# Patient Record
Sex: FEMALE | Race: WHITE | NOT HISPANIC OR LATINO | Employment: OTHER | URBAN - METROPOLITAN AREA
[De-identification: names, ages, dates, MRNs, and addresses within clinical notes are randomized per-mention and may not be internally consistent; named-entity substitution may affect disease eponyms.]

---

## 2017-02-13 ENCOUNTER — HOSPITAL ENCOUNTER (EMERGENCY)
Facility: MEDICAL CENTER | Age: 52
End: 2017-02-13
Attending: EMERGENCY MEDICINE
Payer: COMMERCIAL

## 2017-02-13 VITALS
WEIGHT: 166.67 LBS | BODY MASS INDEX: 26.79 KG/M2 | SYSTOLIC BLOOD PRESSURE: 131 MMHG | TEMPERATURE: 98.2 F | HEIGHT: 66 IN | HEART RATE: 90 BPM | RESPIRATION RATE: 16 BRPM | OXYGEN SATURATION: 98 % | DIASTOLIC BLOOD PRESSURE: 74 MMHG

## 2017-02-13 DIAGNOSIS — J20.9 ACUTE BRONCHITIS, UNSPECIFIED ORGANISM: ICD-10-CM

## 2017-02-13 PROCEDURE — 99283 EMERGENCY DEPT VISIT LOW MDM: CPT

## 2017-02-13 RX ORDER — ALBUTEROL SULFATE 90 UG/1
2 AEROSOL, METERED RESPIRATORY (INHALATION) EVERY 6 HOURS PRN
Qty: 8.5 G | Refills: 0 | Status: SHIPPED | OUTPATIENT
Start: 2017-02-13 | End: 2018-12-31 | Stop reason: SDUPTHER

## 2017-02-13 RX ORDER — AZITHROMYCIN 250 MG/1
TABLET, FILM COATED ORAL
Qty: 6 TAB | Refills: 0 | Status: SHIPPED | OUTPATIENT
Start: 2017-02-13 | End: 2017-06-19

## 2017-02-13 RX ORDER — PREDNISONE 20 MG/1
60 TABLET ORAL DAILY
Qty: 15 TAB | Refills: 0 | Status: SHIPPED | OUTPATIENT
Start: 2017-02-13 | End: 2017-02-18

## 2017-02-13 ASSESSMENT — PAIN SCALES - GENERAL: PAINLEVEL_OUTOF10: 9

## 2017-02-13 NOTE — ED AVS SNAPSHOT
2/13/2017          Marilyn Salinas  [address]  [place] [address]    Dear Marilyn:    [facility] wants to ensure your discharge home is safe and you or your loved ones have had all your questions answered regarding your care after you leave the hospital.    You may receive a telephone call within two days of your discharge.  This call is to make certain you understand your discharge instructions as well as ensure we provided you with the best care possible during your stay with us.     The call will only last approximately 3-5 minutes and will be done by a nurse.    Once again, we want to ensure your discharge home is safe and that you have a clear understanding of any next steps in your care.  If you have any questions or concerns, please do not hesitate to contact us, we are here for you.  Thank you for choosing [facility] for your healthcare needs.    Sincerely,    Jeronimo Schwartz    [facility]

## 2017-02-13 NOTE — ED AVS SNAPSHOT
Ecoviate Access Code: [email or web address]  Expires: 3/15/2017  5:47 PM    Your email address is not on file at [facility].  Email Addresses are required for you to sign up for Ecoviate, please contact [phone] to verify your personal information and to provide your email address prior to attempting to register for Ecoviate.    Marilyn Salinas  [address]  [place] [address]    Ecoviate  A secure, online tool to manage your health information     [facility]’s Ecoviate® is a secure, online tool that connects you to your personalized health information from the privacy of your home -- day or night - making it very easy for you to manage your healthcare. Once the activation process is completed, you can even access your medical information using the Ecoviate bubba, which is available for free in the Apple Bubba store or Google Play store.     To learn more about Ecoviate, visit [email or web address]    There are two levels of access available (as shown below):   My Chart Features  [facility] Primary Care Doctor [facility]  Specialists [facility]  Urgent  Care Non-[facility] Primary Care Doctor   Email your healthcare team securely and privately 24/7 X X X    Manage appointments: schedule your next appointment; view details of past/upcoming appointments X      Request prescription refills. X      View recent personal medical records, including lab and immunizations X X X X   View health record, including health history, allergies, medications X X X X   Read reports about your outpatient visits, procedures, consult and ER notes X X X X   See your discharge summary, which is a recap of your hospital and/or ER visit that includes your diagnosis, lab results, and care plan X X  X     How to register for Ecoviate:  Once your e-mail address has been verified, follow the following steps to sign up for Ecoviate.     1. Go to  [email or web address]  2. Click on the Sign Up Now box, which takes you to the New Member Sign Up page.  You will need to provide the following information:  a. Enter your mth sense Access Code exactly as it appears at the top of this page. (You will not need to use this code after you’ve completed the sign-up process. If you do not sign up before the expiration date, you must request a new code.)   b. Enter your date of birth.   c. Enter your home email address.   d. Click Submit, and follow the next screen’s instructions.  3. Create a [employer] ID. This will be your mth sense login ID and cannot be changed, so think of one that is secure and easy to remember.  4. Create a mth sense password. You can change your password at any time.  5. Enter your Password Reset Question and Answer. This can be used at a later time if you forget your password.   6. Enter your e-mail address. This allows you to receive e-mail notifications when new information is available in mth sense.  7. Click Sign Up. You can now view your health information.    For assistance activating your mth sense account, call [phone]

## 2017-02-13 NOTE — ED AVS SNAPSHOT
Home Care Instructions                                                                                                                Marilyn Salinas   MRN: [id number]    Department:  [facility], Emergency Dept   Date of Visit:  2/13/2017            [facility], Emergency Dept    [address] [place] [id number]    Phone:  [phone]      You were seen by     Mando Avalos M.D.      Your Diagnosis Was     Acute bronchitis, unspecified organism     J20.9       Follow-up Information     1. Follow up with [facility], Emergency Dept.    Specialty:  Emergency Medicine    Why:  If symptoms worsen    Contact information    [address] [place] [id number] [phone]      Medication Information     Review all of your home medications and newly ordered medications with your primary doctor and/or pharmacist as soon as possible. Follow medication instructions as directed by your doctor and/or pharmacist.     Please keep your complete medication list with you and share with your physician. Update the information when medications are discontinued, doses are changed, or new medications (including over-the-counter products) are added; and carry medication information at all times in the event of emergency situations.               Medication List      START taking these medications        Instructions    albuterol 108 (90 BASE) MCG/ACT Aers inhalation aerosol    Inhale 2 Puffs by mouth every 6 hours as needed for Shortness of Breath.   Dose:  2 Puff       azithromycin 250 MG Tabs   Commonly known as:  ZITHROMAX    Take two tabs by mouth on day one, then one tab by mouth daily on days 2-5.       predniSONE 20 MG Tabs   Commonly known as:  DELTASONE    Take 3 Tabs by mouth every day for 5 days. Take with food   Dose:  60 mg                 Discharge Instructions       Bronchitis  Bronchitis is the body's way of reacting to injury and/or infection  (inflammation) of the bronchi. Bronchi are the air tubes that extend from the windpipe into the lungs. If the inflammation becomes severe, it may cause shortness of breath.  CAUSES   Inflammation may be caused by:  · A virus.  · Germs (bacteria).  · Dust.  · Allergens.  · Pollutants and many other irritants.  The cells lining the bronchial tree are covered with tiny hairs (cilia). These constantly beat upward, away from the lungs, toward the mouth. This keeps the lungs free of pollutants. When these cells become too irritated and are unable to do their job, mucus begins to develop. This causes the characteristic cough of bronchitis. The cough clears the lungs when the cilia are unable to do their job. Without either of these protective mechanisms, the mucus would settle in the lungs. Then you would develop pneumonia.  Smoking is a common cause of bronchitis and can contribute to pneumonia. Stopping this habit is the single most important thing you can do to help yourself.  TREATMENT   · Your caregiver may prescribe an antibiotic if the cough is caused by bacteria. Also, medicines that open up your airways make it easier to breathe. Your caregiver may also recommend or prescribe an expectorant. It will loosen the mucus to be coughed up. Only take over-the-counter or prescription medicines for pain, discomfort, or fever as directed by your caregiver.  · Removing whatever causes the problem (smoking, for example) is critical to preventing the problem from getting worse.  · Cough suppressants may be prescribed for relief of cough symptoms.  · Inhaled medicines may be prescribed to help with symptoms now and to help prevent problems from returning.  · For those with recurrent (chronic) bronchitis, there may be a need for steroid medicines.  SEEK IMMEDIATE MEDICAL CARE IF:   · During treatment, you develop more pus-like mucus (purulent sputum).  · You have a fever.  · Your baby is older than 3 months with a rectal  temperature of 102° F (38.9° C) or higher.  · Your baby is 3 months old or younger with a rectal temperature of 100.4° F (38° C) or higher.  · You become progressively more ill.  · You have increased difficulty breathing, wheezing, or shortness of breath.  It is necessary to seek immediate medical care if you are elderly or sick from any other disease.  MAKE SURE YOU:   · Understand these instructions.  · Will watch your condition.  · Will get help right away if you are not doing well or get worse.  Document Released: 12/18/2006 Document Revised: 03/11/2013 Document Reviewed: 10/27/2009  ExitCare® Patient Information ©2014 [employer].            Patient Information     Patient Information    Following emergency treatment: all patient requiring follow-up care must return either to a private physician or a clinic if your condition worsens before you are able to obtain further medical attention, please return to the emergency room.     Billing Information    At [facility], we work to make the billing process streamlined for our patients.  Our Representatives are here to answer any questions you may have regarding your hospital bill.  If you have insurance coverage and have supplied your insurance information to us, we will submit a claim to your insurer on your behalf.  Should you have any questions regarding your bill, we can be reached online or by phone as follows:  Online: You are able pay your bills online or live chat with our representatives about any billing questions you may have. We are here to help Monday - Friday from 8:00am to 7:30pm and 9:00am - 12:00pm on Saturdays.  Please visit [email or web address]  for more information.   Phone:  [phone] or [phone]    Please note that your emergency physician, surgeon, pathologist, radiologist, anesthesiologist, and other specialists are not employed by [facility] and will therefore bill separately for their services.   Please contact them directly for any questions concerning their bills at the numbers below:     Emergency Physician Services:  [phone]  [facility]:  [phone]  [facility]:  [phone]  [facility]:  [phone]    1. Your final bill may vary from the amount quoted upon discharge if all procedures are not complete at that time, or if your doctor has additional procedures of which we are not aware. You will receive an additional bill if you return to the Emergency Department at [facility] for suture removal regardless of the facility of which the sutures were placed.     2. Please arrange for settlement of this account at the emergency registration.    3. All self-pay accounts are due in full at the time of treatment.  If you are unable to meet this obligation then payment is expected within 4-5 days.     4. If you have had radiology studies (CT, X-ray, Ultrasound, MRI), you have received a preliminary result during your emergency department visit. Please contact the radiology department [phone] to receive a copy of your final result. Please discuss the Final result with your primary physician or with the follow up physician provided.     Crisis Hotline:  [place] Crisis Hotline:  8-347-YDLFLQH or 1-642.346.3497  [place] Crisis Hotline:    1-483.532.9371 or 079-532-0177         ED Discharge Follow Up Questions    1. In order to provide you with very good care, we would like to follow up with a phone call in the next few days.  May we have your permission to contact you?     YES /  NO    2. What is the best phone number to call you? (       )_____-__________    3. What is the best time to call you?      Morning  /  Afternoon  /  Evening                   Patient Signature:  ____________________________________________________________    Date:  ____________________________________________________________

## 2017-02-14 NOTE — ED NOTES
".  Chief Complaint   Patient presents with   • Congestion     In head and chest x 1.5 months     ./76 mmHg  Pulse 100  Temp(Src) 36.8 °C (98.2 °F) (Temporal)  Resp 18  Ht 1.676 m (5' 6\")  Wt 75.6 kg (166 lb 10.7 oz)  BMI 26.91 kg/m2  SpO2 93%    Ambulatory to triage with above complaints, educated on triage process, placed in lobby, told to inform staff of any changes  "

## 2017-02-14 NOTE — DISCHARGE INSTRUCTIONS

## 2017-02-14 NOTE — ED PROVIDER NOTES
"ED Provider Note    CHIEF COMPLAINT  Chief Complaint   Patient presents with   • Congestion     In head and chest x 1.5 months       HPI  Marilyn Salinas is a 51 y.o. female who presents to the emergency department with chest congestion of approximately a month and a half in duration. She states her symptoms have gradually gotten worse. She also has a lot of congestion in her face. She does have a productive cough. She has not had any fevers nor vomiting. She does abuse tobacco products but does not utilize any inhalers. She has not been on recent steroids or antibiotics.    REVIEW OF SYSTEMS  No nausea or vomiting    PHYSICAL EXAM  VITAL SIGNS: /76 mmHg  Pulse 100  Temp(Src) 36.8 °C (98.2 °F) (Temporal)  Resp 18  Ht 1.676 m (5' 6\")  Wt 75.6 kg (166 lb 10.7 oz)  BMI 26.91 kg/m2  SpO2 93%  In general the patient appears uncomfortable but nontoxic  Nares the patient has swollen turbinates, tympanic membranes are retracted bilaterally, oropharynx nonerythematous  Neck is supple without adenopathy  Pulmonary diffuse rhonchi throughout, slight tachypnea, diffuse wheezing  Skin no erythema nor induration    COURSE & MEDICAL DECISION MAKING  Pertinent Labs & Imaging studies reviewed. (See chart for details)  This a 51-year-old female who presents with acute bronchitis. The patient be treated with azithromycin, prednisone, and albuterol. She does not appear toxic. She'll return if she does not have improvement in 4-5 days and sooner if worse.    FINAL IMPRESSION  1. Acute bronchitis with reactive airway disease     Disposition  The patient will be discharged in stable condition.      Electronically signed by: Mando Avalos, 2/13/2017 5:47 PM        "

## 2017-06-19 ENCOUNTER — OFFICE VISIT (OUTPATIENT)
Dept: MEDICAL GROUP | Facility: MEDICAL CENTER | Age: 52
End: 2017-06-19
Attending: NURSE PRACTITIONER
Payer: MEDICAID

## 2017-06-19 VITALS
BODY MASS INDEX: 29.88 KG/M2 | HEIGHT: 64 IN | DIASTOLIC BLOOD PRESSURE: 80 MMHG | HEART RATE: 88 BPM | SYSTOLIC BLOOD PRESSURE: 120 MMHG | RESPIRATION RATE: 12 BRPM | TEMPERATURE: 98.4 F | OXYGEN SATURATION: 90 % | WEIGHT: 175 LBS

## 2017-06-19 DIAGNOSIS — H54.7 POOR VISION: ICD-10-CM

## 2017-06-19 DIAGNOSIS — M79.672 PAIN IN BOTH FEET: ICD-10-CM

## 2017-06-19 DIAGNOSIS — M51.36 DDD (DEGENERATIVE DISC DISEASE), LUMBAR: ICD-10-CM

## 2017-06-19 DIAGNOSIS — Z13.220 SCREENING CHOLESTEROL LEVEL: ICD-10-CM

## 2017-06-19 DIAGNOSIS — M79.671 PAIN IN BOTH FEET: ICD-10-CM

## 2017-06-19 DIAGNOSIS — N95.1 PERI-MENOPAUSE: ICD-10-CM

## 2017-06-19 DIAGNOSIS — Z12.11 SCREEN FOR COLON CANCER: ICD-10-CM

## 2017-06-19 DIAGNOSIS — Z13.21 ENCOUNTER FOR VITAMIN DEFICIENCY SCREENING: ICD-10-CM

## 2017-06-19 DIAGNOSIS — G89.29 OTHER CHRONIC PAIN: ICD-10-CM

## 2017-06-19 DIAGNOSIS — F41.9 ANXIETY AND DEPRESSION: ICD-10-CM

## 2017-06-19 DIAGNOSIS — M20.42 HAMMER TOES OF BOTH FEET: ICD-10-CM

## 2017-06-19 DIAGNOSIS — E66.9 OBESITY (BMI 30-39.9): ICD-10-CM

## 2017-06-19 DIAGNOSIS — Z72.0 TOBACCO USE: ICD-10-CM

## 2017-06-19 DIAGNOSIS — J45.20 MILD INTERMITTENT ASTHMA WITHOUT COMPLICATION: ICD-10-CM

## 2017-06-19 DIAGNOSIS — K30 ACID INDIGESTION: ICD-10-CM

## 2017-06-19 DIAGNOSIS — R09.89 CHEST CONGESTION: ICD-10-CM

## 2017-06-19 DIAGNOSIS — R09.81 NASAL CONGESTION: ICD-10-CM

## 2017-06-19 DIAGNOSIS — Z00.00 ROUTINE HEALTH MAINTENANCE: ICD-10-CM

## 2017-06-19 DIAGNOSIS — F32.A ANXIETY AND DEPRESSION: ICD-10-CM

## 2017-06-19 DIAGNOSIS — Z12.39 SCREENING FOR BREAST CANCER: ICD-10-CM

## 2017-06-19 DIAGNOSIS — M20.41 HAMMER TOES OF BOTH FEET: ICD-10-CM

## 2017-06-19 DIAGNOSIS — Z13.1 SCREENING FOR DIABETES MELLITUS: ICD-10-CM

## 2017-06-19 DIAGNOSIS — Z91.09 ENVIRONMENTAL ALLERGIES: ICD-10-CM

## 2017-06-19 DIAGNOSIS — N95.1 MENOPAUSAL SYMPTOM: ICD-10-CM

## 2017-06-19 DIAGNOSIS — Z13.29 SCREENING FOR THYROID DISORDER: ICD-10-CM

## 2017-06-19 PROBLEM — M51.369 DDD (DEGENERATIVE DISC DISEASE), LUMBAR: Status: ACTIVE | Noted: 2017-06-19

## 2017-06-19 PROCEDURE — 99203 OFFICE O/P NEW LOW 30 MIN: CPT | Performed by: NURSE PRACTITIONER

## 2017-06-19 PROCEDURE — 99204 OFFICE O/P NEW MOD 45 MIN: CPT | Performed by: NURSE PRACTITIONER

## 2017-06-19 RX ORDER — AZITHROMYCIN 250 MG/1
TABLET, FILM COATED ORAL
Qty: 6 TAB | Refills: 0 | Status: SHIPPED | OUTPATIENT
Start: 2017-06-19 | End: 2017-07-31

## 2017-06-19 RX ORDER — FLUTICASONE PROPIONATE 50 MCG
1 SPRAY, SUSPENSION (ML) NASAL 2 TIMES DAILY
Qty: 16 G | Refills: 2 | Status: SHIPPED | OUTPATIENT
Start: 2017-06-19 | End: 2018-04-10 | Stop reason: SDUPTHER

## 2017-06-19 RX ORDER — METHOCARBAMOL 500 MG/1
500 TABLET, FILM COATED ORAL 2 TIMES DAILY PRN
Qty: 60 TAB | Refills: 2 | Status: SHIPPED | OUTPATIENT
Start: 2017-06-19 | End: 2018-09-06 | Stop reason: SDUPTHER

## 2017-06-19 RX ORDER — RANITIDINE 150 MG/1
300 TABLET ORAL DAILY
COMMUNITY
End: 2017-06-19 | Stop reason: SDUPTHER

## 2017-06-19 RX ORDER — CODEINE PHOSPHATE/GUAIFENESIN 10-100MG/5
5 LIQUID (ML) ORAL 2 TIMES DAILY PRN
Qty: 120 ML | Refills: 0 | Status: SHIPPED | OUTPATIENT
Start: 2017-06-19 | End: 2017-07-31

## 2017-06-19 RX ORDER — IBUPROFEN 800 MG/1
800 TABLET ORAL EVERY 8 HOURS PRN
COMMUNITY
End: 2017-06-19 | Stop reason: SDUPTHER

## 2017-06-19 RX ORDER — RANITIDINE 150 MG/1
150 TABLET ORAL 2 TIMES DAILY
Qty: 60 TAB | Refills: 2 | Status: SHIPPED | OUTPATIENT
Start: 2017-06-19 | End: 2017-12-12 | Stop reason: SDUPTHER

## 2017-06-19 RX ORDER — IBUPROFEN 800 MG/1
800 TABLET ORAL EVERY 12 HOURS PRN
Qty: 60 TAB | Refills: 2 | Status: SHIPPED | OUTPATIENT
Start: 2017-06-19 | End: 2017-10-02

## 2017-06-19 RX ORDER — LORATADINE 10 MG/1
10 TABLET ORAL DAILY
Qty: 30 TAB | Refills: 2 | Status: SHIPPED | OUTPATIENT
Start: 2017-06-19 | End: 2017-11-24 | Stop reason: SDUPTHER

## 2017-06-19 RX ORDER — METHOCARBAMOL 500 MG/1
500 TABLET, FILM COATED ORAL DAILY
COMMUNITY
End: 2017-06-19 | Stop reason: SDUPTHER

## 2017-06-19 RX ORDER — BENZONATATE 100 MG/1
100 CAPSULE ORAL 3 TIMES DAILY PRN
Qty: 30 CAP | Refills: 0 | Status: SHIPPED | OUTPATIENT
Start: 2017-06-19 | End: 2017-07-31

## 2017-06-19 RX ORDER — GABAPENTIN 100 MG/1
100 CAPSULE ORAL 3 TIMES DAILY
Qty: 90 CAP | Refills: 1 | Status: SHIPPED | OUTPATIENT
Start: 2017-06-19 | End: 2017-07-31 | Stop reason: SDUPTHER

## 2017-06-19 ASSESSMENT — PAIN SCALES - GENERAL: PAINLEVEL: 10=SEVERE PAIN

## 2017-06-19 ASSESSMENT — PATIENT HEALTH QUESTIONNAIRE - PHQ9
5. POOR APPETITE OR OVEREATING: 3 - NEARLY EVERY DAY
CLINICAL INTERPRETATION OF PHQ2 SCORE: 5
SUM OF ALL RESPONSES TO PHQ QUESTIONS 1-9: 23

## 2017-06-19 NOTE — PROGRESS NOTES
"Marilyn presents to the clinic to establish as New Patient.  Pt reports moved back to [facility] in Jan 2017    Her PMH includes:    Asthma  Acid Indigestion  \"Arthritis\"  Anxiety and Depression  Heart Murmur ( normal echo)  Tobacco use-Smoking  Marijuana use-smoking.  Asthmatic Bronchitis/chest congestion  Lumpectomy  DDD Lumbar Spine    [place]  Report shows:  No entries.    Chief Complaint   Patient presents with   • New Patient   • Menopause   • Nasal Congestion   • Foot Problem         HISTORY OF THE PRESENT ILLNESS: Patient is a 51 y.o. female.  Moved back to this area from [place] in Jan 2017.    Routine health maintenance  LMP- 1/11/17  Last PAP- ~ 1987  Last Mammogram 2012    Obesity (BMI 30-39.9)  WE discussed her over-weight status and need to cut back  On portion size, increase exercise, decrease sugar/carb intake.  Will check TSH level, Hgb A1C    Chest congestion/cough,   Pt reports has chest congestion since January since moving from [place].  Itchy eyes, runny nose,   Slight wheezing, Smokes 1/4 ppd and daily marijuana.  Discussed her need to quit smoking everything.  Mucus she coughs up is \"yellow and green\".  Treated in ER for same in Feb for same;    Pain in both feet  Pt reports pain in feet \"my entire life\"  Pt reports is taking Motrin and helps.  States feet sometimes swell each day.  When elevate feet swelling gets better.      Anxiety and depression  Pt reports anxiety and depression for about 15 yrs.  Pt reports saw Psychiatrist in [facility] in March and recommended Zoloft but nevere started, In past Paxil and PRozac caused her to be irritable and \"scattered\".  Scared to take medication.  Denies suicidal ideation.  Pt lives in [place] and would prefer provider there.    DDD (degenerative disc disease), lumbar  Pt reports has \"arthritis\" and \"bulging discs\".  In past used Tramadol.    Pt crying and states pain in low back and Feet is \"bad\".Denies saddle paresthesia or loss of bowel or bladder " control.      Acid indigestion  Pt reports Acid indigestion/GERD.  Zantac helps.  Denies dark or bloody stools    Poor vision  Pt reports previous eye surgery.  States needs glasses and wants to see optometrist for vision check  And glasses.  I have given her [facility] info and directions to call for  Appt.  Denies eye pain or drainage.      Allergies: Review of patient's allergies indicates no known allergies.    Current Outpatient Prescriptions Ordered in [facility]   Medication Sig Dispense Refill   • sertraline (ZOLOFT) 50 MG Tab Take 50 mg by mouth every day.     • ibuprofen (MOTRIN) 800 MG Tab Take 1 Tab by mouth every 12 hours as needed for Mild Pain, Moderate Pain or Inflammation. 60 Tab 2   • methocarbamol (ROBAXIN) 500 MG Tab Take 1 Tab by mouth 2 times a day as needed. 60 Tab 2   • guaifenesin-codeine (TUSSI-ORGANIDIN NR) 100-10 MG/5ML syrup Take 5 mL by mouth 2 times a day as needed. 120 mL 0   • benzonatate (TESSALON) 100 MG Cap Take 1 Cap by mouth 3 times a day as needed. 30 Cap 0   • azithromycin (ZITHROMAX) 250 MG Tab Z pack-Take 2 tabs day one and then 1 tab daily for a total of 5 days 6 Tab 0   • loratadine (CLARITIN) 10 MG Tab Take 1 Tab by mouth every day. 30 Tab 2   • fluticasone (FLONASE ALLERGY RELIEF) 50 MCG/ACT nasal spray Spray 1 Spray in nose 2 times a day. 16 g 2   • gabapentin (NEURONTIN) 100 MG Cap Take 1 Cap by mouth 3 times a day. 90 Cap 1   • ranitidine (ZANTAC) 150 MG Tab Take 1 Tab by mouth 2 times a day. 60 Tab 2   • albuterol 108 (90 BASE) MCG/ACT Aero Soln inhalation aerosol Inhale 2 Puffs by mouth every 6 hours as needed for Shortness of Breath. 8.5 g 0     No current Epic-ordered facility-administered medications on file.       Past Medical History   Diagnosis Date   • Anxiety    • Depression    • Arthritis    • Asthma        Past Surgical History   Procedure Laterality Date   • Eye surgery     • Lumpectomy         Social History   Substance Use Topics   • Smoking status:  "Current Every Day Smoker -- 0.25 packs/day for 26 years     Types: Cigarettes   • Smokeless tobacco: Never Used   • Alcohol Use: No       No family status information on file.     Family History   Problem Relation Age of Onset   • Cancer Mother    • Alcohol/Drug Mother    • Cancer Brother    • Diabetes Maternal Aunt        Review of Systems   Constitutional: Negative for fever, chills, weight loss and malaise/fatigue.   HENT: Negative for ear pain, nosebleeds, sore throat and neck pain. Positive for runny nose and congestion.   Positive for itchy eyes, sneezing since moving back to [place] in January.  Eyes: Negative for blurred vision.   Respiratory: Positive for cough, sputum production, mild shortness of breath and wheezing.    Cardiovascular: Negative for chest pain, palpitations, orthopnea. Positive for intermittent lower leg swelling relieved with elevation of legs.   Gastrointestinal: Positive for heartburn. Negative for nausea, vomiting and abdominal pain.   Genitourinary: Negative for dysuria, urgency and frequency.   Musculoskeletal: Negative for myalgias. Positive for chronic Low back pain and joint pain(Bilat feet pain for years).   Skin: Negative for rash and itching.   Neurological: Negative for dizziness, tingling, tremors, sensory change, focal weakness and headaches.   Endo/Heme/Allergies: Does not bruise/bleed easily.   Psychiatric/Behavioral: Negative for memory loss. Positive for depression, anxiety. Negative for suicidal ideation     Current medications, allergies, and problem list reviewed with patient and updated in  [facility] today.      Exam: Blood pressure 120/80, pulse 88, temperature 36.9 °C (98.4 °F), resp. rate 12, height 1.626 m (5' 4\"), weight 79.379 kg (175 lb), last menstrual period 01/11/2017, SpO2 90 %.  General: Normal appearing. moderate distress. Intermittent tearfulness.  HEENT: Normocephalic. Eyes conjunctiva clear lids without ptosis, Right eye slight deviation to right, pupils " equal and reactive to light accommodation, ears normal shape and contour, canals are clear bilaterally, TM's are slightly red in color bilat., nasal mucosa benign, oropharynx is without erythema, edema or exudates.   Neck: Supple without JVD. Thyroid is not enlarged. No lymph node swelling or tenderness to anterior neck.  Pulmonary: Scattered rhonchi to upper lung fields to ausculation. Slight bilat inspiratory wheezes.  Normal effort. No rales.  Cardiovascular: Regular rate and rhythm. Radial pulses are intact and equal bilaterally.  Abdomen: Soft, nontender, nondistended.   Neurologic: Grossly nonfocal  Lymph: No cervical or supraclavicular lymph nodes are palpable  Skin: Warm and dry.  No obvious lesions.  Musculoskeletal: Normal gait. No extremity cyanosis, clubbing, or edema.  Psych: Anxious mood and affect. At times tearful during encounter.  Alert and oriented x3. Judgment and insight is normal.        Assessment/Plan  1. Routine health maintenance  CBC WITH DIFFERENTIAL    COMP METABOLIC PANEL    HEPATITIS PANEL ACUTE(4 COMPONENTS)    HIV ANTIBODIES   2. Screening for thyroid disorder  TSH   3. Encounter for vitamin deficiency screening  VITAMIN D,25 HYDROXY    VITAMIN B12   4. Screening cholesterol level  LIPID PROFILE   5. Screening for diabetes mellitus  HEMOGLOBIN A1C   6. Screen for colon cancer  OCCULT BLOOD FECES IMMUNOASSAY    OCCULT BLOOD FECES IMMUNOASSAY   7. Obesity (BMI 30-39.9)  Patient identified as having weight management issue.  Appropriate orders and counseling given.   8. Screening for breast cancer  MA-SCREEN MAMMO W/CAD-BILAT   9. Mild intermittent asthma without complication  Albuterol as discussed.   10. Nasal congestion  Pt to use Flonase allergy relief spray.   11. Tobacco use  Pt counseled to quit all smoking (cigarettes and Marijuana)   12. Anxiety and depression  REFERRAL TO PSYCHIATRY([facility])    REFERRAL TO PSYCHOLOGY([facility])   13. Acid indigestion  ranitidine  (ZANTAC) 150 MG Tab   14. DDD (degenerative disc disease), lumbar  REFERRAL TO PAIN CLINIC    ibuprofen (MOTRIN) 800 MG Tab    methocarbamol (ROBAXIN) 500 MG Tab  Gabapentin RX   15. Dolly-menopause  Referral to GYN   16. Chest congestion /Asthmatic Bronchitis RX Zpak and Robitussin Codeine   17. Pain in both feet  REFERRAL TO PAIN CLINIC    DX-JOINT SURVEY-FEET SINGLE VIEW    ibuprofen (MOTRIN) 800 MG Tab    methocarbamol (ROBAXIN) 500 MG Tab   18. Other chronic pain  ibuprofen (MOTRIN) 800 MG Tab    methocarbamol (ROBAXIN) 500 MG Tab   19. Hammer toes of both feet  REFERRAL TO ORTHOPEDICS   20. Environmental allergies  loratadine (CLARITIN) 10 MG Tab    fluticasone (FLONASE ALLERGY RELIEF) 50 MCG/ACT nasal spray   21. Poor vision  [facility]   Discussed no narcotics today. Pt to start Gabapentin slowly.  Follow up in 4 weeks. Call or return if questions, concerns, or worsening condition.

## 2017-06-19 NOTE — ASSESSMENT & PLAN NOTE
"Pt reports has chest congestion since January since moving from [place].  Itchy eyes, runny nose,   Slight wheezing, Smokes 1/4 ppd and daily marijuana.  Discussed her need to quit smoking everything.  Mucus she coughs up is \"yellow and green\".  Treated in ER for same in Feb for same;  "

## 2017-06-19 NOTE — ASSESSMENT & PLAN NOTE
"Pt reports anxiety and depression for about 15 yrs.  Pt reports saw Psychiatrist in [facility] in March and recommended Zoloft but nevere started, In past Paxil and PRozac caused her to be irritable and \"scattered\".  Scared to take medication.  Denies suicidal ideation.  Pt lives in [place] and would prefer provider there.  "

## 2017-06-19 NOTE — MR AVS SNAPSHOT
"        Marilyn Cruzva   2017 8:30 AM   Office Visit   MRN: [id number]    Department[facility]   Dept Phone:  [phone]    Description:  Female : 1965   Provider:  YINKA Donato           Reason for Visit     New Patient     Menopause     Nasal Congestion     Foot Problem           Allergies as of 2017     No Known Allergies      You were diagnosed with     Routine health maintenance   [644999]       Screening for thyroid disorder   [V77.0.ICD-9-CM]       Encounter for vitamin deficiency screening   [638254]       Screening cholesterol level   [138541]       Screening for diabetes mellitus   [V77.1.ICD-9-CM]       Screen for colon cancer   [410937]       Obesity (BMI 30-39.9)   [258576]       Screening for breast cancer   [713525]       Mild intermittent asthma without complication   [361068]       Nasal congestion   [347369]       Tobacco use   [117216]       Anxiety and depression   [702593]       Acid indigestion   [705136]       DDD (degenerative disc disease), lumbar   [355347]       Dolly-menopause   [727610]       Chest congestion   [680415]       Pain in both feet   [287745]       Other chronic pain   [338.29.ICD-9-CM]       Hammer toes of both feet   [0104096]       Environmental allergies   [033869]         Vital Signs     Blood Pressure Pulse Temperature Respirations Height Weight    120/80 mmHg 88 36.9 °C (98.4 °F) 12 1.626 m (5' 4\") 79.379 kg (175 lb)    Body Mass Index Oxygen Saturation Last Menstrual Period Smoking Status          30.02 kg/m2 90% 2017 Current Every Day Smoker        Basic Information     Date Of Birth Sex Race Ethnicity Preferred Language    1965 Female White Non- English      Your appointments     2017 10:10 AM   Established Patient with YINKA Dontao   [facility]    [address] [place] [id number] [phone]           You will be receiving a confirmation call a few days " before your appointment from our automated call confirmation system.              Problem List              ICD-10-CM Priority Class Noted - Resolved    Routine health maintenance Z00.00   6/19/2017 - Present    Obesity (BMI 30-39.9) E66.9   6/19/2017 - Present    Mild intermittent asthma J45.20   6/19/2017 - Present    Tobacco use Z72.0   6/19/2017 - Present    Anxiety and depression F41.9, F32.9   6/19/2017 - Present    Acid indigestion K30   6/19/2017 - Present    DDD (degenerative disc disease), lumbar M51.36   6/19/2017 - Present    Dolly-menopause N95.1   6/19/2017 - Present    Chest congestion R09.89   6/19/2017 - Present    Pain in both feet M79.671, M79.672   6/19/2017 - Present      Health Maintenance        Date Due Completion Dates    IMM DTaP/Tdap/Td Vaccine (1 - Tdap) 7/1/1985 ---    PAP SMEAR 7/1/1987 ---    MAMMOGRAM 2/24/2012 2/24/2011, 5/19/2009, 5/19/2009, 11/4/2008, 10/16/2008, 10/16/2008, 3/12/2004    COLONOSCOPY 7/1/2016 ---            Current Immunizations     No immunizations on file.      Below and/or attached are the medications your provider expects you to take. Review all of your home medications and newly ordered medications with your provider and/or pharmacist. Follow medication instructions as directed by your provider and/or pharmacist. Please keep your medication list with you and share with your provider. Update the information when medications are discontinued, doses are changed, or new medications (including over-the-counter products) are added; and carry medication information at all times in the event of emergency situations     Allergies:  No Known Allergies          Medications  Valid as of: June 19, 2017 -  8:43 AM    Generic Name Brand Name Tablet Size Instructions for use    Albuterol Sulfate (Aero Soln) albuterol 108 (90 BASE) MCG/ACT Inhale 2 Puffs by mouth every 6 hours as needed for Shortness of Breath.        Azithromycin (Tab) ZITHROMAX 250 MG Z pack-Take 2 tabs day  one and then 1 tab daily for a total of 5 days        Benzonatate (Cap) TESSALON 100 MG Take 1 Cap by mouth 3 times a day as needed.        Fluticasone Propionate (Suspension) FLONASE 50 MCG/ACT Spray 1 Spray in nose 2 times a day.        Gabapentin (Cap) NEURONTIN 100 MG Take 1 Cap by mouth 3 times a day.        Guaifenesin-Codeine (Syrup) TUSSI-ORGANIDIN -10 MG/5ML Take 5 mL by mouth 2 times a day as needed.        Ibuprofen (Tab) MOTRIN 800 MG Take 1 Tab by mouth every 12 hours as needed for Mild Pain, Moderate Pain or Inflammation.        Loratadine (Tab) CLARITIN 10 MG Take 1 Tab by mouth every day.        Methocarbamol (Tab) ROBAXIN 500 MG Take 1 Tab by mouth 2 times a day as needed.        RaNITidine HCl (Tab) ZANTAC 150 MG Take 1 Tab by mouth 2 times a day.        Sertraline HCl (Tab) ZOLOFT 50 MG Take 50 mg by mouth every day.        .                 Medicines prescribed today were sent to:     [facility] [address] [place], [place] [address] [address] [address]    Phone: [phone] Fax: [phone]    Open 24 Hours?: No      Medication refill instructions:       If your prescription bottle indicates you have medication refills left, it is not necessary to call your provider’s office. Please contact your pharmacy and they will refill your medication.    If your prescription bottle indicates you do not have any refills left, you may request refills at any time through one of the following ways: The online Kite system (except Urgent Care), by calling your provider’s office, or by asking your pharmacy to contact your provider’s office with a refill request. Medication refills are processed only during regular business hours and may not be available until the next business day. Your provider may request additional information or to have a follow-up visit with you prior to refilling your medication.   *Please Note: Medication refills are assigned a new Rx  number when refilled electronically. Your pharmacy may indicate that no refills were authorized even though a new prescription for the same medication is available at the pharmacy. Please request the medicine by name with the pharmacy before contacting your provider for a refill.        Your To Do List     Future Labs/Procedures Complete By Expires    CBC WITH DIFFERENTIAL  As directed 6/19/2018    COMP METABOLIC PANEL  As directed 6/19/2018    DX-JOINT SURVEY-FEET SINGLE VIEW  As directed 6/19/2018    HEMOGLOBIN A1C  As directed 6/19/2018    HEPATITIS PANEL ACUTE(4 COMPONENTS)  As directed 6/19/2018    HIV ANTIBODIES  As directed 6/19/2018    LIPID PROFILE  As directed 6/19/2018    OCCULT BLOOD FECES IMMUNOASSAY  As directed 6/19/2018    OCCULT BLOOD FECES IMMUNOASSAY  As directed 6/19/2018    TSH  As directed 6/19/2018    VITAMIN B12  As directed 6/19/2018    VITAMIN D,25 HYDROXY  As directed 6/19/2018      Referral     A referral request has been sent to our patient care coordination department. Please allow 3-5 business days for us to process this request and contact you either by phone or mail. If you do not hear from us by the 5th business day, please call us at [phone].           Healthonomy Access Code: [email or web address]  Expires: 6/29/2017  2:49 PM    Healthonomy  A secure, online tool to manage your health information     [employer]’s Healthonomy® is a secure, online tool that connects you to your personalized health information from the privacy of your home -- day or night - making it very easy for you to manage your healthcare. Once the activation process is completed, you can even access your medical information using the Healthonomy bubba, which is available for free in the Apple Bubba store or Google Play store.     Healthonomy provides the following levels of access (as shown below):   My Chart Features   [facility] Doctor [facility]  Specialists [facility]  Urgent  Care Non-[facility]  Primary Care  Doctor   Email  your healthcare team securely and privately 24/7 X X X    Manage appointments: schedule your next appointment; view details of past/upcoming appointments X      Request prescription refills. X      View recent personal medical records, including lab and immunizations X X X X   View health record, including health history, allergies, medications X X X X   Read reports about your outpatient visits, procedures, consult and ER notes X X X X   See your discharge summary, which is a recap of your hospital and/or ER visit that includes your diagnosis, lab results, and care plan. X X       How to register for Av[employer]oria:  1. Go to  [email or web address].  2. Click on the Sign Up Now box, which takes you to the New Member Sign Up page. You will need to provide the following information:  a. Enter your Avtodoria Access Code exactly as it appears at the top of this page. (You will not need to use this code after you’ve completed the sign-up process. If you do not sign up before the expiration date, you must request a new code.)   b. Enter your date of birth.   c. Enter your home email address.   d. Click Submit, and follow the next screen’s instructions.  3. Create a Avtodoria ID. This will be your Avtodoria login ID and cannot be changed, so think of one that is secure and easy to remember.  4. Create a Avtodoria password. You can change your password at any time.  5. Enter your Password Reset Question and Answer. This can be used at a later time if you forget your password.   6. Enter your e-mail address. This allows you to receive e-mail notifications when new information is available in Avtodoria.  7. Click Sign Up. You can now view your health information.    For assistance activating your Avtodoria account, call [phone]        Quit Tobacco Information     Do you want to quit using tobacco?    Quitting tobacco decreases risks of cancer, heart and lung disease, increases life expectancy, improves sense of taste and smell, and  increases spending money, among other benefits.    If you are thinking about quitting, we can help.  • [facility] Quit Tobacco Program: 891.464.6309  o Program occurs weekly for four weeks and includes pharmacist consultation on products to support quitting smoking or chewing tobacco. A provider referral is needed for pharmacist consultation.  • Tobacco Users Help Hotline: 9-171-QUIT-NOW (058-3046) or https://[place].quitlogix.org/  o Free, confidential telephone and online coaching for [place] residents. Sessions are designed on a schedule that is convenient for you. Eligible clients receive free nicotine replacement therapy.  • Nationally: www.smokefree.gov  o Information and professional assistance to support both immediate and long-term needs as you become, and remain, a non-smoker. Smokefree.gov allows you to choose the help that best fits your needs.

## 2017-06-19 NOTE — ASSESSMENT & PLAN NOTE
"Pt reports has \"arthritis\" and \"bulging discs\".  In past used Tramadol.    Pt crying and states pain in low back and Feet is \"bad\".Denies saddle paresthesia or loss of bowel or bladder control.    "

## 2017-06-19 NOTE — ASSESSMENT & PLAN NOTE
"Pt reports pain in feet \"my entire life\"  Pt reports is taking Motrin and helps.  States feet sometimes swell each day.  When elevate feet swelling gets better.    "

## 2017-06-19 NOTE — ASSESSMENT & PLAN NOTE
WE discussed her over-weight status and need to cut back  On portion size, increase exercise, decrease sugar/carb intake.  Will check TSH level, Hgb A1C

## 2017-06-19 NOTE — ASSESSMENT & PLAN NOTE
Pt reports previous eye surgery.  States needs glasses and wants to see optometrist for vision check  And glasses.  I have given her [facility] info and directions to call for  Appt.  Denies eye pain or drainage.

## 2017-06-20 ENCOUNTER — HOSPITAL ENCOUNTER (OUTPATIENT)
Facility: MEDICAL CENTER | Age: 52
End: 2017-06-20
Attending: NURSE PRACTITIONER
Payer: MEDICAID

## 2017-06-20 PROCEDURE — 82274 ASSAY TEST FOR BLOOD FECAL: CPT

## 2017-06-22 ENCOUNTER — OFFICE VISIT (OUTPATIENT)
Dept: PHYSICAL MEDICINE AND REHAB | Facility: MEDICAL CENTER | Age: 52
End: 2017-06-22
Payer: MEDICAID

## 2017-06-22 VITALS
WEIGHT: 167 LBS | HEART RATE: 85 BPM | DIASTOLIC BLOOD PRESSURE: 78 MMHG | SYSTOLIC BLOOD PRESSURE: 112 MMHG | HEIGHT: 66 IN | BODY MASS INDEX: 26.84 KG/M2 | OXYGEN SATURATION: 97 % | TEMPERATURE: 96.6 F

## 2017-06-22 DIAGNOSIS — M51.36 DDD (DEGENERATIVE DISC DISEASE), LUMBAR: ICD-10-CM

## 2017-06-22 DIAGNOSIS — M79.672 PAIN IN BOTH FEET: ICD-10-CM

## 2017-06-22 DIAGNOSIS — M79.606 PAIN OF LOWER EXTREMITY, UNSPECIFIED LATERALITY: ICD-10-CM

## 2017-06-22 DIAGNOSIS — G89.29 CHRONIC BILATERAL LOW BACK PAIN WITHOUT SCIATICA: ICD-10-CM

## 2017-06-22 DIAGNOSIS — M54.2 NECK PAIN: ICD-10-CM

## 2017-06-22 DIAGNOSIS — F17.200 SMOKER: ICD-10-CM

## 2017-06-22 DIAGNOSIS — M54.50 CHRONIC BILATERAL LOW BACK PAIN WITHOUT SCIATICA: ICD-10-CM

## 2017-06-22 DIAGNOSIS — G89.29 OTHER CHRONIC PAIN: ICD-10-CM

## 2017-06-22 DIAGNOSIS — M79.671 PAIN IN BOTH FEET: ICD-10-CM

## 2017-06-22 DIAGNOSIS — Z98.890 H/O FOOT SURGERY: ICD-10-CM

## 2017-06-22 DIAGNOSIS — R26.9 IMPAIRED GAIT: ICD-10-CM

## 2017-06-22 DIAGNOSIS — Z71.89 PAIN MEDICATION AGREEMENT DISCUSSED: ICD-10-CM

## 2017-06-22 PROCEDURE — 99204 OFFICE O/P NEW MOD 45 MIN: CPT | Performed by: PHYSICAL MEDICINE & REHABILITATION

## 2017-06-22 RX ORDER — TRAMADOL HYDROCHLORIDE 50 MG/1
50 TABLET ORAL
Qty: 30 TAB | Refills: 1 | Status: SHIPPED | OUTPATIENT
Start: 2017-06-22 | End: 2018-04-10

## 2017-06-22 ASSESSMENT — ENCOUNTER SYMPTOMS
SPUTUM PRODUCTION: 0
PHOTOPHOBIA: 0
VOMITING: 0
SENSORY CHANGE: 1
NECK PAIN: 1
FEVER: 0
ORTHOPNEA: 0
DEPRESSION: 1
INSOMNIA: 1
BACK PAIN: 1
NERVOUS/ANXIOUS: 1
DOUBLE VISION: 0
PALPITATIONS: 0
CHILLS: 0
TINGLING: 1
HEMOPTYSIS: 0
MYALGIAS: 1
NAUSEA: 0

## 2017-06-22 NOTE — PROGRESS NOTES
Subjective:      Marilyn Salinas is a 51 y.o. female who presents with New Patient      Chief complaint: Foot pain        HPI   The patient notes long history of spinal/joints/musculoskeletal pain, denies specific trauma at onset. The patient had prior care in [place] prior to moving to the [facility], some records available for review.    Regarding today's visit:    The patient notes ongoing pain about the bilateral feet, with neuropathic component, worse with activities, limiting standing and walking tolerance. She has had prior podiatry care, also left foot surgery in 2005, and trial with orthotics.    She notes intermittent low back pain, relatively controlled, without overt radicular component.    She notes intermittent neck pain, relatively controlled, without radicular component.    She notes anxiety/depression, psychiatry and psychology consulted.    She has had prior treatment with medications. No bowel/bladder dysfunction noted. No focal weakness noted. Home exercise program has been limited. The ongoing pain limits her ability to function, including standing and walking tolerance.      MEDICAL RECORDS REVIEW/DATA REVIEW: Reviewed in epic.    Records Reviewed: Reviewed referring provider notes.     I reviewed medications. Ibuprofen and Robaxin helpful, tolerated. Has used tramadol in the past, tolerated, with benefit.    I reviewed [place]  profile 6/22/2017. Reviewed [place] CURES 6/22/2017, last controlled substance filled was tramadol in 11/2016.     I reviewed diagnostic studies:     I reviewed radiographs. Reviewed MRI lumbar spine 1/2016 from [place], notes multilevel degenerative changes, disc bulging, foraminal stenosis, facet arthropathy.     I reviewed lab studies.     I reviewed medical issues.     I reviewed family history: No neuromuscular disorders noted.    I reviewed social issues. Unemployed, moved to [facility] from [place]      PAST MEDICAL HISTORY:   Past Medical History    Diagnosis Date   • Anxiety    • Depression    • Arthritis    • Asthma        PAST SURGICAL HISTORY:    Past Surgical History   Procedure Laterality Date   • Eye surgery     • Lumpectomy         ALLERGIES:  Review of patient's allergies indicates no known allergies.    MEDICATIONS:    Outpatient Encounter Prescriptions as of 6/22/2017   Medication Sig Dispense Refill   • tramadol (ULTRAM) 50 MG Tab Take 1 Tab by mouth 1 time daily as needed. for pain. 30 Tab 1   • ibuprofen (MOTRIN) 800 MG Tab Take 1 Tab by mouth every 12 hours as needed for Mild Pain, Moderate Pain or Inflammation. 60 Tab 2   • methocarbamol (ROBAXIN) 500 MG Tab Take 1 Tab by mouth 2 times a day as needed. 60 Tab 2   • guaifenesin-codeine (TUSSI-ORGANIDIN NR) 100-10 MG/5ML syrup Take 5 mL by mouth 2 times a day as needed. 120 mL 0   • benzonatate (TESSALON) 100 MG Cap Take 1 Cap by mouth 3 times a day as needed. 30 Cap 0   • azithromycin (ZITHROMAX) 250 MG Tab Z pack-Take 2 tabs day one and then 1 tab daily for a total of 5 days 6 Tab 0   • loratadine (CLARITIN) 10 MG Tab Take 1 Tab by mouth every day. 30 Tab 2   • fluticasone (FLONASE ALLERGY RELIEF) 50 MCG/ACT nasal spray Spray 1 Spray in nose 2 times a day. 16 g 2   • gabapentin (NEURONTIN) 100 MG Cap Take 1 Cap by mouth 3 times a day. 90 Cap 1   • ranitidine (ZANTAC) 150 MG Tab Take 1 Tab by mouth 2 times a day. 60 Tab 2   • albuterol 108 (90 BASE) MCG/ACT Aero Soln inhalation aerosol Inhale 2 Puffs by mouth every 6 hours as needed for Shortness of Breath. 8.5 g 0   • sertraline (ZOLOFT) 50 MG Tab Take 50 mg by mouth every day.       No facility-administered encounter medications on file as of 6/22/2017.       SOCIAL HISTORY:    Social History     Social History   • Marital Status: Single     Spouse Name: N/A   • Number of Children: N/A   • Years of Education: N/A     Social History Main Topics   • Smoking status: Current Every Day Smoker -- 0.25 packs/day for 26 years     Types: Cigarettes  "  • Smokeless tobacco: Never Used   • Alcohol Use: No   • Drug Use: Yes     Special: Marijuana   • Sexual Activity: Not Currently     Other Topics Concern   •  Service No   • Blood Transfusions No   • Caffeine Concern No   • Occupational Exposure No   • Hobby Hazards No   • Sleep Concern Yes   • Stress Concern Yes   • Weight Concern Yes   • Special Diet No   • Back Care Yes   • Exercise No   • Bike Helmet Yes   • Seat Belt Yes   • Self-Exams Yes     Social History Narrative       Review of Systems   Constitutional: Negative for fever and chills.   HENT: Negative for hearing loss and tinnitus.    Eyes: Negative for double vision and photophobia.   Respiratory: Negative for hemoptysis and sputum production.    Cardiovascular: Negative for palpitations and orthopnea.   Gastrointestinal: Negative for nausea and vomiting.   Genitourinary: Negative for urgency and frequency.   Musculoskeletal: Positive for myalgias, back pain, joint pain and neck pain.   Skin: Negative.    Neurological: Positive for tingling and sensory change.   Endo/Heme/Allergies: Negative.    Psychiatric/Behavioral: Positive for depression. The patient is nervous/anxious and has insomnia.    All other systems reviewed and are negative.        Objective:     /78 mmHg  Pulse 85  Temp(Src) 35.9 °C (96.6 °F)  Ht 1.676 m (5' 6\")  Wt 75.751 kg (167 lb)  BMI 26.97 kg/m2  SpO2 97%  LMP 01/11/2017     Physical Exam  Constitutional: oriented to person, place, and time, appears well-developed and well-nourished.   HEENT: Normocephalic atraumatic, neck supple, no JVD noted, no masses noted, no meningeal signs noted  Lymphadenopathy: no cervical, supraclavicular, or inguinal lymphadenopathy noted  Cardiovascular: Intact distal pulses, including at wrists and ankles, although diminished at ankles, no limb swelling noted  Pulmonary: No tachypnea noted, no accessory muscle use noted, no dyspnea noted  Abdominal: Soft, nontender, exhibits no " distension, no peritoneal signs, no HSM  Musculoskeletal:   Right shoulder: exhibits mild tenderness. Mild pain with range of motion testing  Left shoulder: exhibits mild tenderness. Mild pain with range of motion testing  Right hip: exhibits mild tenderness. Mild pain with range of motion testing  Left hip: exhibits mild tenderness. Mild pain with range of motion testing  Cervical back: exhibits mild decreased range of motion, mild tenderness and mild pain. Spurling's testing produces mild axial pain, trigger points noted  Lumbar back: exhibits mild decreased range of motion, mild tenderness and mild pain. negative straight leg testing, trigger points noted  Foot/ankle: Tender with palpation bilateral foot/ankle regions, pain with range of motion testing, healed scar left foot, vascular skin changes noted  Wrist/hand: mild pain with range of motion testing, negative tinel's at wrist, negative tinel's at elbows  Neurological: oriented to person, place, and time. Cranial nerves grossly intact, normal strength, except about ankles/feet, decreased due to pain. Sensation subjectively diminished and feet, otherwise intact. Reflexes 1+ in upper and lower limbs, stance limited due to foot pain, gait antalgic, mildly wide-based, reciprocal, No upper motor neuron signs evident  Skin: Skin is intact. no rashes or lesions noted  Psychiatric: normal mood and affect. speech is normal and behavior is normal. Judgment and thought content normal. Cognition and memory are normal.        Assessment/Plan:       ASSESSMENT:    1. Bilateral lower limb, foot pain, sprain strain, prior left foot surgery, smoker, consider arterial disease, orthopedics consulted    - Obtained bilateral foot x-rays  - Ordered lower limb arterial studies  - Reviewed orthotics/footwear    2. Low back pain, myofascial pain, disc protrusion, degenerative disc disease, foraminal stenosis, facet arthropathy, lumbar spondylosis, relatively controlled    3. Neck  pain, myofascial pain    - DX-CERVICAL SPINE-4+ VIEWS; Future    4. Impaired gait/mobility    - Ordered single tip cane    5. Anxiety/depression, psychiatry and psychology consulted    - Reviewed psychosocial interventions    6. Controlled substance agreement discussed    - Check results on urine drug screen, provided today  - Patient advised that facility complies with the federal illegal substance list, submitted pain medicine consultation for transfer of care  - Patient advised to this facility is available for further services that do not involve controlled substances    7. Comorbid medical issues, including pulmonary, with care per primary care provider    - Obtain laboratory studies, ordered by primary care provider      DISCUSSION/PLAN:    - I discussed management options. I reviewed symptomatic care    - I reviewed physical therapy referral. I reviewed home exercise program, with medical precautions    - The patient can consider complementary trials with acupuncture, superficial massage therapy, or TENS unit    - I reviewed medication monitoring. I reviewed pain medication dosing, reviewed risks and alternatives. I reviewed medication adjustments.     - To facilitate transfer of care and under the compassionate use provision, I wrote prescription for:    - tramadol (ULTRAM) 50 MG Tab; Take 1 Tab by mouth 1 time daily as needed. for pain.  Dispense: 30 Tab; Refill: 1    - I reviewed risks, side effects, and interactions of medications, including NSAIDs and over-the-counter medications. I reviewed tylenol/acetaminophen dosing. I advise the patient to avoid sudafed/pseudoephedrine-type medication as well as herbs/supplements. I reviewed bowel management program. I recommend avoid use of benzodiazepines due to risk of interaction with pain medication. I advise the patient to avoid alcohol use. I reviewed the controlled substance prescribing program. I reviewed further symptomatic medications.    - I reviewed  additional diagnostic options, including further/advanced imaging, electrodiagnostic testing, and further lab screen    - I reviewed additional therapeutic options, including injection/interventional therapy and additional consultative input    - I encourage the patient to stop or decrease cigarette use    - Return in 2 months or an as-needed basis      Please note that this dictation was created using voice recognition software. I have made every reasonable attempt to correct obvious errors but there may be errors of grammar and content that I may have overlooked prior to finalization of this note.

## 2017-06-22 NOTE — MR AVS SNAPSHOT
"Marilyn Salinas   2017 2:45 PM   Office Visit   MRN: [id number]    Department:  [facility]   Dept Phone:  [phone]    Description:  Female : 1965   Provider:  Connor Monaco M.D.           Reason for Visit     New Patient           Allergies as of 2017     No Known Allergies      You were diagnosed with     DDD (degenerative disc disease), lumbar   [908702]       Pain in both feet   [979752]       Other chronic pain   [338.29.ICD-9-CM]       H/O foot surgery   [838753]       Chronic bilateral low back pain without sciatica   [0066804]       Neck pain   [451859]       Pain medication agreement discussed   [714429]       Impaired gait   [873164]         Vital Signs     Blood Pressure Pulse Temperature Height Weight Body Mass Index    112/78 mmHg 85 35.9 °C (96.6 °F) 1.676 m (5' 6\") 75.751 kg (167 lb) 26.97 kg/m2    Oxygen Saturation Last Menstrual Period Smoking Status             97% 2017 Current Every Day Smoker         Basic Information     Date Of Birth Sex Race Ethnicity Preferred Language    1965 Female White Non- English      Your appointments     2017 10:10 AM   Established Patient with YINKA Donato   [facility]    [address] [id number] [phone]           You will be receiving a confirmation call a few days before your appointment from our automated call confirmation system.            2017 12:30 PM   MA SCRN10 with [facility] MG 3   [facility] ([address])    [address] [id number] [phone]           No deodorant, powder, perfume or lotion under the arm or breast area.              Problem List              ICD-10-CM Priority Class Noted - Resolved    Routine health maintenance Z00.00   2017 - Present    Obesity (BMI 30-39.9) E66.9   2017 - Present    Mild intermittent asthma J45.20   2017 - Present    Tobacco use " Z72.0   6/19/2017 - Present    Anxiety and depression F41.9, F32.9   6/19/2017 - Present    Acid indigestion K30   6/19/2017 - Present    DDD (degenerative disc disease), lumbar M51.36   6/19/2017 - Present    Dolly-menopause N95.1   6/19/2017 - Present    Chest congestion R09.89   6/19/2017 - Present    Pain in both feet M79.671, M79.672   6/19/2017 - Present    Poor vision H54.7   6/19/2017 - Present      Health Maintenance        Date Due Completion Dates    IMM DTaP/Tdap/Td Vaccine (1 - Tdap) 7/1/1985 ---    IMM PNEUMOCOCCAL 19-64 (ADULT) MEDIUM RISK SERIES (1 of 1 - PPSV23) 7/1/1985 ---    PAP SMEAR 7/1/1987 ---    MAMMOGRAM 2/24/2012 2/24/2011, 5/19/2009, 5/19/2009, 11/4/2008, 10/16/2008, 10/16/2008, 3/12/2004    COLONOSCOPY 7/1/2016 ---            Current Immunizations     No immunizations on file.      Below and/or attached are the medications your provider expects you to take. Review all of your home medications and newly ordered medications with your provider and/or pharmacist. Follow medication instructions as directed by your provider and/or pharmacist. Please keep your medication list with you and share with your provider. Update the information when medications are discontinued, doses are changed, or new medications (including over-the-counter products) are added; and carry medication information at all times in the event of emergency situations     Allergies:  No Known Allergies          Medications  Valid as of: June 22, 2017 -  3:39 PM    Generic Name Brand Name Tablet Size Instructions for use    Albuterol Sulfate (Aero Soln) albuterol 108 (90 BASE) MCG/ACT Inhale 2 Puffs by mouth every 6 hours as needed for Shortness of Breath.        Azithromycin (Tab) ZITHROMAX 250 MG Z pack-Take 2 tabs day one and then 1 tab daily for a total of 5 days        Benzonatate (Cap) TESSALON 100 MG Take 1 Cap by mouth 3 times a day as needed.        Fluticasone Propionate (Suspension) FLONASE 50 MCG/ACT Spray 1 Spray  in nose 2 times a day.        Gabapentin (Cap) NEURONTIN 100 MG Take 1 Cap by mouth 3 times a day.        Guaifenesin-Codeine (Syrup) TUSSI-ORGANIDIN -10 MG/5ML Take 5 mL by mouth 2 times a day as needed.        Ibuprofen (Tab) MOTRIN 800 MG Take 1 Tab by mouth every 12 hours as needed for Mild Pain, Moderate Pain or Inflammation.        Loratadine (Tab) CLARITIN 10 MG Take 1 Tab by mouth every day.        Methocarbamol (Tab) ROBAXIN 500 MG Take 1 Tab by mouth 2 times a day as needed.        RaNITidine HCl (Tab) ZANTAC 150 MG Take 1 Tab by mouth 2 times a day.        Sertraline HCl (Tab) ZOLOFT 50 MG Take 50 mg by mouth every day.        TraMADol HCl (Tab) ULTRAM 50 MG Take 1 Tab by mouth 1 time daily as needed. for pain.        .                 Medicines prescribed today were sent to:     [facility] [address] [place], [place] [address] [address] [address]    Phone: [phone] Fax: [phone]    Open 24 Hours?: No      Medication refill instructions:       If your prescription bottle indicates you have medication refills left, it is not necessary to call your provider’s office. Please contact your pharmacy and they will refill your medication.    If your prescription bottle indicates you do not have any refills left, you may request refills at any time through one of the following ways: The online [employer] system (except Urgent Care), by calling your provider’s office, or by asking your pharmacy to contact your provider’s office with a refill request. Medication refills are processed only during regular business hours and may not be available until the next business day. Your provider may request additional information or to have a follow-up visit with you prior to refilling your medication.   *Please Note: Medication refills are assigned a new Rx number when refilled electronically. Your pharmacy may indicate that no refills were authorized even though a new  prescription for the same medication is available at the pharmacy. Please request the medicine by name with the pharmacy before contacting your provider for a refill.        Your To Do List     Future Labs/Procedures Complete By Expires    DX-CERVICAL SPINE-4+ VIEWS  As directed 6/22/2018    PAIN MANAGEMENT SCRN, W/ RFLX TO QNT  As directed 6/22/2018    Comments:    Current Meds (name, sig, last dose):   Current outpatient prescriptions:   •  ibuprofen, 800 mg, Oral, Q12HRS PRN, 6/22/2017  •  methocarbamol, 500 mg, Oral, BID PRN, 6/22/2017  •  guaifenesin-codeine, 5 mL, Oral, BID PRN, 6/22/2017  •  benzonatate, 100 mg, Oral, TID PRN, 6/22/2017  •  azithromycin, Z pack-Take 2 tabs day one and then 1 tab daily for a total of 5 days, 6/22/2017  •  loratadine, 10 mg, Oral, DAILY, 6/22/2017  •  fluticasone, 1 Spray, Nasal, BID, 6/22/2017  •  gabapentin, 100 mg, Oral, TID, 6/22/2017  •  ranitidine, 150 mg, Oral, BID, 6/22/2017  •  albuterol, 2 Puff, Inhalation, Q6HRS PRN, 6/22/2017  •  sertraline, 50 mg, Oral, DAILY            Referral     A referral request has been sent to our patient care coordination department. Please allow 3-5 business days for us to process this request and contact you either by phone or mail. If you do not hear from us by the 5th business day, please call us at [phone].           Ecociclus Access Code: [email or web address]  Expires: 6/29/2017  2:49 PM    Ecociclus  A secure, online tool to manage your health information     [employer]’s Ecociclus® is a secure, online tool that connects you to your personalized health information from the privacy of your home -- day or night - making it very easy for you to manage your healthcare. Once the activation process is completed, you can even access your medical information using the Ecociclus bubba, which is available for free in the Apple Bubba store or Google Play store.     Ecociclus provides the following levels of access (as shown below):   My Chart  Features   [facility] Doctor [facility]  Specialists [facility]  Urgent  Care Non-[facility]  Primary Care  Doctor   Email your healthcare team securely and privately 24/7 X X X    Manage appointments: schedule your next appointment; view details of past/upcoming appointments X      Request prescription refills. X      View recent personal medical records, including lab and immunizations X X X X   View health record, including health history, allergies, medications X X X X   Read reports about your outpatient visits, procedures, consult and ER notes X X X X   See your discharge summary, which is a recap of your hospital and/or ER visit that includes your diagnosis, lab results, and care plan. X X       How to register for Life Metrics:  1. Go to  [email or web address].  2. Click on the Sign Up Now box, which takes you to the New Member Sign Up page. You will need to provide the following information:  a. Enter your Life Metrics Access Code exactly as it appears at the top of this page. (You will not need to use this code after you’ve completed the sign-up process. If you do not sign up before the expiration date, you must request a new code.)   b. Enter your date of birth.   c. Enter your home email address.   d. Click Submit, and follow the next screen’s instructions.  3. Create a Life Metrics ID. This will be your Life Metrics login ID and cannot be changed, so think of one that is secure and easy to remember.  4. Create a Life Metrics password. You can change your password at any time.  5. Enter your Password Reset Question and Answer. This can be used at a later time if you forget your password.   6. Enter your e-mail address. This allows you to receive e-mail notifications when new information is available in Life Metrics.  7. Click Sign Up. You can now view your health information.    For assistance activating your Life Metrics account, call [phone]        Quit Tobacco Information     Do you want to quit using tobacco?    Quitting  tobacco decreases risks of cancer, heart and lung disease, increases life expectancy, improves sense of taste and smell, and increases spending money, among other benefits.    If you are thinking about quitting, we can help.  • [facility] Quit Tobacco Program: [phone]  o Program occurs weekly for four weeks and includes pharmacist consultation on products to support quitting smoking or chewing tobacco. A provider referral is needed for pharmacist consultation.  • Tobacco Users Help Hotline: 9-419-QUIT-NOW (410-4499) or https://[place].quitlogix.org/  o Free, confidential telephone and online coaching for [place] residents. Sessions are designed on a schedule that is convenient for you. Eligible clients receive free nicotine replacement therapy.  • Nationally: www.smokefree.gov  o Information and professional assistance to support both immediate and long-term needs as you become, and remain, a non-smoker. Smokefree.gov allows you to choose the help that best fits your needs.

## 2017-06-24 DIAGNOSIS — Z12.11 SCREEN FOR COLON CANCER: ICD-10-CM

## 2017-06-24 LAB — HEMOCCULT STL QL IA: NEGATIVE

## 2017-06-28 ENCOUNTER — HOSPITAL ENCOUNTER (OUTPATIENT)
Dept: RADIOLOGY | Facility: MEDICAL CENTER | Age: 52
End: 2017-06-28
Attending: PHYSICAL MEDICINE & REHABILITATION
Payer: MEDICAID

## 2017-06-28 DIAGNOSIS — M79.672 PAIN IN BOTH FEET: ICD-10-CM

## 2017-06-28 DIAGNOSIS — M79.606 PAIN OF LOWER EXTREMITY, UNSPECIFIED LATERALITY: ICD-10-CM

## 2017-06-28 DIAGNOSIS — M79.671 PAIN IN BOTH FEET: ICD-10-CM

## 2017-06-28 DIAGNOSIS — F17.200 SMOKER: ICD-10-CM

## 2017-06-28 PROCEDURE — 93922 UPR/L XTREMITY ART 2 LEVELS: CPT | Mod: 26 | Performed by: SURGERY

## 2017-06-28 PROCEDURE — 93922 UPR/L XTREMITY ART 2 LEVELS: CPT

## 2017-07-24 ENCOUNTER — HOSPITAL ENCOUNTER (OUTPATIENT)
Dept: RADIOLOGY | Facility: MEDICAL CENTER | Age: 52
End: 2017-07-24
Attending: NURSE PRACTITIONER
Payer: MEDICAID

## 2017-07-24 ENCOUNTER — HOSPITAL ENCOUNTER (OUTPATIENT)
Dept: RADIOLOGY | Facility: MEDICAL CENTER | Age: 52
End: 2017-07-24
Attending: PHYSICAL MEDICINE & REHABILITATION
Payer: MEDICAID

## 2017-07-24 DIAGNOSIS — M79.672 PAIN IN BOTH FEET: ICD-10-CM

## 2017-07-24 DIAGNOSIS — M54.2 NECK PAIN: ICD-10-CM

## 2017-07-24 DIAGNOSIS — M79.671 PAIN IN BOTH FEET: ICD-10-CM

## 2017-07-24 PROCEDURE — 77063 BREAST TOMOSYNTHESIS BI: CPT

## 2017-07-24 PROCEDURE — 72050 X-RAY EXAM NECK SPINE 4/5VWS: CPT

## 2017-07-24 PROCEDURE — 77077 JOINT SURVEY SINGLE VIEW: CPT

## 2017-07-27 ENCOUNTER — HOSPITAL ENCOUNTER (OUTPATIENT)
Dept: LAB | Facility: MEDICAL CENTER | Age: 52
End: 2017-07-27
Attending: NURSE PRACTITIONER
Payer: MEDICAID

## 2017-07-27 DIAGNOSIS — Z13.21 ENCOUNTER FOR VITAMIN DEFICIENCY SCREENING: ICD-10-CM

## 2017-07-27 DIAGNOSIS — Z00.00 ROUTINE HEALTH MAINTENANCE: ICD-10-CM

## 2017-07-27 DIAGNOSIS — Z13.1 SCREENING FOR DIABETES MELLITUS: ICD-10-CM

## 2017-07-27 DIAGNOSIS — Z13.220 SCREENING CHOLESTEROL LEVEL: ICD-10-CM

## 2017-07-27 DIAGNOSIS — Z13.29 SCREENING FOR THYROID DISORDER: ICD-10-CM

## 2017-07-27 LAB
25(OH)D3 SERPL-MCNC: 13 NG/ML (ref 30–100)
ALBUMIN SERPL BCP-MCNC: 4.1 G/DL (ref 3.2–4.9)
ALBUMIN/GLOB SERPL: 1.4 G/DL
ALP SERPL-CCNC: 66 U/L (ref 30–99)
ALT SERPL-CCNC: 19 U/L (ref 2–50)
ANION GAP SERPL CALC-SCNC: 8 MMOL/L (ref 0–11.9)
AST SERPL-CCNC: 18 U/L (ref 12–45)
BILIRUB SERPL-MCNC: 0.6 MG/DL (ref 0.1–1.5)
BUN SERPL-MCNC: 17 MG/DL (ref 8–22)
CALCIUM SERPL-MCNC: 9.8 MG/DL (ref 8.5–10.5)
CHLORIDE SERPL-SCNC: 109 MMOL/L (ref 96–112)
CHOLEST SERPL-MCNC: 264 MG/DL (ref 100–199)
CO2 SERPL-SCNC: 23 MMOL/L (ref 20–33)
CREAT SERPL-MCNC: 0.75 MG/DL (ref 0.5–1.4)
GFR SERPL CREATININE-BSD FRML MDRD: >60 ML/MIN/1.73 M 2
GLOBULIN SER CALC-MCNC: 3 G/DL (ref 1.9–3.5)
GLUCOSE SERPL-MCNC: 88 MG/DL (ref 65–99)
HAV IGM SERPL QL IA: NEGATIVE
HBV CORE IGM SER QL: NEGATIVE
HBV SURFACE AG SER QL: NEGATIVE
HCV AB SER QL: NEGATIVE
HDLC SERPL-MCNC: 50 MG/DL
HIV 1+2 AB+HIV1 P24 AG SERPL QL IA: NON REACTIVE
LDLC SERPL CALC-MCNC: 181 MG/DL
POTASSIUM SERPL-SCNC: 4.1 MMOL/L (ref 3.6–5.5)
PROT SERPL-MCNC: 7.1 G/DL (ref 6–8.2)
SODIUM SERPL-SCNC: 140 MMOL/L (ref 135–145)
TRIGL SERPL-MCNC: 165 MG/DL (ref 0–149)
TSH SERPL DL<=0.005 MIU/L-ACNC: 2.98 UIU/ML (ref 0.3–3.7)
VIT B12 SERPL-MCNC: 299 PG/ML (ref 211–911)

## 2017-07-27 PROCEDURE — 36415 COLL VENOUS BLD VENIPUNCTURE: CPT

## 2017-07-27 PROCEDURE — 82306 VITAMIN D 25 HYDROXY: CPT

## 2017-07-27 PROCEDURE — 80074 ACUTE HEPATITIS PANEL: CPT

## 2017-07-27 PROCEDURE — 87389 HIV-1 AG W/HIV-1&-2 AB AG IA: CPT

## 2017-07-27 PROCEDURE — 84443 ASSAY THYROID STIM HORMONE: CPT

## 2017-07-27 PROCEDURE — 82607 VITAMIN B-12: CPT

## 2017-07-27 PROCEDURE — 80061 LIPID PANEL: CPT

## 2017-07-27 PROCEDURE — 85025 COMPLETE CBC W/AUTO DIFF WBC: CPT

## 2017-07-27 PROCEDURE — 80053 COMPREHEN METABOLIC PANEL: CPT

## 2017-07-27 PROCEDURE — 83036 HEMOGLOBIN GLYCOSYLATED A1C: CPT

## 2017-07-28 LAB
BASOPHILS # BLD AUTO: 1.2 % (ref 0–1.8)
BASOPHILS # BLD: 0.11 K/UL (ref 0–0.12)
EOSINOPHIL # BLD AUTO: 0.39 K/UL (ref 0–0.51)
EOSINOPHIL NFR BLD: 4.4 % (ref 0–6.9)
ERYTHROCYTE [DISTWIDTH] IN BLOOD BY AUTOMATED COUNT: 46.6 FL (ref 35.9–50)
EST. AVERAGE GLUCOSE BLD GHB EST-MCNC: 114 MG/DL
HBA1C MFR BLD: 5.6 % (ref 0–5.6)
HCT VFR BLD AUTO: 47.2 % (ref 37–47)
HGB BLD-MCNC: 16 G/DL (ref 12–16)
IMM GRANULOCYTES # BLD AUTO: 0.04 K/UL (ref 0–0.11)
IMM GRANULOCYTES NFR BLD AUTO: 0.4 % (ref 0–0.9)
LYMPHOCYTES # BLD AUTO: 3.49 K/UL (ref 1–4.8)
LYMPHOCYTES NFR BLD: 39.2 % (ref 22–41)
MCH RBC QN AUTO: 30.8 PG (ref 27–33)
MCHC RBC AUTO-ENTMCNC: 33.9 G/DL (ref 33.6–35)
MCV RBC AUTO: 90.8 FL (ref 81.4–97.8)
MONOCYTES # BLD AUTO: 0.61 K/UL (ref 0–0.85)
MONOCYTES NFR BLD AUTO: 6.9 % (ref 0–13.4)
NEUTROPHILS # BLD AUTO: 4.26 K/UL (ref 2–7.15)
NEUTROPHILS NFR BLD: 47.9 % (ref 44–72)
NRBC # BLD AUTO: 0 K/UL
NRBC BLD AUTO-RTO: 0 /100 WBC
PLATELET # BLD AUTO: 278 K/UL (ref 164–446)
PMV BLD AUTO: 10.7 FL (ref 9–12.9)
RBC # BLD AUTO: 5.2 M/UL (ref 4.2–5.4)
WBC # BLD AUTO: 8.9 K/UL (ref 4.8–10.8)

## 2017-07-31 ENCOUNTER — OFFICE VISIT (OUTPATIENT)
Dept: MEDICAL GROUP | Facility: MEDICAL CENTER | Age: 52
End: 2017-07-31
Attending: NURSE PRACTITIONER
Payer: MEDICAID

## 2017-07-31 ENCOUNTER — HOSPITAL ENCOUNTER (OUTPATIENT)
Dept: LAB | Facility: MEDICAL CENTER | Age: 52
End: 2017-07-31
Attending: SPECIALIST
Payer: MEDICAID

## 2017-07-31 VITALS
WEIGHT: 180 LBS | OXYGEN SATURATION: 94 % | TEMPERATURE: 97.5 F | DIASTOLIC BLOOD PRESSURE: 70 MMHG | HEART RATE: 76 BPM | SYSTOLIC BLOOD PRESSURE: 100 MMHG | HEIGHT: 66 IN | BODY MASS INDEX: 28.93 KG/M2 | RESPIRATION RATE: 16 BRPM

## 2017-07-31 DIAGNOSIS — M79.671 PAIN IN BOTH FEET: ICD-10-CM

## 2017-07-31 DIAGNOSIS — M51.36 DDD (DEGENERATIVE DISC DISEASE), LUMBAR: ICD-10-CM

## 2017-07-31 DIAGNOSIS — N95.1 PERI-MENOPAUSE: ICD-10-CM

## 2017-07-31 DIAGNOSIS — Z23 NEED FOR PNEUMOCOCCAL VACCINE: ICD-10-CM

## 2017-07-31 DIAGNOSIS — E55.9 VITAMIN D DEFICIENCY: ICD-10-CM

## 2017-07-31 DIAGNOSIS — M79.672 PAIN IN BOTH FEET: ICD-10-CM

## 2017-07-31 DIAGNOSIS — F41.9 ANXIETY AND DEPRESSION: ICD-10-CM

## 2017-07-31 DIAGNOSIS — F32.A ANXIETY AND DEPRESSION: ICD-10-CM

## 2017-07-31 DIAGNOSIS — E78.5 HYPERLIPIDEMIA, UNSPECIFIED: ICD-10-CM

## 2017-07-31 DIAGNOSIS — Z23 NEED FOR TDAP VACCINATION: ICD-10-CM

## 2017-07-31 DIAGNOSIS — Z74.8 ASSISTANCE WITH TRANSPORTATION: ICD-10-CM

## 2017-07-31 PROCEDURE — 90471 IMMUNIZATION ADMIN: CPT | Performed by: NURSE PRACTITIONER

## 2017-07-31 PROCEDURE — 88175 CYTOPATH C/V AUTO FLUID REDO: CPT

## 2017-07-31 PROCEDURE — 90715 TDAP VACCINE 7 YRS/> IM: CPT | Performed by: NURSE PRACTITIONER

## 2017-07-31 PROCEDURE — 99212 OFFICE O/P EST SF 10 MIN: CPT | Performed by: NURSE PRACTITIONER

## 2017-07-31 PROCEDURE — 99214 OFFICE O/P EST MOD 30 MIN: CPT | Mod: 25 | Performed by: NURSE PRACTITIONER

## 2017-07-31 PROCEDURE — 88305 TISSUE EXAM BY PATHOLOGIST: CPT

## 2017-07-31 PROCEDURE — 90732 PPSV23 VACC 2 YRS+ SUBQ/IM: CPT | Performed by: NURSE PRACTITIONER

## 2017-07-31 RX ORDER — ATORVASTATIN CALCIUM 20 MG/1
20 TABLET, FILM COATED ORAL DAILY
Qty: 30 TAB | Refills: 2 | Status: SHIPPED | OUTPATIENT
Start: 2017-07-31 | End: 2018-01-01 | Stop reason: SDUPTHER

## 2017-07-31 RX ORDER — GABAPENTIN 100 MG/1
100 CAPSULE ORAL 3 TIMES DAILY
Qty: 90 CAP | Refills: 2 | Status: SHIPPED | OUTPATIENT
Start: 2017-07-31 | End: 2018-04-10

## 2017-07-31 ASSESSMENT — PATIENT HEALTH QUESTIONNAIRE - PHQ9
SUM OF ALL RESPONSES TO PHQ QUESTIONS 1-9: 16
5. POOR APPETITE OR OVEREATING: 3 - NEARLY EVERY DAY
CLINICAL INTERPRETATION OF PHQ2 SCORE: 5

## 2017-07-31 NOTE — ASSESSMENT & PLAN NOTE
Pt has appt with DR Monaco and started on Tramadol.  Pt is planning on seeing Dr Francisco Domínguez in October 2nd.  Tramadol helping and Gabapentin helping.   Pt asking for xray of lumbar spine as had known DDD and can not remember when last xray.  I instructed her I would order the xrays but recommend f/u with Dr Monaco.

## 2017-07-31 NOTE — PROGRESS NOTES
"    Chief Complaint: Results, Gabapentin refill.    HPI:  Marilyn presents to the clinic for 1st f/u appt since her new patient visit on 6/19/17.    Her PMH includes    Asthma  Acid Indigestion  \"Arthritis\"  Anxiety and Depression  Heart Murmur ( normal echo)  Tobacco use-Smoking  Marijuana use-smoking.  Asthmatic Bronchitis/chest congestion  Lumpectomy  DDD Lumbar Spine    Established w   Pain Management-Dr Monaco -1st appt 6/22/17      Referrals Approved:  Psychiatry and Psychology-[facility]  Orthopedics- Ortho  GYN- DR Driscoll  Pain -Dr Carlos Enrique Tapia  Report shows:  6/26/17 Tramadol 50 mg # 30 by Dr Monaco    Review of Records shows:    6/19/17 clinic for New Patient appt. Labs ordered, Referral to Pain Management, Psychiatry, Psychology, Orthopedics                Joint Survey Xrays of feet ordered. Rx for Zpak, Robitussin w codeine for Bronchitis, Rx Motrin, Zantac, Robaxin, Gabapentin, Claritin, Flonase.    6/21/17 Pain Management Appt w Dr Monaco, eval and Rx for Tramadol.     Results REview:    7/27/17 Labs  CBC normal, CMP normal, Tsh= 2.980, Hepatitis Panel all negative, HIV negative  Cholesterol Tot= 264, Xwzbbkpoa=442, HDL= 50 , LDL= 181  Vitamin D= 13     7/24/17  Xray survey feet:  Moderate arthritic changes PIP joint of the left second toe.  2.  Findings consistent with left bunionectomy.  3.  Mild arthritic changes right and left first MTP joints and left calcaneocuboid joint.  4.  Possible osteonecrosis of head of the left third metatarsal.         7/24/17 C-spine Xray REport:  Moderate degenerative changes. No instability.    Vitamin D deficiency  Labs low vit d.  Discussed benefit of normalizing.      Hyperlipidemia, unspecified  WE discussed her lipid panel  And we discussed importance of reducing sugar/carbs and saturated fats  I recommended addition of STATIN, increase exercise and recheck   Lipids in 3-6 months.    Pain in both feet  Pt reports nerve pain in feet is doing " "better.  With Gabapentin \"cuts pain in feet 1/2\".  Has appt [facility] tomorrow.        DDD (degenerative disc disease), lumbar  Pt has appt with DR Monaco and started on Tramadol.  Pt is planning on seeing Dr Francisco Domínguez in October 2nd.  Tramadol helping and Gabapentin helping.   Pt asking for xray of lumbar spine as had known DDD and can not remember when last xray.  I instructed her I would order the xrays but recommend f/u with Dr Monaco.    Magi-menopause  Saw Dr Driscoll for magi-menopause symptoms.  She reports \"very painful Pap smear\"  WAs reported she was unable to see Dr Marquita Cuenca and that's why she saw Dr Driscoll.   Has f/u appt in Mid August.      Anxiety and depression  Pt reports has appt with [facility] family counseling.   Has anxiety and depression is moderate.  Denies suicidal ideation.    DMV placard/Transportation Assist    Pt asking for DMV paperwork for disability placard  As has difficulty walking far due to chronic back pain, chronic feet pain.  Paperwork completed and scanned into media  Patient Active Problem List    Diagnosis Date Noted   • Vitamin D deficiency 07/31/2017   • Hyperlipidemia, unspecified 07/31/2017   • Routine health maintenance 06/19/2017   • Obesity (BMI 30-39.9) 06/19/2017   • Mild intermittent asthma 06/19/2017   • Tobacco use 06/19/2017   • Anxiety and depression 06/19/2017   • Acid indigestion 06/19/2017   • DDD (degenerative disc disease), lumbar 06/19/2017   • Magi-menopause 06/19/2017   • Chest congestion 06/19/2017   • Pain in both feet 06/19/2017   • Poor vision 06/19/2017       Allergies:Review of patient's allergies indicates no known allergies.    Current Outpatient Prescriptions   Medication Sig Dispense Refill   • Cholecalciferol 4000 UNITS Cap Take 1 Capsule by mouth every day. 30 Cap 5   • atorvastatin (LIPITOR) 20 MG Tab Take 1 Tab by mouth every day. 30 Tab 2   • gabapentin (NEURONTIN) 100 MG Cap Take 1 Cap by mouth 3 times a day. 90 Cap 2 " "  • tramadol (ULTRAM) 50 MG Tab Take 1 Tab by mouth 1 time daily as needed. for pain. 30 Tab 1   • ibuprofen (MOTRIN) 800 MG Tab Take 1 Tab by mouth every 12 hours as needed for Mild Pain, Moderate Pain or Inflammation. 60 Tab 2   • methocarbamol (ROBAXIN) 500 MG Tab Take 1 Tab by mouth 2 times a day as needed. 60 Tab 2   • loratadine (CLARITIN) 10 MG Tab Take 1 Tab by mouth every day. 30 Tab 2   • fluticasone (FLONASE ALLERGY RELIEF) 50 MCG/ACT nasal spray Spray 1 Spray in nose 2 times a day. 16 g 2   • ranitidine (ZANTAC) 150 MG Tab Take 1 Tab by mouth 2 times a day. 60 Tab 2   • albuterol 108 (90 BASE) MCG/ACT Aero Soln inhalation aerosol Inhale 2 Puffs by mouth every 6 hours as needed for Shortness of Breath. 8.5 g 0     Current Facility-Administered Medications   Medication Dose Route Frequency Provider Last Rate Last Dose   • pneumococcal vaccine (PNEUMOVAX-23) injection 25 mcg  0.5 mL Intramuscular Once PRN Connor Saunders, A.P.N.           Social History   Substance Use Topics   • Smoking status: Current Every Day Smoker -- 0.25 packs/day for 26 years     Types: Cigarettes   • Smokeless tobacco: Never Used   • Alcohol Use: No       Family History   Problem Relation Age of Onset   • Cancer Mother    • Alcohol/Drug Mother    • Cancer Brother    • Diabetes Maternal Aunt        ROS:  Review of Systems   See HPI Above        Exam:  Blood pressure 100/70, pulse 76, temperature 36.4 °C (97.5 °F), resp. rate 16, height 1.676 m (5' 5.98\"), weight 81.647 kg (180 lb), SpO2 94 %.  General:  Well nourished, well developed female in NAD  HENT:Head is grossly normal. PERRL.  Neck: Supple. Trachea is midline.  Pulmonary: Clear to ausculation .  Normal effort.  Cardiovascular: Regular rate and rhythm.  Abdomen-Abdomen is soft, No tenderness.  Upper extremities- Strong = . Good ROM  Lower extremities- neg for edema, redness, tenderness.  Neuro- A & O x 4. Speech clear and appropriate. Tired affect.     Current " medications, allergies, and problem list reviewed with patient and updated in  EPIC today.    Assessment/Plan:  1. Need for Tdap vaccination  Tdap =>6yo IM   2. Need for pneumococcal vaccine  pneumococcal vaccine (PNEUMOVAX-23) injection 25 mcg   3. Vitamin D deficiency  Cholecalciferol 4000 UNITS Cap Start   4. Hyperlipidemia, unspecified  atorvastatin (LIPITOR) 20 MG Tab Start   5. Pain in both feet  gabapentin (NEURONTIN) 100 MG Cap-Refill   6. DDD (degenerative disc disease), lumbar  DX-LUMBAR SPINE-2 OR 3 VIEWS  DMv paperwork completed for Pt for DMV placards See scanned in media    gabapentin (NEURONTIN) 100 MG Cap-Refill   7. Dolly-menopause  Pt to f/u Dr Driscoll as planned. Pt had PAP smear today in his office.   8. Anxiety and depression  Patient has been identified as being depressed and appropriate orders and counseling have been given  Pt to keep upcoming appt with [facility]. Instructed if suicidal thoughts ever occur to go to ER directly.   >30 min spent face to face with patient, >50% of time spent counseling, coordinating care, reviewing records, discussing POC, educating patient  Follow up in 2 months, Call for Lumbar Xray report. Call or return if questions, concerns, or worsening condition.

## 2017-07-31 NOTE — ASSESSMENT & PLAN NOTE
Pt reports has appt with [facility].   Has anxiety and depression is moderate.  Denies suicidal ideation.

## 2017-07-31 NOTE — ASSESSMENT & PLAN NOTE
"Saw Dr Driscoll for magi-menopause symptoms.  She reports \"very painful Pap smear\"  WAs reported she was unable to see Dr Marquiat Cuenca and that's why she saw Dr Driscoll.   Has f/u appt in Mid August.    "

## 2017-07-31 NOTE — ASSESSMENT & PLAN NOTE
WE discussed her lipid panel  And we discussed importance of reducing sugar/carbs and saturated fats  I recommended addition of STATIN, increase exercise and recheck   Lipids in 3-6 months.

## 2017-07-31 NOTE — ASSESSMENT & PLAN NOTE
"Pt reports nerve pain in feet is doing better.  With Gabapentin \"cuts pain in feet 1/2\".  Has appt [facility] tomorrow.      "

## 2017-07-31 NOTE — MR AVS SNAPSHOT
"        Marilyn Salinas   2017 2:10 PM   Office Visit   MRN: [id number]    [facility]   Dept Phone:  [phone]    Description:  Female : 1965   Provider:  YINKA Donato           Reason for Visit     Results           Allergies as of 2017     No Known Allergies      You were diagnosed with     Need for Tdap vaccination   [364488]       Need for pneumococcal vaccine   [485222]       Vitamin D deficiency   [3548399]       Hyperlipidemia, unspecified   [0405656]       Pain in both feet   [992904]       DDD (degenerative disc disease), lumbar   [070508]       Dolly-menopause   [199726]       Anxiety and depression   [062433]         Vital Signs     Blood Pressure Pulse Temperature Respirations Height Weight    100/70 mmHg 76 36.4 °C (97.5 °F) 16 1.676 m (5' 5.98\") 81.647 kg (180 lb)    Body Mass Index Oxygen Saturation Smoking Status             29.07 kg/m2 94% Current Every Day Smoker         Basic Information     Date Of Birth Sex Race Ethnicity Preferred Language    1965 Female White Non- English      Problem List              ICD-10-CM Priority Class Noted - Resolved    Routine health maintenance Z00.00   2017 - Present    Obesity (BMI 30-39.9) E66.9   2017 - Present    Mild intermittent asthma J45.20   2017 - Present    Tobacco use Z72.0   2017 - Present    Anxiety and depression F41.9, F32.9   2017 - Present    Acid indigestion K30   2017 - Present    DDD (degenerative disc disease), lumbar M51.36   2017 - Present    Dolly-menopause N95.1   2017 - Present    Chest congestion R09.89   2017 - Present    Pain in both feet M79.671, M79.672   2017 - Present    Poor vision H54.7   2017 - Present    Vitamin D deficiency E55.9   2017 - Present    Hyperlipidemia, unspecified E78.5   2017 - Present      Health Maintenance        Date Due Completion Dates    IMM DTaP/Tdap/Td Vaccine (1 - Tdap) 1985 " ---    IMM PNEUMOCOCCAL 19-64 (ADULT) MEDIUM RISK SERIES (1 of 1 - PPSV23) 7/1/1985 ---    PAP SMEAR 7/1/1987 ---    COLONOSCOPY 7/1/2016 ---    IMM INFLUENZA (1) 9/1/2017 ---    MAMMOGRAM 7/24/2018 7/24/2017, 2/24/2011, 5/19/2009, 5/19/2009, 11/4/2008, 10/16/2008, 10/16/2008, 3/12/2004            Current Immunizations     Pneumococcal polysaccharide vaccine (PPSV-23)  Incomplete    Tdap Vaccine 7/31/2017      Below and/or attached are the medications your provider expects you to take. Review all of your home medications and newly ordered medications with your provider and/or pharmacist. Follow medication instructions as directed by your provider and/or pharmacist. Please keep your medication list with you and share with your provider. Update the information when medications are discontinued, doses are changed, or new medications (including over-the-counter products) are added; and carry medication information at all times in the event of emergency situations     Allergies:  No Known Allergies          Medications  Valid as of: July 31, 2017 -  2:49 PM    Generic Name Brand Name Tablet Size Instructions for use    Albuterol Sulfate (Aero Soln) albuterol 108 (90 BASE) MCG/ACT Inhale 2 Puffs by mouth every 6 hours as needed for Shortness of Breath.        Atorvastatin Calcium (Tab) LIPITOR 20 MG Take 1 Tab by mouth every day.        Cholecalciferol (Cap) Cholecalciferol 4000 UNITS Take 1 Capsule by mouth every day.        Fluticasone Propionate (Suspension) FLONASE 50 MCG/ACT Spray 1 Spray in nose 2 times a day.        Gabapentin (Cap) NEURONTIN 100 MG Take 1 Cap by mouth 3 times a day.        Ibuprofen (Tab) MOTRIN 800 MG Take 1 Tab by mouth every 12 hours as needed for Mild Pain, Moderate Pain or Inflammation.        Loratadine (Tab) CLARITIN 10 MG Take 1 Tab by mouth every day.        Methocarbamol (Tab) ROBAXIN 500 MG Take 1 Tab by mouth 2 times a day as needed.        RaNITidine HCl (Tab) ZANTAC 150 MG Take 1 Tab  by mouth 2 times a day.        TraMADol HCl (Tab) ULTRAM 50 MG Take 1 Tab by mouth 1 time daily as needed. for pain.        .                 Medicines prescribed today were sent to:     [facility] [place], [place] - [address]    [address] [place] [address]    Phone: [phone] Fax: [phone]    Open 24 Hours?: No      Medication refill instructions:       If your prescription bottle indicates you have medication refills left, it is not necessary to call your provider’s office. Please contact your pharmacy and they will refill your medication.    If your prescription bottle indicates you do not have any refills left, you may request refills at any time through one of the following ways: The online JetPay system (except Urgent Care), by calling your provider’s office, or by asking your pharmacy to contact your provider’s office with a refill request. Medication refills are processed only during regular business hours and may not be available until the next business day. Your provider may request additional information or to have a follow-up visit with you prior to refilling your medication.   *Please Note: Medication refills are assigned a new Rx number when refilled electronically. Your pharmacy may indicate that no refills were authorized even though a new prescription for the same medication is available at the pharmacy. Please request the medicine by name with the pharmacy before contacting your provider for a refill.        Your To Do List     Future Labs/Procedures Complete By Expires    DX-LUMBAR SPINE-2 OR 3 VIEWS  As directed 7/31/2018         JetPay Status: Patient Declined        Quit Tobacco Information     Do you want to quit using tobacco?    Quitting tobacco decreases risks of cancer, heart and lung disease, increases life expectancy, improves sense of taste and smell, and increases spending money, among other benefits.    If you are thinking about quitting,  we can help.  • [facility] Quit Tobacco Program: 625.950.8076  o Program occurs weekly for four weeks and includes pharmacist consultation on products to support quitting smoking or chewing tobacco. A provider referral is needed for pharmacist consultation.  • Tobacco Users Help Hotline: 1-646-QUIT-NOW (651-4950) or https://[place].quitlogix.org/  o Free, confidential telephone and online coaching for [place] residents. Sessions are designed on a schedule that is convenient for you. Eligible clients receive free nicotine replacement therapy.  • Nationally: www.smokefree.gov  o Information and professional assistance to support both immediate and long-term needs as you become, and remain, a non-smoker. Smokefree.gov allows you to choose the help that best fits your needs.

## 2017-07-31 NOTE — ASSESSMENT & PLAN NOTE
Pt asking for DMV paperwork for disability placard  As has difficulty walking far due to chronic back pain, chronic feet pain.  Paperwork completed and scanned into media

## 2017-08-02 LAB — PATHOLOGY CONSULT NOTE: NORMAL

## 2017-08-03 ENCOUNTER — TELEPHONE (OUTPATIENT)
Dept: MEDICAL GROUP | Facility: MEDICAL CENTER | Age: 52
End: 2017-08-03

## 2017-08-03 LAB
C TRACH DNA GENITAL QL NAA+PROBE: NEGATIVE
CYTOLOGY REG CYTOL: NORMAL
HPV HR 12 DNA CVX QL NAA+PROBE: NEGATIVE
HPV16 DNA SPEC QL NAA+PROBE: NEGATIVE
HPV18 DNA SPEC QL NAA+PROBE: NEGATIVE
N GONORRHOEA DNA GENITAL QL NAA+PROBE: NEGATIVE
SPECIMEN SOURCE: NORMAL
SPECIMEN SOURCE: NORMAL

## 2017-08-03 NOTE — TELEPHONE ENCOUNTER
Pt. Called in, states she has imaging done at [address] on 07/24/17, states she signed a form for records/imaging from xray to be sent to [facility]. Pt states [facility] has not received records and has cancelled her appt since imaging has yet to be received. Pt. States she would like an update. Advised records were updated and mailed out per [facility] imaging dept. Spoke with Octavia to confirm.

## 2017-09-15 NOTE — H&P
DATE OF SCHEDULED SURGERY:  10/03/2017    REASON FOR SURGERY:  Heavy vaginal bleeding, passage of large clots,   significant dysmenorrhea, dyspareunia, pelvic pain, near complete uterovaginal   prolapse, and mixed urinary incontinence.    HISTORY OF PRESENT ILLNESS:  This is a 52-year-old woman with longstanding   history of heavy vaginal bleeding, passage of large clots, who was referred to   our office for perimenopausal symptoms including abnormal uterine bleeding.    The patient did undergo a Pap smear, which was within normal limits, negative   for high risk human papilloma virus.  Endometrial biopsy showed fragments of   benign endometrial and endocervical tissue with no evidence of any hyperplasia   or malignancy.  Cervical cultures were within normal limits.  The patient did   undergo a pelvic ultrasound, which did show multiple uterine fibroids seen   posteriorly and superiorly measuring 1.8, 2.3 and 2.1 cm respectively.  No   significant free fluid or adnexal masses were appreciated.  The patient has   had persistent heavy vaginal bleeding, states that she has exhausted all   conservative measures, not interested in proceeding forward with hormonal   therapy and given her near complete uterovaginal prolapse in addition to   abnormal uterine bleeding, in addition urinary incontinence requiring daily   pad therapy.  She is now interested in proceeding forward with attempted   definitive surgical correction with laparoscopic-assisted vaginal   hysterectomy, bilateral salpingo-oophorectomy, anterior and posterior vaginal   repair, enterocele repair, perineoplasty, and probable sacrospinous vault   suspension, in addition to transobturator tape midurethral sling procedure.    Risks, benefits, alternatives of all individual portions of the procedure were   addressed with the patient in detail over several visits.  She has asked   appropriate questions, signed the appropriate consents, and wished to proceed    forward with surgery as planned.    PAST MEDICAL HISTORY:  Hyperlipidemia, obesity, asthma, hypercholesterolemia,   depression, anxiety, chronic pain, degenerative disk disease, lumbago,   seasonal allergies, and gastroesophageal reflux disease.    PAST SURGICAL HISTORY:  The patient had foot surgery, breast surgery, and eye   surgery.    OBSTETRICAL HISTORY:  The patient has had 2 previous deliveries, 2 therapeutic   abortions, largest infant 9 pounds 2 ounces between 1983 and 1989.    GYNECOLOGIC HISTORY:  The patient does report now somewhat regular cycles;   however, reports some heavy with passage of large clots, significant   dysmenorrhea, lasting up to 2 weeks in duration.  She reports significant   dyspareunia and pelvic pain.  She does understand the limitations of surgery   in addressing, pelvic pain, dyspareunia, which may be unimproved or worsen the   symptoms with the surgery as described above.  She also has mixed urinary   incontinence symptoms.  She does have type 2 stress urinary incontinence,   which was demonstrated and confirmed on urodynamic studies, but does   understand her overactive bladder may actually worsen with the surgery as   described above requiring additional medical or surgical management to address   this in the future.  A near complete uterovaginal prolapse has a higher risk   of recurrence potentially postoperatively.  She also understands the   limitations of surgery in the phase of her Guillain-[place] syndrome, which was   diagnosed in 2016.    SOCIAL HISTORY:  She is single.  She denies the use of any alcohol.  She   reports a 28-year history of tobacco use, currently smoking half to quarter   pack of cigarettes per day.  She denies any other drug use.    MEDICATIONS:  Atorvastatin 20 mg p.o. daily, bupropion 150 mg p.o. daily,   gabapentin 100 mg 3 times daily, ibuprofen p.r.n., loratadine 10 mg p.o.   daily, meloxicam 15 mg p.o. daily, methocarbamol 500 mg p.o. daily,  ranitidine   150 mg twice daily, tramadol p.r.n., vitamin D 400 units daily.    ALLERGIES:  No known drug allergies.    PHYSICAL EXAMINATION:  VITAL SIGNS:  She is afebrile, hemodynamically stable.  HEART:  Regular rate and rhythm.  CHEST:  Clear to auscultation bilaterally.  ABDOMEN:  Obese, nontender.  PELVIC:  Shows grade II anterior and posterior uterine relaxation, tenderness   to palpation was noted at the uterine fundus, bilateral adnexal regions.  No   adnexal masses were appreciated.  EXTREMITIES:  Nontender.  RECTOVAGINAL:  Confirmed the above.  She is guaiac negative.    ASSESSMENT AND PLAN:  A 52-year-old woman, who is in the period of   postmenopausal period of time with symptomatic uterine fibroids with   associated menometrorrhagia, dysmenorrhea, pelvic pain, dyspareunia,   symptomatic near complete uterovaginal prolapse, mixed urinary incontinence,   now interested in proceeding forward with surgery as described above.  The   risks, benefits and alternatives have been addressed with the patient in   detail over several visits.  She has asked appropriate questions, signed the   appropriate consents, and wished to proceed forward with surgery as planned.       ____________________________________     MD CARLENE MOREL / JANE    DD:  09/14/2017 14:53:46  DT:  09/14/2017 16:46:49    D#:  [id number]  Job#:  [id number]

## 2017-10-02 ENCOUNTER — APPOINTMENT (OUTPATIENT)
Dept: ADMISSIONS | Facility: MEDICAL CENTER | Age: 52
DRG: 743 | End: 2017-10-02
Payer: MEDICAID

## 2017-10-02 ENCOUNTER — APPOINTMENT (OUTPATIENT)
Dept: ADMISSIONS | Facility: MEDICAL CENTER | Age: 52
End: 2017-10-02
Attending: SPECIALIST
Payer: MEDICAID

## 2017-10-02 RX ORDER — MELOXICAM 15 MG/1
15 TABLET ORAL DAILY
COMMUNITY
End: 2019-09-09 | Stop reason: SDUPTHER

## 2017-10-03 ENCOUNTER — HOSPITAL ENCOUNTER (INPATIENT)
Facility: MEDICAL CENTER | Age: 52
LOS: 1 days | DRG: 743 | End: 2017-10-03
Attending: SPECIALIST | Admitting: SPECIALIST
Payer: MEDICAID

## 2017-10-03 VITALS
HEIGHT: 66 IN | WEIGHT: 177.25 LBS | DIASTOLIC BLOOD PRESSURE: 72 MMHG | RESPIRATION RATE: 16 BRPM | HEART RATE: 84 BPM | BODY MASS INDEX: 28.49 KG/M2 | SYSTOLIC BLOOD PRESSURE: 125 MMHG | TEMPERATURE: 97.7 F | OXYGEN SATURATION: 94 %

## 2017-10-03 PROBLEM — R10.2 PELVIC PAIN: Status: ACTIVE | Noted: 2017-10-03

## 2017-10-03 LAB
ANION GAP SERPL CALC-SCNC: 10 MMOL/L (ref 0–11.9)
BASOPHILS # BLD AUTO: 0.7 % (ref 0–1.8)
BASOPHILS # BLD: 0.06 K/UL (ref 0–0.12)
BUN SERPL-MCNC: 13 MG/DL (ref 8–22)
CALCIUM SERPL-MCNC: 8.9 MG/DL (ref 8.5–10.5)
CHLORIDE SERPL-SCNC: 106 MMOL/L (ref 96–112)
CO2 SERPL-SCNC: 19 MMOL/L (ref 20–33)
CREAT SERPL-MCNC: 0.6 MG/DL (ref 0.5–1.4)
EKG IMPRESSION: NORMAL
EOSINOPHIL # BLD AUTO: 0.18 K/UL (ref 0–0.51)
EOSINOPHIL NFR BLD: 2 % (ref 0–6.9)
ERYTHROCYTE [DISTWIDTH] IN BLOOD BY AUTOMATED COUNT: 45.1 FL (ref 35.9–50)
GFR SERPL CREATININE-BSD FRML MDRD: >60 ML/MIN/1.73 M 2
GLUCOSE SERPL-MCNC: 86 MG/DL (ref 65–99)
HCT VFR BLD AUTO: 45.2 % (ref 37–47)
HGB BLD-MCNC: 15.9 G/DL (ref 12–16)
IMM GRANULOCYTES # BLD AUTO: 0.02 K/UL (ref 0–0.11)
IMM GRANULOCYTES NFR BLD AUTO: 0.2 % (ref 0–0.9)
LYMPHOCYTES # BLD AUTO: 2.58 K/UL (ref 1–4.8)
LYMPHOCYTES NFR BLD: 28.7 % (ref 22–41)
MCH RBC QN AUTO: 32.3 PG (ref 27–33)
MCHC RBC AUTO-ENTMCNC: 35.2 G/DL (ref 33.6–35)
MCV RBC AUTO: 91.7 FL (ref 81.4–97.8)
MONOCYTES # BLD AUTO: 0.75 K/UL (ref 0–0.85)
MONOCYTES NFR BLD AUTO: 8.4 % (ref 0–13.4)
NEUTROPHILS # BLD AUTO: 5.39 K/UL (ref 2–7.15)
NEUTROPHILS NFR BLD: 60 % (ref 44–72)
NRBC # BLD AUTO: 0 K/UL
NRBC BLD AUTO-RTO: 0 /100 WBC
PLATELET # BLD AUTO: 239 K/UL (ref 164–446)
PMV BLD AUTO: 10.4 FL (ref 9–12.9)
POTASSIUM SERPL-SCNC: 3.8 MMOL/L (ref 3.6–5.5)
RBC # BLD AUTO: 4.93 M/UL (ref 4.2–5.4)
SODIUM SERPL-SCNC: 135 MMOL/L (ref 135–145)
WBC # BLD AUTO: 9 K/UL (ref 4.8–10.8)

## 2017-10-03 PROCEDURE — 501516 HCHG SUTURE, CAPIO: Performed by: SPECIALIST

## 2017-10-03 PROCEDURE — 0TSD4ZZ REPOSITION URETHRA, PERCUTANEOUS ENDOSCOPIC APPROACH: ICD-10-PCS | Performed by: SPECIALIST

## 2017-10-03 PROCEDURE — 700102 HCHG RX REV CODE 250 W/ 637 OVERRIDE(OP)

## 2017-10-03 PROCEDURE — 501838 HCHG SUTURE GENERAL: Performed by: SPECIALIST

## 2017-10-03 PROCEDURE — 0UT9FZZ RESECTION OF UTERUS, VIA NATURAL OR ARTIFICIAL OPENING WITH PERCUTANEOUS ENDOSCOPIC ASSISTANCE: ICD-10-PCS | Performed by: SPECIALIST

## 2017-10-03 PROCEDURE — 160036 HCHG PACU - EA ADDL 30 MINS PHASE I: Performed by: SPECIALIST

## 2017-10-03 PROCEDURE — 80048 BASIC METABOLIC PNL TOTAL CA: CPT

## 2017-10-03 PROCEDURE — 503052 HCHG HEMOSTAT POWDER-5GRAM: Performed by: SPECIALIST

## 2017-10-03 PROCEDURE — C1771 REP DEV, URINARY, W/SLING: HCPCS | Performed by: SPECIALIST

## 2017-10-03 PROCEDURE — 85025 COMPLETE CBC W/AUTO DIFF WBC: CPT

## 2017-10-03 PROCEDURE — 501577 HCHG TROCAR, STEP 11MM: Performed by: SPECIALIST

## 2017-10-03 PROCEDURE — 700111 HCHG RX REV CODE 636 W/ 250 OVERRIDE (IP)

## 2017-10-03 PROCEDURE — A9270 NON-COVERED ITEM OR SERVICE: HCPCS

## 2017-10-03 PROCEDURE — 0USG8ZZ REPOSITION VAGINA, VIA NATURAL OR ARTIFICIAL OPENING ENDOSCOPIC: ICD-10-PCS | Performed by: SPECIALIST

## 2017-10-03 PROCEDURE — 93005 ELECTROCARDIOGRAM TRACING: CPT | Performed by: SPECIALIST

## 2017-10-03 PROCEDURE — 501579 HCHG TROCAR, STEP 5MM: Performed by: SPECIALIST

## 2017-10-03 PROCEDURE — 160002 HCHG RECOVERY MINUTES (STAT): Performed by: SPECIALIST

## 2017-10-03 PROCEDURE — 160009 HCHG ANES TIME/MIN: Performed by: SPECIALIST

## 2017-10-03 PROCEDURE — 88305 TISSUE EXAM BY PATHOLOGIST: CPT

## 2017-10-03 PROCEDURE — 503054 HCHG APPLICATOR-HEMOSTAT POWDER: Performed by: SPECIALIST

## 2017-10-03 PROCEDURE — 306637 HCHG MISC ORTHO ITEM RC 0274

## 2017-10-03 PROCEDURE — 0UT7FZZ RESECTION OF BILATERAL FALLOPIAN TUBES, VIA NATURAL OR ARTIFICIAL OPENING WITH PERCUTANEOUS ENDOSCOPIC ASSISTANCE: ICD-10-PCS | Performed by: SPECIALIST

## 2017-10-03 PROCEDURE — 0UUF8JZ SUPPLEMENT CUL-DE-SAC WITH SYNTHETIC SUBSTITUTE, VIA NATURAL OR ARTIFICIAL OPENING ENDOSCOPIC: ICD-10-PCS | Performed by: SPECIALIST

## 2017-10-03 PROCEDURE — 502240 HCHG MISC OR SUPPLY RC 0272: Performed by: SPECIALIST

## 2017-10-03 PROCEDURE — 93010 ELECTROCARDIOGRAM REPORT: CPT | Performed by: INTERNAL MEDICINE

## 2017-10-03 PROCEDURE — 160035 HCHG PACU - 1ST 60 MINS PHASE I: Performed by: SPECIALIST

## 2017-10-03 PROCEDURE — A4338 INDWELLING CATHETER LATEX: HCPCS | Performed by: SPECIALIST

## 2017-10-03 PROCEDURE — 0UT2FZZ RESECTION OF BILATERAL OVARIES, VIA NATURAL OR ARTIFICIAL OPENING WITH PERCUTANEOUS ENDOSCOPIC ASSISTANCE: ICD-10-PCS | Performed by: SPECIALIST

## 2017-10-03 PROCEDURE — 160041 HCHG SURGERY MINUTES - EA ADDL 1 MIN LEVEL 4: Performed by: SPECIALIST

## 2017-10-03 PROCEDURE — 502703 HCHG DEVICE, LIGASURE V SEALER: Performed by: SPECIALIST

## 2017-10-03 PROCEDURE — 160029 HCHG SURGERY MINUTES - 1ST 30 MINS LEVEL 4: Performed by: SPECIALIST

## 2017-10-03 PROCEDURE — 500854 HCHG NEEDLE, INSUFFLATION FOR STEP: Performed by: SPECIALIST

## 2017-10-03 PROCEDURE — 160048 HCHG OR STATISTICAL LEVEL 1-5: Performed by: SPECIALIST

## 2017-10-03 PROCEDURE — 501330 HCHG SET, CYSTO IRRIG TUBING: Performed by: SPECIALIST

## 2017-10-03 PROCEDURE — 700101 HCHG RX REV CODE 250

## 2017-10-03 PROCEDURE — 500886 HCHG PACK, LAPAROSCOPY: Performed by: SPECIALIST

## 2017-10-03 DEVICE — SLING TOT OBTRYX I HALO: Type: IMPLANTABLE DEVICE | Site: BLADDER | Status: FUNCTIONAL

## 2017-10-03 RX ORDER — METOCLOPRAMIDE HYDROCHLORIDE 5 MG/ML
10 INJECTION INTRAMUSCULAR; INTRAVENOUS EVERY 4 HOURS PRN
Status: DISCONTINUED | OUTPATIENT
Start: 2017-10-03 | End: 2017-10-03 | Stop reason: HOSPADM

## 2017-10-03 RX ORDER — SIMETHICONE 80 MG
80 TABLET,CHEWABLE ORAL EVERY 8 HOURS PRN
Status: DISCONTINUED | OUTPATIENT
Start: 2017-10-03 | End: 2017-10-03 | Stop reason: HOSPADM

## 2017-10-03 RX ORDER — OXYCODONE HCL 20 MG/1
TABLET, FILM COATED, EXTENDED RELEASE ORAL
Status: COMPLETED
Start: 2017-10-03 | End: 2017-10-03

## 2017-10-03 RX ORDER — MIDAZOLAM HYDROCHLORIDE 1 MG/ML
INJECTION INTRAMUSCULAR; INTRAVENOUS
Status: COMPLETED
Start: 2017-10-03 | End: 2017-10-03

## 2017-10-03 RX ORDER — ONDANSETRON 2 MG/ML
4 INJECTION INTRAMUSCULAR; INTRAVENOUS EVERY 6 HOURS PRN
Status: DISCONTINUED | OUTPATIENT
Start: 2017-10-03 | End: 2017-10-03 | Stop reason: HOSPADM

## 2017-10-03 RX ORDER — GABAPENTIN 300 MG/1
CAPSULE ORAL
Status: COMPLETED
Start: 2017-10-03 | End: 2017-10-03

## 2017-10-03 RX ORDER — OXYCODONE HCL 5 MG/5 ML
SOLUTION, ORAL ORAL
Status: COMPLETED
Start: 2017-10-03 | End: 2017-10-03

## 2017-10-03 RX ORDER — BUPIVACAINE HYDROCHLORIDE AND EPINEPHRINE 2.5; 5 MG/ML; UG/ML
INJECTION, SOLUTION INFILTRATION; PERINEURAL
Status: DISCONTINUED | OUTPATIENT
Start: 2017-10-03 | End: 2017-10-03 | Stop reason: HOSPADM

## 2017-10-03 RX ORDER — ACETAMINOPHEN 500 MG
1000 TABLET ORAL EVERY 6 HOURS
Status: DISCONTINUED | OUTPATIENT
Start: 2017-10-03 | End: 2017-10-03 | Stop reason: HOSPADM

## 2017-10-03 RX ORDER — CELECOXIB 200 MG/1
CAPSULE ORAL
Status: COMPLETED
Start: 2017-10-03 | End: 2017-10-03

## 2017-10-03 RX ORDER — OXYCODONE HYDROCHLORIDE 5 MG/1
10 TABLET ORAL
Status: DISCONTINUED | OUTPATIENT
Start: 2017-10-03 | End: 2017-10-03 | Stop reason: HOSPADM

## 2017-10-03 RX ORDER — BUPIVACAINE HYDROCHLORIDE AND EPINEPHRINE 2.5; 5 MG/ML; UG/ML
INJECTION, SOLUTION EPIDURAL; INFILTRATION; INTRACAUDAL; PERINEURAL
Status: DISCONTINUED | OUTPATIENT
Start: 2017-10-03 | End: 2017-10-03 | Stop reason: HOSPADM

## 2017-10-03 RX ORDER — ACETAMINOPHEN 500 MG
TABLET ORAL
Status: COMPLETED
Start: 2017-10-03 | End: 2017-10-03

## 2017-10-03 RX ORDER — SODIUM CHLORIDE, SODIUM LACTATE, POTASSIUM CHLORIDE, CALCIUM CHLORIDE 600; 310; 30; 20 MG/100ML; MG/100ML; MG/100ML; MG/100ML
INJECTION, SOLUTION INTRAVENOUS CONTINUOUS
Status: DISCONTINUED | OUTPATIENT
Start: 2017-10-03 | End: 2017-10-03 | Stop reason: HOSPADM

## 2017-10-03 RX ADMIN — FENTANYL CITRATE 50 MCG: 50 INJECTION, SOLUTION INTRAMUSCULAR; INTRAVENOUS at 16:37

## 2017-10-03 RX ADMIN — OXYCODONE HYDROCHLORIDE 10 MG: 5 SOLUTION ORAL at 16:38

## 2017-10-03 RX ADMIN — ACETAMINOPHEN 1000 MG: 500 TABLET, FILM COATED ORAL at 12:59

## 2017-10-03 RX ADMIN — MIDAZOLAM 1 MG: 1 INJECTION INTRAMUSCULAR; INTRAVENOUS at 17:00

## 2017-10-03 RX ADMIN — SODIUM CHLORIDE, SODIUM LACTATE, POTASSIUM CHLORIDE, CALCIUM CHLORIDE 1000 ML: 600; 310; 30; 20 INJECTION, SOLUTION INTRAVENOUS at 12:30

## 2017-10-03 RX ADMIN — OXYCODONE HYDROCHLORIDE 20 MG: 20 TABLET, FILM COATED, EXTENDED RELEASE ORAL at 12:59

## 2017-10-03 RX ADMIN — GABAPENTIN 300 MG: 300 CAPSULE ORAL at 13:34

## 2017-10-03 RX ADMIN — CELECOXIB 400 MG: 200 CAPSULE ORAL at 12:59

## 2017-10-03 RX ADMIN — FENTANYL CITRATE 50 MCG: 50 INJECTION, SOLUTION INTRAMUSCULAR; INTRAVENOUS at 16:47

## 2017-10-03 ASSESSMENT — PAIN SCALES - GENERAL
PAINLEVEL_OUTOF10: 8
PAINLEVEL_OUTOF10: 0
PAINLEVEL_OUTOF10: ASSUMED PAIN PRESENT
PAINLEVEL_OUTOF10: 8
PAINLEVEL_OUTOF10: 0

## 2017-10-03 NOTE — OR NURSING
"RECEIVED FROM OR WITH DR LIU.  ORAL AIRWAY IN PLACE.  VSS  ORDONEZ CATHETER IN PLACE.  ABDOMEN SOFT - THREE BAND-AIDS DRY AND IN TACT.  NERISSA PAD DRY.  1625 AIRWAY DC'D.  PATIENT GIVEN SIPS OF WATER.    1635 GIVEN ORAL AND IV PAIN MEDICATION.  PATIENT STATES \"IT HURTS\" UNABLE TO RATE SAME.  FAMILY BROUGHT TO BEDSIDE.  1655 PATIENT STATES SHE IS VERY ANXIOUS AND HAS H/O ANXIETY AND TAKES MEDICATION FOR SAME.  SHE STATES SHE DID NOT TAKE IT LAST NIGHT OR THIS MORNING.  MEDICATED PER ORDER.  1720 SLEEPING  1745 PATIENT AWAKE.  GIVEN INCENTIVE SPIROMETER AND SHOWN HOW TO USE IT.  VERY RELUCTANT TO DO SO  1800 PATIENT STATES SHE IS VERY UNCOMFORTABLE.  TRANSFERRED TO RECLINER AND GIVEN ICE CREAM.  PATIENT STATES SHE IS VERY UNCOMFORTABLE AND HAS HORRIBLE FOOT PAIN BILATERALLY.  FAMILY STATES SHE WILL BE SEEING A PHYSICIAN FOR THIS SOON.    1830 DISCHARGE INSTRUCTIONS TO PATIENT AND FAMILY.  CATHETER CARE DEMONSTRATED.  FAMILY STATES THEY FEEL CONFIDENT WITH THIS PLAN.    1840 ON ROOM AIR; EATING CRACKERS AND DRINKING JUICE.  1905 UP AND DRESSED. ALL QUESTIONS ANSWERED.  DISCHARGED TO HOME  "

## 2017-10-03 NOTE — OP REPORT
DATE OF SERVICE:  10/3/2017     PREOPERATIVE DIAGNOSES:  Heavy vaginal bleeding, passage of large clots,   significant dysmenorrhea, dyspareunia, pelvic pain, near complete uterovaginal   prolapse, and mixed urinary incontinence.     POSTOPERATIVE DIAGNOSES:  Same; final pathology pending     PROCEDURE:  Laparoscopic-assisted vaginal hysterectomy, bilateral    salpingo-oophorectomy, anterior and posterior vaginal repair, enterocele    repair, perineoplasty, sacrospinous vault suspension, transobturator tape mid    urethral sling procedure, cystoscopy.     SURGEON:  Hudson Driscoll MD     ASSISTANT:  Ramsey Presley MD     ANESTHESIA:  General     ANESTHESIOLOGIST: Anesthesiologist: Karely Wick M.D.     ESTIMATED BLOOD LOSS FOR THE PROCEDURE:  100 mL.     FINDINGS:  Bulbous fibroid uterus weighing 211gms with normal appearing adnexa,    good support of the anterior and posterior vaginal walls in addition to apical   vaginal tissue without any undue tension in the suture sites.  Cystoscopy    revealed bilateral ureteral efflux, normal bladder, no undue tension noted at    the level of the mid urethra after sling placement on cystoscopic evaluation.     DESCRIPTION OF PROCEDURE:  The patient was taken to the operating room where    general anesthesia was performed without difficulty.  The patient was then    prepped and draped in usual sterile fashion with lower extremities placed in    Fabian stirrups.  Attention was first turned to the perineum where a weighted    speculum was placed with the use of Reyes retractor.  Single tooth tenaculum    was placed on the anterior lip of the cervix.  Hulka uterine manipulator was    then placed.  Single tooth tenaculum and retractor was then removed.     Attention was then turned to the umbilicus where a 1 cm incision was made.  A    Veress needle was placed, 3.5 L of carboperitoneum were then obtained.  A 10    mm trocar port was then placed.  Two separate ports were placed  approximately    1/3 of the way from the anterior supra iliac spine lateral to the rectus    muscle under direct laparoscopic visualization with 5 mm ports placed.     Attention was then turned to the pelvis where the distal portion of the    fallopian tubes was then grasped just below the ovary.  This area was grasped,   cauterized, and transected at the level of the infundibulopelvic ligament.     Once the ureters were noted to be coursing far inferior to the operative    field, the broad round main portion of broad ligaments were then isolated,    cauterized, and transected on each side.  The vesicouterine peritoneum was    grasped, incised, the bladder flap was created.  Uterine vessels were then    clamped, cauterized and transected on each side.  Attention was then turned to   the perineum where a weighted speculum was placed with the use of Reyes    retractor, a thyroid tenaculum was placed on the anterior lip, one on the    posterior lips of the cervix.  Cervix was then injected with 10 mL of 0.25%    Marcaine with epinephrine in a circumferential fashion starting anteriorly,    proceeding posterior, proceeding back anterior once again.  The bladder was    pushed anterior and cephalad.  The anterior cul-de-sac was entered sharply.     Right angle Tiffanie retractor was placed upon sharp entry in the anterior    cul-de-sac.  A large duckbill speculum was placed upon sharp entry in the    posterior cul-de-sac.  Uterosacral cardinal complex was then grasped with ZED    clamps, transected and suture ligated with 0 Vicryl suture bilaterally.  The    uterus, both fallopian tubes then handed off the field as specimen.  Excellent   hemostasis was noted.  The anterior and posterior leafs of the peritoneum in    addition to the uterosacral cardinal complexes were reapproximated in the    midline in a pursestring suture using 2-0 Vicryl.  The vaginal cuff was then    reapproximated with figure-of-eight sutures using 0  Vicryl reapproximating    both uterosacral cardinal complexes of the respective vaginal apices.  Once    the vaginal cuff was closed, the anterior vaginal mucosa was then injected    with 10 mL of 0.25% Marcaine with epinephrine.  An incision was made in the    anterior vaginal mucosa.  The vaginal mucosa was then dissected off the    underlying pubocervical fascia jailyn until the levator muscles were then    reached.  Horizontal mattress sutures were then used to reapproximate the    pubocervical fascia in the midline in a double 0 closure, thereby reducing the   midline cystocele.  A 10 mL of 0.25% Marcaine with epinephrine were injected    with 5 mL on the right, 5 mL on the left lateral to the clitoris labia crural    fold followed by stab incision made with the use 11 blade scalpel followed by    the placement of the Obtryx halo needles through the labial crural incision    sites through the obturator membrane out through the vaginal mucosal incision    at the level of the mid urethra.  The sling was then applied to the Obtryx    halo needle.  Needle was then taken back up through their original track.     Tensioning of position was then performed.  The Louis catheter was removed,    which had been placed at the beginning of the case for placement of 30-degree    cystoscope, which revealed normal urinary bladder, bilateral ureteral efflux    and no undue tension noted at the level of the mid urethra.  The sling was    then released under direct cystoscopic evaluation.  Tensioning of position was   then finalized.  All excess vaginal mucosa and sling material was then    excised, cystoscope removed.  Louis catheter replaced.  The vaginal mucosa was   then reapproximated with 2-0 Vicryl in running locking fashion in one    segment.  Posterior vaginal mucosa was then injected with 10 mL of 0.25%    Marcaine with epinephrine.  The vaginal mucosa was then dissected off the    underlying rectovaginal fascia jailyn until  the levator muscles were then    reached.  Dissection on the sacrospinous processes ischial spine was then    performed on the right, 3 cm medial along the sacrospinous ligament with    straight catheterization needle device, 0 Ethibond suture was taken through    the sacrospinous ligament taken out through the apical portion of the vaginal    cuff on the overlying vaginal mucosa.  Once tied down, there was good    reapproximation of the cuff to the sacrospinous ligament.  The rectovaginal    septum was then reapproximated to the posterior aspect of the vaginal cuff to    reduce a large enterocele located at the superior aspect of dissection in a    double pursestring suture.  Horizontal mattress sutures were then used to    reapproximate the rectovaginal fascia in the midline in a double 0 closure,    thereby reducing the midline rectocele.  Excess vaginal mucosa was then    trimmed.  The vaginal mucosa was then reapproximated with 2-0 Vicryl in    running locking fashion in one segment for a perineoplasty on the perineum.     Attention was then turned back up to the abdomen and pelvis.  Pelvis was    inspected and noted to be hemostatic, 3.5 L of carboperitoneum removed    followed by removal of the ports under direct laparoscopic visualization.  The   incisions were then reapproximated with single interrupted sutures using 4-0    Vicryl, injected with 20 mL of 0.25% Marcaine with epinephrine.  The patient    tolerated the procedure well and was awoken from general anesthesia, was taken   to recovery in stable condition.        ____________________________________     MERLENE LANTIGUA MD

## 2017-10-03 NOTE — OR SURGEON
Operative Report    PreOp Diagnosis: Heavy vaginal bleeding, passage of large clots,   significant dysmenorrhea, dyspareunia, pelvic pain, near complete uterovaginal   prolapse, and mixed urinary incontinence.    PostOp Diagnosis: Same    Procedure(s):  VAGINAL HYSTERECTOMY SCOPE TOTAL - Wound Class: Clean Contaminated  SALPINGECTOMY - Wound Class: Clean Contaminated  OOPHORECTOMY - Wound Class: Clean Contaminated  ANTERIOR AND POSTERIOR REPAIR - Wound Class: Clean Contaminated  ENTEROCELE REPAIR, PERINEOPLASTY - Wound Class: Clean Contaminated  BLADDER SLING FEMALE TOT, CYSTOSCOPY - Wound Class: Clean Contaminated  VAGINAL SUSPENSION SACROSPINOUS VAULT POSSIBLE - Wound Class: Clean Contaminated    Surgeon(s):  NAUN Weldon M.D.    Anesthesiologist/Type of Anesthesia:  Anesthesiologist: Karely Wick M.D./General    Surgical Staff:  Circulator: Eden Foster R.N.; Erin Saldivar R.N.  Relief Circulator: Indira Nicole R.N.  Scrub Person: Ana Luisa Almanza    Specimens:  Uterus bilateral fallopian tubes and ovaries    Estimated Blood Loss: 100ccs    Findings: Fibroid uterus weighing 211 gms, normal appearing adnexa, good support of the anterior and posterior and apical vaginal tissue without any undue tension at any suture sites, cystoscopy revealed bilateral ureteral efflux, normal efflux and no undue tension at mid urethra after sling placement     Complications: None        10/3/2017 4:12 PM Hudson Driscoll

## 2017-10-04 NOTE — DISCHARGE INSTRUCTIONS
ACTIVITY: Rest and take it easy for the first 24 hours.  A responsible adult is recommended to remain with you during that time.  It is normal to feel sleepy.  We encourage you to not do anything that requires balance, judgment or coordination.    MILD FLU-LIKE SYMPTOMS ARE NORMAL. YOU MAY EXPERIENCE GENERALIZED MUSCLE ACHES, THROAT IRRITATION, HEADACHE AND/OR SOME NAUSEA.    FOR 24 HOURS DO NOT:  Drive, operate machinery or run household appliances.  Drink beer or alcoholic beverages.   Make important decisions or sign legal documents.    SPECIAL INSTRUCTIONS: *PLEASE SEE INSTRUCTION SHEET.  NO HEAVY LIFTING.  PELVIC REST - NO TAMPONS, DOUCHING OR INTERCOURSE.**    DIET: To avoid nausea, slowly advance diet as tolerated, avoiding spicy or greasy foods for the first day.  Add more substantial food to your diet according to your physician's instructions.  Babies can be fed formula or breast milk as soon as they are hungry.  INCREASE FLUIDS AND FIBER TO AVOID CONSTIPATION.    SURGICAL DRESSING/BATHING: *OK TO SHOWER AFTER CATHETER REMOVAL TOMORROW.  NO TUB BATHS, HOT TUBS OR SOAKING **    FOLLOW-UP APPOINTMENT:  A follow-up appointment should be arranged with your doctor; call to schedule.    You should CALL YOUR PHYSICIAN if you develop:  Fever greater than 101 degrees F.  Pain not relieved by medication, or persistent nausea or vomiting.  Excessive bleeding (blood soaking through dressing) or unexpected drainage from the wound.  Extreme redness or swelling around the incision site, drainage of pus or foul smelling drainage.  Inability to urinate or empty your bladder within 8 hours.  Problems with breathing or chest pain.    You should call 911 if you develop problems with breathing or chest pain.  If you are unable to contact your doctor or surgical center, you should go to the nearest emergency room or urgent care center.    Physician's telephone #: *[phone]**    If any questions arise, call your doctor.  If  your doctor is not available, please feel free to call the Surgical Center at [phone].  The Center is open Monday through Friday from 7AM to 7PM.  You can also call the HEALTH HOTLINE open 24 hours/day, 7 days/week and speak to a nurse at [phone], or toll free at [phone].    A registered nurse may call you a few days after your surgery to see how you are doing after your procedure.    MEDICATIONS: Resume taking daily medication.  Take prescribed pain medication with food.  If no medication is prescribed, you may take non-aspirin pain medication if needed.  PAIN MEDICATION CAN BE VERY CONSTIPATING.  Take a stool softener or laxative such as senokot, pericolace, or milk of magnesia if needed.     Last pain medication given at *4:30 P.M.  MAY TAKE MORE PAIN MEDICATION AT 8:30 P.M. TONIGHT**.    If your physician has prescribed pain medication that includes Acetaminophen (Tylenol), do not take additional Acetaminophen (Tylenol) while taking the prescribed medication.    Depression / Suicide Risk    As you are discharged from this [facility] facility, it is important to learn how to keep safe from harming yourself.    Recognize the warning signs:  · Abrupt changes in personality, positive or negative- including increase in energy   · Giving away possessions  · Change in eating patterns- significant weight changes-  positive or negative  · Change in sleeping patterns- unable to sleep or sleeping all the time   · Unwillingness or inability to communicate  · Depression  · Unusual sadness, discouragement and loneliness  · Talk of wanting to die  · Neglect of personal appearance   · Rebelliousness- reckless behavior  · Withdrawal from people/activities they love  · Confusion- inability to concentrate     If you or a loved one observes any of these behaviors or has concerns about self-harm, here's what you can do:  · Talk about it- your feelings and reasons for harming yourself  · Remove any means that you  might use to hurt yourself (examples: pills, rope, extension cords, firearm)  · Get professional help from the community (Mental Health, Substance Abuse, psychological counseling)  · Do not be alone:Call your Safe Contact- someone whom you trust who will be there for you.  · Call your local CRISIS HOTLINE 498-2544 or 265-035-0579  · Call your local Children's Mobile Crisis Response Team [place] (486) 942-1761 or www.[employer] [place]  · Call the toll free National Suicide Prevention Hotlines   · National Suicide Prevention Lifeline 484-119-PVQW (9145)  · National Hope Line Network 800-SUICIDE (398-6236)

## 2017-12-12 DIAGNOSIS — K30 ACID INDIGESTION: ICD-10-CM

## 2017-12-13 RX ORDER — RANITIDINE 150 MG/1
TABLET ORAL
Qty: 60 TAB | Refills: 2 | Status: SHIPPED | OUTPATIENT
Start: 2017-12-13 | End: 2018-04-04 | Stop reason: SDUPTHER

## 2018-03-23 ENCOUNTER — APPOINTMENT (OUTPATIENT)
Dept: RADIOLOGY | Facility: IMAGING CENTER | Age: 53
End: 2018-03-23
Attending: NURSE PRACTITIONER
Payer: MEDICAID

## 2018-03-23 ENCOUNTER — OFFICE VISIT (OUTPATIENT)
Dept: URGENT CARE | Facility: PHYSICIAN GROUP | Age: 53
End: 2018-03-23
Payer: MEDICAID

## 2018-03-23 VITALS
SYSTOLIC BLOOD PRESSURE: 120 MMHG | TEMPERATURE: 97.7 F | BODY MASS INDEX: 27.16 KG/M2 | HEART RATE: 82 BPM | WEIGHT: 169 LBS | DIASTOLIC BLOOD PRESSURE: 80 MMHG | HEIGHT: 66 IN | OXYGEN SATURATION: 99 % | RESPIRATION RATE: 20 BRPM

## 2018-03-23 DIAGNOSIS — M54.50 ACUTE MIDLINE LOW BACK PAIN WITHOUT SCIATICA: ICD-10-CM

## 2018-03-23 DIAGNOSIS — R29.898 WEAKNESS OF BOTH LOWER EXTREMITIES: ICD-10-CM

## 2018-03-23 DIAGNOSIS — G61.0 GUILLAIN BARRÉ SYNDROME (HCC): ICD-10-CM

## 2018-03-23 PROCEDURE — 72100 X-RAY EXAM L-S SPINE 2/3 VWS: CPT | Performed by: FAMILY MEDICINE

## 2018-03-23 PROCEDURE — 99213 OFFICE O/P EST LOW 20 MIN: CPT | Performed by: NURSE PRACTITIONER

## 2018-03-23 RX ORDER — HYDROCODONE BITARTRATE AND ACETAMINOPHEN 7.5; 325 MG/1; MG/1
1-2 TABLET ORAL EVERY 6 HOURS PRN
COMMUNITY
End: 2018-12-14

## 2018-03-23 RX ORDER — BUPROPION HYDROCHLORIDE 100 MG/1
450 TABLET ORAL DAILY
COMMUNITY
End: 2018-10-30

## 2018-03-23 RX ORDER — OXYBUTYNIN CHLORIDE 5 MG/1
5 TABLET ORAL 3 TIMES DAILY
COMMUNITY
End: 2018-08-21

## 2018-03-23 ASSESSMENT — ENCOUNTER SYMPTOMS
FEVER: 0
CHILLS: 0
SHORTNESS OF BREATH: 0

## 2018-03-23 NOTE — PROGRESS NOTES
"Subjective:      Marilyn Salinas is a 52 y.o. female who presents with Anxiety (flare up of pre-existing medical condition)    Past Medical History:   Diagnosis Date   • Anxiety    • Arthritis     spine, feet    • Asthma    • Cataract    • Depression    • High cholesterol      Social History     Social History   • Marital status: Single     Spouse name: N/A   • Number of children: N/A   • Years of education: N/A     Occupational History   • Not on file.     Social History Main Topics   • Smoking status: Current Every Day Smoker     Packs/day: 0.25     Years: 26.00     Types: Cigarettes   • Smokeless tobacco: Never Used   • Alcohol use No   • Drug use: Yes     Types: Marijuana   • Sexual activity: Not Currently     Other Topics Concern   •  Service No   • Blood Transfusions No   • Caffeine Concern No   • Occupational Exposure No   • Hobby Hazards No   • Sleep Concern Yes   • Stress Concern Yes   • Weight Concern Yes   • Special Diet No   • Back Care Yes   • Exercise No   • Bike Helmet Yes   • Seat Belt Yes   • Self-Exams Yes     Social History Narrative   • No narrative on file     Family History   Problem Relation Age of Onset   • Cancer Mother    • Alcohol/Drug Mother    • Cancer Brother    • Diabetes Maternal Aunt        Allergies: Patient has no known allergies.    This patient is a 52-year-old female who presents today with complaint of episode of lower extremity weakness bilaterally yesterday. Patient has a history of Jason Barré syndrome that was diagnosed about 2 years ago. Patient states this was diagnosed in an emergency room and no apparent precursor has been identified. Patient states that she was at a counseling appointment at mental health facility yesterday and felt that her legs \"locked up\". Patient states overtime she feels her lower extremities are becoming increasingly weak. She is improved today over yesterday.          Anxiety   Presents for initial visit. Onset was in the past 7 " "days. The problem has been resolved. Patient reports no chest pain or shortness of breath.           Review of Systems   Constitutional: Negative for chills and fever.   Respiratory: Negative for shortness of breath.    Cardiovascular: Negative for chest pain.   All other systems reviewed and are negative.         Objective:     /80   Pulse 82   Temp 36.5 °C (97.7 °F)   Resp 20   Ht 1.676 m (5' 6\")   Wt 76.7 kg (169 lb)   SpO2 99%   BMI 27.28 kg/m²      Physical Exam   Constitutional: She is oriented to person, place, and time. She appears well-developed and well-nourished.   HENT:   Head: Normocephalic.   Right Ear: External ear normal.   Left Ear: External ear normal.   Nose: Nose normal.   Mouth/Throat: Oropharynx is clear and moist. No oropharyngeal exudate.   Eyes: Conjunctivae and EOM are normal. Pupils are equal, round, and reactive to light.   Neck: Normal range of motion. Neck supple.   Cardiovascular: Normal rate and regular rhythm.    Pulmonary/Chest: Effort normal and breath sounds normal.   Musculoskeletal: Normal range of motion.        Lumbar back: She exhibits tenderness. She exhibits normal range of motion.        Back:    SLR positive bilaterally at 45 degrees.  Patient is ambulatory with assistance from a cane   Neurological: She is alert and oriented to person, place, and time.   Skin: Skin is warm and dry. Capillary refill takes less than 2 seconds.   Psychiatric: She has a normal mood and affect. Her speech is normal and behavior is normal. Cognition and memory are normal.   Vitals reviewed.    3/23/2018 12:49 PM    HISTORY/REASON FOR EXAM:  Low back pain      TECHNIQUE/ EXAM DESCRIPTION AND NUMBER OF VIEWS:  3 views of the lumbar spine.    COMPARISON: None.    FINDINGS:  There are 5 nonrib-bearing lumbar vertebra. No compression fracture is identified. There is multilevel intervertebral disc space narrowing which is most prominent at L5/S1 with disc vacuum phenomenon seen. " Endplate spurring is seen in multiple levels.   There are degenerative changes of the facet joints at multiple levels. Endplate sclerosis is seen at L5/S1.   Impression       Multilevel degenerative changes, most prominent at L5/S1.    Facet arthropathy.               Assessment/Plan:     1. Acute midline low back pain without sciatica  2. Lower extremity weakness  -XR lumbar spine  -Continue present medications  -Referral given to neurology  -strict ER precautions for recurrence of sypmtoms or worsening of weakness/falls.

## 2018-04-04 DIAGNOSIS — K30 ACID INDIGESTION: ICD-10-CM

## 2018-04-04 RX ORDER — RANITIDINE 150 MG/1
TABLET ORAL
Qty: 60 TAB | Refills: 2 | Status: SHIPPED | OUTPATIENT
Start: 2018-04-04 | End: 2018-04-10 | Stop reason: SDUPTHER

## 2018-04-10 ENCOUNTER — OFFICE VISIT (OUTPATIENT)
Dept: MEDICAL GROUP | Facility: MEDICAL CENTER | Age: 53
End: 2018-04-10
Attending: NURSE PRACTITIONER
Payer: MEDICAID

## 2018-04-10 VITALS
HEART RATE: 98 BPM | OXYGEN SATURATION: 95 % | SYSTOLIC BLOOD PRESSURE: 92 MMHG | TEMPERATURE: 98.8 F | HEIGHT: 66 IN | DIASTOLIC BLOOD PRESSURE: 60 MMHG | WEIGHT: 166 LBS | RESPIRATION RATE: 16 BRPM | BODY MASS INDEX: 26.68 KG/M2

## 2018-04-10 DIAGNOSIS — M79.672 PAIN IN BOTH FEET: ICD-10-CM

## 2018-04-10 DIAGNOSIS — Z91.09 ENVIRONMENTAL ALLERGIES: ICD-10-CM

## 2018-04-10 DIAGNOSIS — M79.671 PAIN IN BOTH FEET: ICD-10-CM

## 2018-04-10 DIAGNOSIS — G61.0 GBS (GUILLAIN BARRE SYNDROME) (HCC): ICD-10-CM

## 2018-04-10 DIAGNOSIS — E55.9 VITAMIN D DEFICIENCY: ICD-10-CM

## 2018-04-10 DIAGNOSIS — K30 ACID INDIGESTION: ICD-10-CM

## 2018-04-10 DIAGNOSIS — M51.36 DDD (DEGENERATIVE DISC DISEASE), LUMBAR: ICD-10-CM

## 2018-04-10 DIAGNOSIS — E78.2 MIXED HYPERLIPIDEMIA: ICD-10-CM

## 2018-04-10 PROCEDURE — 99214 OFFICE O/P EST MOD 30 MIN: CPT | Performed by: NURSE PRACTITIONER

## 2018-04-10 PROCEDURE — 99213 OFFICE O/P EST LOW 20 MIN: CPT | Performed by: NURSE PRACTITIONER

## 2018-04-10 RX ORDER — GABAPENTIN 400 MG/1
400 CAPSULE ORAL 2 TIMES DAILY
COMMUNITY
Start: 2018-04-04 | End: 2019-09-09 | Stop reason: SDUPTHER

## 2018-04-10 RX ORDER — FLUTICASONE PROPIONATE 50 MCG
1 SPRAY, SUSPENSION (ML) NASAL 2 TIMES DAILY
Qty: 16 G | Refills: 2 | Status: SHIPPED | OUTPATIENT
Start: 2018-04-10 | End: 2018-10-30 | Stop reason: SDUPTHER

## 2018-04-10 RX ORDER — ATORVASTATIN CALCIUM 20 MG/1
20 TABLET, FILM COATED ORAL DAILY
Qty: 90 TAB | Refills: 2 | Status: SHIPPED | OUTPATIENT
Start: 2018-04-10 | End: 2019-09-10 | Stop reason: SDUPTHER

## 2018-04-10 RX ORDER — LORATADINE 10 MG/1
10 TABLET ORAL DAILY
Qty: 30 TAB | Refills: 2 | Status: SHIPPED | OUTPATIENT
Start: 2018-04-10 | End: 2018-10-30 | Stop reason: SDUPTHER

## 2018-04-10 RX ORDER — RANITIDINE 150 MG/1
150 TABLET ORAL 2 TIMES DAILY
Qty: 180 TAB | Refills: 2 | Status: SHIPPED | OUTPATIENT
Start: 2018-04-10 | End: 2018-10-13 | Stop reason: SDUPTHER

## 2018-04-10 RX ORDER — VENLAFAXINE HYDROCHLORIDE 37.5 MG/1
37.5 CAPSULE, EXTENDED RELEASE ORAL DAILY
COMMUNITY
Start: 2018-02-28 | End: 2019-12-19

## 2018-04-10 RX ORDER — ZOLPIDEM TARTRATE 10 MG/1
10 TABLET ORAL NIGHTLY PRN
COMMUNITY
Start: 2018-03-15 | End: 2021-11-22

## 2018-04-10 NOTE — ASSESSMENT & PLAN NOTE
Reports dx feb 2016 was dx w Manasa Babcock in [facility].  States was having difficulty walking and limbs felt heavy and has flare ups.    I recommended she talk to Dr Francisco Domínguez her pain management about it.  Discussed new referral to Neurology as notes show [facility] not accepting her dx/insurance.  Pt wants to see Neurologist for further work up and treatment.

## 2018-04-10 NOTE — PROGRESS NOTES
"Chief Complaint:   Chief Complaint   Patient presents with   • Follow-Up   • Results     urgent care xray of spine    • Foot Problem       HPI:  Marilyn is here today for a follow-up on pain and request for ranitidine, med refills, and discuss low back and feet pains      Her PMH includes     Asthma  Acid Indigestion  \"Arthritis\"  Anxiety and Depression  Heart Murmur ( normal echo)  Tobacco use-Smoking  Marijuana use-smoking.  Asthmatic Bronchitis/chest congestion  Lumpectomy  DDD Lumbar Spine  \"Guillian Bare\" Syndrome per patient  Chronic feet pain w bunions, calluses, toenail fungus     Established w   Pain Management-Dr Francisco Domínguez        Referrals Previously Approved:  Psychiatry and Psychology-[facility]  Orthopedics- Ortho  GYN- DR Driscoll  Pain -Dr Monaco---> DR Francisco Domínguez     [place]  Report shows:  3/16/18 Norco 7.5/325 # 120 by Dr Francisco Domínguez  3/15/18 Ambien 10 mg # 30 by DR Francisco Domínguez  Similar entries in report  15 RX and 4 Prescribers in report  30 MME     Review of Records shows:    3/23/18 [facility] Visit for Anxiety , low back pain. Xray of Lumbar Spine completed---Report Findings:    FINDINGS:  There are 5 nonrib-bearing lumbar vertebra. No compression fracture is identified. There is multilevel intervertebral disc space narrowing which is most prominent at L5/S1 with disc vacuum phenomenon seen. Endplate spurring is seen in multiple levels.   There are degenerative changes of the facet joints at multiple levels. Endplate sclerosis is seen at L5/S1.   Impression       Multilevel degenerative changes, most prominent at L5/S1.   Urgent Care Practitioner referred her to Neurology for hx of Guillian Urania and lumbar pain w radiculopathy  But denied as [facility] not accepting this dx.    10/3/17 Hysterectomy Surgery by Dr Driscoll    7/31/17 Clinic Visit for Results and Gabapentin Refill. Lumbar Spine Xray ordered.     6/19/17 clinic for New Patient appt. Labs " ordered, Referral to Pain Management, Psychiatry, Psychology, Orthopedics                Joint Survey Xrays of feet ordered. Rx for Zpak, Robitussin w codeine for Bronchitis, Rx Motrin, Zantac, Robaxin, Gabapentin, Claritin, Flonase.     6/21/17 Pain Management Appt w alyssa Tate and Rx for Tramadol.        DDD (degenerative disc disease), lumbar  Pt has DDD lumbar area and has pain managed by Pain Clinic- DR Francisco Domínguez  See [facility]  above.  She recently was seen in [facility] at the URgent care for low back pain and anxiety.  L-S Spine film ordered and I discussed the results and recommended f/u with her Pain Management MD.    Pain in both feet  Pt has hx of feet pain.  7/24/17  Xray survey feet:  Moderate arthritic changes PIP joint of the left second toe.  2.  Findings consistent with left bunionectomy.  3.  Mild arthritic changes right and left first MTP joints and left calcaneocuboid joint.  4.  Possible osteonecrosis of head of the left third metatarsal.         Previously referred to Orthopedics for this problem and was to see [facility] in early August 2017.  Pt reports went to Ortho there and got special Orthotics but Pt not happy with him as no other plnands.  Both feet have gunnnions and and large calluses and all toenails very thick and appears to be toenail fungus.   No podiatry accepting insurance.  Reports just standing for an hour or two causes her feet pain to be too much and she must sit.  Discussed her discuss her feet pain w Pain Management.  Recommend Epsom Salts and warm Water soaks daily.    GBS (Guillain [place] syndrome) (CMS-[facility])  Reports dx feb 2016 was dx w Guillain Dwale in [facility].  States was having difficulty walking and limbs felt heavy and has flare ups.    I recommended she talk to Dr Francisco Domínguez her pain management about it.  Discussed new referral to Neurology as notes show [facility] not accepting her dx/insurance.  Pt wants to see Neurologist  for further work up and treatment.      Patient Active Problem List    Diagnosis Date Noted   • GBS (Guillain [place] syndrome) (CMS-[facility]) 04/10/2018   • Pelvic pain 10/03/2017   • Vitamin D deficiency 07/31/2017   • Hyperlipidemia, unspecified 07/31/2017   • Assistance with transportation 07/31/2017   • Routine health maintenance 06/19/2017   • Obesity (BMI 30-39.9) 06/19/2017   • Mild intermittent asthma 06/19/2017   • Tobacco use 06/19/2017   • Anxiety and depression 06/19/2017   • Acid indigestion 06/19/2017   • DDD (degenerative disc disease), lumbar 06/19/2017   • Dolly-menopause 06/19/2017   • Chest congestion 06/19/2017   • Pain in both feet 06/19/2017   • Poor vision 06/19/2017       Allergies:Patient has no known allergies.    Medicines as of today:  Current Outpatient Prescriptions   Medication Sig Dispense Refill   • gabapentin (NEURONTIN) 400 MG Cap Take 400 mg by mouth 2 times a day.     • venlafaxine XR (EFFEXOR XR) 37.5 MG CAPSULE SR 24 HR Take 37.5 mg by mouth every day.     • zolpidem (AMBIEN) 10 MG Tab Take 10 mg by mouth at bedtime as needed.     • raNITidine (ZANTAC) 150 MG Tab Take 1 Tab by mouth 2 times a day. TWICE DAILY 180 Tab 2   • atorvastatin (LIPITOR) 20 MG Tab Take 1 Tab by mouth every day. 90 Tab 2   • Cholecalciferol 4000 units Cap Take 1 Capsule by mouth every day. 90 Cap 2   • fluticasone (FLONASE ALLERGY RELIEF) 50 MCG/ACT nasal spray Spray 1 Spray in nose 2 times a day. 16 g 2   • loratadine (CLARITIN) 10 MG Tab Take 1 Tab by mouth every day. 30 Tab 2   • HYDROcodone-acetaminophen (NORCO) 7.5-325 MG per tablet Take 1-2 Tabs by mouth every 6 hours as needed.     • buPROPion (WELLBUTRIN) 100 MG Tab Take 100 mg by mouth 2 times a day.     • oxybutynin (DITROPAN) 5 MG Tab Take 5 mg by mouth 3 times a day.     • meloxicam (MOBIC) 15 MG tablet Take 15 mg by mouth every day.     • methocarbamol (ROBAXIN) 500 MG Tab Take 1 Tab by mouth 2 times a day as needed. 60 Tab 2   • albuterol 108 (90  "BASE) MCG/ACT Aero Soln inhalation aerosol Inhale 2 Puffs by mouth every 6 hours as needed for Shortness of Breath. 8.5 g 0     No current facility-administered medications for this visit.        Social History   Substance Use Topics   • Smoking status: Current Every Day Smoker     Packs/day: 0.25     Years: 26.00     Types: Cigarettes   • Smokeless tobacco: Never Used   • Alcohol use No       Past Medical History:   Diagnosis Date   • Anxiety    • Arthritis     spine, feet    • Asthma    • Cataract    • Depression    • High cholesterol        Family History   Problem Relation Age of Onset   • Cancer Mother    • Alcohol/Drug Mother    • Cancer Brother    • Diabetes Maternal Aunt        ROS:  Review of Systems   See HPI Above    Exam:  Blood pressure (!) 92/60, pulse 98, temperature 37.1 °C (98.8 °F), resp. rate 16, height 1.676 m (5' 5.98\"), weight 75.3 kg (166 lb), SpO2 95 %. Body mass index is 26.81 kg/m².    General:  Well nourished, well developed female in mild discomfort.  HENT:Head is grossly normal. PERRL.  Neck: Supple. Trachea is midline.  Pulmonary: Clear to ausculation .  Normal effort. No rales, ronchi, or wheezing.   Cardiovascular: Regular rate and rhythm.  Abdomen-Abdomen is soft, No tenderness.  Upper extremities- Strong = . Good ROM  Lower extremities- neg for edema. Bilat Feet w bunions, calluses, and very thickened curved toenails consistent  w toenail fungus.   Neuro- A & O x 4. Speech clear and appropriate. Walks slowly w a cane w good balance.    Current medications, allergies, and problem list reviewed with patient and updated in [facility] today.    Assessment/Plan:  1. DDD (degenerative disc disease), lumbar  Keep f/u appt w Pain Management next week  ( Dr Francisco Domínguez)   2. Pain in both feet  Epsom Salt and warm water soak daily.  Wear Orthotics. Discuss pain w Pain Management. Consider seeing Podiatrist as cash pay, since insurance not covering Podiatry.   3. GBS (Guillain [place] syndrome) " (CMS-[facility])  REFERRAL TO NEUROLOGY   4. Acid indigestion  raNITidine (ZANTAC) 150 MG Tab   5. Mixed hyperlipidemia  atorvastatin (LIPITOR) 20 MG Tab   6. Vitamin D deficiency  Cholecalciferol 4000 units Cap   7. Environmental allergies  fluticasone (FLONASE ALLERGY RELIEF) 50 MCG/ACT nasal spray    loratadine (CLARITIN) 10 MG Tab       Return in about 3 months (around 7/10/2018).

## 2018-04-10 NOTE — ASSESSMENT & PLAN NOTE
Pt has DDD lumbar area and has pain managed by Pain Clinic- DR Francisco Mayes  above.  She recently was seen in [facility] for low back pain and anxiety.  L-S Spine film ordered and I discussed the results and recommended f/u with her Pain Management MD.

## 2018-04-10 NOTE — ASSESSMENT & PLAN NOTE
Pt has hx of feet pain.  7/24/17  Xray survey feet:  Moderate arthritic changes PIP joint of the left second toe.  2.  Findings consistent with left bunionectomy.  3.  Mild arthritic changes right and left first MTP joints and left calcaneocuboid joint.  4.  Possible osteonecrosis of head of the left third metatarsal.         Previously referred to Orthopedics for this problem and was to see [facility] in early August 2017.  Pt reports went to Ortho there and got special Orthotics but Pt not happy with him as no other plnands.  Both feet have gunnnions and and large calluses and all toenails very thick and appears to be toenail fungus.   No podiatry accepting insurance.  Reports just standing for an hour or two causes her feet pain to be too much and she must sit.  Discussed her discuss her feet pain w Pain Management.  Recommend Epsom Salts and warm Water soaks daily.

## 2018-05-08 ENCOUNTER — APPOINTMENT (OUTPATIENT)
Dept: RADIOLOGY | Facility: MEDICAL CENTER | Age: 53
End: 2018-05-08
Attending: ANESTHESIOLOGY
Payer: MEDICAID

## 2018-05-08 DIAGNOSIS — M54.2 CERVICALGIA: ICD-10-CM

## 2018-05-08 DIAGNOSIS — M47.816 LUMBAR SPONDYLOSIS: ICD-10-CM

## 2018-05-08 PROCEDURE — 72148 MRI LUMBAR SPINE W/O DYE: CPT

## 2018-05-08 PROCEDURE — 72141 MRI NECK SPINE W/O DYE: CPT

## 2018-05-23 ENCOUNTER — APPOINTMENT (OUTPATIENT)
Dept: RADIOLOGY | Facility: IMAGING CENTER | Age: 53
End: 2018-05-23
Attending: ANESTHESIOLOGY
Payer: MEDICAID

## 2018-05-23 ENCOUNTER — APPOINTMENT (OUTPATIENT)
Dept: URGENT CARE | Facility: PHYSICIAN GROUP | Age: 53
End: 2018-05-23
Payer: MEDICAID

## 2018-05-23 DIAGNOSIS — M79.671 BILATERAL FOOT PAIN: ICD-10-CM

## 2018-05-23 DIAGNOSIS — M79.672 BILATERAL FOOT PAIN: ICD-10-CM

## 2018-05-23 PROCEDURE — 73620 X-RAY EXAM OF FOOT: CPT | Mod: LT,RT | Performed by: FAMILY MEDICINE

## 2018-07-17 ENCOUNTER — APPOINTMENT (OUTPATIENT)
Dept: RADIOLOGY | Facility: IMAGING CENTER | Age: 53
End: 2018-07-17
Attending: NURSE PRACTITIONER
Payer: MEDICAID

## 2018-07-17 ENCOUNTER — APPOINTMENT (OUTPATIENT)
Dept: URGENT CARE | Facility: PHYSICIAN GROUP | Age: 53
End: 2018-07-17
Payer: MEDICAID

## 2018-07-17 DIAGNOSIS — M54.5 LOW BACK PAIN, UNSPECIFIED BACK PAIN LATERALITY, UNSPECIFIED CHRONICITY, WITH SCIATICA PRESENCE UNSPECIFIED: ICD-10-CM

## 2018-07-17 PROCEDURE — 72100 X-RAY EXAM L-S SPINE 2/3 VWS: CPT | Performed by: FAMILY MEDICINE

## 2018-08-21 ENCOUNTER — OFFICE VISIT (OUTPATIENT)
Dept: MEDICAL GROUP | Facility: MEDICAL CENTER | Age: 53
End: 2018-08-21
Attending: NURSE PRACTITIONER
Payer: MEDICAID

## 2018-08-21 VITALS
WEIGHT: 160 LBS | SYSTOLIC BLOOD PRESSURE: 116 MMHG | OXYGEN SATURATION: 93 % | HEIGHT: 66 IN | TEMPERATURE: 98.2 F | HEART RATE: 96 BPM | BODY MASS INDEX: 25.71 KG/M2 | DIASTOLIC BLOOD PRESSURE: 68 MMHG | RESPIRATION RATE: 24 BRPM

## 2018-08-21 DIAGNOSIS — G61.0 GBS (GUILLAIN BARRE SYNDROME) (HCC): ICD-10-CM

## 2018-08-21 DIAGNOSIS — L84 CORNS AND CALLUS: ICD-10-CM

## 2018-08-21 DIAGNOSIS — M79.671 PAIN IN BOTH FEET: ICD-10-CM

## 2018-08-21 DIAGNOSIS — F32.A ANXIETY AND DEPRESSION: ICD-10-CM

## 2018-08-21 DIAGNOSIS — M79.672 PAIN IN BOTH FEET: ICD-10-CM

## 2018-08-21 DIAGNOSIS — Z87.820 HX OF CONCUSSION: ICD-10-CM

## 2018-08-21 DIAGNOSIS — N32.81 OVERACTIVE BLADDER: ICD-10-CM

## 2018-08-21 DIAGNOSIS — M51.36 DDD (DEGENERATIVE DISC DISEASE), LUMBAR: ICD-10-CM

## 2018-08-21 DIAGNOSIS — G62.89 OTHER POLYNEUROPATHY: ICD-10-CM

## 2018-08-21 DIAGNOSIS — F41.9 ANXIETY AND DEPRESSION: ICD-10-CM

## 2018-08-21 PROCEDURE — 99213 OFFICE O/P EST LOW 20 MIN: CPT | Performed by: NURSE PRACTITIONER

## 2018-08-21 PROCEDURE — 99214 OFFICE O/P EST MOD 30 MIN: CPT | Performed by: NURSE PRACTITIONER

## 2018-08-21 RX ORDER — OXYBUTYNIN CHLORIDE 15 MG/1
15 TABLET, EXTENDED RELEASE ORAL DAILY
Qty: 30 TAB | Refills: 2 | Status: SHIPPED | OUTPATIENT
Start: 2018-08-21 | End: 2018-09-26 | Stop reason: SDUPTHER

## 2018-08-21 ASSESSMENT — PATIENT HEALTH QUESTIONNAIRE - PHQ9
CLINICAL INTERPRETATION OF PHQ2 SCORE: 3
5. POOR APPETITE OR OVEREATING: 3 - NEARLY EVERY DAY
SUM OF ALL RESPONSES TO PHQ QUESTIONS 1-9: 18

## 2018-08-21 NOTE — ASSESSMENT & PLAN NOTE
"Pt has large corn to outer aspect of left foot plantar area.  States unable to see Podiatrist.  Wants to have tx here.  Right foot has 3 corns that are moderate in growth and painful.    Reports is upset as corns are very painful and want them \"shaved down\"  "

## 2018-08-21 NOTE — ASSESSMENT & PLAN NOTE
"Pt is seeing Dr ismael Jacobson at [facility] r/t her chronic back pain.  Reports pain is \"9\".  Is seeing Dr Domínguez for Pain Management and provides Narcotic.  "

## 2018-08-21 NOTE — ASSESSMENT & PLAN NOTE
Is seeing [facility] for Psychiatry and Counseling  Is tearful at times but sometimes frustrated at her medical issues.  Denies suicidal ideation.

## 2018-08-21 NOTE — ASSESSMENT & PLAN NOTE
Right foot large pedicled corn w tenderness  Left foot w 3 moderate size corns w assoc callus formation  States caused feet pain every day.  Unable to see Podiatry as insurance does not cover.  Recommend she return her for a long appt for   Liquid Nitrogen, Scalpel Debridement and application of Trichlor-acetic acid.  We discussed it may take a number of tx's to minimize the negative effects of  The corns.

## 2018-08-21 NOTE — ASSESSMENT & PLAN NOTE
"Pt reports has Neurology appt w Dr Calvin r/t her Gullian [place] syndrome.  Has numbness to hands that bother her.  Walking is an issue due to balance and feet pain w corns.  Has special shoe inserts are helping slightly  Hx of \"pigeon toed\".      Pt reports \"tripped over my dog\" in July and No LOC and went to [facility] and tx for sinus infection and instructed had CT face and head, and reports CT head normal but CT face showed sinus infection.  "

## 2018-08-21 NOTE — PROGRESS NOTES
"Chief Complaint:   Chief Complaint   Patient presents with   • Follow-Up   • Concussion     in july        HPI:  Marilyn is here today for a follow-up on July Concussion/ER Visit at [facility].    Her PMH includes     Asthma  Acid Indigestion  \"Arthritis\"  Anxiety and Depression  Heart Murmur ( normal echo)  Tobacco use-Smoking  Marijuana use-smoking.  Asthmatic Bronchitis/chest congestion  Lumpectomy  DDD Lumbar Spine  \"Guillian Bare\" Syndrome per patient  Chronic feet pain w bunions, calluses, toenail fungus     Established w   Pain Management-Dr Francisco Domínguez  GYN- Dr Driscoll  Ophthalmologist- Dr Giordano  Neurosurgery- Gideon Jacobson ([facility])  Neurology- [facility]- Dr Calvin, 1st appt 9/11/18  Psychiatry and Counseling- [facility]  Report shows:  8/9/18 [place] 7.5/325 # 120 by Dr Francisco Domínguez  7/24/18 Ambien 10 mg # 30 by DR Francisco Domínguez  Similar entries in report  22 RX and 3 Prescribers in report       Review of Records shows:  4/10/18 Clinic Appt for f/u on LBP, Feet pain, Request for Zantac. Hx of Guillian Montezuma wanting to see Neurology.  Allergy medications.     3/23/18 [facility] Visit for Anxiety , low back pain. Xray of Lumbar Spine completed---Report Findings:     FINDINGS:  There are 5 nonrib-bearing lumbar vertebra. No compression fracture is identified. There is multilevel intervertebral disc space narrowing which is most prominent at L5/S1 with disc vacuum phenomenon seen. Endplate spurring is seen in multiple levels.   There are degenerative changes of the facet joints at multiple levels. Endplate sclerosis is seen at L5/S1.   Impression        Multilevel degenerative changes, most prominent at L5/S1.   Urgent Care Practitioner referred her to Neurology for hx of Guillian Montezuma and lumbar pain w radiculopathy  But denied as [facility] not accepting this " "dx.     ----------------------------------------------------------------------------------------------------------------------------------------------    GBS (Guillain [place] syndrome) ([facility]) Recent Concussion from [facility]  Pt reports has Neurology appt w Dr Calvin r/t her Gullian Kent syndrome.  Has numbness to hands that bother her.  Walking is an issue due to balance and feet pain w corns.  Has special shoe inserts are helping slightly  Hx of \"pigeon toed\".      Pt reports \"tripped over my dog\" in July and No LOC and went to [facility] and tx for sinus infection and instructed had CT face and head, and reports CT head normal but CT face showed sinus infection. Reports sinuses are 'better'.  Reports mild tenderness to right forehead where she hit her head.    DDD (degenerative disc disease), lumbar  Pt is seeing Dr Gideon Jacobson at [facility] r/t her chronic back pain.  Reports pain is \"9\".  Is seeing Dr Domínguez for Pain Management and provides Narcotic.    Pain in both feet  Pt has large corn to outer aspect of left foot plantar area.  States unable to see Podiatrist.  Wants to have tx here.  Right foot has 3 corns that are moderate in growth and painful.    Reports is upset as corns are very painful and want them \"shaved down\"    Anxiety and depression  Is seeing [facility] for Psychiatry and Counseling  Is tearful at times but sometimes frustrated at her medical issues.  Denies suicidal ideation.    Corns and callus  Right foot large pedicled corn w tenderness  Left foot w 3 moderate size corns w assoc callus formation  States caused feet pain every day.  Unable to see Podiatry as insurance does not cover.  Recommend she return her for a long appt for   Liquid Nitrogen, Scalpel Debridement and application of Trichlor-acetic acid.  We discussed it may take a number of tx's to minimize the negative effects of  The corns.      Patient Active Problem List    Diagnosis Date Noted   • Corns and callus 08/21/2018 "   • GBS (Guillain [place] syndrome) ([facility]) 04/10/2018   • Pelvic pain 10/03/2017   • Vitamin D deficiency 07/31/2017   • Hyperlipidemia, unspecified 07/31/2017   • Assistance with transportation 07/31/2017   • Routine health maintenance 06/19/2017   • Obesity (BMI 30-39.9) 06/19/2017   • Mild intermittent asthma 06/19/2017   • Tobacco use 06/19/2017   • Anxiety and depression 06/19/2017   • Acid indigestion 06/19/2017   • DDD (degenerative disc disease), lumbar 06/19/2017   • Dolly-menopause 06/19/2017   • Chest congestion 06/19/2017   • Pain in both feet 06/19/2017   • Poor vision 06/19/2017       Allergies:Patient has no known allergies.    Medicines as of today:  Current Outpatient Prescriptions   Medication Sig Dispense Refill   • oxybutynin (DITROPAN XL) 15 MG CR tablet Take 1 Tab by mouth every day. 30 Tab 2   • gabapentin (NEURONTIN) 400 MG Cap Take 400 mg by mouth 2 times a day.     • venlafaxine XR (EFFEXOR XR) 37.5 MG CAPSULE SR 24 HR Take 37.5 mg by mouth every day.     • zolpidem (AMBIEN) 10 MG Tab Take 10 mg by mouth at bedtime as needed.     • raNITidine (ZANTAC) 150 MG Tab Take 1 Tab by mouth 2 times a day. TWICE DAILY 180 Tab 2   • atorvastatin (LIPITOR) 20 MG Tab Take 1 Tab by mouth every day. 90 Tab 2   • Cholecalciferol 4000 units Cap Take 1 Capsule by mouth every day. 90 Cap 2   • fluticasone (FLONASE ALLERGY RELIEF) 50 MCG/ACT nasal spray Spray 1 Spray in nose 2 times a day. 16 g 2   • loratadine (CLARITIN) 10 MG Tab Take 1 Tab by mouth every day. 30 Tab 2   • HYDROcodone-acetaminophen (NORCO) 7.5-325 MG per tablet Take 1-2 Tabs by mouth every 6 hours as needed.     • buPROPion (WELLBUTRIN) 100 MG Tab Take 450 mg by mouth every day.     • meloxicam (MOBIC) 15 MG tablet Take 15 mg by mouth every day.     • methocarbamol (ROBAXIN) 500 MG Tab Take 1 Tab by mouth 2 times a day as needed. 60 Tab 2   • albuterol 108 (90 BASE) MCG/ACT Aero Soln inhalation aerosol Inhale 2 Puffs by mouth every 6 hours as  "needed for Shortness of Breath. 8.5 g 0     No current facility-administered medications for this visit.        Social History   Substance Use Topics   • Smoking status: Current Every Day Smoker     Packs/day: 0.25     Years: 26.00     Types: Cigarettes   • Smokeless tobacco: Never Used   • Alcohol use No       Past Medical History:   Diagnosis Date   • Anxiety    • Arthritis     spine, feet    • Asthma    • Cataract    • Depression    • High cholesterol        Family History   Problem Relation Age of Onset   • Cancer Mother    • Alcohol/Drug Mother    • Cancer Brother    • Diabetes Maternal Aunt        ROS:  Review of Systems   See HPI Above    Exam:  Blood pressure 116/68, pulse 96, temperature 36.8 °C (98.2 °F), resp. rate (!) 24, height 1.676 m (5' 5.98\"), weight 72.6 kg (160 lb), SpO2 93 %. Body mass index is 25.84 kg/m².    General:  Well nourished, well developed female in NAD  HENT:Head is grossly normal. PERRL.  Neck: Supple. Trachea is midline.  Pulmonary: Clear to ausculation .  Normal effort. No rales, ronchi, or wheezing.   Cardiovascular: Regular rate and rhythm.  Abdomen-Abdomen is soft, No tenderness.  Upper extremities- Strong = . Good ROM  Lower extremities- neg for edema, redness, tenderness except right foot large protuberant corn to proximal lateral plantar surface  Tender to touch. Left foot w 3 moderate sized corns and assoc calluses w tenderness. No redness or drainage.  Neuro- A & O x 4. Speech clear and appropriate. Tearful at times    Current medications, allergies, and problem list reviewed with patient and updated in [facility] today.    Assessment/Plan:  1. Other polyneuropathy (HCC)  GBS (Guillain [place] syndrome) (HCC)     Keep appt w [facility]- Dr Calvin set for 9/11/18. Continue Gabapentin   2. Hx of concussion  Keep appt w Neurology.         3. DDD (degenerative disc disease), lumbar  Continue w Pain Management- DR Francisco Domínguez.  Keep f/u appt w [facility]- Dr Meneses " Kavon this week   4. Pain in both feet / Corns and callus  Pt to return for tx of corns and calluses   5. Anxiety and depression  Continue current Psych Meds, Continue [facility].   6. Overactive bladder  oxybutynin (DITROPAN XL) 15 MG CR tablet            No Follow-up on file.

## 2018-09-06 ENCOUNTER — OFFICE VISIT (OUTPATIENT)
Dept: MEDICAL GROUP | Facility: MEDICAL CENTER | Age: 53
End: 2018-09-06
Attending: NURSE PRACTITIONER
Payer: MEDICAID

## 2018-09-06 VITALS
TEMPERATURE: 97.9 F | SYSTOLIC BLOOD PRESSURE: 122 MMHG | RESPIRATION RATE: 16 BRPM | OXYGEN SATURATION: 98 % | DIASTOLIC BLOOD PRESSURE: 82 MMHG | HEART RATE: 64 BPM | WEIGHT: 157 LBS | BODY MASS INDEX: 25.23 KG/M2 | HEIGHT: 66 IN

## 2018-09-06 DIAGNOSIS — M79.671 PAIN IN BOTH FEET: ICD-10-CM

## 2018-09-06 DIAGNOSIS — L84 CORNS AND CALLUS: ICD-10-CM

## 2018-09-06 DIAGNOSIS — G89.29 OTHER CHRONIC PAIN: ICD-10-CM

## 2018-09-06 DIAGNOSIS — M51.36 DDD (DEGENERATIVE DISC DISEASE), LUMBAR: ICD-10-CM

## 2018-09-06 DIAGNOSIS — M79.672 PAIN IN BOTH FEET: ICD-10-CM

## 2018-09-06 PROCEDURE — 99214 OFFICE O/P EST MOD 30 MIN: CPT | Performed by: NURSE PRACTITIONER

## 2018-09-06 PROCEDURE — 99212 OFFICE O/P EST SF 10 MIN: CPT | Performed by: NURSE PRACTITIONER

## 2018-09-06 RX ORDER — METHOCARBAMOL 500 MG/1
500 TABLET, FILM COATED ORAL 2 TIMES DAILY PRN
Qty: 60 TAB | Refills: 0 | Status: SHIPPED | OUTPATIENT
Start: 2018-09-06 | End: 2019-09-10 | Stop reason: SDUPTHER

## 2018-09-06 ASSESSMENT — PAIN SCALES - GENERAL: PAINLEVEL: 5=MODERATE PAIN

## 2018-09-06 NOTE — ASSESSMENT & PLAN NOTE
Marilyn here today for treatment of multiple corns and callus on feet which are causing her  Pain when walking or bearing wt. Reports unable to walk barefoot due to pain, and must   Have padded shoes on. Unable to see Podiatry 2' cost and lack of insurance coverage  For Podiatry.

## 2018-09-06 NOTE — PROGRESS NOTES
"Chief Complaint:   Chief Complaint   Patient presents with   • Follow-Up       HPI:  Marilyn is here today for a follow-up for feet callous/corns  causing pain  Her PMH includes     Asthma  Acid Indigestion  \"Arthritis\"  Anxiety and Depression  Heart Murmur ( normal echo)  Tobacco use-Smoking  Marijuana use-smoking.  Asthmatic Bronchitis/chest congestion  Lumpectomy  DDD Lumbar Spine  \"Guillian Bare\" Syndrome per patient  Chronic feet pain w bunions, calluses, corns and toenail fungus     Established w   Pain Management-Dr Francisco Domínguez  GYN- Dr Driscoll  Ophthalmologist- Dr Giordano  Neurosurgery- Gideon Jacobson ([facility])  Neurology- [facility]- Dr Calvin, 1st appt 9/11/18  Psychiatry and Counseling- [facility]  Report shows:  8/29/18 Ambien 10 mg # 30 by Michelle Hoskins  8/9/18 [place] 7.5/325 # 120 by Dr Francisco Domínguez  7/24/18 Ambien 10 mg # 30 by DR Francisco Domínguez  Similar entries in report  23 RX and 3 Prescribers in report        Review of Records shows:    8/21/18 Clinic visit for F/u on ER fisit for \"Concussion'  Feet issues and unable to see Podiatry.  4/10/18 Clinic Appt for f/u on LBP, Feet pain, Request for Zantac. Hx of Guillian Lynch wanting to see Neurology.  Allergy medications.     3/23/18 [facility] Visit for Anxiety , low back pain. Xray of Lumbar Spine completed---Report Findings:      Corns and callus  Marilyn here today for treatment of multiple corns and callus on feet which are causing her  Pain when walking or bearing wt. Reports unable to walk barefoot due to pain, and must   Have padded shoes on. Unable to see Podiatry 2' cost and lack of insurance coverage  For Podiatry.        Patient Active Problem List    Diagnosis Date Noted   • Corns and callus 08/21/2018   • GBS (Guillain Lynch syndrome) (HCC) 04/10/2018   • Pelvic pain 10/03/2017   • Vitamin D deficiency 07/31/2017   • Hyperlipidemia, unspecified 07/31/2017   • Assistance with " transportation 07/31/2017   • Routine health maintenance 06/19/2017   • Obesity (BMI 30-39.9) 06/19/2017   • Mild intermittent asthma 06/19/2017   • Tobacco use 06/19/2017   • Anxiety and depression 06/19/2017   • Acid indigestion 06/19/2017   • DDD (degenerative disc disease), lumbar 06/19/2017   • Dolly-menopause 06/19/2017   • Chest congestion 06/19/2017   • Pain in both feet 06/19/2017   • Poor vision 06/19/2017       Allergies:Patient has no known allergies.    Medicines as of today:  Current Outpatient Prescriptions   Medication Sig Dispense Refill   • salicylic acid-lactic acid 17 % external solution Apply 1-2 drops to each callus on feet twice daily as tolerated 1 Bottle 2   • methocarbamol (ROBAXIN) 500 MG Tab Take 1 Tab by mouth 2 times a day as needed. 60 Tab 0   • oxybutynin (DITROPAN XL) 15 MG CR tablet Take 1 Tab by mouth every day. 30 Tab 2   • gabapentin (NEURONTIN) 400 MG Cap Take 400 mg by mouth 2 times a day.     • venlafaxine XR (EFFEXOR XR) 37.5 MG CAPSULE SR 24 HR Take 37.5 mg by mouth every day.     • zolpidem (AMBIEN) 10 MG Tab Take 10 mg by mouth at bedtime as needed.     • raNITidine (ZANTAC) 150 MG Tab Take 1 Tab by mouth 2 times a day. TWICE DAILY 180 Tab 2   • atorvastatin (LIPITOR) 20 MG Tab Take 1 Tab by mouth every day. 90 Tab 2   • Cholecalciferol 4000 units Cap Take 1 Capsule by mouth every day. 90 Cap 2   • fluticasone (FLONASE ALLERGY RELIEF) 50 MCG/ACT nasal spray Spray 1 Spray in nose 2 times a day. 16 g 2   • loratadine (CLARITIN) 10 MG Tab Take 1 Tab by mouth every day. 30 Tab 2   • HYDROcodone-acetaminophen (NORCO) 7.5-325 MG per tablet Take 1-2 Tabs by mouth every 6 hours as needed.     • buPROPion (WELLBUTRIN) 100 MG Tab Take 450 mg by mouth every day.     • meloxicam (MOBIC) 15 MG tablet Take 15 mg by mouth every day.     • albuterol 108 (90 BASE) MCG/ACT Aero Soln inhalation aerosol Inhale 2 Puffs by mouth every 6 hours as needed for Shortness of Breath. 8.5 g 0     No  "current facility-administered medications for this visit.        Social History   Substance Use Topics   • Smoking status: Current Every Day Smoker     Packs/day: 0.25     Years: 26.00     Types: Cigarettes   • Smokeless tobacco: Never Used   • Alcohol use No       Past Medical History:   Diagnosis Date   • Anxiety    • Arthritis     spine, feet    • Asthma    • Cataract    • Depression    • High cholesterol        Family History   Problem Relation Age of Onset   • Cancer Mother    • Alcohol/Drug Mother    • Cancer Brother    • Diabetes Maternal Aunt        ROS:  Review of Systems   See HPI Above    Exam:  Blood pressure 122/82, pulse 64, temperature 36.6 °C (97.9 °F), resp. rate 16, height 1.676 m (5' 5.98\"), weight 71.2 kg (157 lb), SpO2 98 %, not currently breastfeeding. Body mass index is 25.35 kg/m².    General:  Well nourished, well developed female in moderate discomfort  HENT:Head is grossly normal.   Neck: Supple. Trachea is midline.  Pulmonary: Clear to ausculation .  Normal effort. No rales, ronchi, or wheezing.   Cardiovascular: Regular rate and rhythm.  Abdomen-Abdomen is soft, No tenderness.  Upper extremities- Strong = . Good ROM  Lower extremities- neg for edema, redness, tenderness except tenderness to multiple corns/calluses on feet.  Neuro- A & O x 4. Speech clear and appropriate.    Current medications, allergies, and problem list reviewed with patient and updated in [facility] today.    Assessment/Plan:  1. Corns and callus  salicylic acid-lactic acid 17 % external solution RX  For use at home on callus and corns as discussed.  Procedure in Clinic: Liquid Nitrogen application to 2 calluses on left foot and 3 calluse/corns on right foot, followed by Scalpel debridement of dead skin, and application of Trichloracetic acid.  Tolerated well. Small band-aide to left foot large toe corn.  Skin care discussed. Signs of infection discussed and if any to call/return or go to UC/ER   2. DDD (degenerative " disc disease), lumbar  methocarbamol (ROBAXIN) 500 MG Tab refill until able to see her Pain Management MD-Dr Domínguez.   3. Pain in both feet  methocarbamol (ROBAXIN) 500 MG Tab   4. Other chronic pain  methocarbamol (ROBAXIN) 500 MG Tab       Return in about 3 weeks (around 9/27/2018) for for 2nd Procedure/tx of corns/calluses of feet.

## 2018-09-11 ENCOUNTER — OFFICE VISIT (OUTPATIENT)
Dept: NEUROLOGY | Facility: MEDICAL CENTER | Age: 53
End: 2018-09-11
Payer: MEDICAID

## 2018-09-11 VITALS
BODY MASS INDEX: 25.62 KG/M2 | OXYGEN SATURATION: 98 % | TEMPERATURE: 98.2 F | SYSTOLIC BLOOD PRESSURE: 106 MMHG | WEIGHT: 159.4 LBS | DIASTOLIC BLOOD PRESSURE: 78 MMHG | HEIGHT: 66 IN | HEART RATE: 75 BPM | RESPIRATION RATE: 18 BRPM

## 2018-09-11 DIAGNOSIS — R20.2 PARESTHESIAS WITH SUBJECTIVE WEAKNESS: ICD-10-CM

## 2018-09-11 DIAGNOSIS — G82.50 QUADRIPARESIS (HCC): Primary | ICD-10-CM

## 2018-09-11 DIAGNOSIS — R53.1 PARESTHESIAS WITH SUBJECTIVE WEAKNESS: ICD-10-CM

## 2018-09-11 PROCEDURE — 99205 OFFICE O/P NEW HI 60 MIN: CPT | Performed by: PSYCHIATRY & NEUROLOGY

## 2018-09-11 ASSESSMENT — ENCOUNTER SYMPTOMS
SENSORY CHANGE: 1
TINGLING: 1
NECK PAIN: 1
MEMORY LOSS: 0
CONSTIPATION: 0
BACK PAIN: 1
FALLS: 0
TREMORS: 0

## 2018-09-11 ASSESSMENT — PAIN SCALES - GENERAL: PAINLEVEL: 5=MODERATE PAIN

## 2018-09-12 NOTE — PROGRESS NOTES
Subjective:      Marilyn Salinas is a 53 y.o. female who presents from the office of ONOFRE Donato, for consultation, with a history of progressive paraparesis and paresthesias in the upper extremities, in a background of possible GBS diagnosed 2 years ago.    HPI    Patient is a pleasant 53-year-old right-handed  female whose pain and weakness symptoms started several years ago, mostly in the legs, though more recently the upper extremities have become involved. She describes the first episode back in her 20s were there was sudden weakness in all 4 extremities, aware of this when she had awakened unable to move. There was no neck pain, cranial nerve functions were intact, there was no loss of consciousness, she was never incontinent. The extremities felt heavy and weak, though she could move them, it was quite difficult; the legs were always more involved than the arms. The episode was self-limited, the symptoms resolving within a matter of hours, and she was never left with sequelae. These were never associated with pain, muscle tenderness or cramps, sensory distortions, etc. They were random in onset, she estimates she is probably had 5-6 of these over her life.    The most recent event occurred in January, 2016, this time the weakness was so severe that she actually required assistance to get out of bed. She was still able to weight-bear, used her boyfriend's walker, getting herself to the car. She was evaluated at a local emergency room. Evidently MRI scan of the brain was done, though she cannot be more specific. Blood work was drawn, again she can't be specific. She clearly remembers that a lumbar puncture was never performed. She was admitted for several days, she was never treated with what sounds like was IVIG, certainly not TPE. Symptoms resolved within 2 days, without sequelae, and she was told that she had Guillain-Barré syndrome.     Mostly what she is now dealing with is the low back  pain which predated all of the above, which has clearly worsened between the episodes of generalized weakness. She describes bilateral radicular symptoms from the back in an L5 distribution down both legs and into the tops of the feet. When she stands and walks, this can bring the symptoms on. She can sit down and put her feet up, this gives her the most consistent benefit. Over time the interval before symptoms increase has shortened, the required time to rest and relax for symptom improvement has lengthened. There has been no real change in bowel or bladder function, she does suffer from chronic bladder insufficiency. Over time she has noted that Valsalva can actually cause radiation of symptoms into the legs.    More recently, she has begun to notice upper extremity paresthesias localized over the ulnar nerve distribution in both forearms, at times radiating proximally in a C7 distribution pattern. These are more severely morning hours, increase with use of the extremities. She uses her cane and keyboards as part of her job description, this makes the symptoms worse.    She eventually saw Dr. Jacobson from a neurosurgical standpoint, MRI scan in May, 2018 was done, revealing moderate to severe central canal stenosis, bilateral foraminal narrowing, at L3/4 due to degenerative change and disc protrusion, and at the L5/S1 level, mild central stenosis but right-sided lateral foraminal narrowing. MRI the cervical spine revealed multilevel degenerative changes with varying degrees of foraminal narrowing bilaterally, and without central stenosis. EMG is pending next month.    Symptomatically, she has been on Neurontin 400 mg, twice a day, with some benefit, Modic 15 mg daily and Vicodin with Robaxin taken regularly, this is provided the best symptomatically relief so far.    Medical and surgical histories are reviewed. She has a history of anxiety and depression, his lipidemia and osteoarthritis, no diagnosed history of  "MS, neurodegenerative disease, seizure, CVA, CAD, hypertension, PVD, autoimmune disease, diabetes, hypertension or systemic malignancy. There is no surgical history of note other than NELA for fibroids.    She knows nothing of her father's history medically, her mother has arthritis, her half brother has cancer. HER-2 children are alive and well. She continues to smoke regularly, does not drink alcohol. She uses medical cannabis for her pain.    She is on Robaxin 500 mg and Vicodin when necessary, meloxicam 15 mg daily, Lipitor 20 mg daily, Ditropan XL 15 mg daily, Neurontin 400 mg, twice a day, Wellbutrin, and Effexor XR.    Review of Systems   Constitutional: Negative for malaise/fatigue.   Gastrointestinal: Negative for constipation.   Genitourinary: Positive for frequency and urgency.   Musculoskeletal: Positive for back pain, joint pain and neck pain. Negative for falls.   Neurological: Positive for tingling and sensory change. Negative for tremors.   Psychiatric/Behavioral: Negative for memory loss.   All other systems reviewed and are negative.       Objective:     /78   Pulse 75   Temp 36.8 °C (98.2 °F)   Resp 18   Ht 1.676 m (5' 6\")   Wt 72.3 kg (159 lb 6.4 oz)   SpO2 98%   BMI 25.73 kg/m²      Physical Exam    She appears in mild distress complaining of low back pain. She is quite cooperative. Vital signs are stable. There is no malar rash or temporal tenderness. The neck is supple, range of motion is constrained because of discomfort, compression maneuvers are negative other than eliciting localized pain. Carotid pulses are present bilaterally without asymmetry. Cardiac evaluation reveals a regular rhythm. Straight leg raising is negative bilaterally other than increased ipsilateral back pain. There is a severely restrained range of motion in both ankles resulting in some extensor posturing with the feet. Obtaining a 90° angle when she stands on her feet is difficult.    Cognition is grossly " intact, there is no aphasia or inattention, she is fully oriented.    PERRLA/EOMI, visual fields are grossly full, there is no facial asymmetry or sensory loss, jaw jerk is present, the tongue and uvula are midline, there is no dysarthria, shoulder shrug and head rotation are intact.    Musculoskeletal exam again reveals no tremor, asterixis, or drift. Strength in the upper extremities is intact bilaterally, tone is normal. In the lower extremities there is no obvious weakness even distally in the feet though range of motion is constrained. Reflexes are brisk and present at all points, diminished at the ankles because of the reduced range of motion. Both toes are downgoing.    Coordination reveals a spastic quality and limp when she walks with a left leg moving stiffly. She has to use a cane. She can stand with her feet together but it is difficult. Heel, toe, and tandem walking are all possibilities. Repetitive movements are slowed in her feet, these are intact in the hands. There is no appendicular dystaxia with any extremities.    Sensory exam is intact to vibration and joint position sense. There is a minimal loss of pinprick in the distal lower extremities below the ankles only.     Assessment/Plan:     1. Quadriparesis (HCC)  The event she is described in the past I doubt are related to Guillain-Barré syndrome, in fact I doubt she ever had the condition itself. The history is certainly inconsistent with this. Regardless, neurodiagnostics need to be done, symptoms she describes in the upper extremities suggest bilateral ulnar neuropathies or C7 radiculopathies, she is already having a study done to evaluate the lower extremities, her symptoms I suspect are related to a structural pathology from the lumbosacral stenosis, with possible claudication given the degree of central stenosis seen on imaging. She is already under the care of Dr. Jacobson, from a neurosurgical standpoint, and this is the ankle I think she  will need to pursue if there is to be any meaningful improvement in her condition.    - REFERRAL TO NEURODIAGNOSTICS (EEG,EP,EMG/NCS/DBS) Modality Requested: EMG/NCS-Comment Extremities    2. Paresthesias with subjective weakness  Again, I doubt this is part of GBS syndrome or its sequelae, the upper extremities I think are due to focal compression either at the elbows/wrists or proximally in the cervical spine, in the lower extremities most likely in the lumbosacral spine. Symptomatic relief should be continued.    Face-to-face time was spent reviewing all of the above. At this time, unless there is something additional that is found on neurophysiologic studies, I don't think long-term neurologic input really will be required.    - REFERRAL TO NEURODIAGNOSTICS (EEG,EP,EMG/NCS/DBS) Modality Requested: EMG/NCS-Comment Extremities    Time: 60 minutes was spent face-to-face with the patient for exam, review, discussion, and education, of this over 50% of the time was spent counseling and coordinating care surrounding all of the above issues.

## 2018-09-26 DIAGNOSIS — N32.81 OVERACTIVE BLADDER: ICD-10-CM

## 2018-09-26 RX ORDER — OXYBUTYNIN CHLORIDE 15 MG/1
15 TABLET, EXTENDED RELEASE ORAL 2 TIMES DAILY
Qty: 60 TAB | Refills: 2 | Status: SHIPPED | OUTPATIENT
Start: 2018-09-26 | End: 2018-12-31 | Stop reason: SDUPTHER

## 2018-09-26 NOTE — TELEPHONE ENCOUNTER
PATIENT IS REQUESTING A NEW RX TO BE SENT FOR BID. PATIENT USED TO TAKE 1 DAILY.   PATIENT HAS NEW PHARMACY                 Was the patient seen in the last year in this department? Yes    Does patient have an active prescription for medications requested? No     Received Request Via: Pharmacy

## 2018-10-13 DIAGNOSIS — K30 ACID INDIGESTION: ICD-10-CM

## 2018-10-15 RX ORDER — RANITIDINE 150 MG/1
TABLET ORAL
Qty: 180 TAB | Refills: 0 | Status: SHIPPED | OUTPATIENT
Start: 2018-10-15 | End: 2019-05-16 | Stop reason: SDUPTHER

## 2018-10-23 ENCOUNTER — APPOINTMENT (OUTPATIENT)
Dept: RADIOLOGY | Facility: MEDICAL CENTER | Age: 53
End: 2018-10-23
Attending: NURSE PRACTITIONER
Payer: MEDICAID

## 2018-10-30 ENCOUNTER — HOSPITAL ENCOUNTER (OUTPATIENT)
Dept: RADIOLOGY | Facility: MEDICAL CENTER | Age: 53
End: 2018-10-30
Attending: NURSE PRACTITIONER
Payer: MEDICAID

## 2018-10-30 ENCOUNTER — OFFICE VISIT (OUTPATIENT)
Dept: MEDICAL GROUP | Facility: MEDICAL CENTER | Age: 53
End: 2018-10-30
Attending: NURSE PRACTITIONER
Payer: MEDICAID

## 2018-10-30 VITALS
TEMPERATURE: 98 F | OXYGEN SATURATION: 100 % | HEART RATE: 66 BPM | RESPIRATION RATE: 16 BRPM | HEIGHT: 66 IN | WEIGHT: 155 LBS | SYSTOLIC BLOOD PRESSURE: 120 MMHG | BODY MASS INDEX: 24.91 KG/M2 | DIASTOLIC BLOOD PRESSURE: 64 MMHG

## 2018-10-30 DIAGNOSIS — L84 CORNS AND CALLUS: ICD-10-CM

## 2018-10-30 DIAGNOSIS — Z72.0 TOBACCO USE: ICD-10-CM

## 2018-10-30 DIAGNOSIS — Z12.39 SCREENING BREAST EXAMINATION: ICD-10-CM

## 2018-10-30 DIAGNOSIS — R05.9 COUGH: ICD-10-CM

## 2018-10-30 DIAGNOSIS — J45.31 MILD PERSISTENT ASTHMATIC BRONCHITIS WITH ACUTE EXACERBATION: ICD-10-CM

## 2018-10-30 DIAGNOSIS — Z91.09 ENVIRONMENTAL ALLERGIES: ICD-10-CM

## 2018-10-30 PROCEDURE — 99214 OFFICE O/P EST MOD 30 MIN: CPT | Performed by: NURSE PRACTITIONER

## 2018-10-30 PROCEDURE — 99212 OFFICE O/P EST SF 10 MIN: CPT | Performed by: NURSE PRACTITIONER

## 2018-10-30 PROCEDURE — 77067 SCR MAMMO BI INCL CAD: CPT

## 2018-10-30 RX ORDER — CODEINE PHOSPHATE/GUAIFENESIN 10-100MG/5
5 LIQUID (ML) ORAL 2 TIMES DAILY PRN
Qty: 120 ML | Refills: 0 | Status: SHIPPED | OUTPATIENT
Start: 2018-10-30 | End: 2018-11-06

## 2018-10-30 RX ORDER — BUPROPION HYDROCHLORIDE 150 MG/1
450 TABLET ORAL EVERY MORNING
Refills: 2 | COMMUNITY
Start: 2018-09-26 | End: 2019-12-19

## 2018-10-30 RX ORDER — DOXYCYCLINE 100 MG/1
100 CAPSULE ORAL 2 TIMES DAILY
Qty: 14 CAP | Refills: 0 | Status: SHIPPED | OUTPATIENT
Start: 2018-10-30 | End: 2019-02-22

## 2018-10-30 RX ORDER — FLUTICASONE PROPIONATE 50 MCG
1 SPRAY, SUSPENSION (ML) NASAL 2 TIMES DAILY
Qty: 16 G | Refills: 2 | Status: SHIPPED | OUTPATIENT
Start: 2018-10-30 | End: 2019-02-22 | Stop reason: SDUPTHER

## 2018-10-30 RX ORDER — LORATADINE 10 MG/1
10 TABLET ORAL DAILY
Qty: 30 TAB | Refills: 2 | Status: SHIPPED | OUTPATIENT
Start: 2018-10-30 | End: 2018-12-31 | Stop reason: SDUPTHER

## 2018-10-30 RX ORDER — PREDNISONE 5 MG/1
TABLET ORAL
Qty: 15 TAB | Refills: 0 | Status: SHIPPED | OUTPATIENT
Start: 2018-10-30 | End: 2019-02-22

## 2018-10-30 RX ORDER — METHOCARBAMOL 500 MG/1
TABLET, FILM COATED ORAL
Refills: 0 | COMMUNITY
Start: 2018-09-06 | End: 2018-10-30

## 2018-10-30 NOTE — PROGRESS NOTES
"Chief Complaint:   Chief Complaint   Patient presents with   • Callouses     bilateral    • Cough       HPI:  Marilyn is here today for a follow-up on feet corns/callouses, recent cough    Her PMH includes     Asthma  Acid Indigestion  \"Arthritis\"  Anxiety and Depression  Heart Murmur ( normal echo)  Tobacco use-Smoking  Marijuana use-smoking.  Asthmatic Bronchitis/chest congestion  Lumpectomy  DDD Lumbar Spine  \"Guillian Bare\" Syndrome per patient  Chronic feet pain w bunions, calluses, corns and toenail fungus     Established w   Pain Management-Dr rFancisco Domínguez  GYN- Dr Driscoll  Ophthalmologist- Dr Giordano  Neurosurgery- Gideon Jacobson ([facility])  Neurology- [facility]- Dr Calvin, 1st appt 9/11/18  Psychiatry and Counseling- [facility]  Report shows:  10/16/18 Norco 7.5/325 # 120 by Dr Francisco Domínguez  9/26/18 Ambien 10 mg # 30 by Michelle Felix [place]  Similar Entries in report  22 RX and 2 Prescribers in report  ---------------------------------------------       Review of Records shows:  9/11/18 Neurology appt- Consult- Dr Calvin. For weakness/paresthesia to upper extremities.---> EEG ordered. Suspect is radiculopathy from spine.   9/6/18 Clinic Visit for tx of feet corns/callouses--> Liquid Nitrogen, Scalpel Debridement,  Application of Trichloroacetic acid. RX Topical Salicylic acid for home use.  RX Refill Robaxin. To f/u w her Pain Management MD    8/21/18 Clinic visit for F/u on ER fisit for \"Concussion'  Feet issues and unable to see Podiatry.  4/10/18 Clinic Appt for f/u on LBP, Feet pain, Request for Zantac. Hx of Guillian Compton wanting to see Neurology.  Allergy medications.     3/23/18 [facility] Visit for Anxiety , low back pain. Xray of Lumbar Spine completed---Report Findings:       Cough ( Hx of Asthma, 30 plus pack years smoking cigarettes.  Marijuana smoking)  Reports \"cough all the time\"  Smokes 1 pack per week of Cigarettes and Marijuana daily " "smoking  Recommend to not smoke either.  Cough is \"white/yellow\" phlegm and if \"smoke weed it is brown\".  Denies fever or chills.  Hx of Asthma.  Smoked since age 21 yrs old and was 1ppd for over 30 years.    Suspect Chronic Bronchitis.  Is taking Albuterol inhaler and using it \"more these days\"    Corns and callus  Reports corns/callouses to both feet still bothering her, but less.  Is wanting more treatment to remove them.  She was unable to get Salicylic acid RX to apply on own at home as Insurance did not cover.        Patient Active Problem List    Diagnosis Date Noted   • Cough 10/30/2018   • Corns and callus 08/21/2018   • GBS (Guillain [place] syndrome) ([facility]) 04/10/2018   • Pelvic pain 10/03/2017   • Vitamin D deficiency 07/31/2017   • Hyperlipidemia, unspecified 07/31/2017   • Assistance with transportation 07/31/2017   • Routine health maintenance 06/19/2017   • Obesity (BMI 30-39.9) 06/19/2017   • Mild intermittent asthma 06/19/2017   • Tobacco use 06/19/2017   • Anxiety and depression 06/19/2017   • Acid indigestion 06/19/2017   • DDD (degenerative disc disease), lumbar 06/19/2017   • Dolly-menopause 06/19/2017   • Chest congestion 06/19/2017   • Pain in both feet 06/19/2017   • Poor vision 06/19/2017       Allergies:Patient has no known allergies.    Medicines as of today:  Current Outpatient Prescriptions   Medication Sig Dispense Refill   • buPROPion (WELLBUTRIN XL) 150 MG XL tablet Take 450 mg by mouth every morning.  2   • fluticasone (FLONASE ALLERGY RELIEF) 50 MCG/ACT nasal spray Spray 1 Spray in nose 2 times a day. 16 g 2   • doxycycline (MONODOX) 100 MG capsule Take 1 Cap by mouth 2 times a day. 14 Cap 0   • predniSONE (DELTASONE) 5 MG Tab Take 5 mg twice a day for 5 days, then once daily for 5 days. 15 Tab 0   • loratadine (CLARITIN) 10 MG Tab Take 1 Tab by mouth every day. 30 Tab 2   • guaifenesin-codeine (TUSSI-ORGANIDIN NR) 100-10 MG/5ML syrup Take 5 mL by mouth 2 times a day as needed for up " "to 7 days. 120 mL 0   • raNITidine (ZANTAC) 150 MG Tab TAKE 1 TABLET BY MOUTH TWO TIMES DAILY 180 Tab 0   • oxybutynin (DITROPAN XL) 15 MG CR tablet Take 1 Tab by mouth 2 Times a Day. 60 Tab 2   • gabapentin (NEURONTIN) 400 MG Cap Take 400 mg by mouth 2 times a day.     • venlafaxine XR (EFFEXOR XR) 37.5 MG CAPSULE SR 24 HR Take 37.5 mg by mouth every day.     • zolpidem (AMBIEN) 10 MG Tab Take 10 mg by mouth at bedtime as needed.     • atorvastatin (LIPITOR) 20 MG Tab Take 1 Tab by mouth every day. 90 Tab 2   • Cholecalciferol 4000 units Cap Take 1 Capsule by mouth every day. 90 Cap 2   • HYDROcodone-acetaminophen (NORCO) 7.5-325 MG per tablet Take 1-2 Tabs by mouth every 6 hours as needed.     • meloxicam (MOBIC) 15 MG tablet Take 15 mg by mouth every day.     • albuterol 108 (90 BASE) MCG/ACT Aero Soln inhalation aerosol Inhale 2 Puffs by mouth every 6 hours as needed for Shortness of Breath. 8.5 g 0   • methocarbamol (ROBAXIN) 500 MG Tab Take 1 Tab by mouth 2 times a day as needed. 60 Tab 0     No current facility-administered medications for this visit.        Social History   Substance Use Topics   • Smoking status: Current Every Day Smoker     Packs/day: 0.25     Years: 26.00     Types: Cigarettes   • Smokeless tobacco: Never Used   • Alcohol use No       Past Medical History:   Diagnosis Date   • Anxiety    • Arthritis     spine, feet    • Asthma    • Cataract    • Depression    • High cholesterol        Family History   Problem Relation Age of Onset   • Cancer Mother    • Alcohol/Drug Mother    • Cancer Brother    • Diabetes Maternal Aunt        ROS:  Review of Systems   See HPI Above    Exam:  Blood pressure 120/64, pulse 66, temperature 36.7 °C (98 °F), temperature source Temporal, resp. rate 16, height 1.676 m (5' 5.98\"), weight 70.3 kg (155 lb), SpO2 100 %, not currently breastfeeding. Body mass index is 25.03 kg/m².    General:  Well nourished, well developed female in NAD  HENT:Head is grossly normal. " PERRL.  Neck: Supple. Trachea is midline.  Pulmonary: Rhonchi bilat to upper airways to ausculation .  Normal effort. Sligh expiratory wheezing.  No Rales  Cardiovascular: Regular rate and rhythm.  Abdomen-Abdomen is soft, No tenderness.  Upper extremities- Strong = . Good ROM  Lower extremities- neg for edema, redness Multiple corns/callouses to feet.  Neuro- A & O x 4. Speech clear and appropriate.    Current medications, allergies, and problem list reviewed with patient and updated in [facility] today.    Assessment/Plan:  1. Environmental allergies  fluticasone (FLONASE ALLERGY RELIEF) 50 MCG/ACT nasal spray    loratadine (CLARITIN) 10 MG Tab   2. Cough (suspect chronic bronchitis/COPD) doxycycline (MONODOX) 100 MG capsule    PULMONARY FUNCTION TESTS -Test requested: Complete Pulmonary Function Test    guaifenesin-codeine (TUSSI-ORGANIDIN NR) 100-10 MG/5ML syrup   3. Mild persistent asthmatic bronchitis with acute exacerbation  doxycycline (MONODOX) 100 MG capsule    predniSONE (DELTASONE) 5 MG Tab    PULMONARY FUNCTION TESTS -Test requested: Complete Pulmonary Function Test    guaifenesin-codeine (TUSSI-ORGANIDIN NR) 100-10 MG/5ML syrup   4. Tobacco use  PULMONARY FUNCTION TESTS -Test requested: Complete Pulmonary Function Test  Recommend smoking cessation, both cigarettes currently 1 pack per week, and daily marijuana smoking.   5. Corns and callus  Procedure: Liquid Nitrogen to Corn/callouses of each foot. Scalpel Debridement of dead skin.  Tolerated well.       Return in about 8 weeks (around 12/25/2018) for PFT, re tx of Corns/callouses, Long.

## 2018-10-30 NOTE — ASSESSMENT & PLAN NOTE
"Reports \"cough all the time\"  Smokes 1 pack per week of Cigarettes and Marijuana daily smoking  Recommend to not smoke either.  Cough is \"white/yellow\" phlegm and if \"smoke weed it is brown\".  Denies fever or chills.  Hx of Asthma.  Smoked since age 21 yrs old and was 1ppd for over 30 years.    Suspect Chronic Bronchitis.  Is taking Albuterol inhaler and using it \"more these days\"  "

## 2018-10-30 NOTE — ASSESSMENT & PLAN NOTE
Reports corns/callouses to both feet still bothering her, but less.  Is wanting more treatment to remove them.  She was unable to get Salicylic acid RX to apply on own at home as Insurance did not cover.

## 2018-11-18 ENCOUNTER — OFFICE VISIT (OUTPATIENT)
Dept: URGENT CARE | Facility: PHYSICIAN GROUP | Age: 53
End: 2018-11-18
Payer: MEDICAID

## 2018-11-18 VITALS
DIASTOLIC BLOOD PRESSURE: 70 MMHG | HEIGHT: 66 IN | WEIGHT: 157 LBS | BODY MASS INDEX: 25.23 KG/M2 | HEART RATE: 104 BPM | TEMPERATURE: 98.6 F | SYSTOLIC BLOOD PRESSURE: 110 MMHG | OXYGEN SATURATION: 97 % | RESPIRATION RATE: 14 BRPM
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
## 2018-11-18 DIAGNOSIS — M25.572 ACUTE LEFT ANKLE PAIN: ICD-10-CM

## 2018-11-18 DIAGNOSIS — S50.02XA CONTUSION OF LEFT ELBOW, INITIAL ENCOUNTER: ICD-10-CM

## 2018-11-18 DIAGNOSIS — S39.012A STRAIN OF LUMBAR REGION, INITIAL ENCOUNTER: ICD-10-CM

## 2018-11-18 DIAGNOSIS — S70.02XA CONTUSION OF LEFT HIP, INITIAL ENCOUNTER: ICD-10-CM

## 2018-11-18 PROCEDURE — 99214 OFFICE O/P EST MOD 30 MIN: CPT | Performed by: PHYSICIAN ASSISTANT

## 2018-11-18 NOTE — PROGRESS NOTES
Chief Complaint   Patient presents with   • Injury     fell off on the ground/ L side of the body/ R arm bruise       HISTORY OF PRESENT ILLNESS: Patient is a 53 y.o. female who presents today for the following:    Patient comes in for evaluation of multiple injuries sustained from a ground-level fall 2 days ago.  Patient states she stepped off the curb wrong, landing on her left side.  She does not recall hitting her head or losing consciousness.  She has had a dull headache since the incident but has not had any worsening headaches or vision changes, dizziness, or balance issues.  She says that she hurts from her temple to her ankle.  She complains of the most pain of the left hip.  Patient is on chronic pain medication.       Patient Active Problem List    Diagnosis Date Noted   • Cough 10/30/2018   • Corns and callus 08/21/2018   • GBS (Guillain [place] syndrome) ([facility]) 04/10/2018   • Pelvic pain 10/03/2017   • Vitamin D deficiency 07/31/2017   • Hyperlipidemia, unspecified 07/31/2017   • Assistance with transportation 07/31/2017   • Routine health maintenance 06/19/2017   • Obesity (BMI 30-39.9) 06/19/2017   • Mild intermittent asthma 06/19/2017   • Tobacco use 06/19/2017   • Anxiety and depression 06/19/2017   • Acid indigestion 06/19/2017   • DDD (degenerative disc disease), lumbar 06/19/2017   • Dolly-menopause 06/19/2017   • Chest congestion 06/19/2017   • Pain in both feet 06/19/2017   • Poor vision 06/19/2017       Allergies:Patient has no known allergies.    Current Outpatient Prescriptions [facility]   Medication Sig Dispense Refill   • buPROPion (WELLBUTRIN XL) 150 MG XL tablet Take 450 mg by mouth every morning.  2   • fluticasone (FLONASE ALLERGY RELIEF) 50 MCG/ACT nasal spray Spray 1 Spray in nose 2 times a day. 16 g 2   • doxycycline (MONODOX) 100 MG capsule Take 1 Cap by mouth 2 times a day. 14 Cap 0   • predniSONE (DELTASONE) 5 MG Tab Take 5 mg twice a day for 5 days, then once daily for 5 days.  15 Tab 0   • loratadine (CLARITIN) 10 MG Tab Take 1 Tab by mouth every day. 30 Tab 2   • raNITidine (ZANTAC) 150 MG Tab TAKE 1 TABLET BY MOUTH TWO TIMES DAILY 180 Tab 0   • oxybutynin (DITROPAN XL) 15 MG CR tablet Take 1 Tab by mouth 2 Times a Day. 60 Tab 2   • methocarbamol (ROBAXIN) 500 MG Tab Take 1 Tab by mouth 2 times a day as needed. 60 Tab 0   • gabapentin (NEURONTIN) 400 MG Cap Take 400 mg by mouth 2 times a day.     • venlafaxine XR (EFFEXOR XR) 37.5 MG CAPSULE SR 24 HR Take 37.5 mg by mouth every day.     • zolpidem (AMBIEN) 10 MG Tab Take 10 mg by mouth at bedtime as needed.     • atorvastatin (LIPITOR) 20 MG Tab Take 1 Tab by mouth every day. 90 Tab 2   • Cholecalciferol 4000 units Cap Take 1 Capsule by mouth every day. 90 Cap 2   • HYDROcodone-acetaminophen (NORCO) 7.5-325 MG per tablet Take 1-2 Tabs by mouth every 6 hours as needed.     • meloxicam (MOBIC) 15 MG tablet Take 15 mg by mouth every day.     • albuterol 108 (90 BASE) MCG/ACT Aero Soln inhalation aerosol Inhale 2 Puffs by mouth every 6 hours as needed for Shortness of Breath. 8.5 g 0     No current Epic-ordered facility-administered medications on file.        Past Medical History:   Diagnosis Date   • Anxiety    • Arthritis     spine, feet    • Asthma    • Cataract    • Depression    • High cholesterol        Social History   Substance Use Topics   • Smoking status: Current Every Day Smoker     Packs/day: 0.25     Years: 26.00     Types: Cigarettes   • Smokeless tobacco: Never Used   • Alcohol use No       Family Status   Relation Status   • Mo (Not Specified)   • Bro (Not Specified)   • MAunt (Not Specified)     Family History   Problem Relation Age of Onset   • Cancer Mother    • Alcohol/Drug Mother    • Cancer Brother    • Diabetes Maternal Aunt        Review of Systems:   Constitutional ROS: No unexpected change in weight, No weakness, No fatigue  Eye ROS: No recent significant change in vision, No eye pain, redness, discharge  Ear ROS:  "No drainage, No tinnitus or vertigo, No recent change in hearing  Mouth/Throat ROS: No teeth or gum problems, No bleeding gums, No tongue complaints  Neck ROS: No swollen glands, No significant pain in neck  Pulmonary ROS: No chronic cough, sputum, or hemoptysis, No dyspnea on exertion, No wheezing  Cardiovascular ROS: No diaphoresis, No edema, No palpitations  Gastrointestinal ROS: No change in bowel habits, No significant change in appetite, No nausea, vomiting, diarrhea, or constipation    Exam:  Blood pressure 110/70, pulse (!) 104, temperature 37 °C (98.6 °F), temperature source Temporal, resp. rate 14, height 1.676 m (5' 6\"), weight 71.2 kg (157 lb), last menstrual period 01/11/2017, SpO2 97 %, not currently breastfeeding.  General: Well developed, well nourished. No distress.  HEENT: Head is grossly normal.  No soft tissue swelling, ecchymosis, or abrasions noted on the scalp or the face.  Neck: No localized tenderness noted.  Full range of motion.  No ecchymosis noted.  Pulmonary: Unlabored respiratory effort.   Back: Full range of motion.  No localized tenderness noted.  Extremities: Abrasion noted in the proximal left forearm, extensor surface.  No localized bony tenderness noted.  Full range of motion left elbow.  Left ankle shows no bony tenderness, soft tissue swelling, or ecchymosis.  Left hip shows a small ecchymotic lesion slightly posterior to the greater trochanter but has full range of motion of the left hip.  Neurologic: Grossly nonfocal. No facial asymmetry noted.  Skin: Warm, dry, good turgor. No rashes in visible areas.   Psych: Normal mood. Alert and oriented x3. Judgment and insight is normal.    Assessment/Plan:  Patient is easily able to take off and put on her shoe of the left foot as she flexes and extends her left elbow and flexes and externally rotates her left hip she crosses her left ankle over the right knee.  Patient does not show any obvious discomfort and does not in a quick " manner.  Discussed with patient that I have a low suspicion for fracture at this point.  I also have a low suspicion for any acute intracranial processes although she certainly may have sustained a concussion.  Recommend continuing chronic pain medication as directed and following up with primary care for worsening or persistent symptoms.  1. Fall from ground level     2. Contusion of left elbow, initial encounter     3. Contusion of left hip, initial encounter     4. Acute left ankle pain     5. Strain of lumbar region, initial encounter

## 2018-11-28 ENCOUNTER — HOSPITAL ENCOUNTER (OUTPATIENT)
Dept: PULMONOLOGY | Facility: MEDICAL CENTER | Age: 53
End: 2018-11-28
Attending: NURSE PRACTITIONER
Payer: MEDICAID

## 2018-11-28 PROCEDURE — 94060 EVALUATION OF WHEEZING: CPT

## 2018-11-28 PROCEDURE — 94726 PLETHYSMOGRAPHY LUNG VOLUMES: CPT

## 2018-11-28 PROCEDURE — 94726 PLETHYSMOGRAPHY LUNG VOLUMES: CPT | Mod: 26 | Performed by: INTERNAL MEDICINE

## 2018-11-28 PROCEDURE — 94729 DIFFUSING CAPACITY: CPT | Mod: 26 | Performed by: INTERNAL MEDICINE

## 2018-11-28 PROCEDURE — 94060 EVALUATION OF WHEEZING: CPT | Mod: 26 | Performed by: INTERNAL MEDICINE

## 2018-11-28 PROCEDURE — 94729 DIFFUSING CAPACITY: CPT

## 2018-11-28 ASSESSMENT — PULMONARY FUNCTION TESTS
FEV1/FVC: 76.29
FEV1_PERCENT_PREDICTED: 83
FEV1/FVC: 80
FEV1/FVC_PERCENT_CHANGE: 38
FVC_PREDICTED: 3.63
FEV1_PERCENT_CHANGE: 3
FVC_LLN: 3.03
FEV1/FVC_PERCENT_PREDICTED: 95
FEV1/FVC_PERCENT_LLN: 67
FEV1: 2.42
FVC: 3.04
FVC_PERCENT_PREDICTED: 90
FEV1/FVC_PERCENT_PREDICTED: 96
FEV1/FVC: 80
FEV1/FVC_PERCENT_CHANGE: -4
FEV1/FVC_PREDICTED: 80
FVC_PERCENT_PREDICTED: 83
FEV1/FVC_PERCENT_LLN: 67
FVC: 3.29
FEV1_LLN: 2.41
FEV1: 2.51
FEV1_PERCENT_CHANGE: 8
FEV1_LLN: 2.41
FEV1_PERCENT_PREDICTED: 86
FEV1_PREDICTED: 2.89
FEV1/FVC_PERCENT_PREDICTED: 80
FEV1/FVC_PERCENT_PREDICTED: 99
FEV1/FVC_PERCENT_PREDICTED: 100
FVC_LLN: 3.03
FEV1/FVC: 76

## 2018-12-14 ENCOUNTER — APPOINTMENT (OUTPATIENT)
Dept: RADIOLOGY | Facility: MEDICAL CENTER | Age: 53
End: 2018-12-14
Attending: EMERGENCY MEDICINE
Payer: MEDICAID

## 2018-12-14 ENCOUNTER — HOSPITAL ENCOUNTER (EMERGENCY)
Facility: MEDICAL CENTER | Age: 53
End: 2018-12-14
Attending: EMERGENCY MEDICINE
Payer: MEDICAID

## 2018-12-14 VITALS
OXYGEN SATURATION: 96 % | TEMPERATURE: 97.3 F | DIASTOLIC BLOOD PRESSURE: 83 MMHG | HEIGHT: 66 IN | WEIGHT: 145 LBS | HEART RATE: 80 BPM | RESPIRATION RATE: 18 BRPM | SYSTOLIC BLOOD PRESSURE: 122 MMHG | BODY MASS INDEX: 23.3 KG/M2

## 2018-12-14 DIAGNOSIS — S22.32XA CLOSED FRACTURE OF ONE RIB OF LEFT SIDE, INITIAL ENCOUNTER: ICD-10-CM

## 2018-12-14 LAB
ALBUMIN SERPL BCP-MCNC: 4 G/DL (ref 3.2–4.9)
ALBUMIN/GLOB SERPL: 1.6 G/DL
ALP SERPL-CCNC: 78 U/L (ref 30–99)
ALT SERPL-CCNC: 19 U/L (ref 2–50)
ANION GAP SERPL CALC-SCNC: 9 MMOL/L (ref 0–11.9)
AST SERPL-CCNC: 16 U/L (ref 12–45)
BASOPHILS # BLD AUTO: 0.8 % (ref 0–1.8)
BASOPHILS # BLD: 0.07 K/UL (ref 0–0.12)
BILIRUB SERPL-MCNC: 0.4 MG/DL (ref 0.1–1.5)
BUN SERPL-MCNC: 16 MG/DL (ref 8–22)
CALCIUM SERPL-MCNC: 9.6 MG/DL (ref 8.5–10.5)
CHLORIDE SERPL-SCNC: 106 MMOL/L (ref 96–112)
CO2 SERPL-SCNC: 22 MMOL/L (ref 20–33)
CREAT SERPL-MCNC: 0.77 MG/DL (ref 0.5–1.4)
EOSINOPHIL # BLD AUTO: 0.2 K/UL (ref 0–0.51)
EOSINOPHIL NFR BLD: 2.3 % (ref 0–6.9)
ERYTHROCYTE [DISTWIDTH] IN BLOOD BY AUTOMATED COUNT: 43.8 FL (ref 35.9–50)
ETHANOL BLD-MCNC: 0 G/DL
GLOBULIN SER CALC-MCNC: 2.5 G/DL (ref 1.9–3.5)
GLUCOSE SERPL-MCNC: 90 MG/DL (ref 65–99)
HCT VFR BLD AUTO: 39.4 % (ref 37–47)
HGB BLD-MCNC: 13.7 G/DL (ref 12–16)
IMM GRANULOCYTES # BLD AUTO: 0.02 K/UL (ref 0–0.11)
IMM GRANULOCYTES NFR BLD AUTO: 0.2 % (ref 0–0.9)
LYMPHOCYTES # BLD AUTO: 3.58 K/UL (ref 1–4.8)
LYMPHOCYTES NFR BLD: 40.5 % (ref 22–41)
MCH RBC QN AUTO: 31.6 PG (ref 27–33)
MCHC RBC AUTO-ENTMCNC: 34.8 G/DL (ref 33.6–35)
MCV RBC AUTO: 90.8 FL (ref 81.4–97.8)
MONOCYTES # BLD AUTO: 0.52 K/UL (ref 0–0.85)
MONOCYTES NFR BLD AUTO: 5.9 % (ref 0–13.4)
NEUTROPHILS # BLD AUTO: 4.45 K/UL (ref 2–7.15)
NEUTROPHILS NFR BLD: 50.3 % (ref 44–72)
NRBC # BLD AUTO: 0 K/UL
NRBC BLD-RTO: 0 /100 WBC
PLATELET # BLD AUTO: 257 K/UL (ref 164–446)
PMV BLD AUTO: 10.9 FL (ref 9–12.9)
POTASSIUM SERPL-SCNC: 3.8 MMOL/L (ref 3.6–5.5)
PROT SERPL-MCNC: 6.5 G/DL (ref 6–8.2)
RBC # BLD AUTO: 4.34 M/UL (ref 4.2–5.4)
SODIUM SERPL-SCNC: 137 MMOL/L (ref 135–145)
WBC # BLD AUTO: 8.8 K/UL (ref 4.8–10.8)

## 2018-12-14 PROCEDURE — 96375 TX/PRO/DX INJ NEW DRUG ADDON: CPT

## 2018-12-14 PROCEDURE — 96374 THER/PROPH/DIAG INJ IV PUSH: CPT

## 2018-12-14 PROCEDURE — 80053 COMPREHEN METABOLIC PANEL: CPT

## 2018-12-14 PROCEDURE — 85025 COMPLETE CBC W/AUTO DIFF WBC: CPT

## 2018-12-14 PROCEDURE — 700111 HCHG RX REV CODE 636 W/ 250 OVERRIDE (IP): Performed by: EMERGENCY MEDICINE

## 2018-12-14 PROCEDURE — 99285 EMERGENCY DEPT VISIT HI MDM: CPT

## 2018-12-14 PROCEDURE — 71101 X-RAY EXAM UNILAT RIBS/CHEST: CPT | Mod: LT

## 2018-12-14 PROCEDURE — 80307 DRUG TEST PRSMV CHEM ANLYZR: CPT

## 2018-12-14 RX ORDER — ONDANSETRON 2 MG/ML
4 INJECTION INTRAMUSCULAR; INTRAVENOUS ONCE
Status: COMPLETED | OUTPATIENT
Start: 2018-12-14 | End: 2018-12-14

## 2018-12-14 RX ORDER — HYDROCODONE BITARTRATE AND ACETAMINOPHEN 7.5; 325 MG/1; MG/1
1-2 TABLET ORAL EVERY 6 HOURS PRN
Qty: 6 TAB | Refills: 0 | Status: SHIPPED | OUTPATIENT
Start: 2018-12-14 | End: 2018-12-16

## 2018-12-14 RX ORDER — HYDROMORPHONE HYDROCHLORIDE 1 MG/ML
1 INJECTION, SOLUTION INTRAMUSCULAR; INTRAVENOUS; SUBCUTANEOUS ONCE
Status: COMPLETED | OUTPATIENT
Start: 2018-12-14 | End: 2018-12-14

## 2018-12-14 RX ADMIN — HYDROMORPHONE HYDROCHLORIDE 1 MG: 1 INJECTION, SOLUTION INTRAMUSCULAR; INTRAVENOUS; SUBCUTANEOUS at 14:25

## 2018-12-14 RX ADMIN — ONDANSETRON 4 MG: 2 INJECTION INTRAMUSCULAR; INTRAVENOUS at 14:25

## 2018-12-14 ASSESSMENT — PAIN DESCRIPTION - DESCRIPTORS: DESCRIPTORS: ACHING

## 2018-12-14 ASSESSMENT — LIFESTYLE VARIABLES: DO YOU DRINK ALCOHOL: NO

## 2018-12-14 ASSESSMENT — PAIN SCALES - GENERAL: PAINLEVEL_OUTOF10: 4

## 2018-12-14 NOTE — ED TRIAGE NOTES
Pt. Arrives via EMS from home with C/O left sided rib pain after coughing. Pt. Reports she was smoking a marijuana cigarette which caused her to cough. Pt. Hx. Of asthma.

## 2018-12-14 NOTE — ED PROVIDER NOTES
ED Provider Note    CHIEF COMPLAINT  Chief Complaint   Patient presents with   • Rib Pain       HPI  Marilyn Salinas is a 53 y.o. female who presents for evaluation of left lateral chest wall pain.  The patient reports that several days ago she fell onto a ceramic jar.  She has some pain but not did not seek medical care.  Then she had a significant coughing episode and felt a tearing sensation on her left lateral chest wall.  She has a known history of underlying asthma and COPD.  She reports 10 out of 10 pain.  Paramedics arrived and brought her here.  She received pain medicine prior to arrival.  No hemoptysis no leg swelling injury to the head or neck    REVIEW OF SYSTEMS  See HPI for further details.  No numbness tingling weakness fevers chills all other systems are negative.     PAST MEDICAL HISTORY  Past Medical History:   Diagnosis Date   • Anxiety    • Arthritis     spine, feet    • Asthma    • Cataract    • Depression    • High cholesterol        FAMILY HISTORY  Noncontributory    SOCIAL HISTORY  Social History     Social History   • Marital status: Single     Spouse name: N/A   • Number of children: N/A   • Years of education: N/A     Social History Main Topics   • Smoking status: Current Every Day Smoker     Packs/day: 0.25     Years: 26.00     Types: Cigarettes   • Smokeless tobacco: Never Used   • Alcohol use No   • Drug use: Yes     Types: Marijuana   • Sexual activity: Not Currently     Other Topics Concern   •  Service No   • Blood Transfusions No   • Caffeine Concern No   • Occupational Exposure No   • Hobby Hazards No   • Sleep Concern Yes   • Stress Concern Yes   • Weight Concern Yes   • Special Diet No   • Back Care Yes   • Exercise No   • Bike Helmet Yes   • Seat Belt Yes   • Self-Exams Yes     Social History Narrative   • No narrative on file     Noncontributory  SURGICAL HISTORY  Past Surgical History:   Procedure Laterality Date   • VAGINAL HYSTERECTOMY SCOPE TOTAL N/A 10/3/2017     Procedure: VAGINAL HYSTERECTOMY SCOPE TOTAL;  Surgeon: Hudson Driscoll M.D.;  Location: SURGERY SAME DAY [facility] ORS;  Service: Gynecology   • SALPINGECTOMY Bilateral 10/3/2017    Procedure: SALPINGECTOMY;  Surgeon: Hudson Driscoll M.D.;  Location: SURGERY SAME DAY [facility] ORS;  Service: Gynecology   • OOPHORECTOMY Bilateral 10/3/2017    Procedure: OOPHORECTOMY;  Surgeon: Hudson Driscoll M.D.;  Location: SURGERY SAME DAY [facility] ORS;  Service: Gynecology   • ANTERIOR AND POSTERIOR REPAIR Bilateral 10/3/2017    Procedure: ANTERIOR AND POSTERIOR REPAIR;  Surgeon: Hudson Driscoll M.D.;  Location: SURGERY SAME DAY [facility] ORS;  Service: Gynecology   • ENTEROCELE REPAIR N/A 10/3/2017    Procedure: ENTEROCELE REPAIR, PERINEOPLASTY;  Surgeon: Hudson Driscoll M.D.;  Location: SURGERY SAME DAY [facility] ORS;  Service: Gynecology   • BLADDER SLING FEMALE N/A 10/3/2017    Procedure: BLADDER SLING FEMALE TOT, CYSTOSCOPY;  Surgeon: Hudson Driscoll M.D.;  Location: SURGERY SAME DAY [facility] ORS;  Service: Gynecology   • VAGINAL SUSPENSION N/A 10/3/2017    Procedure: VAGINAL SUSPENSION SACROSPINOUS VAULT POSSIBLE;  Surgeon: Hudson Driscoll M.D.;  Location: SURGERY SAME DAY [facility] ORS;  Service: Gynecology   • OTHER Left 2005    hammertoe x2   • EYE SURGERY  1972    for lazy eye   • LUMPECTOMY         CURRENT MEDICATIONS  Home Medications     Reviewed by Zita Rob R.N. (Registered Nurse) on 12/14/18 at 1340  Med List Status: Not Addressed   Medication Last Dose Status   albuterol 108 (90 BASE) MCG/ACT Aero Soln inhalation aerosol  Active   atorvastatin (LIPITOR) 20 MG Tab  Active   buPROPion (WELLBUTRIN XL) 150 MG XL tablet  Active   Cholecalciferol 4000 units Cap  Active   doxycycline (MONODOX) 100 MG capsule  Active   fluticasone (FLONASE ALLERGY RELIEF) 50 MCG/ACT nasal spray  Active   gabapentin (NEURONTIN) 400 MG Cap  Active   HYDROcodone-acetaminophen (NORCO) 7.5-325 MG per tablet  Active   loratadine  "(CLARITIN) 10 MG Tab  Active   meloxicam (MOBIC) 15 MG tablet  Active   methocarbamol (ROBAXIN) 500 MG Tab  Active   oxybutynin (DITROPAN XL) 15 MG CR tablet  Active   predniSONE (DELTASONE) 5 MG Tab  Active   raNITidine (ZANTAC) 150 MG Tab  Active   venlafaxine XR (EFFEXOR XR) 37.5 MG CAPSULE SR 24 HR  Active   zolpidem (AMBIEN) 10 MG Tab  Active                ALLERGIES  No Known Allergies    PHYSICAL EXAM  VITAL SIGNS: /83   Pulse 80   Temp 36.3 °C (97.3 °F) (Temporal)   Resp 18   Ht 1.676 m (5' 6\")   Wt 65.8 kg (145 lb)   LMP 01/11/2017   SpO2 96%   BMI 23.40 kg/m²       Constitutional: Patient appears to be uncomfortable  HENT: Normocephalic, Atraumatic, Bilateral external ears normal, Oropharynx moist, No oral exudates, Nose normal.   Eyes: PERRLA, EOMI, Conjunctiva normal, No discharge.   Neck: Normal range of motion, No tenderness, Supple, No stridor.   Cardiovascular: Normal heart rate, Normal rhythm, No murmurs, No rubs, No gallops.   Thorax & Lungs: Normal breath sounds, No respiratory distress, No wheezing, point tenderness on the left lateral chest wall overlying ribs 5 and 6 no crepitus no flail chest   abdomen: Bowel sounds normal, Soft, No tenderness, No masses, No pulsatile masses.   Skin: Warm, Dry, No erythema, No rash.   Back: No tenderness, No CVA tenderness.   Extremities: Intact distal pulses, No edema, No tenderness, No cyanosis, No clubbing.   Neurologic: Alert & oriented x 3, Normal motor function, Normal sensory function, No focal deficits noted.   Psychiatric: Anxious      RADIOLOGY/PROCEDURES  [id number] (WITH 1-VIEW CXR) LEFT   Final Result      1.  LEFT eighth rib fracture   2.  Probable calcific tendinosis of the LEFT rotator cuff          Results for orders placed or performed during the hospital encounter of 12/14/18   DIAGNOSTIC ALCOHOL   Result Value Ref Range    Diagnostic Alcohol 0.00 0.00 g/dL   COMP METABOLIC PANEL   Result Value Ref Range    Sodium 137 " 135 - 145 mmol/L    Potassium 3.8 3.6 - 5.5 mmol/L    Chloride 106 96 - 112 mmol/L    Co2 22 20 - 33 mmol/L    Anion Gap 9.0 0.0 - 11.9    Glucose 90 65 - 99 mg/dL    Bun 16 8 - 22 mg/dL    Creatinine 0.77 0.50 - 1.40 mg/dL    Calcium 9.6 8.5 - 10.5 mg/dL    AST(SGOT) 16 12 - 45 U/L    ALT(SGPT) 19 2 - 50 U/L    Alkaline Phosphatase 78 30 - 99 U/L    Total Bilirubin 0.4 0.1 - 1.5 mg/dL    Albumin 4.0 3.2 - 4.9 g/dL    Total Protein 6.5 6.0 - 8.2 g/dL    Globulin 2.5 1.9 - 3.5 g/dL    A-G Ratio 1.6 g/dL   CBC WITH DIFFERENTIAL   Result Value Ref Range    WBC 8.8 4.8 - 10.8 K/uL    RBC 4.34 4.20 - 5.40 M/uL    Hemoglobin 13.7 12.0 - 16.0 g/dL    Hematocrit 39.4 37.0 - 47.0 %    MCV 90.8 81.4 - 97.8 fL    MCH 31.6 27.0 - 33.0 pg    MCHC 34.8 33.6 - 35.0 g/dL    RDW 43.8 35.9 - 50.0 fL    Platelet Count 257 164 - 446 K/uL    MPV 10.9 9.0 - 12.9 fL    Neutrophils-Polys 50.30 44.00 - 72.00 %    Lymphocytes 40.50 22.00 - 41.00 %    Monocytes 5.90 0.00 - 13.40 %    Eosinophils 2.30 0.00 - 6.90 %    Basophils 0.80 0.00 - 1.80 %    Immature Granulocytes 0.20 0.00 - 0.90 %    Nucleated RBC 0.00 /100 WBC    Neutrophils (Absolute) 4.45 2.00 - 7.15 K/uL    Lymphs (Absolute) 3.58 1.00 - 4.80 K/uL    Monos (Absolute) 0.52 0.00 - 0.85 K/uL    Eos (Absolute) 0.20 0.00 - 0.51 K/uL    Baso (Absolute) 0.07 0.00 - 0.12 K/uL    Immature Granulocytes (abs) 0.02 0.00 - 0.11 K/uL    NRBC (Absolute) 0.00 K/uL   ESTIMATED GFR   Result Value Ref Range    GFR If African American >60 >60 mL/min/1.73 m 2    GFR If Non African American >60 >60 mL/min/1.73 m 2      COURSE & MEDICAL DECISION MAKING  Pertinent Labs & Imaging studies reviewed. (See chart for details)  [id number] (WITH 1-VIEW CXR) LEFT   Final Result      1.  LEFT eighth rib fracture   2.  Probable calcific tendinosis of the LEFT rotator cuff          An IV was already been established by paramedics.  The patient was given additional nausea and pain medication.  Laboratory  studies are all normal and reassuring.  Radiograph demonstrates an isolated single left rib fracture.  There is no suggestion of pneumothorax flail chest pulmonary contusion on radiograph.  The patient was able to use incentive spirometry.  I reviewed her records and she apparently gets regular double strength Granby through her PCP every month.  Apparently her prescription is waiting for her at the pharmacy.  I will give her 6 tablets prescription only      FINAL IMPRESSION  1.  1. Closed fracture of one rib of left side, initial encounter               Electronically signed by: Zander Mitchell, 12/14/2018 1:47 PM

## 2018-12-14 NOTE — ED NOTES
Pt discharged at this time. Pt verbalized and demonstrated understanding of use of incentive spirometer. Pt wheeled out in wheelchair at this time. Pt stable on discharge.

## 2018-12-20 ENCOUNTER — APPOINTMENT (OUTPATIENT)
Dept: NEUROLOGY | Facility: MEDICAL CENTER | Age: 53
End: 2018-12-20
Payer: MEDICAID

## 2018-12-31 ENCOUNTER — OFFICE VISIT (OUTPATIENT)
Dept: MEDICAL GROUP | Facility: MEDICAL CENTER | Age: 53
End: 2018-12-31
Attending: NURSE PRACTITIONER
Payer: MEDICAID

## 2018-12-31 VITALS
BODY MASS INDEX: 23.95 KG/M2 | HEIGHT: 66 IN | RESPIRATION RATE: 16 BRPM | DIASTOLIC BLOOD PRESSURE: 80 MMHG | HEART RATE: 86 BPM | SYSTOLIC BLOOD PRESSURE: 100 MMHG | WEIGHT: 149 LBS | OXYGEN SATURATION: 95 % | TEMPERATURE: 98.4 F

## 2018-12-31 DIAGNOSIS — M79.671 PAIN IN BOTH FEET: ICD-10-CM

## 2018-12-31 DIAGNOSIS — M51.36 DDD (DEGENERATIVE DISC DISEASE), LUMBAR: ICD-10-CM

## 2018-12-31 DIAGNOSIS — J45.20 MILD INTERMITTENT ASTHMA WITHOUT COMPLICATION: ICD-10-CM

## 2018-12-31 DIAGNOSIS — M79.672 PAIN IN BOTH FEET: ICD-10-CM

## 2018-12-31 DIAGNOSIS — N32.81 OVERACTIVE BLADDER: ICD-10-CM

## 2018-12-31 DIAGNOSIS — Z91.81 RISK FOR FALLS: ICD-10-CM

## 2018-12-31 DIAGNOSIS — L84 CORNS AND CALLUS: ICD-10-CM

## 2018-12-31 DIAGNOSIS — S93.402A SPRAIN OF LEFT ANKLE, UNSPECIFIED LIGAMENT, INITIAL ENCOUNTER: ICD-10-CM

## 2018-12-31 DIAGNOSIS — Z91.09 ENVIRONMENTAL ALLERGIES: ICD-10-CM

## 2018-12-31 PROCEDURE — 99214 OFFICE O/P EST MOD 30 MIN: CPT | Performed by: NURSE PRACTITIONER

## 2018-12-31 PROCEDURE — 99213 OFFICE O/P EST LOW 20 MIN: CPT | Performed by: NURSE PRACTITIONER

## 2018-12-31 RX ORDER — LORATADINE 10 MG/1
TABLET ORAL
Qty: 30 TAB | Refills: 2 | Status: SHIPPED | OUTPATIENT
Start: 2018-12-31 | End: 2019-05-30 | Stop reason: SDUPTHER

## 2018-12-31 RX ORDER — OXYBUTYNIN CHLORIDE 15 MG/1
TABLET, EXTENDED RELEASE ORAL
Qty: 60 TAB | Refills: 2 | Status: SHIPPED | OUTPATIENT
Start: 2018-12-31 | End: 2019-06-14 | Stop reason: SDUPTHER

## 2018-12-31 RX ORDER — ALBUTEROL SULFATE 90 UG/1
2 AEROSOL, METERED RESPIRATORY (INHALATION) 4 TIMES DAILY
Qty: 1 INHALER | Refills: 2 | Status: SHIPPED | OUTPATIENT
Start: 2018-12-31 | End: 2019-11-14 | Stop reason: SDUPTHER

## 2018-12-31 ASSESSMENT — PAIN SCALES - GENERAL: PAINLEVEL: 8=MODERATE-SEVERE PAIN

## 2018-12-31 NOTE — ASSESSMENT & PLAN NOTE
Continues to see [facility] Neurosurgery.  Had Nerve Conduction Study denied by insurance.  Continues to take Meloxicam and Gabapentin and obtains narcotic pain meds from  Dr Francisco Domínguez- Pain management.

## 2018-12-31 NOTE — ASSESSMENT & PLAN NOTE
"Pt reports she has had a number of falls the past few months.  She believes it is due to her hips and low back pain , and left ankle.  States had surgery to left ankle/foot for \"hammer toe\" in 2005 and has progressively  Been more painful and disruptive to her walking.    Is seeing DR Jacobson - Neurosurgeon and reports her low back pain and NCS was denied by insurance.    Records show an Urgent Care visit on 11/18/18 for a reported fall against a curb  And  ER visit on 12/14/18 for a reported fall against a ceramic jar. Xrays  Showed   Isolated Left 8th rib fx.    Discussed trial of Left Ankle air-gel splint, continue using her cane, and to be careful with   Her narcotic pain medication to help prevent further falls. Denies dizziness.  "

## 2018-12-31 NOTE — ASSESSMENT & PLAN NOTE
REports pain to both feet but left ankle and foot worse.  Reports gets some swelling as days go on.  Recommend she elevate more.  Will trial air-gel splint

## 2018-12-31 NOTE — ASSESSMENT & PLAN NOTE
"We reviewed her PFT which is very good, probable mild asthma.  Pt asking for refill of Albuterol as \"using it more\".  Continues to smoke and not ready to quit.  Denies resting SOB.    "

## 2018-12-31 NOTE — PROGRESS NOTES
"Chief Complaint:   Chief Complaint   Patient presents with   • Foot Problem   • Fall     too many falls in the last 3 months        HPI:  Marilyn is here today for a follow-up on  Feet issues, discuss recent falls.  Her PMH includes     Asthma  Acid Indigestion  \"Arthritis\"  Anxiety and Depression  Heart Murmur ( normal echo)  Tobacco use-Smoking  Marijuana use-smoking.  Asthmatic Bronchitis/chest congestion  Lumpectomy  DDD Lumbar Spine  \"Guillian Bare\" Syndrome per patient  Chronic feet pain w bunions, calluses, corns and toenail fungus     Established w   Pain Management-Dr Francisco Domínguez  GYN- Dr Driscoll  Ophthalmologist- Dr Giordano  Neurosurgery- Gideon Jacobson ([facility])  Neurology- [facility]- Dr Calvin, 1st appt 9/11/18  Psychiatry and Counseling- [facility]  Report shows:  12/17/18 Ambien 10 mg # 30 by DR Michelle Jha ( [place])  12/17/18 Norco 7.5.325 # 120 by Dr Francisco Domínguez MD  Similar entries in report  33 RX and 5 PRescribers in report.  ---------------------------------------------        Review of Records shows:  12/14/18 ER visit for Left lateral rib pain s/p fall on ceramic jar., Cough, Xray showed Left 8th rib fx, left shoulder rotator cuff tendonitis possible.  11/18/18 Urgent Care visit for GLF off curb, left side.  10/30/18 Clini cfor Cough/Bronchitis ( smokes tobacco and [place]).  oredre for PFT, RX Doxycycline, Prednisone, Claritin, Flonase, Robitussin w Codeine.   TX w Liquid Nitrogen and Scalpel debridement of feet callous.  9/11/18 Neurology appt- Consult- Dr Calvin. For weakness/paresthesia to upper extremities.---> EEG ordered. Suspect is radiculopathy from spine.   9/6/18 Clinic Visit for tx of feet corns/callouses--> Liquid Nitrogen, Scalpel Debridement,  Application of Trichloroacetic acid. RX Topical Salicylic acid for home use.  RX Refill Robaxin. To f/u w her Pain Management MD     8/21/18 Clinic visit for F/u on ER fisit for \"Concussion'  Feet issues " "and unable to see Podiatry.  4/10/18 Clinic Appt for f/u on LBP, Feet pain, Request for Zantac. Hx of Kendrick Babcock wanting to see Neurology.  Allergy medications.     3/23/18 [facility] Visit for Anxiety , low back pain. Xray of Lumbar Spine completed---Report Findings:         Risk for falls  Pt reports she has had a number of falls the past few months.  She believes it is due to her hips and low back pain , and left ankle.  States had surgery to left ankle/foot for \"hammer toe\" in 2005 and has progressively  Been more painful and disruptive to her walking.    Is seeing DR Jacobson - Neurosurgeon and reports her low back pain and NCS was denied by insurance.    Records show an Urgent Care visit on 11/18/18 for a reported fall against a curb  And  ER visit on 12/14/18 for a reported fall against a ceramic jar. Xrays  Showed   Isolated Left 8th rib fx.    Discussed trial of Left Ankle air-gel splint, continue using her cane, and to be careful with   Her narcotic pain medication to help prevent further falls. Denies dizziness.    Corns and callus  Reports corns on both feet are somewhat better, but wants another tx today.  Reports is using otc Jergens lotion which is helping soften her feet skin and lesson the   Discomfort of the corns.    Pain in both feet/left ankle pain  REports pain to both feet but left ankle and foot worse.  Reports gets some swelling as days go on.  Recommend she elevate more.  Will trial air-gel splint     Mild intermittent asthma  We reviewed her PFT which is very good, probable mild asthma.  Pt asking for refill of Albuterol as \"using it more\".  Continues to smoke and not ready to quit.  Denies resting SOB.      DDD (degenerative disc disease), lumbar/chronic Pain  Continues to see [facility] Neurosurgery.  Had Nerve Conduction Study denied by insurance.  Continues to take Meloxicam and Gabapentin and obtains narcotic pain meds from  Dr Francisco Domínguez- Pain management.        Patient " Active Problem List    Diagnosis Date Noted   • Risk for falls 12/31/2018   • Cough 10/30/2018   • Corns and callus 08/21/2018   • GBS (Guillain Brodhead syndrome) ([facility]) 04/10/2018   • Pelvic pain 10/03/2017   • Vitamin D deficiency 07/31/2017   • Hyperlipidemia, unspecified 07/31/2017   • Assistance with transportation 07/31/2017   • Routine health maintenance 06/19/2017   • Obesity (BMI 30-39.9) 06/19/2017   • Mild intermittent asthma 06/19/2017   • Tobacco use 06/19/2017   • Anxiety and depression 06/19/2017   • Acid indigestion 06/19/2017   • DDD (degenerative disc disease), lumbar 06/19/2017   • Dolly-menopause 06/19/2017   • Chest congestion 06/19/2017   • Pain in both feet 06/19/2017   • Poor vision 06/19/2017       Allergies:Patient has no known allergies.    Medicines as of today:  Current Outpatient Prescriptions   Medication Sig Dispense Refill   • albuterol 108 (90 Base) MCG/ACT Aero Soln inhalation aerosol Inhale 2 Puffs by mouth 4 times a day. 1 Inhaler 2   • buPROPion (WELLBUTRIN XL) 150 MG XL tablet Take 450 mg by mouth every morning.  2   • fluticasone (FLONASE ALLERGY RELIEF) 50 MCG/ACT nasal spray Spray 1 Spray in nose 2 times a day. 16 g 2   • doxycycline (MONODOX) 100 MG capsule Take 1 Cap by mouth 2 times a day. 14 Cap 0   • predniSONE (DELTASONE) 5 MG Tab Take 5 mg twice a day for 5 days, then once daily for 5 days. 15 Tab 0   • loratadine (CLARITIN) 10 MG Tab Take 1 Tab by mouth every day. 30 Tab 2   • raNITidine (ZANTAC) 150 MG Tab TAKE 1 TABLET BY MOUTH TWO TIMES DAILY 180 Tab 0   • oxybutynin (DITROPAN XL) 15 MG CR tablet Take 1 Tab by mouth 2 Times a Day. 60 Tab 2   • methocarbamol (ROBAXIN) 500 MG Tab Take 1 Tab by mouth 2 times a day as needed. 60 Tab 0   • gabapentin (NEURONTIN) 400 MG Cap Take 400 mg by mouth 2 times a day.     • venlafaxine XR (EFFEXOR XR) 37.5 MG CAPSULE SR 24 HR Take 37.5 mg by mouth every day.     • zolpidem (AMBIEN) 10 MG Tab Take 10 mg by mouth at bedtime as needed.  "    • atorvastatin (LIPITOR) 20 MG Tab Take 1 Tab by mouth every day. 90 Tab 2   • Cholecalciferol 4000 units Cap Take 1 Capsule by mouth every day. 90 Cap 2   • meloxicam (MOBIC) 15 MG tablet Take 15 mg by mouth every day.       No current facility-administered medications for this visit.        Social History   Substance Use Topics   • Smoking status: Current Every Day Smoker     Packs/day: 0.25     Years: 26.00     Types: Cigarettes   • Smokeless tobacco: Never Used   • Alcohol use No       Past Medical History:   Diagnosis Date   • Anxiety    • Arthritis     spine, feet    • Asthma    • Cataract    • Depression    • High cholesterol        Family History   Problem Relation Age of Onset   • Cancer Mother    • Alcohol/Drug Mother    • Cancer Brother    • Diabetes Maternal Aunt        ROS:  Review of Systems   See HPI Above    Exam:  Blood pressure 100/80, pulse 86, temperature 36.9 °C (98.4 °F), temperature source Temporal, resp. rate 16, height 1.676 m (5' 5.98\"), weight 67.6 kg (149 lb), last menstrual period 01/11/2017, SpO2 95 %, not currently breastfeeding. Body mass index is 24.06 kg/m².    General:  Well nourished, well developed female in moderate discomfort.  HENT:Head is grossly normal. PERRL.  Neck: Supple. Trachea is midline.  Pulmonary: Slight end inspiratory wheeze bilat to ausculation .  Normal effort. No rales, ronchi.   Cardiovascular: Regular rate and rhythm.  Abdomen-Abdomen is soft, No tenderness.  Upper extremities- . Good ROM  Lower extremities- neg for edema, redness. Small callus/corns to both feet. Mild tenderness to left lateral ankle.  No swelling noted.  Neuro- A & O x 4. Speech clear and appropriate.    Current medications, allergies, and problem list reviewed with patient and updated in [facility] today.    Assessment/Plan:  1. Risk for falls  To be cognizant of her use of gabapentin and Narcotics from Pain management for pain.  Wear left ankle gel-splint applied today ([facility]) and " instructions of use given  Billing via paper work.   2. Corns and callus  Procedure: Liquid Nitrogen and scalpel debridement to 3 corns completed today and tolerated well.  To continue use of emollient lotions to feet.   3. Pain in both feet  Elevate often as discussed. Continue w Dr Domínguez for pain managment   4. Mild intermittent asthma without complication  albuterol 108 (90 Base) MCG/ACT Aero Soln inhalation aerosol   5. Sprain of left ankle, unspecified ligament, initial encounter  GEl-Air ankle splint applied.   6. DDD (degenerative disc disease), lumbar  F/u [facility]. Continue w Pain management. Continue Gabpentin and Meloxicam prn.       Return in about 2 months (around 2/28/2019) for f/u on feet and ankle issues.

## 2019-01-28 ENCOUNTER — TELEPHONE (OUTPATIENT)
Dept: MEDICAL GROUP | Facility: MEDICAL CENTER | Age: 54
End: 2019-01-28

## 2019-01-28 NOTE — TELEPHONE ENCOUNTER
Please let Marilyn know that I am unable to report that she is disable and unable to work and she may want to go to a Disability Doctor for evaluation  Oscar

## 2019-01-28 NOTE — TELEPHONE ENCOUNTER
Pt called and is requesting notes that states she is disabled and unable to work. Pt needs this for her daughter to be able to claim pt on her taxes this year as pt is living with her son and his girlfriend and is not able to work at this time. Please advise.

## 2019-02-22 ENCOUNTER — OFFICE VISIT (OUTPATIENT)
Dept: MEDICAL GROUP | Facility: MEDICAL CENTER | Age: 54
End: 2019-02-22
Attending: INTERNAL MEDICINE
Payer: MEDICAID

## 2019-02-22 VITALS
DIASTOLIC BLOOD PRESSURE: 80 MMHG | SYSTOLIC BLOOD PRESSURE: 124 MMHG | WEIGHT: 146 LBS | RESPIRATION RATE: 16 BRPM | HEART RATE: 70 BPM | TEMPERATURE: 98.3 F | OXYGEN SATURATION: 100 % | BODY MASS INDEX: 23.46 KG/M2 | HEIGHT: 66 IN

## 2019-02-22 DIAGNOSIS — F07.81 POST CONCUSSIVE SYNDROME: ICD-10-CM

## 2019-02-22 DIAGNOSIS — R42 VERTIGO: ICD-10-CM

## 2019-02-22 DIAGNOSIS — R04.0 EPISTAXIS: ICD-10-CM

## 2019-02-22 PROBLEM — Z00.00 ROUTINE HEALTH MAINTENANCE: Status: RESOLVED | Noted: 2017-06-19 | Resolved: 2019-02-22

## 2019-02-22 PROCEDURE — 99213 OFFICE O/P EST LOW 20 MIN: CPT | Performed by: INTERNAL MEDICINE

## 2019-02-22 PROCEDURE — 99214 OFFICE O/P EST MOD 30 MIN: CPT | Performed by: INTERNAL MEDICINE

## 2019-02-22 RX ORDER — FLUTICASONE PROPIONATE 50 MCG
1 SPRAY, SUSPENSION (ML) NASAL 2 TIMES DAILY
Qty: 16 G | Refills: 2 | Status: SHIPPED | OUTPATIENT
Start: 2019-02-22 | End: 2019-04-12 | Stop reason: SDUPTHER

## 2019-02-22 RX ORDER — BUTALBITAL, ACETAMINOPHEN AND CAFFEINE 50; 325; 40 MG/1; MG/1; MG/1
1 TABLET ORAL EVERY 6 HOURS PRN
Qty: 30 TAB | Refills: 0 | Status: SHIPPED | OUTPATIENT
Start: 2019-02-22 | End: 2019-03-24

## 2019-02-22 ASSESSMENT — PAIN SCALES - GENERAL: PAINLEVEL: 3=SLIGHT PAIN

## 2019-02-22 NOTE — ASSESSMENT & PLAN NOTE
She reports intermittent episodes of vertigo.  States that she was advised to follow-up with ENT for this but has not received a referral yet.  She has not noticed increase in frequency of the vertigo symptoms since her most recent fall.

## 2019-02-22 NOTE — ASSESSMENT & PLAN NOTE
She reports that since her most recent fall, she is been having consistent nosebleeds.  States that every time she blows her nose, there is some blood present.  She also reports tasting blood, and when she coughs up a lot of mucus there is a little bit of blood in it.  She has never had hemoptysis and absence of a nosebleed and she believes that the blood is coming from the nosebleed.

## 2019-02-22 NOTE — ASSESSMENT & PLAN NOTE
She states that approximately 2 weeks ago, she tripped over a toy and her granddaughters room and fell backwards hitting her head very hard on the dresser.  She has a laceration over the back of her scalp consistent with this.  She reports not losing consciousness but coming close.  She went to the emergency room the next day and was evaluated with a CT head.  We do not have these records for review as they were done at [facility] but she reports that it was negative and she was discharged home.  Since that time, she is continued to have daily headaches as well as some increased nausea.  She has only had one episode of vomiting.  She also has noticed more emotional lability and that she is crying more easily.  For her headaches, she has been trying the meloxicam without improvement.  She is also on hydrocodone for pain management and reports this is not helpful.

## 2019-02-22 NOTE — PROGRESS NOTES
Subjective:   Marilyn Salinas is a 53 y.o. female here today for continued headaches and nausea after recent fall, nosebleed    Post concussive syndrome  She states that approximately 2 weeks ago, she tripped over a toy and her granddaughters room and fell backwards hitting her head very hard on the dresser.  She has a laceration over the back of her scalp consistent with this.  She reports not losing consciousness but coming close.  She went to the emergency room the next day and was evaluated with a CT head.  We do not have these records for review as they were done at [facility] but she reports that it was negative and she was discharged home.  Since that time, she is continued to have daily headaches as well as some increased nausea.  She has only had one episode of vomiting.  She also has noticed more emotional lability and that she is crying more easily.  For her headaches, she has been trying the meloxicam without improvement.  She is also on hydrocodone for pain management and reports this is not helpful.     Epistaxis  She reports that since her most recent fall, she is been having consistent nosebleeds.  States that every time she blows her nose, there is some blood present.  She also reports tasting blood, and when she coughs up a lot of mucus there is a little bit of blood in it.  She has never had hemoptysis and absence of a nosebleed and she believes that the blood is coming from the nosebleed.    Vertigo  She reports intermittent episodes of vertigo.  States that she was advised to follow-up with ENT for this but has not received a referral yet.  She has not noticed increase in frequency of the vertigo symptoms since her most recent fall.       Current medicines (including changes today)  Current Outpatient Prescriptions   Medication Sig Dispense Refill   • acetaminophen/caffeine/butalbital 325-40-50 mg (FIORICET) -40 MG Tab Take 1 Tab by mouth every 6 hours as needed for  "Headache for up to 30 days. 30 Tab 0   • fluticasone (FLONASE ALLERGY RELIEF) 50 MCG/ACT nasal spray Spray 1 Spray in nose 2 times a day. 16 g 2   • oxybutynin (DITROPAN XL) 15 MG CR tablet TAKE 1 TABLET BY MOUTH TWO TIMES DAILY 60 Tab 2   • ALLERGY RELIEF 10 MG Tab TAKE 1 TABLET BY MOUTH DAILY 30 Tab 2   • albuterol 108 (90 Base) MCG/ACT Aero Soln inhalation aerosol Inhale 2 Puffs by mouth 4 times a day. 1 Inhaler 2   • buPROPion (WELLBUTRIN XL) 150 MG XL tablet Take 450 mg by mouth every morning.  2   • raNITidine (ZANTAC) 150 MG Tab TAKE 1 TABLET BY MOUTH TWO TIMES DAILY 180 Tab 0   • methocarbamol (ROBAXIN) 500 MG Tab Take 1 Tab by mouth 2 times a day as needed. 60 Tab 0   • gabapentin (NEURONTIN) 400 MG Cap Take 400 mg by mouth 2 times a day.     • venlafaxine XR (EFFEXOR XR) 37.5 MG CAPSULE SR 24 HR Take 37.5 mg by mouth every day.     • zolpidem (AMBIEN) 10 MG Tab Take 10 mg by mouth at bedtime as needed.     • meloxicam (MOBIC) 15 MG tablet Take 15 mg by mouth every day.     • atorvastatin (LIPITOR) 20 MG Tab Take 1 Tab by mouth every day. (Patient not taking: Reported on 2/22/2019) 90 Tab 2   • Cholecalciferol 4000 units Cap Take 1 Capsule by mouth every day. 90 Cap 2     No current facility-administered medications for this visit.      She  has a past medical history of Anxiety; Arthritis; Asthma; Cataract; Depression; and High cholesterol.    ROS   Denies chest pain, shortness of breath  As above in HPI     Objective:     Blood pressure 124/80, pulse 70, temperature 36.8 °C (98.3 °F), temperature source Temporal, resp. rate 16, height 1.676 m (5' 5.98\"), weight 66.2 kg (146 lb), last menstrual period 01/11/2017, SpO2 100 %, not currently breastfeeding. Body mass index is 23.58 kg/m².   Physical Exam:  Constitutional: Alert, no distress.  Skin: Warm, dry, good turgor, no rashes in visible areas.  Eye: Equal, round and reactive, R eye straubismus unchanged per patient.  ENT: no obvious bleeding vessels " visualized intranasally  Psych: Alert and oriented x3, tearful during visit       Assessment and Plan:   The following treatment plan was discussed    1. Post concussive syndrome  Records have been requested from [facility], however she is already had brain imaging one day after the event without any acute worsening lately so I do not think we need to repeat this.  Fioricet has been provided for the headache.  She declines prescription for antiemetic.  We discussed that the emotional lability may be part of this is well and things should improve over the next few weeks.  - acetaminophen/caffeine/butalbital 325-40-50 mg (FIORICET) -40 MG Tab; Take 1 Tab by mouth every 6 hours as needed for Headache for up to 30 days.  Dispense: 30 Tab; Refill: 0    2. Epistaxis  No obvious source of bleeding visualized.  Concerned that it could be more posterior and needs to be cauterized given persistent symptoms as well as sensation of the blood going down the back of her throat.  I have referred her to ENT for this.  - REFERRAL TO ENT    3. Vertigo  Intermittent, we did not discuss this in much detail today however patient is requesting that ENT also check her out for the vertigo.  Since we are sending her for the epistaxis, will also have them evaluate for this.  - REFERRAL TO ENT    She has an establish care appointment with her new PCP, Dr. Latif, in several weeks.  She will follow-up sooner than that if she develops any worsening headache or changes neurologically.    Followup: Return in about 4 weeks (around 3/22/2019), or if symptoms worsen or fail to improve, for establish care with new PCP.

## 2019-02-22 NOTE — LETTER
[facility]  AMALIA Tapia.  [address]  [place] [address]  Fax: [phone]   Authorization for Release/Disclosure of   Protected Health Information   Name: MARILYN SALINAS : 1965 SSN: [id number]   Address: [address] [place] [address]  [place] [address] Phone:    [phone] (home) [phone] (work)   I authorize the entity listed below to release/disclose the PHI below to:   [facility]/YINKA Tapia and Mary Drew M.D.   Provider or Entity Name:     Address   City, State, Zip   Phone:      Fax:     Reason for request: continuity of care   Information to be released:    [  ] LAST COLONOSCOPY,  including any PATH REPORT and follow-up  [  ] LAST FIT/COLOGUARD RESULT [  ] LAST DEXA  [  ] LAST MAMMOGRAM  [  ] LAST PAP  [  ] LAST LABS [  ] RETINA EXAM REPORT  [  ] IMMUNIZATION RECORDS  [ x ] Release all info      [  ] Check here and initial the line next to each item to release ALL health information INCLUDING  _____ Care and treatment for drug and / or alcohol abuse  _____ HIV testing, infection status, or AIDS  _____ Genetic Testing    DATES OF SERVICE OR TIME PERIOD TO BE DISCLOSED: _____________  I understand and acknowledge that:  * This Authorization may be revoked at any time by you in writing, except if your health information has already been used or disclosed.  * Your health information that will be used or disclosed as a result of you signing this authorization could be re-disclosed by the recipient. If this occurs, your re-disclosed health information may no longer be protected by State or Federal laws.  * You may refuse to sign this Authorization. Your refusal will not affect your ability to obtain treatment.  * This Authorization becomes effective upon signing and will  on (date) __________.      If no date is indicated, this Authorization will  one (1) year from the signature date.    Name: Marilyn Salinas    Signature:   Date:     2019            PLEASE FAX REQUESTED RECORDS BACK TO: [phone]

## 2019-03-12 ENCOUNTER — TELEPHONE (OUTPATIENT)
Dept: MEDICAL GROUP | Facility: MEDICAL CENTER | Age: 54
End: 2019-03-12

## 2019-03-12 NOTE — TELEPHONE ENCOUNTER
Left message with patient about no show to appointment today 3/12/19.  Explained that this was her first no show and the no show policy.

## 2019-04-12 ENCOUNTER — OFFICE VISIT (OUTPATIENT)
Dept: MEDICAL GROUP | Facility: MEDICAL CENTER | Age: 54
End: 2019-04-12
Attending: FAMILY MEDICINE
Payer: MEDICAID

## 2019-04-12 VITALS
HEART RATE: 68 BPM | TEMPERATURE: 97.4 F | OXYGEN SATURATION: 97 % | WEIGHT: 142 LBS | HEIGHT: 66 IN | RESPIRATION RATE: 16 BRPM | SYSTOLIC BLOOD PRESSURE: 100 MMHG | BODY MASS INDEX: 22.82 KG/M2 | DIASTOLIC BLOOD PRESSURE: 68 MMHG

## 2019-04-12 DIAGNOSIS — M79.672 PAIN IN BOTH FEET: ICD-10-CM

## 2019-04-12 DIAGNOSIS — M48.02 FORAMINAL STENOSIS OF CERVICAL REGION: ICD-10-CM

## 2019-04-12 DIAGNOSIS — F41.9 ANXIETY AND DEPRESSION: ICD-10-CM

## 2019-04-12 DIAGNOSIS — M79.671 PAIN IN BOTH FEET: ICD-10-CM

## 2019-04-12 DIAGNOSIS — M79.671 BILATERAL FOOT PAIN: ICD-10-CM

## 2019-04-12 DIAGNOSIS — F32.A ANXIETY AND DEPRESSION: ICD-10-CM

## 2019-04-12 DIAGNOSIS — M51.36 DDD (DEGENERATIVE DISC DISEASE), LUMBAR: ICD-10-CM

## 2019-04-12 DIAGNOSIS — Z91.81 RISK FOR FALLS: ICD-10-CM

## 2019-04-12 DIAGNOSIS — R42 VERTIGO: ICD-10-CM

## 2019-04-12 DIAGNOSIS — M79.672 BILATERAL FOOT PAIN: ICD-10-CM

## 2019-04-12 PROBLEM — E66.9 OBESITY (BMI 30-39.9): Status: RESOLVED | Noted: 2017-06-19 | Resolved: 2019-04-12

## 2019-04-12 PROCEDURE — 99213 OFFICE O/P EST LOW 20 MIN: CPT | Performed by: FAMILY MEDICINE

## 2019-04-12 PROCEDURE — 99214 OFFICE O/P EST MOD 30 MIN: CPT | Performed by: FAMILY MEDICINE

## 2019-04-12 RX ORDER — MECLIZINE HCL 12.5 MG/1
12.5 TABLET ORAL 3 TIMES DAILY PRN
Qty: 90 TAB | Refills: 3 | Status: SHIPPED | OUTPATIENT
Start: 2019-04-12 | End: 2019-09-10 | Stop reason: SDUPTHER

## 2019-04-12 RX ORDER — FLUTICASONE PROPIONATE 50 MCG
1 SPRAY, SUSPENSION (ML) NASAL 2 TIMES DAILY
Qty: 16 G | Refills: 11 | Status: SHIPPED | OUTPATIENT
Start: 2019-04-12 | End: 2019-11-14 | Stop reason: SDUPTHER

## 2019-04-12 NOTE — ASSESSMENT & PLAN NOTE
Patient continues to experience frequent falls several per week.  Sustained a recent laceration to her scalp in February.  Falls are typically ground-level.  Patient is using a 2 wheeled walker which times gets caught and cracked causing her to pitch forward.  No syncopal episodes.

## 2019-04-12 NOTE — ASSESSMENT & PLAN NOTE
Patient reports ongoing frustration and certain measure of sadness associated with her chronic pain (feet, neck, lumbar spine).  She is on 300 mg currently of Wellbutrin along with 75 mg venlafaxine.  Patient is seeing a therapist in [place] weekly.  Denies suicidal ideation or confusion.

## 2019-04-12 NOTE — ASSESSMENT & PLAN NOTE
Patient reports approximately 3-year history of vertigo where she will feel whorling along with at times some unsteadiness as well.  This is a major contributor to her recent frequent falls in the past several months.  She has had one visit recently with ENT in [place] and they have scheduled a brain MRI for next week.  She reports ringing in both ears.  Patient does have persistent nausea for unclear reasons.  No usual emesis.

## 2019-04-12 NOTE — ASSESSMENT & PLAN NOTE
Patient reports she is very symptomatic from continued pain in both feet.  She had hammertoes corrected in her left foot in 2005.  Still has some partial numbness in the distal left foot.  Reports pain in both feet and occasionally tripping over her left foot.  She has history of onychomycosis and painful corns as well.

## 2019-04-12 NOTE — PROGRESS NOTES
No chief complaint on file.      HISTORY OF PRESENT ILLNESS: Patient is a 53 y.o. female established patient who presents today to transfer primary care providers and discuss the following problems        Pain in both feet  Patient reports she is very symptomatic from continued pain in both feet.  She had hammertoes corrected in her left foot in 2005.  Still has some partial numbness in the distal left foot.  Reports pain in both feet and occasionally tripping over her left foot.  She has history of onychomycosis and painful corns as well.    Anxiety and depression  Patient reports ongoing frustration and certain measure of sadness associated with her chronic pain (feet, neck, lumbar spine).  She is on 300 mg currently of Wellbutrin along with 75 mg venlafaxine.  Patient is seeing a therapist in [place] weekly.  Denies suicidal ideation or confusion.    Risk for falls  Patient continues to experience frequent falls several per week.  Sustained a recent laceration to her scalp in February.  Falls are typically ground-level.  Patient is using a 2 wheeled walker which times gets caught and cracked causing her to pitch forward.  No syncopal episodes.    Vertigo  Patient reports approximately 3-year history of vertigo where she will feel whorling along with at times some unsteadiness as well.  This is a major contributor to her recent frequent falls in the past several months.  She has had one visit recently with ENT in [place] and they have scheduled a brain MRI for next week.  She reports ringing in both ears.  Patient does have persistent nausea for unclear reasons.  No usual emesis.  Social history-single, currently temporary living with her daughter in [place].  Not working      Patient Active Problem List    Diagnosis Date Noted   • Foraminal stenosis of cervical region 04/12/2019   • Post concussive syndrome 02/22/2019   • Epistaxis 02/22/2019   • Vertigo 02/22/2019   • Risk for falls 12/31/2018   • Cough  10/30/2018   • Corns and callus 08/21/2018   • GBS (Guillain Freeland syndrome) ([facility]) 04/10/2018   • Pelvic pain 10/03/2017   • Vitamin D deficiency 07/31/2017   • Hyperlipidemia, unspecified 07/31/2017   • Assistance with transportation 07/31/2017   • Mild intermittent asthma 06/19/2017   • Tobacco use 06/19/2017   • Anxiety and depression 06/19/2017   • Acid indigestion 06/19/2017   • DDD (degenerative disc disease), lumbar 06/19/2017   • Dolly-menopause 06/19/2017   • Chest congestion 06/19/2017   • Pain in both feet 06/19/2017   • Poor vision 06/19/2017       Allergies:Patient has no known allergies.    Current Outpatient Prescriptions   Medication Sig Dispense Refill   • fluticasone (FLONASE ALLERGY RELIEF) 50 MCG/ACT nasal spray Spray 1 Spray in nose 2 times a day. 16 g 2   • oxybutynin (DITROPAN XL) 15 MG CR tablet TAKE 1 TABLET BY MOUTH TWO TIMES DAILY 60 Tab 2   • ALLERGY RELIEF 10 MG Tab TAKE 1 TABLET BY MOUTH DAILY 30 Tab 2   • albuterol 108 (90 Base) MCG/ACT Aero Soln inhalation aerosol Inhale 2 Puffs by mouth 4 times a day. 1 Inhaler 2   • buPROPion (WELLBUTRIN XL) 150 MG XL tablet Take 450 mg by mouth every morning.  2   • raNITidine (ZANTAC) 150 MG Tab TAKE 1 TABLET BY MOUTH TWO TIMES DAILY 180 Tab 0   • methocarbamol (ROBAXIN) 500 MG Tab Take 1 Tab by mouth 2 times a day as needed. 60 Tab 0   • gabapentin (NEURONTIN) 400 MG Cap Take 400 mg by mouth 2 times a day.     • venlafaxine XR (EFFEXOR XR) 37.5 MG CAPSULE SR 24 HR Take 37.5 mg by mouth every day.     • zolpidem (AMBIEN) 10 MG Tab Take 10 mg by mouth at bedtime as needed.     • atorvastatin (LIPITOR) 20 MG Tab Take 1 Tab by mouth every day. (Patient not taking: Reported on 2/22/2019) 90 Tab 2   • Cholecalciferol 4000 units Cap Take 1 Capsule by mouth every day. 90 Cap 2   • meloxicam (MOBIC) 15 MG tablet Take 15 mg by mouth every day.       No current facility-administered medications for this visit.        Social History   Substance Use Topics   •  "Smoking status: Current Every Day Smoker     Packs/day: 0.25     Years: 26.00     Types: Cigarettes   • Smokeless tobacco: Never Used   • Alcohol use No       Family History   Problem Relation Age of Onset   • Cancer Mother    • Alcohol/Drug Mother    • Cancer Brother    • Diabetes Maternal Aunt        ROS:  Review of Systems   Constitutional: Negative for fever, chills, weight loss and malaise/fatigue.   Eyes: Negative for blurred vision.   Respiratory: Negative for cough, sputum production, shortness of breath and wheezing.    Cardiovascular: Negative for chest pain, palpitations, orthopnea and leg swelling.   Gastrointestinal: Negative for heartburn, nausea, vomiting and abdominal pain.   Endo/Heme/Allergies: Does not bruise/bleed easily.               Exam:  /68 (BP Location: Left arm, Patient Position: Sitting, BP Cuff Size: Adult)   Pulse 68   Temp 36.3 °C (97.4 °F) (Temporal)   Resp 16   Ht 1.676 m (5' 5.98\")   Wt 64.4 kg (142 lb)   SpO2 97%   General:  Well nourished, well developed female in mild distress  Head is grossly normal.  Ears-normal TMs and canals bilaterally.  Neck: Supple without JVD or bruit. Thyroid is not enlarged. Trachea is midline.  Tender at the posterior base of the neck C7-T1 area  Pulmonary: Clear to ausculation .  Normal effort. No rales, ronchi, or wheezing.  Cardiovascular: Regular rate and rhythm without murmur.  Abdomen-Abdomen is soft, normal bowel sounds, no masses, guarding, ororganomegaly, or tenderness.  Lower extremities-medial mild contracture at both ankles.  Severe onychomycosis of the nails on the left foot moderate of the nails on the right foot.  Decreased light touch over the distal toes on the left foot (following previous surgery).  Also several marked calluses on both feet.  Back-1+ tender from L4 through S4 in the midline    Please note that this dictation was created using voice recognition software. I have made every reasonable attempt to correct " obvious errors, but I expect that there are errors of grammar and possibly content that I did not discover before finalizing the note.    Assessment/Plan:  1. Bilateral foot pain     2. Risk for falls     3. DDD (degenerative disc disease), lumbar     4. Foraminal stenosis of cervical region     5. Pain in both feet     6. Anxiety and depression     7. Vertigo       Plan: 1.  To decrease risk of falls we will try to obtain a 4 wheeled walker with seat  2.  Continue ENT evaluation of vertigo  3.  Cautious trial of meclizine 12.5 mg up to 3 times daily-drowsiness reviewed  4.  Continue other current medications  5.  Continue follow-up with neurosurgery regarding cervical and lumbar spinal stenosis and foraminal stenosis at multiple levels  6.  Follow-up with me in 4-6 weeks

## 2019-05-16 DIAGNOSIS — K30 ACID INDIGESTION: ICD-10-CM

## 2019-05-16 RX ORDER — RANITIDINE 150 MG/1
150 TABLET ORAL 2 TIMES DAILY
Qty: 180 TAB | Refills: 0 | Status: SHIPPED | OUTPATIENT
Start: 2019-05-16 | End: 2019-08-28 | Stop reason: SDUPTHER

## 2019-05-30 ENCOUNTER — OFFICE VISIT (OUTPATIENT)
Dept: MEDICAL GROUP | Facility: MEDICAL CENTER | Age: 54
End: 2019-05-30
Attending: FAMILY MEDICINE
Payer: MEDICAID

## 2019-05-30 VITALS
HEART RATE: 80 BPM | TEMPERATURE: 97.8 F | BODY MASS INDEX: 21.53 KG/M2 | HEIGHT: 66 IN | RESPIRATION RATE: 16 BRPM | WEIGHT: 134 LBS | OXYGEN SATURATION: 93 % | SYSTOLIC BLOOD PRESSURE: 100 MMHG | DIASTOLIC BLOOD PRESSURE: 58 MMHG

## 2019-05-30 DIAGNOSIS — M79.675 TOE PAIN, CHRONIC, LEFT: ICD-10-CM

## 2019-05-30 DIAGNOSIS — G89.29 TOE PAIN, CHRONIC, LEFT: ICD-10-CM

## 2019-05-30 PROCEDURE — 99213 OFFICE O/P EST LOW 20 MIN: CPT | Performed by: FAMILY MEDICINE

## 2019-05-30 RX ORDER — VENLAFAXINE HYDROCHLORIDE 75 MG/1
75 CAPSULE, EXTENDED RELEASE ORAL EVERY EVENING
Refills: 1 | Status: ON HOLD | COMMUNITY
Start: 2019-04-16 | End: 2020-01-07

## 2019-05-30 RX ORDER — BUPROPION HYDROCHLORIDE 300 MG/1
TABLET ORAL
Refills: 2 | COMMUNITY
Start: 2019-05-07 | End: 2019-09-09 | Stop reason: SDUPTHER

## 2019-05-30 RX ORDER — LIDOCAINE 50 MG/G
OINTMENT TOPICAL
Refills: 2 | COMMUNITY
Start: 2019-03-26 | End: 2019-12-19

## 2019-05-30 RX ORDER — HYDROCODONE BITARTRATE AND ACETAMINOPHEN 7.5; 325 MG/1; MG/1
TABLET ORAL
Refills: 0 | COMMUNITY
Start: 2019-05-21 | End: 2019-12-19

## 2019-05-30 ASSESSMENT — PAIN SCALES - GENERAL: PAINLEVEL: 10=SEVERE PAIN

## 2019-05-30 NOTE — PROGRESS NOTES
"Subjective:      Marilyn Salinas is a 53 y.o. female who presents with Nail Problem and Vertigo            HPI 1.  Toe pain-patient reports increasing levels of pain involving the greatly thickened great toenail and pain at the tip of her left great toenail.  She is been taking clindamycin over the past 3 weeks.  She reports she has had persistent pain in the area of her left great toe dating back to surgery that she had done with Dr. Javier tanner in 2005.  She does have an appointment to see him on June 18.  She was recently seen at [facility] emergency room also for toe pain.  She denies any recent trauma but does note occasional blood seepage at the base of her thickened nail.  She has difficulty with the jaleesa's horn deformity of that greatly thickened left great toenail getting caught on shoes and socks.  Patient reports she is already taking Norco 7.5 up to 4 times daily    ROS negative for fever, positive for bilateral lumbar pain, positive for limp       Objective:     /58 (BP Location: Left arm, Patient Position: Sitting, BP Cuff Size: Adult)   Pulse 80   Temp 36.6 °C (97.8 °F) (Temporal)   Resp 16   Ht 1.676 m (5' 5.98\")   Wt 60.8 kg (134 lb)   LMP 01/11/2017   SpO2 93%   BMI 21.64 kg/m²      Physical Exam  General-moderate distress, agitated due to persistent pain level, normal grooming   Left foot-severely thickened and deformed karina horn nail on the left great toe, 2+ tender to motion.  Also moderate callus and yellow thickening of the skin broadly over the tip of the left great toe which is also 2+ tender to pressure.  There may be a small dry 4 mm ulcer on the medial distal tip which is currently crusted.  Moderate karina horn deformities of the other 4 toenails on the left foot are also noted.  Light touch is preserved.  There is no local redness or increased warmth.       Assessment/Plan:     1. Toe pain, chronic, left  Plan: 1.  In the absence of infection I have informed patient I do not " think she needs to have any further refills of antibiotic  2.  Think she will benefit from mechanical removal of this markedly enlarged and deformed toenail-services for which she will need to see her podiatrist  3.  We will call to see if we can accelerate her appointment any earlier however she reports that as a result of the ER calling her podiatrist they have already moved her appointment up to June 18 from a later date.  4.  Recheck with me in 6 weeks-follow-up on back pain at that time

## 2019-08-12 ENCOUNTER — TELEPHONE (OUTPATIENT)
Dept: MEDICAL GROUP | Facility: MEDICAL CENTER | Age: 54
End: 2019-08-12

## 2019-08-12 DIAGNOSIS — F41.9 ANXIETY AND DEPRESSION: ICD-10-CM

## 2019-08-12 DIAGNOSIS — F32.A ANXIETY AND DEPRESSION: ICD-10-CM

## 2019-08-12 NOTE — TELEPHONE ENCOUNTER
Referral to psychiatry placed, doesn't need referral to primary care she simple needs to see who accepts her insurance. Thank you

## 2019-08-12 NOTE — TELEPHONE ENCOUNTER
"1. Caller Name: Marilyn                                        Call Back Number: [phone] (home) [phone] (work)      Patient approves a detailed voicemail message: yes    Pt called in requesting a referal to a new psychiatrist that she will be sseing in [place]. Pt also calling stating that she is moving to [place] and she is currently not seeing her pain mangement doctor as she is not happy with him. She asked to be taken of her pain medication as nothing was changing and she felt he was not helpful at all. She asked them if they could help her with her withdrawal symptoms and was told that she needed to come in for a 4 day clinic, which was not acceptable to her. I advised that we would place a referral to psychiatry for her appointment. Pt also stated she needed a referral to a new PCP. Advised pt that we usually don't do referral to new PCP's and tried to explain the process to her. She stated that doctors just don't give a damn about pt's any more and \"so there's nothing you can do to help me , okay bye. and hung up   "

## 2019-08-12 NOTE — TELEPHONE ENCOUNTER
1. Caller Name: Marilyn                                        Call Back Number: [phone] (home) [phone] (work)        Patient approves a detailed voicemail message: yes    2. SPECIFIC Action To Be Taken: Referral to Psychiatry    3. Diagnosis/Clinical Reason for Request: Anxiety and Depression    4. Specialty & Provider Name/Lab/Imaging Location: [facility]     5. Is appointment scheduled for requested order/referral: yes     Patient was informed they will receive a return phone call from the office ONLY if there are any questions before processing their request. Advised to call back if they haven't received a call from the referral department in 5 days.

## 2019-09-09 DIAGNOSIS — G89.29 OTHER CHRONIC PAIN: ICD-10-CM

## 2019-09-09 DIAGNOSIS — E78.2 MIXED HYPERLIPIDEMIA: ICD-10-CM

## 2019-09-09 DIAGNOSIS — M79.671 PAIN IN BOTH FEET: ICD-10-CM

## 2019-09-09 DIAGNOSIS — M79.672 PAIN IN BOTH FEET: ICD-10-CM

## 2019-09-09 DIAGNOSIS — M51.36 DDD (DEGENERATIVE DISC DISEASE), LUMBAR: ICD-10-CM

## 2019-09-09 NOTE — TELEPHONE ENCOUNTER
1. Caller Name: Marilyn                                         Call Back Number: [phone] (home) [phone] (work)        Patient approves a detailed voicemail message: yes    Pt called stating that she is in need of refills and to have something done regarding her foot. I asked the pt exactly what medications she needed and she was on her way to check in the back of her house. I heard a thump anf the pt cried out in pain and was crying. She said she fell and hit her head on a glass door. I asked the pt if she was alright and advised that she needed to be seen in the ER(confirmed by Jovana Vazquez). Pt stated that she did not want to go the the ER because they never do anything to help her. Asked if the pt could come into the office and she stated that she has been falling alot and that MTM makes her take the bus in from [place] and it is hard for her to get to the bus stop. Pt said she maybe able to get her friend to bring her into the office. I made her an appointment for the next day. Pt states she will have her friend bring her and if she can't she will call and cancel.

## 2019-09-10 RX ORDER — BUPROPION HYDROCHLORIDE 300 MG/1
300 TABLET ORAL EVERY MORNING
Qty: 30 TAB | Refills: 2 | Status: SHIPPED
Start: 2019-09-10 | End: 2019-12-19

## 2019-09-10 RX ORDER — GABAPENTIN 400 MG/1
400 CAPSULE ORAL 2 TIMES DAILY
Qty: 60 CAP | Refills: 6 | Status: SHIPPED
Start: 2019-09-10 | End: 2019-12-19

## 2019-09-10 RX ORDER — MECLIZINE HCL 12.5 MG/1
12.5 TABLET ORAL 3 TIMES DAILY PRN
Qty: 90 TAB | Refills: 3 | Status: SHIPPED
Start: 2019-09-10 | End: 2019-12-19

## 2019-09-10 RX ORDER — ATORVASTATIN CALCIUM 20 MG/1
20 TABLET, FILM COATED ORAL DAILY
Qty: 90 TAB | Refills: 2 | Status: SHIPPED
Start: 2019-09-10 | End: 2019-12-19

## 2019-09-10 RX ORDER — MELOXICAM 15 MG/1
15 TABLET ORAL DAILY
Qty: 30 TAB | Refills: 3 | Status: SHIPPED
Start: 2019-09-10 | End: 2019-12-19

## 2019-09-10 RX ORDER — METHOCARBAMOL 500 MG/1
500 TABLET, FILM COATED ORAL 2 TIMES DAILY PRN
Qty: 60 TAB | Refills: 3 | Status: SHIPPED
Start: 2019-09-10 | End: 2019-12-19

## 2019-09-27 ENCOUNTER — TELEPHONE (OUTPATIENT)
Dept: MEDICAL GROUP | Facility: MEDICAL CENTER | Age: 54
End: 2019-09-27

## 2019-09-27 NOTE — TELEPHONE ENCOUNTER
1. Caller Name: Marilyn                                         Call Back Number: [phone] (home) [phone] (work)        Patient approves a detailed voicemail message: yes    Pt called stating that she is having trouble getting to her appts due to transportation. MTM only provides her with bus tickets and she is having trouble getting to the bus stop. Pt would like to know if we could get them to find an alternate mode of transportation. She was also asking for refills on her medications, they where sent to the [facility], I [facility] them into the [facility] in [place].

## 2019-09-30 NOTE — TELEPHONE ENCOUNTER
I am not aware of any other transportation networks that reach out as far as [place].  Patient could call in to [facility] and see if they have any other options available for her geographic area.  Dr. ARREAGA

## 2019-11-05 ENCOUNTER — TELEPHONE (OUTPATIENT)
Dept: MEDICAL GROUP | Facility: MEDICAL CENTER | Age: 54
End: 2019-11-05

## 2019-11-06 NOTE — TELEPHONE ENCOUNTER
1. Caller Name: Marilyn                                         Call Back Number: [phone] (home) [phone] (work)        Patient approves a detailed voicemail message: yes    Pt called requesting that we send information to [facility] in order to get her approved for transportation other than the bus, we filled out new forms and faxed them to [facility]. Pt informed via voicemail to contact [facility] in the morning to set up transportation

## 2019-11-08 ENCOUNTER — APPOINTMENT (OUTPATIENT)
Dept: RADIOLOGY | Facility: MEDICAL CENTER | Age: 54
End: 2019-11-08
Attending: NURSE PRACTITIONER
Payer: MEDICAID

## 2019-11-08 NOTE — TELEPHONE ENCOUNTER
Patient called and requested a message to be sent to her PCP and MA. Patient was distrught and was upset on the phone as she was explaining that she had an appointment today 11/8 to see her provider but [employer]TM never picked her up. Patient states that she had called and set up a ride with [employer]TM but they did not show. Patient then stated that had called [employer] asking where her ride was and was but was told my MTM they did not pick her up because they had not received any paperwork regarding any transportation from the provider.    Patient would like a call back from the MA as she is asking why [employer]TM stated they hadn't received anything when she was told last that everything was sent.

## 2019-11-13 NOTE — TELEPHONE ENCOUNTER
Spoke with pt regarding up coming appointments, tried to contact [facility] and was hung up on 3 times. Pt has an appointment Thursday that she needs transportation set up for. Thursday at the [facility] an also an appt at 3:45 with imaging @ [address]. Please contact [facility] to schedule the transportation and inform the pt.

## 2019-11-14 ENCOUNTER — APPOINTMENT (OUTPATIENT)
Dept: RADIOLOGY | Facility: MEDICAL CENTER | Age: 54
End: 2019-11-14
Attending: NURSE PRACTITIONER
Payer: MEDICAID

## 2019-11-14 ENCOUNTER — OFFICE VISIT (OUTPATIENT)
Dept: MEDICAL GROUP | Facility: MEDICAL CENTER | Age: 54
End: 2019-11-14
Attending: FAMILY MEDICINE
Payer: MEDICAID

## 2019-11-14 VITALS
WEIGHT: 135 LBS | BODY MASS INDEX: 21.69 KG/M2 | HEART RATE: 80 BPM | TEMPERATURE: 97.5 F | HEIGHT: 66 IN | DIASTOLIC BLOOD PRESSURE: 62 MMHG | RESPIRATION RATE: 16 BRPM | OXYGEN SATURATION: 96 % | SYSTOLIC BLOOD PRESSURE: 94 MMHG

## 2019-11-14 DIAGNOSIS — M79.672 PAIN IN BOTH FEET: ICD-10-CM

## 2019-11-14 DIAGNOSIS — M47.812 SPONDYLOSIS WITHOUT MYELOPATHY OR RADICULOPATHY, CERVICAL REGION: ICD-10-CM

## 2019-11-14 DIAGNOSIS — M79.671 PAIN IN BOTH FEET: ICD-10-CM

## 2019-11-14 DIAGNOSIS — J45.20 MILD INTERMITTENT ASTHMA WITHOUT COMPLICATION: ICD-10-CM

## 2019-11-14 PROCEDURE — 99213 OFFICE O/P EST LOW 20 MIN: CPT | Performed by: FAMILY MEDICINE

## 2019-11-14 PROCEDURE — 72141 MRI NECK SPINE W/O DYE: CPT

## 2019-11-14 PROCEDURE — 72148 MRI LUMBAR SPINE W/O DYE: CPT

## 2019-11-14 RX ORDER — ALBUTEROL SULFATE 90 UG/1
2 AEROSOL, METERED RESPIRATORY (INHALATION) 4 TIMES DAILY
Qty: 1 INHALER | Refills: 6 | Status: ON HOLD
Start: 2019-11-14 | End: 2020-01-07

## 2019-11-14 RX ORDER — FLUTICASONE PROPIONATE 50 MCG
1 SPRAY, SUSPENSION (ML) NASAL 2 TIMES DAILY
Qty: 16 G | Refills: 11 | Status: SHIPPED | OUTPATIENT
Start: 2019-11-14 | End: 2019-12-19

## 2019-11-14 NOTE — PROGRESS NOTES
"Subjective:      Marilyn Salinas is a 54 y.o. female who presents with Toe Pain            HPI 1.  Severe bilateral foot pain-patient continues to be severely affected by bilateral foot pain right foot worse than left.  She had previous podiatric surgery around 2005 for hammertoe repair and bunionectomy of her left great toe.  She has developed onychomycosis with severe karina horn deformity of all 5 toenails on the left side along with areas of severe pain at the heads of several metatarsals and other areas of numbness more on the medial aspect of her foot.  Patient reports that if she while standing turns rapidly she will fall with a frequency of about 2 times per week.  She does not experience syncope.  She cannot walk more than 15 feet and that is with significant pain.  She is using a cane.  She is also seeing neurosurgery for low back pain and neck crepitus and has an MRI of her lumbar spine scheduled later today.  She also has an application in for disability    ROS       Objective:     BP (!) 94/62 (BP Location: Left arm, Patient Position: Sitting, BP Cuff Size: Adult)   Pulse 80   Temp 36.4 °C (97.5 °F) (Temporal)   Resp 16   Ht 1.676 m (5' 5.98\")   Wt 61.2 kg (135 lb)   LMP 01/11/2017   SpO2 96%   BMI 21.80 kg/m²      Physical Exam  General-alert cooperative female in moderate distress using a cane.  Lower extremities-2-3+ tender to compression pressure over the head of the third metatarsal and over the head of the fifth metatarsal.  Decreased light touch over the medial aspect of the left foot with intact touch over the lateral aspect.  Extremely severe karina horn deformities of all 5 nails on the left foot.  Negative for pretibial edema  Back-1+ tender to palpation over the bony midline L4-S4.     Foot x-rays-2018-reviewed notable for persistent abnormal area at the head of the third metatarsal, query osteonecrosis, along with old postsurgical changes     Assessment/Plan:       1. Pain in both " feet    - MR-FOOT-WITH & W/O RIGHT; Future  - REFERRAL TO PHYSICAL THERAPY Reason for Therapy: Eval/Treat/Report    2. Mild intermittent asthma without complication    - albuterol 108 (90 Base) MCG/ACT Aero Soln inhalation aerosol; Inhale 2 Puffs by mouth 4 times a day.  Dispense: 1 Inhaler; Refill: 6    Plan: 1.  FCE form for mental health and physical ability was presented by patient today-I have explained to her that I do not have the facilities or formal training to accurately complete either of these forms  2.  Physical therapy referral for physical FCE form  3.  Patient is referred back to her psychiatry provider for her other paperwork  4.  Foot MRI-left, to evaluate suspected osteonecrosis head of the third metatarsal  5.  Revisit 1 month

## 2019-12-09 ENCOUNTER — TELEPHONE (OUTPATIENT)
Dept: MEDICAL GROUP | Facility: MEDICAL CENTER | Age: 54
End: 2019-12-09

## 2019-12-10 NOTE — TELEPHONE ENCOUNTER
Patient called asking if a ride was scheduled for her appointment tomorrow. Also she wanted to let you know that she has not gone to her function appointment I'm assuming its her physical therapy

## 2019-12-19 ENCOUNTER — HOSPITAL ENCOUNTER (OUTPATIENT)
Dept: RADIOLOGY | Facility: MEDICAL CENTER | Age: 54
End: 2019-12-19

## 2019-12-19 ENCOUNTER — HOSPITAL ENCOUNTER (INPATIENT)
Facility: MEDICAL CENTER | Age: 54
LOS: 8 days | DRG: 029 | End: 2019-12-27
Attending: EMERGENCY MEDICINE | Admitting: HOSPITALIST
Payer: MEDICAID

## 2019-12-19 DIAGNOSIS — G89.29 CHRONIC MIDLINE LOW BACK PAIN WITHOUT SCIATICA: ICD-10-CM

## 2019-12-19 DIAGNOSIS — M54.2 CERVICAL SPINE PAIN: ICD-10-CM

## 2019-12-19 DIAGNOSIS — S14.129A CENTRAL CORD SYNDROME, INITIAL ENCOUNTER (HCC): ICD-10-CM

## 2019-12-19 DIAGNOSIS — M54.16 LUMBAR RADICULOPATHY: ICD-10-CM

## 2019-12-19 DIAGNOSIS — M54.50 CHRONIC MIDLINE LOW BACK PAIN WITHOUT SCIATICA: ICD-10-CM

## 2019-12-19 DIAGNOSIS — R26.2 AMBULATORY DYSFUNCTION: ICD-10-CM

## 2019-12-19 PROCEDURE — 36620 INSERTION CATHETER ARTERY: CPT

## 2019-12-19 PROCEDURE — 700111 HCHG RX REV CODE 636 W/ 250 OVERRIDE (IP)

## 2019-12-19 PROCEDURE — 03HY32Z INSERTION OF MONITORING DEVICE INTO UPPER ARTERY, PERCUTANEOUS APPROACH: ICD-10-PCS | Performed by: INTERNAL MEDICINE

## 2019-12-19 PROCEDURE — 700102 HCHG RX REV CODE 250 W/ 637 OVERRIDE(OP): Performed by: INTERNAL MEDICINE

## 2019-12-19 PROCEDURE — 99291 CRITICAL CARE FIRST HOUR: CPT

## 2019-12-19 PROCEDURE — 99291 CRITICAL CARE FIRST HOUR: CPT | Mod: 25 | Performed by: INTERNAL MEDICINE

## 2019-12-19 PROCEDURE — 700111 HCHG RX REV CODE 636 W/ 250 OVERRIDE (IP): Performed by: INTERNAL MEDICINE

## 2019-12-19 PROCEDURE — 36620 INSERTION CATHETER ARTERY: CPT | Performed by: INTERNAL MEDICINE

## 2019-12-19 PROCEDURE — 99222 1ST HOSP IP/OBS MODERATE 55: CPT | Performed by: HOSPITALIST

## 2019-12-19 PROCEDURE — 770022 HCHG ROOM/CARE - ICU (200)

## 2019-12-19 PROCEDURE — A9270 NON-COVERED ITEM OR SERVICE: HCPCS | Performed by: INTERNAL MEDICINE

## 2019-12-19 PROCEDURE — A9270 NON-COVERED ITEM OR SERVICE: HCPCS | Performed by: HOSPITALIST

## 2019-12-19 PROCEDURE — A9270 NON-COVERED ITEM OR SERVICE: HCPCS

## 2019-12-19 PROCEDURE — 700105 HCHG RX REV CODE 258: Performed by: INTERNAL MEDICINE

## 2019-12-19 PROCEDURE — 700102 HCHG RX REV CODE 250 W/ 637 OVERRIDE(OP)

## 2019-12-19 PROCEDURE — 700102 HCHG RX REV CODE 250 W/ 637 OVERRIDE(OP): Performed by: HOSPITALIST

## 2019-12-19 RX ORDER — ALBUTEROL SULFATE 90 UG/1
2 AEROSOL, METERED RESPIRATORY (INHALATION)
Status: DISCONTINUED | OUTPATIENT
Start: 2019-12-19 | End: 2019-12-27 | Stop reason: HOSPADM

## 2019-12-19 RX ORDER — PROCHLORPERAZINE EDISYLATE 5 MG/ML
5-10 INJECTION INTRAMUSCULAR; INTRAVENOUS EVERY 4 HOURS PRN
Status: DISCONTINUED | OUTPATIENT
Start: 2019-12-19 | End: 2019-12-20

## 2019-12-19 RX ORDER — RANITIDINE 150 MG/1
150 TABLET ORAL EVERY MORNING
Status: ON HOLD | COMMUNITY
End: 2020-01-07

## 2019-12-19 RX ORDER — OXYBUTYNIN CHLORIDE 15 MG/1
15 TABLET, EXTENDED RELEASE ORAL 2 TIMES DAILY
COMMUNITY
End: 2020-02-25

## 2019-12-19 RX ORDER — ONDANSETRON 4 MG/1
4 TABLET, ORALLY DISINTEGRATING ORAL EVERY 4 HOURS PRN
Status: DISCONTINUED | OUTPATIENT
Start: 2019-12-19 | End: 2019-12-20

## 2019-12-19 RX ORDER — OXYCODONE HYDROCHLORIDE 5 MG/1
5-10 TABLET ORAL EVERY 4 HOURS PRN
Status: DISCONTINUED | OUTPATIENT
Start: 2019-12-19 | End: 2019-12-27 | Stop reason: HOSPADM

## 2019-12-19 RX ORDER — OXYBUTYNIN CHLORIDE 5 MG/1
15 TABLET, EXTENDED RELEASE ORAL 2 TIMES DAILY
Status: DISCONTINUED | OUTPATIENT
Start: 2019-12-19 | End: 2019-12-27 | Stop reason: HOSPADM

## 2019-12-19 RX ORDER — METHOCARBAMOL 500 MG/1
500 TABLET, FILM COATED ORAL 2 TIMES DAILY PRN
Status: DISCONTINUED | OUTPATIENT
Start: 2019-12-19 | End: 2019-12-27 | Stop reason: HOSPADM

## 2019-12-19 RX ORDER — ACETAMINOPHEN 325 MG/1
650 TABLET ORAL EVERY 6 HOURS
Status: DISCONTINUED | OUTPATIENT
Start: 2019-12-19 | End: 2019-12-27 | Stop reason: HOSPADM

## 2019-12-19 RX ORDER — BUPROPION HYDROCHLORIDE 300 MG/1
300 TABLET ORAL EVERY EVENING
Status: ON HOLD | COMMUNITY
End: 2019-12-27

## 2019-12-19 RX ORDER — BUPROPION HYDROCHLORIDE 150 MG/1
150 TABLET, EXTENDED RELEASE ORAL 2 TIMES DAILY
Status: DISCONTINUED | OUTPATIENT
Start: 2019-12-19 | End: 2019-12-27 | Stop reason: HOSPADM

## 2019-12-19 RX ORDER — PROMETHAZINE HYDROCHLORIDE 25 MG/1
12.5-25 SUPPOSITORY RECTAL EVERY 4 HOURS PRN
Status: DISCONTINUED | OUTPATIENT
Start: 2019-12-19 | End: 2019-12-20

## 2019-12-19 RX ORDER — MELOXICAM 15 MG/1
15 TABLET ORAL DAILY
Status: ON HOLD | COMMUNITY
End: 2019-12-27

## 2019-12-19 RX ORDER — POLYETHYLENE GLYCOL 3350 17 G/17G
1 POWDER, FOR SOLUTION ORAL
Status: DISCONTINUED | OUTPATIENT
Start: 2019-12-19 | End: 2019-12-20

## 2019-12-19 RX ORDER — MIDODRINE HYDROCHLORIDE 5 MG/1
10 TABLET ORAL
Status: DISCONTINUED | OUTPATIENT
Start: 2019-12-19 | End: 2019-12-21

## 2019-12-19 RX ORDER — LORATADINE 10 MG/1
10 TABLET ORAL DAILY
Status: ON HOLD | COMMUNITY
End: 2020-01-07

## 2019-12-19 RX ORDER — GABAPENTIN 400 MG/1
400 CAPSULE ORAL 2 TIMES DAILY
Status: DISCONTINUED | OUTPATIENT
Start: 2019-12-19 | End: 2019-12-23

## 2019-12-19 RX ORDER — ZOLPIDEM TARTRATE 5 MG/1
10 TABLET ORAL NIGHTLY PRN
Status: DISCONTINUED | OUTPATIENT
Start: 2019-12-19 | End: 2019-12-27 | Stop reason: HOSPADM

## 2019-12-19 RX ORDER — AMOXICILLIN 250 MG
2 CAPSULE ORAL 2 TIMES DAILY
Status: DISCONTINUED | OUTPATIENT
Start: 2019-12-19 | End: 2019-12-20

## 2019-12-19 RX ORDER — METHOCARBAMOL 500 MG/1
500 TABLET, FILM COATED ORAL 2 TIMES DAILY PRN
Status: ON HOLD | COMMUNITY
End: 2020-01-07

## 2019-12-19 RX ORDER — VENLAFAXINE HYDROCHLORIDE 75 MG/1
75 CAPSULE, EXTENDED RELEASE ORAL EVERY EVENING
Status: DISCONTINUED | OUTPATIENT
Start: 2019-12-19 | End: 2019-12-27 | Stop reason: HOSPADM

## 2019-12-19 RX ORDER — FLUTICASONE PROPIONATE 50 MCG
1 SPRAY, SUSPENSION (ML) NASAL DAILY
Status: ON HOLD | COMMUNITY
End: 2020-01-07

## 2019-12-19 RX ORDER — MELOXICAM 7.5 MG/1
15 TABLET ORAL DAILY
Status: DISCONTINUED | OUTPATIENT
Start: 2019-12-20 | End: 2019-12-19

## 2019-12-19 RX ORDER — BISACODYL 10 MG
10 SUPPOSITORY, RECTAL RECTAL
Status: DISCONTINUED | OUTPATIENT
Start: 2019-12-19 | End: 2019-12-20

## 2019-12-19 RX ORDER — FAMOTIDINE 20 MG/1
20 TABLET, FILM COATED ORAL 2 TIMES DAILY
Status: DISCONTINUED | OUTPATIENT
Start: 2019-12-19 | End: 2019-12-27 | Stop reason: HOSPADM

## 2019-12-19 RX ORDER — ONDANSETRON 2 MG/ML
4 INJECTION INTRAMUSCULAR; INTRAVENOUS EVERY 4 HOURS PRN
Status: DISCONTINUED | OUTPATIENT
Start: 2019-12-19 | End: 2019-12-20

## 2019-12-19 RX ORDER — GABAPENTIN 400 MG/1
400 CAPSULE ORAL 2 TIMES DAILY
Status: ON HOLD | COMMUNITY
End: 2019-12-27

## 2019-12-19 RX ORDER — PROMETHAZINE HYDROCHLORIDE 25 MG/1
12.5-25 TABLET ORAL EVERY 4 HOURS PRN
Status: DISCONTINUED | OUTPATIENT
Start: 2019-12-19 | End: 2019-12-20

## 2019-12-19 RX ORDER — ALBUTEROL SULFATE 90 UG/1
AEROSOL, METERED RESPIRATORY (INHALATION)
Status: ACTIVE
Start: 2019-12-19 | End: 2019-12-20

## 2019-12-19 RX ADMIN — SENNOSIDES AND DOCUSATE SODIUM 2 TABLET: 8.6; 5 TABLET ORAL at 17:14

## 2019-12-19 RX ADMIN — ZOLPIDEM TARTRATE 10 MG: 5 TABLET ORAL at 23:09

## 2019-12-19 RX ADMIN — VENLAFAXINE HYDROCHLORIDE 75 MG: 75 CAPSULE, EXTENDED RELEASE ORAL at 18:55

## 2019-12-19 RX ADMIN — PHENYLEPHRINE HYDROCHLORIDE 10 MCG/MIN: 10 INJECTION INTRAVENOUS at 23:56

## 2019-12-19 RX ADMIN — BUPROPION HYDROCHLORIDE 150 MG: 150 TABLET, EXTENDED RELEASE ORAL at 18:55

## 2019-12-19 RX ADMIN — MIDODRINE HYDROCHLORIDE 10 MG: 5 TABLET ORAL at 23:09

## 2019-12-19 RX ADMIN — ALBUTEROL SULFATE 2 PUFF: 90 AEROSOL, METERED RESPIRATORY (INHALATION) at 22:57

## 2019-12-19 RX ADMIN — ACETAMINOPHEN 650 MG: 325 TABLET, FILM COATED ORAL at 23:09

## 2019-12-19 RX ADMIN — FENTANYL CITRATE 25 MCG: 50 INJECTION, SOLUTION INTRAMUSCULAR; INTRAVENOUS at 23:45

## 2019-12-19 RX ADMIN — FAMOTIDINE 20 MG: 20 TABLET ORAL at 17:14

## 2019-12-19 RX ADMIN — METHOCARBAMOL TABLETS 500 MG: 500 TABLET, COATED ORAL at 17:29

## 2019-12-19 RX ADMIN — OXYBUTYNIN CHLORIDE 15 MG: 5 TABLET, EXTENDED RELEASE ORAL at 18:55

## 2019-12-19 RX ADMIN — ACETAMINOPHEN 650 MG: 325 TABLET, FILM COATED ORAL at 17:29

## 2019-12-19 RX ADMIN — GABAPENTIN 400 MG: 400 CAPSULE ORAL at 17:14

## 2019-12-19 RX ADMIN — OXYCODONE HYDROCHLORIDE 5 MG: 5 TABLET ORAL at 22:25

## 2019-12-19 ASSESSMENT — ENCOUNTER SYMPTOMS
DIARRHEA: 0
ABDOMINAL PAIN: 0
NAUSEA: 0
HEADACHES: 0
SENSORY CHANGE: 1
DOUBLE VISION: 0
FEVER: 0
BLURRED VISION: 0
NECK PAIN: 1
WEAKNESS: 1
VOMITING: 0
CHILLS: 0
FOCAL WEAKNESS: 1
LOSS OF CONSCIOUSNESS: 0
COUGH: 0
BACK PAIN: 1
SHORTNESS OF BREATH: 0
DIZZINESS: 0
PALPITATIONS: 0
SORE THROAT: 0
FALLS: 1

## 2019-12-19 ASSESSMENT — COGNITIVE AND FUNCTIONAL STATUS - GENERAL
DRESSING REGULAR LOWER BODY CLOTHING: A LOT
SUGGESTED CMS G CODE MODIFIER MOBILITY: CL
SUGGESTED CMS G CODE MODIFIER DAILY ACTIVITY: CK
TOILETING: A LOT
MOVING FROM LYING ON BACK TO SITTING ON SIDE OF FLAT BED: A LOT
DAILY ACTIVITIY SCORE: 14
STANDING UP FROM CHAIR USING ARMS: A LOT
PERSONAL GROOMING: A LOT
TURNING FROM BACK TO SIDE WHILE IN FLAT BAD: A LITTLE
MOBILITY SCORE: 14
DRESSING REGULAR UPPER BODY CLOTHING: A LOT
MOVING TO AND FROM BED TO CHAIR: A LITTLE
WALKING IN HOSPITAL ROOM: A LOT
CLIMB 3 TO 5 STEPS WITH RAILING: A LOT
HELP NEEDED FOR BATHING: A LOT

## 2019-12-19 ASSESSMENT — COPD QUESTIONNAIRES
DO YOU EVER COUGH UP ANY MUCUS OR PHLEGM?: YES, EVERY DAY
DURING THE PAST 4 WEEKS HOW MUCH DID YOU FEEL SHORT OF BREATH: SOME OF THE TIME
HAVE YOU SMOKED AT LEAST 100 CIGARETTES IN YOUR ENTIRE LIFE: YES
COPD SCREENING SCORE: 7

## 2019-12-19 ASSESSMENT — PATIENT HEALTH QUESTIONNAIRE - PHQ9
1. LITTLE INTEREST OR PLEASURE IN DOING THINGS: NOT AT ALL
2. FEELING DOWN, DEPRESSED, IRRITABLE, OR HOPELESS: NOT AT ALL
SUM OF ALL RESPONSES TO PHQ9 QUESTIONS 1 AND 2: 0
2. FEELING DOWN, DEPRESSED, IRRITABLE, OR HOPELESS: NOT AT ALL
SUM OF ALL RESPONSES TO PHQ9 QUESTIONS 1 AND 2: 0
1. LITTLE INTEREST OR PLEASURE IN DOING THINGS: NOT AT ALL

## 2019-12-19 ASSESSMENT — LIFESTYLE VARIABLES
HOW MANY TIMES IN THE PAST YEAR HAVE YOU HAD 5 OR MORE DRINKS IN A DAY: 0
EVER FELT BAD OR GUILTY ABOUT YOUR DRINKING: NO
DO YOU DRINK ALCOHOL: NO
TOTAL SCORE: 0
EVER_SMOKED: YES
ON A TYPICAL DAY WHEN YOU DRINK ALCOHOL HOW MANY DRINKS DO YOU HAVE: 0
CONSUMPTION TOTAL: NEGATIVE
TOTAL SCORE: 0
EVER HAD A DRINK FIRST THING IN THE MORNING TO STEADY YOUR NERVES TO GET RID OF A HANGOVER: NO
AVERAGE NUMBER OF DAYS PER WEEK YOU HAVE A DRINK CONTAINING ALCOHOL: 0
HAVE YOU EVER FELT YOU SHOULD CUT DOWN ON YOUR DRINKING: NO
TOTAL SCORE: 0
ALCOHOL_USE: NO
HAVE PEOPLE ANNOYED YOU BY CRITICIZING YOUR DRINKING: NO
EVER_SMOKED: YES

## 2019-12-19 NOTE — ED NOTES
Medication reconciliation updated and complete per pt at bedside with medication list. Reviewed list with pt and returned to pt at bedside  Allergies have been verified and updated   No oral ABX within the last 14 days

## 2019-12-19 NOTE — ED TRIAGE NOTES
"Pt transferred from [facility] for back pain and admission. Frequent falls, c/o heavy legs. Uses cane and walker. Pt states she has fallen 15 times in 30 days. Pt wants to be rescaned, \"my head is not okay\". Pt with pain to neck, shoulders and tailbone.  "

## 2019-12-19 NOTE — ED PROVIDER NOTES
ED Provider Note    Scribed for Zander Pickett M.D. by Lata Camarillo. 12/19/2019, 2:05 PM.    Primary care provider: Edwar Platt M.D.  Means of arrival: Ambulance    History obtained from: Patient  History limited by: None     CHIEF COMPLAINT  Chief Complaint   Patient presents with   • Back Pain       HPI  Marilyn Salinas is a 54 y.o. female who presents to the Emergency Department as a transfer from [facility] for back pain onset prior to arrival. She endorses chronic back pain with associated decreased mobility.  She reports 15 falls in the last 30 days. She also endorses a constant headache and would like to have a head CT done. Her case is followed by Dr. Platt and Dr. Jacobson is her back specialist. She denies urinary or bowel incontinence.       REVIEW OF SYSTEMS  See HPI for further details. All other systems are negative.     PAST MEDICAL HISTORY   has a past medical history of Anxiety, Arthritis, Asthma, Cataract, Depression, and High cholesterol.    SURGICAL HISTORY   has a past surgical history that includes eye surgery (1972); lumpectomy; other (Left, 2005); vaginal hysterectomy scope total (N/A, 10/3/2017); salpingectomy (Bilateral, 10/3/2017); oophorectomy (Bilateral, 10/3/2017); anterior and posterior repair (Bilateral, 10/3/2017); enterocele repair (N/A, 10/3/2017); bladder sling female (N/A, 10/3/2017); and vaginal suspension (N/A, 10/3/2017).    SOCIAL HISTORY  Social History     Tobacco Use   • Smoking status: Current Every Day Smoker     Packs/day: 0.25     Years: 26.00     Pack years: 6.50     Types: Cigarettes   • Smokeless tobacco: Never Used   Substance Use Topics   • Alcohol use: No   • Drug use: Yes     Types: Marijuana     Comment: marijuana daily      Social History     Substance and Sexual Activity   Drug Use Yes   • Types: Marijuana    Comment: marijuana daily       FAMILY HISTORY  Family History   Problem Relation Age of Onset   • Cancer Mother    • Alcohol/Drug  "Mother    • Cancer Brother    • Diabetes Maternal Aunt        CURRENT MEDICATIONS  Reviewed.  See Encounter Summary.     ALLERGIES  Allergies   Allergen Reactions   • Codeine        PHYSICAL EXAM  VITAL SIGNS: /79   Pulse 90   Temp 37.1 °C (98.8 °F) (Temporal)   Resp 16   Ht 1.676 m (5' 6\")   Wt 61.2 kg (135 lb)   LMP 01/11/2017   BMI 21.79 kg/m²   Constitutional: Alert in no apparent distress.  HENT: No signs of trauma, Bilateral external ears normal, Nose normal.   Eyes: Pupils are equal and reactive, Conjunctiva normal, Non-icteric.   Neck: Normal range of motion, No tenderness, Supple, No stridor.   Cardiovascular: Regular rate and rhythm, no murmurs.   Thorax & Lungs: Normal breath sounds, No respiratory distress, No wheezing, No chest tenderness.   Abdomen: Bowel sounds normal, Soft, No tenderness, No masses, No pulsatile masses. No peritoneal signs.  Skin: Warm, Dry, No erythema, No rash.   Back: No bony tenderness, No CVA tenderness.   Extremities: Intact distal pulses, No edema, No tenderness, No cyanosis  Musculoskeletal: Good range of motion in all major joints. No tenderness to palpation or major deformities noted.   Neurologic: Alert , Normal motor function, Normal sensory function. 3/5 strength throughout.  Some muscular spasticity at ankles        DIAGNOSTIC STUDIES / PROCEDURES     RADIOLOGY  OUTSIDE IMAGES-CT CERVICAL SPINE   Final Result        The radiologist's interpretation of all radiological studies and images have been reviewed by me.    COURSE & MEDICAL DECISION MAKING  Pertinent Labs & Imaging studies reviewed. (See chart for details)    Obtained and reviewed past medical records from [facility] which indicated negative head CT and C4-C5 compression fracture. Dr. Jacobson was contacted and reccommended transfer to [facility] and will evaluate her as an inpatient. MRI on 11/15/19 [facility] which reveal central canal narrowing of L3-L5 and C4-C5. [facility] lab review " negative.     2:05 PM - Patient seen and examined at bedside.    2:29 PM I discussed the patient's case and the above findings with Dr. Aguilera (Hospitalist) who agrees to evaluate the patient for hospitalization.     Decision Making:  This is a 54 y.o. year old female who [facility]  [facility] facility did complete a CT of the head and neck today.  Patient with recent MRI in the middle of November.  At this point I have brought the patient into the hospital service for ongoing inpatient care as was initially discussed at the time of transfer.  Neurosurgical consultation and evaluation here in the ER.  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  Laboratory evaluation was completed prior to arrival and has not been repeated now and on chart review it was within normal limits.    DISPOSITION:  Patient will be hospitalized by Dr. Aguilera in guarded condition.      FINAL IMPRESSION  1. Ambulatory dysfunction    2. Chronic midline low back pain without sciatica          ILata (Scribe), am scribing for, and in the presence of, Zander Pickett M.D..    Electronically signed by: Lata Camarillo (Misty), 12/19/2019    IZander M.D. personally performed the services described in this documentation, as scribed by Lata Camarillo in my presence, and it is both accurate and complete.  C  The note accurately reflects work and decisions made by me.  Zander Pickett  12/19/2019  9:29 PM

## 2019-12-20 ENCOUNTER — APPOINTMENT (OUTPATIENT)
Dept: RADIOLOGY | Facility: MEDICAL CENTER | Age: 54
DRG: 029 | End: 2019-12-20
Attending: NEUROLOGICAL SURGERY
Payer: MEDICAID

## 2019-12-20 ENCOUNTER — ANESTHESIA (OUTPATIENT)
Dept: SURGERY | Facility: MEDICAL CENTER | Age: 54
DRG: 029 | End: 2019-12-20
Payer: MEDICAID

## 2019-12-20 ENCOUNTER — ANESTHESIA EVENT (OUTPATIENT)
Dept: SURGERY | Facility: MEDICAL CENTER | Age: 54
DRG: 029 | End: 2019-12-20
Payer: MEDICAID

## 2019-12-20 PROBLEM — S14.129A CENTRAL CORD SYNDROME (HCC): Status: ACTIVE | Noted: 2019-12-20

## 2019-12-20 LAB
ANION GAP SERPL CALC-SCNC: 7 MMOL/L (ref 0–11.9)
BASOPHILS # BLD AUTO: 0.8 % (ref 0–1.8)
BASOPHILS # BLD: 0.07 K/UL (ref 0–0.12)
BUN SERPL-MCNC: 14 MG/DL (ref 8–22)
CALCIUM SERPL-MCNC: 8.6 MG/DL (ref 8.5–10.5)
CHLORIDE SERPL-SCNC: 107 MMOL/L (ref 96–112)
CO2 SERPL-SCNC: 23 MMOL/L (ref 20–33)
CREAT SERPL-MCNC: 0.74 MG/DL (ref 0.5–1.4)
EOSINOPHIL # BLD AUTO: 0.28 K/UL (ref 0–0.51)
EOSINOPHIL NFR BLD: 3 % (ref 0–6.9)
ERYTHROCYTE [DISTWIDTH] IN BLOOD BY AUTOMATED COUNT: 43.6 FL (ref 35.9–50)
GLUCOSE SERPL-MCNC: 101 MG/DL (ref 65–99)
HCT VFR BLD AUTO: 39.2 % (ref 37–47)
HGB BLD-MCNC: 13.4 G/DL (ref 12–16)
IMM GRANULOCYTES # BLD AUTO: 0.03 K/UL (ref 0–0.11)
IMM GRANULOCYTES NFR BLD AUTO: 0.3 % (ref 0–0.9)
LYMPHOCYTES # BLD AUTO: 3.78 K/UL (ref 1–4.8)
LYMPHOCYTES NFR BLD: 41 % (ref 22–41)
MCH RBC QN AUTO: 31.2 PG (ref 27–33)
MCHC RBC AUTO-ENTMCNC: 34.2 G/DL (ref 33.6–35)
MCV RBC AUTO: 91.2 FL (ref 81.4–97.8)
MONOCYTES # BLD AUTO: 0.71 K/UL (ref 0–0.85)
MONOCYTES NFR BLD AUTO: 7.7 % (ref 0–13.4)
NEUTROPHILS # BLD AUTO: 4.36 K/UL (ref 2–7.15)
NEUTROPHILS NFR BLD: 47.2 % (ref 44–72)
NRBC # BLD AUTO: 0 K/UL
NRBC BLD-RTO: 0 /100 WBC
PLATELET # BLD AUTO: 326 K/UL (ref 164–446)
PMV BLD AUTO: 9.4 FL (ref 9–12.9)
POTASSIUM SERPL-SCNC: 3.5 MMOL/L (ref 3.6–5.5)
RBC # BLD AUTO: 4.3 M/UL (ref 4.2–5.4)
SODIUM SERPL-SCNC: 137 MMOL/L (ref 135–145)
WBC # BLD AUTO: 9.2 K/UL (ref 4.8–10.8)

## 2019-12-20 PROCEDURE — 0RG10A0 FUSION OF CERVICAL VERTEBRAL JOINT WITH INTERBODY FUSION DEVICE, ANTERIOR APPROACH, ANTERIOR COLUMN, OPEN APPROACH: ICD-10-PCS | Performed by: NEUROLOGICAL SURGERY

## 2019-12-20 PROCEDURE — 0RT30ZZ RESECTION OF CERVICAL VERTEBRAL DISC, OPEN APPROACH: ICD-10-PCS | Performed by: NEUROLOGICAL SURGERY

## 2019-12-20 PROCEDURE — C1781 MESH (IMPLANTABLE): HCPCS | Performed by: NEUROLOGICAL SURGERY

## 2019-12-20 PROCEDURE — 700101 HCHG RX REV CODE 250: Performed by: ANESTHESIOLOGY

## 2019-12-20 PROCEDURE — 500864 HCHG NEEDLE, SPINAL 18G: Performed by: NEUROLOGICAL SURGERY

## 2019-12-20 PROCEDURE — C1713 ANCHOR/SCREW BN/BN,TIS/BN: HCPCS | Performed by: NEUROLOGICAL SURGERY

## 2019-12-20 PROCEDURE — 01N10ZZ RELEASE CERVICAL NERVE, OPEN APPROACH: ICD-10-PCS | Performed by: NEUROLOGICAL SURGERY

## 2019-12-20 PROCEDURE — 72040 X-RAY EXAM NECK SPINE 2-3 VW: CPT

## 2019-12-20 PROCEDURE — 500367 HCHG DRAIN KIT, HEMOVAC: Performed by: NEUROLOGICAL SURGERY

## 2019-12-20 PROCEDURE — 95939 C MOTOR EVOKED UPR&LWR LIMBS: CPT | Performed by: NEUROLOGICAL SURGERY

## 2019-12-20 PROCEDURE — A9270 NON-COVERED ITEM OR SERVICE: HCPCS | Performed by: HOSPITALIST

## 2019-12-20 PROCEDURE — 700112 HCHG RX REV CODE 229: Performed by: NURSE PRACTITIONER

## 2019-12-20 PROCEDURE — 95937 NEUROMUSCULAR JUNCTION TEST: CPT | Performed by: NEUROLOGICAL SURGERY

## 2019-12-20 PROCEDURE — 700111 HCHG RX REV CODE 636 W/ 250 OVERRIDE (IP): Performed by: INTERNAL MEDICINE

## 2019-12-20 PROCEDURE — 95940 IONM IN OPERATNG ROOM 15 MIN: CPT | Performed by: NEUROLOGICAL SURGERY

## 2019-12-20 PROCEDURE — 770022 HCHG ROOM/CARE - ICU (200)

## 2019-12-20 PROCEDURE — 160035 HCHG PACU - 1ST 60 MINS PHASE I: Performed by: NEUROLOGICAL SURGERY

## 2019-12-20 PROCEDURE — 99291 CRITICAL CARE FIRST HOUR: CPT | Performed by: INTERNAL MEDICINE

## 2019-12-20 PROCEDURE — 700102 HCHG RX REV CODE 250 W/ 637 OVERRIDE(OP): Performed by: HOSPITALIST

## 2019-12-20 PROCEDURE — 700111 HCHG RX REV CODE 636 W/ 250 OVERRIDE (IP): Performed by: ANESTHESIOLOGY

## 2019-12-20 PROCEDURE — 160041 HCHG SURGERY MINUTES - EA ADDL 1 MIN LEVEL 4: Performed by: NEUROLOGICAL SURGERY

## 2019-12-20 PROCEDURE — 700105 HCHG RX REV CODE 258: Performed by: ANESTHESIOLOGY

## 2019-12-20 PROCEDURE — 85025 COMPLETE CBC W/AUTO DIFF WBC: CPT

## 2019-12-20 PROCEDURE — A9270 NON-COVERED ITEM OR SERVICE: HCPCS | Performed by: INTERNAL MEDICINE

## 2019-12-20 PROCEDURE — 160029 HCHG SURGERY MINUTES - 1ST 30 MINS LEVEL 4: Performed by: NEUROLOGICAL SURGERY

## 2019-12-20 PROCEDURE — 80048 BASIC METABOLIC PNL TOTAL CA: CPT

## 2019-12-20 PROCEDURE — 700101 HCHG RX REV CODE 250: Performed by: NURSE PRACTITIONER

## 2019-12-20 PROCEDURE — 160048 HCHG OR STATISTICAL LEVEL 1-5: Performed by: NEUROLOGICAL SURGERY

## 2019-12-20 PROCEDURE — 00NW0ZZ RELEASE CERVICAL SPINAL CORD, OPEN APPROACH: ICD-10-PCS | Performed by: NEUROLOGICAL SURGERY

## 2019-12-20 PROCEDURE — 700102 HCHG RX REV CODE 250 W/ 637 OVERRIDE(OP): Performed by: INTERNAL MEDICINE

## 2019-12-20 PROCEDURE — 501838 HCHG SUTURE GENERAL: Performed by: NEUROLOGICAL SURGERY

## 2019-12-20 PROCEDURE — 700101 HCHG RX REV CODE 250: Performed by: NEUROLOGICAL SURGERY

## 2019-12-20 PROCEDURE — 110454 HCHG SHELL REV 250: Performed by: NEUROLOGICAL SURGERY

## 2019-12-20 PROCEDURE — 160009 HCHG ANES TIME/MIN: Performed by: NEUROLOGICAL SURGERY

## 2019-12-20 PROCEDURE — 160002 HCHG RECOVERY MINUTES (STAT): Performed by: NEUROLOGICAL SURGERY

## 2019-12-20 PROCEDURE — 95938 SOMATOSENSORY TESTING: CPT | Performed by: NEUROLOGICAL SURGERY

## 2019-12-20 PROCEDURE — 95861 NEEDLE EMG 2 EXTREMITIES: CPT | Performed by: NEUROLOGICAL SURGERY

## 2019-12-20 PROCEDURE — 502000 HCHG MISC OR IMPLANTS RC 0278: Performed by: NEUROLOGICAL SURGERY

## 2019-12-20 PROCEDURE — A9270 NON-COVERED ITEM OR SERVICE: HCPCS | Performed by: NURSE PRACTITIONER

## 2019-12-20 PROCEDURE — 700105 HCHG RX REV CODE 258: Performed by: INTERNAL MEDICINE

## 2019-12-20 PROCEDURE — 700106 HCHG RX REV CODE 271: Performed by: NEUROLOGICAL SURGERY

## 2019-12-20 PROCEDURE — 700111 HCHG RX REV CODE 636 W/ 250 OVERRIDE (IP): Performed by: NURSE PRACTITIONER

## 2019-12-20 PROCEDURE — 500002 HCHG ADHESIVE, DERMABOND: Performed by: NEUROLOGICAL SURGERY

## 2019-12-20 PROCEDURE — 160036 HCHG PACU - EA ADDL 30 MINS PHASE I: Performed by: NEUROLOGICAL SURGERY

## 2019-12-20 DEVICE — IMPLANTABLE DEVICE: Type: IMPLANTABLE DEVICE | Status: FUNCTIONAL

## 2019-12-20 DEVICE — ORTHOBLEND 5CC: Type: IMPLANTABLE DEVICE | Status: FUNCTIONAL

## 2019-12-20 DEVICE — GRAFT BONE KIT INFUSE XX-SMALL: Type: IMPLANTABLE DEVICE | Status: FUNCTIONAL

## 2019-12-20 RX ORDER — CEFAZOLIN SODIUM 1 G/3ML
INJECTION, POWDER, FOR SOLUTION INTRAMUSCULAR; INTRAVENOUS PRN
Status: DISCONTINUED | OUTPATIENT
Start: 2019-12-20 | End: 2019-12-20 | Stop reason: SURG

## 2019-12-20 RX ORDER — PROCHLORPERAZINE EDISYLATE 5 MG/ML
5-10 INJECTION INTRAMUSCULAR; INTRAVENOUS EVERY 4 HOURS PRN
Status: DISCONTINUED | OUTPATIENT
Start: 2019-12-20 | End: 2019-12-27 | Stop reason: HOSPADM

## 2019-12-20 RX ORDER — PROMETHAZINE HYDROCHLORIDE 25 MG/1
12.5-25 TABLET ORAL EVERY 4 HOURS PRN
Status: DISCONTINUED | OUTPATIENT
Start: 2019-12-20 | End: 2019-12-27 | Stop reason: HOSPADM

## 2019-12-20 RX ORDER — ROCURONIUM BROMIDE 10 MG/ML
INJECTION, SOLUTION INTRAVENOUS PRN
Status: DISCONTINUED | OUTPATIENT
Start: 2019-12-20 | End: 2019-12-20 | Stop reason: SURG

## 2019-12-20 RX ORDER — PROMETHAZINE HYDROCHLORIDE 25 MG/1
12.5-25 SUPPOSITORY RECTAL EVERY 4 HOURS PRN
Status: DISCONTINUED | OUTPATIENT
Start: 2019-12-20 | End: 2019-12-27 | Stop reason: HOSPADM

## 2019-12-20 RX ORDER — REMIFENTANIL HYDROCHLORIDE 1 MG/ML
INJECTION, POWDER, LYOPHILIZED, FOR SOLUTION INTRAVENOUS
Status: DISCONTINUED | OUTPATIENT
Start: 2019-12-20 | End: 2019-12-20 | Stop reason: SURG

## 2019-12-20 RX ORDER — DOCUSATE SODIUM 100 MG/1
100 CAPSULE, LIQUID FILLED ORAL 2 TIMES DAILY
Status: DISCONTINUED | OUTPATIENT
Start: 2019-12-20 | End: 2019-12-27 | Stop reason: HOSPADM

## 2019-12-20 RX ORDER — BUPIVACAINE HYDROCHLORIDE AND EPINEPHRINE 5; 5 MG/ML; UG/ML
INJECTION, SOLUTION PERINEURAL
Status: DISCONTINUED | OUTPATIENT
Start: 2019-12-20 | End: 2019-12-20 | Stop reason: HOSPADM

## 2019-12-20 RX ORDER — DEXAMETHASONE SODIUM PHOSPHATE 4 MG/ML
INJECTION, SOLUTION INTRA-ARTICULAR; INTRALESIONAL; INTRAMUSCULAR; INTRAVENOUS; SOFT TISSUE PRN
Status: DISCONTINUED | OUTPATIENT
Start: 2019-12-20 | End: 2019-12-20 | Stop reason: SURG

## 2019-12-20 RX ORDER — SODIUM CHLORIDE, SODIUM LACTATE, POTASSIUM CHLORIDE, CALCIUM CHLORIDE 600; 310; 30; 20 MG/100ML; MG/100ML; MG/100ML; MG/100ML
INJECTION, SOLUTION INTRAVENOUS CONTINUOUS
Status: DISCONTINUED | OUTPATIENT
Start: 2019-12-20 | End: 2019-12-20 | Stop reason: HOSPADM

## 2019-12-20 RX ORDER — DIPHENHYDRAMINE HCL 25 MG
25 TABLET ORAL EVERY 6 HOURS PRN
Status: DISCONTINUED | OUTPATIENT
Start: 2019-12-20 | End: 2019-12-27 | Stop reason: HOSPADM

## 2019-12-20 RX ORDER — SODIUM CHLORIDE AND POTASSIUM CHLORIDE 150; 900 MG/100ML; MG/100ML
INJECTION, SOLUTION INTRAVENOUS CONTINUOUS
Status: DISCONTINUED | OUTPATIENT
Start: 2019-12-20 | End: 2019-12-23

## 2019-12-20 RX ORDER — ENEMA 19; 7 G/133ML; G/133ML
1 ENEMA RECTAL
Status: DISCONTINUED | OUTPATIENT
Start: 2019-12-20 | End: 2019-12-27 | Stop reason: HOSPADM

## 2019-12-20 RX ORDER — SODIUM CHLORIDE, SODIUM LACTATE, POTASSIUM CHLORIDE, AND CALCIUM CHLORIDE .6; .31; .03; .02 G/100ML; G/100ML; G/100ML; G/100ML
IRRIGANT IRRIGATION
Status: DISCONTINUED | OUTPATIENT
Start: 2019-12-20 | End: 2019-12-20 | Stop reason: HOSPADM

## 2019-12-20 RX ORDER — DIPHENHYDRAMINE HYDROCHLORIDE 50 MG/ML
25 INJECTION INTRAMUSCULAR; INTRAVENOUS EVERY 6 HOURS PRN
Status: DISCONTINUED | OUTPATIENT
Start: 2019-12-20 | End: 2019-12-27 | Stop reason: HOSPADM

## 2019-12-20 RX ORDER — HYDROMORPHONE HYDROCHLORIDE 1 MG/ML
0.1 INJECTION, SOLUTION INTRAMUSCULAR; INTRAVENOUS; SUBCUTANEOUS
Status: DISCONTINUED | OUTPATIENT
Start: 2019-12-20 | End: 2019-12-20 | Stop reason: HOSPADM

## 2019-12-20 RX ORDER — HALOPERIDOL 5 MG/ML
1 INJECTION INTRAMUSCULAR
Status: DISCONTINUED | OUTPATIENT
Start: 2019-12-20 | End: 2019-12-20 | Stop reason: HOSPADM

## 2019-12-20 RX ORDER — POLYETHYLENE GLYCOL 3350 17 G/17G
1 POWDER, FOR SOLUTION ORAL 2 TIMES DAILY PRN
Status: DISCONTINUED | OUTPATIENT
Start: 2019-12-20 | End: 2019-12-27 | Stop reason: HOSPADM

## 2019-12-20 RX ORDER — ONDANSETRON 4 MG/1
4 TABLET, ORALLY DISINTEGRATING ORAL EVERY 4 HOURS PRN
Status: DISCONTINUED | OUTPATIENT
Start: 2019-12-20 | End: 2019-12-27 | Stop reason: HOSPADM

## 2019-12-20 RX ORDER — OXYCODONE HCL 5 MG/5 ML
10 SOLUTION, ORAL ORAL
Status: DISCONTINUED | OUTPATIENT
Start: 2019-12-20 | End: 2019-12-20 | Stop reason: HOSPADM

## 2019-12-20 RX ORDER — ONDANSETRON 2 MG/ML
INJECTION INTRAMUSCULAR; INTRAVENOUS PRN
Status: DISCONTINUED | OUTPATIENT
Start: 2019-12-20 | End: 2019-12-20 | Stop reason: SURG

## 2019-12-20 RX ORDER — HYDROMORPHONE HYDROCHLORIDE 2 MG/ML
INJECTION, SOLUTION INTRAMUSCULAR; INTRAVENOUS; SUBCUTANEOUS PRN
Status: DISCONTINUED | OUTPATIENT
Start: 2019-12-20 | End: 2019-12-20 | Stop reason: SURG

## 2019-12-20 RX ORDER — HYDROMORPHONE HYDROCHLORIDE 1 MG/ML
0.4 INJECTION, SOLUTION INTRAMUSCULAR; INTRAVENOUS; SUBCUTANEOUS
Status: DISCONTINUED | OUTPATIENT
Start: 2019-12-20 | End: 2019-12-20 | Stop reason: HOSPADM

## 2019-12-20 RX ORDER — ONDANSETRON 2 MG/ML
4 INJECTION INTRAMUSCULAR; INTRAVENOUS
Status: DISCONTINUED | OUTPATIENT
Start: 2019-12-20 | End: 2019-12-20 | Stop reason: HOSPADM

## 2019-12-20 RX ORDER — HYDROMORPHONE HYDROCHLORIDE 1 MG/ML
0.2 INJECTION, SOLUTION INTRAMUSCULAR; INTRAVENOUS; SUBCUTANEOUS
Status: DISCONTINUED | OUTPATIENT
Start: 2019-12-20 | End: 2019-12-20 | Stop reason: HOSPADM

## 2019-12-20 RX ORDER — CEFAZOLIN SODIUM 2 G/100ML
2 INJECTION, SOLUTION INTRAVENOUS EVERY 8 HOURS
Status: COMPLETED | OUTPATIENT
Start: 2019-12-20 | End: 2019-12-21

## 2019-12-20 RX ORDER — BACITRACIN 50000 [IU]/1
INJECTION, POWDER, FOR SOLUTION INTRAMUSCULAR
Status: DISCONTINUED | OUTPATIENT
Start: 2019-12-20 | End: 2019-12-20 | Stop reason: HOSPADM

## 2019-12-20 RX ORDER — BISACODYL 10 MG
10 SUPPOSITORY, RECTAL RECTAL
Status: DISCONTINUED | OUTPATIENT
Start: 2019-12-20 | End: 2019-12-27 | Stop reason: HOSPADM

## 2019-12-20 RX ORDER — ONDANSETRON 2 MG/ML
4 INJECTION INTRAMUSCULAR; INTRAVENOUS EVERY 4 HOURS PRN
Status: DISCONTINUED | OUTPATIENT
Start: 2019-12-20 | End: 2019-12-27 | Stop reason: HOSPADM

## 2019-12-20 RX ORDER — AMOXICILLIN 250 MG
1 CAPSULE ORAL NIGHTLY
Status: DISCONTINUED | OUTPATIENT
Start: 2019-12-20 | End: 2019-12-23

## 2019-12-20 RX ORDER — OXYCODONE HCL 5 MG/5 ML
5 SOLUTION, ORAL ORAL
Status: DISCONTINUED | OUTPATIENT
Start: 2019-12-20 | End: 2019-12-20 | Stop reason: HOSPADM

## 2019-12-20 RX ORDER — DIPHENHYDRAMINE HYDROCHLORIDE 50 MG/ML
12.5 INJECTION INTRAMUSCULAR; INTRAVENOUS
Status: DISCONTINUED | OUTPATIENT
Start: 2019-12-20 | End: 2019-12-20 | Stop reason: HOSPADM

## 2019-12-20 RX ORDER — SODIUM CHLORIDE, SODIUM LACTATE, POTASSIUM CHLORIDE, CALCIUM CHLORIDE 600; 310; 30; 20 MG/100ML; MG/100ML; MG/100ML; MG/100ML
INJECTION, SOLUTION INTRAVENOUS
Status: DISCONTINUED | OUTPATIENT
Start: 2019-12-20 | End: 2019-12-20 | Stop reason: SURG

## 2019-12-20 RX ORDER — AMOXICILLIN 250 MG
1 CAPSULE ORAL
Status: DISCONTINUED | OUTPATIENT
Start: 2019-12-20 | End: 2019-12-27 | Stop reason: HOSPADM

## 2019-12-20 RX ADMIN — HYDROMORPHONE HYDROCHLORIDE 0.4 MG: 2 INJECTION, SOLUTION INTRAMUSCULAR; INTRAVENOUS; SUBCUTANEOUS at 16:46

## 2019-12-20 RX ADMIN — FENTANYL CITRATE 50 MCG: 50 INJECTION, SOLUTION INTRAMUSCULAR; INTRAVENOUS at 14:37

## 2019-12-20 RX ADMIN — POTASSIUM CHLORIDE AND SODIUM CHLORIDE: 900; 150 INJECTION, SOLUTION INTRAVENOUS at 19:26

## 2019-12-20 RX ADMIN — OXYBUTYNIN CHLORIDE 15 MG: 5 TABLET, EXTENDED RELEASE ORAL at 04:54

## 2019-12-20 RX ADMIN — BUPROPION HYDROCHLORIDE 150 MG: 150 TABLET, EXTENDED RELEASE ORAL at 04:54

## 2019-12-20 RX ADMIN — PROPOFOL 150 MG: 10 INJECTION, EMULSION INTRAVENOUS at 14:37

## 2019-12-20 RX ADMIN — ZOLPIDEM TARTRATE 10 MG: 5 TABLET ORAL at 21:20

## 2019-12-20 RX ADMIN — ROCURONIUM BROMIDE 30 MG: 10 INJECTION, SOLUTION INTRAVENOUS at 14:37

## 2019-12-20 RX ADMIN — MIDODRINE HYDROCHLORIDE 10 MG: 5 TABLET ORAL at 19:00

## 2019-12-20 RX ADMIN — REMIFENTANIL HYDROCHLORIDE 0.1 MCG/KG/MIN: 1 INJECTION, POWDER, LYOPHILIZED, FOR SOLUTION INTRAVENOUS at 14:41

## 2019-12-20 RX ADMIN — PHENYLEPHRINE HYDROCHLORIDE 30 MCG/MIN: 10 INJECTION INTRAVENOUS at 13:23

## 2019-12-20 RX ADMIN — FENTANYL CITRATE 50 MCG: 0.05 INJECTION, SOLUTION INTRAMUSCULAR; INTRAVENOUS at 18:31

## 2019-12-20 RX ADMIN — DEXAMETHASONE SODIUM PHOSPHATE 8 MG: 4 INJECTION, SOLUTION INTRA-ARTICULAR; INTRALESIONAL; INTRAMUSCULAR; INTRAVENOUS; SOFT TISSUE at 15:04

## 2019-12-20 RX ADMIN — PHENYLEPHRINE HYDROCHLORIDE 30 MCG/MIN: 10 INJECTION INTRAVENOUS at 14:26

## 2019-12-20 RX ADMIN — ACETAMINOPHEN 650 MG: 325 TABLET, FILM COATED ORAL at 19:14

## 2019-12-20 RX ADMIN — CEFAZOLIN 2 G: 330 INJECTION, POWDER, FOR SOLUTION INTRAMUSCULAR; INTRAVENOUS at 14:41

## 2019-12-20 RX ADMIN — GABAPENTIN 400 MG: 400 CAPSULE ORAL at 18:59

## 2019-12-20 RX ADMIN — GABAPENTIN 400 MG: 400 CAPSULE ORAL at 04:54

## 2019-12-20 RX ADMIN — SODIUM CHLORIDE, POTASSIUM CHLORIDE, SODIUM LACTATE AND CALCIUM CHLORIDE: 600; 310; 30; 20 INJECTION, SOLUTION INTRAVENOUS at 14:25

## 2019-12-20 RX ADMIN — CEFAZOLIN SODIUM 2 G: 2 INJECTION, SOLUTION INTRAVENOUS at 23:05

## 2019-12-20 RX ADMIN — FAMOTIDINE 20 MG: 20 TABLET ORAL at 19:13

## 2019-12-20 RX ADMIN — MIDODRINE HYDROCHLORIDE 10 MG: 5 TABLET ORAL at 07:30

## 2019-12-20 RX ADMIN — OXYBUTYNIN CHLORIDE 15 MG: 5 TABLET, EXTENDED RELEASE ORAL at 19:04

## 2019-12-20 RX ADMIN — ONDANSETRON 4 MG: 2 INJECTION INTRAMUSCULAR; INTRAVENOUS at 16:50

## 2019-12-20 RX ADMIN — DOCUSATE SODIUM 100 MG: 100 CAPSULE, LIQUID FILLED ORAL at 19:13

## 2019-12-20 RX ADMIN — PROPOFOL 200 MCG/KG/MIN: 10 INJECTION, EMULSION INTRAVENOUS at 14:39

## 2019-12-20 RX ADMIN — ACETAMINOPHEN 650 MG: 325 TABLET, FILM COATED ORAL at 04:54

## 2019-12-20 RX ADMIN — VENLAFAXINE HYDROCHLORIDE 75 MG: 75 CAPSULE, EXTENDED RELEASE ORAL at 19:03

## 2019-12-20 RX ADMIN — HYDROMORPHONE HYDROCHLORIDE 0.2 MG: 1 INJECTION, SOLUTION INTRAMUSCULAR; INTRAVENOUS; SUBCUTANEOUS at 18:23

## 2019-12-20 RX ADMIN — OXYCODONE HYDROCHLORIDE 10 MG: 5 TABLET ORAL at 18:57

## 2019-12-20 RX ADMIN — FENTANYL CITRATE 50 MCG: 0.05 INJECTION, SOLUTION INTRAMUSCULAR; INTRAVENOUS at 18:12

## 2019-12-20 RX ADMIN — ACETAMINOPHEN 650 MG: 325 TABLET, FILM COATED ORAL at 12:15

## 2019-12-20 RX ADMIN — MIDODRINE HYDROCHLORIDE 10 MG: 5 TABLET ORAL at 12:15

## 2019-12-20 RX ADMIN — BUPROPION HYDROCHLORIDE 150 MG: 150 TABLET, EXTENDED RELEASE ORAL at 19:02

## 2019-12-20 ASSESSMENT — PATIENT HEALTH QUESTIONNAIRE - PHQ9
1. LITTLE INTEREST OR PLEASURE IN DOING THINGS: NOT AT ALL
SUM OF ALL RESPONSES TO PHQ9 QUESTIONS 1 AND 2: 0
2. FEELING DOWN, DEPRESSED, IRRITABLE, OR HOPELESS: NOT AT ALL

## 2019-12-20 ASSESSMENT — ENCOUNTER SYMPTOMS
MYALGIAS: 0
STRIDOR: 0
BLURRED VISION: 0
SORE THROAT: 0
NERVOUS/ANXIOUS: 0
DIZZINESS: 0
VOMITING: 0
COUGH: 0
ABDOMINAL PAIN: 0
NECK PAIN: 1
FLANK PAIN: 0
FOCAL WEAKNESS: 1
BRUISES/BLEEDS EASILY: 0
TINGLING: 1
CHILLS: 0
HEADACHES: 0
BACK PAIN: 1
DIARRHEA: 0
PALPITATIONS: 0
NAUSEA: 0
SENSORY CHANGE: 1
FEVER: 0
DOUBLE VISION: 0
SHORTNESS OF BREATH: 0
SPUTUM PRODUCTION: 0
SPEECH CHANGE: 0

## 2019-12-20 NOTE — PROGRESS NOTES
Hospital Medicine Daily Progress Note    Date of Service  12/21/2019    Chief Complaint  Back and neck pain    Hospital Course    54 y.o. female admitted 12/19/2019 with HTN, chronic back pain and neck pain. An outpatient MRI done of her cervical spine and lumbar spine on November 14, and these demonstrated multiple degenerative changes and some foraminal issues however did not show any cord compression.  Since her last MRI she has had episodes of falling.  She has had increasing weakness in the legs and arms. At an outlying facility prior to transfer to us, she had a CT done this did not demonstrate any acute findings either.  She is sent to our facility for evaluation by neurosurgery Dr. Jacobson who has been consulted.  On 12/20 patient had cervical surgical intervention by Dr. Linton.  12/21 patient states improved strength in her arms but remains weak in her legs.       Interval Problem Update   S/P Cervical spine surgery 12/20  Tingling in upper arms but better than yesterday  On pressor support to keep MAP >80  UOP:1.1L   IS:1500cc  On room air  Midodrine 10 TID increase to 15mg TID  Alert with clear speech.  Remains weak in her bilateral legs with some tingling.  States improvement in her upper arm strength.    Consultants/Specialty  Dr Linton, Neurosurgery    Code Status  FULL    Disposition  Transfer to neurosurgical floor  Will need rehab placement    Review of Systems  Review of Systems   Constitutional: Negative for chills, fever and malaise/fatigue.   HENT: Negative for congestion and sore throat.    Eyes: Negative for blurred vision.   Respiratory: Negative for cough, shortness of breath and stridor.    Cardiovascular: Negative for chest pain and palpitations.   Gastrointestinal: Negative for abdominal pain and nausea.   Genitourinary: Negative for dysuria.   Musculoskeletal: Positive for neck pain. Negative for myalgias.   Neurological: Positive for tingling and sensory change. Negative for dizziness,  speech change and headaches.        Physical Exam  Temp:  [36.8 °C (98.2 °F)-37.6 °C (99.6 °F)] 37.2 °C (98.9 °F)  Pulse:  [49-88] 54  Resp:  [10-70] 24  BP: ()/(52-76) 119/66  SpO2:  [89 %-100 %] 96 %    Physical Exam  Vitals signs reviewed.   Constitutional:       General: She is not in acute distress.     Appearance: Normal appearance. She is not diaphoretic.   HENT:      Head: Normocephalic and atraumatic.      Nose: Nose normal.      Mouth/Throat:      Mouth: Mucous membranes are moist.   Eyes:      General: No scleral icterus.        Right eye: No discharge.         Left eye: No discharge.      Extraocular Movements: Extraocular movements intact.      Conjunctiva/sclera: Conjunctivae normal.   Neck:      Musculoskeletal: Muscular tenderness present.      Vascular: No carotid bruit.      Comments: Right lateral neck surgical site w/o drainage, mild bruising and good approximation of surgical edges  Cardiovascular:      Rate and Rhythm: Normal rate and regular rhythm.      Pulses:           Radial pulses are 2+ on the right side and 2+ on the left side.        Dorsalis pedis pulses are 2+ on the right side and 2+ on the left side.      Heart sounds: Normal heart sounds. No murmur.   Pulmonary:      Effort: Pulmonary effort is normal. No respiratory distress.      Breath sounds: Normal breath sounds. No wheezing.   Abdominal:      General: Bowel sounds are normal. There is no distension.      Palpations: Abdomen is soft.      Tenderness: There is no tenderness.   Musculoskeletal:         General: No swelling or tenderness.   Skin:     Coloration: Skin is not jaundiced.   Neurological:      General: No focal deficit present.      Mental Status: She is alert and oriented to person, place, and time.      Cranial Nerves: No cranial nerve deficit.      Motor: Weakness (bilateral legs) present.   Psychiatric:         Mood and Affect: Mood normal.         Behavior: Behavior normal.         Fluids    Intake/Output  Summary (Last 24 hours) at 12/21/2019 1633  Last data filed at 12/21/2019 1400  Gross per 24 hour   Intake 5314.31 ml   Output 4038 ml   Net 1276.31 ml       Laboratory  Recent Labs     12/20/19  0447 12/21/19  0322   WBC 9.2 12.2*   RBC 4.30 4.23   HEMOGLOBIN 13.4 13.2   HEMATOCRIT 39.2 38.9   MCV 91.2 92.0   MCH 31.2 31.2   MCHC 34.2 33.9   RDW 43.6 44.6   PLATELETCT 326 328   MPV 9.4 9.6     Recent Labs     12/20/19 0447 12/21/19  0322   SODIUM 137 139   POTASSIUM 3.5* 4.2   CHLORIDE 107 109   CO2 23 23   GLUCOSE 101* 128*   BUN 14 10   CREATININE 0.74 0.66   CALCIUM 8.6 8.2*                   Imaging  IR-MIDLINE CATHETER INSERTION WO GUIDANCE > AGE 3   Final Result                  Ultrasound-guided midline placement performed by qualified nursing staff    as above.          DX-CERVICAL SPINE-2 OR 3 VIEWS   Final Result      Digitized intraoperative radiograph is submitted for review.  This examination is not for diagnostic purpose but for guidance during a surgical procedure.      OUTSIDE IMAGES-CT CERVICAL SPINE   Final Result      DX-PORTABLE FLUORO > 1 HOUR    (Results Pending)        Assessment/Plan  * Cervical spine pain  Assessment & Plan  Recent history of falls  Neurosurgery consulted and on 12/20 took patient for cervical fixation last repair  Pain management  PT OT and rehab evaluation  Pain management    Central cord syndrome (HCC)  Assessment & Plan  Central cord injury.  Herniated C4-5 disk.  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    12/20 S/P surgical repair by Dr Linton.  1.  Anterior cervical diskectomy and iatrogenic fracture through C3-4 with   bilateral foraminotomies at C3-4.  2.  Anterior cervical diskectomy with bilateral foraminotomies at C4-5.  3.  C4 corpectomy.  4.  Placement of anterior interbody spacer using a Pyramesh cage with   autograft, allograft and BMP.  5.  Use of intraoperative fluoro for  analysis of anterior decompression of   spinal cord.  6.  Anterior plate fixation from C3 through C5 using [place] plate and 17 mm   bicortical screws placed under direct fluoro visualization.  7.  Use of fluoro for localization.  8.  Modifier 22.  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    Additionally, this patient had profound constriction at the C4 body requiring   a C4 corpectomy to decompress her anteriorly away from her ossification   posteriorly and is a baseline smoker with very poor bone quality.  9.  Use of intraoperative monitoring for motors and sensories given extreme   stenosis across the C4 body and C4-5 disk space as well as for placement of   the anterior cage at the C4 corpectomy site.    Will need PT/OT and likely Rehab placement.    Lumbar radiculopathy  Assessment & Plan  As per her cervical pain, this is a acutely worse on chronic basis.  MRI in November did not show any acute spinal cord injury.  On exam today she does have weakness however it is an inconsistent exam.  She has less than antigravity strength when specifically tested however demonstrates antigravity strength fairly easily when she is moving spontaneously.  Beyond that she does demonstrate spasticity in the plantar flexors which would imply some real underlying pathology.  Look forward to Dr. Jacobson's evaluation and recommendations      Mild intermittent asthma- (present on admission)  Assessment & Plan  Placed on O2 and respiratory protocols  Exam is benign    Anxiety and depression- (present on admission)  Assessment & Plan  Continue the patient's home venlafaxine and bupropion    Tobacco use- (present on admission)  Assessment & Plan  Smoking cessation       VTE prophylaxis: SCD

## 2019-12-20 NOTE — PROGRESS NOTES
Received bedside report from ER nurse Kayla. Patient transported to the unit with no complications. Dr. Aranda notified patient is on unit. Patient's belongings are in the room's closet. Call light within reach. Will continue to monitor.

## 2019-12-20 NOTE — PROGRESS NOTES
Neurosurgery Progress Note    Subjective:  Exam improving since last night more mobility and strength in her lower Ex BL    Exam:  A+OX3  CN Intact  LORI 4/5 with distal weakness>then prox 3/5 hand  C5 distribution pain hand numbness likely from cord numbness   BLE contracted with down pointing toes now 4-/5 all motor groups       BP  Min: 98/61  Max: 142/67  Pulse  Av.6  Min: 51  Max: 105  Resp  Av.7  Min: 16  Max: 21  Temp  Av.8 °C (98.3 °F)  Min: 36.7 °C (98 °F)  Max: 37.1 °C (98.8 °F)  SpO2  Av.6 %  Min: 89 %  Max: 99 %    No data recorded    Recent Labs     19  0447   WBC 9.2   RBC 4.30   HEMOGLOBIN 13.4   HEMATOCRIT 39.2   MCV 91.2   MCH 31.2   MCHC 34.2   RDW 43.6   PLATELETCT 326   MPV 9.4     Recent Labs     19  0447   SODIUM 137   POTASSIUM 3.5*   CHLORIDE 107   CO2 23   GLUCOSE 101*   BUN 14   CREATININE 0.74   CALCIUM 8.6               Intake/Output       19 0700 - 19 0659 19 0700 - 19 0659      0206-1629 [id number] Total [id number] [id number] Total       Intake    P.O.  --  160 160  --  -- --    P.O. -- 160 160 -- -- --    I.V.  --  114.3 114.3  --  -- --    Phenylephrine Volume -- 114.3 114.3 -- -- --    Other  --  60 60  --  -- --    Medications (PO/Enteral Liquids) -- 60 60 -- -- --    Total Intake -- 334.3 334.3 -- -- --       Output    Urine  --  250 250  --  -- --    Number of Times Voided -- 1 x 1 x -- -- --    Urine Void (mL) -- 250 250 -- -- --    Stool  --  -- --  --  -- --    Number of Times Stooled -- 0 x 0 x -- -- --    Total Output -- 250 250 -- -- --       Net I/O     -- 84.3 84.3 -- -- --            Intake/Output Summary (Last 24 hours) at 2019 0638  Last data filed at 2019 0600  Gross per 24 hour   Intake 334.34 ml   Output 250 ml   Net 84.34 ml            • zolpidem  10 mg HS PRN   • venlafaxine XR  75 mg Q EVENING   • famotidine  20 mg BID   • oxybutynin SR  15 mg BID   • methocarbamol  500 mg BID PRN   • gabapentin   400 mg BID   • buPROPion SR  150 mg BID   • senna-docusate  2 Tab BID    And   • polyethylene glycol/lytes  1 Packet QDAY PRN    And   • magnesium hydroxide  30 mL QDAY PRN    And   • bisacodyl  10 mg QDAY PRN   • Respiratory Care per Protocol   Continuous RT   • ondansetron  4 mg Q4HRS PRN   • ondansetron  4 mg Q4HRS PRN   • promethazine  12.5-25 mg Q4HRS PRN   • promethazine  12.5-25 mg Q4HRS PRN   • prochlorperazine  5-10 mg Q4HRS PRN   • acetaminophen  650 mg Q6HRS   • oxyCODONE immediate-release  5-10 mg Q4HRS PRN   • midodrine  10 mg TID WITH MEALS   • albuterol      • albuterol  2 Puff Q4H PRN (RT)   • Phenylephrine infusion  0-300 mcg/min Continuous       Assessment and Plan:  Hospital day # 2  POD# NA  Chemical prophylactic DVT therapy: No  Start date/time: TBD    OR today for a C4 corpectomy and BL C4-5 foraminotomy C3-4 disectomy, C4-5 discectomy with [place]ek interbody spacer possible BMP, and anterior screw and plate fixation     NPO  Hold anticoagulation   Maps >85 fo 5 days  ICU Q2 hr neuro checks   No role for regina Linton

## 2019-12-20 NOTE — ASSESSMENT & PLAN NOTE
Venlafaxine and bupropion  Denies suicidal ideations.  Patient reports her anxiety is well controlled with pot as an outpatient.

## 2019-12-20 NOTE — PROCEDURES
"Arterial Line Insertion  Date/Time: 12/19/2019 11:52 PM  Performed by: Austin Aranda Jr., D.O.  Authorized by: Austin Aranda Jr., D.O.   Consent: Written consent obtained.  Risks and benefits: risks, benefits and alternatives were discussed  Consent given by: patient  Required items: required blood products, implants, devices, and special equipment available  Patient identity confirmed: verbally with patient and arm band  Time out: Immediately prior to procedure a \"time out\" was called to verify the correct patient, procedure, equipment, support staff and site/side marked as required.  Preparation: Patient was prepped and draped in the usual sterile fashion.  Indications: hemodynamic monitoring  Location: left radial  Anesthesia: local infiltration    Anesthesia:  Local Anesthetic: lidocaine 1% without epinephrine    Sedation:  Patient sedated: no    Fabian's test normal: yes  Needle gauge: 20  Seldinger technique: Seldinger technique used  Number of attempts: 1  Post-procedure: line sutured and dressing applied  Post-procedure CMS: normal  Patient tolerance: Patient tolerated the procedure well with no immediate complications          "

## 2019-12-20 NOTE — ASSESSMENT & PLAN NOTE
-counseled for more than 10 minutes on tobacco cessation [id number]   -I have ordered nicotine replacement therapy

## 2019-12-20 NOTE — PROGRESS NOTES
Called pharmacy and spoke with Joana (pharmacist), about max dose of phenylephrine on a peripheral line, patient is currently on phenylephrine 10 mcg/min. Joana called Dr. Aranda and said that there is no max dose on this medication through a peripheral IV  per Dr. Aranda.

## 2019-12-20 NOTE — PROGRESS NOTES
Received report from Kayla, er nurse at [facility]. Patient transported to the unit with no complication. Dr. Aranda notified patient on unit. Patient's belongings are in room's closet. Call light within reach. Will continue to monitor.

## 2019-12-20 NOTE — ASSESSMENT & PLAN NOTE
Acute on chronic following several falls recently, monitoring for additional complications from these falls  Exam concerning for central cord type condition s/p surgical decompression Dr Linton 12/20  Neurosurgery on board, recommendations for blood pressure augmentation to MAP 85 mmHg, titrating Trevor-Synephrine and continue oral midodrine as well x5 days postop, stop date 12/25   Neurochecks to every 4 hours stable improving exam  Oral midodrine   lovenox cleared by neurosurgery start 12/23  Continue hard collar for support postop when out of bed  Per neurosurgery will start decadron for wound swelling

## 2019-12-20 NOTE — H&P
Hospital Medicine History & Physical Note    Date of Service  12/19/2019    Primary Care Physician  Edwar Platt M.D.    Consultants  Neurosurgery Dr. Jacobson    Code Status  Full    Chief Complaint  Back and neck pain    History of Presenting Illness  54 y.o. female who has a history of chronic back and neck pain for many years now.  She has been working with Dr. Jacobson as an outpatient.  She presents today complaining of worsening pain both in her neck and shoulders as well as in both legs.  She feels that this is secondary to a ground-level fall which she suffered several days ago.  She tripped and lost her balance hitting her head on a sharp metal edge.  There was no loss of consciousness.  She complains of pain in her neck rating up into the back of her head causing headaches, and radiating downwards into both upper shoulders.  She also reports numbness that extends all the way down to her fingertips.  The pain is worse with movement however is present constantly.  In her mid lower back she has pain centrally which radiates down into both legs all the way down to her toes.  The pain is worse on the left side but is present on the right as well.  She reports sensation loss extending in a stocking pattern from her toes all the way up to her lower back.  She states she has been able to walk in the last 24 hours due to the weakness.  She denies any bowel or bladder changes.  She had an outpatient MRI done of her cervical spine and lumbar spine on November 14.  These demonstrated multiple degenerative changes and some foraminal issues however did not show any cord compression.  At an outlying facility prior to transfer to us, she had a CT done this did not demonstrate any acute findings either.  She is sent to our facility for evaluation by neurosurgery Dr. Jacobson who has been consulted.    Review of Systems  Review of Systems   Constitutional: Negative for chills and fever.   HENT: Negative for nosebleeds and  sore throat.    Eyes: Negative for blurred vision and double vision.   Respiratory: Negative for cough and shortness of breath.    Cardiovascular: Negative for chest pain, palpitations and leg swelling.   Gastrointestinal: Negative for abdominal pain, diarrhea, nausea and vomiting.   Genitourinary: Negative for dysuria and urgency.   Musculoskeletal: Positive for back pain, falls, joint pain and neck pain.   Skin: Negative for rash.   Neurological: Positive for sensory change, focal weakness and weakness. Negative for dizziness, loss of consciousness and headaches.       Past Medical History   has a past medical history of Anxiety, Arthritis, Asthma, Cataract, Depression, and High cholesterol.    Surgical History   has a past surgical history that includes eye surgery (1972); lumpectomy; other (Left, 2005); vaginal hysterectomy scope total (N/A, 10/3/2017); salpingectomy (Bilateral, 10/3/2017); oophorectomy (Bilateral, 10/3/2017); anterior and posterior repair (Bilateral, 10/3/2017); enterocele repair (N/A, 10/3/2017); bladder sling female (N/A, 10/3/2017); and vaginal suspension (N/A, 10/3/2017).     Family History  family history includes Alcohol/Drug in her mother; Cancer in her brother and mother; Diabetes in her maternal aunt.     Social History   reports that she has been smoking cigarettes. She has a 6.50 pack-year smoking history. She has never used smokeless tobacco. She reports current drug use. Drug: Marijuana. She reports that she does not drink alcohol.    Allergies  Allergies   Allergen Reactions   • Codeine Unspecified     Unknown reaction         Medications  Prior to Admission Medications   Prescriptions Last Dose Informant Patient Reported? Taking?   Cholecalciferol 4000 units Tab 12/18/2019 at AM Patient Yes Yes   Sig: Take 4,000 Units by mouth every morning.   albuterol 108 (90 Base) MCG/ACT Aero Soln inhalation aerosol 12/18/2019 at AM Patient No No   Sig: Inhale 2 Puffs by mouth 4 times a day.    buPROPion (WELLBUTRIN XL) 300 MG XL tablet 12/18/2019 at PM Patient Yes Yes   Sig: Take 300 mg by mouth every evening.   fluticasone (FLONASE) 50 MCG/ACT nasal spray 12/18/2019 at AFTERNOON Patient Yes Yes   Sig: Spray 1 Spray in nose every day.   gabapentin (NEURONTIN) 400 MG Cap 12/18/2019 at AM Patient Yes Yes   Sig: Take 400 mg by mouth 2 Times a Day.   loratadine (CLARITIN) 10 MG Tab 12/18/2019 at AM Patient Yes Yes   Sig: Take 10 mg by mouth every day.   meloxicam (MOBIC) 15 MG tablet 12/18/2019 at AM Patient Yes Yes   Sig: Take 15 mg by mouth every day.   methocarbamol (ROBAXIN) 500 MG Tab 12/17/2019 at AM Patient Yes Yes   Sig: Take 500 mg by mouth 2 times a day as needed (MUSCLE SPASM).   oxybutynin (DITROPAN XL) 15 MG CR tablet 12/18/2019 at AM Patient Yes Yes   Sig: Take 15 mg by mouth 2 Times a Day.   raNITidine (ZANTAC) 150 MG Tab 12/18/2019 at AM Patient Yes Yes   Sig: Take 150 mg by mouth every morning.   venlafaxine XR (EFFEXOR XR) 75 MG CAPSULE SR 24 HR 12/18/2019 at PM Patient Yes No   Sig: Take 75 mg by mouth every evening.   zolpidem (AMBIEN) 10 MG Tab 12/17/2019 at PM Patient Yes No   Sig: Take 10 mg by mouth at bedtime as needed.      Facility-Administered Medications: None       Physical Exam  Temp:  [37.1 °C (98.8 °F)] 37.1 °C (98.8 °F)  Pulse:  [] 96  Resp:  [16] 16  BP: ()/(64-79) 99/73  SpO2:  [91 %-98 %] 98 %    Physical Exam  Constitutional:       General: She is not in acute distress.     Appearance: She is well-developed. She is not diaphoretic.   HENT:      Head: Normocephalic and atraumatic.   Neck:      Vascular: No JVD.   Cardiovascular:      Rate and Rhythm: Normal rate and regular rhythm.   Pulmonary:      Effort: Pulmonary effort is normal. No respiratory distress.      Breath sounds: No stridor. No wheezing or rales.   Abdominal:      Palpations: Abdomen is soft.      Tenderness: There is no tenderness. There is no guarding or rebound.   Skin:     General: Skin is  warm and dry.      Findings: No rash.   Neurological:      Mental Status: She is oriented to person, place, and time. Mental status is at baseline.      Comments: The patient reports subjectively decreased sensation in all 4 extremities however she is able to localize light touch with her eyes closed in all 4 extremities in all dermatomes.  She reports the sensation is not normal however.  Her strength is 2/5 at best in both lower extremities when I specifically test her however when moving in the bed and moving the sheets around, she does have demonstrate antigravity strength.  She does however demonstrate some spasticity and bilateral plantar flexion.   Psychiatric:         Thought Content: Thought content normal.         Laboratory:          No results for input(s): ALTSGPT, ASTSGOT, ALKPHOSPHAT, TBILIRUBIN, DBILIRUBIN, GAMMAGT, AMYLASE, LIPASE, ALB, PREALBUMIN, GLUCOSE in the last 72 hours.      No results for input(s): NTPROBNP in the last 72 hours.      No results for input(s): TROPONINT in the last 72 hours.    Urinalysis:    No results found     Imaging:  OUTSIDE IMAGES-CT CERVICAL SPINE   Final Result            Assessment/Plan:  I anticipate this patient will require at least two midnights for appropriate medical management, necessitating inpatient admission.    Cervical spine pain  Assessment & Plan  The patient has chronic pain however is acutely worsened likely secondary to a recent fall.  Neurosurgery has been consulted and we look forward to their evaluation.  Her physical exam as far as her motor function is somewhat inconsistent.  We will continue her outpatient pain medication and use some p.o. narcotics however will stay away from IV.    Lumbar radiculopathy  Assessment & Plan  As per her cervical pain, this is a acutely worse on chronic basis.  MRI in November did not show any acute spinal cord injury.  On exam today she does have weakness however it is an inconsistent exam.  She has less than  antigravity strength when specifically tested however demonstrates antigravity strength fairly easily when she is moving spontaneously.  Beyond that she does demonstrate spasticity in the plantar flexors which would imply some real underlying pathology.  Look forward to Dr. Jacobson's evaluation and recommendations      Anxiety and depression- (present on admission)  Assessment & Plan  Continue the patient's home venlafaxine and bupropion    Tobacco use- (present on admission)  Assessment & Plan  I spent approximately 5 minutes counseling the patient in smoking cessation.  She appears somewhat interested in trying.    Mild intermittent asthma- (present on admission)  Assessment & Plan  Placed on O2 and respiratory protocols  Exam is benign      VTE prophylaxis: Enoxaparin

## 2019-12-20 NOTE — CONSULTS
Critical Care Consultation    Date of consult: 12/19/2019    Referring Physician  Yong Zhong D.O.    Reason for Consultation  Spastic paraplegia, concern for cervical cord compression    History of Presenting Illness  54 y.o. female with a past medical history of hypercholesterolemia, anxiety, asthma, degenerative disc disease who presented 12/19/2019 as a transfer from [facility] for progressive paraplegia.  The patient reports over the last several days she has had several falls during which she hit her head and neck several times.  Prior to these events she was ambulating around her house with a cane however has had worsening lower extremity weakness and actually developed a contracture of her left lower extremity.  She presented to [facility] for these complaints and a CT scan of the neck was obtained which did not show any acute abnormalities.  An MRI from November which was done in our facility shows multilevel degenerative disc disease with moderate central canal stenosis at C4-C5.  The patient denies any incontinence of bowel or bladder or retention of bowel or bladder, she denies any saddle anesthesia.  The patient was seen by neurosurgery in the emergency department and determined to have significant neurologic findings consistent with central cord syndrome.  She is to be admitted to the ICU for neuro checks and BP support.  She is planned for operative intervention tomorrow.  At this time the patient's main complaint is lumbar pain with radicular symptoms in her lower extremities.    Code Status  Full Code    Review of Systems  Review of Systems   Constitutional: Negative for chills and fever.   Eyes: Negative for blurred vision.   Respiratory: Negative for cough, sputum production, shortness of breath and stridor.    Cardiovascular: Negative for chest pain.   Gastrointestinal: Negative for abdominal pain, nausea and vomiting.   Genitourinary: Negative for  dysuria.   Musculoskeletal: Positive for back pain, joint pain and neck pain. Negative for myalgias.   Skin: Negative for rash.   Neurological: Positive for tingling, sensory change and focal weakness. Negative for dizziness.       Past Medical History   has a past medical history of Anxiety, Arthritis, Asthma, Cataract, Depression, and High cholesterol.    Surgical History   has a past surgical history that includes eye surgery (1972); lumpectomy; other (Left, 2005); vaginal hysterectomy scope total (N/A, 10/3/2017); salpingectomy (Bilateral, 10/3/2017); oophorectomy (Bilateral, 10/3/2017); anterior and posterior repair (Bilateral, 10/3/2017); enterocele repair (N/A, 10/3/2017); bladder sling female (N/A, 10/3/2017); and vaginal suspension (N/A, 10/3/2017).    Family History  family history includes Alcohol/Drug in her mother; Cancer in her brother and mother; Diabetes in her maternal aunt.    Social History   reports that she has been smoking cigarettes. She has a 6.50 pack-year smoking history. She has never used smokeless tobacco. She reports current drug use. Drug: Marijuana. She reports that she does not drink alcohol.    Medications  Home Medications     Reviewed by Zeynep Murcia (Pharmacy Tech) on 12/19/19 at 1436  Med List Status: Complete   Medication Last Dose Status   albuterol 108 (90 Base) MCG/ACT Aero Soln inhalation aerosol 12/18/2019 Active   buPROPion (WELLBUTRIN XL) 300 MG XL tablet 12/18/2019 Active   Cholecalciferol 4000 units Tab 12/18/2019 Active   fluticasone (FLONASE) 50 MCG/ACT nasal spray 12/18/2019 Active   gabapentin (NEURONTIN) 400 MG Cap 12/18/2019 Active   loratadine (CLARITIN) 10 MG Tab 12/18/2019 Active   meloxicam (MOBIC) 15 MG tablet 12/18/2019 Active   methocarbamol (ROBAXIN) 500 MG Tab 12/17/2019 Active   oxybutynin (DITROPAN XL) 15 MG CR tablet 12/18/2019 Active   raNITidine (ZANTAC) 150 MG Tab 12/18/2019 Active   venlafaxine XR (EFFEXOR XR) 75 MG CAPSULE SR 24 HR  12/18/2019 Active   zolpidem (AMBIEN) 10 MG Tab 12/17/2019 Active              Current Facility-Administered Medications   Medication Dose Route Frequency Provider Last Rate Last Dose   • zolpidem (AMBIEN) tablet 10 mg  10 mg Oral HS PRN Yong Zhong, D.O.   10 mg at 12/19/19 2309   • venlafaxine XR (EFFEXOR XR) capsule 75 mg  75 mg Oral Q EVENING Yong Zhong, D.O.   75 mg at 12/19/19 1855   • famotidine (PEPCID) tablet 20 mg  20 mg Oral BID Yong Zhong, D.O.   20 mg at 12/19/19 1714   • oxybutynin SR (DITROPAN-XL) tablet 15 mg  15 mg Oral BID Yong Zhong, D.O.   15 mg at 12/19/19 1855   • methocarbamol (ROBAXIN) tablet 500 mg  500 mg Oral BID PRN Yong Zhong, D.O.   500 mg at 12/19/19 1729   • gabapentin (NEURONTIN) capsule 400 mg  400 mg Oral BID Yong Zhong, D.O.   400 mg at 12/19/19 1714   • buPROPion SR (WELLBUTRIN-SR) tablet 150 mg  150 mg Oral BID Yong Zhong, D.O.   150 mg at 12/19/19 1855   • senna-docusate (PERICOLACE or SENOKOT S) 8.6-50 MG per tablet 2 Tab  2 Tab Oral BID Yong Zhong, D.O.   2 Tab at 12/19/19 1714    And   • polyethylene glycol/lytes (MIRALAX) PACKET 1 Packet  1 Packet Oral QDAY PRN Yong Zhong D.O.        And   • magnesium hydroxide (MILK OF MAGNESIA) suspension 30 mL  30 mL Oral QDAY PRN Yong Zhong D.O.        And   • bisacodyl (DULCOLAX) suppository 10 mg  10 mg Rectal QDAY PRN Yong Zhong D.O.       • Respiratory Care per Protocol   Nebulization Continuous RT Yong Zhong D.O.       • ondansetron (ZOFRAN) syringe/vial injection 4 mg  4 mg Intravenous Q4HRS PRN WILL MartinO.       • ondansetron (ZOFRAN ODT) dispertab 4 mg  4 mg Oral Q4HRS PRN NANCY Martin.O.       • promethazine (PHENERGAN) tablet 12.5-25 mg  12.5-25 mg Oral Q4HRS PRN NANCY Martin.O.       • promethazine (PHENERGAN) suppository 12.5-25 mg   12.5-25 mg Rectal Q4HRS PRN Yong Zhong D.O.       • prochlorperazine (COMPAZINE) injection 5-10 mg  5-10 mg Intravenous Q4HRS PRN Yong Zhong D.O.       • acetaminophen (TYLENOL) tablet 650 mg  650 mg Oral Q6HRS Yong Zhong D.O.   650 mg at 12/19/19 2309   • oxyCODONE immediate-release (ROXICODONE) tablet 5-10 mg  5-10 mg Oral Q4HRS PRN Yong Zhong D.O.   5 mg at 12/19/19 2225   • midodrine (PROAMATINE) tablet 10 mg  10 mg Oral TID WITH MEALS Austin Aranda Jr., D.O.   10 mg at 12/19/19 2309   • ALBUTEROL SULFATE  (90 BASE) MCG/ACT INH AERS            • albuterol inhaler 2 Puff  2 Puff Inhalation Q4H PRN (RT) NANCY Martin.O.   2 Puff at 12/19/19 2257   • phenylephrine (ANTONIO-SYNEPHRINE) 40 mg in  mL Infusion  0-300 mcg/min Intravenous Continuous Austin Aranda Jr. D.O. 22.5 mL/hr at 12/20/19 0126 60 mcg/min at 12/20/19 0126       Allergies  Allergies   Allergen Reactions   • Codeine Unspecified     Unknown reaction         Vital Signs last 24 hours  Temp:  [36.7 °C (98 °F)-37.1 °C (98.8 °F)] 36.9 °C (98.4 °F)  Pulse:  [] 59  Resp:  [16-20] 20  BP: ()/(56-79) 127/75  SpO2:  [89 %-99 %] 92 %    Physical Exam  Physical Exam  HENT:      Head: Normocephalic and atraumatic.      Right Ear: External ear normal.      Left Ear: External ear normal.      Nose: Nose normal.      Mouth/Throat:      Mouth: Mucous membranes are dry.      Pharynx: Oropharynx is clear.      Comments: Edentulous  Eyes:      Extraocular Movements: Extraocular movements intact.      Conjunctiva/sclera: Conjunctivae normal.   Neck:      Musculoskeletal: Normal range of motion and neck supple.   Cardiovascular:      Rate and Rhythm: Normal rate and regular rhythm.   Pulmonary:      Effort: Pulmonary effort is normal.      Breath sounds: Normal breath sounds.   Abdominal:      General: Abdomen is flat. Bowel sounds are normal.      Palpations: Abdomen is soft.      Musculoskeletal:         General: No swelling or tenderness.        Back:    Skin:     General: Skin is warm and dry.   Neurological:      Mental Status: She is alert and oriented to person, place, and time.      GCS: GCS eye subscore is 4. GCS verbal subscore is 5. GCS motor subscore is 6.      Cranial Nerves: Cranial nerves are intact.      Sensory: Sensory deficit (Bilateral hand numbness) present.      Motor: Weakness, atrophy and abnormal muscle tone present.      Deep Tendon Reflexes: Babinski sign present on the right side. Babinski sign present on the left side.      Reflex Scores:       Patellar reflexes are 4+ on the right side and 4+ on the left side.     Comments: + Burrows's bilaterally. 4/5 bilateral upper extremity strength.    Significant bilateral lower extremity weakness with left lower extremity contracture.   Psychiatric:         Mood and Affect: Mood normal.         Behavior: Behavior normal.         Fluids    Intake/Output Summary (Last 24 hours) at 12/20/2019 0256  Last data filed at 12/20/2019 0200  Gross per 24 hour   Intake 184.34 ml   Output 250 ml   Net -65.66 ml       Laboratory  No results found for this or any previous visit (from the past 48 hour(s)).    Imaging  OUTSIDE IMAGES-CT CERVICAL SPINE   Final Result          Assessment/Plan  Cervical spine pain  Assessment & Plan  Acute on chronic following several falls recently  Exam concerning for central cord type condition  Neurosurgery on board, recommendations for blood pressure augmentation to 90 mmHg  Every hour neuro checks  Art line will be placed, vasopressors as needed to maintain MAP >90 mmHg  Hold lovenox and mobic for possible surgical intervention tomorrow  NPO after MN    Lumbar radiculopathy  Assessment & Plan  Acute on chronic  MRI from November reviewed  Neurosurgery on board    Anxiety and depression- (present on admission)  Assessment & Plan  Continue Wellbutrin and Effexor    Tobacco use- (present on  admission)  Assessment & Plan  Nicotine replacement protocol if needed  Cessation counseling    Mild intermittent asthma- (present on admission)  Assessment & Plan  PRN albuterol at home  RT/O2 Protocols  Titrate supplemental FiO2 to maintain SpO2 >88%      Discussed patient condition and risk of morbidity and/or mortality with Hospitalist, RN, Pharmacy, Code status disscussed, Charge nurse / hot rounds and Patient.      The patient remains critically ill.  Critical care time = 34 minutes in directly providing and coordinating critical care and extensive data review.  No time overlap and excludes procedures.

## 2019-12-20 NOTE — PROGRESS NOTES
2 RN skin check complete.  Devices in place: blood pressure cuff, oximeter, ECG leads, SCDs, peripheral IV.  Skin assessed under devices intact.  No confirmed pressure ulcers found.  No new potential pressure ulcers noted.  The following interventions in place: SCDs, repositioning q2 hours with pillows, heels floating on pillows.

## 2019-12-20 NOTE — ED NOTES
No change in pt condition, able to roll from side to side without difficulty, no skin breakdown noted when turned.

## 2019-12-20 NOTE — DIETARY
"Nutrition Services: Day 1 of admit.  Marilyn Salinas is a 54 y.o. female with admitting DX of Cervical spondylarthritis, Lumbar radicular pain  Consult received for Poor PO & Wt Loss 14-23# in 6 months on Nutrition Admit Screen (MST 3)    Pt states prior to admit she was eating okay. Pt reports wt loss in [id number] and denies wt loss recently. Pt reports her UBW is 135 lbs (61.4 kg).     Assessment:  Ht: 167.6 cm, Wt 12/19: 57.1 kg via bed scale, BMI: 20.33 - Normal  Diet: NPO since this morning. Per MD note plan for \"OR today for a C4 corpectomy and BL C4-5 foraminotomy C3-4 disectomy, C4-5 discectomy with Peek interbody spacer possible BMP, and anterior screw and plate fixation\"    Evaluation:   1. Pt with severe fat loss as evidenced by sunken orbital regions.  2. Per chart review pt's wt on 5/30: 60.8 kg. Wt loss percent is 6.1% in 7 months, which is not significant however it is worth noting.   3. Labs: K 3.5, Glucose 101  4. Meds: wellbutrin-sr, pepcid, neurontin, proamatine, ditropan-xl, pericolace, effexor xr, brooks-synephrine @ 40 mcg/min    Malnutrition Risk: Pt severe fat loss noted above. No other criteria noted at this time.     Recommendations/Plan:  1. Start PO diet when medically feasible.   2. Monitor weight.  3. Obtain supplement order per RD as needed.    RD following    "

## 2019-12-20 NOTE — ASSESSMENT & PLAN NOTE
PRN albuterol at home  RT/O2 Protocols  Titrate supplemental FiO2 to maintain SpO2 >88%  No exacerbation at this time, monitor for need for IV steroids  Nasal steroids for chronic sinus process as well as needed  Continue to monitor, okay so far

## 2019-12-20 NOTE — ASSESSMENT & PLAN NOTE
Acute on chronic  MRI from November reviewed with radiology  Neurosurgery on board, to the OR 12/20 for decompression of cervical abnormalities, appears successful for lower extremity central cord   Continue monitoring for neurological improvement and providing spinal cord supportive care

## 2019-12-20 NOTE — ANESTHESIA PROCEDURE NOTES
Airway  Date/Time: 12/20/2019 2:39 PM  Performed by: Kaleb Dye M.D.  Authorized by: Kaleb Dye M.D.     Location:  OR  Urgency:  Elective  Indications for Airway Management:  Anesthesia  Spontaneous Ventilation: absent    Sedation Level:  Deep  Preoxygenated: Yes    Patient Position:  Sniffing  Mask Difficulty Assessment:  1 - vent by mask  Final Airway Type:  Endotracheal airway  Final Endotracheal Airway:  ETT  Cuffed: Yes    Technique Used for Successful ETT Placement:  Video laryngoscopy  Insertion Site:  Oral  Blade Type:  Glide  Laryngoscope Blade/Videolaryngoscope Blade Size:  3  ETT Size (mm):  7.0  Measured from:  Lips  ETT to Lips (cm):  21  Placement Verified by: auscultation and capnometry    Cormack-Lehane Classification:  Grade I - full view of glottis  Number of Attempts at Approach:  1  Number of Other Approaches Attempted:  0   Head/neck kept in neutral position throughout induction/intubation.

## 2019-12-20 NOTE — PROGRESS NOTES
Critical Care Progress Note    Date of admission  12/19/2019    Chief Complaint  54 y.o. female admitted 12/19/2019 with spastic paraplegia    Hospital Course    54 y.o. female with a past medical history of hypercholesterolemia, anxiety, asthma, degenerative disc disease who presented 12/19/2019 as a transfer from [facility] for progressive paraplegia.  The patient reports over the last several days she has had several falls during which she hit her head and neck several times.  Prior to these events she was ambulating around her house with a cane however has had worsening lower extremity weakness and actually developed a contracture of her left lower extremity.  She presented to [facility] for these complaints and a CT scan of the neck was obtained which did not show any acute abnormalities.  An MRI from November which was done in our facility shows multilevel degenerative disc disease with moderate central canal stenosis at C4-C5.  The patient denies any incontinence of bowel or bladder or retention of bowel or bladder, she denies any saddle anesthesia.  The patient was seen by neurosurgery in the emergency department and determined to have significant neurologic findings consistent with central cord syndrome.  She is to be admitted to the ICU for neuro checks and BP support.  She is planned for operative intervention tomorrow.  At this time the patient's main complaint is lumbar pain with radicular symptoms in her lower extremities.      Interval Problem Update  Reviewed last 24 hour events:    A&O x4  No change numbness in UE and LE weakenss  Neuro checks no change overnight  SB/SR  SBp 120-140s/90s  ANTONIO 60 -> 40 - goal MAP > 90  UO good  NPO  Tm 98.8  PIVs  L Radial A line  No CXR  1 lpm NC  Midodrine 10 tid    IS  Continue to titrate Antonio-Synephrine  Central line placement as needed  N.p.o.  To OR 2 PM cervical spine surgery and decompression/C5 corpectomy  Review with  neurosurgery on return Re: Blood pressure goals postop another treatment to support spinal cord and hopefully its recovery from impingement    Review of Systems  Review of Systems   Constitutional: Negative for chills, fever and malaise/fatigue.   HENT: Negative for congestion and sore throat.    Eyes: Negative for blurred vision and double vision.   Respiratory: Negative for cough, sputum production and shortness of breath.    Cardiovascular: Negative for chest pain and palpitations.   Gastrointestinal: Negative for abdominal pain, diarrhea, nausea and vomiting.   Genitourinary: Negative for dysuria, flank pain, frequency and hematuria.   Musculoskeletal: Positive for back pain and neck pain.   Neurological: Positive for tingling, sensory change and focal weakness. Negative for speech change and headaches.   Endo/Heme/Allergies: Does not bruise/bleed easily.   Psychiatric/Behavioral: The patient is not nervous/anxious.         Vital Signs for last 24 hours   Temp:  [36.1 °C (97 °F)-36.9 °C (98.4 °F)] 36.8 °C (98.2 °F)  Pulse:  [50-93] 73  Resp:  [13-78] 20  BP: ()/(56-77) 137/73  SpO2:  [89 %-99 %] 96 %    Hemodynamic parameters for last 24 hours       Respiratory Information for the last 24 hours       Physical Exam   Physical Exam  Vitals signs reviewed.   Constitutional:       General: She is not in acute distress.     Appearance: She is underweight. She is ill-appearing.      Interventions: Nasal cannula in place.   HENT:      Head: Normocephalic and atraumatic.      Mouth/Throat:      Mouth: Mucous membranes are moist.   Eyes:      General: No scleral icterus.     Extraocular Movements: Extraocular movements intact.      Pupils: Pupils are equal, round, and reactive to light.   Neck:      Musculoskeletal: Neck supple.      Thyroid: No thyromegaly.      Trachea: Phonation normal.   Cardiovascular:      Rate and Rhythm: Normal rate and regular rhythm.  No extrasystoles are present.     Pulses: Normal  pulses.      Heart sounds: No murmur. No friction rub. No gallop.       Comments: Sinus rhythm  Pulmonary:      Effort: No accessory muscle usage.      Breath sounds: No wheezing, rhonchi or rales.   Abdominal:      General: Abdomen is flat. Bowel sounds are normal.      Palpations: Abdomen is soft.      Tenderness: There is tenderness. There is no right CVA tenderness, left CVA tenderness or guarding. Positive signs include Rovsing's sign.   Lymphadenopathy:      Cervical: No cervical adenopathy.   Skin:     General: Skin is warm and dry.      Capillary Refill: Capillary refill takes less than 2 seconds.      Coloration: Skin is not cyanotic.      Nails: There is no clubbing.     Neurological:      Mental Status: She is alert and oriented to person, place, and time.      GCS: GCS eye subscore is 4. GCS verbal subscore is 5. GCS motor subscore is 6.      Comments: Upper extremity mild weakness, moderate to severe lower extremity weakness bilaterally, sensation intact in lower extremities but there is some tingling and numbness diffusely in the upper extremities, mentation normal and no cranial nerve deficits     Psychiatric:         Attention and Perception: Attention normal.         Mood and Affect: Mood normal.         Speech: Speech normal.         Behavior: Behavior normal.         Cognition and Memory: Cognition normal.         Medications  Current Facility-Administered Medications   Medication Dose Route Frequency Provider Last Rate Last Dose   • Pharmacy Consult Request ...Pain Management Review 1 Each  1 Each Other PHARMACY TO DOSE BIB McgarryPRENEE.       • MD ALERT...DO NOT ADMINISTER NSAIDS or ASPIRIN unless ORDERED By Neurosurgery 1 Each  1 Each Other PRN BIB McgarryP.N.       • docusate sodium (COLACE) capsule 100 mg  100 mg Oral BID HUMBERTO Mcgarry.P.N.   100 mg at 12/20/19 1913   • senna-docusate (PERICOLACE or SENOKOT S) 8.6-50 MG per tablet 1 Tab  1 Tab Oral Nightly Elsy ARREAGA  Clayton, A.P.N.       • senna-docusate (PERICOLACE or SENOKOT S) 8.6-50 MG per tablet 1 Tab  1 Tab Oral Q24HRS PRN Elsy Arnold, A.P.N.       • polyethylene glycol/lytes (MIRALAX) PACKET 1 Packet  1 Packet Oral BID PRN Elsy Arnold, A.P.N.       • magnesium hydroxide (MILK OF MAGNESIA) suspension 30 mL  30 mL Oral QDAY PRN Elsy Arnold, A.P.N.       • bisacodyl (DULCOLAX) suppository 10 mg  10 mg Rectal Q24HRS PRN Elsy Arnold, A.P.N.       • fleet enema 133 mL  1 Each Rectal Once PRN Elsy Arnold, A.P.N.       • 0.9 % NaCl with KCl 20 mEq infusion   Intravenous Continuous Elsy Arnold, A.P.N. 100 mL/hr at 12/20/19 1926     • ondansetron (ZOFRAN) syringe/vial injection 4 mg  4 mg Intravenous Q4HRS PRN Elsy Arnold, A.P.N.       • ondansetron (ZOFRAN ODT) dispertab 4 mg  4 mg Oral Q4HRS PRN Elsy Arnold, A.P.N.       • promethazine (PHENERGAN) tablet 12.5-25 mg  12.5-25 mg Oral Q4HRS PRN Elsy Arnold, A.P.N.       • promethazine (PHENERGAN) suppository 12.5-25 mg  12.5-25 mg Rectal Q4HRS PRN Elsy Arnold, A.P.N.       • prochlorperazine (COMPAZINE) injection 5-10 mg  5-10 mg Intravenous Q4HRS PRN Elsy Arnold, A.P.N.       • diphenhydrAMINE (BENADRYL) tablet/capsule 25 mg  25 mg Oral Q6HRS PRN Elsy Arnold, A.P.N.        Or   • diphenhydrAMINE (BENADRYL) injection 25 mg  25 mg Intravenous Q6HRS PRN Elsy Arnold, A.P.N.       • ceFAZolin in dextrose (ANCEF) IVPB premix 2 g  2 g Intravenous Q8HR BIB McgarryPNonaN.       • benzocaine-menthol (CEPACOL) lozenge 1 Lozenge  1 Lozenge Mouth/Throat Q2HRS PRN BIB McgarryP.N.       • zolpidem (AMBIEN) tablet 10 mg  10 mg Oral HS PRN Yong Zhong D.O.   10 mg at 12/19/19 2309   • venlafaxine XR (EFFEXOR XR) capsule 75 mg  75 mg Oral Q EVENING Yong Zhong D.O.   75 mg at 12/20/19 1903   • famotidine (PEPCID) tablet 20 mg  20 mg Oral BID Yong Zhong D.O.   20 mg at  12/20/19 1913   • oxybutynin SR (DITROPAN-XL) tablet 15 mg  15 mg Oral BID Yong Zhong, D.O.   15 mg at 12/20/19 1904   • methocarbamol (ROBAXIN) tablet 500 mg  500 mg Oral BID PRN Yong Zhong D.O.   500 mg at 12/19/19 1729   • gabapentin (NEURONTIN) capsule 400 mg  400 mg Oral BID Yong Zhong D.O.   400 mg at 12/20/19 1859   • buPROPion SR (WELLBUTRIN-SR) tablet 150 mg  150 mg Oral BID Yong Zhong, D.O.   150 mg at 12/20/19 1902   • Respiratory Care per Protocol   Nebulization Continuous RT Yong Zhong D.O.       • acetaminophen (TYLENOL) tablet 650 mg  650 mg Oral Q6HRS Yong Zhong D.O.   650 mg at 12/20/19 1914   • oxyCODONE immediate-release (ROXICODONE) tablet 5-10 mg  5-10 mg Oral Q4HRS PRN Yong Zhong D.O.   10 mg at 12/20/19 1857   • midodrine (PROAMATINE) tablet 10 mg  10 mg Oral TID WITH MEALS Austin Aranda Jr., D.O.   10 mg at 12/20/19 1900   • albuterol inhaler 2 Puff  2 Puff Inhalation Q4H PRN (RT) Yong Zhong D.O.   2 Puff at 12/19/19 2257   • phenylephrine (ANTONIO-SYNEPHRINE) 40 mg in  mL Infusion  0-300 mcg/min Intravenous Continuous Austin Aranda Jr., D.O. 11.2 mL/hr at 12/20/19 1745 29.867 mcg/min at 12/20/19 1745       Fluids    Intake/Output Summary (Last 24 hours) at 12/20/2019 2115  Last data filed at 12/20/2019 2000  Gross per 24 hour   Intake 2164.01 ml   Output 1800 ml   Net 364.01 ml       Laboratory          Recent Labs     12/20/19  0447   SODIUM 137   POTASSIUM 3.5*   CHLORIDE 107   CO2 23   BUN 14   CREATININE 0.74   CALCIUM 8.6     Recent Labs     12/20/19  0447   GLUCOSE 101*     Recent Labs     12/20/19  0447   WBC 9.2   NEUTSPOLYS 47.20   LYMPHOCYTES 41.00   MONOCYTES 7.70   EOSINOPHILS 3.00   BASOPHILS 0.80     Recent Labs     12/20/19 0447   RBC 4.30   HEMOGLOBIN 13.4   HEMATOCRIT 39.2   PLATELETCT 326       Imaging  CT:    Reviewed  MRI:   Reviewed    Assessment/Plan  Central  cord syndrome (HCC)  Assessment & Plan  Neurologic signs symptoms consistent with this process  Neurosurgical consultation obtained, operative decompression for 12/20  Supporting perfusion to spinal cord with active titration of Trevor-Synephrine  Maintain euvolemia with fluids, serial exam to reassess  No role for steroids at this time physical therapy, Occupational Therapy and physiatry consultation in the near future  Correlation mid cervical region, monitor for progression and respiratory compromise requiring intubation mechanical ventilation    Cervical spine pain  Assessment & Plan  Acute on chronic following several falls recently, monitoring for additional complications from these falls  Exam concerning for central cord type condition, findings persisting  Neurosurgery on board, recommendations for blood pressure augmentation to 90 mmHg, titrating Trevor-Synephrine  Continuing every hour neuro checks, unchanged, reduce to every 12 hours when okay with neurosurgery  Arterial line placed 12/19, vasopressors as needed to maintain MAP >90 mmHg  Hold lovenox and mobic for surgical intervention today  N.p.o. for now    Lumbar radiculopathy  Assessment & Plan  Acute on chronic  MRI from November reviewed with radiology  Neurosurgery on board, to the OR today for decompression of cervical abnormalities    Mild intermittent asthma- (present on admission)  Assessment & Plan  PRN albuterol at home  RT/O2 Protocols  Titrate supplemental FiO2 to maintain SpO2 >88%  No exacerbation at this time, monitor for need for IV steroids  Nasal steroids for chronic sinus process as well as needed    Anxiety and depression- (present on admission)  Assessment & Plan  Resume Wellbutrin and Effexor, n.p.o. today  As needed benzodiazepines    Tobacco use- (present on admission)  Assessment & Plan  Nicotine replacement protocol as needed  Cessation counseling ongoing       VTE:  SCDs  Ulcer: H2 Antagonist  Lines: Arterial Line  Ongoing  indication addressed    I have performed a physical exam and reviewed and updated ROS and Plan today (12/20/2019). In review of yesterday's note (12/19/2019), there are no changes except as documented above.     Patient remains critically ill.  Patient with significant cord impingement.  Patient with upper and lower extremity nerve neuropathic findings.  Ongoing blood pressure support with fluids and active titration of Trevor-Synephrine continuous infusion.  Going to the OR today for this neurosurgical procedure.  Prognosis guarded.  Patient has significant increased risk for morbidity and mortality without above critical care interventions.    Discussed patient condition and risk of morbidity and/or mortality with Hospitalist, RN, RT, Pharmacy, , Charge nurse / hot rounds, Patient and neurosurgery     The patient remains critically ill.  Critical care time = 40 minutes in directly providing and coordinating critical care and extensive data review.  No time overlap and excludes procedures.

## 2019-12-20 NOTE — ANESTHESIA PREPROCEDURE EVALUATION
"Cervical central cord syndrome after fall, spastic paraplegia in all extremities. Pt on phenylephrine ggt to keep MAP > 90. Denies problems with anesthesia in the past.    Relevant Problems   PULMONARY   (+) Mild intermittent asthma       Physical Exam    Airway   Mallampati: II  TM distance: >3 FB  Neck ROM: full       Cardiovascular - normal exam  Rhythm: regular  Rate: normal  (-) murmur     Dental - normal exam      Very poor dentition   Pulmonary - normal exam  Breath sounds clear to auscultation     Abdominal    Neurological - abnormal exam    Comments: Spastic paraplegia in BLE. Upper extremities weak and \"painful to move.\"             Anesthesia Plan    ASA 3 (Acute spastic paraplegia)   ASA physical status 3 criteria: COPD and other (comment)    Plan - general       Airway plan will be ETT        Induction: intravenous    Postoperative Plan: Postoperative administration of opioids is intended.    Pertinent diagnostic labs and testing reviewed    Informed Consent:    Anesthetic plan and risks discussed with patient.    Use of blood products discussed with: patient whom consented to blood products.         "

## 2019-12-20 NOTE — CONSULTS
DATE OF SERVICE:  12/19/2019    NEUROSURGERY CONSULTATION    CHIEF COMPLAINT AND REASON FOR CONSULTATION:  Central cord/spinal cord injury.    BRIEF HISTORY OF PRESENT ILLNESS:  This is a woman born in 1965, who has a   history of degenerative disk disease and history of premature birth   approximately 3 months early and had some congenital-related developmental   delay.  She presented to [facility] with concerns for progressive paraplegia   and frequent falls.  She has a past medical history also significant for foot   surgery and chronic axial and radicular lumbar pain, which she was being   referred originally to Dr. Jacobson from that location.  Over the last several   days, the patient has had progressive falls where she hit her head several   times.  She estimates approximately 5 times giving herself severe concussion.    In addition to that, she has had several times where she has head her neck   and the back of her neck with her falls and originally was able to ambulate   successfully with a cane; however, over the last several days she has   progressed to using a rolling walker and now she has lower extremity   contractures and had significant difficulty moving her lower limbs as well as   strength in her hands consistent with likely a central cord injury.  She has   MRIs of both her cervical spine and lumbar spine, which shows severe   degenerative disk disease.  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    She has bilateral hyperreflexia and Burrows's as well as contractures and   spastic paraplegia on examination consistent with her MRI findings of cervical   compression and likely cervical  "spinal cord injury.  This has been going on   for several days; however, it has worsened over the last 72 hours.  The last   time the patient states that she was able to ambulate successfully without the   use of a significant aid was likely Monday or Tuesday.  She also has had   several head traumas, \"snowglobe.\"  At this time, she does not report any   bowel or bladder incontinence.  She had a recent head CT at the outside   institution, which did not demonstrate any strokes or other abnormalities.    REVIEW OF SYSTEMS:  A 12-point review of systems as per HPI.  The patient has   had progressive weakness with concern for spinal cord injury.  She is not   complaining of bowel or bladder incontinence at this time.  She has   significant deficits in the bilateral lower extremities where she has   difficulty with knee flexion, EHL, foot flexion and extension.  She says that   she has been unable to use her legs in a coordinated manner for some time.    She has no chest pain or shortness of breath.    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  She also seems   to have some cognitive delay from this.  It is unclear if this is secondary to   the prematurity or the persistent head trauma that she has suffered in the   last few days.  Her son-in-law or son who is at the bedside states that she is   very emotional and has been having some cognitive trouble recently.    SURGICAL HISTORY:  Significant for a foot surgery in 2005, which left her with   a contracted foot.    MEDICATIONS:  Please see EMR.    PHYSICAL EXAMINATION:  GENERAL:  The patient is alert.  She is oriented x3, able to answer questions   appropriately.  She does have some confusion and some tangential thinking.    She has to be refocused several times during the course of the examination to   figure out exactly what her symptoms are related to her neck trauma.  However,   she is able to be refocused and she can answer questions " appropriately.  HEENT:  Appears to be atraumatic and normocephalic.  She has no contusions or   lacerations.  She does have restricted range of motion of the neck with neck   pain at the axial.  She said this has worse recently after her trauma.  She   has missing dentition.  NEUROLOGIC:  Cranial nerves are grossly intact.  EXTREMITIES:  Bilateral upper extremities, the patient has 4/5 strength in the   deltoids, 4+/5 strength in the biceps and triceps.  She has 4/5 strength in   the intrinsic hand muscles, wrist extension, wrist flexion.  She has pain in   the C5 distribution and had numbness in her hands as well.  Bilateral lower   extremity, with prompting and significant encouragement, the patient is able   to lift her legs off the bed.  She is able to bend her knees and flex her   thigh.  She seems to have foot contractures in the extended position and toe   pointing and she has rigidity and increased stiffness in the lower leg.  She   has 4/5 at best in the lower extremities in all motor groups with the   exception of the hamstrings are 3/5, foot EHL is 3/5.  She has hyperreflexia   and spasticity in the lower extremities, 3+/5 in the knees.  She has bilateral   Babinski's and clonus as well.    IMAGING:  She had a CT with report from the outside that demonstrates no   abnormal findings in the cranium.  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  She has a significant disk herniation   and bulge that is both central and lateral at the C4-C5 level that is   compounded by an ossified ligamentum flavum at the C4-C5 level causing ____.    She also has bilateral severe neural foraminal compression due to disk   herniation laterally at this level.  Lumbar MRI with and without contrast   shows severe degenerative disk disease, most  profound at the L5-S1 level with   disk herniation.  She has neural foraminal stenosis throughout the lumbar   spine; however, she does not have substantial central stenosis.  At the top of   her lumbar spine, the lower thoracic disk can be viewed.  They do not show   significant degenerative disk disease, which would be concerning for potential   need for an MRI of the thoracic spine at this time.    ASSESSMENT AND PLAN:  This is a woman born in 1965, who presents with symptoms   of severe spinal cord injury after having frequent falls over the last   several days.  On evaluation, she has symptoms consistent with spastic   paraplegia, bilateral upper extremity hyperreflexia, and weakness with distal   greater than proximal weakness consistent with central cord injury.  Her   bilateral lower extremities do shows significant weakness and spasticity along   with rigidity, concerning for longstanding cord compression that has been   exacerbated by her recent fall.  At this time, given her imaging, we do not   feel that it is not necessary to reimage her with thoracic MRI without   contrast.  It is evident based on her Ham as well as her bilateral lower   extremity rigidity that she likely is symptomatic from the C4-C5 disk with   hypertrophy of the ligamentum flavum behind the C4 lamina.  Given that it   would be very difficult to address her cervical compression from the disk at   the C4-C5 level without an anterior approach, at this time we feel that it   would be reasonable to perform a C4 corpectomy to have reduction in her   overall posterior compression of the cord with additionally adding a C3-C4   diskectomy and using ultrasound to see if the patient has decompression of the   cord anteriorly ventrally.  This will allow us to also decompress the   bilateral C5 radiculopathies that the patient is complaining of due to her   severe shoulder pain and C5 weakness.  We have scheduled this case for   tomorrow  given that she has had progressive paraplegia over the last several   weeks with severe onset of spasticity in the last 48-24 hours.  We have   scheduled her for a C4 corpectomy with the above findings.  We have discussed   the case with the neuro critical care attending to have her placed in the ICU   with blood pressure parameters of greater than 90.  The ____ demonstrates   there is no role for steroids in this patient.  She should have an A-line with   q. 1 hour neuro checks.  We will keep her n.p.o. at midnight and appropriate   labs were obtained at [facility], which showed that she is not coagulopathic   at this time.  The case is posted right now for 2 p.m. with neuromonitoring   due to her severe compression.       ____________________________________     MD MITCH Dumas / JANE    DD:  12/19/2019 17:13:52  DT:  12/19/2019 22:40:01    D#:  [id number]  Job#:  [id number]

## 2019-12-20 NOTE — CARE PLAN
Problem: Bronchoconstriction:  Goal: Improve in air movement and diminished wheezing  Outcome: PROGRESSING AS EXPECTED     Alb PRN ordered per RT protocol per pts home routine   Given PRN x 1 at 2257

## 2019-12-20 NOTE — ED NOTES
Patient resting quietly in bed without distress, denies any complaints or needs at this time.  Call bell within reach.

## 2019-12-20 NOTE — CARE PLAN
Problem: Communication  Goal: The ability to communicate needs accurately and effectively will improve. Patient has been encouraged to express feelings of hospital stay.   Outcome: PROGRESSING AS EXPECTED     Problem: Safety  Goal: Will remain free from injury: Hospital clothing on, bed at lowest level possible. Bed alarm activated, yellow bracelet on.   Outcome: PROGRESSING AS EXPECTED

## 2019-12-21 ENCOUNTER — APPOINTMENT (OUTPATIENT)
Dept: RADIOLOGY | Facility: MEDICAL CENTER | Age: 54
DRG: 029 | End: 2019-12-21
Attending: INTERNAL MEDICINE
Payer: MEDICAID

## 2019-12-21 LAB
ANION GAP SERPL CALC-SCNC: 7 MMOL/L (ref 0–11.9)
BASOPHILS # BLD AUTO: 0.2 % (ref 0–1.8)
BASOPHILS # BLD: 0.03 K/UL (ref 0–0.12)
BUN SERPL-MCNC: 10 MG/DL (ref 8–22)
CALCIUM SERPL-MCNC: 8.2 MG/DL (ref 8.5–10.5)
CHLORIDE SERPL-SCNC: 109 MMOL/L (ref 96–112)
CO2 SERPL-SCNC: 23 MMOL/L (ref 20–33)
CREAT SERPL-MCNC: 0.66 MG/DL (ref 0.5–1.4)
EOSINOPHIL # BLD AUTO: 0 K/UL (ref 0–0.51)
EOSINOPHIL NFR BLD: 0 % (ref 0–6.9)
ERYTHROCYTE [DISTWIDTH] IN BLOOD BY AUTOMATED COUNT: 44.6 FL (ref 35.9–50)
GLUCOSE SERPL-MCNC: 128 MG/DL (ref 65–99)
HCT VFR BLD AUTO: 38.9 % (ref 37–47)
HGB BLD-MCNC: 13.2 G/DL (ref 12–16)
IMM GRANULOCYTES # BLD AUTO: 0.05 K/UL (ref 0–0.11)
IMM GRANULOCYTES NFR BLD AUTO: 0.4 % (ref 0–0.9)
LYMPHOCYTES # BLD AUTO: 1.16 K/UL (ref 1–4.8)
LYMPHOCYTES NFR BLD: 9.5 % (ref 22–41)
MCH RBC QN AUTO: 31.2 PG (ref 27–33)
MCHC RBC AUTO-ENTMCNC: 33.9 G/DL (ref 33.6–35)
MCV RBC AUTO: 92 FL (ref 81.4–97.8)
MONOCYTES # BLD AUTO: 0.51 K/UL (ref 0–0.85)
MONOCYTES NFR BLD AUTO: 4.2 % (ref 0–13.4)
NEUTROPHILS # BLD AUTO: 10.43 K/UL (ref 2–7.15)
NEUTROPHILS NFR BLD: 85.7 % (ref 44–72)
NRBC # BLD AUTO: 0 K/UL
NRBC BLD-RTO: 0 /100 WBC
PLATELET # BLD AUTO: 328 K/UL (ref 164–446)
PMV BLD AUTO: 9.6 FL (ref 9–12.9)
POTASSIUM SERPL-SCNC: 4.2 MMOL/L (ref 3.6–5.5)
RBC # BLD AUTO: 4.23 M/UL (ref 4.2–5.4)
SODIUM SERPL-SCNC: 139 MMOL/L (ref 135–145)
WBC # BLD AUTO: 12.2 K/UL (ref 4.8–10.8)

## 2019-12-21 PROCEDURE — 92610 EVALUATE SWALLOWING FUNCTION: CPT

## 2019-12-21 PROCEDURE — 99291 CRITICAL CARE FIRST HOUR: CPT | Performed by: INTERNAL MEDICINE

## 2019-12-21 PROCEDURE — 700105 HCHG RX REV CODE 258: Performed by: INTERNAL MEDICINE

## 2019-12-21 PROCEDURE — 700102 HCHG RX REV CODE 250 W/ 637 OVERRIDE(OP): Performed by: INTERNAL MEDICINE

## 2019-12-21 PROCEDURE — 700101 HCHG RX REV CODE 250: Performed by: NURSE PRACTITIONER

## 2019-12-21 PROCEDURE — A9270 NON-COVERED ITEM OR SERVICE: HCPCS | Performed by: NURSE PRACTITIONER

## 2019-12-21 PROCEDURE — 700111 HCHG RX REV CODE 636 W/ 250 OVERRIDE (IP): Performed by: NURSE PRACTITIONER

## 2019-12-21 PROCEDURE — 700112 HCHG RX REV CODE 229: Performed by: NURSE PRACTITIONER

## 2019-12-21 PROCEDURE — 700111 HCHG RX REV CODE 636 W/ 250 OVERRIDE (IP): Performed by: INTERNAL MEDICINE

## 2019-12-21 PROCEDURE — A9270 NON-COVERED ITEM OR SERVICE: HCPCS | Performed by: INTERNAL MEDICINE

## 2019-12-21 PROCEDURE — C1751 CATH, INF, PER/CENT/MIDLINE: HCPCS

## 2019-12-21 PROCEDURE — 770022 HCHG ROOM/CARE - ICU (200)

## 2019-12-21 PROCEDURE — 99233 SBSQ HOSP IP/OBS HIGH 50: CPT | Performed by: HOSPITALIST

## 2019-12-21 PROCEDURE — A9270 NON-COVERED ITEM OR SERVICE: HCPCS | Performed by: HOSPITALIST

## 2019-12-21 PROCEDURE — 700102 HCHG RX REV CODE 250 W/ 637 OVERRIDE(OP): Performed by: NURSE PRACTITIONER

## 2019-12-21 PROCEDURE — 80048 BASIC METABOLIC PNL TOTAL CA: CPT

## 2019-12-21 PROCEDURE — 85025 COMPLETE CBC W/AUTO DIFF WBC: CPT

## 2019-12-21 PROCEDURE — 700102 HCHG RX REV CODE 250 W/ 637 OVERRIDE(OP): Performed by: HOSPITALIST

## 2019-12-21 RX ORDER — MIDODRINE HYDROCHLORIDE 5 MG/1
15 TABLET ORAL
Status: DISCONTINUED | OUTPATIENT
Start: 2019-12-21 | End: 2019-12-27 | Stop reason: HOSPADM

## 2019-12-21 RX ADMIN — BUPROPION HYDROCHLORIDE 150 MG: 150 TABLET, EXTENDED RELEASE ORAL at 17:28

## 2019-12-21 RX ADMIN — BUPROPION HYDROCHLORIDE 150 MG: 150 TABLET, EXTENDED RELEASE ORAL at 05:05

## 2019-12-21 RX ADMIN — MIDODRINE HYDROCHLORIDE 10 MG: 5 TABLET ORAL at 07:59

## 2019-12-21 RX ADMIN — MIDODRINE HYDROCHLORIDE 15 MG: 5 TABLET ORAL at 12:03

## 2019-12-21 RX ADMIN — FAMOTIDINE 20 MG: 20 TABLET ORAL at 05:03

## 2019-12-21 RX ADMIN — PHENYLEPHRINE HYDROCHLORIDE 70 MCG/MIN: 10 INJECTION INTRAVENOUS at 19:45

## 2019-12-21 RX ADMIN — POTASSIUM CHLORIDE AND SODIUM CHLORIDE: 900; 150 INJECTION, SOLUTION INTRAVENOUS at 15:50

## 2019-12-21 RX ADMIN — OXYBUTYNIN CHLORIDE 15 MG: 5 TABLET, EXTENDED RELEASE ORAL at 17:29

## 2019-12-21 RX ADMIN — ACETAMINOPHEN 650 MG: 325 TABLET, FILM COATED ORAL at 05:04

## 2019-12-21 RX ADMIN — OXYBUTYNIN CHLORIDE 15 MG: 5 TABLET, EXTENDED RELEASE ORAL at 05:03

## 2019-12-21 RX ADMIN — CEFAZOLIN SODIUM 2 G: 2 INJECTION, SOLUTION INTRAVENOUS at 08:19

## 2019-12-21 RX ADMIN — VENLAFAXINE HYDROCHLORIDE 75 MG: 75 CAPSULE, EXTENDED RELEASE ORAL at 17:28

## 2019-12-21 RX ADMIN — ACETAMINOPHEN 650 MG: 325 TABLET, FILM COATED ORAL at 12:03

## 2019-12-21 RX ADMIN — DOCUSATE SODIUM 100 MG: 100 CAPSULE, LIQUID FILLED ORAL at 06:00

## 2019-12-21 RX ADMIN — POTASSIUM CHLORIDE AND SODIUM CHLORIDE: 900; 150 INJECTION, SOLUTION INTRAVENOUS at 05:56

## 2019-12-21 RX ADMIN — ACETAMINOPHEN 650 MG: 325 TABLET, FILM COATED ORAL at 23:45

## 2019-12-21 RX ADMIN — FAMOTIDINE 20 MG: 20 TABLET ORAL at 17:27

## 2019-12-21 RX ADMIN — PHENYLEPHRINE HYDROCHLORIDE 60 MCG/MIN: 10 INJECTION INTRAVENOUS at 02:12

## 2019-12-21 RX ADMIN — PHENYLEPHRINE HYDROCHLORIDE 100 MCG/MIN: 10 INJECTION INTRAVENOUS at 12:00

## 2019-12-21 RX ADMIN — GABAPENTIN 400 MG: 400 CAPSULE ORAL at 17:26

## 2019-12-21 RX ADMIN — MIDODRINE HYDROCHLORIDE 15 MG: 5 TABLET ORAL at 17:26

## 2019-12-21 RX ADMIN — GABAPENTIN 400 MG: 400 CAPSULE ORAL at 05:03

## 2019-12-21 RX ADMIN — ZOLPIDEM TARTRATE 10 MG: 5 TABLET ORAL at 20:15

## 2019-12-21 RX ADMIN — ACETAMINOPHEN 650 MG: 325 TABLET, FILM COATED ORAL at 17:27

## 2019-12-21 RX ADMIN — ACETAMINOPHEN 650 MG: 325 TABLET, FILM COATED ORAL at 01:24

## 2019-12-21 ASSESSMENT — COGNITIVE AND FUNCTIONAL STATUS - GENERAL
MOVING TO AND FROM BED TO CHAIR: A LITTLE
STANDING UP FROM CHAIR USING ARMS: A LOT
CLIMB 3 TO 5 STEPS WITH RAILING: A LOT
HELP NEEDED FOR BATHING: A LOT
DRESSING REGULAR UPPER BODY CLOTHING: A LOT
EATING MEALS: A LITTLE
SUGGESTED CMS G CODE MODIFIER DAILY ACTIVITY: CL
MOVING FROM LYING ON BACK TO SITTING ON SIDE OF FLAT BED: A LOT
DAILY ACTIVITIY SCORE: 13
TOILETING: A LOT
TURNING FROM BACK TO SIDE WHILE IN FLAT BAD: A LITTLE
MOBILITY SCORE: 14
PERSONAL GROOMING: A LOT
WALKING IN HOSPITAL ROOM: A LOT
SUGGESTED CMS G CODE MODIFIER MOBILITY: CL
DRESSING REGULAR LOWER BODY CLOTHING: A LOT

## 2019-12-21 ASSESSMENT — ENCOUNTER SYMPTOMS
HEADACHES: 0
SPUTUM PRODUCTION: 0
SHORTNESS OF BREATH: 0
FOCAL WEAKNESS: 1
BRUISES/BLEEDS EASILY: 0
FEVER: 0
TINGLING: 1
DIARRHEA: 0
DOUBLE VISION: 0
CHILLS: 0
COUGH: 0
NERVOUS/ANXIOUS: 0
PALPITATIONS: 0
MYALGIAS: 0
ORTHOPNEA: 0
BLURRED VISION: 0
PND: 0
DIZZINESS: 0
NAUSEA: 0
FLANK PAIN: 0
SENSORY CHANGE: 1
NECK PAIN: 1
BACK PAIN: 1
SPEECH CHANGE: 0
ABDOMINAL PAIN: 0
DEPRESSION: 0
SORE THROAT: 0
STRIDOR: 0
VOMITING: 0

## 2019-12-21 NOTE — ASSESSMENT & PLAN NOTE
S/p operative decompression for 12/20 Dr Linton tolerated well  Supporting perfusion to spinal cord perfusion with active titration of Trevor-Synephrine, MAP goal >= 85 x5 days (12/25)  Oral midodrine supported with Trevor-Synephrine infusion  Maintain euvolemia with fluids, serial exam to reassess  OT/PT and physiatry consultation in the near future  Continue hard collar for supportive neck postop  Rehab consultation 12/23 Dr Gregory

## 2019-12-21 NOTE — CARE PLAN
Problem: Venous Thromboembolism (VTW)/Deep Vein Thrombosis (DVT) Prevention: SCDS in place, order in for BRIT hose, change position every 2 hours, deep breathing exercising encouraged.  Goal: Patient will participate in Venous Thrombosis (VTE)/Deep Vein Thrombosis (DVT)Prevention Measures  Outcome: PROGRESSING AS EXPECTED     Problem: Bowel/Gastric: Educated on the importance of stool softners and how oxycodone causes constipation.   Goal: Normal bowel function is maintained or improved  Outcome: PROGRESSING AS EXPECTED

## 2019-12-21 NOTE — PROGRESS NOTES
Critical Care Progress Note    Date of admission  12/19/2019    Chief Complaint  54 y.o. female admitted 12/19/2019 with spastic paraplegia    Hospital Course    54 y.o. female with a past medical history of hypercholesterolemia, anxiety, asthma, degenerative disc disease who presented 12/19/2019 as a transfer from [facility] for progressive paraplegia.  The patient reports over the last several days she has had several falls during which she hit her head and neck several times.  Prior to these events she was ambulating around her house with a cane however has had worsening lower extremity weakness and actually developed a contracture of her left lower extremity.  She presented to [facility] for these complaints and a CT scan of the neck was obtained which did not show any acute abnormalities.  An MRI from November which was done in our facility shows multilevel degenerative disc disease with moderate central canal stenosis at C4-C5.  The patient denies any incontinence of bowel or bladder or retention of bowel or bladder, she denies any saddle anesthesia.  The patient was seen by neurosurgery in the emergency department and determined to have significant neurologic findings consistent with central cord syndrome.  She is to be admitted to the ICU for neuro checks and BP support.  She is planned for operative intervention tomorrow.  At this time the patient's main complaint is lumbar pain with radicular symptoms in her lower extremities.      Interval Problem Update  Reviewed last 24 hour events:    POD#1 - C4 corpectomy and complex repair  Leg strength better?  No change UE weakness/numbess  Aspen collar  Q1H neurochecks no change  Follows well  SB/SR  SBp 110-120s  ANTONIO 75  Midodrine  Tm 99.6  Louis - great UO  NS w/ 20 K - 100  IS 1500  Room air  SCDs - no VTE per NS  Ancef    Up Midodrine  Wean off ANTONIO  Midline    Serial follow-up  Neurological exam unchanged  Bradycardic at time,  suspect is reflexive to pressors reason to support MAP  Antonio-Synephrine being titrated     YESTERDAY  A&O x4  No change numbness in UE and LE weakenss  Neuro checks no change overnight  SB/SR  SBp 120-140s/90s  ANTONIO 60 -> 40 - goal MAP > 90  UO good  NPO  Tm 98.8  PIVs  L Radial A line  No CXR  1 lpm NC  Midodrine 10 tid    IS  Continue to titrate Antonio-Synephrine  Central line placement as needed  N.p.o.  To OR 2 PM cervical spine surgery and decompression/C5 corpectomy  Review with neurosurgery on return Re: Blood pressure goals postop another treatment to support spinal cord and hopefully its recovery from impingement    Review of Systems  Review of Systems   Constitutional: Negative for chills, fever and malaise/fatigue.   HENT: Negative for congestion and sore throat.    Eyes: Negative for blurred vision and double vision.   Respiratory: Negative for cough, sputum production and shortness of breath.    Cardiovascular: Negative for chest pain, palpitations, orthopnea and PND.   Gastrointestinal: Negative for abdominal pain, diarrhea, nausea and vomiting.   Genitourinary: Negative for dysuria, flank pain, frequency and hematuria.   Musculoskeletal: Positive for back pain (Chronic) and neck pain (Controlled).   Skin: Negative for rash.   Neurological: Positive for tingling (No change), sensory change (No change) and focal weakness (Improved in lower extremities). Negative for speech change and headaches.   Endo/Heme/Allergies: Does not bruise/bleed easily.   Psychiatric/Behavioral: Negative for depression. The patient is not nervous/anxious.         Vital Signs for last 24 hours   Temp:  [36.8 °C (98.2 °F)-37.6 °C (99.6 °F)] 37.3 °C (99.1 °F)  Pulse:  [47-87] 47  Resp:  [10-51] 14  BP: ()/(52-76) 127/63  SpO2:  [89 %-100 %] 94 %    Hemodynamic parameters for last 24 hours       Respiratory Information for the last 24 hours       Physical Exam   Physical Exam  Vitals signs reviewed.   Constitutional:        General: She is not in acute distress.     Appearance: She is underweight. She is ill-appearing.      Interventions: Nasal cannula in place.   HENT:      Head: Normocephalic and atraumatic.      Mouth/Throat:      Mouth: Mucous membranes are moist.   Eyes:      General: No scleral icterus.     Extraocular Movements: Extraocular movements intact.      Pupils: Pupils are equal, round, and reactive to light.   Neck:      Musculoskeletal: Neck supple.      Thyroid: No thyromegaly.      Vascular: No JVD.      Trachea: Phonation normal.      Comments: Anterior surgical changes noted, Wound looks good  Cardiovascular:      Rate and Rhythm: Normal rate and regular rhythm.  No extrasystoles are present.     Pulses: Normal pulses.      Heart sounds: No murmur. No friction rub. No gallop.       Comments: Remains in sinus rhythm  Pulmonary:      Effort: No accessory muscle usage.      Breath sounds: No wheezing, rhonchi or rales.   Abdominal:      General: Abdomen is flat. Bowel sounds are normal.      Palpations: Abdomen is soft.      Tenderness: There is no tenderness. There is no right CVA tenderness, left CVA tenderness or guarding.   Lymphadenopathy:      Cervical: No cervical adenopathy.   Skin:     General: Skin is warm and dry.      Capillary Refill: Capillary refill takes less than 2 seconds.      Coloration: Skin is not cyanotic.      Nails: There is no clubbing.     Neurological:      Mental Status: She is alert and oriented to person, place, and time.      GCS: GCS eye subscore is 4. GCS verbal subscore is 5. GCS motor subscore is 6.      Comments: Sensory loss in upper extremity primarily ends is unchanged as is strength in the upper extremities, lower extremity strength appears to have improved postop     Psychiatric:         Attention and Perception: Attention normal.         Mood and Affect: Mood normal.         Speech: Speech normal.         Behavior: Behavior normal.         Cognition and Memory: Cognition  normal.         Medications  Current Facility-Administered Medications   Medication Dose Route Frequency Provider Last Rate Last Dose   • midodrine (PROAMATINE) tablet 15 mg  15 mg Oral TID WITH MEALS Jack Ruffin M.D.   15 mg at 12/21/19 1726   • Pharmacy Consult Request ...Pain Management Review 1 Each  1 Each Other PHARMACY TO DOSE HUMBERTO Mcgarry.P.N.       • MD ALERT...DO NOT ADMINISTER NSAIDS or ASPIRIN unless ORDERED By Neurosurgery 1 Each  1 Each Other PRN HUMBERTO Mcgarry.P.N.       • docusate sodium (COLACE) capsule 100 mg  100 mg Oral BID HUMBERTO Mcgarry.P.N.   Stopped at 12/21/19 1800   • senna-docusate (PERICOLACE or SENOKOT S) 8.6-50 MG per tablet 1 Tab  1 Tab Oral Nightly Elsy Arnold A.P.N.       • senna-docusate (PERICOLACE or SENOKOT S) 8.6-50 MG per tablet 1 Tab  1 Tab Oral Q24HRS PRN Elsy Arnold, A.P.N.       • polyethylene glycol/lytes (MIRALAX) PACKET 1 Packet  1 Packet Oral BID PRN Elsy Arnold, A.P.N.       • magnesium hydroxide (MILK OF MAGNESIA) suspension 30 mL  30 mL Oral QDAY PRN Elsy Arnold, A.P.N.       • bisacodyl (DULCOLAX) suppository 10 mg  10 mg Rectal Q24HRS PRN Elsy Arnold A.P.N.       • fleet enema 133 mL  1 Each Rectal Once PRN Elsy Arnold, A.P.N.       • 0.9 % NaCl with KCl 20 mEq infusion   Intravenous Continuous Elsy Arnold A.P.N. 100 mL/hr at 12/21/19 1550     • ondansetron (ZOFRAN) syringe/vial injection 4 mg  4 mg Intravenous Q4HRS PRN Elsy Arnold, A.P.N.       • ondansetron (ZOFRAN ODT) dispertab 4 mg  4 mg Oral Q4HRS PRN Elsy Arnold A.P.N.       • promethazine (PHENERGAN) tablet 12.5-25 mg  12.5-25 mg Oral Q4HRS PRN Elsy Arnold, A.P.N.       • promethazine (PHENERGAN) suppository 12.5-25 mg  12.5-25 mg Rectal Q4HRS PRN Elsy Arnold, A.P.N.       • prochlorperazine (COMPAZINE) injection 5-10 mg  5-10 mg Intravenous Q4HRS PRN Elsy Arnold, A.P.N.       • diphenhydrAMINE (BENADRYL)  tablet/capsule 25 mg  25 mg Oral Q6HRS PRN Elsy Arnold, A.P.N.        Or   • diphenhydrAMINE (BENADRYL) injection 25 mg  25 mg Intravenous Q6HRS PRN Elsy Arnold, A.P.N.       • benzocaine-menthol (CEPACOL) lozenge 1 Lozenge  1 Lozenge Mouth/Throat Q2HRS PRN Elsy Arnold, A.P.N.       • zolpidem (AMBIEN) tablet 10 mg  10 mg Oral HS PRN Yong Zhong, D.O.   10 mg at 12/20/19 2120   • venlafaxine XR (EFFEXOR XR) capsule 75 mg  75 mg Oral Q EVENING Yong Zhong, D.O.   75 mg at 12/21/19 1728   • famotidine (PEPCID) tablet 20 mg  20 mg Oral BID Yong Zhong, D.O.   20 mg at 12/21/19 1727   • oxybutynin SR (DITROPAN-XL) tablet 15 mg  15 mg Oral BID Yong Zhong, D.O.   15 mg at 12/21/19 1729   • methocarbamol (ROBAXIN) tablet 500 mg  500 mg Oral BID PRN Yong Zhong, D.O.   500 mg at 12/19/19 1729   • gabapentin (NEURONTIN) capsule 400 mg  400 mg Oral BID Yong Zhong, D.O.   400 mg at 12/21/19 1726   • buPROPion SR (WELLBUTRIN-SR) tablet 150 mg  150 mg Oral BID Yong Zhong, D.O.   150 mg at 12/21/19 1728   • Respiratory Care per Protocol   Nebulization Continuous RT Yong Zhong, D.O.       • acetaminophen (TYLENOL) tablet 650 mg  650 mg Oral Q6HRS Yong Zhong, D.O.   650 mg at 12/21/19 1727   • oxyCODONE immediate-release (ROXICODONE) tablet 5-10 mg  5-10 mg Oral Q4HRS PRN Yong Zhong, D.O.   10 mg at 12/20/19 1857   • albuterol inhaler 2 Puff  2 Puff Inhalation Q4H PRN (RT) Yong Zhong D.O.   2 Puff at 12/19/19 6861   • phenylephrine (ANTONIO-SYNEPHRINE) 40 mg in  mL Infusion  0-300 mcg/min Intravenous Continuous Jack Ruffin M.D. 26.3 mL/hr at 12/21/19 1945 70 mcg/min at 12/21/19 1945       Fluids    Intake/Output Summary (Last 24 hours) at 12/21/2019 1956  Last data filed at 12/21/2019 1800  Gross per 24 hour   Intake 4598.76 ml   Output 3888 ml   Net 710.76 ml       Laboratory           Recent Labs     12/20/19 0447 12/21/19  0322   SODIUM 137 139   POTASSIUM 3.5* 4.2   CHLORIDE 107 109   CO2 23 23   BUN 14 10   CREATININE 0.74 0.66   CALCIUM 8.6 8.2*     Recent Labs     12/20/19 0447 12/21/19  0322   GLUCOSE 101* 128*     Recent Labs     12/20/19 0447 12/21/19  0322   WBC 9.2 12.2*   NEUTSPOLYS 47.20 85.70*   LYMPHOCYTES 41.00 9.50*   MONOCYTES 7.70 4.20   EOSINOPHILS 3.00 0.00   BASOPHILS 0.80 0.20     Recent Labs     12/20/19 0447 12/21/19  0322   RBC 4.30 4.23   HEMOGLOBIN 13.4 13.2   HEMATOCRIT 39.2 38.9   PLATELETCT 326 328       Imaging  CT:    Reviewed  MRI:   Reviewed    Assessment/Plan  * Cervical spine pain  Assessment & Plan  Acute on chronic following several falls recently, monitoring for additional complications from these falls  Exam concerning for central cord type condition, findings persisting  Neurosurgery on board, recommendations for blood pressure augmentation to MAP 85 mmHg, titrating Trevor-Synephrine  Continue oral midodrine as well  Noting reflexive bradycardia to medications at times  Continuing every hour neuro checks, unchanged, reduce to every 12 hours when okay with neurosurgery  Arterial line placed 12/19, vasopressors as needed to maintain MAP >85 mmHg  Hold lovenox and mobic for surgical intervention today      Central cord syndrome (HCC)  Assessment & Plan  Neurologic signs symptoms consistent with this process  Neurosurgical consultation obtained, operative decompression for 12/20  Supporting perfusion to spinal cord with active titration of Trevor-Synephrine, MAP goal >= 85  Maintain euvolemia with fluids, serial exam to reassess  No role for steroids at this time physical therapy  OT/PT and physiatry consultation in the near future  Monitor for progression and respiratory compromise requiring intubation mechanical ventilation    Lumbar radiculopathy  Assessment & Plan  Acute on chronic  MRI from November reviewed with radiology  Neurosurgery on board, to  the OR 12/20 for decompression of cervical abnormalities, appears successful  Monitoring for neurological improvement and providing spinal cord supportive care    Mild intermittent asthma- (present on admission)  Assessment & Plan  PRN albuterol at home  RT/O2 Protocols  Titrate supplemental FiO2 to maintain SpO2 >88%  No exacerbation at this time, monitor for need for IV steroids  Nasal steroids for chronic sinus process as well as needed    Anxiety and depression- (present on admission)  Assessment & Plan  Resume Wellbutrin and Effexor when clinically appropriate  As needed benzodiazepines    Tobacco use- (present on admission)  Assessment & Plan  Nicotine replacement protocol as needed  Cessation counseling ongoing       VTE:  SCDs  Ulcer: H2 Antagonist  Lines: Arterial Line  Ongoing indication addressed    I have performed a physical exam and reviewed and updated ROS and Plan today (12/21/2019). In review of yesterday's note (12/20/2019), there are no changes except as documented above.     Patient remains critically ill.  Patient with significant cord impingement status post operative decompression.  Patient with upper and lower extremity nerve neuropathic findings.  Ongoing blood pressure support with fluids and active titration of Trevor-Synephrine continuous infusion along with oral midodrine as well, goal MAP greater than 85.  Prognosis remains guarded, ongoing frequent neuro checks and close observation in the ICU postop.  Patient has significant increased risk for morbidity and mortality without above critical care interventions.    Discussed patient condition and risk of morbidity and/or mortality with Hospitalist, RN, RT, Pharmacy, Charge nurse / hot rounds, Patient and neurosurgery     The patient remains critically ill.  Critical care time = 38 minutes in directly providing and coordinating critical care and extensive data review.  No time overlap and excludes procedures.

## 2019-12-21 NOTE — OP REPORT
DATE OF SERVICE:  12/20/2019    SURGEON:  Connor Linton MD    FIRST ASSISTANT:  ONOFRE Vides    PREOPERATIVE DIAGNOSES:  1.  Central cord injury.  2.  Herniated C4-5 disk.  3.  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  4.  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    POSTOPERATIVE DIAGNOSES:  1.  Central cord injury.  2.  Herniated C4-5 disk.  3.  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  4.  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    SURGERIES:  1.  Anterior cervical diskectomy and iatrogenic fracture through C3-4 with   bilateral foraminotomies at C3-4.  2.  Anterior cervical diskectomy with bilateral foraminotomies at C4-5.  3.  C4 corpectomy.  4.  Placement of anterior interbody spacer using a Pyramesh cage with   autograft, allograft and BMP.  5.  Use of intraoperative fluoro for analysis of anterior decompression of   spinal cord.  6.  Anterior plate fixation from C3 through C5 using Cutler plate and 17 mm   bicortical screws placed under direct fluoro visualization.  7.  Use of fluoro for localization.  8.  Modifier 22.  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    Additionally, this patient had profound constriction at the C4 body requiring   a C4 corpectomy to decompress her anteriorly away from her ossification   posteriorly and is a baseline smoker with very poor bone quality.  9.  Use of intraoperative monitoring for motors and sensories given extreme   stenosis across the C4 body and C4-5 disk space as well as for placement of   the anterior cage at the C4 corpectomy site.    BRIEF HISTORY OF PRESENT ILLNESS:  This is a woman born in 1965 who presented   to [facility] as a direct transfer from [facility] after   having repeated falls where she struck her head as well as her neck.  She had   baseline deformity of the left foot; however, she was becoming weaker and   weaker over the last several days and eventually went from being able to walk   without any assistance or cane to using a rolling walker and then eventually   requiring significant assistance and unable to ambulate.  She presented to   [facility] with MRI showing severe stenosis across the C3   through C5 levels secondary to ossification of the posterior longitudinal   ligament as well as large disk herniation at C3-4, C4-5 and ossification of   the ligamentum flavum.  At the time of her presentation, she had spastic   paraplegia with minimal movement in her lower extremities and additionally C5   palsies and proximal and distal weakness in the upper extremities.  Given   these findings, the patient was placed in the ICU.  She had presented 3 days   after her initial injury.  We increased her blood pressure parameters, which   required her to be on pressors over the evening.  She had mild improvement   with increased blood pressures over the evening and was taken the following   day for an anterior cervical diskectomy at C3-4, C4-5 with a C4 corpectomy and   anterior plating.    CONSENT:  Consent was obtained from the family and the patient apprising the   risks, complications and indications of the surgery, which included, but not   limited to paralysis, hematoma and need for more surgery.  They agreed and   consented.    SPECIMENS:  None.    IMPLANTS:  [employer]onic Pyramesh cage with autograft, allograft and BMP packed   inside of it.  This is a 14x10 mm internal diameter cage.  Also, [place]   plating system that was placed with 17 mm screws and a 23 mm plate anteriorly   with screws in both C3 and C5 with bicortical purchase directed under fluoro.    DRAINS:  The patient had a  medium Hemovac placed anterior to her implants.    COMPLICATIONS:  None.    PROCEDURE IN DETAIL:  The patient was brought into the operating room by   anesthesia.  She was placed on the operating table in a neutral position.    Then, holding cervical traction, the patient was intubated and general   anesthetic was started using the GlideScope.  We then papoosed her arms and   put a small gel roll under her scapula and extended her neck slightly.  We   then clipped, prepped and draped the patient in sterile usual fashion for a   right cervical approach for an anterior cervical diskectomy and fusion.  We   then brought the fluoro in and got a shot to assess for appropriate placement   of our incision.  Once we were happy with placement of our incision, we   performed a surgical timeout confirming the correct side, site, procedure and   patient with all faculty and staff agreeing.  Our incision was centered over   the C4 body.  We then incised the dermis using a 10 blade surgical scalpel,   and then using Bovie cautery on cut, we cut through the residual of the dermis   down to the level of the platysma.  We then performed a supraplatysmal   dissection using hot cautery and then cut through the platysma using the Bovie   cautery again on the low setting and then did an infraplatysmal dissection.    At this time, there was no bleeding.  We then placed a self-retaining   Weitlaner retractor into the wound.  We bipolared some smaller veins that were   over the strap muscles, and then using hot cautery and traction, we dissected   over the strap muscles superior to the omohyoid and we were able to find a   nice plane between the strap muscles and the carotid sheath.  At that time, we   were able to dissect down to the prevertebral fascia, and at this time when   we had a good exposure to prevertebral fascia, we then placed a Cloward in the   wound and then bluntly dissected the alveolar tissue over the prevertebral    fascia superiorly and inferiorly until we were able to see several disk   spaces.  We clipped a snap to the ALL over what we assumed was the C3-4 disk   space and got a lateral fluoro confirming that this was the congenital union   of the C3-4 body with still residual disk in it.  Once we had a lateral fluoro   shot, we were able to assess this area and we began our hot dissection using   Cloward suction and Bovie to expose the anterior aspect of the body of C3, C4   and C5.  Once we had the disk spaces exposed for the C4-5, we took a Leksell   and bit the anterior osteophyte off of C3-4 exposing the disk space.  We then   dissected the lateral paraspinal muscles laterally and then placed the rainbow   retractor into the wound and retracted the viscera and soft tissues away from   the spine.  We then placed [place] pin holes in the C3 body and the C5 body   and then using the high-speed drill drilled away the osteophyte complex   anterior to the C3-4 and C4-5 disk spaces.  Once we had exposure of the disk   spaces, we used the curette to remove the disk as much as possible at the C4-5   level, exposing the uncovertebral joints to get our lateral margin, and then   in the C3-4 level, we were able to remove the central disk that was still   existed from her congenital union.  The lateral aspect, there was no   uncovertebral joint as this was fused between C3 and C4; however, it was   necessary to explore this with the 2 mm drill laterally so that we could gain   enough lateral exposure so that we could complete the corpectomy.  Once the   disk space laterally was exposed at C3-4, we then began to trough from the   lateral aspect of the uncovertebral joints approximately 1 cm wide and remove   the anterior and internal aspects of C4 using a Leksell rongeur.  Once we   reached the PLL by both repeatedly switching between the 2 mm drill troughing   C4 laterally and then biting away the internal aspects for our graft, we    continued down to the PLL and we were able to drill down to the PLL in a   rectangular shape, exposing the C3-4 disk, the C4-5 disk and then the PLL on   the lateral aspect of our dissection.  Once we had the PLL exposed and the   disk space exposed, we used a micro curette to gain access to the epidural   space at the C4-5 level and then used the #1 Kerr[place] to punch, bite out and   use as a scissor to remove the PLL slightly above the endplate of C5 and then   in series cut the PLL away from the posterior aspect of C4, moving towards C3,   removing en bloc the PLL behind C4 along with the redundancy of tissues.    Once we reached the C3 body, we then underbit the C3 body, removing the   residual tissue.  At this time, we had approximately 10 mm of lateral exposure   and we had complete removal of the endplate of C4 as well as the PLL between   the C3-4 and C4-5 disk spaces.  We did bring in the ultrasound at this time to   view the decompression and noted there was still significant osteophyte   complex behind the C3-4 disk superior to C5 as well as underneath the inferior   edge of C3.  We therefore continued with our dissection and bit away a   significant portion, backbiting underneath C3 as well as under C5 until on   ultrasound we saw complete decompression of both the cord as well as removal   of these osteophyte complexes.  Once that had been completed, we then checked   with a nerve hook to feel that there was no residual stenosis and then we felt   lateral underneath the body of C4 and continued with lateral expansion of our   corpectomy using a barrel bur until we had approximately 14-15 mm of lateral   exposure to allow for the 14 mm Pyramesh cage to fit into the space at C4.    Once that was completed, we then checked the bilateral foramina with a nerve   hook and then bit out until there was complete decompression of both the C4   and C5 nerve roots.  Once our bilateral C3-4 and C4-5 foraminotomies  were   completed and we felt underneath the bone using both the foramina with a nerve   hook and then double checking with the ultrasound, we felt that it was at   that point appropriate to dry the wound with bipolar as well as FloSeal,   irrigated the wound out and then fitted a Pyramesh 14 mm wide x 10 mm deep   titanium cage to the patient's opening.  After we had fitted this   appropriately and did a fair amount of decortication of the endplates, we   packed the Pyramesh cage with the patient's own C4 corpectomy bone as well as   [place] and a third of an extra small BMP in the center of the Pyramesh cage.    We then malleted this into place, checked the motors again to make sure that   there was no change in motors or sensories, which were stable, and once the   cage was in place, we then removed the [place] pins, waxed the holes, double   checked for appropriate hemostasis and then under direct fluoro visualization   placed an anterior [place] plate cage and 17 mm screws directed up and   medially in the C3 body as well as the C5 body down and medially.  Once we   felt that we had bicortical purchase with our 17 mm screws and we had adequate   placement, we locked the anterior plate in place and double checked with AP   and lateral fluoro that we had appropriate placement of our screws, which were   saved and then we dried the wound thoroughly, placed a small Hemovac over the   plate and cage and then ensured that the plate was locked and then closed the   wound in layers with 3-0 Vicryls on the piriformis muscle followed by 3-0   Vicryls in the dermis followed by Monocryl on the skin with Dermabond.  All   counts were correct x2.  My PA, Elsy Arnold, was present for the entirety   of the case and was vital to assist with retraction, placement of hardware and   retaining the dry field for appropriate visualization.  This case was   challenging much more so than a normal corpectomy given the extreme stenosis    of the patient's cervical spine as well as the retropulsed disk fragment at   the C4-5 level underneath the C5 body as well as the patient's congenital   fusion, which made it difficult to remove the C3-4 disk and to gain   appropriate visualization as well as iatrogenic fracture through the outline   so that we could place the Pyramesh cage.       ____________________________________     MD MITCH Dumas / JANE    DD:  12/20/2019 18:05:32  DT:  12/20/2019 20:57:57    D#:  [id number]  Job#:  [id number]

## 2019-12-21 NOTE — THERAPY
"Speech Language Therapy Clinical Swallow Evaluation completed.    Functional Status: Patient was seen on this date for a clinical swallow evaluation. RN reported pt tolerated lunch without difficulty but MD requesting formal SLP evaluation. Patient AAOx4, vocal quality clear, and speech intact. Oral motor examination WNL. Poor-fair dental quality. PO trials consisted of single ice chips, thin liquids (single and consecutive sips from cup), soft solids in mixed consistency form, and dry solids. Swallow trigger was timely and laryngeal elevation reduced to palpation. No overt s/sx of aspiration with all trials tested. Patient unable to chew more fibrous textures (pineapple) and removed un-masticated bolus from oral cavity. Pt reported baseline soft diet due to poor dental quality.     Recommendations - Diet: At this time, recommend slight downgrade to D3/thin liquid given assistance with tray set up and monitoring.                            Strategies: Assistance needed for meal tray set-up and Head of Bed at 90 Degrees                            Medication Administration: Whole with Liquid Wash      Plan of Care: Will benefit from Speech Therapy 3 times per week    Post-Acute Therapy: Recommend inpatient transitional care services for continued speech therapy services.      See \"Rehab Therapy-Acute\" Patient Summary Report for complete documentation. Thank you for the consult.         "

## 2019-12-21 NOTE — PROGRESS NOTES
Neurosurgery Progress Note    Subjective:  POD #1    Feels improved strength in arms and legs  Incision CDI  Hemovac 20 cc        Exam:  Awake, alert Wound looks good  Speech fluent appropriate  In no apparent distress  Affect, mood appropriate  Oriented x 3  Pupils 3 mm midline, reactive.  Conjugate gaze.  Visual fields full to confrontation  Face symmetric  Tongue midline without fasciculation  Facial sensation intact light touch  Hearing intact to conversation, light finger rub bilaterally  Motor:  4/5 BUE and LE  Sensation:  Intact touch face, neck, torso, four extremities      BP  Min: 92/60  Max: 137/73  Pulse  Av.6  Min: 49  Max: 88  Resp  Av.5  Min: 10  Max: 70  Temp  Av.9 °C (98.5 °F)  Min: 36.1 °C (97 °F)  Max: 37.6 °C (99.6 °F)  SpO2  Av.7 %  Min: 92 %  Max: 100 %    No data recorded    Recent Labs     19  0447 19  0322   WBC 9.2 12.2*   RBC 4.30 4.23   HEMOGLOBIN 13.4 13.2   HEMATOCRIT 39.2 38.9   MCV 91.2 92.0   MCH 31.2 31.2   MCHC 34.2 33.9   RDW 43.6 44.6   PLATELETCT 326 328   MPV 9.4 9.6     Recent Labs     19  0447 19  0322   SODIUM 137 139   POTASSIUM 3.5* 4.2   CHLORIDE 107 109   CO2 23 23   GLUCOSE 101* 128*   BUN 14 10   CREATININE 0.74 0.66   CALCIUM 8.6 8.2*               Intake/Output       19 0700 - 19 0659 19 07 - 19 0659       6104-3993 Total 7742-2431 3070-2808 Total       Intake    I.V.  1613  1349 2962  --  -- --    Phenylephrine Volume 113 292.4 405.4 -- -- --    Volume (mL) (Lactated Ringers) 1500 -- 1500 -- -- --    Volume (mL) (0.9 % NaCl with KCl 20 mEq infusion) -- 1056.7 1056.7 -- -- --    Other  --  400 400  --  -- --    Medications (PO/Enteral Liquids) -- 400 400 -- -- --    IV Piggyback  --  183.3 183.3  --  -- --    Volume (mL) (ceFAZolin in dextrose (ANCEF) IVPB premix 2 g) -- 183.3 183.3 -- -- --    Total Intake 1613 1932.4 3545.4 -- -- --       Output    Urine  1050  1150 2200  --  -- --     Urine 800 -- 800 -- -- --    Number of Times Voided 1 x -- 1 x -- -- --    Urine Void (mL) 250 -- 250 -- -- --    Output (mL) (Urethral Catheter Latex 16 Fr.) -- 1150 1150 -- -- --    Drains  --  20 20  --  -- --    Output (mL) (Closed/Suction Drain 1 Right Neck Hemovac) -- 20 20 -- -- --    Stool  --  -- --  --  -- --    Number of Times Stooled -- 0 x 0 x -- -- --    Blood  200  -- 200  --  -- --    Est. Blood Loss 200 -- 200 -- -- --    Total Output 1250 1170 2420 -- -- --       Net I/O     363 762.4 1125.4 -- -- --            Intake/Output Summary (Last 24 hours) at 12/21/2019 1043  Last data filed at 12/21/2019 0600  Gross per 24 hour   Intake 3462.35 ml   Output 2170 ml   Net 1292.35 ml            • midodrine  15 mg TID WITH MEALS   • Pharmacy Consult Request  1 Each PHARMACY TO DOSE   • MD ALERT...DO NOT ADMINISTER NSAIDS or ASPIRIN unless ORDERED By Neurosurgery  1 Each PRN   • docusate sodium  100 mg BID   • senna-docusate  1 Tab Nightly   • senna-docusate  1 Tab Q24HRS PRN   • polyethylene glycol/lytes  1 Packet BID PRN   • magnesium hydroxide  30 mL QDAY PRN   • bisacodyl  10 mg Q24HRS PRN   • fleet  1 Each Once PRN   • 0.9 % NaCl with KCl 20 mEq 1,000 mL   Continuous   • ondansetron  4 mg Q4HRS PRN   • ondansetron  4 mg Q4HRS PRN   • promethazine  12.5-25 mg Q4HRS PRN   • promethazine  12.5-25 mg Q4HRS PRN   • prochlorperazine  5-10 mg Q4HRS PRN   • diphenhydrAMINE  25 mg Q6HRS PRN    Or   • diphenhydrAMINE  25 mg Q6HRS PRN   • benzocaine-menthol  1 Lozenge Q2HRS PRN   • zolpidem  10 mg HS PRN   • venlafaxine XR  75 mg Q EVENING   • famotidine  20 mg BID   • oxybutynin SR  15 mg BID   • methocarbamol  500 mg BID PRN   • gabapentin  400 mg BID   • buPROPion SR  150 mg BID   • Respiratory Care per Protocol   Continuous RT   • acetaminophen  650 mg Q6HRS   • oxyCODONE immediate-release  5-10 mg Q4HRS PRN   • albuterol  2 Puff Q4H PRN (RT)   • Phenylephrine infusion  0-300 mcg/min Continuous       Assessment  and Plan:    POD# 1- improved     Hold anticoagulation   Maps >85 fo 4 more days  ICU 42 hr neuro checks   No role for steriods

## 2019-12-21 NOTE — ANESTHESIA TIME REPORT
Anesthesia Start and Stop Event Times     Date Time Event    12/20/2019 1415 Ready for Procedure     1425 Anesthesia Start     1724 Anesthesia Stop        Responsible Staff  12/20/19    Name Role Begin End    Kaleb Dye M.D. Anesth 1425 1724        Preop Diagnosis (Free Text):  Pre-op Diagnosis     Cervical spondylarthritis        Preop Diagnosis (Codes):    Post op Diagnosis  Cervical myelopathy (HCC)      Premium Reason  A. 3PM - 7AM    Comments:

## 2019-12-21 NOTE — PROGRESS NOTES
Dr. Aranda notified of patient's cardiac rhythm sinus radhika with a rate of 48. Patient is not symptomatic.  MD Orders: continue to monitor.

## 2019-12-21 NOTE — CARE PLAN
Problem: Safety  Goal: Will remain free from injury  Outcome: PROGRESSING AS EXPECTED   Bed in low position and locked. Patient educated on call light use, toileting, and safe mobilization. Bed alarm activated.     Problem: Venous Thromboembolism (VTW)/Deep Vein Thrombosis (DVT) Prevention:  Goal: Patient will participate in Venous Thrombosis (VTE)/Deep Vein Thrombosis (DVT)Prevention Measures  Outcome: PROGRESSING AS EXPECTED     Bilateral BRIT hose and SCDs in place.

## 2019-12-21 NOTE — PROCEDURES
Vascular Access Team    Date of Insertion: 12/21/19  Arm Circumference: n/a  Line Length: 10  Line Size: 18  Vein Occupancy %: 30  Reason for Midline: access  Labs: WBC 2.2, , BUN 10, Cr 0.66, GFR >60, INR na    Orders confirmed, vessel patency confirmed with ultrasound. Risks and benefits of procedure explained to patient and education regarding line associated bloodstream infections provided. Questions answered.     PowerGlide Midline placed in RUE per licensed provider order with ultrasound guidance. 18g, 10 cm line placed in basilic vein after 1 attempt(s).  Catheter inserted with brisk blood return. Secured with 0cm external from insertion site.  Line flushed without resistance with 10 mL 0.9% normal saline.  Midline secured with Biopatch and Tegaderm.     Midline placement is confirmed by nurse using ultrasound and ability to flush and draw blood. Midline is appropriate for use at this time.  No X-ray is needed for placement confirmation. Pt tolerated procedure well.  Patient condition relayed to unit RN or ordering physician via this post procedure note in the EMR.    Ultrasound images uploaded to PACS and viewable in the EMR - yes  Ultrasound imaged printed and placed in paper chart - no      BARD PowerGlide Midline ref # [id number], Lot # [id number]

## 2019-12-22 LAB
ANION GAP SERPL CALC-SCNC: 6 MMOL/L (ref 0–11.9)
BASOPHILS # BLD AUTO: 0.7 % (ref 0–1.8)
BASOPHILS # BLD: 0.08 K/UL (ref 0–0.12)
BUN SERPL-MCNC: 12 MG/DL (ref 8–22)
CALCIUM SERPL-MCNC: 8.4 MG/DL (ref 8.5–10.5)
CHLORIDE SERPL-SCNC: 108 MMOL/L (ref 96–112)
CO2 SERPL-SCNC: 27 MMOL/L (ref 20–33)
CREAT SERPL-MCNC: 0.69 MG/DL (ref 0.5–1.4)
EOSINOPHIL # BLD AUTO: 0.13 K/UL (ref 0–0.51)
EOSINOPHIL NFR BLD: 1.2 % (ref 0–6.9)
ERYTHROCYTE [DISTWIDTH] IN BLOOD BY AUTOMATED COUNT: 46.3 FL (ref 35.9–50)
GLUCOSE SERPL-MCNC: 87 MG/DL (ref 65–99)
HCT VFR BLD AUTO: 37.6 % (ref 37–47)
HGB BLD-MCNC: 12.4 G/DL (ref 12–16)
IMM GRANULOCYTES # BLD AUTO: 0.02 K/UL (ref 0–0.11)
IMM GRANULOCYTES NFR BLD AUTO: 0.2 % (ref 0–0.9)
LYMPHOCYTES # BLD AUTO: 4.91 K/UL (ref 1–4.8)
LYMPHOCYTES NFR BLD: 45.5 % (ref 22–41)
MCH RBC QN AUTO: 30.8 PG (ref 27–33)
MCHC RBC AUTO-ENTMCNC: 33 G/DL (ref 33.6–35)
MCV RBC AUTO: 93.3 FL (ref 81.4–97.8)
MONOCYTES # BLD AUTO: 0.71 K/UL (ref 0–0.85)
MONOCYTES NFR BLD AUTO: 6.6 % (ref 0–13.4)
NEUTROPHILS # BLD AUTO: 4.93 K/UL (ref 2–7.15)
NEUTROPHILS NFR BLD: 45.8 % (ref 44–72)
NRBC # BLD AUTO: 0 K/UL
NRBC BLD-RTO: 0 /100 WBC
PLATELET # BLD AUTO: 272 K/UL (ref 164–446)
PMV BLD AUTO: 9.8 FL (ref 9–12.9)
POTASSIUM SERPL-SCNC: 4.6 MMOL/L (ref 3.6–5.5)
RBC # BLD AUTO: 4.03 M/UL (ref 4.2–5.4)
SODIUM SERPL-SCNC: 141 MMOL/L (ref 135–145)
WBC # BLD AUTO: 10.8 K/UL (ref 4.8–10.8)

## 2019-12-22 PROCEDURE — 700102 HCHG RX REV CODE 250 W/ 637 OVERRIDE(OP): Performed by: INTERNAL MEDICINE

## 2019-12-22 PROCEDURE — 99233 SBSQ HOSP IP/OBS HIGH 50: CPT | Performed by: INTERNAL MEDICINE

## 2019-12-22 PROCEDURE — 700102 HCHG RX REV CODE 250 W/ 637 OVERRIDE(OP): Performed by: HOSPITALIST

## 2019-12-22 PROCEDURE — A9270 NON-COVERED ITEM OR SERVICE: HCPCS | Performed by: INTERNAL MEDICINE

## 2019-12-22 PROCEDURE — 700112 HCHG RX REV CODE 229: Performed by: NURSE PRACTITIONER

## 2019-12-22 PROCEDURE — A9270 NON-COVERED ITEM OR SERVICE: HCPCS | Performed by: NURSE PRACTITIONER

## 2019-12-22 PROCEDURE — 99233 SBSQ HOSP IP/OBS HIGH 50: CPT | Performed by: HOSPITALIST

## 2019-12-22 PROCEDURE — 85025 COMPLETE CBC W/AUTO DIFF WBC: CPT

## 2019-12-22 PROCEDURE — 700105 HCHG RX REV CODE 258: Performed by: INTERNAL MEDICINE

## 2019-12-22 PROCEDURE — 770022 HCHG ROOM/CARE - ICU (200)

## 2019-12-22 PROCEDURE — A9270 NON-COVERED ITEM OR SERVICE: HCPCS | Performed by: HOSPITALIST

## 2019-12-22 PROCEDURE — 700111 HCHG RX REV CODE 636 W/ 250 OVERRIDE (IP): Performed by: INTERNAL MEDICINE

## 2019-12-22 PROCEDURE — 700101 HCHG RX REV CODE 250: Performed by: NURSE PRACTITIONER

## 2019-12-22 PROCEDURE — 80048 BASIC METABOLIC PNL TOTAL CA: CPT

## 2019-12-22 RX ADMIN — Medication 1 LOZENGE: at 16:00

## 2019-12-22 RX ADMIN — FAMOTIDINE 20 MG: 20 TABLET ORAL at 17:07

## 2019-12-22 RX ADMIN — ACETAMINOPHEN 650 MG: 325 TABLET, FILM COATED ORAL at 05:00

## 2019-12-22 RX ADMIN — POTASSIUM CHLORIDE AND SODIUM CHLORIDE: 900; 150 INJECTION, SOLUTION INTRAVENOUS at 11:52

## 2019-12-22 RX ADMIN — Medication 1 LOZENGE: at 20:44

## 2019-12-22 RX ADMIN — POTASSIUM CHLORIDE AND SODIUM CHLORIDE: 900; 150 INJECTION, SOLUTION INTRAVENOUS at 19:46

## 2019-12-22 RX ADMIN — OXYBUTYNIN CHLORIDE 15 MG: 5 TABLET, EXTENDED RELEASE ORAL at 05:00

## 2019-12-22 RX ADMIN — GABAPENTIN 400 MG: 400 CAPSULE ORAL at 05:00

## 2019-12-22 RX ADMIN — PHENYLEPHRINE HYDROCHLORIDE 20 MCG/MIN: 10 INJECTION INTRAVENOUS at 12:29

## 2019-12-22 RX ADMIN — OXYBUTYNIN CHLORIDE 15 MG: 5 TABLET, EXTENDED RELEASE ORAL at 17:07

## 2019-12-22 RX ADMIN — BUPROPION HYDROCHLORIDE 150 MG: 150 TABLET, EXTENDED RELEASE ORAL at 17:07

## 2019-12-22 RX ADMIN — VENLAFAXINE HYDROCHLORIDE 75 MG: 75 CAPSULE, EXTENDED RELEASE ORAL at 17:07

## 2019-12-22 RX ADMIN — FAMOTIDINE 20 MG: 20 TABLET ORAL at 05:00

## 2019-12-22 RX ADMIN — ACETAMINOPHEN 650 MG: 325 TABLET, FILM COATED ORAL at 17:07

## 2019-12-22 RX ADMIN — MIDODRINE HYDROCHLORIDE 15 MG: 5 TABLET ORAL at 17:07

## 2019-12-22 RX ADMIN — ACETAMINOPHEN 650 MG: 325 TABLET, FILM COATED ORAL at 11:52

## 2019-12-22 RX ADMIN — MIDODRINE HYDROCHLORIDE 15 MG: 5 TABLET ORAL at 08:25

## 2019-12-22 RX ADMIN — BUPROPION HYDROCHLORIDE 150 MG: 150 TABLET, EXTENDED RELEASE ORAL at 05:00

## 2019-12-22 RX ADMIN — GABAPENTIN 400 MG: 400 CAPSULE ORAL at 17:07

## 2019-12-22 RX ADMIN — POTASSIUM CHLORIDE AND SODIUM CHLORIDE: 900; 150 INJECTION, SOLUTION INTRAVENOUS at 01:51

## 2019-12-22 RX ADMIN — MIDODRINE HYDROCHLORIDE 15 MG: 5 TABLET ORAL at 11:52

## 2019-12-22 ASSESSMENT — ENCOUNTER SYMPTOMS
SORE THROAT: 1
BLURRED VISION: 0
NAUSEA: 0
FLANK PAIN: 0
HEADACHES: 0
BACK PAIN: 1
CHILLS: 0
PND: 0
SORE THROAT: 0
PALPITATIONS: 0
VOMITING: 0
SHORTNESS OF BREATH: 0
DIARRHEA: 0
FOCAL WEAKNESS: 1
NERVOUS/ANXIOUS: 0
STRIDOR: 0
COUGH: 0
DEPRESSION: 0
ABDOMINAL PAIN: 0
DOUBLE VISION: 0
MYALGIAS: 0
NECK PAIN: 1
SPUTUM PRODUCTION: 0
TINGLING: 1
SPEECH CHANGE: 0
SENSORY CHANGE: 1
FEVER: 0
ORTHOPNEA: 0
DIZZINESS: 0
BRUISES/BLEEDS EASILY: 0

## 2019-12-22 NOTE — CARE PLAN
Problem: Communication  Goal: The ability to communicate needs accurately and effectively will improve  Outcome: PROGRESSING AS EXPECTED   Appropriate use of call light. Patient able to communicate needs.    Problem: Safety  Goal: Will remain free from injury  Outcome: PROGRESSING AS EXPECTED   Bed in low position and locked. Bed alarm activated. Appropriate call light use. Safe mobilization. Frequent toileting offered.     Problem: Venous Thromboembolism (VTW)/Deep Vein Thrombosis (DVT) Prevention:  Goal: Patient will participate in Venous Thrombosis (VTE)/Deep Vein Thrombosis (DVT)Prevention Measures  Outcome: PROGRESSING AS EXPECTED     Bilateral calf SCDs and BRIT hose per order.

## 2019-12-22 NOTE — PROGRESS NOTES
Critical Care Progress Note    Date of admission  12/19/2019    Chief Complaint  54 y.o. female admitted 12/19/2019 with spastic paraplegia    Hospital Course    54 y.o. female with a past medical history of hypercholesterolemia, anxiety, asthma, degenerative disc disease who presented 12/19/2019 as a transfer from [facility] for progressive paraplegia.  The patient reports over the last several days she has had several falls during which she hit her head and neck several times.  Prior to these events she was ambulating around her house with a cane however has had worsening lower extremity weakness and actually developed a contracture of her left lower extremity.  She presented to [facility] for these complaints and a CT scan of the neck was obtained which did not show any acute abnormalities.  An MRI from November which was done in our facility shows multilevel degenerative disc disease with moderate central canal stenosis at C4-C5.  The patient denies any incontinence of bowel or bladder or retention of bowel or bladder, she denies any saddle anesthesia.  The patient was seen by neurosurgery in the emergency department and determined to have significant neurologic findings consistent with central cord syndrome.  She is to be admitted to the ICU for neuro checks and BP support.  She is planned for operative intervention tomorrow.  At this time the patient's main complaint is lumbar pain with radicular symptoms in her lower extremities.      Interval Problem Update  Reviewed last 24 hour events:    A&O x4  Neuro check min change - LE perhaps stronger  No HA, N/V  SB/SR  SBp MAP > 85  UO good  ANTONIO 25  Midodrine  PO good  .hr  Midline  Room air  No CXR  Home pepcid  No chem VTE    Continue midodrine  Pull gypsy, not functioning well now, has been correlating nicely for a few days  Per neurosurgery request continue to support MAP to 85  Okay to reduce neurochecks to every 4 hours  but keep in ICU    Serial follow-up  No change in exam, hemodynamic or respiratory status-reviewed with RN  Sore throat persists but no swelling causing upper airway obstruction at this point, surgery was an anterior approach  Antonio-Synephrine weaned to 10 mics and when turned off maps dropped to 80, drip resumed at 10  Midodrine continues at fairly high dose     YESTERDAY  POD#1 - C4 corpectomy and complex repair  Leg strength better?  No change UE weakness/numbess  Aspen collar  Q1H neurochecks no change  Follows well  SB/SR  SBp 110-120s  ANTONIO 75  Midodrine  Tm 99.6  Louis - great UO  NS w/ 20 K - 100  IS 1500  Room air  SCDs - no VTE per NS  Ancef    Up Midodrine  Wean off ANTONIO  Midline    Serial follow-up  Neurological exam unchanged  Bradycardic at time, suspect is reflexive to pressors reason to support MAP  Antonio-Synephrine being titrated    Review of Systems  Review of Systems   Constitutional: Negative for chills, fever and malaise/fatigue.   HENT: Positive for sore throat. Negative for congestion.         Voice unchanged   Eyes: Negative for blurred vision and double vision.   Respiratory: Negative for cough, sputum production, shortness of breath and stridor.    Cardiovascular: Negative for chest pain, palpitations, orthopnea and PND.   Gastrointestinal: Negative for abdominal pain, diarrhea, nausea and vomiting.   Genitourinary: Negative for dysuria, flank pain, frequency and hematuria.   Musculoskeletal: Positive for back pain (Chronic more than acute) and neck pain (Unchanged, controlled but now associated with sore throat).   Skin: Negative for rash.   Neurological: Positive for tingling (Unchanged), sensory change (Unchanged) and focal weakness (Lower extremity strength improved a little bit again, definitely improved versus admit). Negative for speech change and headaches.   Endo/Heme/Allergies: Does not bruise/bleed easily.   Psychiatric/Behavioral: Negative for depression. The patient is not  nervous/anxious.         Vital Signs for last 24 hours   Temp:  [36.8 °C (98.2 °F)-37.6 °C (99.7 °F)] 37.1 °C (98.7 °F)  Pulse:  [48-90] 64  Resp:  [12-46] 20  BP: ()/(54-80) 131/69  SpO2:  [88 %-99 %] 98 %    Hemodynamic parameters for last 24 hours       Respiratory Information for the last 24 hours       Physical Exam   Physical Exam  Vitals signs reviewed.   Constitutional:       General: She is not in acute distress.     Appearance: She is underweight. She is ill-appearing (Unchanged).      Interventions: Nasal cannula in place.   HENT:      Head: Normocephalic and atraumatic.      Mouth/Throat:      Mouth: Mucous membranes are moist.      Dentition: Abnormal dentition.      Pharynx: No pharyngeal swelling.   Eyes:      General: No scleral icterus.     Extraocular Movements: Extraocular movements intact.      Pupils: Pupils are equal, round, and reactive to light.   Neck:      Musculoskeletal: Neck supple.      Thyroid: No thyromegaly.      Vascular: No JVD.      Trachea: Phonation normal.      Comments: Anterior surgical changes noted, Wound looks good, no significant edema  Cardiovascular:      Rate and Rhythm: Normal rate and regular rhythm.  No extrasystoles are present.     Pulses: Normal pulses.      Heart sounds: No murmur. No friction rub. No gallop.       Comments: Remains in sinus rhythm  Pulmonary:      Effort: No accessory muscle usage.      Breath sounds: No stridor. No wheezing, rhonchi or rales.      Comments: Work of breathing minimal, O2 saturation good  Abdominal:      General: Abdomen is flat. Bowel sounds are normal.      Palpations: Abdomen is soft.      Tenderness: There is no tenderness. There is no right CVA tenderness, left CVA tenderness or guarding.   Lymphadenopathy:      Cervical: No cervical adenopathy.   Skin:     General: Skin is warm and dry.      Capillary Refill: Capillary refill takes less than 2 seconds.      Coloration: Skin is not cyanotic.      Nails: There is no  clubbing.     Neurological:      Mental Status: She is alert and oriented to person, place, and time.      GCS: GCS eye subscore is 4. GCS verbal subscore is 5. GCS motor subscore is 6.      Comments: Sensory loss in upper extremity, primarily hands, unchanged again.  Unchanged strength in the upper extremities again.  Lower extremity strength appears to have improved postop again.  No other deficits     Psychiatric:         Attention and Perception: Attention normal.         Mood and Affect: Mood normal.         Speech: Speech normal.         Behavior: Behavior normal.         Cognition and Memory: Cognition normal.         Medications  Current Facility-Administered Medications   Medication Dose Route Frequency Provider Last Rate Last Dose   • menthol (HALLS) lozenge 1 Lozenge  1 Lozenge Oral Q2HRS PRN Saurabh Irwin D.O.   1 Lozenge at 12/22/19 2044   • midodrine (PROAMATINE) tablet 15 mg  15 mg Oral TID WITH MEALS Jack Ruffin M.D.   15 mg at 12/22/19 1707   • Pharmacy Consult Request ...Pain Management Review 1 Each  1 Each Other PHARMACY TO DOSE HUMBERTO Mcgarry.P.N.       • MD ALERT...DO NOT ADMINISTER NSAIDS or ASPIRIN unless ORDERED By Neurosurgery 1 Each  1 Each Other PRN Elsy Arnold, HUMBERTO.P.N.       • docusate sodium (COLACE) capsule 100 mg  100 mg Oral BID HUMBERTO Mcgarry.P.N.   Stopped at 12/21/19 1800   • senna-docusate (PERICOLACE or SENOKOT S) 8.6-50 MG per tablet 1 Tab  1 Tab Oral Nightly HUMBERTO Mcgarry.P.N.   Stopped at 12/21/19 2015   • senna-docusate (PERICOLACE or SENOKOT S) 8.6-50 MG per tablet 1 Tab  1 Tab Oral Q24HRS PRN HUMBERTO Mcgarry.P.N.       • polyethylene glycol/lytes (MIRALAX) PACKET 1 Packet  1 Packet Oral BID PRN Elsy Arnold A.P.N.       • magnesium hydroxide (MILK OF MAGNESIA) suspension 30 mL  30 mL Oral QDAY PRN HUMBERTO Mcgarry.P.N.       • bisacodyl (DULCOLAX) suppository 10 mg  10 mg Rectal Q24HRS PRN Elsy Arnold, A.P.N.       •  fleet enema 133 mL  1 Each Rectal Once PRN Elsy Arnold, A.P.N.       • 0.9 % NaCl with KCl 20 mEq infusion   Intravenous Continuous Elsy Arnold, A.P.N. 100 mL/hr at 12/22/19 1946     • ondansetron (ZOFRAN) syringe/vial injection 4 mg  4 mg Intravenous Q4HRS PRN Elsy Arnold, A.P.N.       • ondansetron (ZOFRAN ODT) dispertab 4 mg  4 mg Oral Q4HRS PRN Elsy Arnold, A.P.N.       • promethazine (PHENERGAN) tablet 12.5-25 mg  12.5-25 mg Oral Q4HRS PRN Elsy Arnold, A.P.N.       • promethazine (PHENERGAN) suppository 12.5-25 mg  12.5-25 mg Rectal Q4HRS PRN Elsy Arnold, A.P.N.       • prochlorperazine (COMPAZINE) injection 5-10 mg  5-10 mg Intravenous Q4HRS PRN Elsy Arnold, A.P.N.       • diphenhydrAMINE (BENADRYL) tablet/capsule 25 mg  25 mg Oral Q6HRS PRN Elsy Arnold, A.P.N.        Or   • diphenhydrAMINE (BENADRYL) injection 25 mg  25 mg Intravenous Q6HRS PRN Elsy Arnold, A.P.N.       • benzocaine-menthol (CEPACOL) lozenge 1 Lozenge  1 Lozenge Mouth/Throat Q2HRS PRN Elsy Arnold, A.P.N.       • zolpidem (AMBIEN) tablet 10 mg  10 mg Oral HS PRN Yong Zhong, D.O.   10 mg at 12/21/19 2015   • venlafaxine XR (EFFEXOR XR) capsule 75 mg  75 mg Oral Q EVENING Yong Zhong, D.O.   75 mg at 12/22/19 1707   • famotidine (PEPCID) tablet 20 mg  20 mg Oral BID Yong Zhong, D.O.   20 mg at 12/22/19 1707   • oxybutynin SR (DITROPAN-XL) tablet 15 mg  15 mg Oral BID Yong Zhong, D.O.   15 mg at 12/22/19 1707   • methocarbamol (ROBAXIN) tablet 500 mg  500 mg Oral BID PRN Yong Zhong, D.O.   500 mg at 12/19/19 1729   • gabapentin (NEURONTIN) capsule 400 mg  400 mg Oral BID Yong Zhong, D.O.   400 mg at 12/22/19 1707   • buPROPion SR (WELLBUTRIN-SR) tablet 150 mg  150 mg Oral BID Yong Zhong, D.O.   150 mg at 12/22/19 1707   • Respiratory Care per Protocol   Nebulization Continuous RT Yong Zhong,  D.O.       • acetaminophen (TYLENOL) tablet 650 mg  650 mg Oral Q6HRS NANCY Martin.O.   650 mg at 12/22/19 1707   • oxyCODONE immediate-release (ROXICODONE) tablet 5-10 mg  5-10 mg Oral Q4HRS PRN NANCY Martin.O.   10 mg at 12/20/19 1857   • albuterol inhaler 2 Puff  2 Puff Inhalation Q4H PRN (RT) NANCY Martin.O.   2 Puff at 12/19/19 2257   • phenylephrine (ANTONIO-SYNEPHRINE) 40 mg in  mL Infusion  0-300 mcg/min Intravenous Continuous Jack Ruffin M.D. 5.6 mL/hr at 12/22/19 2329 15 mcg/min at 12/22/19 2329       Fluids    Intake/Output Summary (Last 24 hours) at 12/22/2019 2330  Last data filed at 12/22/2019 2200  Gross per 24 hour   Intake 4067.86 ml   Output 5410 ml   Net -1342.14 ml       Laboratory          Recent Labs     12/20/19 0447 12/21/19  0322 12/22/19  0400   SODIUM 137 139 141   POTASSIUM 3.5* 4.2 4.6   CHLORIDE 107 109 108   CO2 23 23 27   BUN 14 10 12   CREATININE 0.74 0.66 0.69   CALCIUM 8.6 8.2* 8.4*     Recent Labs     12/20/19 0447 12/21/19  0322 12/22/19  0400   GLUCOSE 101* 128* 87     Recent Labs     12/20/19 0447 12/21/19  0322 12/22/19  0400   WBC 9.2 12.2* 10.8   NEUTSPOLYS 47.20 85.70* 45.80   LYMPHOCYTES 41.00 9.50* 45.50*   MONOCYTES 7.70 4.20 6.60   EOSINOPHILS 3.00 0.00 1.20   BASOPHILS 0.80 0.20 0.70     Recent Labs     12/20/19 0447 12/21/19  0322 12/22/19  0400   RBC 4.30 4.23 4.03*   HEMOGLOBIN 13.4 13.2 12.4   HEMATOCRIT 39.2 38.9 37.6   PLATELETCT 326 328 272       Imaging  CT:    Reviewed  MRI:   Reviewed    Assessment/Plan  * Cervical spine pain  Assessment & Plan  Acute on chronic following several falls recently, monitoring for additional complications from these falls  Exam concerning for central cord type condition, findings persisting, positive response to surgical treatment  Neurosurgery on board, recommendations for blood pressure augmentation to MAP 85 mmHg, titrating Antonio-Synephrine and continue oral midodrine as well  x5 days postop, stop 12/25  Noting reflexive bradycardia to medications at times  Neurochecks to every 4 hours  Vasopressors, oral midodrine and IV Trevor-Synephrine, as needed to maintain MAP >85 mmHg until 12/25  Arterial line failing, correlating well for 2 days with cough, will pull a line  Hold lovenox and mobic until cleared by neurosurgery, SCDs for now  Continue hard collar for supportive neck postop      Central cord syndrome (HCC)  Assessment & Plan  Neurologic signs symptoms consistent with this process-improved lower extremity strength 48 hours postop  Neurosurgical consultation obtained, operative decompression for 12/20 tolerated well  Supporting perfusion to spinal cord with active titration of Trevor-Synephrine, MAP goal >= 85 x5 days  Oral midodrine supported with Trevor-Synephrine infusion, Trevor-Synephrine almost weaned off  Maintain euvolemia with fluids, serial exam to reassess  No role for steroids at this time physical therapy  OT/PT and physiatry consultation in the near future  Monitor for progression and respiratory compromise requiring intubation mechanical ventilation  Continue hard collar for supportive neck postop    Lumbar radiculopathy  Assessment & Plan  Acute on chronic  MRI from November reviewed with radiology  Neurosurgery on board, to the OR 12/20 for decompression of cervical abnormalities, appears successful for lower extremity central cord issues, upper extremity symptoms have not improved as of yet  Continue monitoring for neurological improvement and providing spinal cord supportive care    Mild intermittent asthma- (present on admission)  Assessment & Plan  PRN albuterol at home  RT/O2 Protocols  Titrate supplemental FiO2 to maintain SpO2 >88%  No exacerbation at this time, monitor for need for IV steroids  Nasal steroids for chronic sinus process as well as needed  Continue to monitor, okay so far    Anxiety and depression- (present on admission)  Assessment & Plan  Resume  Wellbutrin and Effexor  Administer benzodiazepines cautiously as needed    Tobacco use- (present on admission)  Assessment & Plan  Nicotine replacement protocol as needed  Cessation counseling ongoing daily       VTE:  SCDs  Ulcer: H2 Antagonist  Lines: Arterial Line  Ongoing indication addressed    I have performed a physical exam and reviewed and updated ROS and Plan today (12/22/2019). In review of yesterday's note (12/21/2019), there are no changes except as documented above.     Discussed patient condition and risk of morbidity and/or mortality with Hospitalist, RN, RT, Pharmacy, Charge nurse / hot rounds, Patient and neurosurgery

## 2019-12-22 NOTE — CARE PLAN
Problem: Safety  Goal: Will remain free from injury: Patient in hospital clothing, bed alarm on and at lowest position, call light in place.   Outcome: PROGRESSING AS EXPECTED     Problem: Venous Thromboembolism (VTW)/Deep Vein Thrombosis (DVT) Prevention: SCDs on , BRIT  hoses on.  Goal: Patient will participate in Venous Thrombosis (VTE)/Deep Vein Thrombosis (DVT)Prevention Measures  Outcome: PROGRESSING AS EXPECTED

## 2019-12-22 NOTE — DISCHARGE PLANNING
[facility] Transitional Care Coordination     Referral from:  Dr. Irwin    Facesheet indicates: JUSTO MOURA    Potential Rehab Diagnosis: Central Cord injury    Chart review indicates patient may have on going medical management and may have therapy needs to possibly meet inpatient rehab facility criteria with the goal of returning to community.    D/C support: Son & D.I.L.     Physiatry consultation pended per protocol.      Central Cord injury.  Would welcome PT/OT evals once appropriate.     Thank you for the referral.

## 2019-12-22 NOTE — PROGRESS NOTES
Neurosurgery Progress Note    Subjective:  No c/o.  MAP ok    Exam:  Generates at least 4-4+/5 power throughout    BP  Min: 107/70  Max: 152/70  Pulse  Av.6  Min: 47  Max: 81  Resp  Av.4  Min: 12  Max: 46  Temp  Av.3 °C (99.1 °F)  Min: 36.8 °C (98.2 °F)  Max: 37.6 °C (99.7 °F)  SpO2  Av.5 %  Min: 88 %  Max: 98 %    No data recorded    Recent Labs     19  0447 19  0322 19  0400   WBC 9.2 12.2* 10.8   RBC 4.30 4.23 4.03*   HEMOGLOBIN 13.4 13.2 12.4   HEMATOCRIT 39.2 38.9 37.6   MCV 91.2 92.0 93.3   MCH 31.2 31.2 30.8   MCHC 34.2 33.9 33.0*   RDW 43.6 44.6 46.3   PLATELETCT 326 328 272   MPV 9.4 9.6 9.8     Recent Labs     19  0447 19  0322 19  0400   SODIUM 137 139 141   POTASSIUM 3.5* 4.2 4.6   CHLORIDE 107 109 108   CO2 23 23 27   GLUCOSE 101* 128* 87   BUN 14 10 12   CREATININE 0.74 0.66 0.69   CALCIUM 8.6 8.2* 8.4*               Intake/Output       19 07 - 19 0659 19 07 - 19 0659       Total  Total       Intake    P.O.  1080  -- 1080  120  -- 120    P.O. 1080 -- 1080 120 -- 120    I.V.  1586.4  1414.7 3001.1  222.6  -- 222.6    Phenylephrine Volume 386.4 214.7 601.1 22.6 -- 22.6    Volume (mL) (0.9 % NaCl with KCl 20 mEq infusion) 1200 1200 2400 200 -- 200    Other  --  300 300  --  -- --    Medications (PO/Enteral Liquids) -- 300 300 -- -- --    Total Intake 2666.4 1714.7 4381.1 342.6 -- 342.6       Output    Urine  2690  2200 4890  600  -- 600    Output (mL) (Urethral Catheter Latex 16 Fr.) 2690 2200 4890 600 -- 600    Drains  28  -- 28  --  -- --    Output (mL) ([REMOVED] Closed/Suction Drain 1 Right Neck Hemovac) 28 -- 28 -- -- --    Stool  --  -- --  --  -- --    Number of Times Stooled 1 x 0 x 1 x 0 x -- 0 x    Total Output 2718 2200 4918 600 -- 600       Net I/O     -51.6 -485.4 -536.9 -257.4 -- -257.4            Intake/Output Summary (Last 24 hours) at 2019 1122  Last data filed  at 12/22/2019 0800  Gross per 24 hour   Intake 3862.78 ml   Output 4620 ml   Net -757.22 ml            • midodrine  15 mg TID WITH MEALS   • Pharmacy Consult Request  1 Each PHARMACY TO DOSE   • MD ALERT...DO NOT ADMINISTER NSAIDS or ASPIRIN unless ORDERED By Neurosurgery  1 Each PRN   • docusate sodium  100 mg BID   • senna-docusate  1 Tab Nightly   • senna-docusate  1 Tab Q24HRS PRN   • polyethylene glycol/lytes  1 Packet BID PRN   • magnesium hydroxide  30 mL QDAY PRN   • bisacodyl  10 mg Q24HRS PRN   • fleet  1 Each Once PRN   • 0.9 % NaCl with KCl 20 mEq 1,000 mL   Continuous   • ondansetron  4 mg Q4HRS PRN   • ondansetron  4 mg Q4HRS PRN   • promethazine  12.5-25 mg Q4HRS PRN   • promethazine  12.5-25 mg Q4HRS PRN   • prochlorperazine  5-10 mg Q4HRS PRN   • diphenhydrAMINE  25 mg Q6HRS PRN    Or   • diphenhydrAMINE  25 mg Q6HRS PRN   • benzocaine-menthol  1 Lozenge Q2HRS PRN   • zolpidem  10 mg HS PRN   • venlafaxine XR  75 mg Q EVENING   • famotidine  20 mg BID   • oxybutynin SR  15 mg BID   • methocarbamol  500 mg BID PRN   • gabapentin  400 mg BID   • buPROPion SR  150 mg BID   • Respiratory Care per Protocol   Continuous RT   • acetaminophen  650 mg Q6HRS   • oxyCODONE immediate-release  5-10 mg Q4HRS PRN   • albuterol  2 Puff Q4H PRN (RT)   • Phenylephrine infusion  0-300 mcg/min Continuous       Assessment and Plan:    POD # 2  Prophylactic anticoagulation: No   Neuro stable.  q4 hr neuro checks.

## 2019-12-22 NOTE — ASSESSMENT & PLAN NOTE
Central cord injury, she presented with symptoms of paraplegia  Underwent cervical corpectomy on 12/20/2019, performed by Dr. Linton  Strength improved  PT/OT

## 2019-12-22 NOTE — PROGRESS NOTES
Hospital Medicine Daily Progress Note    Date of Service  12/22/2019    Chief Complaint  Back and neck pain    Hospital Course    54 y.o. female admitted 12/19/2019 with HTN, chronic back pain and neck pain. An outpatient MRI done of her cervical spine and lumbar spine on November 14, and these demonstrated multiple degenerative changes and some foraminal issues however did not show any cord compression.  Since her last MRI she has had episodes of falling.  She has had increasing weakness in the legs and arms. At an outlying facility prior to transfer to us, she had a CT done this did not demonstrate any acute findings either.  She is sent to our facility for evaluation by neurosurgery Dr. Jacobson who has been consulted.  On 12/20 patient had cervical surgical intervention by Dr. Linton.  12/21 patient states improved strength in her arms but remains weak in her legs.       Interval Problem Update  remains on vasopressors for higher MAP s/p C-spine surgery  On room air  On midodrine 15mg TID    Consultants/Specialty  Dr Linton, Neurosurgery    Code Status  FULL    Disposition  Transfer to neurosurgical floor  Will need rehab placement    Review of Systems  Review of Systems   Constitutional: Negative for chills, fever and malaise/fatigue.   HENT: Negative for congestion and sore throat.    Eyes: Negative for blurred vision.   Respiratory: Negative for cough, shortness of breath and stridor.    Cardiovascular: Negative for chest pain and palpitations.   Gastrointestinal: Negative for abdominal pain and nausea.   Genitourinary: Negative for dysuria.   Musculoskeletal: Positive for neck pain. Negative for myalgias.   Neurological: Positive for tingling and sensory change. Negative for dizziness, speech change and headaches.   Psychiatric/Behavioral: The patient is not nervous/anxious.         Physical Exam  Temp:  [36.8 °C (98.2 °F)-37.6 °C (99.7 °F)] 36.8 °C (98.3 °F)  Pulse:  [47-87] 66  Resp:  [12-46] 15  BP:  (101-152)/(57-80) 132/78  SpO2:  [88 %-98 %] 94 %    Physical Exam  Vitals signs reviewed.   Constitutional:       General: She is not in acute distress.     Appearance: Normal appearance. She is not diaphoretic.   HENT:      Head: Normocephalic and atraumatic.      Nose: Nose normal.      Mouth/Throat:      Mouth: Mucous membranes are moist.   Eyes:      General: No scleral icterus.        Right eye: No discharge.         Left eye: No discharge.      Extraocular Movements: Extraocular movements intact.      Conjunctiva/sclera: Conjunctivae normal.   Neck:      Musculoskeletal: Muscular tenderness present.      Vascular: No carotid bruit.      Comments: Right lateral neck surgical site w/o drainage, mild bruising and good approximation of surgical edges  Cardiovascular:      Rate and Rhythm: Normal rate and regular rhythm.      Pulses:           Radial pulses are 2+ on the right side and 2+ on the left side.        Dorsalis pedis pulses are 2+ on the right side and 2+ on the left side.      Heart sounds: Normal heart sounds. No murmur.   Pulmonary:      Effort: Pulmonary effort is normal. No respiratory distress.      Breath sounds: Normal breath sounds. No wheezing.   Abdominal:      General: Bowel sounds are normal. There is no distension.      Palpations: Abdomen is soft.      Tenderness: There is no tenderness.   Musculoskeletal:         General: No swelling or tenderness.   Skin:     Coloration: Skin is not jaundiced.   Neurological:      General: No focal deficit present.      Mental Status: She is alert and oriented to person, place, and time.      Cranial Nerves: No cranial nerve deficit.      Motor: Weakness (bilateral legs) present.   Psychiatric:         Mood and Affect: Mood normal.         Behavior: Behavior normal.         Fluids    Intake/Output Summary (Last 24 hours) at 12/22/2019 1657  Last data filed at 12/22/2019 1600  Gross per 24 hour   Intake 3748.54 ml   Output 5210 ml   Net -1461.46 ml        Laboratory  Recent Labs     12/20/19  0447 12/21/19  0322 12/22/19  0400   WBC 9.2 12.2* 10.8   RBC 4.30 4.23 4.03*   HEMOGLOBIN 13.4 13.2 12.4   HEMATOCRIT 39.2 38.9 37.6   MCV 91.2 92.0 93.3   MCH 31.2 31.2 30.8   MCHC 34.2 33.9 33.0*   RDW 43.6 44.6 46.3   PLATELETCT 326 328 272   MPV 9.4 9.6 9.8     Recent Labs     12/20/19  0447 12/21/19  0322 12/22/19  0400   SODIUM 137 139 141   POTASSIUM 3.5* 4.2 4.6   CHLORIDE 107 109 108   CO2 23 23 27   GLUCOSE 101* 128* 87   BUN 14 10 12   CREATININE 0.74 0.66 0.69   CALCIUM 8.6 8.2* 8.4*                   Imaging  IR-MIDLINE CATHETER INSERTION WO GUIDANCE > AGE 3   Final Result                  Ultrasound-guided midline placement performed by qualified nursing staff    as above.          DX-CERVICAL SPINE-2 OR 3 VIEWS   Final Result      Digitized intraoperative radiograph is submitted for review.  This examination is not for diagnostic purpose but for guidance during a surgical procedure.      OUTSIDE IMAGES-CT CERVICAL SPINE   Final Result      DX-PORTABLE FLUORO > 1 HOUR    (Results Pending)        Assessment/Plan  * Cervical spine pain  Assessment & Plan  Recent history of falls  Neurosurgery consulted and on 12/20 took patient for cervical fixation last repair  Pain management  PT OT and rehab evaluation  Pain management    Central cord syndrome (HCC)  Assessment & Plan  Central cord injury.  Herniated C4-5 disk.  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    12/20 S/P surgical repair by Dr Linton.  1.  Anterior cervical diskectomy and iatrogenic fracture through C3-4 with   bilateral foraminotomies at C3-4.  2.  Anterior cervical diskectomy with bilateral foraminotomies at C4-5.  3.  C4 corpectomy.  4.  Placement of anterior interbody spacer using a Pyramesh cage with   autograft, allograft and BMP.  5.  Use of intraoperative fluoro for analysis of anterior decompression of    spinal cord.  6.  Anterior plate fixation from C3 through C5 using [place] plate and 17 mm   bicortical screws placed under direct fluoro visualization.  7.  Use of fluoro for localization.  8.  Modifier 22.  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    Additionally, this patient had profound constriction at the C4 body requiring   a C4 corpectomy to decompress her anteriorly away from her ossification   posteriorly and is a baseline smoker with very poor bone quality.  9.  Use of intraoperative monitoring for motors and sensories given extreme   stenosis across the C4 body and C4-5 disk space as well as for placement of   the anterior cage at the C4 corpectomy site.    Monitoring ICU due to neurosurgery wanting MAP greater than 85  Will need PT/OT and likely Rehab placement.    Lumbar radiculopathy  Assessment & Plan  As per her cervical pain, this is a acutely worse on chronic basis.  MRI in November did not show any acute spinal cord injury.  On exam today she does have weakness however it is an inconsistent exam.  She has less than antigravity strength when specifically tested however demonstrates antigravity strength fairly easily when she is moving spontaneously.  Beyond that she does demonstrate spasticity in the plantar flexors which would imply some real underlying pathology.  Look forward to Dr. Jacobson's evaluation and recommendations      Mild intermittent asthma- (present on admission)  Assessment & Plan  Placed on O2 and respiratory protocols  Exam is benign    Anxiety and depression- (present on admission)  Assessment & Plan  Continue the patient's home venlafaxine and bupropion    Tobacco use- (present on admission)  Assessment & Plan  Smoking cessation        VTE prophylaxis: SCD

## 2019-12-23 ENCOUNTER — APPOINTMENT (OUTPATIENT)
Dept: RADIOLOGY | Facility: MEDICAL CENTER | Age: 54
DRG: 029 | End: 2019-12-23
Attending: PHYSICAL MEDICINE & REHABILITATION
Payer: MEDICAID

## 2019-12-23 LAB
ANION GAP SERPL CALC-SCNC: 7 MMOL/L (ref 0–11.9)
BASOPHILS # BLD AUTO: 0.7 % (ref 0–1.8)
BASOPHILS # BLD: 0.06 K/UL (ref 0–0.12)
BUN SERPL-MCNC: 10 MG/DL (ref 8–22)
CALCIUM SERPL-MCNC: 8.6 MG/DL (ref 8.5–10.5)
CHLORIDE SERPL-SCNC: 105 MMOL/L (ref 96–112)
CO2 SERPL-SCNC: 27 MMOL/L (ref 20–33)
CREAT SERPL-MCNC: 0.62 MG/DL (ref 0.5–1.4)
EOSINOPHIL # BLD AUTO: 0.21 K/UL (ref 0–0.51)
EOSINOPHIL NFR BLD: 2.4 % (ref 0–6.9)
ERYTHROCYTE [DISTWIDTH] IN BLOOD BY AUTOMATED COUNT: 45.9 FL (ref 35.9–50)
GLUCOSE SERPL-MCNC: 93 MG/DL (ref 65–99)
HCT VFR BLD AUTO: 40.5 % (ref 37–47)
HGB BLD-MCNC: 13.5 G/DL (ref 12–16)
IMM GRANULOCYTES # BLD AUTO: 0.02 K/UL (ref 0–0.11)
IMM GRANULOCYTES NFR BLD AUTO: 0.2 % (ref 0–0.9)
LYMPHOCYTES # BLD AUTO: 3.35 K/UL (ref 1–4.8)
LYMPHOCYTES NFR BLD: 37.6 % (ref 22–41)
MCH RBC QN AUTO: 30.5 PG (ref 27–33)
MCHC RBC AUTO-ENTMCNC: 33.3 G/DL (ref 33.6–35)
MCV RBC AUTO: 91.6 FL (ref 81.4–97.8)
MONOCYTES # BLD AUTO: 0.56 K/UL (ref 0–0.85)
MONOCYTES NFR BLD AUTO: 6.3 % (ref 0–13.4)
NEUTROPHILS # BLD AUTO: 4.7 K/UL (ref 2–7.15)
NEUTROPHILS NFR BLD: 52.8 % (ref 44–72)
NRBC # BLD AUTO: 0 K/UL
NRBC BLD-RTO: 0 /100 WBC
PLATELET # BLD AUTO: 275 K/UL (ref 164–446)
PMV BLD AUTO: 9.8 FL (ref 9–12.9)
POTASSIUM SERPL-SCNC: 4.3 MMOL/L (ref 3.6–5.5)
RBC # BLD AUTO: 4.42 M/UL (ref 4.2–5.4)
SODIUM SERPL-SCNC: 139 MMOL/L (ref 135–145)
WBC # BLD AUTO: 8.9 K/UL (ref 4.8–10.8)

## 2019-12-23 PROCEDURE — 99254 IP/OBS CNSLTJ NEW/EST MOD 60: CPT | Performed by: PHYSICAL MEDICINE & REHABILITATION

## 2019-12-23 PROCEDURE — 700102 HCHG RX REV CODE 250 W/ 637 OVERRIDE(OP): Performed by: HOSPITALIST

## 2019-12-23 PROCEDURE — A9270 NON-COVERED ITEM OR SERVICE: HCPCS | Performed by: HOSPITALIST

## 2019-12-23 PROCEDURE — 700102 HCHG RX REV CODE 250 W/ 637 OVERRIDE(OP): Performed by: PHYSICAL MEDICINE & REHABILITATION

## 2019-12-23 PROCEDURE — 51798 US URINE CAPACITY MEASURE: CPT

## 2019-12-23 PROCEDURE — A9270 NON-COVERED ITEM OR SERVICE: HCPCS | Performed by: PHYSICAL MEDICINE & REHABILITATION

## 2019-12-23 PROCEDURE — 97166 OT EVAL MOD COMPLEX 45 MIN: CPT

## 2019-12-23 PROCEDURE — 700102 HCHG RX REV CODE 250 W/ 637 OVERRIDE(OP): Performed by: INTERNAL MEDICINE

## 2019-12-23 PROCEDURE — 700102 HCHG RX REV CODE 250 W/ 637 OVERRIDE(OP): Performed by: NURSE PRACTITIONER

## 2019-12-23 PROCEDURE — 700111 HCHG RX REV CODE 636 W/ 250 OVERRIDE (IP): Performed by: INTERNAL MEDICINE

## 2019-12-23 PROCEDURE — 99232 SBSQ HOSP IP/OBS MODERATE 35: CPT | Performed by: HOSPITALIST

## 2019-12-23 PROCEDURE — 700101 HCHG RX REV CODE 250: Performed by: NURSE PRACTITIONER

## 2019-12-23 PROCEDURE — A9270 NON-COVERED ITEM OR SERVICE: HCPCS | Performed by: NURSE PRACTITIONER

## 2019-12-23 PROCEDURE — A9270 NON-COVERED ITEM OR SERVICE: HCPCS | Performed by: INTERNAL MEDICINE

## 2019-12-23 PROCEDURE — 94150 VITAL CAPACITY TEST: CPT

## 2019-12-23 PROCEDURE — 85025 COMPLETE CBC W/AUTO DIFF WBC: CPT

## 2019-12-23 PROCEDURE — 700105 HCHG RX REV CODE 258: Performed by: INTERNAL MEDICINE

## 2019-12-23 PROCEDURE — 770022 HCHG ROOM/CARE - ICU (200)

## 2019-12-23 PROCEDURE — 97162 PT EVAL MOD COMPLEX 30 MIN: CPT

## 2019-12-23 PROCEDURE — 99291 CRITICAL CARE FIRST HOUR: CPT | Performed by: INTERNAL MEDICINE

## 2019-12-23 PROCEDURE — 80048 BASIC METABOLIC PNL TOTAL CA: CPT

## 2019-12-23 PROCEDURE — 700112 HCHG RX REV CODE 229: Performed by: NURSE PRACTITIONER

## 2019-12-23 RX ORDER — GABAPENTIN 300 MG/1
600 CAPSULE ORAL 2 TIMES DAILY
Status: DISCONTINUED | OUTPATIENT
Start: 2019-12-23 | End: 2019-12-27 | Stop reason: HOSPADM

## 2019-12-23 RX ORDER — BACLOFEN 10 MG/1
10 TABLET ORAL 3 TIMES DAILY
Status: DISCONTINUED | OUTPATIENT
Start: 2019-12-23 | End: 2019-12-27 | Stop reason: HOSPADM

## 2019-12-23 RX ORDER — SENNOSIDES A AND B 8.6 MG/1
17.2 TABLET, FILM COATED ORAL
Status: DISCONTINUED | OUTPATIENT
Start: 2019-12-24 | End: 2019-12-27 | Stop reason: HOSPADM

## 2019-12-23 RX ADMIN — GABAPENTIN 600 MG: 300 CAPSULE ORAL at 17:46

## 2019-12-23 RX ADMIN — ACETAMINOPHEN 650 MG: 325 TABLET, FILM COATED ORAL at 22:54

## 2019-12-23 RX ADMIN — GABAPENTIN 400 MG: 400 CAPSULE ORAL at 05:10

## 2019-12-23 RX ADMIN — ZOLPIDEM TARTRATE 5 MG: 5 TABLET ORAL at 22:09

## 2019-12-23 RX ADMIN — ACETAMINOPHEN 650 MG: 325 TABLET, FILM COATED ORAL at 05:10

## 2019-12-23 RX ADMIN — ENOXAPARIN SODIUM 40 MG: 100 INJECTION SUBCUTANEOUS at 11:07

## 2019-12-23 RX ADMIN — Medication 1 LOZENGE: at 17:46

## 2019-12-23 RX ADMIN — ACETAMINOPHEN 650 MG: 325 TABLET, FILM COATED ORAL at 11:07

## 2019-12-23 RX ADMIN — OXYBUTYNIN CHLORIDE 15 MG: 5 TABLET, EXTENDED RELEASE ORAL at 05:11

## 2019-12-23 RX ADMIN — VENLAFAXINE HYDROCHLORIDE 75 MG: 75 CAPSULE, EXTENDED RELEASE ORAL at 17:46

## 2019-12-23 RX ADMIN — MIDODRINE HYDROCHLORIDE 15 MG: 5 TABLET ORAL at 11:07

## 2019-12-23 RX ADMIN — FAMOTIDINE 20 MG: 20 TABLET ORAL at 05:11

## 2019-12-23 RX ADMIN — Medication 1 LOZENGE: at 05:23

## 2019-12-23 RX ADMIN — ACETAMINOPHEN 650 MG: 325 TABLET, FILM COATED ORAL at 17:45

## 2019-12-23 RX ADMIN — ACETAMINOPHEN 650 MG: 325 TABLET, FILM COATED ORAL at 00:11

## 2019-12-23 RX ADMIN — FAMOTIDINE 20 MG: 20 TABLET ORAL at 17:46

## 2019-12-23 RX ADMIN — BACLOFEN 10 MG: 10 TABLET ORAL at 17:46

## 2019-12-23 RX ADMIN — SENNOSIDES AND DOCUSATE SODIUM 1 TABLET: 8.6; 5 TABLET ORAL at 12:19

## 2019-12-23 RX ADMIN — MIDODRINE HYDROCHLORIDE 15 MG: 5 TABLET ORAL at 07:52

## 2019-12-23 RX ADMIN — MIDODRINE HYDROCHLORIDE 15 MG: 5 TABLET ORAL at 17:46

## 2019-12-23 RX ADMIN — BUPROPION HYDROCHLORIDE 150 MG: 150 TABLET, EXTENDED RELEASE ORAL at 05:10

## 2019-12-23 RX ADMIN — POTASSIUM CHLORIDE AND SODIUM CHLORIDE: 900; 150 INJECTION, SOLUTION INTRAVENOUS at 06:17

## 2019-12-23 RX ADMIN — OXYBUTYNIN CHLORIDE 15 MG: 5 TABLET, EXTENDED RELEASE ORAL at 17:47

## 2019-12-23 RX ADMIN — PHENYLEPHRINE HYDROCHLORIDE 75 MCG/MIN: 10 INJECTION INTRAVENOUS at 22:48

## 2019-12-23 RX ADMIN — DOCUSATE SODIUM 100 MG: 100 CAPSULE, LIQUID FILLED ORAL at 17:46

## 2019-12-23 RX ADMIN — BUPROPION HYDROCHLORIDE 150 MG: 150 TABLET, EXTENDED RELEASE ORAL at 17:47

## 2019-12-23 RX ADMIN — METHOCARBAMOL TABLETS 500 MG: 500 TABLET, COATED ORAL at 11:50

## 2019-12-23 ASSESSMENT — COGNITIVE AND FUNCTIONAL STATUS - GENERAL
DRESSING REGULAR LOWER BODY CLOTHING: A LOT
TURNING FROM BACK TO SIDE WHILE IN FLAT BAD: UNABLE
DRESSING REGULAR UPPER BODY CLOTHING: A LITTLE
CLIMB 3 TO 5 STEPS WITH RAILING: TOTAL
EATING MEALS: A LITTLE
WALKING IN HOSPITAL ROOM: A LOT
STANDING UP FROM CHAIR USING ARMS: A LITTLE
MOVING FROM LYING ON BACK TO SITTING ON SIDE OF FLAT BED: UNABLE
SUGGESTED CMS G CODE MODIFIER DAILY ACTIVITY: CK
MOBILITY SCORE: 9
MOVING TO AND FROM BED TO CHAIR: UNABLE
TOILETING: A LOT
DAILY ACTIVITIY SCORE: 15
HELP NEEDED FOR BATHING: A LOT
SUGGESTED CMS G CODE MODIFIER MOBILITY: CM
PERSONAL GROOMING: A LITTLE

## 2019-12-23 ASSESSMENT — ENCOUNTER SYMPTOMS
FOCAL WEAKNESS: 1
CHILLS: 0
VOMITING: 0
DEPRESSION: 0
NERVOUS/ANXIOUS: 0
BLURRED VISION: 0
SENSORY CHANGE: 1
ABDOMINAL PAIN: 0
NECK PAIN: 1
BACK PAIN: 1
TINGLING: 0
BRUISES/BLEEDS EASILY: 0
DIZZINESS: 0
COUGH: 0
HEADACHES: 0
DIARRHEA: 0
SPEECH CHANGE: 0
MYALGIAS: 0
SPUTUM PRODUCTION: 0
NAUSEA: 0
TINGLING: 1
STRIDOR: 0
FEVER: 0
SORE THROAT: 0
PND: 0
ORTHOPNEA: 0
PALPITATIONS: 0
SHORTNESS OF BREATH: 0
DOUBLE VISION: 0
FLANK PAIN: 0

## 2019-12-23 ASSESSMENT — ACTIVITIES OF DAILY LIVING (ADL): TOILETING: INDEPENDENT

## 2019-12-23 ASSESSMENT — GAIT ASSESSMENTS
ASSISTIVE DEVICE: HAND HELD ASSIST
DISTANCE (FEET): 2
GAIT LEVEL OF ASSIST: MINIMAL ASSIST

## 2019-12-23 ASSESSMENT — PULMONARY FUNCTION TESTS: FVC: 3.1

## 2019-12-23 NOTE — PROGRESS NOTES
Neurosurgery Progress Note    Subjective:  No c/o.  MAP ok    Exam:  Generates at least 4-4+/5 power throughout    BP  Min: 90/54  Max: 147/78  Pulse  Av.2  Min: 51  Max: 91  Resp  Av.8  Min: 12  Max: 46  Temp  Av.1 °C (98.7 °F)  Min: 36.8 °C (98.3 °F)  Max: 37.2 °C (99 °F)  SpO2  Av.3 %  Min: 88 %  Max: 99 %    No data recorded    Recent Labs     19  0322 19  0400 19  0333   WBC 12.2* 10.8 8.9   RBC 4.23 4.03* 4.42   HEMOGLOBIN 13.2 12.4 13.5   HEMATOCRIT 38.9 37.6 40.5   MCV 92.0 93.3 91.6   MCH 31.2 30.8 30.5   MCHC 33.9 33.0* 33.3*   RDW 44.6 46.3 45.9   PLATELETCT 328 272 275   MPV 9.6 9.8 9.8     Recent Labs     19  0322 19  0400 19  0333   SODIUM 139 141 139   POTASSIUM 4.2 4.6 4.3   CHLORIDE 109 108 105   CO2 23 27 27   GLUCOSE 128* 87 93   BUN 10 12 10   CREATININE 0.66 0.69 0.62   CALCIUM 8.2* 8.4* 8.6               Intake/Output       19 0700 - 19 0659 19 07 - 19 0659       Total  Total       Intake    P.O.    --   --  -- --    P.O.  -- 1080 -- -- --    I.V.  693.6  1889.8 2583.4  --  -- --    Phenylephrine Volume 93.6 89.8 183.4 -- -- --    Volume (mL) (0.9 % NaCl with KCl 20 mEq infusion) 600 1800 2400 -- -- --    Other  --  520 520  --  -- --    Medications (PO/Enteral Liquids) -- 520 520 -- -- --    Total Intake 1773.6 2409.8 4183.4 -- -- --       Output    Urine  3160  2800 5960  --  -- --    Output (mL) (Urethral Catheter Latex 16 Fr.) 3160 2800 5960 -- -- --    Stool  --  0 0  --  -- --    Number of Times Stooled 0 x 0 x 0 x -- -- --    Measurable Stool (mL) -- 0 0 -- -- --    Total Output 3160 2800 5960 -- -- --       Net I/O     -1386.4 -390.2 -1776.6 -- -- --            Intake/Output Summary (Last 24 hours) at 2019 0716  Last data filed at 2019 0600  Gross per 24 hour   Intake 4183.4 ml   Output 5960 ml   Net -1776.6 ml            • menthol  1 Lozenge  Q2HRS PRN   • midodrine  15 mg TID WITH MEALS   • Pharmacy Consult Request  1 Each PHARMACY TO DOSE   • MD ALERT...DO NOT ADMINISTER NSAIDS or ASPIRIN unless ORDERED By Neurosurgery  1 Each PRN   • docusate sodium  100 mg BID   • senna-docusate  1 Tab Nightly   • senna-docusate  1 Tab Q24HRS PRN   • polyethylene glycol/lytes  1 Packet BID PRN   • magnesium hydroxide  30 mL QDAY PRN   • bisacodyl  10 mg Q24HRS PRN   • fleet  1 Each Once PRN   • 0.9 % NaCl with KCl 20 mEq 1,000 mL   Continuous   • ondansetron  4 mg Q4HRS PRN   • ondansetron  4 mg Q4HRS PRN   • promethazine  12.5-25 mg Q4HRS PRN   • promethazine  12.5-25 mg Q4HRS PRN   • prochlorperazine  5-10 mg Q4HRS PRN   • diphenhydrAMINE  25 mg Q6HRS PRN    Or   • diphenhydrAMINE  25 mg Q6HRS PRN   • benzocaine-menthol  1 Lozenge Q2HRS PRN   • zolpidem  10 mg HS PRN   • venlafaxine XR  75 mg Q EVENING   • famotidine  20 mg BID   • oxybutynin SR  15 mg BID   • methocarbamol  500 mg BID PRN   • gabapentin  400 mg BID   • buPROPion SR  150 mg BID   • Respiratory Care per Protocol   Continuous RT   • acetaminophen  650 mg Q6HRS   • oxyCODONE immediate-release  5-10 mg Q4HRS PRN   • albuterol  2 Puff Q4H PRN (RT)   • Phenylephrine infusion  0-300 mcg/min Continuous       Assessment and Plan:    POD # 3  Prophylactic anticoagulation: yes   Neuro stable.  q4 hr neuro checks.  BP goals >85 through today will liberalize 12/24 given substantial improvement in exam   No role for steroids  Ok for DVT prof 12/23    Royer

## 2019-12-23 NOTE — PROGRESS NOTES
Hospital Medicine Daily Progress Note    Date of Service  12/23/2019    Chief Complaint  Back and neck pain    Hospital Course    54 y.o. female admitted 12/19/2019 with HTN, chronic back pain and neck pain. An outpatient MRI done of her cervical spine and lumbar spine on November 14, and these demonstrated multiple degenerative changes and some foraminal issues however did not show any cord compression.  Since her last MRI she has had episodes of falling.  She has had increasing weakness in the legs and arms. At an outlying facility prior to transfer to us, she had a CT done this did not demonstrate any acute findings either.  She is sent to our facility for evaluation by neurosurgery Dr. Jacobson who has been consulted.  On 12/20 patient had cervical surgical intervention by Dr. Linton.  12/21 patient states improved strength in her arms but remains weak in her legs.        Interval Problem Update  A+Ox3  Ongoing neck pain  Eating and swallowing w/o issue  Slept well  Moves all extremities  Encouraged smoking cessation  Room air      Consultants/Specialty  Dr Linton, Neurosurgery    Code Status  FULL    Disposition  Transfer to neurosurgical floor  Will need rehab placement    Review of Systems  Review of Systems   Constitutional: Negative for fever and malaise/fatigue.   HENT: Negative for congestion and sore throat.    Eyes: Negative for blurred vision.   Respiratory: Negative for cough and shortness of breath.    Cardiovascular: Negative for chest pain and palpitations.   Gastrointestinal: Negative for abdominal pain and nausea.   Genitourinary: Negative for dysuria.   Musculoskeletal: Positive for neck pain. Negative for myalgias.   Neurological: Positive for tingling (decreased since surgery) and sensory change. Negative for dizziness, speech change and headaches.   Psychiatric/Behavioral: The patient is not nervous/anxious.         Physical Exam  Temp:  [36.6 °C (97.9 °F)-37.2 °C (99 °F)] 36.8 °C (98.3  °F)  Pulse:  [54-91] 63  Resp:  [12-34] 33  BP: ()/(54-82) 142/74  SpO2:  [88 %-99 %] 93 %    Physical Exam  Vitals signs reviewed.   Constitutional:       General: She is not in acute distress.     Appearance: Normal appearance. She is not diaphoretic.   HENT:      Head: Normocephalic and atraumatic.      Nose: Nose normal.      Mouth/Throat:      Mouth: Mucous membranes are moist.   Eyes:      General: No scleral icterus.        Right eye: No discharge.         Left eye: No discharge.      Extraocular Movements: Extraocular movements intact.      Conjunctiva/sclera: Conjunctivae normal.   Neck:      Musculoskeletal: Muscular tenderness present.      Vascular: No carotid bruit.      Comments: Right lateral neck surgical site w/o drainage, mild bruising and good approximation of surgical edges  Cardiovascular:      Rate and Rhythm: Normal rate and regular rhythm.      Pulses:           Radial pulses are 2+ on the right side and 2+ on the left side.        Dorsalis pedis pulses are 2+ on the right side and 2+ on the left side.      Heart sounds: Normal heart sounds. No murmur.   Pulmonary:      Effort: Pulmonary effort is normal. No respiratory distress.      Breath sounds: Normal breath sounds. No wheezing.   Abdominal:      General: Bowel sounds are normal. There is no distension.      Palpations: Abdomen is soft.      Tenderness: There is no tenderness.   Musculoskeletal:         General: No swelling or tenderness.   Skin:     Coloration: Skin is not jaundiced.   Neurological:      General: No focal deficit present.      Mental Status: She is alert and oriented to person, place, and time.      Cranial Nerves: No cranial nerve deficit.      Motor: Weakness (bilateral legs) present.   Psychiatric:         Mood and Affect: Mood normal.         Behavior: Behavior normal.         Fluids    Intake/Output Summary (Last 24 hours) at 12/23/2019 1358  Last data filed at 12/23/2019 1200  Gross per 24 hour   Intake  3903.92 ml   Output 5680 ml   Net -1776.08 ml       Laboratory  Recent Labs     12/21/19  0322 12/22/19  0400 12/23/19  0333   WBC 12.2* 10.8 8.9   RBC 4.23 4.03* 4.42   HEMOGLOBIN 13.2 12.4 13.5   HEMATOCRIT 38.9 37.6 40.5   MCV 92.0 93.3 91.6   MCH 31.2 30.8 30.5   MCHC 33.9 33.0* 33.3*   RDW 44.6 46.3 45.9   PLATELETCT 328 272 275   MPV 9.6 9.8 9.8     Recent Labs     12/21/19  0322 12/22/19  0400 12/23/19  0333   SODIUM 139 141 139   POTASSIUM 4.2 4.6 4.3   CHLORIDE 109 108 105   CO2 23 27 27   GLUCOSE 128* 87 93   BUN 10 12 10   CREATININE 0.66 0.69 0.62   CALCIUM 8.2* 8.4* 8.6                   Imaging  IR-MIDLINE CATHETER INSERTION WO GUIDANCE > AGE 3   Final Result                  Ultrasound-guided midline placement performed by qualified nursing staff    as above.          DX-CERVICAL SPINE-2 OR 3 VIEWS   Final Result      Digitized intraoperative radiograph is submitted for review.  This examination is not for diagnostic purpose but for guidance during a surgical procedure.      DX-PORTABLE FLUORO > 1 HOUR   Final Result      Portable fluoroscopy utilized for 6 seconds.         INTERPRETING LOCATION: [address] [place] [address], [facility]      OUTSIDE IMAGES-CT CERVICAL SPINE   Final Result           Assessment/Plan  * Cervical spine pain  Assessment & Plan  Recent history of falls  Neurosurgery consulted and on 12/20 took patient for cervical fixation last repair  Pain management  PT OT and rehab evaluation  Pain management    Central cord syndrome (HCC)  Assessment & Plan  Central cord injury.  Herniated C4-5 disk.  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    12/20 S/P surgical repair by Dr Linton.  1.  Anterior cervical diskectomy and iatrogenic fracture through C3-4 with   bilateral foraminotomies at C3-4.  2.  Anterior cervical diskectomy with bilateral foraminotomies at C4-5.  3.  C4 corpectomy.  4.  Placement of anterior  interbody spacer using a Pyramesh cage with   autograft, allograft and BMP.  5.  Use of intraoperative fluoro for analysis of anterior decompression of   spinal cord.  6.  Anterior plate fixation from C3 through C5 using [place] plate and 17 mm   bicortical screws placed under direct fluoro visualization.  7.  Use of fluoro for localization.  8.  Modifier 22.  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    Additionally, this patient had profound constriction at the C4 body requiring   a C4 corpectomy to decompress her anteriorly away from her ossification   posteriorly and is a baseline smoker with very poor bone quality.  9.  Use of intraoperative monitoring for motors and sensories given extreme   stenosis across the C4 body and C4-5 disk space as well as for placement of   the anterior cage at the C4 corpectomy site.    Monitoring ICU due to neurosurgery wanting MAP greater than 85  Will need PT/OT and likely Rehab placement.    Lumbar radiculopathy  Assessment & Plan  As per her cervical pain, this is a acutely worse on chronic basis.  MRI in November did not show any acute spinal cord injury.  On exam today she does have weakness however it is an inconsistent exam.  She has less than antigravity strength when specifically tested however demonstrates antigravity strength fairly easily when she is moving spontaneously.  Beyond that she does demonstrate spasticity in the plantar flexors which would imply some real underlying pathology.  Look forward to Dr. Jacobson's evaluation and recommendations      Mild intermittent asthma- (present on admission)  Assessment & Plan  Placed on O2 and respiratory protocols  Exam is benign    Anxiety and depression- (present on  admission)  Assessment & Plan  Continue the patient's home venlafaxine and bupropion    Tobacco use- (present on admission)  Assessment & Plan  Smoking cessation       VTE prophylaxis: SCD

## 2019-12-23 NOTE — PROGRESS NOTES
Critical Care Progress Note    Date of admission  12/19/2019    Chief Complaint  54 y.o. female admitted 12/19/2019 with spastic paraplegia    Hospital Course    54 y.o. female with a past medical history of hypercholesterolemia, anxiety, asthma, degenerative disc disease who presented 12/19/2019 as a transfer from [facility] for progressive paraplegia.  The patient reports over the last several days she has had several falls during which she hit her head and neck several times.  Prior to these events she was ambulating around her house with a cane however has had worsening lower extremity weakness and actually developed a contracture of her left lower extremity.  She presented to [facility] for these complaints and a CT scan of the neck was obtained which did not show any acute abnormalities.  An MRI from November which was done in our facility shows multilevel degenerative disc disease with moderate central canal stenosis at C4-C5.  The patient denies any incontinence of bowel or bladder or retention of bowel or bladder, she denies any saddle anesthesia.  The patient was seen by neurosurgery in the emergency department and determined to have significant neurologic findings consistent with central cord syndrome.  She is to be admitted to the ICU for neuro checks and BP support.  She is planned for operative intervention tomorrow.  At this time the patient's main complaint is lumbar pain with radicular symptoms in her lower extremities.      Interval Problem Update  Reviewed last 24 hour events:  Neuro: q4 aox4 numbness and tingling all ext  HR: 50-80  SBP: map > 85  Tmax: afebrile  GI: room air, dysphagia diet  UOP: hernandez  Lines: peripheral, central stop IVF  Resp: n/a  Vte: lovonex today  PPI/H2:pepcid home  Antibx: n/a  Midodrine 15mg    Trevor still titrating  Rehab consultation Dr Gregory    Review of Systems  Review of Systems   Constitutional: Negative for chills, fever and  malaise/fatigue.   HENT: Negative for congestion and sore throat.         Voice unchanged   Eyes: Negative for blurred vision and double vision.   Respiratory: Negative for cough, sputum production, shortness of breath and stridor.    Cardiovascular: Negative for chest pain, palpitations, orthopnea and PND.   Gastrointestinal: Negative for abdominal pain, diarrhea, nausea and vomiting.   Genitourinary: Negative for dysuria, flank pain, frequency and hematuria.   Musculoskeletal: Positive for back pain and neck pain.   Skin: Negative for rash.   Neurological: Positive for sensory change and focal weakness. Negative for tingling, speech change and headaches.   Endo/Heme/Allergies: Does not bruise/bleed easily.   Psychiatric/Behavioral: Negative for depression. The patient is not nervous/anxious.         Vital Signs for last 24 hours   Temp:  [36.5 °C (97.7 °F)-37.1 °C (98.8 °F)] 36.5 °C (97.7 °F)  Pulse:  [54-91] 66  Resp:  [12-34] 15  BP: ()/(54-82) 118/68  SpO2:  [88 %-99 %] 93 %    Hemodynamic parameters for last 24 hours       Respiratory Information for the last 24 hours       Physical Exam   Physical Exam  Vitals signs reviewed.   Constitutional:       General: She is not in acute distress.     Appearance: She is underweight. She is ill-appearing (chronic).      Interventions: Nasal cannula in place.   HENT:      Head: Normocephalic and atraumatic.      Mouth/Throat:      Mouth: Mucous membranes are dry.      Dentition: Abnormal dentition.      Pharynx: No pharyngeal swelling.   Eyes:      General: No scleral icterus.     Extraocular Movements: Extraocular movements intact.      Pupils: Pupils are equal, round, and reactive to light.   Neck:      Musculoskeletal: Neck supple.      Thyroid: No thyromegaly.      Vascular: No JVD.      Trachea: Phonation normal.      Comments: Anterior surgical changes noted, Wound looks good, no significant edema  Cardiovascular:      Rate and Rhythm: Normal rate and regular  rhythm.  No extrasystoles are present.     Pulses: Normal pulses.      Heart sounds: No murmur. No friction rub. No gallop.    Pulmonary:      Effort: No accessory muscle usage.      Breath sounds: No stridor. No wheezing, rhonchi or rales.   Abdominal:      General: Abdomen is flat. Bowel sounds are normal.      Palpations: Abdomen is soft.      Tenderness: There is no tenderness. There is no right CVA tenderness, left CVA tenderness or guarding.   Lymphadenopathy:      Cervical: No cervical adenopathy.   Skin:     General: Skin is warm and dry.      Capillary Refill: Capillary refill takes less than 2 seconds.      Coloration: Skin is not cyanotic.      Nails: There is no clubbing.     Neurological:      Mental Status: She is alert and oriented to person, place, and time.      GCS: GCS eye subscore is 4. GCS verbal subscore is 5. GCS motor subscore is 6.      Comments: 5-/5 upper extremities throughout, lowers 4-/5 hip, knee and toe, chronic wasting of lower extremities. Marissa sensory loss in upper extremities. No facial droop, no dysartharia, no facial droop   Psychiatric:         Attention and Perception: Attention normal.         Mood and Affect: Mood normal.         Speech: Speech normal.         Behavior: Behavior normal.         Cognition and Memory: Cognition normal.         Medications  Current Facility-Administered Medications   Medication Dose Route Frequency Provider Last Rate Last Dose   • enoxaparin (LOVENOX) inj 40 mg  40 mg Subcutaneous DAILY Ravin Martin M.D.   40 mg at 12/23/19 1107   • menthol (HALLS) lozenge 1 Lozenge  1 Lozenge Oral Q2HRS PRN Saurabh Irwin D.O.   1 Lozenge at 12/23/19 0523   • midodrine (PROAMATINE) tablet 15 mg  15 mg Oral TID WITH MEALS Jack Ruffin M.D.   15 mg at 12/23/19 1107   • Pharmacy Consult Request ...Pain Management Review 1 Each  1 Each Other PHARMACY TO DOSE YINKA Mcgarry       • MD ALERT...DO NOT ADMINISTER NSAIDS or ASPIRIN unless  ORDERED By Neurosurgery 1 Each  1 Each Other PRN Elsy Arnold, HUMBERTO.P.N.       • docusate sodium (COLACE) capsule 100 mg  100 mg Oral BID Elsy Arnold, A.P.N.   Stopped at 12/21/19 1800   • senna-docusate (PERICOLACE or SENOKOT S) 8.6-50 MG per tablet 1 Tab  1 Tab Oral Nightly HUMBERTO Mcgarry.P.N.   Stopped at 12/21/19 2015   • senna-docusate (PERICOLACE or SENOKOT S) 8.6-50 MG per tablet 1 Tab  1 Tab Oral Q24HRS PRN HUMBERTO Mcgarry.P.N.   1 Tab at 12/23/19 1219   • polyethylene glycol/lytes (MIRALAX) PACKET 1 Packet  1 Packet Oral BID PRN Elsy Arnold, A.P.N.       • magnesium hydroxide (MILK OF MAGNESIA) suspension 30 mL  30 mL Oral QDAY PRN Elsy Arnold, A.P.N.       • bisacodyl (DULCOLAX) suppository 10 mg  10 mg Rectal Q24HRS PRN Elsy Arnold, A.P.N.       • fleet enema 133 mL  1 Each Rectal Once PRN Elsy Arnold, A.P.N.       • ondansetron (ZOFRAN) syringe/vial injection 4 mg  4 mg Intravenous Q4HRS PRN Elsy Arnold, A.P.N.       • ondansetron (ZOFRAN ODT) dispertab 4 mg  4 mg Oral Q4HRS PRN Elsy Arnold, A.P.N.       • promethazine (PHENERGAN) tablet 12.5-25 mg  12.5-25 mg Oral Q4HRS PRN Elsy Arnold, A.P.N.       • promethazine (PHENERGAN) suppository 12.5-25 mg  12.5-25 mg Rectal Q4HRS PRN Elsy Arnold, A.P.N.       • prochlorperazine (COMPAZINE) injection 5-10 mg  5-10 mg Intravenous Q4HRS PRN Elsy Arnold, A.P.N.       • diphenhydrAMINE (BENADRYL) tablet/capsule 25 mg  25 mg Oral Q6HRS PRN AMALIA Mcgarry.        Or   • diphenhydrAMINE (BENADRYL) injection 25 mg  25 mg Intravenous Q6HRS PRN BIB McgarryP.N.       • benzocaine-menthol (CEPACOL) lozenge 1 Lozenge  1 Lozenge Mouth/Throat Q2HRS PRN BIB McgarryPNonaN.       • zolpidem (AMBIEN) tablet 10 mg  10 mg Oral HS PRN Yong Zhong D.O.   10 mg at 12/21/19 2015   • venlafaxine XR (EFFEXOR XR) capsule 75 mg  75 mg Oral Q EVENING Yong Zhong D.O.    75 mg at 12/22/19 1707   • famotidine (PEPCID) tablet 20 mg  20 mg Oral BID Yong Zhong, D.O.   20 mg at 12/23/19 0511   • oxybutynin SR (DITROPAN-XL) tablet 15 mg  15 mg Oral BID Yong Zhong, D.O.   15 mg at 12/23/19 0511   • methocarbamol (ROBAXIN) tablet 500 mg  500 mg Oral BID PRN Yong Zhong, D.O.   500 mg at 12/23/19 1150   • gabapentin (NEURONTIN) capsule 400 mg  400 mg Oral BID Yong Zhong, D.O.   400 mg at 12/23/19 0510   • buPROPion SR (WELLBUTRIN-SR) tablet 150 mg  150 mg Oral BID Yong Zhong, D.O.   150 mg at 12/23/19 0510   • Respiratory Care per Protocol   Nebulization Continuous RT Yong Zhong, D.O.       • acetaminophen (TYLENOL) tablet 650 mg  650 mg Oral Q6HRS Yong Zhong, D.O.   650 mg at 12/23/19 1107   • oxyCODONE immediate-release (ROXICODONE) tablet 5-10 mg  5-10 mg Oral Q4HRS PRN Yong Zhong, D.O.   10 mg at 12/20/19 1857   • albuterol inhaler 2 Puff  2 Puff Inhalation Q4H PRN (RT) Yong Zhong D.O.   2 Puff at 12/19/19 2257   • phenylephrine (ANTONIO-SYNEPHRINE) 40 mg in  mL Infusion  0-300 mcg/min Intravenous Continuous Jack Ruffin M.D.   Stopped at 12/23/19 1000       Fluids    Intake/Output Summary (Last 24 hours) at 12/23/2019 1556  Last data filed at 12/23/2019 1200  Gross per 24 hour   Intake 3588.92 ml   Output 5080 ml   Net -1491.08 ml       Laboratory          Recent Labs     12/21/19  0322 12/22/19  0400 12/23/19  0333   SODIUM 139 141 139   POTASSIUM 4.2 4.6 4.3   CHLORIDE 109 108 105   CO2 23 27 27   BUN 10 12 10   CREATININE 0.66 0.69 0.62   CALCIUM 8.2* 8.4* 8.6     Recent Labs     12/21/19  0322 12/22/19  0400 12/23/19  0333   GLUCOSE 128* 87 93     Recent Labs     12/21/19  0322 12/22/19  0400 12/23/19  0333   WBC 12.2* 10.8 8.9   NEUTSPOLYS 85.70* 45.80 52.80   LYMPHOCYTES 9.50* 45.50* 37.60   MONOCYTES 4.20 6.60 6.30   EOSINOPHILS 0.00 1.20 2.40   BASOPHILS 0.20 0.70  0.70     Recent Labs     12/21/19  0322 12/22/19  0400 12/23/19  0333   RBC 4.23 4.03* 4.42   HEMOGLOBIN 13.2 12.4 13.5   HEMATOCRIT 38.9 37.6 40.5   PLATELETCT 328 272 275       Imaging  CT:    Reviewed  MRI:   Reviewed    Assessment/Plan  * Cervical spine pain  Assessment & Plan  Acute on chronic following several falls recently, monitoring for additional complications from these falls  Exam concerning for central cord type condition s/p surgical decompression Dr Linton 12/20  Neurosurgery on board, recommendations for blood pressure augmentation to MAP 85 mmHg, titrating Trevor-Synephrine and continue oral midodrine as well x5 days postop, stop date 12/25  Noting reflexive bradycardia to medications at times  Neurochecks to every 4 hours  Oral midodrine and IV Trevor-Synephrine, as needed to maintain MAP >85 mmHg until 12/25  Cuff pressure correlated with artline pulled since was not working  lovenox cleared by neurosurgery start 12/23  Continue hard collar for support postop      Central cord syndrome (HCC)  Assessment & Plan  S/p operative decompression for 12/20 Dr Linton tolerated well  Supporting perfusion to spinal cord perfusion with active titration of Trevor-Synephrine, MAP goal >= 85 x5 days (12/25)  Oral midodrine supported with Trevor-Synephrine infusion  Maintain euvolemia with fluids, serial exam to reassess  No role for steroids at this time physical therapy  OT/PT and physiatry consultation in the near future  Monitor for progression and respiratory compromise requiring intubation mechanical ventilation  Continue hard collar for supportive neck postop  Rehab consultation 12/23 Dr Gregory    Lumbar radiculopathy  Assessment & Plan  Acute on chronic  MRI from November reviewed with radiology  Neurosurgery on board, to the OR 12/20 for decompression of cervical abnormalities, appears successful for lower extremity central cord   Continue monitoring for neurological improvement and providing spinal cord supportive  care    Mild intermittent asthma- (present on admission)  Assessment & Plan  PRN albuterol at home  RT/O2 Protocols  Titrate supplemental FiO2 to maintain SpO2 >88%  No exacerbation at this time, monitor for need for IV steroids  Nasal steroids for chronic sinus process as well as needed  Continue to monitor, okay so far    Anxiety and depression- (present on admission)  Assessment & Plan  Resume Wellbutrin and Effexor    Tobacco use- (present on admission)  Assessment & Plan  Nicotine replacement protocol as needed  Cessation counseling ongoing daily       VTE:  Lovenox  Ulcer: H2 Antagonist home med  Lines: None    I have performed a physical exam and reviewed and updated ROS and Plan today (12/23/2019). In review of yesterday's note (12/22/2019), there are no changes except as documented above.     Discussed patient condition and risk of morbidity and/or mortality with Hospitalist, RN, RT, Pharmacy, Charge nurse / hot rounds and Patient     Patient remains in critical condition from acute spinal cord dysfunction on neosynephrine gtt with active titration . Critical care time provided was 39 minutes. This excludes all separate billable procedures.

## 2019-12-23 NOTE — CARE PLAN
Problem: Safety  Goal: Will remain free from injury: Patient's bed is at lowest position, hospital clothing on and call light within reach.  Outcome: PROGRESSING AS EXPECTED     Problem: Knowledge Deficit: Explained MOA of gabapentin in terms she would understand. Asked her if she has any questions or any learning diabilities.   Goal: Knowledge of the prescribed therapeutic regimen will improve  Outcome: PROGRESSING AS EXPECTED

## 2019-12-23 NOTE — THERAPY
"Physical Therapy Evaluation completed.   Bed Mobility: Minimal Assist     Transfers: Minimal Assist   Gait: Minimal Assist  with Front-Wheel Walker (3-4 steps to chair only)      Plan of Care: Will benefit from Physical Therapy 5 times per week  Discharge Recommendations: Equipment: Will Continue to Assess for Equipment Needs. Post-acute therapy: Intensive Multidisciplinary Skilled Therapy    Pt is s/p C3-5 surgical intervention with diagnosis of central cord syndrome presenting with spasticity to B knees and ankles but with formal strength of 3-/5 to B LE. She was able to complete log roll and sidely to sit with min A for her trunk and once upright, sustained at close SBA and B UE support. Pt reporting \"dullness\" to L LE and denied sensory impairment to R. She initiated sit to stand with min A, HHA, and stepped to chair with extra time for weight shift and swing limb advancement. While here, PT will follow to address impaired balance, gait deviations, and limited activity tolerance.     See \"Rehab Therapy-Acute\" Patient Summary Report for complete documentation.     "

## 2019-12-23 NOTE — CARE PLAN
Problem: Nutritional:  Goal: Achieve adequate nutritional intake  Description  Patient will start diet and consume >50% of meals   Outcome: MET  Pt started on a regular, dysphagia 3 diet. Per chart pt PO %. RD available prn

## 2019-12-24 ENCOUNTER — APPOINTMENT (OUTPATIENT)
Dept: RADIOLOGY | Facility: MEDICAL CENTER | Age: 54
DRG: 029 | End: 2019-12-24
Attending: PHYSICAL MEDICINE & REHABILITATION
Payer: MEDICAID

## 2019-12-24 LAB
ANION GAP SERPL CALC-SCNC: 8 MMOL/L (ref 0–11.9)
BASOPHILS # BLD AUTO: 0.9 % (ref 0–1.8)
BASOPHILS # BLD: 0.09 K/UL (ref 0–0.12)
BUN SERPL-MCNC: 10 MG/DL (ref 8–22)
CALCIUM SERPL-MCNC: 8.8 MG/DL (ref 8.5–10.5)
CHLORIDE SERPL-SCNC: 106 MMOL/L (ref 96–112)
CO2 SERPL-SCNC: 22 MMOL/L (ref 20–33)
CREAT SERPL-MCNC: 0.51 MG/DL (ref 0.5–1.4)
EOSINOPHIL # BLD AUTO: 0.21 K/UL (ref 0–0.51)
EOSINOPHIL NFR BLD: 2.1 % (ref 0–6.9)
ERYTHROCYTE [DISTWIDTH] IN BLOOD BY AUTOMATED COUNT: 45.6 FL (ref 35.9–50)
GLUCOSE SERPL-MCNC: 104 MG/DL (ref 65–99)
HCT VFR BLD AUTO: 42.8 % (ref 37–47)
HGB BLD-MCNC: 14.3 G/DL (ref 12–16)
IMM GRANULOCYTES # BLD AUTO: 0.03 K/UL (ref 0–0.11)
IMM GRANULOCYTES NFR BLD AUTO: 0.3 % (ref 0–0.9)
LYMPHOCYTES # BLD AUTO: 3.79 K/UL (ref 1–4.8)
LYMPHOCYTES NFR BLD: 38.4 % (ref 22–41)
MCH RBC QN AUTO: 30.6 PG (ref 27–33)
MCHC RBC AUTO-ENTMCNC: 33.4 G/DL (ref 33.6–35)
MCV RBC AUTO: 91.6 FL (ref 81.4–97.8)
MONOCYTES # BLD AUTO: 0.78 K/UL (ref 0–0.85)
MONOCYTES NFR BLD AUTO: 7.9 % (ref 0–13.4)
NEUTROPHILS # BLD AUTO: 4.96 K/UL (ref 2–7.15)
NEUTROPHILS NFR BLD: 50.4 % (ref 44–72)
NRBC # BLD AUTO: 0 K/UL
NRBC BLD-RTO: 0 /100 WBC
PLATELET # BLD AUTO: 338 K/UL (ref 164–446)
PMV BLD AUTO: 9.8 FL (ref 9–12.9)
POTASSIUM SERPL-SCNC: 4.3 MMOL/L (ref 3.6–5.5)
RBC # BLD AUTO: 4.67 M/UL (ref 4.2–5.4)
SODIUM SERPL-SCNC: 136 MMOL/L (ref 135–145)
WBC # BLD AUTO: 9.9 K/UL (ref 4.8–10.8)

## 2019-12-24 PROCEDURE — 74018 RADEX ABDOMEN 1 VIEW: CPT

## 2019-12-24 PROCEDURE — 700111 HCHG RX REV CODE 636 W/ 250 OVERRIDE (IP): Performed by: NURSE PRACTITIONER

## 2019-12-24 PROCEDURE — 700111 HCHG RX REV CODE 636 W/ 250 OVERRIDE (IP): Performed by: INTERNAL MEDICINE

## 2019-12-24 PROCEDURE — A9270 NON-COVERED ITEM OR SERVICE: HCPCS | Performed by: INTERNAL MEDICINE

## 2019-12-24 PROCEDURE — 770022 HCHG ROOM/CARE - ICU (200)

## 2019-12-24 PROCEDURE — 700102 HCHG RX REV CODE 250 W/ 637 OVERRIDE(OP): Performed by: PHYSICAL MEDICINE & REHABILITATION

## 2019-12-24 PROCEDURE — A9270 NON-COVERED ITEM OR SERVICE: HCPCS | Performed by: NEUROLOGICAL SURGERY

## 2019-12-24 PROCEDURE — 700105 HCHG RX REV CODE 258: Performed by: INTERNAL MEDICINE

## 2019-12-24 PROCEDURE — 92526 ORAL FUNCTION THERAPY: CPT

## 2019-12-24 PROCEDURE — 700102 HCHG RX REV CODE 250 W/ 637 OVERRIDE(OP): Performed by: INTERNAL MEDICINE

## 2019-12-24 PROCEDURE — 80048 BASIC METABOLIC PNL TOTAL CA: CPT

## 2019-12-24 PROCEDURE — A9270 NON-COVERED ITEM OR SERVICE: HCPCS | Performed by: HOSPITALIST

## 2019-12-24 PROCEDURE — 99291 CRITICAL CARE FIRST HOUR: CPT | Performed by: INTERNAL MEDICINE

## 2019-12-24 PROCEDURE — A9270 NON-COVERED ITEM OR SERVICE: HCPCS | Performed by: NURSE PRACTITIONER

## 2019-12-24 PROCEDURE — 51798 US URINE CAPACITY MEASURE: CPT

## 2019-12-24 PROCEDURE — 700102 HCHG RX REV CODE 250 W/ 637 OVERRIDE(OP): Performed by: HOSPITALIST

## 2019-12-24 PROCEDURE — A9270 NON-COVERED ITEM OR SERVICE: HCPCS | Performed by: PHYSICAL MEDICINE & REHABILITATION

## 2019-12-24 PROCEDURE — 99232 SBSQ HOSP IP/OBS MODERATE 35: CPT | Performed by: HOSPITALIST

## 2019-12-24 PROCEDURE — 700102 HCHG RX REV CODE 250 W/ 637 OVERRIDE(OP): Performed by: NEUROLOGICAL SURGERY

## 2019-12-24 PROCEDURE — 85025 COMPLETE CBC W/AUTO DIFF WBC: CPT

## 2019-12-24 PROCEDURE — 700112 HCHG RX REV CODE 229: Performed by: NURSE PRACTITIONER

## 2019-12-24 PROCEDURE — 94150 VITAL CAPACITY TEST: CPT

## 2019-12-24 RX ORDER — ALPRAZOLAM 0.5 MG/1
0.5 TABLET ORAL ONCE
Status: ACTIVE | OUTPATIENT
Start: 2019-12-24 | End: 2019-12-25

## 2019-12-24 RX ORDER — DEXAMETHASONE 4 MG/1
4 TABLET ORAL EVERY 6 HOURS
Status: COMPLETED | OUTPATIENT
Start: 2019-12-24 | End: 2019-12-25

## 2019-12-24 RX ADMIN — GABAPENTIN 600 MG: 300 CAPSULE ORAL at 17:36

## 2019-12-24 RX ADMIN — OXYCODONE HYDROCHLORIDE 5 MG: 5 TABLET ORAL at 08:38

## 2019-12-24 RX ADMIN — OXYBUTYNIN CHLORIDE 15 MG: 5 TABLET, EXTENDED RELEASE ORAL at 17:38

## 2019-12-24 RX ADMIN — ZOLPIDEM TARTRATE 5 MG: 5 TABLET ORAL at 22:15

## 2019-12-24 RX ADMIN — ACETAMINOPHEN 650 MG: 325 TABLET, FILM COATED ORAL at 05:14

## 2019-12-24 RX ADMIN — MIDODRINE HYDROCHLORIDE 15 MG: 5 TABLET ORAL at 17:38

## 2019-12-24 RX ADMIN — PHENYLEPHRINE HYDROCHLORIDE 40 MCG/MIN: 10 INJECTION INTRAVENOUS at 12:09

## 2019-12-24 RX ADMIN — GABAPENTIN 600 MG: 300 CAPSULE ORAL at 05:15

## 2019-12-24 RX ADMIN — OXYCODONE HYDROCHLORIDE 5 MG: 5 TABLET ORAL at 17:36

## 2019-12-24 RX ADMIN — MIDODRINE HYDROCHLORIDE 15 MG: 5 TABLET ORAL at 12:20

## 2019-12-24 RX ADMIN — DOCUSATE SODIUM 100 MG: 100 CAPSULE, LIQUID FILLED ORAL at 17:36

## 2019-12-24 RX ADMIN — BUPROPION HYDROCHLORIDE 150 MG: 150 TABLET, EXTENDED RELEASE ORAL at 05:15

## 2019-12-24 RX ADMIN — FAMOTIDINE 20 MG: 20 TABLET ORAL at 17:37

## 2019-12-24 RX ADMIN — BACLOFEN 10 MG: 10 TABLET ORAL at 05:14

## 2019-12-24 RX ADMIN — DEXAMETHASONE 4 MG: 4 TABLET ORAL at 17:37

## 2019-12-24 RX ADMIN — FAMOTIDINE 20 MG: 20 TABLET ORAL at 05:15

## 2019-12-24 RX ADMIN — ENOXAPARIN SODIUM 40 MG: 100 INJECTION SUBCUTANEOUS at 12:10

## 2019-12-24 RX ADMIN — OXYBUTYNIN CHLORIDE 15 MG: 5 TABLET, EXTENDED RELEASE ORAL at 05:16

## 2019-12-24 RX ADMIN — MIDODRINE HYDROCHLORIDE 15 MG: 5 TABLET ORAL at 08:26

## 2019-12-24 RX ADMIN — DEXAMETHASONE 4 MG: 4 TABLET ORAL at 10:10

## 2019-12-24 RX ADMIN — BUPROPION HYDROCHLORIDE 150 MG: 150 TABLET, EXTENDED RELEASE ORAL at 17:39

## 2019-12-24 RX ADMIN — ONDANSETRON 4 MG: 2 INJECTION INTRAMUSCULAR; INTRAVENOUS at 12:23

## 2019-12-24 RX ADMIN — BACLOFEN 10 MG: 10 TABLET ORAL at 12:09

## 2019-12-24 RX ADMIN — ACETAMINOPHEN 650 MG: 325 TABLET, FILM COATED ORAL at 12:10

## 2019-12-24 RX ADMIN — VENLAFAXINE HYDROCHLORIDE 75 MG: 75 CAPSULE, EXTENDED RELEASE ORAL at 17:38

## 2019-12-24 RX ADMIN — SENNOSIDES 17.2 MG: 8.6 TABLET, FILM COATED ORAL at 12:21

## 2019-12-24 RX ADMIN — ACETAMINOPHEN 650 MG: 325 TABLET, FILM COATED ORAL at 17:36

## 2019-12-24 RX ADMIN — DOCUSATE SODIUM 100 MG: 100 CAPSULE, LIQUID FILLED ORAL at 05:13

## 2019-12-24 RX ADMIN — BACLOFEN 10 MG: 10 TABLET ORAL at 17:37

## 2019-12-24 ASSESSMENT — ENCOUNTER SYMPTOMS
SPEECH CHANGE: 0
PALPITATIONS: 0
SORE THROAT: 0
SENSORY CHANGE: 1
STRIDOR: 0
DIZZINESS: 0
VOMITING: 0
SHORTNESS OF BREATH: 0
NAUSEA: 0
HEADACHES: 0
HEMOPTYSIS: 0
DEPRESSION: 0
FOCAL WEAKNESS: 1
TINGLING: 0
DOUBLE VISION: 0
FLANK PAIN: 0
BACK PAIN: 1
ORTHOPNEA: 0
COUGH: 0
BLURRED VISION: 0
PND: 0
BRUISES/BLEEDS EASILY: 0
NERVOUS/ANXIOUS: 0
FEVER: 0
DIARRHEA: 0
NECK PAIN: 1
SPUTUM PRODUCTION: 0
CHILLS: 0
ABDOMINAL PAIN: 0

## 2019-12-24 ASSESSMENT — PULMONARY FUNCTION TESTS: FVC: 3.15

## 2019-12-24 NOTE — PROGRESS NOTES
Neurosurgery Progress Note    Subjective:  No c/o.  MAP ok    Exam:  Generates at least 4-4+/5 power throughout    BP  Min: 90/58  Max: 166/86  Pulse  Av.9  Min: 53  Max: 98  Resp  Av  Min: 12  Max: 38  Temp  Av.8 °C (98.2 °F)  Min: 36.4 °C (97.6 °F)  Max: 37 °C (98.6 °F)  SpO2  Av.8 %  Min: 91 %  Max: 99 %    No data recorded    Recent Labs     19  0400 19  0333 19  0530   WBC 10.8 8.9 9.9   RBC 4.03* 4.42 4.67   HEMOGLOBIN 12.4 13.5 14.3   HEMATOCRIT 37.6 40.5 42.8   MCV 93.3 91.6 91.6   MCH 30.8 30.5 30.6   MCHC 33.0* 33.3* 33.4*   RDW 46.3 45.9 45.6   PLATELETCT 272 275 338   MPV 9.8 9.8 9.8     Recent Labs     19  0400 19  0333 19  0530   SODIUM 141 139 136   POTASSIUM 4.6 4.3 4.3   CHLORIDE 108 105 106   CO2 27 27 22   GLUCOSE 87 93 104*   BUN 12 10 10   CREATININE 0.69 0.62 0.51   CALCIUM 8.4* 8.6 8.8               Intake/Output       19 0700 - 19 0659 19 0700 - 19 0659       Total 1900-0659 Total       Intake    P.O.  1600  240 1840  --  -- --    P.O. 9815 141 0390 -- -- --    I.V.  220.7  283.5 504.2  --  -- --    Phenylephrine Volume 20.7 283.5 304.2 -- -- --    Volume (mL) (0.9 % NaCl with KCl 20 mEq infusion) 200 -- 200 -- -- --    Total Intake 1820.7 523.5 2344.2 -- -- --       Output    Urine  1570  1200 2770  600  -- 600    Number of Times Voided 1 x 4 x 5 x 1 x -- 1 x    Number of Times Incontinent of Urine -- 1 x 1 x -- -- --    Urine Void (mL) 300 1200 1500 600 -- 600    Output (mL) ([REMOVED] Urethral Catheter Latex 16 Fr.) 1270 -- 1270 -- -- --    Stool  --  -- --  --  -- --    Number of Times Stooled 2 x 1 x 3 x -- -- --    Total Output 1570 1200 2770 600 -- 600       Net I/O     250.7 -676.5 -425.8 -600 -- -600            Intake/Output Summary (Last 24 hours) at 2019 0918  Last data filed at 2019 0800  Gross per 24 hour   Intake 1733.04 ml   Output 2900 ml   Net -1166.96 ml        $ Bladder Scan Results (mL): 102    • enoxaparin (LOVENOX) injection  40 mg DAILY   • baclofen  10 mg TID   • gabapentin  600 mg BID   • sennosides  17.2 mg DAILY AT NOON   • menthol  1 Lozenge Q2HRS PRN   • midodrine  15 mg TID WITH MEALS   • Pharmacy Consult Request  1 Each PHARMACY TO DOSE   • MD ALERT...DO NOT ADMINISTER NSAIDS or ASPIRIN unless ORDERED By Neurosurgery  1 Each PRN   • docusate sodium  100 mg BID   • senna-docusate  1 Tab Q24HRS PRN   • polyethylene glycol/lytes  1 Packet BID PRN   • magnesium hydroxide  30 mL QDAY PRN   • bisacodyl  10 mg Q24HRS PRN   • fleet  1 Each Once PRN   • ondansetron  4 mg Q4HRS PRN   • ondansetron  4 mg Q4HRS PRN   • promethazine  12.5-25 mg Q4HRS PRN   • promethazine  12.5-25 mg Q4HRS PRN   • prochlorperazine  5-10 mg Q4HRS PRN   • diphenhydrAMINE  25 mg Q6HRS PRN    Or   • diphenhydrAMINE  25 mg Q6HRS PRN   • benzocaine-menthol  1 Lozenge Q2HRS PRN   • zolpidem  10 mg HS PRN   • venlafaxine XR  75 mg Q EVENING   • famotidine  20 mg BID   • oxybutynin SR  15 mg BID   • methocarbamol  500 mg BID PRN   • buPROPion SR  150 mg BID   • Respiratory Care per Protocol   Continuous RT   • acetaminophen  650 mg Q6HRS   • oxyCODONE immediate-release  5-10 mg Q4HRS PRN   • albuterol  2 Puff Q4H PRN (RT)   • Phenylephrine infusion  0-300 mcg/min Continuous       Assessment and Plan:    POD # 4  Prophylactic anticoagulation: yes   Neuro stable.  q4 hr neuro checks.  Will placed on 24 hours Dex for superficial swelling in neck from BMP 4Q6hr   Ok for DVT prof 12/23  Will count BP >85map goals day 5/5 today can lower pressers tomorrow 12/25    Royer

## 2019-12-24 NOTE — THERAPY
"Occupational Therapy Evaluation completed.   Functional Status:  MaxA aspen collar don in supine, Aly supine>sit via log roll, Aly sit>stand, Aly scooting, Aly stand pivot txf, Aly seated grooming, ModA UB dress, ModA LB dress, SPV eating  Plan of Care: Will benefit from Occupational Therapy 5 times per week  Discharge Recommendations:  Equipment: Will Continue to Assess for Equipment Needs. Post-acute therapy Recommend post-acute placement for additional occupational therapy services prior to discharge home. Patient can tolerate post-acute therapies at a 5x/week frequency.    See \"Rehab Therapy-Acute\" Patient Summary Report for complete documentation.    Pt is 55yo female s/p cervical surgical intervention for degenerative disc disease and canal narrowing resulting in neurologic presentation consistent with central cord syndrome. [place] collar order reads \"may remove in bed\", therefore aspen collar donned in supine prior to initiating log roll bed mobility. Pt reports improved functional use and strength in BUEs following surgery. Pt reports supportive son who she plans to move in with following hospitalization and rehab. Pt requires Aly for stand pivot txf, set-up and Aly for seated grooming, presents with delayed fine motor coord in BUEs however functional strength 4/5 bilaterally. Acute OT to follow while in house to increase independence in ADLs and ADL transfers. Recommend post-acute placement for continued inpatient therapies.   "

## 2019-12-24 NOTE — PROGRESS NOTES
Hospital Medicine Daily Progress Note    Date of Service  12/24/2019    Chief Complaint  54 y.o. female admitted 12/19/2019 with neck and back pain    Hospital Course    Ms Salinas has a history of chronic back pain and decreased mobility.  She presented on 12/19/2019 with back pain exacerbation and progressive paraplegia.  Patient was seen in consultation by neurosurgery and on 12/20/2019 she and anterior cervical diskectomy, foraminotomies and placement of interbody spacer.      Interval Problem Update  Complains of 8/10 neck pain, front and back of neck, minimal relief with scheduled tylenol, oxycodone helps with the pain, indicates that strength in her upper extremities is stable and at least no worse today.  She denies numbness and tingling in her distal extremities  Titrating brooks to maintain MAP 85 per neurosurgery recommendations    Consultants/Specialty  Critical Care, I discussed the patient's condition with Dr. Martin today on ICU Rounds  Neurosurgery, Dr. Linton    Code Status  Full Code    Disposition  ICU for today, likely to transfer to neurosurgical floor on 12/25    Review of Systems  Review of Systems   Constitutional: Negative for chills and malaise/fatigue.   Respiratory: Negative for cough, hemoptysis and sputum production.    Cardiovascular: Negative for chest pain, palpitations and orthopnea.   Gastrointestinal: Negative for nausea and vomiting.   Musculoskeletal: Positive for back pain and neck pain.   Skin: Negative for itching and rash.   Neurological: Negative for dizziness.   All other systems reviewed and are negative.       Physical Exam  Temp:  [36.4 °C (97.6 °F)-37 °C (98.6 °F)] 36.6 °C (97.8 °F)  Pulse:  [] 107  Resp:  [12-38] 30  BP: ()/(55-86) 108/63  SpO2:  [91 %-99 %] 95 %    Physical Exam  Constitutional:       General: She is not in acute distress.     Appearance: Normal appearance. She is normal weight.   HENT:      Head: Normocephalic and atraumatic.      Right  Ear: External ear normal.      Left Ear: External ear normal.      Nose: Nose normal.      Mouth/Throat:      Mouth: Mucous membranes are moist.      Pharynx: Oropharynx is clear.   Eyes:      Extraocular Movements: Extraocular movements intact.      Conjunctiva/sclera: Conjunctivae normal.      Pupils: Pupils are equal, round, and reactive to light.   Neck:      Musculoskeletal: Normal range of motion and neck supple.   Cardiovascular:      Rate and Rhythm: Normal rate and regular rhythm.      Pulses: Normal pulses.   Pulmonary:      Effort: Pulmonary effort is normal.      Breath sounds: Normal breath sounds.   Abdominal:      General: Abdomen is flat. Bowel sounds are normal.      Palpations: Abdomen is soft.   Musculoskeletal: Normal range of motion.   Skin:     General: Skin is warm and dry.      Capillary Refill: Capillary refill takes less than 2 seconds.      Coloration: Skin is not jaundiced.   Neurological:      General: No focal deficit present.      Mental Status: She is alert and oriented to person, place, and time.      Cranial Nerves: No cranial nerve deficit.      Gait: Gait normal.      Comments: 4+/5 strength in UE/LE bilaterally   Psychiatric:         Mood and Affect: Mood normal.         Behavior: Behavior normal.         Fluids    Intake/Output Summary (Last 24 hours) at 12/24/2019 1023  Last data filed at 12/24/2019 0800  Gross per 24 hour   Intake 1461.12 ml   Output 2300 ml   Net -838.88 ml       Laboratory  Recent Labs     12/22/19  0400 12/23/19  0333 12/24/19  0530   WBC 10.8 8.9 9.9   RBC 4.03* 4.42 4.67   HEMOGLOBIN 12.4 13.5 14.3   HEMATOCRIT 37.6 40.5 42.8   MCV 93.3 91.6 91.6   MCH 30.8 30.5 30.6   MCHC 33.0* 33.3* 33.4*   RDW 46.3 45.9 45.6   PLATELETCT 272 275 338   MPV 9.8 9.8 9.8     Recent Labs     12/22/19  0400 12/23/19  0333 12/24/19  0530   SODIUM 141 139 136   POTASSIUM 4.6 4.3 4.3   CHLORIDE 108 105 106   CO2 27 27 22   GLUCOSE 87 93 104*   BUN 12 10 10   CREATININE 0.69  0.62 0.51   CALCIUM 8.4* 8.6 8.8                   Imaging  [id number] VIEW   Final Result         1.  Air-filled reactive small bowel loops in the left abdomen, appearance suggests ileus or enteritis, recommend radiographic follow-up to resolution to exclude progression to obstructive changes.   2.  Moderate stool in the colon suggests changes of constipation      IR-MIDLINE CATHETER INSERTION WO GUIDANCE > AGE 3   Final Result                  Ultrasound-guided midline placement performed by qualified nursing staff    as above.          DX-CERVICAL SPINE-2 OR 3 VIEWS   Final Result      Digitized intraoperative radiograph is submitted for review.  This examination is not for diagnostic purpose but for guidance during a surgical procedure.      DX-PORTABLE FLUORO > 1 HOUR   Final Result      Portable fluoroscopy utilized for 6 seconds.         INTERPRETING LOCATION: [address] [place] [address], [facility], [facility]      OUTSIDE IMAGES-CT CERVICAL SPINE   Final Result           Assessment/Plan  * Cervical spine pain- (present on admission)  Assessment & Plan  Pain control, neurosurgery added decadron today    Central cord syndrome (HCC)- (present on admission)  Assessment & Plan  Central cord injury.  Herniated C4-5 disk.  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  12/20 S/P surgical repair by Dr Linton  Continue ICU care with goal of high map greater than 85  PT/OT      Lumbar radiculopathy- (present on admission)  Assessment & Plan        Mild intermittent asthma- (present on admission)  Assessment & Plan  No acute exacerbation, RT protocol, supplemental oxygen    Anxiety and depression- (present on admission)  Assessment & Plan  Venlafaxine and bupropion    Tobacco use- (present on admission)  Assessment & Plan  Smoking cessation       VTE prophylaxis: lovenox

## 2019-12-24 NOTE — CARE PLAN
Problem: Infection  Goal: Will remain free from infection  Outcome: PROGRESSING AS EXPECTED  Note:   Pt educated on importance of frequent hand hygiene. Family educated to use hand sanitzer upon entrance and exit of patient room and throughout hospital.     Daily AM Labs being monitored for increase WBC count.   Q2 hour temperature checks and monitoring for vital signs for cues to possible infection    Louis being cleaned every shift with soap and water.       Problem: Venous Thromboembolism (VTW)/Deep Vein Thrombosis (DVT) Prevention:  Goal: Patient will participate in Venous Thrombosis (VTE)/Deep Vein Thrombosis (DVT)Prevention Measures  Note:   Pt wearing SCD's, yassine hose and receiving LMWH

## 2019-12-24 NOTE — THERAPY
"Speech Language Therapy dysphagia treatment completed.   Functional Status:  Pt seen this date for dysphagia intervention. Pt was awake, alert, followed directions and reporting 9/10 neck pain. Pt reporting odynophagia at 7/10, pt also reporting globus sensation with current diet that is resolved with liquid wash. Pt reports difficulty masticating current diet 2/2 dentition. PO trials of thins via straw, ice cream, and regular (string cheese) assessed. Swallow trigger appreciated to be timely. Subtle throat clear appreciated in 80% of PO trials of thins via straw, however, vocal quality remained clear. Pt demonstrated prolonged mastication of regular trial and c/o globus sensation with regular. No s/sx of aspiration appreciated with trials of ice cream. Limited PO trials assessed 2/2 reports of pain and chills. Pt desatting to low-mid 80s SpO2 outside of PO trials, RN aware.  SLP following.   Recommendations: 2/2 c/o globus sensation and difficulty masticating 2/2 dentition, recommend downgrade to D2/thins with adherence to the following strategies: upright at 90* for PO, assistance with mealtray setup, small bites, alternate liquids solids.   Plan of Care: Will benefit from Speech Therapy 3 times per week  Post-Acute Therapy: Recommend inpatient transitional care services for continued speech therapy services.        See \"Rehab Therapy-Acute\" Patient Summary Report for complete documentation.     "

## 2019-12-24 NOTE — FLOWSHEET NOTE
12/24/19 1415   Pulmonary Function Group   #FVC / Vital Capacity (liters)  3.15   DC per Dr Martin

## 2019-12-24 NOTE — PROGRESS NOTES
Critical Care Progress Note    Date of admission  12/19/2019    Chief Complaint  54 y.o. female admitted 12/19/2019 with spastic paraplegia    Hospital Course    54 y.o. female with a past medical history of hypercholesterolemia, anxiety, asthma, degenerative disc disease who presented 12/19/2019 as a transfer from [facility] for progressive paraplegia.  The patient reports over the last several days she has had several falls during which she hit her head and neck several times.  Prior to these events she was ambulating around her house with a cane however has had worsening lower extremity weakness and actually developed a contracture of her left lower extremity.  She presented to [facility] for these complaints and a CT scan of the neck was obtained which did not show any acute abnormalities.  An MRI from November which was done in our facility shows multilevel degenerative disc disease with moderate central canal stenosis at C4-C5.  The patient denies any incontinence of bowel or bladder or retention of bowel or bladder, she denies any saddle anesthesia.  The patient was seen by neurosurgery in the emergency department and determined to have significant neurologic findings consistent with central cord syndrome.  She is to be admitted to the ICU for neuro checks and BP support.  She is planned for operative intervention tomorrow.  At this time the patient's main complaint is lumbar pain with radicular symptoms in her lower extremities.      Interval Problem Update  Reviewed last 24 hour events:  Neuro: aox4 leg feels heavy moving upper extremities, 4/5 upper 3/5 lower neck pain 5/10 oxy and tylenol   HR: 50-70  SBP: brooks at 50  Tmax: afebrile   GI: dysphagia 3 3 BM's  UOP: 1.2 l no retention stop bladder scan  Lines: midline IV  Resp: room air   Vte: lovenox  PPI/H2:pepcid  Antibx: none    Midodrine 15mg Q8  FVC 3.1L IS 2L   Stop post void residual never need straight cath will stop  and clinical monitor  3 bm's yesterday  Discussed with neurosurgery start steroids  Another 24 hrs of pressors  Xanax x1     Review of Systems  Review of Systems   Constitutional: Negative for chills, fever and malaise/fatigue.   HENT: Negative for congestion and sore throat.    Eyes: Negative for blurred vision and double vision.   Respiratory: Negative for cough, sputum production, shortness of breath and stridor.    Cardiovascular: Negative for chest pain, palpitations, orthopnea and PND.   Gastrointestinal: Negative for abdominal pain, diarrhea, nausea and vomiting.   Genitourinary: Negative for dysuria, flank pain, frequency and hematuria.   Musculoskeletal: Positive for back pain and neck pain.   Skin: Negative for rash.   Neurological: Positive for sensory change and focal weakness. Negative for tingling, speech change and headaches.   Endo/Heme/Allergies: Does not bruise/bleed easily.   Psychiatric/Behavioral: Negative for depression. The patient is not nervous/anxious.         Vital Signs for last 24 hours   Temp:  [36.4 °C (97.6 °F)-37.2 °C (98.9 °F)] 36.7 °C (98 °F)  Pulse:  [] 61  Resp:  [12-62] 49  BP: ()/(55-88) 134/70  SpO2:  [93 %-100 %] 100 %    Hemodynamic parameters for last 24 hours       Respiratory Information for the last 24 hours       Physical Exam   Physical Exam  Vitals signs reviewed.   Constitutional:       General: She is not in acute distress.     Appearance: She is underweight. She is not ill-appearing.      Interventions: Nasal cannula in place.      Comments: Chronic thin frail    HENT:      Head: Normocephalic and atraumatic.      Mouth/Throat:      Mouth: Mucous membranes are dry.      Dentition: Abnormal dentition.      Pharynx: No pharyngeal swelling.   Eyes:      General: No scleral icterus.     Extraocular Movements: Extraocular movements intact.      Pupils: Pupils are equal, round, and reactive to light.   Neck:      Musculoskeletal: Neck supple.      Thyroid: No  thyromegaly.      Vascular: No JVD.      Trachea: Phonation normal.      Comments: Anterior surgical changes noted  Cardiovascular:      Rate and Rhythm: Normal rate and regular rhythm.  No extrasystoles are present.     Pulses: Normal pulses.      Heart sounds: No murmur. No friction rub. No gallop.    Pulmonary:      Effort: No accessory muscle usage.      Breath sounds: No stridor. No wheezing, rhonchi or rales.   Abdominal:      General: Abdomen is flat. Bowel sounds are normal.      Palpations: Abdomen is soft.      Tenderness: There is no tenderness. There is no right CVA tenderness, left CVA tenderness or guarding.   Lymphadenopathy:      Cervical: No cervical adenopathy.   Skin:     General: Skin is warm and dry.      Capillary Refill: Capillary refill takes less than 2 seconds.      Coloration: Skin is not cyanotic.      Nails: There is no clubbing.     Neurological:      Mental Status: She is alert and oriented to person, place, and time.      GCS: GCS eye subscore is 4. GCS verbal subscore is 5. GCS motor subscore is 6.      Comments: 5-/5 upper extremities throughout, lowers 4/5 hip, knee and toe, chronic wasting of lower extremities. Marissa sensory loss in upper extremities. No facial droop, no dysartharia, no facial droop   Psychiatric:         Attention and Perception: Attention normal.         Mood and Affect: Mood normal.         Speech: Speech normal.         Behavior: Behavior normal.         Cognition and Memory: Cognition normal.         Medications  Current Facility-Administered Medications   Medication Dose Route Frequency Provider Last Rate Last Dose   • dexamethasone (DECADRON) tablet 4 mg  4 mg Oral Q6HRS Connor Linton M.D.   4 mg at 12/24/19 1010   • ALPRAZolam (XANAX) tablet 0.5 mg  0.5 mg Oral Once Ravin Martin M.D.       • enoxaparin (LOVENOX) inj 40 mg  40 mg Subcutaneous DAILY Ravin Martin M.D.   40 mg at 12/23/19 1107   • baclofen (LIORESAL) tablet 10 mg  10 mg Oral TID  Edinson Gregory D.O.   10 mg at 12/24/19 1209   • gabapentin (NEURONTIN) capsule 600 mg  600 mg Oral BID Edinson Gregory D.O.   600 mg at 12/24/19 0515   • sennosides (SENOKOT) 8.6 MG tablet 17.2 mg  17.2 mg Oral DAILY AT NOON NANCY Leal.O.   17.2 mg at 12/24/19 1221   • menthol (HALLS) lozenge 1 Lozenge  1 Lozenge Oral Q2HRS PRN NANCY Leon.O.   1 Lozenge at 12/23/19 1746   • midodrine (PROAMATINE) tablet 15 mg  15 mg Oral TID WITH MEALS Jack Ruffin M.D.   15 mg at 12/24/19 1220   • Pharmacy Consult Request ...Pain Management Review 1 Each  1 Each Other PHARMACY TO DOSE Elsy Arnold, A.P.N.       • MD ALERT...DO NOT ADMINISTER NSAIDS or ASPIRIN unless ORDERED By Neurosurgery 1 Each  1 Each Other PRN Elsy Arnold, A.P.N.       • docusate sodium (COLACE) capsule 100 mg  100 mg Oral BID Elsy Arnold, A.P.N.   100 mg at 12/24/19 0513   • senna-docusate (PERICOLACE or SENOKOT S) 8.6-50 MG per tablet 1 Tab  1 Tab Oral Q24HRS PRN Elsy Arnold, A.P.N.   1 Tab at 12/23/19 1219   • polyethylene glycol/lytes (MIRALAX) PACKET 1 Packet  1 Packet Oral BID PRN Elsy Arnold, A.P.N.       • magnesium hydroxide (MILK OF MAGNESIA) suspension 30 mL  30 mL Oral QDAY PRN Elsy Arnold, A.P.N.       • bisacodyl (DULCOLAX) suppository 10 mg  10 mg Rectal Q24HRS PRN Elsy Arnold, A.P.N.       • fleet enema 133 mL  1 Each Rectal Once PRN Elsy Arnold, A.P.N.       • ondansetron (ZOFRAN) syringe/vial injection 4 mg  4 mg Intravenous Q4HRS PRN Elsy Arnold, HUMBERTO.P.N.   4 mg at 12/24/19 1223   • ondansetron (ZOFRAN ODT) dispertab 4 mg  4 mg Oral Q4HRS PRN Elsy Arnold, A.P.N.       • promethazine (PHENERGAN) tablet 12.5-25 mg  12.5-25 mg Oral Q4HRS PRN HUMBERTO Mcgarry.P.N.       • promethazine (PHENERGAN) suppository 12.5-25 mg  12.5-25 mg Rectal Q4HRS PRN Elsy Arnold, A.P.N.       • prochlorperazine (COMPAZINE) injection 5-10 mg  5-10 mg Intravenous Q4HRS PRN  Elsy Arnold, A.P.N.       • diphenhydrAMINE (BENADRYL) tablet/capsule 25 mg  25 mg Oral Q6HRS PRN Elsy Arnold, A.P.N.        Or   • diphenhydrAMINE (BENADRYL) injection 25 mg  25 mg Intravenous Q6HRS PRN Elsy Arnold, A.P.N.       • benzocaine-menthol (CEPACOL) lozenge 1 Lozenge  1 Lozenge Mouth/Throat Q2HRS PRN Elsy Arnold, A.P.N.       • zolpidem (AMBIEN) tablet 10 mg  10 mg Oral HS PRN Yong Zhong, D.O.   5 mg at 12/23/19 2209   • venlafaxine XR (EFFEXOR XR) capsule 75 mg  75 mg Oral Q EVENING Yong Zhong, D.O.   75 mg at 12/23/19 1746   • famotidine (PEPCID) tablet 20 mg  20 mg Oral BID Yong Zhong, D.O.   20 mg at 12/24/19 0515   • oxybutynin SR (DITROPAN-XL) tablet 15 mg  15 mg Oral BID Ynog Zhong, D.O.   15 mg at 12/24/19 0516   • methocarbamol (ROBAXIN) tablet 500 mg  500 mg Oral BID PRN Yong Zhong, D.O.   500 mg at 12/23/19 1150   • buPROPion SR (WELLBUTRIN-SR) tablet 150 mg  150 mg Oral BID Yong Zhong, D.O.   150 mg at 12/24/19 0515   • Respiratory Care per Protocol   Nebulization Continuous RT Yong Zhong, D.O.       • acetaminophen (TYLENOL) tablet 650 mg  650 mg Oral Q6HRS Yong Zhong, D.O.   650 mg at 12/24/19 1210   • oxyCODONE immediate-release (ROXICODONE) tablet 5-10 mg  5-10 mg Oral Q4HRS PRN Yong Zhong, D.O.   5 mg at 12/24/19 0838   • albuterol inhaler 2 Puff  2 Puff Inhalation Q4H PRN (RT) Yong Zhong D.O.   2 Puff at 12/19/19 7723   • phenylephrine (ANTONIO-SYNEPHRINE) 40 mg in  mL Infusion  0-300 mcg/min Intravenous Continuous Jack Ruffin M.D. 20.6 mL/hr at 12/24/19 1331 55 mcg/min at 12/24/19 1331       Fluids    Intake/Output Summary (Last 24 hours) at 12/24/2019 1547  Last data filed at 12/24/2019 1300  Gross per 24 hour   Intake 1351.89 ml   Output 2750 ml   Net -1398.11 ml       Laboratory          Recent Labs     12/22/19  0400 12/23/19  6512  12/24/19  0530   SODIUM 141 139 136   POTASSIUM 4.6 4.3 4.3   CHLORIDE 108 105 106   CO2 27 27 22   BUN 12 10 10   CREATININE 0.69 0.62 0.51   CALCIUM 8.4* 8.6 8.8     Recent Labs     12/22/19  0400 12/23/19  0333 12/24/19  0530   GLUCOSE 87 93 104*     Recent Labs     12/22/19  0400 12/23/19  0333 12/24/19  0530   WBC 10.8 8.9 9.9   NEUTSPOLYS 45.80 52.80 50.40   LYMPHOCYTES 45.50* 37.60 38.40   MONOCYTES 6.60 6.30 7.90   EOSINOPHILS 1.20 2.40 2.10   BASOPHILS 0.70 0.70 0.90     Recent Labs     12/22/19  0400 12/23/19  0333 12/24/19  0530   RBC 4.03* 4.42 4.67   HEMOGLOBIN 12.4 13.5 14.3   HEMATOCRIT 37.6 40.5 42.8   PLATELETCT 272 275 338       Imaging  CT:    Reviewed  MRI:   Reviewed    Assessment/Plan  * Cervical spine pain  Assessment & Plan  Acute on chronic following several falls recently, monitoring for additional complications from these falls  Exam concerning for central cord type condition s/p surgical decompression Dr Linton 12/20  Neurosurgery on board, recommendations for blood pressure augmentation to MAP 85 mmHg, titrating Trevor-Synephrine and continue oral midodrine as well x5 days postop, stop date 12/25  Noting reflexive bradycardia to medications at times  Neurochecks to every 4 hours  Oral midodrine and IV Trevor-Synephrine, as needed to maintain MAP >85 mmHg until 12/25  Cuff pressure correlated with artline pulled since was not working  lovenox cleared by neurosurgery start 12/23  Continue hard collar for support postop when out of bed  Per neurosurgery will start decadron for wound swelling       Central cord syndrome (HCC)  Assessment & Plan  S/p operative decompression for 12/20 Dr Linton tolerated well  Supporting perfusion to spinal cord perfusion with active titration of Trevor-Synephrine, MAP goal >= 85 x5 days (12/25)  Oral midodrine supported with Trevor-Synephrine infusion  Maintain euvolemia with fluids, serial exam to reassess  No role for steroids at this time physical therapy  OT/PT and  physiatry consultation in the near future  Monitor for progression and respiratory compromise requiring intubation mechanical ventilation  Continue hard collar for supportive neck postop  Rehab consultation 12/23 Dr Gregory    Lumbar radiculopathy  Assessment & Plan  Acute on chronic  MRI from November reviewed with radiology  Neurosurgery on board, to the OR 12/20 for decompression of cervical abnormalities, appears successful for lower extremity central cord   Continue monitoring for neurological improvement and providing spinal cord supportive care    Mild intermittent asthma- (present on admission)  Assessment & Plan  PRN albuterol at home  RT/O2 Protocols  Titrate supplemental FiO2 to maintain SpO2 >88%  No exacerbation at this time, monitor for need for IV steroids  Nasal steroids for chronic sinus process as well as needed  Continue to monitor, okay so far    Anxiety and depression- (present on admission)  Assessment & Plan  Resume Wellbutrin and Effexor  One dose of xanax given today 12/24    Tobacco use- (present on admission)  Assessment & Plan  Nicotine replacement protocol as needed  Cessation counseling ongoing daily       VTE:  Lovenox  Ulcer: H2 Antagonist home med  Lines: None    I have performed a physical exam and reviewed and updated ROS and Plan today (12/24/2019). In review of yesterday's note (12/23/2019), there are no changes except as documented above.     Discussed patient condition and risk of morbidity and/or mortality with Hospitalist, RN, RT, Pharmacy, Charge nurse / hot rounds and Patient     Patient remains in critical condition from central cord syndrome and spinal cord perfusion on neosynephrine gtt with active titration. Critical care time provided was 36 minutes. This excludes all separate billable procedures.

## 2019-12-24 NOTE — CONSULTS
Physical Medicine and Rehabilitation Consultation         Initial Consult      Date of Consultation: 12/23/2019  Consulting provider: Edinson Gregory D.O.  Reason for consultation: assess for acute inpatient rehab appropriateness  Consulting physician Dr Martin    Chief complaint: UE and LE weakness    HPI: The patient is a 54 y.o. female with a past medical history of chronic back and neck pain, presented on 12/19/2019  1:32 PM with increasing weakness and bilateral lower extremities and upper extremities, worsened after ground-level fall.  She denies any loss of consciousness.  She was found to have disc herniation at C4-5 and C3- C4, with T2 hyperintensity of the cord at this level. she underwent ACDF C3-5.  She reported significant improvement in her upper extremity strength, lower extremity weakness continues.  She also complains of significant abductor spasticity present in bilateral lower extremities.    She is on hyperperfusion protocol with a goal of map greater than 85    She complains of burning and tingling and bilateral upper extremities.  She also reports severe pain with removal of BRIT hose.    She is also complaining of infection of the left third toe    Bladder: Louis removed this morning, she has been voiding, no PVRs done  Bowel: Unclear when her last bowel movement was      ROS:  Gen: No fatigue, confusion, significant weight loss  Eyes: no blurry vision, double vision or pain in eyes  ENT: no changes in hearing, runny nose, nose bleeds, sinus pain  Endo: no episodes of low blood sugar  CV: No CP, palpitations, symptoms of AUTONOMIC DYSREFLEXIA  Lungs: no shortness of breath, changes in secretions, changes in cough, pain with coughing  Abd: No abd pain, nausea, vomiting, pain with eating  : no blood in urine, suprapubic pain  Ext/MSK: No swelling in legs, asymmetric atrophy  Neuro: no changes in strength or sensation  Skin: no new ulcers/skin breakdown appreciated by patient  Mood: No  changes in mood today, increase in depression or anxiety  Heme: no bruising, or bleeding  Allergy: No recent allergies  Rest of 14 point review of systems is negative    PMH:  Past Medical History:   Diagnosis Date   • Anxiety    • Arthritis     spine, feet    • Asthma    • Cataract    • Depression    • High cholesterol        PSH:  Past Surgical History:   Procedure Laterality Date   • CERVICAL DISK AND FUSION ANTERIOR N/A 12/20/2019    Procedure: DISCECTOMY, SPINE, CERVICAL, ANTERIOR APPROACH, WITH FUSION - C3-5;  Surgeon: Connor Linton M.D.;  [place] [facility]  Service: Neurosurgery   • CORPECTOMY N/A 12/20/2019    Procedure: CORPECTOMY;  Surgeon: Connor Linton M.D.;  [place] [facility]  Service: Neurosurgery   • VAGINAL HYSTERECTOMY SCOPE TOTAL N/A 10/3/2017    Procedure: VAGINAL HYSTERECTOMY SCOPE TOTAL;  Surgeon: Hudson Driscoll M.D.;  [facility];  Service: Gynecology   • SALPINGECTOMY Bilateral 10/3/2017    Procedure: SALPINGECTOMY;  Surgeon: Hudson Driscoll M.D.;  [facility];  Service: Gynecology   • OOPHORECTOMY Bilateral 10/3/2017    Procedure: OOPHORECTOMY;  Surgeon: Hudson Driscoll M.D.;  [facility];  Service: Gynecology   • ANTERIOR AND POSTERIOR REPAIR Bilateral 10/3/2017    Procedure: ANTERIOR AND POSTERIOR REPAIR;  Surgeon: Hudson Driscoll M.D.;  [facility];  Service: Gynecology   • ENTEROCELE REPAIR N/A 10/3/2017    Procedure: ENTEROCELE REPAIR, PERINEOPLASTY;  Surgeon: Hudson Driscoll M.D.;  [facility];  Service: Gynecology   • BLADDER SLING FEMALE N/A 10/3/2017    Procedure: BLADDER SLING FEMALE TOT, CYSTOSCOPY;  Surgeon: Hudson Driscoll M.D.;  [facility];  Service: Gynecology   • VAGINAL SUSPENSION N/A 10/3/2017    Procedure: VAGINAL SUSPENSION SACROSPINOUS VAULT POSSIBLE;   Surgeon: Hudson Driscoll M.D.;  Location: SURGERY [facility];  Service: Gynecology   • OTHER Left 2005    hammertoe x2   • EYE SURGERY  1972    for lazy eye   • LUMPECTOMY         FHX:  Family History   Problem Relation Age of Onset   • Cancer Mother    • Alcohol/Drug Mother    • Cancer Brother    • Diabetes Maternal Aunt        Medications:  Current Facility-Administered Medications   Medication Dose   • enoxaparin (LOVENOX) inj 40 mg  40 mg   • menthol (HALLS) lozenge 1 Lozenge  1 Lozenge   • midodrine (PROAMATINE) tablet 15 mg  15 mg   • Pharmacy Consult Request ...Pain Management Review 1 Each  1 Each   • MD ALERT...DO NOT ADMINISTER NSAIDS or ASPIRIN unless ORDERED By Neurosurgery 1 Each  1 Each   • docusate sodium (COLACE) capsule 100 mg  100 mg   • senna-docusate (PERICOLACE or SENOKOT S) 8.6-50 MG per tablet 1 Tab  1 Tab   • senna-docusate (PERICOLACE or SENOKOT S) 8.6-50 MG per tablet 1 Tab  1 Tab   • polyethylene glycol/lytes (MIRALAX) PACKET 1 Packet  1 Packet   • magnesium hydroxide (MILK OF MAGNESIA) suspension 30 mL  30 mL   • bisacodyl (DULCOLAX) suppository 10 mg  10 mg   • fleet enema 133 mL  1 Each   • ondansetron (ZOFRAN) syringe/vial injection 4 mg  4 mg   • ondansetron (ZOFRAN ODT) dispertab 4 mg  4 mg   • promethazine (PHENERGAN) tablet 12.5-25 mg  12.5-25 mg   • promethazine (PHENERGAN) suppository 12.5-25 mg  12.5-25 mg   • prochlorperazine (COMPAZINE) injection 5-10 mg  5-10 mg   • diphenhydrAMINE (BENADRYL) tablet/capsule 25 mg  25 mg    Or   • diphenhydrAMINE (BENADRYL) injection 25 mg  25 mg   • benzocaine-menthol (CEPACOL) lozenge 1 Lozenge  1 Lozenge   • zolpidem (AMBIEN) tablet 10 mg  10 mg   • venlafaxine XR (EFFEXOR XR) capsule 75 mg  75 mg   • famotidine (PEPCID) tablet 20 mg  20 mg   • oxybutynin SR (DITROPAN-XL) tablet 15 mg  15 mg   • methocarbamol (ROBAXIN) tablet 500 mg  500 mg   • gabapentin (NEURONTIN) capsule 400 mg  400 mg   • buPROPion SR (WELLBUTRIN-SR) tablet  "150 mg  150 mg   • Respiratory Care per Protocol     • acetaminophen (TYLENOL) tablet 650 mg  650 mg   • oxyCODONE immediate-release (ROXICODONE) tablet 5-10 mg  5-10 mg   • albuterol inhaler 2 Puff  2 Puff   • phenylephrine (ANTONIO-SYNEPHRINE) 40 mg in  mL Infusion  0-300 mcg/min       Allergies:  Allergies   Allergen Reactions   • Codeine Unspecified     Unknown reaction         Social Hx:  Pre-morbidly, this patient lived in a single level home with unknown steps to enter, she will be going home with her children.   Tobacco: +      Prior level of function:   Independent        Physical Exam:  Vitals: /60   Pulse (!) 57   Temp 36.4 °C (97.6 °F) (Temporal)   Resp (!) 24   Ht 1.676 m (5' 5.98\")   Wt 60.3 kg (132 lb 15 oz)   SpO2 98%   Gen: NAD  HEENT:  NC/AT, PERRLA, moist mucous membranes, anterior cervical incision is clean dry and intact  Cardio: RRR, no mumurs  Pulm: CTAB, hypophonic voice, shallow breathing  Abd: Soft NTND, active bowel sounds,   Ext: No peripheral edema. No calf tenderness. No clubbing/cyanosis. +dorsal pedalis pulses bilaterally.  Onychomycosis on bilateral feet    Mental status: answers questions appropriately follows commands      CRANIAL NERVES:  2,3: visual acuity grossly intact, PERRL  3,4,6: EOMI bilaterally, no nystagmus or diplopia  5: sensation intact to light touch bilaterally and symmetric  7: no facial asymmetry  8: hearing grossly intact  9,10: symmetric palate elevation  11: SCM/Trapezius strength 5/5 bilaterally  12: tongue protrudes midline    Motor:      Shoulder flexors:  Right -  4/5, Left -  4/5 *unclear if limited by pain  Elbow flexors:  Right -  4/5, Left -  4/5  Wrist extensors:  Right -  4/5, Left -  4/5  Elbow extensors:  Right -  4/5, Left -  4/5  Finger flexors:  Right -  4/5, Left -  4/5  Finger abductors:  Right -  3/5, Left -  4/5  Hip flexors:  Right -  3/5, Left -  3/5, limited by spasticity  Knee ext:  Right -  3/5, Left -  3/5  Dorsiflexors:  " Right -  3/5, Left -  3/5  EHL:  Right -  4/5, Left -  4/5  Plantar flexors:  Right -  4/5, Left -  4/5   Negative Pronator drift bilaterally    Sensory:   Altered sensation to light touch on the right C2 and left C3    DTRs: 3+ in bilateral biceps, triceps, brachioradialis, 3+ in bilateral patellar and achilles tendons  No clonus at bilateral ankles      Tone: Significant abductor tone bilateral, MAS 3+/4      Labs:  Recent Labs     12/21/19 0322 12/22/19 0400 12/23/19  0333   RBC 4.23 4.03* 4.42   HEMOGLOBIN 13.2 12.4 13.5   HEMATOCRIT 38.9 37.6 40.5   PLATELETCT 328 272 275     Recent Labs     12/21/19 0322 12/22/19 0400 12/23/19  0333   SODIUM 139 141 139   POTASSIUM 4.2 4.6 4.3   CHLORIDE 109 108 105   CO2 23 27 27   GLUCOSE 128* 87 93   BUN 10 12 10   CREATININE 0.66 0.69 0.62   CALCIUM 8.2* 8.4* 8.6     Recent Results (from the past 24 hour(s))   CBC WITH DIFFERENTIAL    Collection Time: 12/23/19  3:33 AM   Result Value Ref Range    WBC 8.9 4.8 - 10.8 K/uL    RBC 4.42 4.20 - 5.40 M/uL    Hemoglobin 13.5 12.0 - 16.0 g/dL    Hematocrit 40.5 37.0 - 47.0 %    MCV 91.6 81.4 - 97.8 fL    MCH 30.5 27.0 - 33.0 pg    MCHC 33.3 (L) 33.6 - 35.0 g/dL    RDW 45.9 35.9 - 50.0 fL    Platelet Count 275 164 - 446 K/uL    MPV 9.8 9.0 - 12.9 fL    Neutrophils-Polys 52.80 44.00 - 72.00 %    Lymphocytes 37.60 22.00 - 41.00 %    Monocytes 6.30 0.00 - 13.40 %    Eosinophils 2.40 0.00 - 6.90 %    Basophils 0.70 0.00 - 1.80 %    Immature Granulocytes 0.20 0.00 - 0.90 %    Nucleated RBC 0.00 /100 WBC    Neutrophils (Absolute) 4.70 2.00 - 7.15 K/uL    Lymphs (Absolute) 3.35 1.00 - 4.80 K/uL    Monos (Absolute) 0.56 0.00 - 0.85 K/uL    Eos (Absolute) 0.21 0.00 - 0.51 K/uL    Baso (Absolute) 0.06 0.00 - 0.12 K/uL    Immature Granulocytes (abs) 0.02 0.00 - 0.11 K/uL    NRBC (Absolute) 0.00 K/uL   Basic Metabolic Panel    Collection Time: 12/23/19  3:33 AM   Result Value Ref Range    Sodium 139 135 - 145 mmol/L    Potassium 4.3 3.6 -  5.5 mmol/L    Chloride 105 96 - 112 mmol/L    Co2 27 20 - 33 mmol/L    Glucose 93 65 - 99 mg/dL    Bun 10 8 - 22 mg/dL    Creatinine 0.62 0.50 - 1.40 mg/dL    Calcium 8.6 8.5 - 10.5 mg/dL    Anion Gap 7.0 0.0 - 11.9   ESTIMATED GFR    Collection Time: 12/23/19  3:33 AM   Result Value Ref Range    GFR If African American >60 >60 mL/min/1.73 m 2    GFR If Non African American >60 >60 mL/min/1.73 m 2       ASSESSMENT:    C1 nontraumatic incomplete spinal cord injury: Status post C3-C5 ACDF on 12/21 by Dr. Linton.  -Prolonged discussion with patient and on prognosis, prolonged healing time, good signs of progression of strength in upper and lower extremities.  This may be muted by increased spasticity in lower extremities.   -Hyperperfusion protocol, patient is already on Midrin 15 mg 3 times daily  -Consider starting Sudafed 30 mg every 8 hours, can increase to 60 mg every 8 hour, which will increase heart rate and help clear secretions as well as improved BP.   -Can also initiate Florinef 0.1 mg twice daily, increase intravascular volume    Neuropathic pain:  - increase gabapentin to 600mg tid, can increase dose to 900 mg 3 times a day  -Tylenol 1000 mg 3 times daily  -Consider increasing venlafaxine 125 mg daily    Orthostatic hypotension: Given level of injury patient is at high risk for orthostatic hypotension  -Start Glenn hose when out of bed  - Consider restarting    Neurogenic bowel:  -Check KUB to evaluate for stool load  -Start patient on neurogenic bowel program, upper motor neuron with senna 2 tablets q. noon, Colace 200 mg twice daily, Dulcolax suppository every afternoon with digital stimulation.  Goal of 1 BM per day    Neurogenic bladder:   -Check PVRs  -Start voiding trial, if can't void in 6 hours or prn check pvr and if >500cc then ICP,if able to void then check PVR, if PVR is >200cc then ICP    DVT ppx: lovenox 40 mg daily    Rehabilitation: Impaired ADLs and mobility  -We will discuss with family  about discharge support and location    Discussed with pt, summarized hospitalization and care, options for next step of care    Discussed with team about recommendations    Patient was seen for 80 minutes on unit/floor of which > 50% of time was spent on counseling and coordination of care regarding the above, including prognosis, risk reduction, benefits of treatment, and options for next stage of care.      Edinson Gregory D.O.  Physical Medicine and Rehabilitation

## 2019-12-25 LAB
ANION GAP SERPL CALC-SCNC: 9 MMOL/L (ref 0–11.9)
BASOPHILS # BLD AUTO: 0.2 % (ref 0–1.8)
BASOPHILS # BLD: 0.02 K/UL (ref 0–0.12)
BUN SERPL-MCNC: 18 MG/DL (ref 8–22)
CALCIUM SERPL-MCNC: 9.9 MG/DL (ref 8.5–10.5)
CHLORIDE SERPL-SCNC: 101 MMOL/L (ref 96–112)
CO2 SERPL-SCNC: 29 MMOL/L (ref 20–33)
CREAT SERPL-MCNC: 0.67 MG/DL (ref 0.5–1.4)
EOSINOPHIL # BLD AUTO: 0 K/UL (ref 0–0.51)
EOSINOPHIL NFR BLD: 0 % (ref 0–6.9)
ERYTHROCYTE [DISTWIDTH] IN BLOOD BY AUTOMATED COUNT: 45.2 FL (ref 35.9–50)
GLUCOSE SERPL-MCNC: 145 MG/DL (ref 65–99)
HCT VFR BLD AUTO: 45.9 % (ref 37–47)
HGB BLD-MCNC: 15.2 G/DL (ref 12–16)
IMM GRANULOCYTES # BLD AUTO: 0.02 K/UL (ref 0–0.11)
IMM GRANULOCYTES NFR BLD AUTO: 0.2 % (ref 0–0.9)
LYMPHOCYTES # BLD AUTO: 1.61 K/UL (ref 1–4.8)
LYMPHOCYTES NFR BLD: 19.1 % (ref 22–41)
MCH RBC QN AUTO: 30.5 PG (ref 27–33)
MCHC RBC AUTO-ENTMCNC: 33.1 G/DL (ref 33.6–35)
MCV RBC AUTO: 92 FL (ref 81.4–97.8)
MONOCYTES # BLD AUTO: 0.25 K/UL (ref 0–0.85)
MONOCYTES NFR BLD AUTO: 3 % (ref 0–13.4)
NEUTROPHILS # BLD AUTO: 6.55 K/UL (ref 2–7.15)
NEUTROPHILS NFR BLD: 77.5 % (ref 44–72)
NRBC # BLD AUTO: 0 K/UL
NRBC BLD-RTO: 0 /100 WBC
PLATELET # BLD AUTO: 389 K/UL (ref 164–446)
PMV BLD AUTO: 9.6 FL (ref 9–12.9)
POTASSIUM SERPL-SCNC: 4.5 MMOL/L (ref 3.6–5.5)
RBC # BLD AUTO: 4.99 M/UL (ref 4.2–5.4)
SODIUM SERPL-SCNC: 139 MMOL/L (ref 135–145)
WBC # BLD AUTO: 8.5 K/UL (ref 4.8–10.8)

## 2019-12-25 PROCEDURE — A9270 NON-COVERED ITEM OR SERVICE: HCPCS | Performed by: NEUROLOGICAL SURGERY

## 2019-12-25 PROCEDURE — 80048 BASIC METABOLIC PNL TOTAL CA: CPT

## 2019-12-25 PROCEDURE — 700111 HCHG RX REV CODE 636 W/ 250 OVERRIDE (IP): Performed by: INTERNAL MEDICINE

## 2019-12-25 PROCEDURE — 700102 HCHG RX REV CODE 250 W/ 637 OVERRIDE(OP): Performed by: NEUROLOGICAL SURGERY

## 2019-12-25 PROCEDURE — 700102 HCHG RX REV CODE 250 W/ 637 OVERRIDE(OP): Performed by: INTERNAL MEDICINE

## 2019-12-25 PROCEDURE — A9270 NON-COVERED ITEM OR SERVICE: HCPCS | Performed by: PHYSICAL MEDICINE & REHABILITATION

## 2019-12-25 PROCEDURE — 700102 HCHG RX REV CODE 250 W/ 637 OVERRIDE(OP): Performed by: HOSPITALIST

## 2019-12-25 PROCEDURE — 99291 CRITICAL CARE FIRST HOUR: CPT | Performed by: INTERNAL MEDICINE

## 2019-12-25 PROCEDURE — 700102 HCHG RX REV CODE 250 W/ 637 OVERRIDE(OP): Performed by: PHYSICAL MEDICINE & REHABILITATION

## 2019-12-25 PROCEDURE — 99232 SBSQ HOSP IP/OBS MODERATE 35: CPT | Performed by: HOSPITALIST

## 2019-12-25 PROCEDURE — 770001 HCHG ROOM/CARE - MED/SURG/GYN PRIV*

## 2019-12-25 PROCEDURE — A9270 NON-COVERED ITEM OR SERVICE: HCPCS | Performed by: HOSPITALIST

## 2019-12-25 PROCEDURE — 700112 HCHG RX REV CODE 229: Performed by: NURSE PRACTITIONER

## 2019-12-25 PROCEDURE — 85025 COMPLETE CBC W/AUTO DIFF WBC: CPT

## 2019-12-25 PROCEDURE — A9270 NON-COVERED ITEM OR SERVICE: HCPCS | Performed by: NURSE PRACTITIONER

## 2019-12-25 PROCEDURE — A9270 NON-COVERED ITEM OR SERVICE: HCPCS | Performed by: INTERNAL MEDICINE

## 2019-12-25 RX ADMIN — ACETAMINOPHEN 650 MG: 325 TABLET, FILM COATED ORAL at 11:27

## 2019-12-25 RX ADMIN — ZOLPIDEM TARTRATE 10 MG: 5 TABLET ORAL at 21:56

## 2019-12-25 RX ADMIN — MIDODRINE HYDROCHLORIDE 15 MG: 5 TABLET ORAL at 07:42

## 2019-12-25 RX ADMIN — ACETAMINOPHEN 650 MG: 325 TABLET, FILM COATED ORAL at 16:44

## 2019-12-25 RX ADMIN — GABAPENTIN 600 MG: 300 CAPSULE ORAL at 06:26

## 2019-12-25 RX ADMIN — MIDODRINE HYDROCHLORIDE 15 MG: 5 TABLET ORAL at 16:44

## 2019-12-25 RX ADMIN — GABAPENTIN 600 MG: 300 CAPSULE ORAL at 16:44

## 2019-12-25 RX ADMIN — BACLOFEN 10 MG: 10 TABLET ORAL at 16:44

## 2019-12-25 RX ADMIN — OXYCODONE HYDROCHLORIDE 5 MG: 5 TABLET ORAL at 20:59

## 2019-12-25 RX ADMIN — DEXAMETHASONE 4 MG: 4 TABLET ORAL at 00:42

## 2019-12-25 RX ADMIN — MIDODRINE HYDROCHLORIDE 15 MG: 5 TABLET ORAL at 13:17

## 2019-12-25 RX ADMIN — OXYBUTYNIN CHLORIDE 15 MG: 5 TABLET, EXTENDED RELEASE ORAL at 16:43

## 2019-12-25 RX ADMIN — FAMOTIDINE 20 MG: 20 TABLET ORAL at 06:26

## 2019-12-25 RX ADMIN — BACLOFEN 10 MG: 10 TABLET ORAL at 06:26

## 2019-12-25 RX ADMIN — BACLOFEN 10 MG: 10 TABLET ORAL at 11:27

## 2019-12-25 RX ADMIN — DOCUSATE SODIUM 100 MG: 100 CAPSULE, LIQUID FILLED ORAL at 06:25

## 2019-12-25 RX ADMIN — BUPROPION HYDROCHLORIDE 150 MG: 150 TABLET, EXTENDED RELEASE ORAL at 16:44

## 2019-12-25 RX ADMIN — FAMOTIDINE 20 MG: 20 TABLET ORAL at 16:44

## 2019-12-25 RX ADMIN — SENNOSIDES 17.2 MG: 8.6 TABLET, FILM COATED ORAL at 11:31

## 2019-12-25 RX ADMIN — OXYBUTYNIN CHLORIDE 15 MG: 5 TABLET, EXTENDED RELEASE ORAL at 06:27

## 2019-12-25 RX ADMIN — OXYCODONE HYDROCHLORIDE 10 MG: 5 TABLET ORAL at 15:25

## 2019-12-25 RX ADMIN — DEXAMETHASONE 4 MG: 4 TABLET ORAL at 06:25

## 2019-12-25 RX ADMIN — DOCUSATE SODIUM 100 MG: 100 CAPSULE, LIQUID FILLED ORAL at 16:44

## 2019-12-25 RX ADMIN — VENLAFAXINE HYDROCHLORIDE 75 MG: 75 CAPSULE, EXTENDED RELEASE ORAL at 16:43

## 2019-12-25 RX ADMIN — BUPROPION HYDROCHLORIDE 150 MG: 150 TABLET, EXTENDED RELEASE ORAL at 06:26

## 2019-12-25 RX ADMIN — OXYCODONE HYDROCHLORIDE 5 MG: 5 TABLET ORAL at 10:39

## 2019-12-25 RX ADMIN — ACETAMINOPHEN 650 MG: 325 TABLET, FILM COATED ORAL at 06:26

## 2019-12-25 RX ADMIN — OXYCODONE HYDROCHLORIDE 5 MG: 5 TABLET ORAL at 07:42

## 2019-12-25 RX ADMIN — ENOXAPARIN SODIUM 40 MG: 100 INJECTION SUBCUTANEOUS at 06:25

## 2019-12-25 RX ADMIN — ACETAMINOPHEN 650 MG: 325 TABLET, FILM COATED ORAL at 00:42

## 2019-12-25 ASSESSMENT — ENCOUNTER SYMPTOMS
SENSORY CHANGE: 1
BACK PAIN: 1
DIZZINESS: 0
SORE THROAT: 0
INSOMNIA: 1
COUGH: 0
FOCAL WEAKNESS: 1
FEVER: 0
DOUBLE VISION: 0
ABDOMINAL PAIN: 0
NERVOUS/ANXIOUS: 0
TINGLING: 0
DIARRHEA: 0
PND: 0
STRIDOR: 0
BLURRED VISION: 0
FLANK PAIN: 0
SPUTUM PRODUCTION: 0
HEMOPTYSIS: 0
HEADACHES: 0
CHILLS: 0
NECK PAIN: 1
DEPRESSION: 0
BRUISES/BLEEDS EASILY: 0
PALPITATIONS: 0
SHORTNESS OF BREATH: 0
VOMITING: 0
ORTHOPNEA: 0
NAUSEA: 0
SPEECH CHANGE: 0

## 2019-12-25 NOTE — PROGRESS NOTES
Neurosurgery Progress Note    Subjective:  No events    Exam:   throughout except delt bilat 4+  C/d/i min swelling      BP  Min: 108/70  Max: 142/76  Pulse  Av  Min: 53  Max: 107  Resp  Av.1  Min: 13  Max: 62  Temp  Av.7 °C (98 °F)  Min: 36.1 °C (97 °F)  Max: 37.2 °C (98.9 °F)  SpO2  Av.3 %  Min: 91 %  Max: 100 %    No data recorded    Recent Labs     19  0333 19  0530 19  0630   WBC 8.9 9.9 8.5   RBC 4.42 4.67 4.99   HEMOGLOBIN 13.5 14.3 15.2   HEMATOCRIT 40.5 42.8 45.9   MCV 91.6 91.6 92.0   MCH 30.5 30.6 30.5   MCHC 33.3* 33.4* 33.1*   RDW 45.9 45.6 45.2   PLATELETCT 275 338 389   MPV 9.8 9.8 9.6     Recent Labs     19  0333 19  0530 19  0630   SODIUM 139 136 139   POTASSIUM 4.3 4.3 4.5   CHLORIDE 105 106 101   CO2 27 22 29   GLUCOSE 93 104* 145*   BUN 10 10 18   CREATININE 0.62 0.51 0.67   CALCIUM 8.6 8.8 9.9               Intake/Output       19 0700 - 19 0659 19 0700 - 19 0659       Total  Total       Intake    I.V.  228.5  258.1 486.6  --  -- --    Phenylephrine Volume 228.5 258.1 486.6 -- -- --    Total Intake 228.5 258.1 486.6 -- -- --       Output    Urine  2150  1850 4000  --  -- --    Number of Times Voided 4 x 4 x 8 x -- -- --    Urine Void (mL) 2150 1850 4000 -- -- --    Stool  --  -- --  --  -- --    Number of Times Stooled -- 0 x 0 x -- -- --    Total Output 2150 1850 4000 -- -- --       Net I/O     -1921.5 -1591.9 -3513.4 -- -- --            Intake/Output Summary (Last 24 hours) at 2019 0755  Last data filed at 2019 0600  Gross per 24 hour   Intake 486.57 ml   Output 4000 ml   Net -3513.43 ml            • ALPRAZolam  0.5 mg Once   • enoxaparin (LOVENOX) injection  40 mg DAILY   • baclofen  10 mg TID   • gabapentin  600 mg BID   • sennosides  17.2 mg DAILY AT NOON   • menthol  1 Lozenge Q2HRS PRN   • midodrine  15 mg TID WITH MEALS   • Pharmacy Consult Request  1 Each  PHARMACY TO DOSE   • MD ALERT...DO NOT ADMINISTER NSAIDS or ASPIRIN unless ORDERED By Neurosurgery  1 Each PRN   • docusate sodium  100 mg BID   • senna-docusate  1 Tab Q24HRS PRN   • polyethylene glycol/lytes  1 Packet BID PRN   • magnesium hydroxide  30 mL QDAY PRN   • bisacodyl  10 mg Q24HRS PRN   • fleet  1 Each Once PRN   • ondansetron  4 mg Q4HRS PRN   • ondansetron  4 mg Q4HRS PRN   • promethazine  12.5-25 mg Q4HRS PRN   • promethazine  12.5-25 mg Q4HRS PRN   • prochlorperazine  5-10 mg Q4HRS PRN   • diphenhydrAMINE  25 mg Q6HRS PRN    Or   • diphenhydrAMINE  25 mg Q6HRS PRN   • benzocaine-menthol  1 Lozenge Q2HRS PRN   • zolpidem  10 mg HS PRN   • venlafaxine XR  75 mg Q EVENING   • famotidine  20 mg BID   • oxybutynin SR  15 mg BID   • methocarbamol  500 mg BID PRN   • buPROPion SR  150 mg BID   • Respiratory Care per Protocol   Continuous RT   • acetaminophen  650 mg Q6HRS   • oxyCODONE immediate-release  5-10 mg Q4HRS PRN   • albuterol  2 Puff Q4H PRN (RT)   • Phenylephrine infusion  0-300 mcg/min Continuous       Assessment and Plan:    POD # 5 C4 corpectomy   Prophylactic anticoagulation: yes  Exam stable  Transfer to floor  D/c decadron today

## 2019-12-25 NOTE — PROGRESS NOTES
Hospital Medicine Daily Progress Note    Date of Service  12/25/2019    Chief Complaint  54 y.o. female admitted 12/19/2019 with neck and back pain    Hospital Course    Ms Salinas has a history of chronic back pain and decreased mobility.  She presented on 12/19/2019 with back pain exacerbation and progressive paraplegia.  Patient was seen in consultation by neurosurgery and on 12/20/2019 she underwent cervical discectomy/corpectomy.  The patient was cared for in the ICU postoperatively, neurosurgery recommended supportive high MAP to maintain spinal cord perfusion, Trevor-Synephrine was stopped on 12/25/2019 and blood pressure goals were liberalized.      Interval Problem Update  Oriented x 4, no confusion  Complains of neck pain and swelling, improved with oxycodone  Neosynephrine drip stopped by neurosurgery this morning, 's-120s  2 person assist to stand    Consultants/Specialty  Critical Care, I discussed the patient's condition with Dr. Martin today on ICU Rounds  Neurosurgery    Code Status  Full Code    Disposition  Transfer to neurosurgical floor    Review of Systems  Review of Systems   Constitutional: Positive for malaise/fatigue. Negative for chills.   Respiratory: Negative for cough, hemoptysis and sputum production.    Cardiovascular: Negative for chest pain, palpitations and orthopnea.   Gastrointestinal: Negative for nausea and vomiting.   Musculoskeletal: Positive for back pain and neck pain.   Skin: Negative for itching and rash.   Neurological: Negative for dizziness.   Psychiatric/Behavioral: The patient has insomnia.    All other systems reviewed and are negative.       Physical Exam  Temp:  [36.1 °C (97 °F)-37.2 °C (98.9 °F)] 36.7 °C (98 °F)  Pulse:  [] 81  Resp:  [13-49] 30  BP: (108-142)/(58-88) 113/67  SpO2:  [91 %-100 %] 96 %    Physical Exam  Constitutional:       General: She is not in acute distress.     Appearance: Normal appearance. She is normal weight. She is not  ill-appearing.   HENT:      Head: Normocephalic and atraumatic.      Right Ear: External ear normal.      Left Ear: External ear normal.      Nose: Nose normal.      Mouth/Throat:      Mouth: Mucous membranes are moist.      Pharynx: No oropharyngeal exudate.   Eyes:      Extraocular Movements: Extraocular movements intact.      Conjunctiva/sclera: Conjunctivae normal.      Pupils: Pupils are equal, round, and reactive to light.   Neck:      Musculoskeletal: Normal range of motion and neck supple.   Cardiovascular:      Rate and Rhythm: Normal rate and regular rhythm.      Pulses: Normal pulses.      Heart sounds: No murmur.   Pulmonary:      Effort: Pulmonary effort is normal. No respiratory distress.      Breath sounds: Normal breath sounds. No wheezing or rales.   Abdominal:      General: Abdomen is flat. Bowel sounds are normal.      Palpations: Abdomen is soft.   Musculoskeletal: Normal range of motion.   Skin:     General: Skin is warm and dry.      Capillary Refill: Capillary refill takes less than 2 seconds.      Coloration: Skin is not jaundiced.   Neurological:      General: No focal deficit present.      Mental Status: She is alert and oriented to person, place, and time.      Cranial Nerves: No cranial nerve deficit.      Gait: Gait normal.      Comments: 5/5 strength in UE/LE bilaterally   Psychiatric:         Mood and Affect: Mood normal.         Behavior: Behavior normal.         Fluids    Intake/Output Summary (Last 24 hours) at 12/25/2019 0959  Last data filed at 12/25/2019 0800  Gross per 24 hour   Intake 899.57 ml   Output 3700 ml   Net -2800.43 ml       Laboratory  Recent Labs     12/23/19  0333 12/24/19  0530 12/25/19  0630   WBC 8.9 9.9 8.5   RBC 4.42 4.67 4.99   HEMOGLOBIN 13.5 14.3 15.2   HEMATOCRIT 40.5 42.8 45.9   MCV 91.6 91.6 92.0   MCH 30.5 30.6 30.5   MCHC 33.3* 33.4* 33.1*   RDW 45.9 45.6 45.2   PLATELETCT 275 338 389   MPV 9.8 9.8 9.6     Recent Labs     12/23/19  0333 12/24/19  0530  12/25/19  0630   SODIUM 139 136 139   POTASSIUM 4.3 4.3 4.5   CHLORIDE 105 106 101   CO2 27 22 29   GLUCOSE 93 104* 145*   BUN 10 10 18   CREATININE 0.62 0.51 0.67   CALCIUM 8.6 8.8 9.9                   Imaging  [id number] VIEW   Final Result         1.  Air-filled reactive small bowel loops in the left abdomen, appearance suggests ileus or enteritis, recommend radiographic follow-up to resolution to exclude progression to obstructive changes.   2.  Moderate stool in the colon suggests changes of constipation      IR-MIDLINE CATHETER INSERTION WO GUIDANCE > AGE 3   Final Result                  Ultrasound-guided midline placement performed by qualified nursing staff    as above.          DX-CERVICAL SPINE-2 OR 3 VIEWS   Final Result      Digitized intraoperative radiograph is submitted for review.  This examination is not for diagnostic purpose but for guidance during a surgical procedure.      DX-PORTABLE FLUORO > 1 HOUR   Final Result      Portable fluoroscopy utilized for 6 seconds.         INTERPRETING LOCATION: [address] [place] [address], [facility]      OUTSIDE IMAGES-CT CERVICAL SPINE   Final Result           Assessment/Plan  * Cervical spine pain- (present on admission)  Assessment & Plan  Pain control better today    Central cord syndrome (HCC)- (present on admission)  Assessment & Plan  Central cord injury, she presented with symptoms of paraplegia  Underwent cervical corpectomy on 12/20/2019, performed by Dr. Linton  Strength improved  PT/OT  OK to transfer out of ICU      Lumbar radiculopathy- (present on admission)  Assessment & Plan  Chronic and stable  Outpatient follow up      Anxiety and depression- (present on admission)  Assessment & Plan  Venlafaxine and bupropion    Tobacco use- (present on admission)  Assessment & Plan  Smoking cessation    Mild intermittent asthma- (present on admission)  Assessment & Plan  No acute exacerbation  RT protocol  Supplemental O2 as needed       VTE prophylaxis:  lovenox

## 2019-12-25 NOTE — PROGRESS NOTES
Critical Care Progress Note    Date of admission  12/19/2019    Chief Complaint  54 y.o. female admitted 12/19/2019 with spastic paraplegia    Hospital Course    54 y.o. female with a past medical history of hypercholesterolemia, anxiety, asthma, degenerative disc disease who presented 12/19/2019 as a transfer from [facility] for progressive paraplegia.  The patient reports over the last several days she has had several falls during which she hit her head and neck several times.  Prior to these events she was ambulating around her house with a cane however has had worsening lower extremity weakness and actually developed a contracture of her left lower extremity.  She presented to [facility] for these complaints and a CT scan of the neck was obtained which did not show any acute abnormalities.  An MRI from November which was done in our facility shows multilevel degenerative disc disease with moderate central canal stenosis at C4-C5.  The patient denies any incontinence of bowel or bladder or retention of bowel or bladder, she denies any saddle anesthesia.  The patient was seen by neurosurgery in the emergency department and determined to have significant neurologic findings consistent with central cord syndrome.  She is to be admitted to the ICU for neuro checks and BP support.  She is planned for operative intervention tomorrow.  At this time the patient's main complaint is lumbar pain with radicular symptoms in her lower extremities.      Interval Problem Update  Reviewed last 24 hour events:  Neuro: q4 aox4 5/5 upper  4/5 lowers chronic lower numbness oxy 5 pain   HR: 60-80  SBP: 130 off brooks this am  Tmax: afebrile   GI: dysphagia 2 diet  UOP: 1.8l  Lines: midline  Resp: nothing   Vte: lovenox  PPI/H2:pepcid home  Antibx: none    Stop brooks only for map > 65  Midodrine 15mg TID    Review of Systems  Review of Systems   Constitutional: Negative for chills, fever and  malaise/fatigue.   HENT: Negative for congestion and sore throat.    Eyes: Negative for blurred vision and double vision.   Respiratory: Negative for cough, sputum production, shortness of breath and stridor.    Cardiovascular: Negative for chest pain, palpitations, orthopnea and PND.   Gastrointestinal: Negative for abdominal pain, diarrhea, nausea and vomiting.   Genitourinary: Negative for dysuria, flank pain, frequency and hematuria.   Musculoskeletal: Positive for back pain and neck pain.   Skin: Negative for rash.   Neurological: Positive for sensory change and focal weakness. Negative for tingling, speech change and headaches.   Endo/Heme/Allergies: Does not bruise/bleed easily.   Psychiatric/Behavioral: Negative for depression. The patient is not nervous/anxious.         Vital Signs for last 24 hours   Temp:  [36.1 °C (97 °F)-37.1 °C (98.8 °F)] 36.7 °C (98 °F)  Pulse:  [] 111  Resp:  [15-49] 20  BP: (108-142)/(58-79) 113/74  SpO2:  [91 %-100 %] 91 %    Hemodynamic parameters for last 24 hours       Respiratory Information for the last 24 hours       Physical Exam   Physical Exam  Vitals signs reviewed.   Constitutional:       General: She is not in acute distress.     Appearance: She is underweight. She is not ill-appearing.      Interventions: Nasal cannula in place.      Comments: Chronic thin frail with nurse doing hair at bedside   HENT:      Head: Normocephalic and atraumatic.      Mouth/Throat:      Mouth: Mucous membranes are dry.      Dentition: Abnormal dentition.      Pharynx: No pharyngeal swelling.   Eyes:      General: No scleral icterus.     Extraocular Movements: Extraocular movements intact.      Pupils: Pupils are equal, round, and reactive to light.   Neck:      Musculoskeletal: Neck supple.      Thyroid: No thyromegaly.      Vascular: No JVD.      Trachea: Phonation normal.      Comments: Anterior surgical changes noted with firmness no pus or drainage  Cardiovascular:      Rate and  Rhythm: Normal rate and regular rhythm.  No extrasystoles are present.     Pulses: Normal pulses.      Heart sounds: No murmur. No friction rub. No gallop.    Pulmonary:      Effort: No accessory muscle usage.      Breath sounds: No stridor. No wheezing, rhonchi or rales.   Abdominal:      General: Abdomen is flat. Bowel sounds are normal.      Palpations: Abdomen is soft.      Tenderness: There is no tenderness. There is no right CVA tenderness, left CVA tenderness or guarding.   Lymphadenopathy:      Cervical: No cervical adenopathy.   Skin:     General: Skin is warm and dry.      Capillary Refill: Capillary refill takes less than 2 seconds.      Coloration: Skin is not cyanotic.      Nails: There is no clubbing.     Neurological:      Mental Status: She is alert and oriented to person, place, and time.      GCS: GCS eye subscore is 4. GCS verbal subscore is 5. GCS motor subscore is 6.      Comments: 5-/5 upper extremities throughout, lowers 4+/5 hip, knee and toe, chronic wasting of lower extremities. Marissa sensory loss in upper extremities. No facial droop, no dysartharia, no facial droop   Psychiatric:         Attention and Perception: Attention normal.         Mood and Affect: Mood normal.         Speech: Speech normal.         Behavior: Behavior normal.         Cognition and Memory: Cognition normal.         Medications  Current Facility-Administered Medications   Medication Dose Route Frequency Provider Last Rate Last Dose   • ALPRAZolam (XANAX) tablet 0.5 mg  0.5 mg Oral Once Ravin Martin M.D.   Stopped at 12/24/19 1515   • enoxaparin (LOVENOX) inj 40 mg  40 mg Subcutaneous DAILY Ravin Martin M.D.   40 mg at 12/25/19 0625   • baclofen (LIORESAL) tablet 10 mg  10 mg Oral TID Edinson Gregory D.O.   10 mg at 12/25/19 1127   • gabapentin (NEURONTIN) capsule 600 mg  600 mg Oral BID Edinson Gregory D.O.   600 mg at 12/25/19 0626   • sennosides (SENOKOT) 8.6 MG tablet 17.2 mg  17.2 mg Oral DAILY AT  SILVERIO Gregory D.O.   17.2 mg at 12/25/19 1131   • menthol (HALLS) lozenge 1 Lozenge  1 Lozenge Oral Q2HRS PRN Saurabh Irwin D.O.   1 Lozenge at 12/23/19 1746   • midodrine (PROAMATINE) tablet 15 mg  15 mg Oral TID WITH MEALS Jack Ruffin M.D.   15 mg at 12/25/19 0742   • Pharmacy Consult Request ...Pain Management Review 1 Each  1 Each Other PHARMACY TO DOSE HUMBERTO Mcgarry.P.N.       • MD ALERT...DO NOT ADMINISTER NSAIDS or ASPIRIN unless ORDERED By Neurosurgery 1 Each  1 Each Other PRN HUMBERTO Mcgarry.P.N.       • docusate sodium (COLACE) capsule 100 mg  100 mg Oral BID Elsy Arnold A.P.N.   100 mg at 12/25/19 0625   • senna-docusate (PERICOLACE or SENOKOT S) 8.6-50 MG per tablet 1 Tab  1 Tab Oral Q24HRS PRN Elsy Arnold A.P.N.   1 Tab at 12/23/19 1219   • polyethylene glycol/lytes (MIRALAX) PACKET 1 Packet  1 Packet Oral BID PRN Elsy Arnold, A.P.N.       • magnesium hydroxide (MILK OF MAGNESIA) suspension 30 mL  30 mL Oral QDAY PRN Elsy Arnold, A.P.N.       • bisacodyl (DULCOLAX) suppository 10 mg  10 mg Rectal Q24HRS PRN Elsy Arnold, A.P.N.       • fleet enema 133 mL  1 Each Rectal Once PRN Elsy Arnold, A.P.N.       • ondansetron (ZOFRAN) syringe/vial injection 4 mg  4 mg Intravenous Q4HRS PRN Elsy Arnold, A.P.N.   4 mg at 12/24/19 1223   • ondansetron (ZOFRAN ODT) dispertab 4 mg  4 mg Oral Q4HRS PRN Elsy Arnold, A.P.N.       • promethazine (PHENERGAN) tablet 12.5-25 mg  12.5-25 mg Oral Q4HRS PRN Elsy Arnold, A.P.N.       • promethazine (PHENERGAN) suppository 12.5-25 mg  12.5-25 mg Rectal Q4HRS PRN Elsy Arnold, A.P.N.       • prochlorperazine (COMPAZINE) injection 5-10 mg  5-10 mg Intravenous Q4HRS PRN Elsy Arnold, A.P.N.       • diphenhydrAMINE (BENADRYL) tablet/capsule 25 mg  25 mg Oral Q6HRS PRN Elsy Arnold, A.P.N.        Or   • diphenhydrAMINE (BENADRYL) injection 25 mg  25 mg Intravenous Q6HRS PRN Elsy ARREAGA  BIB ArnoldP.N.       • benzocaine-menthol (CEPACOL) lozenge 1 Lozenge  1 Lozenge Mouth/Throat Q2HRS PRN AMALIA Mcgarry.       • zolpidem (AMBIEN) tablet 10 mg  10 mg Oral HS PRN Yong Zhong, D.O.   5 mg at 12/24/19 2215   • venlafaxine XR (EFFEXOR XR) capsule 75 mg  75 mg Oral Q EVENING Yong Zhong, D.O.   75 mg at 12/24/19 1738   • famotidine (PEPCID) tablet 20 mg  20 mg Oral BID Yong Zhong, D.O.   20 mg at 12/25/19 0626   • oxybutynin SR (DITROPAN-XL) tablet 15 mg  15 mg Oral BID Yong Zhong, D.O.   15 mg at 12/25/19 0627   • methocarbamol (ROBAXIN) tablet 500 mg  500 mg Oral BID PRN Yong Zhong, D.O.   500 mg at 12/23/19 1150   • buPROPion SR (WELLBUTRIN-SR) tablet 150 mg  150 mg Oral BID Yong Zhong, D.O.   150 mg at 12/25/19 0626   • Respiratory Care per Protocol   Nebulization Continuous RT Yong Zhong, D.O.       • acetaminophen (TYLENOL) tablet 650 mg  650 mg Oral Q6HRS Yong Zhong, D.O.   650 mg at 12/25/19 1127   • oxyCODONE immediate-release (ROXICODONE) tablet 5-10 mg  5-10 mg Oral Q4HRS PRN Yong Zhong, D.O.   5 mg at 12/25/19 1039   • albuterol inhaler 2 Puff  2 Puff Inhalation Q4H PRN (RT) Yong Zhong D.O.   2 Puff at 12/19/19 2257       Fluids    Intake/Output Summary (Last 24 hours) at 12/25/2019 1203  Last data filed at 12/25/2019 1000  Gross per 24 hour   Intake 1299.57 ml   Output 3700 ml   Net -2400.43 ml       Laboratory          Recent Labs     12/23/19  0333 12/24/19  0530 12/25/19  0630   SODIUM 139 136 139   POTASSIUM 4.3 4.3 4.5   CHLORIDE 105 106 101   CO2 27 22 29   BUN 10 10 18   CREATININE 0.62 0.51 0.67   CALCIUM 8.6 8.8 9.9     Recent Labs     12/23/19  0333 12/24/19  0530 12/25/19  0630   GLUCOSE 93 104* 145*     Recent Labs     12/23/19  0333 12/24/19  0530 12/25/19  0630   WBC 8.9 9.9 8.5   NEUTSPOLYS 52.80 50.40 77.50*   LYMPHOCYTES 37.60 38.40 19.10*    MONOCYTES 6.30 7.90 3.00   EOSINOPHILS 2.40 2.10 0.00   BASOPHILS 0.70 0.90 0.20     Recent Labs     12/23/19  0333 12/24/19  0530 12/25/19  0630   RBC 4.42 4.67 4.99   HEMOGLOBIN 13.5 14.3 15.2   HEMATOCRIT 40.5 42.8 45.9   PLATELETCT 275 338 389       Imaging  CT:    Reviewed  MRI:   Reviewed    Assessment/Plan  * Cervical spine pain- (present on admission)  Assessment & Plan  Acute on chronic following several falls recently, monitoring for additional complications from these falls  Exam concerning for central cord type condition s/p surgical decompression Dr Linton 12/20  Neurosurgery on board, recommendations for blood pressure augmentation to MAP 85 mmHg, titrating Trevor-Synephrine and continue oral midodrine as well x5 days postop, stop date 12/25   Neurochecks to every 4 hours stable improving exam  Oral midodrine   lovenox cleared by neurosurgery start 12/23  Continue hard collar for support postop when out of bed  Per neurosurgery will start decadron for wound swelling       Central cord syndrome (HCC)- (present on admission)  Assessment & Plan  S/p operative decompression for 12/20 Dr Linton tolerated well  Supporting perfusion to spinal cord perfusion with active titration of Trevor-Synephrine, MAP goal >= 85 x5 days (12/25)  Oral midodrine supported with Trevor-Synephrine infusion  Maintain euvolemia with fluids, serial exam to reassess  OT/PT and physiatry consultation in the near future  Continue hard collar for supportive neck postop  Rehab consultation 12/23 Dr Gregory    Lumbar radiculopathy- (present on admission)  Assessment & Plan  Acute on chronic  MRI from November reviewed with radiology  Neurosurgery on board, to the OR 12/20 for decompression of cervical abnormalities, appears successful for lower extremity central cord   Continue monitoring for neurological improvement and providing spinal cord supportive care    Anxiety and depression- (present on admission)  Assessment & Plan  Resume Wellbutrin  and Effexor  One dose of xanax given today 12/24    Tobacco use- (present on admission)  Assessment & Plan  Nicotine replacement protocol as needed  Cessation counseling ongoing daily    Mild intermittent asthma- (present on admission)  Assessment & Plan  PRN albuterol at home  RT/O2 Protocols  Titrate supplemental FiO2 to maintain SpO2 >88%  No exacerbation at this time, monitor for need for IV steroids  Nasal steroids for chronic sinus process as well as needed  Continue to monitor, okay so far       VTE:  Lovenox  Ulcer: H2 Antagonist home med  Lines: None    I have performed a physical exam and reviewed and updated ROS and Plan today (12/25/2019). In review of yesterday's note (12/24/2019), there are no changes except as documented above.     Discussed patient condition and risk of morbidity and/or mortality with Hospitalist, RN, RT, Pharmacy, Charge nurse / hot rounds and Patient     Patient remains in critical condition from needing neosynephrine gtt with active titration. Critical care time provided was 34 minutes. This excludes all separate billable procedures.

## 2019-12-26 PROBLEM — I95.0 IDIOPATHIC HYPOTENSION: Status: ACTIVE | Noted: 2019-12-26

## 2019-12-26 LAB
ANION GAP SERPL CALC-SCNC: 8 MMOL/L (ref 0–11.9)
BASOPHILS # BLD AUTO: 0.4 % (ref 0–1.8)
BASOPHILS # BLD: 0.05 K/UL (ref 0–0.12)
BUN SERPL-MCNC: 26 MG/DL (ref 8–22)
CALCIUM SERPL-MCNC: 9.1 MG/DL (ref 8.5–10.5)
CHLORIDE SERPL-SCNC: 102 MMOL/L (ref 96–112)
CO2 SERPL-SCNC: 28 MMOL/L (ref 20–33)
CREAT SERPL-MCNC: 0.69 MG/DL (ref 0.5–1.4)
EOSINOPHIL # BLD AUTO: 0.05 K/UL (ref 0–0.51)
EOSINOPHIL NFR BLD: 0.4 % (ref 0–6.9)
ERYTHROCYTE [DISTWIDTH] IN BLOOD BY AUTOMATED COUNT: 45.7 FL (ref 35.9–50)
GLUCOSE SERPL-MCNC: 124 MG/DL (ref 65–99)
HCT VFR BLD AUTO: 37.5 % (ref 37–47)
HGB BLD-MCNC: 12.6 G/DL (ref 12–16)
IMM GRANULOCYTES # BLD AUTO: 0.07 K/UL (ref 0–0.11)
IMM GRANULOCYTES NFR BLD AUTO: 0.6 % (ref 0–0.9)
LYMPHOCYTES # BLD AUTO: 3.63 K/UL (ref 1–4.8)
LYMPHOCYTES NFR BLD: 30.5 % (ref 22–41)
MCH RBC QN AUTO: 30.7 PG (ref 27–33)
MCHC RBC AUTO-ENTMCNC: 33.6 G/DL (ref 33.6–35)
MCV RBC AUTO: 91.2 FL (ref 81.4–97.8)
MONOCYTES # BLD AUTO: 1.01 K/UL (ref 0–0.85)
MONOCYTES NFR BLD AUTO: 8.5 % (ref 0–13.4)
NEUTROPHILS # BLD AUTO: 7.08 K/UL (ref 2–7.15)
NEUTROPHILS NFR BLD: 59.6 % (ref 44–72)
NRBC # BLD AUTO: 0 K/UL
NRBC BLD-RTO: 0 /100 WBC
PLATELET # BLD AUTO: 338 K/UL (ref 164–446)
PMV BLD AUTO: 9.6 FL (ref 9–12.9)
POTASSIUM SERPL-SCNC: 3.6 MMOL/L (ref 3.6–5.5)
RBC # BLD AUTO: 4.11 M/UL (ref 4.2–5.4)
SODIUM SERPL-SCNC: 138 MMOL/L (ref 135–145)
WBC # BLD AUTO: 11.9 K/UL (ref 4.8–10.8)

## 2019-12-26 PROCEDURE — 99232 SBSQ HOSP IP/OBS MODERATE 35: CPT | Performed by: INTERNAL MEDICINE

## 2019-12-26 PROCEDURE — 700111 HCHG RX REV CODE 636 W/ 250 OVERRIDE (IP): Performed by: INTERNAL MEDICINE

## 2019-12-26 PROCEDURE — 97535 SELF CARE MNGMENT TRAINING: CPT

## 2019-12-26 PROCEDURE — A9270 NON-COVERED ITEM OR SERVICE: HCPCS | Performed by: PHYSICAL MEDICINE & REHABILITATION

## 2019-12-26 PROCEDURE — 770001 HCHG ROOM/CARE - MED/SURG/GYN PRIV*

## 2019-12-26 PROCEDURE — 85025 COMPLETE CBC W/AUTO DIFF WBC: CPT

## 2019-12-26 PROCEDURE — A9270 NON-COVERED ITEM OR SERVICE: HCPCS | Performed by: NURSE PRACTITIONER

## 2019-12-26 PROCEDURE — 97116 GAIT TRAINING THERAPY: CPT

## 2019-12-26 PROCEDURE — 36415 COLL VENOUS BLD VENIPUNCTURE: CPT

## 2019-12-26 PROCEDURE — 80048 BASIC METABOLIC PNL TOTAL CA: CPT

## 2019-12-26 PROCEDURE — A9270 NON-COVERED ITEM OR SERVICE: HCPCS | Performed by: INTERNAL MEDICINE

## 2019-12-26 PROCEDURE — 700102 HCHG RX REV CODE 250 W/ 637 OVERRIDE(OP): Performed by: HOSPITALIST

## 2019-12-26 PROCEDURE — 700112 HCHG RX REV CODE 229: Performed by: NURSE PRACTITIONER

## 2019-12-26 PROCEDURE — 97110 THERAPEUTIC EXERCISES: CPT

## 2019-12-26 PROCEDURE — 700102 HCHG RX REV CODE 250 W/ 637 OVERRIDE(OP): Performed by: INTERNAL MEDICINE

## 2019-12-26 PROCEDURE — 97530 THERAPEUTIC ACTIVITIES: CPT

## 2019-12-26 PROCEDURE — 700102 HCHG RX REV CODE 250 W/ 637 OVERRIDE(OP): Performed by: PHYSICAL MEDICINE & REHABILITATION

## 2019-12-26 PROCEDURE — A9270 NON-COVERED ITEM OR SERVICE: HCPCS | Performed by: HOSPITALIST

## 2019-12-26 RX ORDER — NICOTINE 21 MG/24HR
14 PATCH, TRANSDERMAL 24 HOURS TRANSDERMAL
Status: DISCONTINUED | OUTPATIENT
Start: 2019-12-26 | End: 2019-12-27 | Stop reason: HOSPADM

## 2019-12-26 RX ADMIN — BUPROPION HYDROCHLORIDE 150 MG: 150 TABLET, EXTENDED RELEASE ORAL at 17:26

## 2019-12-26 RX ADMIN — ACETAMINOPHEN 650 MG: 325 TABLET, FILM COATED ORAL at 00:53

## 2019-12-26 RX ADMIN — ACETAMINOPHEN 650 MG: 325 TABLET, FILM COATED ORAL at 11:30

## 2019-12-26 RX ADMIN — GABAPENTIN 600 MG: 300 CAPSULE ORAL at 04:39

## 2019-12-26 RX ADMIN — FAMOTIDINE 20 MG: 20 TABLET ORAL at 04:40

## 2019-12-26 RX ADMIN — OXYBUTYNIN CHLORIDE 15 MG: 5 TABLET, EXTENDED RELEASE ORAL at 17:26

## 2019-12-26 RX ADMIN — OXYCODONE HYDROCHLORIDE 10 MG: 5 TABLET ORAL at 17:26

## 2019-12-26 RX ADMIN — BUPROPION HYDROCHLORIDE 150 MG: 150 TABLET, EXTENDED RELEASE ORAL at 04:40

## 2019-12-26 RX ADMIN — ACETAMINOPHEN 650 MG: 325 TABLET, FILM COATED ORAL at 04:40

## 2019-12-26 RX ADMIN — VENLAFAXINE HYDROCHLORIDE 75 MG: 75 CAPSULE, EXTENDED RELEASE ORAL at 17:26

## 2019-12-26 RX ADMIN — OXYCODONE HYDROCHLORIDE 5 MG: 5 TABLET ORAL at 08:25

## 2019-12-26 RX ADMIN — MIDODRINE HYDROCHLORIDE 15 MG: 5 TABLET ORAL at 08:25

## 2019-12-26 RX ADMIN — OXYBUTYNIN CHLORIDE 15 MG: 5 TABLET, EXTENDED RELEASE ORAL at 04:39

## 2019-12-26 RX ADMIN — SENNOSIDES 17.2 MG: 8.6 TABLET, FILM COATED ORAL at 11:30

## 2019-12-26 RX ADMIN — BACLOFEN 10 MG: 10 TABLET ORAL at 17:26

## 2019-12-26 RX ADMIN — GABAPENTIN 600 MG: 300 CAPSULE ORAL at 17:26

## 2019-12-26 RX ADMIN — OXYCODONE HYDROCHLORIDE 10 MG: 5 TABLET ORAL at 13:02

## 2019-12-26 RX ADMIN — ENOXAPARIN SODIUM 40 MG: 100 INJECTION SUBCUTANEOUS at 04:40

## 2019-12-26 RX ADMIN — FAMOTIDINE 20 MG: 20 TABLET ORAL at 17:26

## 2019-12-26 RX ADMIN — BACLOFEN 10 MG: 10 TABLET ORAL at 11:30

## 2019-12-26 RX ADMIN — ZOLPIDEM TARTRATE 10 MG: 5 TABLET ORAL at 21:04

## 2019-12-26 RX ADMIN — DOCUSATE SODIUM 100 MG: 100 CAPSULE, LIQUID FILLED ORAL at 17:26

## 2019-12-26 RX ADMIN — MIDODRINE HYDROCHLORIDE 15 MG: 5 TABLET ORAL at 17:26

## 2019-12-26 RX ADMIN — ACETAMINOPHEN 650 MG: 325 TABLET, FILM COATED ORAL at 17:26

## 2019-12-26 RX ADMIN — BACLOFEN 10 MG: 10 TABLET ORAL at 04:40

## 2019-12-26 RX ADMIN — DOCUSATE SODIUM 100 MG: 100 CAPSULE, LIQUID FILLED ORAL at 04:39

## 2019-12-26 RX ADMIN — MIDODRINE HYDROCHLORIDE 15 MG: 5 TABLET ORAL at 11:30

## 2019-12-26 ASSESSMENT — ENCOUNTER SYMPTOMS
SORE THROAT: 0
WEAKNESS: 0
SHORTNESS OF BREATH: 0
WHEEZING: 0
DEPRESSION: 0
BLURRED VISION: 0
BACK PAIN: 1
HEMOPTYSIS: 0
DIZZINESS: 0
ABDOMINAL PAIN: 0
BLOOD IN STOOL: 0
CONSTIPATION: 0
TINGLING: 0
CHILLS: 0
HEADACHES: 0
NAUSEA: 0
FEVER: 0
SEIZURES: 0
PALPITATIONS: 0
DIARRHEA: 0
LOSS OF CONSCIOUSNESS: 0
FALLS: 0
VOMITING: 0
SINUS PAIN: 0
NERVOUS/ANXIOUS: 1
COUGH: 0
SPUTUM PRODUCTION: 0
NECK PAIN: 1

## 2019-12-26 ASSESSMENT — COGNITIVE AND FUNCTIONAL STATUS - GENERAL
DRESSING REGULAR LOWER BODY CLOTHING: A LOT
SUGGESTED CMS G CODE MODIFIER MOBILITY: CM
TOILETING: A LOT
MOBILITY SCORE: 9
MOVING FROM LYING ON BACK TO SITTING ON SIDE OF FLAT BED: UNABLE
PERSONAL GROOMING: A LITTLE
STANDING UP FROM CHAIR USING ARMS: A LITTLE
HELP NEEDED FOR BATHING: A LOT
SUGGESTED CMS G CODE MODIFIER DAILY ACTIVITY: CK
MOVING TO AND FROM BED TO CHAIR: UNABLE
WALKING IN HOSPITAL ROOM: A LOT
TURNING FROM BACK TO SIDE WHILE IN FLAT BAD: UNABLE
CLIMB 3 TO 5 STEPS WITH RAILING: TOTAL
DRESSING REGULAR UPPER BODY CLOTHING: A LITTLE
DAILY ACTIVITIY SCORE: 15
EATING MEALS: A LITTLE

## 2019-12-26 ASSESSMENT — GAIT ASSESSMENTS
DISTANCE (FEET): 10
ASSISTIVE DEVICE: FRONT WHEEL WALKER
GAIT LEVEL OF ASSIST: MINIMAL ASSIST
DEVIATION: ATAXIC;STEP TO;DECREASED BASE OF SUPPORT;BRADYKINETIC;SHUFFLED GAIT

## 2019-12-26 NOTE — CARE PLAN
Problem: Communication  Goal: The ability to communicate needs accurately and effectively will improve  Outcome: PROGRESSING AS EXPECTED   Patient calls appropriately for assistance.    Problem: Fluid Volume:  Goal: Will maintain balanced intake and output  Outcome: PROGRESSING AS EXPECTED   Patient has adequate oral intake.     Problem: Mobility  Goal: Risk for activity intolerance will decrease  Outcome: PROGRESSING AS EXPECTED   Patient is mobilizing up to the restroom with 1 assist and Front wheel walker.

## 2019-12-26 NOTE — PROGRESS NOTES
Neurosurgery Progress Note    Subjective:  No events  Slight difficulty swallowing- slightly improved  Pain slightly improved this am    Exam:  A&O  5/5 throughout except delt bilat 4+  C/d/i min swelling      BP  Min: 101/62  Max: 118/67  Pulse  Av.6  Min: 56  Max: 111  Resp  Av.2  Min: 15  Max: 37  Temp  Av.8 °C (98.3 °F)  Min: 36.3 °C (97.4 °F)  Max: 37.1 °C (98.8 °F)  SpO2  Av.4 %  Min: 91 %  Max: 96 %    No data recorded    Recent Labs     19  0530 19  0630 19  0336   WBC 9.9 8.5 11.9*   RBC 4.67 4.99 4.11*   HEMOGLOBIN 14.3 15.2 12.6   HEMATOCRIT 42.8 45.9 37.5   MCV 91.6 92.0 91.2   MCH 30.6 30.5 30.7   MCHC 33.4* 33.1* 33.6   RDW 45.6 45.2 45.7   PLATELETCT 338 389 338   MPV 9.8 9.6 9.6     Recent Labs     19  0530 19  0630 19  0336   SODIUM 136 139 138   POTASSIUM 4.3 4.5 3.6   CHLORIDE 106 101 102   CO2 22 29 28   GLUCOSE 104* 145* 124*   BUN 10 18 26*   CREATININE 0.51 0.67 0.69   CALCIUM 8.8 9.9 9.1               Intake/Output       19 07 - 19 0659 19 07 - 19 0659       Total 1900-0659 Total       Intake    P.O.  1150  -- 1150  --  -- --    P.O. 1150 -- 1150 -- -- --    I.V.  50.6  -- 50.6  --  -- --    Phenylephrine Volume 50.6 -- 50.6 -- -- --    Total Intake 1200.6 -- 1200.6 -- -- --       Output    Urine  300  -- 300  --  -- --    Number of Times Voided 1 x -- 1 x -- -- --    Urine Void (mL) 300 -- 300 -- -- --    Total Output 300 -- 300 -- -- --       Net I/O     900.6 -- 900.6 -- -- --            Intake/Output Summary (Last 24 hours) at 2019  Last data filed at 2019 1200  Gross per 24 hour   Intake 750 ml   Output 0 ml   Net 750 ml            • enoxaparin (LOVENOX) injection  40 mg DAILY   • baclofen  10 mg TID   • gabapentin  600 mg BID   • sennosides  17.2 mg DAILY AT NOON   • menthol  1 Lozenge Q2HRS PRN   • midodrine  15 mg TID WITH MEALS   • Pharmacy Consult Request   1 Each PHARMACY TO DOSE   • MD ALERT...DO NOT ADMINISTER NSAIDS or ASPIRIN unless ORDERED By Neurosurgery  1 Each PRN   • docusate sodium  100 mg BID   • senna-docusate  1 Tab Q24HRS PRN   • polyethylene glycol/lytes  1 Packet BID PRN   • magnesium hydroxide  30 mL QDAY PRN   • bisacodyl  10 mg Q24HRS PRN   • fleet  1 Each Once PRN   • ondansetron  4 mg Q4HRS PRN   • ondansetron  4 mg Q4HRS PRN   • promethazine  12.5-25 mg Q4HRS PRN   • promethazine  12.5-25 mg Q4HRS PRN   • prochlorperazine  5-10 mg Q4HRS PRN   • diphenhydrAMINE  25 mg Q6HRS PRN    Or   • diphenhydrAMINE  25 mg Q6HRS PRN   • benzocaine-menthol  1 Lozenge Q2HRS PRN   • zolpidem  10 mg HS PRN   • venlafaxine XR  75 mg Q EVENING   • famotidine  20 mg BID   • oxybutynin SR  15 mg BID   • methocarbamol  500 mg BID PRN   • buPROPion SR  150 mg BID   • Respiratory Care per Protocol   Continuous RT   • acetaminophen  650 mg Q6HRS   • oxyCODONE immediate-release  5-10 mg Q4HRS PRN   • albuterol  2 Puff Q4H PRN (RT)       Assessment and Plan:    POD # 6 C4 corpectomy   Prophylactic anticoagulation: yes  Exam stable  Diet per SLP recommendations  PT/OT today- appreciate discharge dispo recommendations  Continue pain control, bowel protocol

## 2019-12-26 NOTE — PREADMISSION SCREENING NOTE
Pre-Admission Screening Form    Patient Information:   Name: Marilyn Salinas     MRN: [id number]       : 1965      Age: 54 y.o.   Gender: female      Race: White [7]       Marital Status: Single [1]  Family Contact: Sampson Wolfe Janna        Relationship: Son [15]  Daughter-in-law [28]  Home Phone: [phone]           Cell Phone: [phone]  Advanced Directives: None  Code Status:  FULL  Current Attending Provider: Saurabh Magaña D.O.  Referring Physician: Dr. Irwin   Physiatrist Consult: Dr. Gregory    Referral Date: 19  Primary Payor Source:  MEDICAID [id number]  Secondary Payor Source:      Medical Information:   Date of Admission to [facility]  Room Number: S150/0   Rehabilitation Diagnosis: C1 nontraumatic incomplete spinal cord injury: Status post C3-C5 ACDF  Immunization History   Administered Date(s) Administered   • Pneumococcal polysaccharide vaccine (PPSV-23) 2017   • Tdap Vaccine 2017     Allergies   Allergen Reactions   • Codeine Unspecified     Unknown reaction       Past Medical History:   Diagnosis Date   • Anxiety    • Arthritis     spine, feet    • Asthma    • Cataract    • Depression    • High cholesterol      Past Surgical History:   Procedure Laterality Date   • CERVICAL DISK AND FUSION ANTERIOR N/A 2019    Procedure: DISCECTOMY, SPINE, CERVICAL, ANTERIOR APPROACH, WITH FUSION - C3-5;  Surgeon: Connor Linton M.D.;  Location: SURGERY [facility];  Service: Neurosurgery   • CORPECTOMY N/A 2019    Procedure: CORPECTOMY;  Surgeon: Connor Linton M.D.;  Location: [facility];  Service: Neurosurgery   • VAGINAL HYSTERECTOMY SCOPE TOTAL N/A 10/3/2017    Procedure: VAGINAL HYSTERECTOMY SCOPE TOTAL;  Surgeon: Hudson Driscoll M.D.;  Location: SURGERY SAME DAY [facility];  Service: Gynecology   • SALPINGECTOMY Bilateral 10/3/2017    Procedure: SALPINGECTOMY;  Surgeon: Hudson Driscoll M.D.;   Location: SURGERY SAME DAY [facility];  Service: Gynecology   • OOPHORECTOMY Bilateral 10/3/2017    Procedure: OOPHORECTOMY;  Surgeon: Hudson Driscoll M.D.;  Location: SURGERY SAME DAY [facility];  Service: Gynecology   • ANTERIOR AND POSTERIOR REPAIR Bilateral 10/3/2017    Procedure: ANTERIOR AND POSTERIOR REPAIR;  Surgeon: Hudson Driscoll M.D.;  Location: SURGERY SAME DAY [facility];  Service: Gynecology   • ENTEROCELE REPAIR N/A 10/3/2017    Procedure: ENTEROCELE REPAIR, PERINEOPLASTY;  Surgeon: Hudson Driscoll M.D.;  Location: SURGERY SAME DAY [facility];  Service: Gynecology   • BLADDER SLING FEMALE N/A 10/3/2017    Procedure: BLADDER SLING FEMALE TOT, CYSTOSCOPY;  Surgeon: Hudson Driscoll M.D.;  Location: SURGERY SAME DAY [facility];  Service: Gynecology   • VAGINAL SUSPENSION N/A 10/3/2017    Procedure: VAGINAL SUSPENSION SACROSPINOUS VAULT POSSIBLE;  Surgeon: Hudson Driscoll M.D.;  Location: SURGERY SAME DAY [facility];  Service: Gynecology   • [facility]   • EYE SURGERY  1972    for lazy eye   • LUMPECTOMY         History Leading to Admission, Conditions that Caused the Need for Rehab (CMS):     Dr. Zhong H&P:  Chief Complaint  Back and neck pain     History of Presenting Illness  54 y.o. female who has a history of chronic back and neck pain for many years now.  She has been working with Dr. Jacobson as an outpatient.  She presents today complaining of worsening pain both in her neck and shoulders as well as in both legs.  She feels that this is secondary to a ground-level fall which she suffered several days ago.  She tripped and lost her balance hitting her head on a sharp metal edge.  There was no loss of consciousness.  She complains of pain in her neck rating up into the back of her head causing headaches, and radiating downwards into both upper shoulders.  She also reports numbness that extends all the way down to her fingertips.  The pain is worse with  movement however is present constantly.  In her mid lower back she has pain centrally which radiates down into both legs all the way down to her toes.  The pain is worse on the left side but is present on the right as well.  She reports sensation loss extending in a stocking pattern from her toes all the way up to her lower back.  She states she has been able to walk in the last 24 hours due to the weakness.  She denies any bowel or bladder changes.  She had an outpatient MRI done of her cervical spine and lumbar spine on November 14.  These demonstrated multiple degenerative changes and some foraminal issues however did not show any cord compression.  At an outlying facility prior to transfer to us, she had a CT done this did not demonstrate any acute findings either.  She is sent to our facility for evaluation by neurosurgery Dr. Jacobson who has been consulted.     Dr. Gregory (Physiatrist) recommendations:  ASSESSMENT:     C1 nontraumatic incomplete spinal cord injury: Status post C3-C5 ACDF on 12/21 by Dr. Linton.  -Prolonged discussion with patient and on prognosis, prolonged healing time, good signs of progression of strength in upper and lower extremities.  This may be muted by increased spasticity in lower extremities.   -Hyperperfusion protocol, patient is already on Midrin 15 mg 3 times daily  -Consider starting Sudafed 30 mg every 8 hours, can increase to 60 mg every 8 hour, which will increase heart rate and help clear secretions as well as improved BP.   -Can also initiate Florinef 0.1 mg twice daily, increase intravascular volume     Neuropathic pain:  - increase gabapentin to 600mg tid, can increase dose to 900 mg 3 times a day  -Tylenol 1000 mg 3 times daily  -Consider increasing venlafaxine 125 mg daily     Orthostatic hypotension: Given level of injury patient is at high risk for orthostatic hypotension  -Start Glenn hose when out of bed  - Consider restarting     Neurogenic bowel:  -Check KUB to  evaluate for stool load  -Start patient on neurogenic bowel program, upper motor neuron with senna 2 tablets q. noon, Colace 200 mg twice daily, Dulcolax suppository every afternoon with digital stimulation.  Goal of 1 BM per day     Neurogenic bladder:   -Check PVRs  -Start voiding trial, if can't void in 6 hours or prn check pvr and if >500cc then ICP,if able to void then check PVR, if PVR is >200cc then ICP     DVT ppx: lovenox 40 mg daily     Rehabilitation: Impaired ADLs and mobility  -We will discuss with family about discharge support and location     Dr. Rosi Beltran (Pulmonary) recommendations:  Assessment/Plan  Cervical spine pain  Assessment & Plan  Acute on chronic following several falls recently  Exam concerning for central cord type condition  Neurosurgery on board, recommendations for blood pressure augmentation to 90 mmHg  Every hour neuro checks  Art line will be placed, vasopressors as needed to maintain MAP >90 mmHg  Hold lovenox and mobic for possible surgical intervention tomorrow  NPO after MN     Lumbar radiculopathy  Assessment & Plan  Acute on chronic  MRI from November reviewed  Neurosurgery on board     Anxiety and depression- (present on admission)  Assessment & Plan  Continue Wellbutrin and Effexor     Tobacco use- (present on admission)  Assessment & Plan  Nicotine replacement protocol if needed  Cessation counseling     Mild intermittent asthma- (present on admission)  Assessment & Plan  PRN albuterol at home  RT/O2 Protocols  Titrate supplemental FiO2 to maintain SpO2 >88%     Dr. Linton (Surgery Neurosurgery) recommendations:  ASSESSMENT AND PLAN:  This is a woman born in 1965, who presents with symptoms   of severe spinal cord injury after having frequent falls over the last   several days.  On evaluation, she has symptoms consistent with spastic   paraplegia, bilateral upper extremity hyperreflexia, and weakness with distal   greater than proximal weakness consistent with central cord  injury.  Her   bilateral lower extremities do shows significant weakness and spasticity along   with rigidity, concerning for longstanding cord compression that has been   exacerbated by her recent fall.  At this time, given her imaging, we do not   feel that it is not necessary to reimage her with thoracic MRI without   contrast.  It is evident based on her Ham as well as her bilateral lower   extremity rigidity that she likely is symptomatic from the C4-C5 disk with   hypertrophy of the ligamentum flavum behind the C4 lamina.  Given that it   would be very difficult to address her cervical compression from the disk at   the C4-C5 level without an anterior approach, at this time we feel that it   would be reasonable to perform a C4 corpectomy to have reduction in her   overall posterior compression of the cord with additionally adding a C3-C4   diskectomy and using ultrasound to see if the patient has decompression of the   cord anteriorly ventrally.  This will allow us to also decompress the   bilateral C5 radiculopathies that the patient is complaining of due to her   severe shoulder pain and C5 weakness.  We have scheduled this case for   tomorrow given that she has had progressive paraplegia over the last several   weeks with severe onset of spasticity in the last 48-24 hours.  We have   scheduled her for a C4 corpectomy with the above findings.  We have discussed   the case with the neuro critical care attending to have her placed in the ICU   with blood pressure parameters of greater than 90.  The ____ demonstrates   there is no role for steroids in this patient.  She should have an A-line with   q. 1 hour neuro checks.  We will keep her n.p.o. at midnight and appropriate   labs were obtained at [facility], which showed that she is not coagulopathic   at this time.  The case is posted right now for 2 p.m. with neuromonitoring   due to her severe compression.     DATE OF SERVICE:   12/20/2019     SURGEON:  Connor Linton MD     FIRST ASSISTANT:  ONOFRE Vides     PREOPERATIVE DIAGNOSES:  1.  Central cord injury.  2.  Herniated C4-5 disk.  3.  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  4.  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     POSTOPERATIVE DIAGNOSES:  1.  Central cord injury.  2.  Herniated C4-5 disk.  3.  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  4.  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     SURGERIES:  1.  Anterior cervical diskectomy and iatrogenic fracture through C3-4 with   bilateral foraminotomies at C3-4.  2.  Anterior cervical diskectomy with bilateral foraminotomies at C4-5.  3.  C4 corpectomy.  4.  Placement of anterior interbody spacer using a Pyramesh cage with   autograft, allograft and BMP.  5.  Use of intraoperative fluoro for analysis of anterior decompression of   spinal cord.  6.  Anterior plate fixation from C3 through C5 using Wellman plate and 17 mm   bicortical screws placed under direct fluoro visualization.  7.  Use of fluoro for localization.  8.  Modifier 22.  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    Additionally, this patient had profound constriction at the C4 body requiring   a C4 corpectomy to decompress her anteriorly away from her ossification   posteriorly and is a baseline smoker with very poor bone quality.  9.  Use of intraoperative monitoring for motors and sensories given extreme   stenosis across the C4 body and C4-5 disk space as well as for placement of   the anterior cage at the C4 corpectomy site.     BRIEF HISTORY OF PRESENT ILLNESS:  This is a woman born in 1965 who presented   to [facility]  [place] as a direct transfer [facility] after   having repeated falls where she struck her head as well as her neck.  She had   baseline deformity of the left foot; however, she was becoming weaker and   weaker over the last several days and eventually went from being able to walk   without any assistance or cane to using a rolling walker and then eventually   requiring significant assistance and unable to ambulate.  She presented to   [facility] with MRI showing severe stenosis across the C3   through C5 levels secondary to ossification of the posterior longitudinal   ligament as well as large disk herniation at C3-4, C4-5 and ossification of   the ligamentum flavum.  At the time of her presentation, she had spastic   paraplegia with minimal movement in her lower extremities and additionally C5   palsies and proximal and distal weakness in the upper extremities.  Given   these findings, the patient was placed in the ICU.  She had presented 3 days   after her initial injury.  We increased her blood pressure parameters, which   required her to be on pressors over the evening.  She had mild improvement   with increased blood pressures over the evening and was taken the following   day for an anterior cervical diskectomy at C3-4, C4-5 with a C4 corpectomy and   anterior plating.    Co-morbidities: See PMH  Potential Risk - Complications: Contractures, Deep Vein Thrombosis, Incontinence, Malnutrition, Pain, Paralysis, Pneumonia, Pressure Ulcer and Urinary Tract Infection  Level of Risk: High    Ongoing Medical Management Needed (Medical/Nursing Needs):   Patient Active Problem List    Diagnosis Date Noted   • Central cord syndrome (HCC) 12/20/2019     Priority: High   • Cervical spine pain 12/19/2019     Priority: High   • Lumbar radiculopathy 12/19/2019     Priority: Medium   • Mild intermittent asthma 06/19/2017     Priority: Low   • Tobacco use 06/19/2017     Priority: Low   • Anxiety and  "depression 06/19/2017     Priority: Low   • Foraminal stenosis of cervical region 04/12/2019   • Post concussive syndrome 02/22/2019   • Epistaxis 02/22/2019   • Vertigo 02/22/2019   • Risk for falls 12/31/2018   • Cough 10/30/2018   • Corns and callus 08/21/2018   • GBS (Guillain Unity syndrome) (HCC) 04/10/2018   • Pelvic pain 10/03/2017   • Vitamin D deficiency 07/31/2017   • Hyperlipidemia, unspecified 07/31/2017   • Assistance with transportation 07/31/2017   • Acid indigestion 06/19/2017   • DDD (degenerative disc disease), lumbar 06/19/2017   • Dolly-menopause 06/19/2017   • Chest congestion 06/19/2017   • Pain in both feet 06/19/2017   • Poor vision 06/19/2017     A & O    Current Vital Signs:   Temperature: 37.1 °C (98.7 °F) Pulse: 80 Respiration: 16 Blood Pressure: 108/70  Weight: 60.3 kg (132 lb 15 oz) Height: 167.6 cm (5' 5.98\")  Pulse Oximetry: 93 % O2 (LPM): 0      Completed Laboratory Reports:  Recent Labs     12/24/19  0530 12/25/19  0630 12/26/19  0336   WBC 9.9 8.5 11.9*   HEMOGLOBIN 14.3 15.2 12.6   HEMATOCRIT 42.8 45.9 37.5   PLATELETCT 338 389 338   SODIUM 136 139 138   POTASSIUM 4.3 4.5 3.6   BUN 10 18 26*   CREATININE 0.51 0.67 0.69   GLUCOSE 104* 145* 124*     Additional Labs: Not Applicable    Prior Living Situation:   Housing / Facility: 1 Story House  Steps Into Home: 1  Lives with - Patient's Self Care Capacity: Alone and Able to Care For Self  Equipment Owned: None    Prior Level of Function / Living Situation:   Physical Therapy: Prior Services: None  Housing / Facility: 1 Story House  Steps Into Home: 1  Bathroom Set up: Bathtub / Shower Combination  Equipment Owned: None  Lives with - Patient's Self Care Capacity: Alone and Able to Care For Self  Bed Mobility: Independent  Transfer Status: Independent  Ambulation: Independent  Distance Ambulation (Feet): (community)  Assistive Devices Used: None  Stairs: Independent  Current Level of Function:   Level Of Assist: Minimal Assist(3-4 " steps only)  Assistive Device: Hand Held Assist  Distance (Feet): 2  Weight Bearing Status: FWB  Supine to Sit: Minimal Assist  Sit to Supine: (up to chair)  Scooting: Minimal Assist  Rolling: Minimal Assist to Rt.  Sit to Stand: Minimal Assist  Bed, Chair, Wheelchair Transfer: Minimal Assist  Transfer Method: Stand Pivot  Sitting in Chair: up to chair post  Sitting Edge of Bed: 15min   Standin-2min  Occupational Therapy:   Self Feeding: Independent  Grooming / Hygiene: Independent  Bathing: Independent  Dressing: Independent  Toileting: Independent  Medication Management: Independent  Laundry: Independent  Kitchen Mobility: Independent  Finances: Independent  Home Management: Independent  Shopping: Independent  Prior Level Of Mobility: Independent Without Device in Community, Independent With Device in Home  Driving / Transportation: Driving Independent  Prior Services: None  Housing / Facility: [address]  Current Level of Function:   Eating: Supervision  Bathing: Not Tested  Upper Body Dressing: Maximal Assist  Lower Body Dressing: Moderate Assist  Speech Language Pathology:   Assessment : Patient was seen on this date for a clinical swallow evaluation. RN reported pt tolerated lunch without difficulty but MD requesting formal SLP evaluation. Patient AAOx4, vocal quality clear, and speech intact. Oral motor examination WNL. Poor-fair dental quality. PO trials consisted of single ice chips, thin liquids (single and consecutive sips from cup), soft solids in mixed consistency form, and dry solids. Swallow trigger was timely and laryngeal elevation reduced to palpation. No overt s/sx of aspiration with all trials tested. Patient unable to chew more fibrous textures (pineapple) and removed un-masticated bolus from oral cavity. Pt reported baseline soft diet due to poor dental quality. At this time, recommend slight downgrade to D3/thin liquid given assistance with tray set up and monitoring.  Problem List:  Dysphagia  Diet / Liquid Recommendation: Dysphagia II, Thin Liquid  Comments: Pt seen this date for dysphagia intervention. Pt was awake, alert, followed directions and reporting 9/10 neck pain. Pt reporting odynophagia at 7/10, pt also reporting globus sensation with current diet that is resolved with liquid wash. Pt reports difficulty masticating current diet 2/2 dentition. PO trials of thins via straw, ice cream, and regular (string cheese) assessed. Swallow trigger appreciated to be timely. Subtle throat clear appreciated in 80% of PO trials of thins via straw, however, vocal quality remained clear. Pt demonstrated prolonged mastication of regular trial and c/o globus sensation with regular. No s/sx of aspiration appreciated with trials of ice cream. Limited PO trials assessed 2/2 reports of pain and chills. Pt desatting to low-mid 80s SpO2 outside of PO trials, RN aware. 2/2 c/o globus sensation and difficulty masticating 2/2 dentition, recommend downgrade to D2/thins with adherence to the following strategies: upright at 90* for PO, assistance with mealtray setup, small bites, alternate liquids solids. SLP following.   Rehabilitation Prognosis/Potential: Good  Estimated Length of Stay: 14-21 days    Nursing:   Orientation : Oriented x 4  Incontinent   Neurogenic B&B    Scope/Intensity of Services Recommended:  Physical Therapy: 1.5 hr / day  5 days / week. Therapeutic Interventions Required: Maximize Endurance, Mobility, Strength and Safety  Occupational Therapy: 1.5 hr / day 5 days / week. Therapeutic Interventions Required: Maximize Self Care, ADLs, IADLs and Energy Conservation  Rehabilitation Nursin/7. Therapeutic Interventions Required: Monitor Pain, Skin, Wound(s), Vital Signs, Intake and Output, Labs, Safety and Family Training  Rehabilitation Physician: 3 - 5 days / week. Therapeutic Interventions Required: Medical Management    Rehabilitation Goals and Plan (Expected frequency & duration of  treatment in the IRF):   Return to the Community, Minimal Assist Level of Care and Family Able to Provide 24/7 Assistance  Anticipated Date of Rehabilitation Admission: 12-27-19  Patient/Family oriented IRF level of care/facility/plan: Yes  Patient/Family willing to participate in IRF care/facility/plan: Yes  Patient able to tolerate IRF level of care proposed: Yes  Patient has potential to benefit IRF level of care proposed: Yes  Comments: Not Applicable    Special Needs or Precautions - Medical Necessity:  Safety Concerns/Precautions:  Fall Risk / High Risk for Falls, Balance and Bed / Chair Alarm  Complex Wound Care: Surgical  Pain Management  IV Site: Peripheral  Current Medications:    Current Facility-Administered Medications Ordered in Epic   Medication Dose Route Frequency Provider Last Rate Last Dose   • enoxaparin (LOVENOX) inj 40 mg  40 mg Subcutaneous DAILY Ravin Martin M.D.   40 mg at 12/26/19 0440   • baclofen (LIORESAL) tablet 10 mg  10 mg Oral TID Edinson Gregory D.O.   10 mg at 12/26/19 0440   • gabapentin (NEURONTIN) capsule 600 mg  600 mg Oral BID Edinson Gregory, D.O.   600 mg at 12/26/19 0439   • sennosides (SENOKOT) 8.6 MG tablet 17.2 mg  17.2 mg Oral DAILY AT NOON Edinson Gregory D.O.   17.2 mg at 12/25/19 1131   • menthol (HALLS) lozenge 1 Lozenge  1 Lozenge Oral Q2HRS PRN Saurabh Irwin D.ONona   1 Lozenge at 12/23/19 1746   • midodrine (PROAMATINE) tablet 15 mg  15 mg Oral TID WITH MEALS Jack Ruffin M.D.   15 mg at 12/26/19 0825   • Pharmacy Consult Request ...Pain Management Review 1 Each  1 Each Other PHARMACY TO DOSE HUMBERTO Mcgarry.P.N.       • MD ALERT...DO NOT ADMINISTER NSAIDS or ASPIRIN unless ORDERED By Neurosurgery 1 Each  1 Each Other PRN Elsy Arnold, HUMBERTO.P.N.       • docusate sodium (COLACE) capsule 100 mg  100 mg Oral BID Elsy Arnold A.P.N.   100 mg at 12/26/19 0439   • senna-docusate (PERICOLACE or SENOKOT S) 8.6-50 MG per tablet 1 Tab  1 Tab  Oral Q24HRS PRN Elsy Arnold, A.P.N.   1 Tab at 12/23/19 1219   • polyethylene glycol/lytes (MIRALAX) PACKET 1 Packet  1 Packet Oral BID PRN Elsy Arnold, A.P.N.       • magnesium hydroxide (MILK OF MAGNESIA) suspension 30 mL  30 mL Oral QDAY PRN Elsy Arnold, A.P.N.       • bisacodyl (DULCOLAX) suppository 10 mg  10 mg Rectal Q24HRS PRN Elsy Arnold, A.P.N.       • fleet enema 133 mL  1 Each Rectal Once PRN Elsy Arnold, A.P.N.       • ondansetron (ZOFRAN) syringe/vial injection 4 mg  4 mg Intravenous Q4HRS PRN Elsy Arnold, A.P.N.   4 mg at 12/24/19 1223   • ondansetron (ZOFRAN ODT) dispertab 4 mg  4 mg Oral Q4HRS PRN Elsy Arnold, A.P.N.       • promethazine (PHENERGAN) tablet 12.5-25 mg  12.5-25 mg Oral Q4HRS PRN Elsy Arnold, A.P.N.       • promethazine (PHENERGAN) suppository 12.5-25 mg  12.5-25 mg Rectal Q4HRS PRN Elsy Arnold, A.P.N.       • prochlorperazine (COMPAZINE) injection 5-10 mg  5-10 mg Intravenous Q4HRS PRN Elsy Arnold, A.P.N.       • diphenhydrAMINE (BENADRYL) tablet/capsule 25 mg  25 mg Oral Q6HRS PRN Elsy Arnold, A.P.N.        Or   • diphenhydrAMINE (BENADRYL) injection 25 mg  25 mg Intravenous Q6HRS PRN Elsy Arnold, A.P.N.       • benzocaine-menthol (CEPACOL) lozenge 1 Lozenge  1 Lozenge Mouth/Throat Q2HRS PRN Elsy Arnold, A.P.N.       • zolpidem (AMBIEN) tablet 10 mg  10 mg Oral HS PRN Yong Zhong, D.O.   10 mg at 12/25/19 2156   • venlafaxine XR (EFFEXOR XR) capsule 75 mg  75 mg Oral Q EVENING Yong Zhong, D.O.   75 mg at 12/25/19 1643   • famotidine (PEPCID) tablet 20 mg  20 mg Oral BID Yong Zhong, D.O.   20 mg at 12/26/19 0440   • oxybutynin SR (DITROPAN-XL) tablet 15 mg  15 mg Oral BID Yong Zhong, D.O.   15 mg at 12/26/19 0439   • methocarbamol (ROBAXIN) tablet 500 mg  500 mg Oral BID PRN Yong Zhong, D.O.   500 mg at 12/23/19 1150   • buPROPion SR (WELLBUTRIN-SR)  tablet 150 mg  150 mg Oral BID Yong Zhong D.O.   150 mg at 12/26/19 0440   • Respiratory Care per Protocol   Nebulization Continuous RT Yong Zhong D.O.       • acetaminophen (TYLENOL) tablet 650 mg  650 mg Oral Q6HRS Yong Zhong D.O.   650 mg at 12/26/19 0440   • oxyCODONE immediate-release (ROXICODONE) tablet 5-10 mg  5-10 mg Oral Q4HRS PRN Yong Zhong D.O.   5 mg at 12/26/19 0825   • albuterol inhaler 2 Puff  2 Puff Inhalation Q4H PRN (RT) NANCY Martin.O.   2 Puff at 12/19/19 2257     No current Epic-ordered outpatient medications on file.     Diet:   DIET ORDERS (From admission to next 24h)     Start     Ordered    12/24/19 1450  Diet Order Regular  ALL MEALS     Question Answer Comment   Diet: Regular    Texture/Fiber modifications: Dysphagia 2(Pureed/Chopped)specify fluid consistency(question 6)    Consistency/Fluid modifications: Thin Liquids        12/24/19 1449                Anticipated Discharge Destination / Patient/Family Goal:  Destination: Home with Assistance Support System: 2 Sons and D.I.L.  Anticipated home health services: OT and PT  Previously used HH service/ provider: Not Applicable  Anticipated DME Needs: [employer]  Outpatient Services: OT and PT  Alternative resources to address additional identified needs:     Pre-Screen Completed: 12/26/2019 9:28 AM Paco Tucker L.P.N.

## 2019-12-26 NOTE — THERAPY
"Occupational Therapy Treatment completed with focus on ADLs, ADL transfers and patient education.  Functional Status:  Pt seen for OT tx. Min A supine > sit, once seated EOB, pt required max A to don c-collar. Min A sit > stand, min A amb w/ FWW to BR. Completed toilet transfer w/ min A for balance and safety d/t low surface height. Max A LB dressing. Supv pericare. Pt pleasant and cooperative. Pt reporting difficulty w/ self-feeding d/t poor grasp. Gave built-up  to increase independence in self-feeding. Continues to express motivation to regain strength, endurance and independence in ADLs and ADL transfers. Will continue to benefit from OT services while in-house to address above deficits.   Plan of Care: Will benefit from Occupational Therapy 5 times per week  Discharge Recommendations:  Equipment Will Continue to Assess for Equipment Needs. Post-acute therapy Recommend post-acute placement for additional occupational therapy services prior to discharge home. Patient can tolerate post-acute therapies at a 5x/week frequency.    See \"Rehab Therapy-Acute\" Patient Summary Report for complete documentation.   "

## 2019-12-26 NOTE — CARE PLAN
Problem: Pain Management  Goal: Pain level will decrease to patient's comfort goal  Note:   Routine pain assessment performed and appropriate pain management interventions implemented

## 2019-12-26 NOTE — DISCHARGE PLANNING
Spoke with GOPI Matos regarding [facility] and D/C resources/support.  She is agreeable with an admission.  Shawna works PM shift and Sampson works day shift.  They have 2 kids ages 4 & 6.  Sampson's brother is living with them now as well, he is 30.  She stated that between the 3 of them that they'll provide 24/7 supervision/assistance as she has lived with them before.  They will be able to attend family training as well.  Discussed case with Dr. Gregory and he is recommending [facility].  Submitted to insurance.

## 2019-12-26 NOTE — PROGRESS NOTES
"Pt is awake and alert. Reports of pain, provided pain meds and ice pack. Pt is a bit unsteady when ambulating, reinforced education for pt to call for assistance when getting out of bed. She verbalized understanding after experiencing unsteadiness when standing on her own. Pt was very tearful because she states \"I used to be able to do all this on my own before\". She requires assistance going from laying in bed to EOB. POC was discussed with pt and all needs were addressed at this time.   "

## 2019-12-27 ENCOUNTER — HOSPITAL ENCOUNTER (INPATIENT)
Facility: REHABILITATION | Age: 54
LOS: 11 days | DRG: 092 | End: 2020-01-07
Attending: PHYSICAL MEDICINE & REHABILITATION | Admitting: PHYSICAL MEDICINE & REHABILITATION
Payer: MEDICAID

## 2019-12-27 VITALS
OXYGEN SATURATION: 95 % | RESPIRATION RATE: 16 BRPM | SYSTOLIC BLOOD PRESSURE: 94 MMHG | HEART RATE: 77 BPM | DIASTOLIC BLOOD PRESSURE: 67 MMHG | BODY MASS INDEX: 21.36 KG/M2 | WEIGHT: 132.94 LBS | TEMPERATURE: 97.9 F | HEIGHT: 66 IN

## 2019-12-27 DIAGNOSIS — G82.52 QUADRIPLEGIA, C1-C4 INCOMPLETE (HCC): Primary | ICD-10-CM

## 2019-12-27 DIAGNOSIS — M79.2 NEUROPATHIC PAIN: ICD-10-CM

## 2019-12-27 DIAGNOSIS — L84 CORNS AND CALLUS: ICD-10-CM

## 2019-12-27 PROBLEM — S14.129A CENTRAL CORD SYNDROME (HCC): Status: RESOLVED | Noted: 2019-12-20 | Resolved: 2019-12-27

## 2019-12-27 LAB
ANION GAP SERPL CALC-SCNC: 8 MMOL/L (ref 0–11.9)
BASOPHILS # BLD AUTO: 0.8 % (ref 0–1.8)
BASOPHILS # BLD: 0.06 K/UL (ref 0–0.12)
BUN SERPL-MCNC: 22 MG/DL (ref 8–22)
CALCIUM SERPL-MCNC: 8.9 MG/DL (ref 8.5–10.5)
CHLORIDE SERPL-SCNC: 103 MMOL/L (ref 96–112)
CO2 SERPL-SCNC: 27 MMOL/L (ref 20–33)
CREAT SERPL-MCNC: 0.65 MG/DL (ref 0.5–1.4)
EOSINOPHIL # BLD AUTO: 0.18 K/UL (ref 0–0.51)
EOSINOPHIL NFR BLD: 2.3 % (ref 0–6.9)
ERYTHROCYTE [DISTWIDTH] IN BLOOD BY AUTOMATED COUNT: 46.2 FL (ref 35.9–50)
GLUCOSE SERPL-MCNC: 82 MG/DL (ref 65–99)
HCT VFR BLD AUTO: 37 % (ref 37–47)
HGB BLD-MCNC: 12.4 G/DL (ref 12–16)
IMM GRANULOCYTES # BLD AUTO: 0.03 K/UL (ref 0–0.11)
IMM GRANULOCYTES NFR BLD AUTO: 0.4 % (ref 0–0.9)
LYMPHOCYTES # BLD AUTO: 4.69 K/UL (ref 1–4.8)
LYMPHOCYTES NFR BLD: 60.4 % (ref 22–41)
MAGNESIUM SERPL-MCNC: 1.8 MG/DL (ref 1.5–2.5)
MCH RBC QN AUTO: 30.9 PG (ref 27–33)
MCHC RBC AUTO-ENTMCNC: 33.5 G/DL (ref 33.6–35)
MCV RBC AUTO: 92.3 FL (ref 81.4–97.8)
MONOCYTES # BLD AUTO: 0.68 K/UL (ref 0–0.85)
MONOCYTES NFR BLD AUTO: 8.8 % (ref 0–13.4)
NEUTROPHILS # BLD AUTO: 2.12 K/UL (ref 2–7.15)
NEUTROPHILS NFR BLD: 27.3 % (ref 44–72)
NRBC # BLD AUTO: 0 K/UL
NRBC BLD-RTO: 0 /100 WBC
PHOSPHATE SERPL-MCNC: 3.3 MG/DL (ref 2.5–4.5)
PLATELET # BLD AUTO: 318 K/UL (ref 164–446)
PMV BLD AUTO: 9.7 FL (ref 9–12.9)
POTASSIUM SERPL-SCNC: 4 MMOL/L (ref 3.6–5.5)
RBC # BLD AUTO: 4.01 M/UL (ref 4.2–5.4)
SODIUM SERPL-SCNC: 138 MMOL/L (ref 135–145)
WBC # BLD AUTO: 7.8 K/UL (ref 4.8–10.8)

## 2019-12-27 PROCEDURE — 700111 HCHG RX REV CODE 636 W/ 250 OVERRIDE (IP): Performed by: INTERNAL MEDICINE

## 2019-12-27 PROCEDURE — 700102 HCHG RX REV CODE 250 W/ 637 OVERRIDE(OP): Performed by: INTERNAL MEDICINE

## 2019-12-27 PROCEDURE — 700102 HCHG RX REV CODE 250 W/ 637 OVERRIDE(OP): Performed by: PHYSICAL MEDICINE & REHABILITATION

## 2019-12-27 PROCEDURE — 97116 GAIT TRAINING THERAPY: CPT

## 2019-12-27 PROCEDURE — A9270 NON-COVERED ITEM OR SERVICE: HCPCS | Performed by: PHYSICAL MEDICINE & REHABILITATION

## 2019-12-27 PROCEDURE — 99239 HOSP IP/OBS DSCHRG MGMT >30: CPT | Performed by: INTERNAL MEDICINE

## 2019-12-27 PROCEDURE — A9270 NON-COVERED ITEM OR SERVICE: HCPCS | Performed by: INTERNAL MEDICINE

## 2019-12-27 PROCEDURE — 770010 HCHG ROOM/CARE - REHAB SEMI PRIVAT*

## 2019-12-27 PROCEDURE — A9270 NON-COVERED ITEM OR SERVICE: HCPCS | Performed by: HOSPITALIST

## 2019-12-27 PROCEDURE — 700112 HCHG RX REV CODE 229: Performed by: NURSE PRACTITIONER

## 2019-12-27 PROCEDURE — 84100 ASSAY OF PHOSPHORUS: CPT

## 2019-12-27 PROCEDURE — 700102 HCHG RX REV CODE 250 W/ 637 OVERRIDE(OP): Performed by: HOSPITALIST

## 2019-12-27 PROCEDURE — 80048 BASIC METABOLIC PNL TOTAL CA: CPT

## 2019-12-27 PROCEDURE — A9270 NON-COVERED ITEM OR SERVICE: HCPCS | Performed by: NURSE PRACTITIONER

## 2019-12-27 PROCEDURE — 94760 N-INVAS EAR/PLS OXIMETRY 1: CPT

## 2019-12-27 PROCEDURE — 99223 1ST HOSP IP/OBS HIGH 75: CPT | Performed by: PHYSICAL MEDICINE & REHABILITATION

## 2019-12-27 PROCEDURE — 36415 COLL VENOUS BLD VENIPUNCTURE: CPT

## 2019-12-27 PROCEDURE — 83735 ASSAY OF MAGNESIUM: CPT

## 2019-12-27 PROCEDURE — 97530 THERAPEUTIC ACTIVITIES: CPT

## 2019-12-27 PROCEDURE — 85025 COMPLETE CBC W/AUTO DIFF WBC: CPT

## 2019-12-27 RX ORDER — DIPHENHYDRAMINE HYDROCHLORIDE 50 MG/ML
25 INJECTION INTRAMUSCULAR; INTRAVENOUS EVERY 6 HOURS PRN
Status: DISCONTINUED | OUTPATIENT
Start: 2019-12-27 | End: 2020-01-07 | Stop reason: HOSPADM

## 2019-12-27 RX ORDER — OXYBUTYNIN CHLORIDE 5 MG/1
15 TABLET, EXTENDED RELEASE ORAL 2 TIMES DAILY
Status: DISCONTINUED | OUTPATIENT
Start: 2019-12-27 | End: 2020-01-07 | Stop reason: HOSPADM

## 2019-12-27 RX ORDER — OXYCODONE HYDROCHLORIDE 5 MG/1
5 TABLET ORAL
Status: DISCONTINUED | OUTPATIENT
Start: 2019-12-27 | End: 2020-01-07 | Stop reason: HOSPADM

## 2019-12-27 RX ORDER — VENLAFAXINE HYDROCHLORIDE 75 MG/1
75 CAPSULE, EXTENDED RELEASE ORAL EVERY EVENING
Status: CANCELLED | OUTPATIENT
Start: 2019-12-27

## 2019-12-27 RX ORDER — NICOTINE 21 MG/24HR
14 PATCH, TRANSDERMAL 24 HOURS TRANSDERMAL
Status: DISCONTINUED | OUTPATIENT
Start: 2019-12-28 | End: 2019-12-30

## 2019-12-27 RX ORDER — GABAPENTIN 300 MG/1
600 CAPSULE ORAL 2 TIMES DAILY
Qty: 90 CAP | Refills: 0 | Status: ON HOLD
Start: 2019-12-27 | End: 2020-01-07

## 2019-12-27 RX ORDER — ALBUTEROL SULFATE 90 UG/1
2 AEROSOL, METERED RESPIRATORY (INHALATION)
Status: CANCELLED | OUTPATIENT
Start: 2019-12-27

## 2019-12-27 RX ORDER — ZOLPIDEM TARTRATE 5 MG/1
10 TABLET ORAL NIGHTLY PRN
Status: DISCONTINUED | OUTPATIENT
Start: 2019-12-27 | End: 2020-01-07 | Stop reason: HOSPADM

## 2019-12-27 RX ORDER — BUPROPION HYDROCHLORIDE 150 MG/1
150 TABLET, EXTENDED RELEASE ORAL 2 TIMES DAILY
Qty: 60 TAB | Refills: 0 | Status: ON HOLD
Start: 2019-12-27 | End: 2020-01-07

## 2019-12-27 RX ORDER — DOCUSATE SODIUM 100 MG/1
100 CAPSULE, LIQUID FILLED ORAL 2 TIMES DAILY
Status: DISCONTINUED | OUTPATIENT
Start: 2019-12-27 | End: 2019-12-27

## 2019-12-27 RX ORDER — SENNOSIDES A AND B 8.6 MG/1
17.2 TABLET, FILM COATED ORAL
Status: DISCONTINUED | OUTPATIENT
Start: 2019-12-28 | End: 2019-12-27

## 2019-12-27 RX ORDER — POLYETHYLENE GLYCOL 3350 17 G/17G
1 POWDER, FOR SOLUTION ORAL
Status: DISCONTINUED | OUTPATIENT
Start: 2019-12-27 | End: 2020-01-07 | Stop reason: HOSPADM

## 2019-12-27 RX ORDER — OXYCODONE HYDROCHLORIDE 10 MG/1
10 TABLET ORAL
Status: DISCONTINUED | OUTPATIENT
Start: 2019-12-27 | End: 2020-01-07 | Stop reason: HOSPADM

## 2019-12-27 RX ORDER — MIDODRINE HYDROCHLORIDE 10 MG/1
15 TABLET ORAL
Status: DISCONTINUED | OUTPATIENT
Start: 2019-12-27 | End: 2019-12-28

## 2019-12-27 RX ORDER — DOCUSATE SODIUM 100 MG/1
100 CAPSULE, LIQUID FILLED ORAL 2 TIMES DAILY
Status: CANCELLED | OUTPATIENT
Start: 2019-12-27

## 2019-12-27 RX ORDER — TRAZODONE HYDROCHLORIDE 50 MG/1
50 TABLET ORAL
Status: DISCONTINUED | OUTPATIENT
Start: 2019-12-27 | End: 2019-12-27

## 2019-12-27 RX ORDER — ALBUTEROL SULFATE 90 UG/1
2 AEROSOL, METERED RESPIRATORY (INHALATION)
Status: DISCONTINUED | OUTPATIENT
Start: 2019-12-27 | End: 2019-12-27

## 2019-12-27 RX ORDER — MIDODRINE HYDROCHLORIDE 5 MG/1
15 TABLET ORAL
Qty: 60 TAB | Refills: 1 | Status: ON HOLD
Start: 2019-12-27 | End: 2020-01-07

## 2019-12-27 RX ORDER — ALUMINA, MAGNESIA, AND SIMETHICONE 2400; 2400; 240 MG/30ML; MG/30ML; MG/30ML
20 SUSPENSION ORAL
Status: DISCONTINUED | OUTPATIENT
Start: 2019-12-27 | End: 2020-01-07 | Stop reason: HOSPADM

## 2019-12-27 RX ORDER — GABAPENTIN 300 MG/1
600 CAPSULE ORAL 2 TIMES DAILY
Status: CANCELLED | OUTPATIENT
Start: 2019-12-27

## 2019-12-27 RX ORDER — AMOXICILLIN 250 MG
2 CAPSULE ORAL 2 TIMES DAILY
Status: DISCONTINUED | OUTPATIENT
Start: 2019-12-27 | End: 2020-01-02

## 2019-12-27 RX ORDER — BISACODYL 10 MG
10 SUPPOSITORY, RECTAL RECTAL
Status: DISCONTINUED | OUTPATIENT
Start: 2019-12-27 | End: 2020-01-07 | Stop reason: HOSPADM

## 2019-12-27 RX ORDER — MIDODRINE HYDROCHLORIDE 5 MG/1
15 TABLET ORAL
Status: CANCELLED | OUTPATIENT
Start: 2019-12-27

## 2019-12-27 RX ORDER — ONDANSETRON 2 MG/ML
4 INJECTION INTRAMUSCULAR; INTRAVENOUS 4 TIMES DAILY PRN
Status: DISCONTINUED | OUTPATIENT
Start: 2019-12-27 | End: 2020-01-07 | Stop reason: HOSPADM

## 2019-12-27 RX ORDER — METHOCARBAMOL 500 MG/1
500 TABLET, FILM COATED ORAL 2 TIMES DAILY PRN
Status: CANCELLED | OUTPATIENT
Start: 2019-12-27

## 2019-12-27 RX ORDER — HYDRALAZINE HYDROCHLORIDE 25 MG/1
25 TABLET, FILM COATED ORAL EVERY 8 HOURS PRN
Status: DISCONTINUED | OUTPATIENT
Start: 2019-12-27 | End: 2020-01-07 | Stop reason: HOSPADM

## 2019-12-27 RX ORDER — BACLOFEN 10 MG/1
10 TABLET ORAL 3 TIMES DAILY
Qty: 90 TAB | Refills: 0 | Status: ON HOLD
Start: 2019-12-27 | End: 2020-01-07

## 2019-12-27 RX ORDER — FAMOTIDINE 20 MG/1
20 TABLET, FILM COATED ORAL 2 TIMES DAILY
Status: CANCELLED | OUTPATIENT
Start: 2019-12-27

## 2019-12-27 RX ORDER — BUPROPION HYDROCHLORIDE 150 MG/1
150 TABLET, EXTENDED RELEASE ORAL 2 TIMES DAILY
Status: CANCELLED | OUTPATIENT
Start: 2019-12-27

## 2019-12-27 RX ORDER — BUPROPION HYDROCHLORIDE 150 MG/1
150 TABLET, EXTENDED RELEASE ORAL 2 TIMES DAILY
Status: DISCONTINUED | OUTPATIENT
Start: 2019-12-27 | End: 2020-01-07 | Stop reason: HOSPADM

## 2019-12-27 RX ORDER — ACETAMINOPHEN 325 MG/1
650 TABLET ORAL EVERY 4 HOURS PRN
Status: DISCONTINUED | OUTPATIENT
Start: 2019-12-27 | End: 2020-01-07 | Stop reason: HOSPADM

## 2019-12-27 RX ORDER — METHOCARBAMOL 500 MG/1
500 TABLET, FILM COATED ORAL 2 TIMES DAILY PRN
Status: DISCONTINUED | OUTPATIENT
Start: 2019-12-27 | End: 2020-01-07 | Stop reason: HOSPADM

## 2019-12-27 RX ORDER — DIPHENHYDRAMINE HCL 25 MG
25 TABLET ORAL EVERY 6 HOURS PRN
Status: DISCONTINUED | OUTPATIENT
Start: 2019-12-27 | End: 2020-01-07 | Stop reason: HOSPADM

## 2019-12-27 RX ORDER — VENLAFAXINE HYDROCHLORIDE 75 MG/1
75 CAPSULE, EXTENDED RELEASE ORAL EVERY EVENING
Status: DISCONTINUED | OUTPATIENT
Start: 2019-12-27 | End: 2020-01-07 | Stop reason: HOSPADM

## 2019-12-27 RX ORDER — GABAPENTIN 300 MG/1
600 CAPSULE ORAL 2 TIMES DAILY
Status: DISCONTINUED | OUTPATIENT
Start: 2019-12-27 | End: 2019-12-28

## 2019-12-27 RX ORDER — OXYCODONE HYDROCHLORIDE 5 MG/1
5 TABLET ORAL EVERY 4 HOURS PRN
Qty: 30 TAB | Refills: 0 | Status: ON HOLD | OUTPATIENT
Start: 2019-12-27 | End: 2020-01-07

## 2019-12-27 RX ORDER — ONDANSETRON 4 MG/1
4 TABLET, ORALLY DISINTEGRATING ORAL 4 TIMES DAILY PRN
Status: DISCONTINUED | OUTPATIENT
Start: 2019-12-27 | End: 2020-01-07 | Stop reason: HOSPADM

## 2019-12-27 RX ORDER — SENNOSIDES A AND B 8.6 MG/1
17.2 TABLET, FILM COATED ORAL
Status: CANCELLED | OUTPATIENT
Start: 2019-12-27

## 2019-12-27 RX ORDER — BACLOFEN 10 MG/1
10 TABLET ORAL 3 TIMES DAILY
Status: CANCELLED | OUTPATIENT
Start: 2019-12-27

## 2019-12-27 RX ORDER — FAMOTIDINE 20 MG/1
20 TABLET, FILM COATED ORAL 2 TIMES DAILY
Status: DISCONTINUED | OUTPATIENT
Start: 2019-12-27 | End: 2020-01-07 | Stop reason: HOSPADM

## 2019-12-27 RX ORDER — BACLOFEN 10 MG/1
10 TABLET ORAL 3 TIMES DAILY
Status: DISCONTINUED | OUTPATIENT
Start: 2019-12-27 | End: 2019-12-31

## 2019-12-27 RX ORDER — ALBUTEROL SULFATE 90 UG/1
2 AEROSOL, METERED RESPIRATORY (INHALATION)
Status: DISCONTINUED | OUTPATIENT
Start: 2019-12-27 | End: 2020-01-07 | Stop reason: HOSPADM

## 2019-12-27 RX ORDER — ECHINACEA PURPUREA EXTRACT 125 MG
2 TABLET ORAL PRN
Status: DISCONTINUED | OUTPATIENT
Start: 2019-12-27 | End: 2020-01-07 | Stop reason: HOSPADM

## 2019-12-27 RX ORDER — POLYVINYL ALCOHOL 14 MG/ML
1 SOLUTION/ DROPS OPHTHALMIC PRN
Status: DISCONTINUED | OUTPATIENT
Start: 2019-12-27 | End: 2020-01-07 | Stop reason: HOSPADM

## 2019-12-27 RX ORDER — NICOTINE 21 MG/24HR
1 PATCH, TRANSDERMAL 24 HOURS TRANSDERMAL EVERY 24 HOURS
Qty: 30 PATCH | Refills: 0 | Status: ON HOLD
Start: 2019-12-27 | End: 2020-01-07

## 2019-12-27 RX ADMIN — BACLOFEN 10 MG: 10 TABLET ORAL at 20:01

## 2019-12-27 RX ADMIN — SENNOSIDES AND DOCUSATE SODIUM 2 TABLET: 8.6; 5 TABLET ORAL at 20:01

## 2019-12-27 RX ADMIN — ACETAMINOPHEN 650 MG: 325 TABLET, FILM COATED ORAL at 05:59

## 2019-12-27 RX ADMIN — FAMOTIDINE 20 MG: 20 TABLET ORAL at 20:01

## 2019-12-27 RX ADMIN — FAMOTIDINE 20 MG: 20 TABLET ORAL at 05:59

## 2019-12-27 RX ADMIN — ACETAMINOPHEN 650 MG: 325 TABLET, FILM COATED ORAL at 20:03

## 2019-12-27 RX ADMIN — OXYBUTYNIN CHLORIDE 15 MG: 5 TABLET, EXTENDED RELEASE ORAL at 05:59

## 2019-12-27 RX ADMIN — GABAPENTIN 600 MG: 300 CAPSULE ORAL at 05:59

## 2019-12-27 RX ADMIN — OXYCODONE HYDROCHLORIDE 10 MG: 5 TABLET ORAL at 13:25

## 2019-12-27 RX ADMIN — BACLOFEN 10 MG: 10 TABLET ORAL at 13:33

## 2019-12-27 RX ADMIN — OXYCODONE HYDROCHLORIDE 10 MG: 5 TABLET ORAL at 09:34

## 2019-12-27 RX ADMIN — ZOLPIDEM TARTRATE 10 MG: 5 TABLET ORAL at 22:00

## 2019-12-27 RX ADMIN — GABAPENTIN 600 MG: 300 CAPSULE ORAL at 20:01

## 2019-12-27 RX ADMIN — ACETAMINOPHEN 650 MG: 325 TABLET, FILM COATED ORAL at 01:51

## 2019-12-27 RX ADMIN — DOCUSATE SODIUM 100 MG: 100 CAPSULE, LIQUID FILLED ORAL at 05:59

## 2019-12-27 RX ADMIN — ENOXAPARIN SODIUM 40 MG: 100 INJECTION SUBCUTANEOUS at 05:59

## 2019-12-27 RX ADMIN — OXYBUTYNIN CHLORIDE 15 MG: 5 TABLET, EXTENDED RELEASE ORAL at 20:01

## 2019-12-27 RX ADMIN — BACLOFEN 10 MG: 10 TABLET ORAL at 05:59

## 2019-12-27 RX ADMIN — MIDODRINE HYDROCHLORIDE 15 MG: 5 TABLET ORAL at 09:34

## 2019-12-27 RX ADMIN — BUPROPION HYDROCHLORIDE 150 MG: 150 TABLET, EXTENDED RELEASE ORAL at 20:01

## 2019-12-27 RX ADMIN — MIDODRINE HYDROCHLORIDE 15 MG: 10 TABLET ORAL at 17:16

## 2019-12-27 RX ADMIN — BUPROPION HYDROCHLORIDE 150 MG: 150 TABLET, EXTENDED RELEASE ORAL at 05:59

## 2019-12-27 RX ADMIN — POLYETHYLENE GLYCOL 3350 1 PACKET: 17 POWDER, FOR SOLUTION ORAL at 20:00

## 2019-12-27 RX ADMIN — VENLAFAXINE HYDROCHLORIDE 75 MG: 75 CAPSULE, EXTENDED RELEASE ORAL at 20:01

## 2019-12-27 RX ADMIN — OXYCODONE HYDROCHLORIDE 10 MG: 10 TABLET ORAL at 16:23

## 2019-12-27 RX ADMIN — SENNOSIDES 17.2 MG: 8.6 TABLET, FILM COATED ORAL at 13:33

## 2019-12-27 RX ADMIN — MIDODRINE HYDROCHLORIDE 15 MG: 5 TABLET ORAL at 13:31

## 2019-12-27 SDOH — ECONOMIC STABILITY: FOOD INSECURITY: WITHIN THE PAST 12 MONTHS, THE FOOD YOU BOUGHT JUST DIDN'T LAST AND YOU DIDN'T HAVE MONEY TO GET MORE.: NEVER TRUE

## 2019-12-27 SDOH — ECONOMIC STABILITY: FOOD INSECURITY: WITHIN THE PAST 12 MONTHS, YOU WORRIED THAT YOUR FOOD WOULD RUN OUT BEFORE YOU GOT MONEY TO BUY MORE.: NEVER TRUE

## 2019-12-27 SDOH — ECONOMIC STABILITY: TRANSPORTATION INSECURITY
IN THE PAST 12 MONTHS, HAS THE LACK OF TRANSPORTATION KEPT YOU FROM MEDICAL APPOINTMENTS OR FROM GETTING MEDICATIONS?: NO

## 2019-12-27 SDOH — ECONOMIC STABILITY: TRANSPORTATION INSECURITY
IN THE PAST 12 MONTHS, HAS LACK OF TRANSPORTATION KEPT YOU FROM MEETINGS, WORK, OR FROM GETTING THINGS NEEDED FOR DAILY LIVING?: NO

## 2019-12-27 ASSESSMENT — COGNITIVE AND FUNCTIONAL STATUS - GENERAL
MOVING TO AND FROM BED TO CHAIR: UNABLE
MOVING FROM LYING ON BACK TO SITTING ON SIDE OF FLAT BED: UNABLE
TURNING FROM BACK TO SIDE WHILE IN FLAT BAD: UNABLE
SUGGESTED CMS G CODE MODIFIER MOBILITY: CL
WALKING IN HOSPITAL ROOM: A LITTLE
STANDING UP FROM CHAIR USING ARMS: A LITTLE
CLIMB 3 TO 5 STEPS WITH RAILING: TOTAL
MOBILITY SCORE: 10

## 2019-12-27 ASSESSMENT — COPD QUESTIONNAIRES
HAVE YOU SMOKED AT LEAST 100 CIGARETTES IN YOUR ENTIRE LIFE: YES
COPD SCREENING SCORE: 3
DURING THE PAST 4 WEEKS HOW MUCH DID YOU FEEL SHORT OF BREATH: NONE/LITTLE OF THE TIME
DO YOU EVER COUGH UP ANY MUCUS OR PHLEGM?: NO/ONLY WITH OCCASIONAL COLDS OR INFECTIONS

## 2019-12-27 ASSESSMENT — LIFESTYLE VARIABLES
HAVE PEOPLE ANNOYED YOU BY CRITICIZING YOUR DRINKING: NO
TOTAL SCORE: 0
HAVE YOU EVER FELT YOU SHOULD CUT DOWN ON YOUR DRINKING: NO
EVER_SMOKED: YES
AVERAGE NUMBER OF DAYS PER WEEK YOU HAVE A DRINK CONTAINING ALCOHOL: 0
CONSUMPTION TOTAL: NEGATIVE
HOW MANY TIMES IN THE PAST YEAR HAVE YOU HAD 5 OR MORE DRINKS IN A DAY: 0
PACK_YEARS: 25
TOTAL SCORE: 0
EVER FELT BAD OR GUILTY ABOUT YOUR DRINKING: NO
EVER HAD A DRINK FIRST THING IN THE MORNING TO STEADY YOUR NERVES TO GET RID OF A HANGOVER: NO
ON A TYPICAL DAY WHEN YOU DRINK ALCOHOL HOW MANY DRINKS DO YOU HAVE: 0
TOTAL SCORE: 0
ALCOHOL_USE: NO

## 2019-12-27 ASSESSMENT — GAIT ASSESSMENTS
ASSISTIVE DEVICE: FRONT WHEEL WALKER
DEVIATION: ATAXIC;STEP TO;DECREASED BASE OF SUPPORT;BRADYKINETIC;SHUFFLED GAIT
DISTANCE (FEET): 15
GAIT LEVEL OF ASSIST: MINIMAL ASSIST

## 2019-12-27 ASSESSMENT — PATIENT HEALTH QUESTIONNAIRE - PHQ9
SUM OF ALL RESPONSES TO PHQ9 QUESTIONS 1 AND 2: 0
2. FEELING DOWN, DEPRESSED, IRRITABLE, OR HOPELESS: NOT AT ALL
1. LITTLE INTEREST OR PLEASURE IN DOING THINGS: NOT AT ALL

## 2019-12-27 NOTE — DISCHARGE SUMMARY
Discharge Summary    CHIEF COMPLAINT ON ADMISSION  Chief Complaint   Patient presents with   • Back Pain       Reason for Admission  OTHER     CODE STATUS  Full Code    HPI & HOSPITAL COURSE  Ms. Marilyn Salinas is a 54 y.o. female with a past medical history significant for degenerative disc disease who presented with acute on chronic back pain with decreased mobility and progressive paraplegia.  An MRI from November demonstrated degenerative disc disease and moderate central canal stenosis at C4-5.  The patient was evaluated by neurosurgery with concern for central cord syndrome status post cervical corpectomy on 12/20/2019 by Dr. Ayala.  Patient tolerated the procedure well.  Initially she was in the ICU on a Trevor-Synephrine drip to maintain blood pressures.  The patient was weaned off the drip but still requires p.o. midodrine.  Patient was stable for transfer to general medical floor 12/25/2019.  She is continued to progress with physical therapy and was accepted [facility].       Therefore, she is discharged in fair and stable condition to an inpatient rehabilitation hospital.    The patient met 2-midnight criteria for an inpatient stay at the time of discharge.      FOLLOW UP ITEMS POST DISCHARGE  None    DISCHARGE DIAGNOSES  Principal Problem (Resolved):    Central cord syndrome (HCC) POA: Yes  Active Problems:    Cervical spine pain POA: Yes    Lumbar radiculopathy POA: Yes    Idiopathic hypotension POA: No    Mild intermittent asthma POA: Yes    Tobacco use POA: Yes    Anxiety and depression POA: Yes      FOLLOW UP  Follow-up with primary care provider within 2 weeks.    MEDICATIONS ON DISCHARGE     Medication List      START taking these medications      Instructions   baclofen 10 MG Tabs  Commonly known as:  LIORESAL   Take 1 Tab by mouth 3 times a day.  Dose:  10 mg     buPROPion  MG Tb12 sustained-release tablet  Commonly known as:  WELLBUTRIN-SR  Replaces:  WELLBUTRIN  MG XL  tablet   Take 1 Tab by mouth 2 Times a Day.  Dose:  150 mg     midodrine 5 MG Tabs  Commonly known as:  PROAMATINE   Take 3 Tabs by mouth 3 times a day, with meals.  Dose:  15 mg     nicotine 14 MG/24HR Pt24  Commonly known as:  NICODERM   Apply 1 Patch to skin as directed every 24 hours.  Dose:  1 Patch     oxyCODONE immediate-release 5 MG Tabs  Commonly known as:  ROXICODONE   Take 1 Tab by mouth every four hours as needed for up to 5 days.  Dose:  5 mg        CHANGE how you take these medications      Instructions   gabapentin 300 MG Caps  What changed:    · medication strength  · how much to take  Commonly known as:  NEURONTIN   Take 2 Caps by mouth 2 Times a Day.  Dose:  600 mg        CONTINUE taking these medications      Instructions   albuterol 108 (90 Base) MCG/ACT Aers inhalation aerosol   Doctor's comments:  Increase in dose to every 4 hrs prn  Inhale 2 Puffs by mouth 4 times a day.  Dose:  2 Puff     Cholecalciferol 4000 units Tabs   Take 4,000 Units by mouth every morning.  Dose:  4,000 Units     fluticasone 50 MCG/ACT nasal spray  Commonly known as:  FLONASE   Spray 1 Spray in nose every day.  Dose:  1 Spray     loratadine 10 MG Tabs  Commonly known as:  CLARITIN   Take 10 mg by mouth every day.  Dose:  10 mg     methocarbamol 500 MG Tabs  Commonly known as:  ROBAXIN   Take 500 mg by mouth 2 times a day as needed (MUSCLE SPASM).  Dose:  500 mg     oxybutynin 15 MG CR tablet  Commonly known as:  DITROPAN XL   Take 15 mg by mouth 2 Times a Day.  Dose:  15 mg     raNITidine 150 MG Tabs  Commonly known as:  ZANTAC   Take 150 mg by mouth every morning.  Dose:  150 mg     venlafaxine XR 75 MG Cp24  Commonly known as:  EFFEXOR XR   Take 75 mg by mouth every evening.  Dose:  75 mg     zolpidem 10 MG Tabs  Commonly known as:  AMBIEN   Take 10 mg by mouth at bedtime as needed.  Dose:  10 mg        STOP taking these medications    meloxicam 15 MG tablet  Commonly known as:  MOBIC     WELLBUTRIN  MG XL  tablet  Generic drug:  buPROPion  Replaced by:  buPROPion  MG Tb12 sustained-release tablet            Allergies  Allergies   Allergen Reactions   • Codeine Unspecified     Unknown reaction         DIET  Orders Placed This Encounter   Procedures   • Diet Order Regular     Standing Status:   Standing     Number of Occurrences:   1     Order Specific Question:   Diet:     Answer:   Regular [1]     Order Specific Question:   Texture/Fiber modifications:     Answer:   Dysphagia 2(Pureed/Chopped)specify fluid consistency(question 6) [2]     Order Specific Question:   Consistency/Fluid modifications:     Answer:   Thin Liquids [3]       ACTIVITY  As tolerated and directed by rehab.  Weight bearing as tolerated    LINES, DRAINS, AND WOUNDS  This is an automated list. Peripheral IVs will be removed prior to discharge.  Peripheral IV 12/20/19 20 G Anterior;Right Forearm (Active)   Site Assessment Clean;Dry;Intact 12/26/2019  8:00 PM   Dressing Type Transparent 12/26/2019  8:00 PM   Line Status Saline locked 12/26/2019  8:00 PM   Dressing Status Clean;Dry;Intact 12/26/2019  8:00 PM   Dressing Intervention N/A 12/26/2019  8:00 PM   Date Primary Tubing Changed 12/21/19 12/21/2019  8:00 PM   Date Secondary Tubing Changed 12/21/19 12/21/2019  8:00 PM   NEXT Primary Tubing Change  12/24/19 12/21/2019  8:00 PM   NEXT Secondary Tubing Change  12/22/19 12/21/2019  8:00 PM   Infiltration Grading ([facility], [facility]) 0 12/26/2019  8:00 PM   Phlebitis Scale ([facility] Only) 0 12/26/2019  8:00 PM          Peripheral IV 12/20/19 20 G Anterior;Right Forearm (Active)   Site Assessment Clean;Dry;Intact 12/26/2019  8:00 PM   Dressing Type Transparent 12/26/2019  8:00 PM   Line Status Saline locked 12/26/2019  8:00 PM   Dressing Status Clean;Dry;Intact 12/26/2019  8:00 PM   Dressing Intervention N/A 12/26/2019  8:00 PM   Date Primary Tubing Changed 12/21/19 12/21/2019  8:00 PM   Date Secondary Tubing Changed 12/21/19 12/21/2019  8:00 PM   NEXT  Primary Tubing Change  12/24/19 12/21/2019  8:00 PM   NEXT Secondary Tubing Change  12/22/19 12/21/2019  8:00 PM   Infil[place] Grading [place] 0 12/26/2019  8:00 PM   Phlebitis Scale ([facility] Only) 0 12/26/2019  8:00 PM               MENTAL STATUS ON TRANSFER  Level of Consciousness: Alert  Orientation : Oriented x 4  Speech: Speech Clear    CONSULTATIONS  Neurosurgery  ICU    PROCEDURES  Cervical corpectomy    LABORATORY  Lab Results   Component Value Date    SODIUM 138 12/27/2019    POTASSIUM 4.0 12/27/2019    CHLORIDE 103 12/27/2019    CO2 27 12/27/2019    GLUCOSE 82 12/27/2019    BUN 22 12/27/2019    CREATININE 0.65 12/27/2019        Lab Results   Component Value Date    WBC 7.8 12/27/2019    HEMOGLOBIN 12.4 12/27/2019    HEMATOCRIT 37.0 12/27/2019    PLATELETCT 318 12/27/2019        I discussed medications and side effects with the patient.  I discussed prognosis and importance of medical compliance with the patient.  I counseled the patient about diet, exercise, weight loss, smoking cessation, and life style modifications.  All questions and concerns have been addressed.  Total time of the discharge process was 37 minutes.

## 2019-12-27 NOTE — DISCHARGE PLANNING
Dr. Magaña has medically cleared.  Dr. Guillory has accepted.  Transport has been arranged for 1000.  Dr. Magaña & Lakshmi (BSN) are aware.Msg left for Georgie (MOUNA) & Shawna (D.I.L.)     Addendum: transport change to 1315.  Dr. Magaña & Lakshmi (BSN) are aware.  Msg placed to Georgie (MOUNA).

## 2019-12-27 NOTE — DISCHARGE PLANNING
Insurance has authorized [facility].  Anticipate an admission once medically cleared.  Msg placed to .

## 2019-12-27 NOTE — DISCHARGE PLANNING
Medicaid Meds-to-Beds: Discharge prescription orders listed below delivered to patient's bedside. RN notified. Patient counseled.       Marilyn Salinas   Home Medication Instructions SHARYN:[id number]    Printed on:12/27/19 1220   Medication Information                      baclofen (LIORESAL) 10 MG Tab  Take 1 Tab by mouth 3 times a day.             buPROPion SR (WELLBUTRIN-SR) 150 MG TABLET SR 12 HR sustained-release tablet  Take 1 Tab by mouth 2 Times a Day.             gabapentin (NEURONTIN) 300 MG Cap  Take 2 Caps by mouth 2 Times a Day.             midodrine (PROAMATINE) 5 MG Tab  Take 3 Tabs by mouth 3 times a day, with meals.             nicotine (NICODERM) 14 MG/24HR PATCH 24 HR  Apply 1 Patch to skin as directed every 24 hours.               Jolly Davis, PharmD

## 2019-12-27 NOTE — THERAPY
"Physical Therapy Treatment completed.   Bed Mobility:  Supine to Sit: Minimal Assist  Transfers: Sit to Stand: Minimal Assist  Gait: Level Of Assist: Minimal Assist with Front-Wheel Walker       Plan of Care: Will benefit from Physical Therapy 5 times per week  Discharge Recommendations: Equipment: Will Continue to Assess for Equipment Needs. Post-acute therapy Recommend post-acute placement for continued physical therapy services prior to discharge home. Patient can tolerate post-acute therapies at a 5x/week frequency.       See \"Rehab Therapy-Acute\" Patient Summary Report for complete documentation.     Pt progressing well w/ therapy. Pt continues to be limited by generalized weakness. She has a hard time getting her trunk upright for bed mobility and once upright, needs assist from therapist to maintain balance. Pt requiring less assist to get to standing today. She does demonstrate a lot of LE weakness and essentially dips down to come up into standing. Pt gait continues to be limited by poor initiation of B LE's. Pt requires a lot of time to ambulate short distances. She needs a lot of cues to spread her feet on the ground but is unable to control foot placement nor demonstrate good foot clearance during gait. Pt is a high fall risk and will benefit from post acute therapy.  "

## 2019-12-27 NOTE — THERAPY
"Physical Therapy Treatment completed.   Bed Mobility:  Supine to Sit: Minimal Assist  Transfers: Sit to Stand: Moderate Assist  Gait: Level Of Assist: Minimal Assist with Front-Wheel Walker       Plan of Care: Will benefit from Physical Therapy 5 times per week  Discharge Recommendations: Equipment: Will Continue to Assess for Equipment Needs. Post-acute therapy Recommend post-acute placement for continued physical therapy services prior to discharge home. Patient can tolerate post-acute therapies at a 5x/week frequency.       See \"Rehab Therapy-Acute\" Patient Summary Report for complete documentation.     Pt progressing as expected w/ therapy. Pt having a hard time w/ bed mobility d/t weakness. Pt requiring a lot of cues for log rolling w/ bed mobility. She needed most assist to get her trunk upright and she had multiple LOB EOB until she was able to find her balance. Pt requiring ModA to stand from a lower surface. She requires Aly for gait however looks very unsteady. Pt w/ a NBOS and kept stepping on her own feet. Pt w/ poor advancement of her LE's and had to rely on her UE's heavily causing quick fatigue. Pt unable to get her R heel on the ground during gait and essentially slid feet forward during gait. Pt unable to perform DF in supine so educated on how to perform w/ sheet. Pt continues to be at a level in which she will benefit from post acute therapy.  "

## 2019-12-27 NOTE — PROGRESS NOTES
Patient has been complaining of pain around a 7/10 this shift. Have offered pain medication, but patient has only requested ice and tylenol. Patient was emotional and tearful at the beginning of the shift, but spirits lifted by the time she went to sleep around 2200. Patient was able to ambulate to the  with walker multiple times this shift, and though gait is unsteady, she had minimal staff assist and was able to pull herself up in bed with coaching and encouragement. Patient benefits from positive reinforcement.

## 2019-12-27 NOTE — DISCHARGE PLANNING
[facility]    Action: LSW covering for Georgie GILES. LSW met with pt at bedside to collect signature for COBRA. Pt provided signature for rounds. Pt informed LSW that she has informed her son and daughter in law [facility]  Dr. Magaña provided signature for COBRA during rounds.   LSW placed COBRA in pt's chart.     Barriers to Discharge: None    Plan: LSW to assist as needed.

## 2019-12-27 NOTE — H&P
REHABILITATION HISTORY AND PHYSICAL/POST ADMISSION PHYSICAL EVALUATION    Date of Admission: 12/27/2019  Marilyn aSlinas  RH05/02    [facility] Code / Diagnosis to Support: 04.1211 Quadriplegia, Incomplete C1-4  Etiologic Diagnosis: Central cord syndrome (HCC)    CC: Neck pain, bilateral weakness, left foot pain    HPI:  Adapted from Dr. Gregory's PM&R Consult on   The patient is a 54 y.o. female with a past medical history of chronic back and neck pain, presented on 12/19/2019  1:32 PM with increasing weakness and bilateral lower extremities and upper extremities, worsened after ground-level fall.  She denies any loss of consciousness.  She was found to have disc herniation at C4-5 and C3- C4, with T2 hyperintensity of the cord at this level. she underwent ACDF C3-5 on 12/20/19 with Dr. Linton.  Post-operatively she was on hyperperfusion protocol with a goal of map greater than 85. Patient was transferred out of ICU and has been making progress with therapy.    Patient tolerated transfer to [facility]. She reports she has patchy numbness in her LE as well as around her neck. She reports diffuse weakness without any focal weakness. She reports she has pain in her neck at 10/10 as well as chronic pain in her left foot due hammer toe as well as other injuries.  She denies SOB. Denies NVD. She reports she did have constipation. Denies dysuria or bladder incontinenceDenies easy bruising or bleeding.     REVIEW OF SYSTEMS:     Comprehensive 14 point ROS was reviewed and all were negative except as noted elsewhere in this document.     PMH:  Past Medical History:   Diagnosis Date   • Anxiety    • Arthritis     spine, feet    • Asthma    • Cataract    • Depression    • High cholesterol        PSH:  Past Surgical History:   Procedure Laterality Date   • CERVICAL DISK AND FUSION ANTERIOR N/A 12/20/2019    Procedure: DISCECTOMY, SPINE, CERVICAL, ANTERIOR APPROACH, WITH FUSION - C3-5;  Surgeon: Connor Linton M.D.;  Location: SURGERY  [facility];  Service: Neurosurgery   • CORPECTOMY N/A 12/20/2019    Procedure: CORPECTOMY;  Surgeon: Connor Linton M.D.;  Location: SURGERY [facility];  Service: Neurosurgery   • VAGINAL HYSTERECTOMY SCOPE TOTAL N/A 10/3/2017    Procedure: VAGINAL HYSTERECTOMY SCOPE TOTAL;  Surgeon: Hudson Driscoll M.D.;  Location: SURGERY SAME DAY [facility];  Service: Gynecology   • SALPINGECTOMY Bilateral 10/3/2017    Procedure: SALPINGECTOMY;  Surgeon: Hudson Driscoll M.D.;  Location: SURGERY SAME DAY [facility];  Service: Gynecology   • OOPHORECTOMY Bilateral 10/3/2017    Procedure: OOPHORECTOMY;  Surgeon: Hudson Driscoll M.D.;  Location: SURGERY SAME DAY [facility];  Service: Gynecology   • ANTERIOR AND POSTERIOR REPAIR Bilateral 10/3/2017    Procedure: ANTERIOR AND POSTERIOR REPAIR;  Surgeon: Hudson Driscoll M.D.;  Location: SURGERY SAME DAY [facility];  Service: Gynecology   • ENTEROCELE REPAIR N/A 10/3/2017    Procedure: ENTEROCELE REPAIR, PERINEOPLASTY;  Surgeon: Hudson Driscoll M.D.;  Location: SURGERY SAME DAY [facility];  Service: Gynecology   • BLADDER SLING FEMALE N/A 10/3/2017    Procedure: BLADDER SLING FEMALE TOT, CYSTOSCOPY;  Surgeon: Hudson Driscoll M.D.;  Location: SURGERY SAME DAY [facility];  Service: Gynecology   • VAGINAL SUSPENSION N/A 10/3/2017    Procedure: VAGINAL SUSPENSION SACROSPINOUS VAULT POSSIBLE;  Surgeon: Hudson Driscoll M.D.;  Location: SURGERY SAME DAY [facility];  Service: Gynecology   • OTHER Left 2005    hammertoe x2   • EYE SURGERY  1972    for lazy eye   • LUMPECTOMY         FAMILY HISTORY:  Family History   Problem Relation Age of Onset   • Cancer Mother    • Alcohol/Drug Mother    • Cancer Brother    • Diabetes Maternal Aunt          MEDICATIONS:  Current Facility-Administered Medications   Medication Dose   • albuterol inhaler 2 Puff  2 Puff   • baclofen (LIORESAL) tablet 10 mg  10 mg   • buPROPion SR (WELLBUTRIN-SR) tablet 150 mg  150 mg   • [START ON  12/28/2019] enoxaparin (LOVENOX) inj 40 mg  40 mg   • famotidine (PEPCID) tablet 20 mg  20 mg   • gabapentin (NEURONTIN) capsule 600 mg  600 mg   • methocarbamol (ROBAXIN) tablet 500 mg  500 mg   • midodrine (PROAMATINE) tablet 15 mg  15 mg   • oxybutynin SR (DITROPAN-XL) tablet 15 mg  15 mg   • venlafaxine XR (EFFEXOR XR) capsule 75 mg  75 mg   • benzocaine-menthol (CEPACOL) lozenge 1 Lozenge  1 Lozenge   • Respiratory Care per Protocol     • Pharmacy Consult Request ...Pain Management Review 1 Each  1 Each   • oxyCODONE immediate-release (ROXICODONE) tablet 5 mg  5 mg   • oxyCODONE immediate release (ROXICODONE) tablet 10 mg  10 mg   • hydrALAZINE (APRESOLINE) tablet 25 mg  25 mg   • acetaminophen (TYLENOL) tablet 650 mg  650 mg   • senna-docusate (PERICOLACE or SENOKOT S) 8.6-50 MG per tablet 2 Tab  2 Tab    And   • polyethylene glycol/lytes (MIRALAX) PACKET 1 Packet  1 Packet    And   • magnesium hydroxide (MILK OF MAGNESIA) suspension 30 mL  30 mL    And   • bisacodyl (DULCOLAX) suppository 10 mg  10 mg   • artificial tears ophthalmic solution 1 Drop  1 Drop   • benzocaine-menthol (CEPACOL) lozenge 1 Lozenge  1 Lozenge   • mag hydrox-al hydrox-simeth (MAALOX PLUS ES or MYLANTA DS) suspension 20 mL  20 mL   • ondansetron (ZOFRAN ODT) dispertab 4 mg  4 mg    Or   • ondansetron (ZOFRAN) syringe/vial injection 4 mg  4 mg   • traZODone (DESYREL) tablet 50 mg  50 mg   • sodium chloride (OCEAN) 0.65 % nasal spray 2 Spray  2 Spray       ALLERGIES:  Codeine    PSYCHOSOCIAL HISTORY:  Housing / Facility: 1 Story House  Steps Into Home: 1  Lives with - Patient's Self Care Capacity: Alone and Able to Care For Self  Equipment Owned: None     Prior Level of Function / Living Situation:   Physical Therapy: Prior Services: None  Housing / Facility: 1 Story House  Steps Into Home: 1  Bathroom Set up: Bathtub / Shower Combination  Equipment Owned: None  Lives with - Patient's Self Care Capacity: Alone and Able to Care For  Self  Bed Mobility: Independent  Transfer Status: Independent  Ambulation: Independent  Distance Ambulation (Feet): (community)  Assistive Devices Used: None  Stairs: Independent  Current Level of Function:   Level Of Assist: Minimal Assist(3-4 steps only)  Assistive Device: Hand Held Assist  Distance (Feet): 2  Weight Bearing Status: FWB  Supine to Sit: Minimal Assist  Sit to Supine: (up to chair)  Scooting: Minimal Assist  Rolling: Minimal Assist to Rt.  Sit to Stand: Minimal Assist  Bed, Chair, Wheelchair Transfer: Minimal Assist  Transfer Method: Stand Pivot  Sitting in Chair: up to chair post  Sitting Edge of Bed: 15min   Standin-2min  Occupational Therapy:   Self Feeding: Independent  Grooming / Hygiene: Independent  Bathing: Independent  Dressing: Independent  Toileting: Independent  Medication Management: Independent  Laundry: Independent  Kitchen Mobility: Independent  Finances: Independent  Home Management: Independent  Shopping: Independent  Prior Level Of Mobility: Independent Without Device in Community, Independent With Device in Home  Driving / Transportation: Driving Independent  Prior Services: None  Housing / Facility: [address]  Current Level of Function:   Eating: Supervision  Bathing: Not Tested  Upper Body Dressing: Maximal Assist  Lower Body Dressing: Moderate Assist  Speech Language Pathology:   Assessment : Patient was seen on this date for a clinical swallow evaluation. RN reported pt tolerated lunch without difficulty but MD requesting formal SLP evaluation. Patient AAOx4, vocal quality clear, and speech intact. Oral motor examination WNL. Poor-fair dental quality. PO trials consisted of single ice chips, thin liquids (single and consecutive sips from cup), soft solids in mixed consistency form, and dry solids. Swallow trigger was timely and laryngeal elevation reduced to palpation. No overt s/sx of aspiration with all trials tested. Patient unable to chew more fibrous textures  "(pineapple) and removed un-masticated bolus from oral cavity. Pt reported baseline soft diet due to poor dental quality. At this time, recommend slight downgrade to D3/thin liquid given assistance with tray set up and monitoring.  Problem List: Dysphagia  Diet / Liquid Recommendation: Dysphagia II, Thin Liquid  Comments: Pt seen this date for dysphagia intervention. Pt was awake, alert, followed directions and reporting 9/10 neck pain. Pt reporting odynophagia at 7/10, pt also reporting globus sensation with current diet that is resolved with liquid wash. Pt reports difficulty masticating current diet 2/2 dentition. PO trials of thins via straw, ice cream, and regular (string cheese) assessed. Swallow trigger appreciated to be timely. Subtle throat clear appreciated in 80% of PO trials of thins via straw, however, vocal quality remained clear. Pt demonstrated prolonged mastication of regular trial and c/o globus sensation with regular. No s/sx of aspiration appreciated with trials of ice cream. Limited PO trials assessed 2/2 reports of pain and chills. Pt desatting to low-mid 80s SpO2 outside of PO trials, RN aware. 2/2 c/o globus sensation and difficulty masticating 2/2 dentition, recommend downgrade to D2/thins with adherence to the following strategies: upright at 90* for PO, assistance with mealtray setup, small bites, alternate liquids solids. SLP following.   Rehabilitation Prognosis/Potential: Good  Estimated Length of Stay: 14-21 days       CURRENT LEVEL OF FUNCTION:   Same as level of function prior to admission to [facility]    PHYSICAL EXAM:     VITAL SIGNS:   height is 1.676 m (5' 6\") and weight is 59 kg (130 lb). Her temporal temperature is 36.4 °C (97.5 °F). Her blood pressure is 114/72 and her pulse is 78. Her respiration is 18 and oxygen saturation is 91%.     GENERAL: No apparent distress  HEENT: Normocephalic/atraumatic, EOMI and PERRL  CARDIAC: Regular rate and rhythm, normal " S1, S2   LUNGS: Clear to auscultation   ABDOMINAL: bowel sounds present, soft, nontender and nondistended    EXTREMITIES: no contractures, spasticity, or edema.    NEURO:  Mental status:  A&Ox4 (person, place, date, situation) answers questions appropriately  Speech: fluent, no aphasia or dysarthria  CRANIAL NERVES: CN 2-12 intact    Motor:  Shoulder flexors:  Right -  4/5, Left -  4/5   Elbow flexors:  Right -  4/5, Left -  4/5  Wrist extensors:  Right -  4/5, Left -  4/5  Elbow extensors:  Right -  4/5, Left -  4/5  Finger flexors:  Right -  4/5, Left -  4/5  Finger abductors:  Right -  3/5, Left -  4/5  Hip flexors:  Right -  3/5, Left -  3/5  Knee ext:  Right -  3/5, Left -  3/5  Dorsiflexors:  Right -  3/5, Left -  3/5  EHL:  Right -  4/5, Left -  4/5  Plantar flexors:  Right -  4/5, Left -  4/5   Sensory: Decreased at right C2, Left C3, patchy throughout legs, left leg numb to anterior   DTRs: 3+ in bilateral biceps and patellar tendons    RADIOLOGY:                                  Results for orders placed in visit on 11/14/19   MR-CERVICAL SPINE-W/O    Impression Multilevel degenerative disc disease, uncinate and facet degeneration. There is moderate central canal narrowing at C4-5. There are varying degrees of neural foraminal narrowing at levels as specifically described above. These changes are mildly   progressed from comparison.           Results for orders placed in visit on 11/14/19   MR-LUMBAR SPINE-W/O    Impression 1.  Multilevel degenerative disc disease and facet degeneration. There is mild central canal narrowing at L3-4 and borderline central canal narrowing at L4-5.    2.  There are degrees of neural foraminal narrowing at levels as specifically described above.    3.  Interval improvement in previously seen disc extrusion at the L3-4 level.                Results for orders placed in visit on 11/14/19   MR-LUMBAR SPINE-W/O    Impression 1.  Multilevel degenerative disc disease and facet  degeneration. There is mild central canal narrowing at L3-4 and borderline central canal narrowing at L4-5.    2.  There are degrees of neural foraminal narrowing at levels as specifically described above.    3.  Interval improvement in previously seen disc extrusion at the L3-4 level.                                    LABS:  Recent Labs     12/25/19 0630 12/26/19  0336 12/27/19  0325   SODIUM 139 138 138   POTASSIUM 4.5 3.6 4.0   CHLORIDE 101 102 103   CO2 29 28 27   GLUCOSE 145* 124* 82   BUN 18 26* 22   CREATININE 0.67 0.69 0.65   CALCIUM 9.9 9.1 8.9     Recent Labs     12/25/19 0630 12/26/19  0336 12/27/19  0325   WBC 8.5 11.9* 7.8   RBC 4.99 4.11* 4.01*   HEMOGLOBIN 15.2 12.6 12.4   HEMATOCRIT 45.9 37.5 37.0   MCV 92.0 91.2 92.3   MCH 30.5 30.7 30.9   MCHC 33.1* 33.6 33.5*   RDW 45.2 45.7 46.2   PLATELETCT 389 338 318   MPV 9.6 9.6 9.7             PRIMARY REHAB DIAGNOSIS:    This patient is a 54 y.o. female admitted for acute inpatient rehabilitation with Central cord syndrome (HCC).    IMPAIRMENTS:   ADLs/IADLs  Mobility    SECONDARY DIAGNOSIS/MEDICAL CO-MORBIDITIES AFFECTING FUNCTION:  Hypotension  Muscle spasticity  Chronic pain  Tobacco use  Depression    RELEVANT CHANGES SINCE PREADMISSION EVALUATION:    Status unchanged    The patient's rehabilitation potential is Very Good  The patient's medical prognosis is good    PLAN:   Discussion and Recommendations:   1. The patient requires an acute inpatient rehabilitation program with a coordinated program of care at an intensity and frequency not available at a lower level of care. This recommendation is substantiated by the patient's medical physicians who recommend that the patient's intervention and assessment of medical issues needs to be done at an acute level of care for patient's safety and maximum outcome.   2. A coordinated program of care will be supplied by an interdisciplinary team of physical therapy, occupational therapy, rehab physician, rehab  nursing, and, if needed, speech therapy and rehab psychology. Rehab team presents a patient-specific rehabilitation and education program concentrating on prevention of future problems related to accessibility, mobility, skin, bowel, bladder, sexuality, and psychosocial and medical/surgical problems.   3. Need for Rehabilitation Physician: The rehab physician will be evaluating the patient on a multi-weekly basis to help coordinate the program of care. The rehab physician communicates between medical physicians, therapists, and nurses to maximize the patient's potential outcome. Specific areas in which the rehab physician will be providing daily assessment include the following:   A. Assessing the patient's heart rate and blood pressure response (vitals monitoring) to activity and making adjustments in medications or conservative measures as needed.   B. The rehab physician will be assessing the frequency at which the program can be increased to allow the patient to reach optimal functional outcome.   C. The rehab physician will also provide assessments in daily skin care, especially in light of patient's impairments in mobility.   D. The rehab physician will provide special expertise in understanding how to work with functional impairment and recommend appropriate interventions, compensatory techniques, and education that will facilitate the patient's outcome.   4. Rehab R.N.   The rehab RN will be working with patient to carry over in room mobility and activities of daily living when the patient is not in 3 hours of skilled therapy. Rehab nursing will be working in conjunction with rehab physician to address all the medical issues above and continue to assess laboratory work and discuss abnormalities with the treating physicians, assess vitals, and response to activity, and discuss and report abnormalities with the rehab physician. Rehab RN will also continue daily skin care, supervise bladder/bowel program,  instruct in medication administration, and ensure patient safety.   5. Rehab Therapy: Therapies to treat at intensity and frequency of (may change after completion of evaluation by all therapeutic disciplines):       PT:  Physical therapy to address mobility, transfer, gait training and evaluation for adaptive equipment needs 1 and 1/2 hour/day at least 5 days/week for the duration of the ELOS (see below)       OT:  Occupational therapy to address ADLs, self-care, home management training, functional mobility/transfers and assistive device evaluation, and community re-integration 1 and 1/2 hour/day at least 5 days/week for the duration of the ELOS (see below).     Medical management / Rehabilitation Issues/ Adverse Potential as part of rehabilitation plan     REHABILITATION ISSUES/ADVERSE POTENTIAL:  1.  Central cord syndrome - s/p fall at home with worsening weakness, found to have severe stenosis. Patient underwent C3-C5 ACDF with Dr. Linton on 12/20/19. Patient demonstrates functional deficits in strength, balance, coordination, and ADL's. Patient is admitted to [facility] for comprehensive rehabilitation therapy as described below.   Rehabilitation nursing monitors bowel and bladder control, educates on medication administration, co-morbidities and monitors patient safety.    2.  Neurostimulants: None at this time but continue to assess daily for need to initiate should status change.    3.  DVT prophylaxis:  Patient is on Lovenox for anticoagulation upon transfer. Encourage OOB. Monitor daily for signs and symptoms of DVT including but not limited to swelling and pain to prevent the development of DVT that may interfere with therapies.    4.  GI prophylaxis:  On pepcid to prevent gastritis/dyspepsia which may interfere with therapies.    5.  Pain: No issues with pain currently / Controlled with APAP    6.  Nutrition/Dysphagia: Dietician monitors nutrient intake, recommend supplements prn and  provide nutrition education to pt/family to promote optimal nutrition for wound healing/recovery.     7.  Bladder/bowel:  Start bowel and bladder program as described below, to prevent constipation, urinary retention (which may lead to UTI), and urinary incontinence (which will impact upon pt's functional independence).   - Post void bladder scans, I&O cath for PVRs >400  - up to commode after meal     8.  Skin/dermal ulcer prophylaxis: Monitor for new skin conditions with q.2 h. turns as required to prevent the development of skin breakdown.     9.  Cognition/Behavior:  Psychologist Dr. Bright provides adjustment counseling to illness and psychosocial barriers that may be potential barriers to rehabilitation.     10. Respiratory therapy: RT performs O2 management prn, breathing retraining, pulmonary hygiene and bronchospasm management prn to optimize participation in therapies.     MEDICAL CO-MORBIDITIES/ADVERSE POTENTIAL AFFECTING FUNCTION:   Central cord syndrome / C2 AISD - s/p fall at home with worsening weakness, found to have severe stenosis. Patient underwent C3-C5 ACDF with Dr. Linton on 12/20/19  -PT and OT for mobility and ADLs    Neurogenic bowel/bladder - Patient without incontinence. Continue to monitor. Patient on Oxybutynin 15 mg BID on transfer. Consider BTP if constant constipation    Neuropathy - Continue Gabapentin 600 mg BID. Consider increase    Spasticity - Patient on Baclofen 10 mg TID on transfer, mostly affecting proximal LE.    Hypotension - Most likely 2/2 SCI. Continue Midodrine 15 mg TID and wean as tolerated     Depression/Anxiety - Patient on Buproprion  BID and Venlafaxine 75 mg     Hammer toes - Wound care consult.     Skin - Patient with patchy sensation. Q2H turning and good monitoring of skin.     Tobacco abuse - Will need smoking cessation counseling.     GI Ppx - Patient on pepcid on transfer.     DVT ppx - Patient on Lovenox on transfer.    I personally performed a  complete drug regimen review and no potential clinically significant medication issues were identified.     Pt was seen today for 77 min, and entire time spent in face-to-face contact was >50% in counseling and coordination of care as detailed in A/P above.        GOALS/EXPECTED LEVEL OF FUNCTION BASED ON CURRENT MEDICAL AND FUNCTIONAL STATUS (may change based on patient's medical status and rate of impairment recovery):  Transfers:   Modified Independent  Mobility/Gait:   Modified Independent  ADL's:   Modified Independent    DISPOSITION: Discharge to pre-morbid independent living setting with the supportive care of patient's family.    ELOS: 10-17 days

## 2019-12-27 NOTE — PROGRESS NOTES
Patient admitted to facility at 1500 via wheelchair; accompanied by hospital transport.  Patient assisted to room and positioned in bed for comfort and safety; call light within reach.  Patient assisted with stowing belongings and oriented to room and facility.  Admission assessment performed and documented in computer.  Admission paperwork completed; signed copies placed in chart. 2 RN skin check by this RN and Whitney MEEK, photos uploaded in medica manager.  Will continue to monitor.

## 2019-12-27 NOTE — DISCHARGE INSTRUCTIONS
Discharge Instructions    Discharged to other by [facility] with escort. Discharged via walking, hospital escort: Yes.  Special equipment needed: C-Collar    Be sure to schedule a follow-up appointment with your primary care doctor or any specialists as instructed.     Discharge Plan:   Smoking Cessation Offered: Patient Refused  Influenza Vaccine Indication: Patient Refuses    I understand that a diet low in cholesterol, fat, and sodium is recommended for good health. Unless I have been given specific instructions below for another diet, I accept this instruction as my diet prescription.   Other diet: Regular       Special Instructions: None    · Is patient discharged on Warfarin / Coumadin?   No       Depression / Suicide Risk    As you are discharged from this [facility], it is important to learn how to keep safe from harming yourself.    Recognize the warning signs:  · Abrupt changes in personality, positive or negative- including increase in energy   · Giving away possessions  · Change in eating patterns- significant weight changes-  positive or negative  · Change in sleeping patterns- unable to sleep or sleeping all the time   · Unwillingness or inability to communicate  · Depression  · Unusual sadness, discouragement and loneliness  · Talk of wanting to die  · Neglect of personal appearance   · Rebelliousness- reckless behavior  · Withdrawal from people/activities they love  · Confusion- inability to concentrate     If you or a loved one observes any of these behaviors or has concerns about self-harm, here's what you can do:  · Talk about it- your feelings and reasons for harming yourself  · Remove any means that you might use to hurt yourself (examples: pills, rope, extension cords, firearm)  · Get professional help from the community (Mental Health, Substance Abuse, psychological counseling)  · Do not be alone:Call your Safe Contact- someone whom you trust who will be there for you.  · Call your  local CRISIS HOTLINE 480-0686 or 336-854-7808  · Call your local Children's Mobile Crisis Response Team [place] (684) 502-8156 or www.[employer]  · Call the toll free National Suicide Prevention Hotlines   · National Suicide Prevention Lifeline 743-290-GEVS (7441)  · National Hope Line Network 800-SUICIDE (639-3171)    Leave incision open to air. Dermabond glue will flake off over the next 2-3 weeks.   OK to shower & pat dry  No soaking in hot tubs, baths, pools, etc.  Avoid repetitive bending, lifting over 10lbs, twisting. We encourage you to take frequent walks as tolerated  Do not drive or consume alcohol while taking narcotic pain medications.  Do not take NSAIDs (such as ibuprofen or naproxen) or Aspirin unless you have been told otherwise by Dr Linton's team  Follow up at [facility] Neurosurgery office in 2 weeks. Call with questions or concerns

## 2019-12-27 NOTE — DISCHARGE PLANNING
Patient is eligible for Medicaid Meds to Beds at discharge if they have coverage with [place] Medicaid, Medicaid FFS, Medicaid HMO ([facility]), or [place]. This service is provided through the [facility] if orders are received prior to 4 p.m. Monday through Friday, excluding holidays. Please call [phone] prior to discharge.

## 2019-12-27 NOTE — PROGRESS NOTES
Hospital Medicine Daily Progress Note    Date of Service  12/26/2019    Chief Complaint  54 y.o. female admitted 12/19/2019 with chronic back pain    Hospital Course    Ms. Marilyn Salinas is a 54 y.o. female with a past medical history significant for degenerative disc disease who presented with acute on chronic back pain with decreased mobility and progressive paraplegia.  An MRI from November demonstrated degenerative disc disease and moderate central canal stenosis at C4-5.  The patient was evaluated by neurosurgery with concern for central cord syndrome status post cervical corpectomy on 12/20/2019 by Dr. Ayala.  Patient tolerated the procedure well.  Initially she was in the ICU on a Trevor-Synephrine drip.  Patient was stable for transfer to general medical floor 12/25/2019.  She is continued to progress with physical therapy and [facility] pending insurance approval.        Interval Problem Update  Patient was seen and examined at bedside.  There were no acute events overnight.  I have personally reviewed vitals, labs, and imaging.  Patient denies fever, chills, chest pain, shortness of breath.  Rates neck pain 7/10.  Patient was frustrated because she could not find her call button last night and was trying to undo her SCDs to get to the bathroom.        Consultants/Specialty  Neurosurgery  ICU    Code Status  Full    Disposition  Dissipate discharge to inpatient rehab tomorrow pending insurance approval.    Review of Systems  Review of Systems   Constitutional: Negative for chills and fever.   HENT: Negative for congestion, sinus pain and sore throat.    Eyes: Negative for blurred vision.   Respiratory: Negative for cough, hemoptysis, sputum production, shortness of breath and wheezing.    Cardiovascular: Negative for chest pain, palpitations and leg swelling.   Gastrointestinal: Negative for abdominal pain, blood in stool, constipation, diarrhea, nausea and vomiting.    Genitourinary: Negative for dysuria, frequency, hematuria and urgency.   Musculoskeletal: Positive for back pain and neck pain. Negative for falls.   Skin: Negative for rash.   Neurological: Negative for dizziness, tingling, seizures, loss of consciousness, weakness and headaches.   Psychiatric/Behavioral: Negative for depression and suicidal ideas. The patient is nervous/anxious.    All other systems reviewed and are negative.       Physical Exam  Temp:  [36.6 °C (97.9 °F)-37.1 °C (98.8 °F)] 36.6 °C (97.9 °F)  Pulse:  [56-83] 59  Resp:  [16] 16  BP: (105-116)/(62-70) 113/65  SpO2:  [93 %-96 %] 95 %    Physical Exam  Vitals signs and nursing note reviewed.   Constitutional:       General: She is not in acute distress.     Appearance: Normal appearance.   HENT:      Head: Normocephalic and atraumatic.      Nose: Nose normal.      Mouth/Throat:      Mouth: Mucous membranes are moist.      Pharynx: Oropharynx is clear. No oropharyngeal exudate or posterior oropharyngeal erythema.   Eyes:      Extraocular Movements: Extraocular movements intact.      Conjunctiva/sclera: Conjunctivae normal.   Neck:      Musculoskeletal: Normal range of motion and neck supple.   Cardiovascular:      Rate and Rhythm: Normal rate and regular rhythm.      Pulses: Normal pulses.      Heart sounds: Normal heart sounds. No murmur.   Pulmonary:      Effort: Pulmonary effort is normal. No respiratory distress.      Breath sounds: Normal breath sounds. No stridor. No wheezing or rales.   Abdominal:      General: Abdomen is flat. Bowel sounds are normal. There is no distension.      Palpations: Abdomen is soft. There is no mass.      Tenderness: There is no tenderness.   Skin:     General: Skin is warm.      Capillary Refill: Capillary refill takes less than 2 seconds.   Neurological:      General: No focal deficit present.      Mental Status: She is alert and oriented to person, place, and time. Mental status is at baseline.      Cranial  Nerves: No cranial nerve deficit.      Motor: No weakness (5/5 muscle strength of upper and lower extremities bilaterally).   Psychiatric:         Mood and Affect: Mood normal.         Behavior: Behavior normal.         Fluids  No intake or output data in the 24 hours ending 12/26/19 1727    Laboratory  Recent Labs     12/24/19  0530 12/25/19  0630 12/26/19  0336   WBC 9.9 8.5 11.9*   RBC 4.67 4.99 4.11*   HEMOGLOBIN 14.3 15.2 12.6   HEMATOCRIT 42.8 45.9 37.5   MCV 91.6 92.0 91.2   MCH 30.6 30.5 30.7   MCHC 33.4* 33.1* 33.6   RDW 45.6 45.2 45.7   PLATELETCT 338 389 338   MPV 9.8 9.6 9.6     Recent Labs     12/24/19  0530 12/25/19  0630 12/26/19  0336   SODIUM 136 139 138   POTASSIUM 4.3 4.5 3.6   CHLORIDE 106 101 102   CO2 22 29 28   GLUCOSE 104* 145* 124*   BUN 10 18 26*   CREATININE 0.51 0.67 0.69   CALCIUM 8.8 9.9 9.1                   Imaging  [id number] VIEW   Final Result         1.  Air-filled reactive small bowel loops in the left abdomen, appearance suggests ileus or enteritis, recommend radiographic follow-up to resolution to exclude progression to obstructive changes.   2.  Moderate stool in the colon suggests changes of constipation      IR-MIDLINE CATHETER INSERTION WO GUIDANCE > AGE 3   Final Result                  Ultrasound-guided midline placement performed by qualified nursing staff    as above.          DX-CERVICAL SPINE-2 OR 3 VIEWS   Final Result      Digitized intraoperative radiograph is submitted for review.  This examination is not for diagnostic purpose but for guidance during a surgical procedure.      DX-PORTABLE FLUORO > 1 HOUR   Final Result      Portable fluoroscopy utilized for 6 seconds.         INTERPRETING LOCATION: [address] [place] [address], [id number]      OUTSIDE IMAGES-CT CERVICAL SPINE   Final Result           Assessment/Plan  * Central cord syndrome (HCC)- (present on admission)  Assessment & Plan  Central cord injury, she presented with symptoms of paraplegia  Underwent cervical  corpectomy on 12/20/2019, performed by Dr. Linton  Strength improved  PT/OT      Cervical spine pain- (present on admission)  Assessment & Plan  Status post cervical corpectomy 12/20  Pain control better today    Lumbar radiculopathy- (present on admission)  Assessment & Plan  Chronic and stable  Physical therapy recommends subacute rehab.    Idiopathic hypotension  Assessment & Plan  Patient's blood pressure is lower end of normal.  Did require Trevor-Synephrine in the ICU after surgery for higher blood pressure goals.  Trevor-Synephrine has since been discontinued and the patient is on Midodrin.    Anxiety and depression- (present on admission)  Assessment & Plan  Venlafaxine and bupropion  Denies suicidal ideations.  Patient reports her anxiety is well controlled with pot as an outpatient.    Tobacco use- (present on admission)  Assessment & Plan  -counseled for more than 10 minutes on tobacco cessation 10256   -I have ordered nicotine replacement therapy      Mild intermittent asthma- (present on admission)  Assessment & Plan  No acute exacerbation  RT protocol  Supplemental O2 as needed     Gastrointestinal prophylaxis: The patient has no risk factors for developing gastric ulcers or gastritis.  As the patient is tolerating oral intake, GI prophylaxis is not indicated at this time.  Antibiotics: None  Diet: Regular  Code status: Full  Prognosis: Guarded  Risk: The Patient is at HIGH risk for inpatient complications and decompensation secondary to her multiple cormorbidities including cervical compression syndrome status post cervical corpectomy, chronic lower back pain with radiculopathy secondary to degenerative disc disease, anxiety, tobacco abuse, hypotension    I discussed the plan of care with bedside RN as well as on multidisciplinary rounds    Time spent was 34 minutes.  > 50% of the time was spend providing counseling and coordination of care        VTE prophylaxis: Enoxaparin

## 2019-12-27 NOTE — ASSESSMENT & PLAN NOTE
Patient's blood pressure is lower end of normal.  Did require Trevor-Synephrine in the ICU after surgery for higher blood pressure goals.  Trevor-Synephrine has since been discontinued and the patient is on Midodrin.

## 2019-12-27 NOTE — CARE PLAN
Problem: Safety  Goal: Will remain free from injury  Outcome: PROGRESSING AS EXPECTED  Goal: Will remain free from falls  Outcome: PROGRESSING AS EXPECTED   Appropriate safety measures in place. Bed alarm on.    Problem: Bowel/Gastric:  Goal: Normal bowel function is maintained or improved  Outcome: PROGRESSING AS EXPECTED  Goal: Will not experience complications related to bowel motility  Outcome: PROGRESSING AS EXPECTED   Patient not complaining of constipation.     Problem: Psychosocial Needs:  Goal: Level of anxiety will decrease  Outcome: PROGRESSING SLOWER THAN EXPECTED   Patient has had very labile emotions today.

## 2019-12-28 LAB
25(OH)D3 SERPL-MCNC: 23 NG/ML (ref 30–100)
ALBUMIN SERPL BCP-MCNC: 3.5 G/DL (ref 3.2–4.9)
ALBUMIN/GLOB SERPL: 1.5 G/DL
ALP SERPL-CCNC: 75 U/L (ref 30–99)
ALT SERPL-CCNC: 129 U/L (ref 2–50)
ANION GAP SERPL CALC-SCNC: 5 MMOL/L (ref 0–11.9)
AST SERPL-CCNC: 68 U/L (ref 12–45)
BASOPHILS # BLD AUTO: 1 % (ref 0–1.8)
BASOPHILS # BLD: 0.07 K/UL (ref 0–0.12)
BILIRUB SERPL-MCNC: 0.3 MG/DL (ref 0.1–1.5)
BUN SERPL-MCNC: 18 MG/DL (ref 8–22)
CALCIUM SERPL-MCNC: 8.9 MG/DL (ref 8.5–10.5)
CHLORIDE SERPL-SCNC: 103 MMOL/L (ref 96–112)
CO2 SERPL-SCNC: 29 MMOL/L (ref 20–33)
CREAT SERPL-MCNC: 0.72 MG/DL (ref 0.5–1.4)
EOSINOPHIL # BLD AUTO: 0.24 K/UL (ref 0–0.51)
EOSINOPHIL NFR BLD: 3.4 % (ref 0–6.9)
ERYTHROCYTE [DISTWIDTH] IN BLOOD BY AUTOMATED COUNT: 46.5 FL (ref 35.9–50)
GLOBULIN SER CALC-MCNC: 2.4 G/DL (ref 1.9–3.5)
GLUCOSE SERPL-MCNC: 75 MG/DL (ref 65–99)
HCT VFR BLD AUTO: 37.9 % (ref 37–47)
HGB BLD-MCNC: 12.4 G/DL (ref 12–16)
IMM GRANULOCYTES # BLD AUTO: 0.02 K/UL (ref 0–0.11)
IMM GRANULOCYTES NFR BLD AUTO: 0.3 % (ref 0–0.9)
LYMPHOCYTES # BLD AUTO: 3.99 K/UL (ref 1–4.8)
LYMPHOCYTES NFR BLD: 56.7 % (ref 22–41)
MCH RBC QN AUTO: 30.5 PG (ref 27–33)
MCHC RBC AUTO-ENTMCNC: 32.7 G/DL (ref 33.6–35)
MCV RBC AUTO: 93.1 FL (ref 81.4–97.8)
MONOCYTES # BLD AUTO: 0.65 K/UL (ref 0–0.85)
MONOCYTES NFR BLD AUTO: 9.2 % (ref 0–13.4)
NEUTROPHILS # BLD AUTO: 2.07 K/UL (ref 2–7.15)
NEUTROPHILS NFR BLD: 29.4 % (ref 44–72)
NRBC # BLD AUTO: 0 K/UL
NRBC BLD-RTO: 0 /100 WBC
PLATELET # BLD AUTO: 324 K/UL (ref 164–446)
PMV BLD AUTO: 9.6 FL (ref 9–12.9)
POTASSIUM SERPL-SCNC: 4 MMOL/L (ref 3.6–5.5)
PROT SERPL-MCNC: 5.9 G/DL (ref 6–8.2)
RBC # BLD AUTO: 4.07 M/UL (ref 4.2–5.4)
SODIUM SERPL-SCNC: 137 MMOL/L (ref 135–145)
TSH SERPL DL<=0.005 MIU/L-ACNC: 3.94 UIU/ML (ref 0.38–5.33)
WBC # BLD AUTO: 7 K/UL (ref 4.8–10.8)

## 2019-12-28 PROCEDURE — 85025 COMPLETE CBC W/AUTO DIFF WBC: CPT

## 2019-12-28 PROCEDURE — 700102 HCHG RX REV CODE 250 W/ 637 OVERRIDE(OP): Performed by: PHYSICAL MEDICINE & REHABILITATION

## 2019-12-28 PROCEDURE — A9270 NON-COVERED ITEM OR SERVICE: HCPCS | Performed by: PHYSICAL MEDICINE & REHABILITATION

## 2019-12-28 PROCEDURE — 770010 HCHG ROOM/CARE - REHAB SEMI PRIVAT*

## 2019-12-28 PROCEDURE — 82306 VITAMIN D 25 HYDROXY: CPT

## 2019-12-28 PROCEDURE — 97116 GAIT TRAINING THERAPY: CPT

## 2019-12-28 PROCEDURE — 99233 SBSQ HOSP IP/OBS HIGH 50: CPT | Performed by: PHYSICAL MEDICINE & REHABILITATION

## 2019-12-28 PROCEDURE — 97162 PT EVAL MOD COMPLEX 30 MIN: CPT

## 2019-12-28 PROCEDURE — 36415 COLL VENOUS BLD VENIPUNCTURE: CPT

## 2019-12-28 PROCEDURE — 84443 ASSAY THYROID STIM HORMONE: CPT

## 2019-12-28 PROCEDURE — 97110 THERAPEUTIC EXERCISES: CPT

## 2019-12-28 PROCEDURE — 97535 SELF CARE MNGMENT TRAINING: CPT

## 2019-12-28 PROCEDURE — 80053 COMPREHEN METABOLIC PANEL: CPT

## 2019-12-28 PROCEDURE — 700111 HCHG RX REV CODE 636 W/ 250 OVERRIDE (IP): Performed by: PHYSICAL MEDICINE & REHABILITATION

## 2019-12-28 PROCEDURE — 97167 OT EVAL HIGH COMPLEX 60 MIN: CPT

## 2019-12-28 PROCEDURE — 97530 THERAPEUTIC ACTIVITIES: CPT

## 2019-12-28 RX ORDER — MIDODRINE HYDROCHLORIDE 10 MG/1
10 TABLET ORAL
Status: DISCONTINUED | OUTPATIENT
Start: 2019-12-28 | End: 2019-12-30

## 2019-12-28 RX ORDER — GABAPENTIN 300 MG/1
600 CAPSULE ORAL 3 TIMES DAILY
Status: DISCONTINUED | OUTPATIENT
Start: 2019-12-28 | End: 2019-12-30

## 2019-12-28 RX ADMIN — MIDODRINE HYDROCHLORIDE 15 MG: 10 TABLET ORAL at 11:47

## 2019-12-28 RX ADMIN — FAMOTIDINE 20 MG: 20 TABLET ORAL at 08:23

## 2019-12-28 RX ADMIN — NICOTINE 14 MG: 14 PATCH TRANSDERMAL at 05:47

## 2019-12-28 RX ADMIN — ONDANSETRON 4 MG: 4 TABLET, ORALLY DISINTEGRATING ORAL at 08:17

## 2019-12-28 RX ADMIN — BACLOFEN 10 MG: 10 TABLET ORAL at 20:22

## 2019-12-28 RX ADMIN — GABAPENTIN 600 MG: 300 CAPSULE ORAL at 08:24

## 2019-12-28 RX ADMIN — MIDODRINE HYDROCHLORIDE 10 MG: 10 TABLET ORAL at 17:19

## 2019-12-28 RX ADMIN — GABAPENTIN 600 MG: 300 CAPSULE ORAL at 20:21

## 2019-12-28 RX ADMIN — BUPROPION HYDROCHLORIDE 150 MG: 150 TABLET, EXTENDED RELEASE ORAL at 20:22

## 2019-12-28 RX ADMIN — FAMOTIDINE 20 MG: 20 TABLET ORAL at 20:22

## 2019-12-28 RX ADMIN — BUPROPION HYDROCHLORIDE 150 MG: 150 TABLET, EXTENDED RELEASE ORAL at 08:23

## 2019-12-28 RX ADMIN — OXYCODONE HYDROCHLORIDE 10 MG: 10 TABLET ORAL at 07:26

## 2019-12-28 RX ADMIN — BACLOFEN 10 MG: 10 TABLET ORAL at 16:02

## 2019-12-28 RX ADMIN — MAGNESIUM HYDROXIDE 30 ML: 400 SUSPENSION ORAL at 16:02

## 2019-12-28 RX ADMIN — BACLOFEN 10 MG: 10 TABLET ORAL at 08:24

## 2019-12-28 RX ADMIN — ENOXAPARIN SODIUM 40 MG: 40 INJECTION, SOLUTION INTRAVENOUS; SUBCUTANEOUS at 08:24

## 2019-12-28 RX ADMIN — VENLAFAXINE HYDROCHLORIDE 75 MG: 75 CAPSULE, EXTENDED RELEASE ORAL at 20:22

## 2019-12-28 RX ADMIN — ZOLPIDEM TARTRATE 10 MG: 5 TABLET ORAL at 22:36

## 2019-12-28 RX ADMIN — POLYETHYLENE GLYCOL 3350 1 PACKET: 17 POWDER, FOR SOLUTION ORAL at 22:12

## 2019-12-28 RX ADMIN — MIDODRINE HYDROCHLORIDE 15 MG: 10 TABLET ORAL at 08:23

## 2019-12-28 RX ADMIN — GABAPENTIN 600 MG: 300 CAPSULE ORAL at 16:01

## 2019-12-28 RX ADMIN — OXYBUTYNIN CHLORIDE 15 MG: 5 TABLET, EXTENDED RELEASE ORAL at 20:21

## 2019-12-28 RX ADMIN — SENNOSIDES AND DOCUSATE SODIUM 2 TABLET: 8.6; 5 TABLET ORAL at 08:23

## 2019-12-28 RX ADMIN — SENNOSIDES AND DOCUSATE SODIUM 2 TABLET: 8.6; 5 TABLET ORAL at 20:22

## 2019-12-28 RX ADMIN — BISACODYL 10 MG: 10 SUPPOSITORY RECTAL at 22:12

## 2019-12-28 RX ADMIN — OXYBUTYNIN CHLORIDE 15 MG: 5 TABLET, EXTENDED RELEASE ORAL at 08:24

## 2019-12-28 ASSESSMENT — BRIEF INTERVIEW FOR MENTAL STATUS (BIMS)
INITIAL REPETITION OF BED BLUE SOCK - FIRST ATTEMPT: 3
WHAT YEAR IS IT: CORRECT
ASKED TO RECALL SOCK: YES, NO CUE REQUIRED
WHAT DAY OF THE WEEK IS IT: CORRECT
WHAT MONTH IS IT: ACCURATE WITHIN 5 DAYS
ASKED TO RECALL BLUE: YES, NO CUE REQUIRED
BIMS SUMMARY SCORE: 15
ASKED TO RECALL BED: YES, NO CUE REQUIRED

## 2019-12-28 ASSESSMENT — ACTIVITIES OF DAILY LIVING (ADL): TOILETING: INDEPENDENT

## 2019-12-28 NOTE — PROGRESS NOTES
"Rehab Progress Note     Encounter Date: 12/28/2019    CC: Central cord syndrome, neck pain    Interval Events (Subjective)  Patient sitting up in room. She reports she is having a hard time with the C collar. She reports it makes her life more difficult. Discussed precautions and she reports she understands why it is necessary. Reviewed admission labs including elevated LFTs, low protein, and low vitamin D.  Discussed about increased PO intake. Discussed avoidance of alcohol and high dose Tylenol. Discussed starting Vitamin D. Discussed about borderline low BP in patients with SCI. Denies NVD.     Objective:  VITAL SIGNS: /62   Pulse 72   Temp 36.5 °C (97.7 °F) (Oral)   Resp 18   Ht 1.676 m (5' 6\")   Wt 59 kg (130 lb)   LMP 01/11/2017   SpO2 93%   BMI 20.98 kg/m²   Gen: NAD  Psych: Mood and affect appropriate  CV: RRR, no edema  Resp: CTAB, no upper airway sounds  Abd: NTND  Neuro: AOx4, 4/5 BUE    Recent Results (from the past 72 hour(s))   CBC WITH DIFFERENTIAL    Collection Time: 12/26/19  3:36 AM   Result Value Ref Range    WBC 11.9 (H) 4.8 - 10.8 K/uL    RBC 4.11 (L) 4.20 - 5.40 M/uL    Hemoglobin 12.6 12.0 - 16.0 g/dL    Hematocrit 37.5 37.0 - 47.0 %    MCV 91.2 81.4 - 97.8 fL    MCH 30.7 27.0 - 33.0 pg    MCHC 33.6 33.6 - 35.0 g/dL    RDW 45.7 35.9 - 50.0 fL    Platelet Count 338 164 - 446 K/uL    MPV 9.6 9.0 - 12.9 fL    Neutrophils-Polys 59.60 44.00 - 72.00 %    Lymphocytes 30.50 22.00 - 41.00 %    Monocytes 8.50 0.00 - 13.40 %    Eosinophils 0.40 0.00 - 6.90 %    Basophils 0.40 0.00 - 1.80 %    Immature Granulocytes 0.60 0.00 - 0.90 %    Nucleated RBC 0.00 /100 WBC    Neutrophils (Absolute) 7.08 2.00 - 7.15 K/uL    Lymphs (Absolute) 3.63 1.00 - 4.80 K/uL    Monos (Absolute) 1.01 (H) 0.00 - 0.85 K/uL    Eos (Absolute) 0.05 0.00 - 0.51 K/uL    Baso (Absolute) 0.05 0.00 - 0.12 K/uL    Immature Granulocytes (abs) 0.07 0.00 - 0.11 K/uL    NRBC (Absolute) 0.00 K/uL   Basic Metabolic Panel    " Collection Time: 12/26/19  3:36 AM   Result Value Ref Range    Sodium 138 135 - 145 mmol/L    Potassium 3.6 3.6 - 5.5 mmol/L    Chloride 102 96 - 112 mmol/L    Co2 28 20 - 33 mmol/L    Glucose 124 (H) 65 - 99 mg/dL    Bun 26 (H) 8 - 22 mg/dL    Creatinine 0.69 0.50 - 1.40 mg/dL    Calcium 9.1 8.5 - 10.5 mg/dL    Anion Gap 8.0 0.0 - 11.9   ESTIMATED GFR    Collection Time: 12/26/19  3:36 AM   Result Value Ref Range    GFR If African American >60 >60 mL/min/1.73 m 2    GFR If Non African American >60 >60 mL/min/1.73 m 2   CBC WITH DIFFERENTIAL    Collection Time: 12/27/19  3:25 AM   Result Value Ref Range    WBC 7.8 4.8 - 10.8 K/uL    RBC 4.01 (L) 4.20 - 5.40 M/uL    Hemoglobin 12.4 12.0 - 16.0 g/dL    Hematocrit 37.0 37.0 - 47.0 %    MCV 92.3 81.4 - 97.8 fL    MCH 30.9 27.0 - 33.0 pg    MCHC 33.5 (L) 33.6 - 35.0 g/dL    RDW 46.2 35.9 - 50.0 fL    Platelet Count 318 164 - 446 K/uL    MPV 9.7 9.0 - 12.9 fL    Neutrophils-Polys 27.30 (L) 44.00 - 72.00 %    Lymphocytes 60.40 (H) 22.00 - 41.00 %    Monocytes 8.80 0.00 - 13.40 %    Eosinophils 2.30 0.00 - 6.90 %    Basophils 0.80 0.00 - 1.80 %    Immature Granulocytes 0.40 0.00 - 0.90 %    Nucleated RBC 0.00 /100 WBC    Neutrophils (Absolute) 2.12 2.00 - 7.15 K/uL    Lymphs (Absolute) 4.69 1.00 - 4.80 K/uL    Monos (Absolute) 0.68 0.00 - 0.85 K/uL    Eos (Absolute) 0.18 0.00 - 0.51 K/uL    Baso (Absolute) 0.06 0.00 - 0.12 K/uL    Immature Granulocytes (abs) 0.03 0.00 - 0.11 K/uL    NRBC (Absolute) 0.00 K/uL   MAGNESIUM    Collection Time: 12/27/19  3:25 AM   Result Value Ref Range    Magnesium 1.8 1.5 - 2.5 mg/dL   PHOSPHORUS    Collection Time: 12/27/19  3:25 AM   Result Value Ref Range    Phosphorus 3.3 2.5 - 4.5 mg/dL   Basic Metabolic Panel    Collection Time: 12/27/19  3:25 AM   Result Value Ref Range    Sodium 138 135 - 145 mmol/L    Potassium 4.0 3.6 - 5.5 mmol/L    Chloride 103 96 - 112 mmol/L    Co2 27 20 - 33 mmol/L    Glucose 82 65 - 99 mg/dL    Bun 22 8 - 22  mg/dL    Creatinine 0.65 0.50 - 1.40 mg/dL    Calcium 8.9 8.5 - 10.5 mg/dL    Anion Gap 8.0 0.0 - 11.9   ESTIMATED GFR    Collection Time: 12/27/19  3:25 AM   Result Value Ref Range    GFR If African American >60 >60 mL/min/1.73 m 2    GFR If Non African American >60 >60 mL/min/1.73 m 2   CBC with Differential    Collection Time: 12/28/19  5:12 AM   Result Value Ref Range    WBC 7.0 4.8 - 10.8 K/uL    RBC 4.07 (L) 4.20 - 5.40 M/uL    Hemoglobin 12.4 12.0 - 16.0 g/dL    Hematocrit 37.9 37.0 - 47.0 %    MCV 93.1 81.4 - 97.8 fL    MCH 30.5 27.0 - 33.0 pg    MCHC 32.7 (L) 33.6 - 35.0 g/dL    RDW 46.5 35.9 - 50.0 fL    Platelet Count 324 164 - 446 K/uL    MPV 9.6 9.0 - 12.9 fL    Neutrophils-Polys 29.40 (L) 44.00 - 72.00 %    Lymphocytes 56.70 (H) 22.00 - 41.00 %    Monocytes 9.20 0.00 - 13.40 %    Eosinophils 3.40 0.00 - 6.90 %    Basophils 1.00 0.00 - 1.80 %    Immature Granulocytes 0.30 0.00 - 0.90 %    Nucleated RBC 0.00 /100 WBC    Neutrophils (Absolute) 2.07 2.00 - 7.15 K/uL    Lymphs (Absolute) 3.99 1.00 - 4.80 K/uL    Monos (Absolute) 0.65 0.00 - 0.85 K/uL    Eos (Absolute) 0.24 0.00 - 0.51 K/uL    Baso (Absolute) 0.07 0.00 - 0.12 K/uL    Immature Granulocytes (abs) 0.02 0.00 - 0.11 K/uL    NRBC (Absolute) 0.00 K/uL   Comp Metabolic Panel (CMP)    Collection Time: 12/28/19  5:12 AM   Result Value Ref Range    Sodium 137 135 - 145 mmol/L    Potassium 4.0 3.6 - 5.5 mmol/L    Chloride 103 96 - 112 mmol/L    Co2 29 20 - 33 mmol/L    Anion Gap 5.0 0.0 - 11.9    Glucose 75 65 - 99 mg/dL    Bun 18 8 - 22 mg/dL    Creatinine 0.72 0.50 - 1.40 mg/dL    Calcium 8.9 8.5 - 10.5 mg/dL    AST(SGOT) 68 (H) 12 - 45 U/L    ALT(SGPT) 129 (H) 2 - 50 U/L    Alkaline Phosphatase 75 30 - 99 U/L    Total Bilirubin 0.3 0.1 - 1.5 mg/dL    Albumin 3.5 3.2 - 4.9 g/dL    Total Protein 5.9 (L) 6.0 - 8.2 g/dL    Globulin 2.4 1.9 - 3.5 g/dL    A-G Ratio 1.5 g/dL   TSH with Reflex to FT4    Collection Time: 12/28/19  5:12 AM   Result Value Ref  Range    TSH 3.940 0.380 - 5.330 uIU/mL   Vitamin D, 25-hydroxy (blood)    Collection Time: 12/28/19  5:12 AM   Result Value Ref Range    25-Hydroxy   Vitamin D 25 23 (L) 30 - 100 ng/mL   ESTIMATED GFR    Collection Time: 12/28/19  5:12 AM   Result Value Ref Range    GFR If African American >60 >60 mL/min/1.73 m 2    GFR If Non African American >60 >60 mL/min/1.73 m 2       Current Facility-Administered Medications   Medication Frequency   • albuterol inhaler 2 Puff Q4H PRN (RT)   • baclofen (LIORESAL) tablet 10 mg TID   • buPROPion SR (WELLBUTRIN-SR) tablet 150 mg BID   • enoxaparin (LOVENOX) inj 40 mg DAILY   • famotidine (PEPCID) tablet 20 mg BID   • gabapentin (NEURONTIN) capsule 600 mg BID   • methocarbamol (ROBAXIN) tablet 500 mg BID PRN   • midodrine (PROAMATINE) tablet 15 mg TID WITH MEALS   • oxybutynin SR (DITROPAN-XL) tablet 15 mg BID   • venlafaxine XR (EFFEXOR XR) capsule 75 mg Q EVENING   • Respiratory Care per Protocol Continuous RT   • Pharmacy Consult Request ...Pain Management Review 1 Each PHARMACY TO DOSE   • oxyCODONE immediate-release (ROXICODONE) tablet 5 mg Q3HRS PRN   • oxyCODONE immediate release (ROXICODONE) tablet 10 mg Q3HRS PRN   • hydrALAZINE (APRESOLINE) tablet 25 mg Q8HRS PRN   • acetaminophen (TYLENOL) tablet 650 mg Q4HRS PRN   • senna-docusate (PERICOLACE or SENOKOT S) 8.6-50 MG per tablet 2 Tab BID    And   • polyethylene glycol/lytes (MIRALAX) PACKET 1 Packet QDAY PRN    And   • magnesium hydroxide (MILK OF MAGNESIA) suspension 30 mL QDAY PRN    And   • bisacodyl (DULCOLAX) suppository 10 mg QDAY PRN   • artificial tears ophthalmic solution 1 Drop PRN   • benzocaine-menthol (CEPACOL) lozenge 1 Lozenge Q2HRS PRN   • mag hydrox-al hydrox-simeth (MAALOX PLUS ES or MYLANTA DS) suspension 20 mL Q2HRS PRN   • ondansetron (ZOFRAN ODT) dispertab 4 mg 4X/DAY PRN    Or   • ondansetron (ZOFRAN) syringe/vial injection 4 mg 4X/DAY PRN   • sodium chloride (OCEAN) 0.65 % nasal spray 2 Spray PRN    • nicotine (NICODERM) 14 MG/24HR 14 mg Daily-0600    And   • nicotine polacrilex (NICORETTE) 2 MG piece 2 mg Q HOUR PRN   • diphenhydrAMINE (BENADRYL) tablet/capsule 25 mg Q6HRS PRN    Or   • diphenhydrAMINE (BENADRYL) injection 25 mg Q6HRS PRN   • zolpidem (AMBIEN) tablet 10 mg HS PRN       Orders Placed This Encounter   Procedures   • Diet Order Regular     Standing Status:   Standing     Number of Occurrences:   1     Order Specific Question:   Diet:     Answer:   Regular [1]     Order Specific Question:   Texture/Fiber modifications:     Answer:   Dysphagia 2(Pureed/Chopped)specify fluid consistency(question 6) [2]     Order Specific Question:   Consistency/Fluid modifications:     Answer:   Thin Liquids [3]       Assessment:  Active Hospital Problems    Diagnosis   • Cervical spine pain   • Tobacco use   • Anxiety and depression   • Foraminal stenosis of cervical region   • Corns and callus   • Hyperlipidemia, unspecified       Medical Decision Making and Plan:  Central cord syndrome / C2 AISD - s/p fall at home with worsening weakness, found to have severe stenosis. Patient underwent C3-C5 ACDF with Dr. Linton on 12/20/19  -PT and OT for mobility and ADLs     Neurogenic bowel/bladder - Patient without incontinence. Continue to monitor. Patient on Oxybutynin 15 mg BID on transfer. Consider BTP if constant constipation     Neuropathy - Continue Gabapentin 600 mg BID. Increase to TID     Spasticity - Patient on Baclofen 10 mg TID on transfer, mostly affecting proximal LE.     Hypotension - Most likely 2/2 SCI. Continue Midodrine 15 mg TID and wean as tolerated  -Decrease to 10 mg TID      Depression/Anxiety - Patient on Buproprion  BID and Venlafaxine 75 mg      Hammer toes - Wound care consult.      Skin - Patient with patchy sensation. Q2H turning and good monitoring of skin.      Tobacco abuse - Will need smoking cessation counseling.      Vitamin D - 23 on admission. Start 1000 U    GI Ppx - Patient on  pepcid on transfer.      DVT ppx - Patient on Lovenox on transfer.    Total time:  35 minutes.  I spent greater than 50% of the time for patient care and coordination on this date, including unit/floor time, and face-to-face time with the patient as per assessment and plan above. Discussion included admission labs, pain control, start Vitamin D, increase Gabapentin, and improved SBP. Decrease midodrine and monitor for orthostatics.     Elvira Carmen M.D.

## 2019-12-28 NOTE — THERAPY
"Occupational Therapy   Initial Evaluation     Patient Name: Marilyn Salinas  Age:  54 y.o., Sex:  female  Medical Record #: [id number]  Today's Date: 12/28/2019     Subjective    \"My neck hurts so much\"     Objective       12/28/19 0701   Prior Living Situation   Prior Services None;Home-Independent   Housing / Facility 1 Story House   Steps Into Home 1   Steps In Home 0   Rail None   Elevator No   Bathroom Set up Bathtub / Shower Combination;Shower Glass Doors   Equipment Owned 4-Wheel Walker;Quad Cane   Comments Pt reported PLOF Mod I.  Pt reported living alone in [place], plans on moving in w/ son in [facility] upon discharge.   Prior Level of ADL Function   Self Feeding Independent   Grooming / Hygiene Independent   Bathing Independent   Dressing Independent   Toileting Independent   Prior Level of IADL Function   Medication Management Independent   Laundry Independent   Kitchen Mobility Independent   Finances Independent   Home Management Independent   Shopping Independent   Prior Level Of Mobility Independent With Device in Community;Independent With Steps in Community;Independent Without Device in Home;Independent With Device in Home;Independent With Steps in Home   IRF-JANAY:  Prior Functioning: Everyday Activities   Self Care Independent   Indoor Mobility (Ambulation) Independent   Stairs Independent   Functional Cognition Independent   Prior Device Use Walker   Vitals   O2 Delivery None (Room Air)   Pain   Intervention Nurse Notified   Pain 0 - 10 Group   Location Neck;Shoulder   Location Orientation Right;Left;Anterior   Description Aching   Therapist Pain Assessment Post Activity Pain Same as Prior to Activity;Nurse Notified;10   Cognition    Orientation Level Oriented x 4   Level of Consciousness Alert   Safety Awareness Impaired   IRF-JANAY:  Cognitive Pattern Assessment   Cognitive Pattern Assessment Used BIMS   IRF-JANAY:  Brief Interview for Mental Status (BIMS)   Repetition of Three Words (First Attempt) " "3   Temporal Orientation: Able to Report the Correct Year Correct   Temporal Orientation: Able to Report the Correct Month Accurate within 5 days   Temporal Orientation: Able to Report the Correct Day of the Week Correct   Able to Recall \"Sock\" Yes, no cue required   Able to Recall \"Blue\" Yes, no cue required   Able to Recall \"Bed\" Yes, no cue required   BIMS Summary Score 15   Vision Screen   Vision Not tested  (Pt reported needing glasses to see - pt reported glasses los)   Passive ROM Upper Body   Passive ROM Upper Body WDL   Active ROM Upper Body   Active ROM Upper Body  WDL   Dominant Hand Right   Strength Upper Body   Upper Body Strength  X   Comments Generalized weakness bilaterally   Sensation Upper Body   Upper Extremity Sensation  WDL   Upper Body Muscle Tone   Upper Body Muscle Tone  WDL   Balance Assessment   Sitting Balance (Static) Fair +   Sitting Balance (Dynamic) Fair -   Standing Balance (Static) Poor +   Standing Balance (Dynamic) Poor   Bed Mobility    Supine to Sit Moderate Assist   Sit to Stand Contact Guard Assist   Scooting Stand by Assist   Coordination Upper Body   Coordination WDL   IRF-JANAY:  Eating   Assistance Needed Independent   [place] Physical Assistance Level No physical assistance or only touching/steadying assist   CARE Score 6   Discharge Goal:  Assistance Needed Independent   Discharge Goal:  Physical Assistance Level No physical assistance or only touching/steadying assist   Discharge Goal:  Score 6   IRF-JANAY:  Oral Hygiene   Assistance Needed Supervision;Set-up / clean-up;Verbal cue   Physical Assistance Level No physical assistance   CARE Score 4   Discharge Goal:  Assistance Needed Independent   Discharge Goal:  Physical Assistance Level No physical assistance or only touching/steadying assist   Discharge Goal:  Score 6   IRF-JANAY:  Shower/Bathe Self   Assistance Needed Physical assistance;Verbal cues;Supervision;Set-up / clean-up;Adaptive equipment   Physical Assistance Level " 25%-49%   CARE Score 3   Discharge Goal:  Assistance Needed Independent;Adaptive equipment   Discharge Goal:  Physical Assistance Level No physical assistance or only touching/steadying assist   Discharge Goal Score 6   IRF-JANAY:  Upper Body Dressing   Assistance Needed Physical assistance;Verbal cues;Supervision;Set-up / clean-up   Physical Assistance Level Less than 25%   CARE Score 3   Discharge Goal:  Assistance Needed Independent   Discharge Goal:  Physical Assistance Level No physical assistance or only touching/steadying assist   Dischage Goal:  Score 6   IRF-JANAY:  Lower Body Dressing   Assistance Needed Physical assistance;Verbal cues;Supervision;Set-up / clean-up   Physical Assistance Level 75% or more   CARE Score 2   Discharge Goal:  Assistance Needed Independent;Adaptive equipment   Discharge Goal:  Physical Assistance Level No physical assistance or only touching/steadying assist   Discharge Goal:  Score 6   IRF JANAY:  Putting On/Taking Off Footwear   Assistance Needed Physical assistance;Verbal cues;Supervision;Set-up / clean-up   Physical Assistance Level 75% or more   CARE Score 2   Discharge Goal:  Assistance Needed Independent;Adaptive equipment   Discharge Goal:  Physical Assistance Level No physical assistance or only touching/steadying assist   Discharge Goal:  Score 6   IRF-JANAY:  Toileting Hygiene   Assistance Needed Supervision;Verbal cues;Physical assistance   Physical Assistance Level Less than 25%   CARE Score 3   Discharge Goal:  Assistance Needed Independent   Discharge Goal:  Physical Assistance Level No physical assistance or only touching/steadying assist   Discharge Goal:  Score 6   IRF-JANAY:  Toilet Transfer   Assistance Needed Physical assistance;Verbal cues;Supervision;Set-up / clean-up;Adaptive equipment   Physical Assistance Level Less than 25%   CARE Score 3   Discharge Goal:  Assistance Needed Independent   Discharge Goal:  Physical Assistance Level No physical assistance or only  touching/steadying assist   Discahrge Goal:  Score 6   IRF-JANAY:  Hearing, Speech, and Vision   Expression of Ideas and Wants 4   Understanding Verbal and Non-Verbal Content 4   Problem List   Problem List Decreased Active Daily Living Skills;Decreased Homemaking Skills;Decreased Upper Extremity Strength Right;Decreased Upper Extremity Strength Left;Decreased Functional Mobility;Decreased Activity Tolerance;Safety Awareness Deficits / Cognition;Impaired Postural Control / Balance   Precautions   Precautions Cervical Collar  ;Spinal / Back Precautions ;Fall Risk   Comments C-collar + showers, wound great toe R foot   Current Discharge Plan   Current Discharge Plan Unknown at this Time   Benefit    Therapy Benefit Patient Would Benefit from Inpatient Rehab Occupational Therapy to Maximize Ashley with ADLs, IADLs and Functional Mobility.   Interdisciplinary Plan of Care Collaboration   IDT Collaboration with  Physical Therapist;Nursing   Patient Position at End of Therapy Seated;Self Releasing Lap Belt Applied   Collaboration Comments CLOF, pain, wound great toe R foot, pt ecorted to dining room for breakfast.   OT Total Time Spent   OT Individual Total Time Spent (Mins) 60   OT Charge Group   OT Self Care / ADL 1   OT Evaluation OT Evaluation High       FIM Eating Score:  6 - Modified Independent  Eating Description:  Increased time    FIM Grooming Score:  5 - Standby Prompting/Supervision or Set-up  Grooming Description:  Supervision for safety, Set-up of equipment(WC level at sink side, therapist switched out pads for c-collar for shower)    FIM Bathing Score:  3 - Moderate Assistance  Bathing Description:  Grab bar, Tub bench, Long handled bath tool, Hand held shower, Increased time, Supervision for safety, Verbal cueing(CGA in stand via grab bar w/ Min assist for magi, Mod assist distal LE's, Mod I torsi, back, hair - Pt wearing c-collar during shower activity (pads swapped out post shower.)    FIM Upper Body  Dressin - Minimal Assistance  Upper Body Dressing Description:  Increased time, Verbal cueing, Supervision for safety, Set-up of equipment, Application of orthotic or brace(Pt required assistance to manage cervical collar, Min assist to pull shirt down back, Mod I to manage bra and don open jacket.)    FIM Lower Body Dressing Score:  2 - Max Assistance  Lower Body Dressing Description:  Increased time, Supervision for safety, Set-up of equipment, Initial preparation for task(Min assist to doff socks, Total assist to don socks (noted wound R great toes, hammer toes L foot, podiatry recommended for nails - consulted w/ nursing), Max assist to thread feet through briefs and pants, CGA in stand w/ Min assist to manage pants/brie)    FIM Toileting Body Dressin - Minimal Assistance  Toileting Description:  Grab bar, Increased time, Supervision for safety, Set-up of equipment    FIM Bed/Chair/Wheelchair Transfers Score:    Bed/Chair/Wheelchair Transfers Description:       FIM Toilet Transfer Score:  4 - Minimal Assistance  Toilet Transfer Description:  Grab bar, Increased time, Supervision for safety, Verbal cueing, Set-up of equipment(CGA for commode transfer to/from manual wc via grab bar.)    FIM Tub/Shower Transfers Score:  4 - Minimal Assistance  Tub/Shower Transfers Description:  Grab bar, Increased time, Supervision for safety, Verbal cueing, Set-up of equipment, Initial preparation for task(CGA to transfer to/from manual wc and shower bench via grab bar.)    FIM Comprehension Score:  6 - Modified Independent  Comprehension Description:  Glasses(Pt reported wearing glasses - had been lost at hospital)    FIM Expression Score:  6 - Modified Independent  Expression Description:       FIM Social Interaction Score:  6 - Modified Independent  Social Interaction Description:  Schedule    FIM Problem Solving Score:  5 - Standby Prompting/Supervision or Set-up  Problem Solving Description:  Verbal cueing, Therapy  schedule    Assessment  Adapted from Dr. Gregory's PM&R Consult on   The patient is a 54 y.o. female with a past medical history of chronic back and neck pain, presented on 12/19/2019  1:32 PM with increasing weakness and bilateral lower extremities and upper extremities, worsened after ground-level fall.  She denies any loss of consciousness.  She was found to have disc herniation at C4-5 and C3- C4, with T2 hyperintensity of the cord at this level. she underwent ACDF C3-5 on 12/20/19 with Dr. Linton.  Patient tolerated transfer to [facility]. She reports she has patchy numbness in her LE as well as around her neck. She reports diffuse weakness without any focal weakness. She reports she has pain in her neck at 10/10 as well as chronic pain in her left foot due hammer toe as well as other injuries. .  Additional factors influencing patient status / progress (ie: cognitive factors, co-morbidities, social support, etc): Hypotension, Muscle spasticity, chronic pain, tobacco use, depression.  At time of evaluation pt presented with below baseline for ADL's, functional mobility and transfers.  Limiters include chronic pain, cervical/spinal precautions, c-collar (including showers), generalized weakness, impaired endurance and balance.  Noted wound great toe R foot.    Plan  Recommend Occupational Therapy 30-60 minutes per day 5-7 days per week for 2 weeks for the following treatments:  OT Orthotics Training, OT Group Therapy, OT Self Care/ADL, OT Neuro Re-Ed/Balance, OT Therapeutic Activity, OT Evaluation and OT Therapeutic Exercise.    Goals:  Long term and short term goals have been discussed with patient and they are in agreement.    Occupational Therapy Goals     Problem: Dressing     Dates: Start: 12/28/19       Description:     Goal: STG-Within one week, patient will dress UB     Dates: Start: 12/28/19       Description: 1) Individualized Goal: Mod I for UB clothing management  2) Interventions:  OT Self Care/ADL, OT Neuro  Re-Ed/Balance, OT Therapeutic Activity, OT Evaluation and OT Therapeutic Exercise             Goal: STG-Within one week, patient will dress LB     Dates: Start: 12/28/19       Description: 1) Individualized Goal:  Min assist for LB clothing management via AE/DME PRN  2) Interventions:  OT Self Care/ADL, OT Neuro Re-Ed/Balance, OT Therapeutic Activity, OT Evaluation and OT Therapeutic Exercise             Goal: STG-Within one week, patient will     Dates: Start: 12/28/19       Description: 1) Individualized Goal: Min assist for cervical collar management + swapping out pads  2) Interventions:  OT Orthotics Training, OT Self Care/ADL, OT Neuro Re-Ed/Balance, OT Therapeutic Activity, OT Evaluation and OT Therapeutic Exercise                   Problem: Functional Transfers     Dates: Start: 12/28/19       Description:     Goal: STG-Within one week, patient will transfer to toilet     Dates: Start: 12/28/19       Description: 1) Individualized Goal:  Sup/SBA for commode transfer via DME PRN  2) Interventions:   OT Self Care/ADL, OT Neuro Re-Ed/Balance, OT Therapeutic Activity, OT Evaluation and OT Therapeutic Exercise                   Problem: OT Long Term Goals     Dates: Start: 12/28/19       Description:     Goal: LTG-By discharge, patient will complete basic self care tasks     Dates: Start: 12/28/19       Description: 1) Individualized Goal:  Mod I for BADL's via AE/DME PRN  2) Interventions:  OT Self Care/ADL, OT Neuro Re-Ed/Balance, OT Therapeutic Activity, OT Evaluation and OT Therapeutic Exercise             Goal: LTG-By discharge, patient will perform bathroom transfers     Dates: Start: 12/28/19       Description: 1) Individualized Goal:  Mod I for shower and commode transfers via DME PRN  2) Interventions:  OT Self Care/ADL, OT Neuro Re-Ed/Balance, OT Therapeutic Activity, OT Evaluation and OT Therapeutic Exercise                   Problem: Toileting     Dates: Start: 12/28/19       Description:     Goal:  STG-Within one week, patient will complete toileting tasks     Dates: Start: 12/28/19       Description: 1) Individualized Goal:  Sup/SBA for toileting (LB clothing management and toilet hygiene) via DME PRN  2) Interventions:   OT Self Care/ADL, OT Neuro Re-Ed/Balance, OT Therapeutic Activity, OT Evaluation and OT Therapeutic Exercise

## 2019-12-28 NOTE — THERAPY
"Physical Therapy   Initial Evaluation     Patient Name: Marilyn Salinas  Age:  54 y.o., Sex:  female  Medical Record #: [id number]  Today's Date: 12/28/2019     Subjective    Pt seated in cafeteria, ready for PT evaluation.      Objective       12/28/19 0831   Prior Living Situation   Prior Services None   Housing / Facility [address] [facility]   Steps Into Home 1  (1 or 2 depending on entry )   Steps In Home 0   Rail None   Equipment Owned 4-Wheel Walker;Quad Cane   Comments Pt reports will be discharging to son's house, above information for son's home set-up   Prior Level of Functional Mobility   Bed Mobility Independent   Transfer Status Independent   Ambulation Independent   Assistive Devices Used 4-Wheel Walker  (and quad cane)   Stairs Independent   IRF-JANAY:  Prior Functioning: Everyday Activities   Self Care Independent   Indoor Mobility (Ambulation) Independent   Stairs Independent   Functional Cognition Independent   Prior Device Use Walker   Passive ROM Lower Body   Passive ROM Lower Body X   Comments Bilateral ankle dorsiflexion limited to ~ neutral   Active ROM Lower Body    Active ROM Lower Body  X   Comments Bilateral dorsiflexion limited to ~ -5   Strength Lower Body   Lower Body Strength  X   Rt Hip Flexion Strength 3 (F)   Rt Knee Flexion Strength 3+ (F+)   Lt Hip Flexion Strength 3 (F)   Lt Knee Flexion Strength 3+ (F+)   Sensation Lower Body   Comments Light touch intact bilaterally, pin prick not tested, pt reports LLE sensation \"dul\"compared to R    Lower Body Muscle Tone   Rt Lower Extremity Muscle Tone Hypertonic;Spastic   Lt Lower Extremity Muscle Tone Hypertonic;Spastic   Comments Significant spasticity BLEs: No formal testing completed at this time   Balance Assessment   Sitting Balance (Static) Fair   Sitting Balance (Dynamic) Fair -   Standing Balance (Static) Poor +   Standing Balance (Dynamic) Poor   Weight Shift Sitting Fair   Weight Shift Standing Fair   Bed Mobility    Supine to Sit " Minimal Assist   Sit to Supine Moderate Assist   Sit to Stand Contact Guard Assist   Scooting Stand by Assist   Rolling Supervised   IRF-JANAY:  Roll Left and Right   Assistance Needed Supervision   CARE Score 4   Discharge Goal:  Assistance Needed Independent   Discharge Goal:  Score 6   IRF-JANAY:  Sit to Lying   Assistance Needed Physical assistance   Physical Assistance Level 50%-74%   CARE Score 2   Discharge Goal:  Assistance Needed Independent   Discharge Goal:  Score 6   IRF-JANAY:  Lying to Sitting on Side of Bed   Assistance Needed Physical assistance   Physical Assistance Level 25%-49%   CARE Score 3   Discharge Goal:  Assistance Needed Independent   Discharge Goal:  Score 6   IRF-JANAY:  Sit to Stand   Assistance Needed Incidental touching;Adaptive equipment   CARE Score 4   Discharge Goal:  Assistance Needed Independent;Adaptive equipment   Discahrge Goal:  Score 6   IRF-JANAY:  Chair/Bed-to-Chair Transfer   Assistance Needed Physical assistance;Adaptive equipment   Physical Assistance Level 25%-49%   CARE Score 3   Discharge Goal:  Assistance Needed Independent;Adaptive equipment   Discharge Goal:  Score 6   IRF-JANAY:  Car Transfer   Reason if not Attempted Safety concerns   CARE Score 88   IRF JANAY:  Walking   Does the Patient Walk? Yes   IRF JANAY:  Walk 10 Feet   Assistance Needed Physical assistance;Adaptive equipment   Physical Assistance Level 25%-49%   CARE Score 3   Discharge Goal:  Assistance Needed Independent;Adaptive equipment   Discharge Goal:  Score 6   IRF-JANAY:  Walk 50 Feet with Two Turns   Reason if not Attempted Safety concerns   CARE Score 88   Discharge Goal:  Assistance Needed Independent;Adaptive equipment   Discharge Goal:  Score 6   IRF-JANAY:  Walk 150 Feet   Reason if not Attempted Safety concerns   CARE Score 88   Discharge Goal:  Assistance Needed Supervision;Adaptive equipment   Discharge Goal:  Score 4   IRF JANAY:  Walking 10 Feet on Uneven Surfaces   Reason if not Attempted Safety  concerns   CARE Score 88   Discharge Goal:  Assistance Needed Adaptive equipment;Supervision   Discharge Goal:  Score 4   IRF JANAY:  1 Step (Curb)   Reason if not Attempted Safety concerns   CARE Score 88   Discharge Goal:  Assistance Needed Supervision;Adaptive equipment   Discharge Goal:  Score 4   IRF-JANAY:  4 Steps   Reason if not Attempted Safety concerns   CARE Score 88   Discharge Goal:  Assistance Needed Supervision;Adaptive equipment   Discharge Goal:  Score 4   IRF JANAY:  12 Steps   Reason if not Attempted Activity not applicable   CARE Score 9   Discharge Goal:  Assistance Needed Physical assistance   Discharge Goal:  Physical Assistance Level Total assistance   Discharge Goal:  Score 1   IRF JANAY:  Picking Up Object   Reason if not Attempted Safety concerns   CARE Score 88   Discharge Goal:  Assistance Needed Supervision;Adaptive equipment   Discharge Goal:  Score 4   IRF-JANAY:  Wheel 50 Feet with Two Turns   Indicate the Type of Wheelchair or Scooter Used Manual   Assistance Needed Supervision   CARE Score 4   Discharge Goal:  Assistance Needed Independent   Discharge Goal:  Score 6   IRF-JANAY:  Wheel 150 Feet   Indicate the Type of Wheelchair or Scooter Used Manual   Assistance Needed Supervision   CARE Score 4   Discharge Goal:  Assistance Needed Independent   Discharge Goal:  Score 6   Problem List    Problems Decreased Activity Tolerance;Pain;Impaired Transfers;Impaired Ambulation;Other (Comments)  (Spasticity )   Precautions   Precautions Cervical Collar  ;Spinal / Back Precautions ;Fall Risk   Comments C-collar OOB + showers, wound great toe R foot   Current Discharge Plan   Current Discharge Plan Temporarily go to Family /  Friend's Home   Interdisciplinary Plan of Care Collaboration   IDT Collaboration with  Occupational Therapist   Patient Position at End of Therapy Seated;Call Light within Reach;Tray Table within Reach   Collaboration Comments CLOF   PT Total Time Spent   PT Individual Total Time  Spent (Mins) 60   PT Charge Group   PT Therapeutic Activities 1   PT Evaluation PT Evaluation Mod       FIM Bed/Chair/Wheelchair Transfers Score: 3 - Moderate Assistance  Bed/Chair/Wheelchair Transfers Description:  Increased time, Set-up of equipment, Verbal cueing(WC <> flat full size bed, mod A for LE amangement during sit > supine, min A supine > sit, CGA SPT with bed rail )    FIM Walking Score:  2 - Max Assistance  Walking Description:  Extra time, Walker, Verbal cueing, Requires incidental assist(30 ft x2 with 4WW and CGA (decreased MAGDALENA, decreased foot clearance/ heel strike))    FIM Wheelchair Score:  5 - Standby Prompting/Supervision or Set-up  Wheelchair Description:  Extra time, Verbal cueing, Supervision for safety(150 ft using UEs, extra time to complete, SBA with verbal cuing for technique)    FIM Stairs Score:  0 - Not tested,unsafe activity  Stairs Description:       Assessment  Patient is 54 y.o. female with a diagnosis of s/p C3-5 ACDF after GLF.  Additional factors influencing patient status / progress (ie: cognitive factors, co-morbidities, social support, etc): history of multiple falls, significant spasticity in LEs, paraesthesias UEs> LEs, chronic pain, modified independent PLOF with 4WW and quad cane.      Plan  Recommend Physical Therapy 30-60 minutes per day 5-7 days per week for 2-3 weeks for the following treatments:  PT Group Therapy, PT E Stim Attended, PT Gait Training, PT Therapeutic Exercises, PT Neuro Re-Ed/Balance, PT Aquatic Therapy, PT Therapeutic Activity and PT Manual Therapy.    Goals:  Long term and short term goals have been discussed with patient and they are in agreement.    Physical Therapy Problems     Problem: Mobility     Dates: Start: 12/28/19       Description:     Goal: STG-Within one week, patient will propel wheelchair community     Dates: Start: 12/28/19       Description: 1) Individualized goal: Mod I indoors for all distances   2) Interventions:  PT Group  "Therapy, PT E Stim Attended, PT Gait Training, PT Therapeutic Exercises, PT TENS Application, PT Neuro Re-Ed/Balance, PT Aquatic Therapy, PT Therapeutic Activity and PT Manual Therapy             Goal: STG-Within one week, patient will ambulate up/down a curb     Dates: Start: 12/28/19       Description: 1) Individualized goal:  4\" curb in // bars or with FWW, min A   2) Interventions: PT Group Therapy, PT E Stim Attended, PT Gait Training, PT Therapeutic Exercises, PT TENS Application, PT Neuro Re-Ed/Balance, PT Aquatic Therapy, PT Therapeutic Activity and PT Manual Therapy                       Problem: Mobility Transfers     Dates: Start: 12/28/19       Description:     Goal: STG-Within one week, patient will transfer bed to chair     Dates: Start: 12/28/19       Description: 1) Individualized goal:  Min A with or without FWW   2) Interventions: PT Group Therapy, PT E Stim Attended, PT Gait Training, PT Therapeutic Exercises, PT TENS Application, PT Neuro Re-Ed/Balance, PT Aquatic Therapy, PT Therapeutic Activity and PT Manual Therapy                 Goal: STG-Within one week, patient will move supine to sit     Dates: Start: 12/28/19       Description: 1) Individualized goal:  SBA with use of AD as needed  2) Interventions: PT Group Therapy, PT E Stim Attended, PT Gait Training, PT Therapeutic Exercises, PT TENS Application, PT Neuro Re-Ed/Balance, PT Aquatic Therapy, PT Therapeutic Activity and PT Manual Therapy                       Problem: PT-Long Term Goals     Dates: Start: 12/28/19       Description:     Goal: LTG-By discharge, patient will propel wheelchair     Dates: Start: 12/28/19       Description: 1) Individualized goal:  Mod I all surfaces and distances  2) Interventions: PT Group Therapy, PT E Stim Attended, PT Gait Training, PT Therapeutic Exercises, PT TENS Application, PT Neuro Re-Ed/Balance, PT Aquatic Therapy, PT Therapeutic Activity and PT Manual Therapy                 Goal: LTG-By " discharge, patient will ambulate     Dates: Start: 12/28/19       Description: 1) Individualized goal:  150 ft with LRAD and spv  2) Interventions: PT Group Therapy, PT E Stim Attended, PT Gait Training, PT Therapeutic Exercises, PT TENS Application, PT Neuro Re-Ed/Balance, PT Aquatic Therapy, PT Therapeutic Activity and PT Manual Therapy                 Goal: LTG-By discharge, patient will transfer one surface to another     Dates: Start: 12/28/19       Description: 1) Individualized goal:  Mod I with LRAD  2) Interventions: PT Group Therapy, PT E Stim Attended, PT Gait Training, PT Therapeutic Exercises, PT TENS Application, PT Neuro Re-Ed/Balance, PT Aquatic Therapy, PT Therapeutic Activity and PT Manual Therapy                 Goal: LTG-By discharge, patient will ambulate up/down 4-6 stairs     Dates: Start: 12/28/19       Description: 1) Individualized goal:  2 steps or ramp to mimic home entry, spv   2) Interventions: PT Group Therapy, PT E Stim Attended, PT Gait Training, PT Therapeutic Exercises, PT TENS Application, PT Neuro Re-Ed/Balance, PT Aquatic Therapy, PT Therapeutic Activity and PT Manual Therapy

## 2019-12-28 NOTE — THERAPY
"Occupational Therapy  Daily Treatment     Patient Name: Marilyn Salinas  Age:  54 y.o., Sex:  female  Medical Record #: [id number]  Today's Date: 12/28/2019     Precautions  Precautions: Cervical Collar  , Spinal / Back Precautions , Fall Risk  Comments: C-collar OOB + showers, wound great toe R foot         Subjective    \"I've got to use the toilet!\"     Objective       12/28/19 1431   Precautions   Precautions Cervical Collar  ;Spinal / Back Precautions ;Fall Risk   Comments C-collar OOB + showers, wound great toe R foot   Vitals   O2 Delivery None (Room Air)   Cognition    Level of Consciousness Alert   Sitting Upper Body Exercises   Upper Extremity Bike Level 2 Resistance  (FluidoBike, 15 mins, rest break x 7, 2.332km)   Interdisciplinary Plan of Care Collaboration   IDT Collaboration with  Certified Nursing Assistant   Patient Position at End of Therapy Seated   Collaboration Comments Transition of care to CNA - Pt on commode   OT Total Time Spent   OT Individual Total Time Spent (Mins) 30   OT Charge Group   OT Therapeutic Exercise  2       Assessment    Pt was alert and cooperative w/ tx.  Limiters include generalized weakness, poor endurance, impaired balance, cervical precautions, c-collar OOB    Plan    Cont'd OT for ADL's functional strength, mobility and transfers + education/training for c-collar, AE and DME.    "

## 2019-12-28 NOTE — FLOWSHEET NOTE
12/27/19 1935   Type of Assessment   Assessment Yes   Patient History   Pulmonary Diagnosis Chronic Bronchitus   Surgical Procedures Cervical spine fusion   Home O2 No   Home Treatments/Frequency Yes   MDI 1/Frequency Albuterol MDI PRN   COPD Risk Screening   Do you have a history of COPD? Yes   Do you have a Pulmonologist? No   COPD Population Screener   During the past 4 weeks, how much did you feel short of breath? 0   Do you ever cough up any mucus or phlegm? 0   In the past 12 months, you do less than you used to because of your breathing problems 0   Have you smoked at least 100 cigarettes in your entire life? 2   How old are you? 1   COPD Screening Score 3   Smoking History   Have you ever smoked Yes   Have you smoked in the last 12 months Yes   Have you quit smoking Yes   Confirm Quit Date 12/20/19   Smoking Pack Years 25   Smoking Cessation Offered Patient Counseled   Level Of Consciousness   Level of Consciousness Alert   Respiratory WDL   Respiratory (WDL) X   Chest Exam   Respiration 16   Pulse 65   Breath Sounds   RUL Breath Sounds Clear   RML Breath Sounds Clear   RLL Breath Sounds Clear   PREM Breath Sounds Clear   LLL Breath Sounds Clear   Secretions   Cough Non Productive   Oximetry   #Pulse Oximetry (Single Determination) Yes   Oxygen   Home O2 Use Prior To Admission? No   Pulse Oximetry 93 %   O2 Daily Delivery Respiratory  Room Air with O2 Available

## 2019-12-28 NOTE — CARE PLAN
Problem: Safety  Goal: Will remain free from injury  Outcome: PROGRESSING AS EXPECTED  Note:   Call light with in reach. Redirection to use call light for assistance. Non skid socks. Upper siderails up x2 while in bed. Bed alarm for safety awareness.     Problem: Pain Management  Goal: Pain level will decrease to patient's comfort goal  Outcome: PROGRESSING AS EXPECTED  Note:   Educate patient of non-pharmacological comfort measures: repositioning, relaxation/breathing technique, cold compress and activities. Complains of HA, neck and shoulder pain, as needed medication given and cold pack given with relief. No respiratory distress. Able to make needs known. Assisted as needed. Will monitor.

## 2019-12-29 PROCEDURE — 700102 HCHG RX REV CODE 250 W/ 637 OVERRIDE(OP): Performed by: PHYSICAL MEDICINE & REHABILITATION

## 2019-12-29 PROCEDURE — 97116 GAIT TRAINING THERAPY: CPT

## 2019-12-29 PROCEDURE — 97530 THERAPEUTIC ACTIVITIES: CPT

## 2019-12-29 PROCEDURE — A9270 NON-COVERED ITEM OR SERVICE: HCPCS | Performed by: PHYSICAL MEDICINE & REHABILITATION

## 2019-12-29 PROCEDURE — 770010 HCHG ROOM/CARE - REHAB SEMI PRIVAT*

## 2019-12-29 PROCEDURE — 700111 HCHG RX REV CODE 636 W/ 250 OVERRIDE (IP): Performed by: PHYSICAL MEDICINE & REHABILITATION

## 2019-12-29 PROCEDURE — 94760 N-INVAS EAR/PLS OXIMETRY 1: CPT

## 2019-12-29 RX ADMIN — FAMOTIDINE 20 MG: 20 TABLET ORAL at 20:07

## 2019-12-29 RX ADMIN — OXYBUTYNIN CHLORIDE 15 MG: 5 TABLET, EXTENDED RELEASE ORAL at 20:07

## 2019-12-29 RX ADMIN — ZOLPIDEM TARTRATE 10 MG: 5 TABLET ORAL at 21:19

## 2019-12-29 RX ADMIN — BUPROPION HYDROCHLORIDE 150 MG: 150 TABLET, EXTENDED RELEASE ORAL at 08:30

## 2019-12-29 RX ADMIN — ALBUTEROL SULFATE 2 PUFF: 90 AEROSOL, METERED RESPIRATORY (INHALATION) at 09:49

## 2019-12-29 RX ADMIN — OXYBUTYNIN CHLORIDE 15 MG: 5 TABLET, EXTENDED RELEASE ORAL at 08:31

## 2019-12-29 RX ADMIN — MIDODRINE HYDROCHLORIDE 10 MG: 10 TABLET ORAL at 17:19

## 2019-12-29 RX ADMIN — POLYETHYLENE GLYCOL 3350 1 PACKET: 17 POWDER, FOR SOLUTION ORAL at 14:58

## 2019-12-29 RX ADMIN — MIDODRINE HYDROCHLORIDE 10 MG: 10 TABLET ORAL at 11:34

## 2019-12-29 RX ADMIN — BACLOFEN 10 MG: 10 TABLET ORAL at 14:44

## 2019-12-29 RX ADMIN — ENOXAPARIN SODIUM 40 MG: 40 INJECTION, SOLUTION INTRAVENOUS; SUBCUTANEOUS at 08:33

## 2019-12-29 RX ADMIN — GABAPENTIN 600 MG: 300 CAPSULE ORAL at 20:07

## 2019-12-29 RX ADMIN — SENNOSIDES AND DOCUSATE SODIUM 2 TABLET: 8.6; 5 TABLET ORAL at 20:09

## 2019-12-29 RX ADMIN — MIDODRINE HYDROCHLORIDE 10 MG: 10 TABLET ORAL at 08:30

## 2019-12-29 RX ADMIN — BACLOFEN 10 MG: 10 TABLET ORAL at 20:09

## 2019-12-29 RX ADMIN — VENLAFAXINE HYDROCHLORIDE 75 MG: 75 CAPSULE, EXTENDED RELEASE ORAL at 20:09

## 2019-12-29 RX ADMIN — BUPROPION HYDROCHLORIDE 150 MG: 150 TABLET, EXTENDED RELEASE ORAL at 20:07

## 2019-12-29 RX ADMIN — GABAPENTIN 600 MG: 300 CAPSULE ORAL at 14:44

## 2019-12-29 RX ADMIN — VITAMIN D, TAB 1000IU (100/BT) 1000 UNITS: 25 TAB at 08:30

## 2019-12-29 RX ADMIN — FAMOTIDINE 20 MG: 20 TABLET ORAL at 08:31

## 2019-12-29 RX ADMIN — SENNOSIDES AND DOCUSATE SODIUM 2 TABLET: 8.6; 5 TABLET ORAL at 08:31

## 2019-12-29 RX ADMIN — GABAPENTIN 600 MG: 300 CAPSULE ORAL at 08:30

## 2019-12-29 RX ADMIN — BACLOFEN 10 MG: 10 TABLET ORAL at 08:31

## 2019-12-29 ASSESSMENT — PATIENT HEALTH QUESTIONNAIRE - PHQ9
1. LITTLE INTEREST OR PLEASURE IN DOING THINGS: NOT AT ALL
2. FEELING DOWN, DEPRESSED, IRRITABLE, OR HOPELESS: NOT AT ALL
SUM OF ALL RESPONSES TO PHQ9 QUESTIONS 1 AND 2: 0

## 2019-12-29 NOTE — FLOWSHEET NOTE
12/29/19 0953   Events/Summary/Plan   Events/Summary/Plan 02spot check in SCI pt with asthma   Interdisciplinary Plan of Care-Goals (Indications)   Bronchodilator Indications History / Diagnosis;Strong Subjective / Objective Improvement   Interdisciplinary Plan of Care-Outcomes    Bronchodilator Outcome Improved Patient Appearance with Decreased use of Accessory Muscles   Education   Education Yes - Pt. / Family has been Instructed in use of Respiratory Equipment;Yes - Pt. / Family has been Instructed in use of Respiratory Medications and Adverse Reactions   RT Assessment of Delivered Medications   Evaluation of Medication Delivery Daily Yes-- Pt /Family has been Instructed in use of Respiratory Medications and Adverse Reactions   MDI/DPI Group   #MDI/DPI Given MDI/DPI x 1   Respiratory WDL   Respiratory (WDL) X   Chest Exam   Respiration 20   Pulse 84   Secretions   Cough Non Productive   Oximetry   #Pulse Oximetry (Single Determination) Yes   Oxygen   Home O2 Use Prior To Admission? No   Pulse Oximetry 92 %   O2 Daily Delivery Respiratory  Room Air with O2 Available

## 2019-12-29 NOTE — PROGRESS NOTES
1915 received report from day shift nurse and assume patient care. Pt is crying from constipation, offered prune juice . Day shift nurse give patient MOM. Refused pain medicine, ice pack given with good effect. No s/sx of of respiratory distress. Safety precautions in place. Call light with in reach. Will continue to monitor.

## 2019-12-29 NOTE — THERAPY
Physical Therapy   Daily Treatment     Patient Name: Marilyn Salinas  Age:  54 y.o., Sex:  female  Medical Record #: [id number]  Today's Date: 12/29/2019     Precautions  Precautions: Cervical Collar  , Spinal / Back Precautions , Fall Risk  Comments: C-collar OOB + showers, wound great toe R foot    Subjective    Pt was sitting up in bed upon arrival; Ccollar off     Objective       12/29/19 0831   Precautions   Precautions Cervical Collar  ;Spinal / Back Precautions ;Fall Risk   Comments C-collar OOB + showers, wound great toe R foot   Interdisciplinary Plan of Care Collaboration   Patient Position at End of Therapy In Bed;Call Light within Reach;Tray Table within Reach;Phone within Reach   PT Total Time Spent   PT Individual Total Time Spent (Mins) 60   PT Charge Group   PT Gait Training 2   PT Therapeutic Activities 2       FIM Walking Score:  2 - Max Assistance  Walking Description:  (57ft x 1, 65ft x 1, FWW, CGA, increased time)    FIM Bed/Chair/Wheelchair Transfers Score: 4 - Minimal Assistance  Bed/Chair/Wheelchair Transfers Description:  (bed mob: Pranay for repositioning/ rolling;transfer: CGA SPT with FWW)    Standing 12minutes during eating/ drinking to finish breakfast, unilateral UE support on FWW, intermittent CGA for balance, no seated rest breaks    UE and LB dressing including: shirt, bra, , underwear and pants with FWW, increased time and CGA and set up    Don/doff Ccollar with set up    Education on prevention of pressure injuries; provided with PRAFOs    Assessment    Pt significantly limited by adductor tone, B LE plantarflexion contractures; improved endurance in AMB with FWW    Plan    Progress through passport, set up on vector for AMB, consider use of , endurance in AMB with FWW, standing balance/ UE functional reaching activities

## 2019-12-29 NOTE — THERAPY
"Physical Therapy   Daily Treatment     Patient Name: Marilyn Salinas  Age:  54 y.o., Sex:  female  Medical Record #: [id number]  Today's Date: 12/28/2019     Precautions  Precautions: Cervical Collar  , Spinal / Back Precautions , Fall Risk  Comments: C-collar OOB + showers, wound great toe R foot    Subjective    \"I hope I can poop soon, its becoming uncomfortable\". Agreeable to PT     Objective       12/28/19 1531   Precautions   Precautions Cervical Collar  ;Spinal / Back Precautions ;Fall Risk   Comments C-collar OOB + showers, wound great toe R foot   Interdisciplinary Plan of Care Collaboration   Patient Position at End of Therapy Seated;Call Light within Reach;Tray Table within Reach   PT Total Time Spent   PT Individual Total Time Spent (Mins) 30   PT Charge Group   PT Gait Training 1   PT Therapeutic Exercise 1     Gross assessment of LE spasticity and ROM: MAS score of 3 bilaterally in hamstrings; unable to appropriately assess gastroc/ soleus due to contractures/ limited passive PROM in bilateral ankles; passive stretching performed into dorsiflexion bilaterally. Limited PROM in bilateral abduction significant tone in bilateral adductor muscle groups. Passive stretching bilaterally for adductors.     Pt education for spasticity and neurological involvement. Education also for bilateral plantarflexion contractures and discussed possible use of PRAFO boots in the future, also discussed optimal postioning in WC footrests to promote increased dorsiflexion.       FIM Walking Score:  2 - Max Assistance  Walking Description:  Extra time, Walker, Requires incidental assist(30 ft with FWW and CGA)      Assessment    Pt receptive to all education given this session. No significant difference in gait between 4WW and FWW. Significant BLE spasticity.     Plan    LE stretching for spasticity management, continue gait training with FWW vs 4WW as appropriate, WC mobility training, pain management PRN, reinforce cervical " spinal precautions.

## 2019-12-29 NOTE — CARE PLAN
Problem: Safety  Goal: Will remain free from injury  Outcome: PROGRESSING AS EXPECTED  Note:   Call light with in reach. Redirection to use call light for assistance. Non skid socks. Upper siderails up x2 while in bed.      Problem: Bowel/Gastric:  Goal: Normal bowel function is maintained or improved  Outcome: PROGRESSING AS EXPECTED  Note:   Patient constipated. Manual removal of stool with large hard stool result. PRN laxatives and 8 oz of prune juice given. CN notified. Patient request sleeping medicine. As needed medication given with good effect. No respiratory distress. Will continue to monitor.

## 2019-12-30 PROCEDURE — 97116 GAIT TRAINING THERAPY: CPT

## 2019-12-30 PROCEDURE — A9270 NON-COVERED ITEM OR SERVICE: HCPCS | Performed by: PHYSICAL MEDICINE & REHABILITATION

## 2019-12-30 PROCEDURE — 700111 HCHG RX REV CODE 636 W/ 250 OVERRIDE (IP): Performed by: PHYSICAL MEDICINE & REHABILITATION

## 2019-12-30 PROCEDURE — 94760 N-INVAS EAR/PLS OXIMETRY 1: CPT

## 2019-12-30 PROCEDURE — 99406 BEHAV CHNG SMOKING 3-10 MIN: CPT | Mod: GZ | Performed by: PHYSICAL MEDICINE & REHABILITATION

## 2019-12-30 PROCEDURE — 99232 SBSQ HOSP IP/OBS MODERATE 35: CPT | Mod: 25 | Performed by: PHYSICAL MEDICINE & REHABILITATION

## 2019-12-30 PROCEDURE — 97530 THERAPEUTIC ACTIVITIES: CPT

## 2019-12-30 PROCEDURE — 11056 PARNG/CUTG B9 HYPRKR LES 2-4: CPT

## 2019-12-30 PROCEDURE — 770010 HCHG ROOM/CARE - REHAB SEMI PRIVAT*

## 2019-12-30 PROCEDURE — 700102 HCHG RX REV CODE 250 W/ 637 OVERRIDE(OP): Performed by: PHYSICAL MEDICINE & REHABILITATION

## 2019-12-30 PROCEDURE — 11720 DEBRIDE NAIL 1-5: CPT

## 2019-12-30 PROCEDURE — 97535 SELF CARE MNGMENT TRAINING: CPT

## 2019-12-30 RX ORDER — MIDODRINE HYDROCHLORIDE 2.5 MG/1
5 TABLET ORAL
Status: DISCONTINUED | OUTPATIENT
Start: 2019-12-30 | End: 2020-01-03

## 2019-12-30 RX ORDER — GABAPENTIN 400 MG/1
800 CAPSULE ORAL 3 TIMES DAILY
Status: DISCONTINUED | OUTPATIENT
Start: 2019-12-30 | End: 2020-01-03

## 2019-12-30 RX ADMIN — BACLOFEN 10 MG: 10 TABLET ORAL at 08:29

## 2019-12-30 RX ADMIN — BUPROPION HYDROCHLORIDE 150 MG: 150 TABLET, EXTENDED RELEASE ORAL at 19:32

## 2019-12-30 RX ADMIN — MIDODRINE HYDROCHLORIDE 5 MG: 2.5 TABLET ORAL at 17:29

## 2019-12-30 RX ADMIN — VENLAFAXINE HYDROCHLORIDE 75 MG: 75 CAPSULE, EXTENDED RELEASE ORAL at 19:33

## 2019-12-30 RX ADMIN — BACLOFEN 10 MG: 10 TABLET ORAL at 14:28

## 2019-12-30 RX ADMIN — ZOLPIDEM TARTRATE 10 MG: 5 TABLET ORAL at 22:12

## 2019-12-30 RX ADMIN — MIDODRINE HYDROCHLORIDE 10 MG: 10 TABLET ORAL at 08:28

## 2019-12-30 RX ADMIN — OXYBUTYNIN CHLORIDE 15 MG: 5 TABLET, EXTENDED RELEASE ORAL at 19:32

## 2019-12-30 RX ADMIN — SENNOSIDES AND DOCUSATE SODIUM 2 TABLET: 8.6; 5 TABLET ORAL at 19:32

## 2019-12-30 RX ADMIN — GABAPENTIN 600 MG: 300 CAPSULE ORAL at 08:29

## 2019-12-30 RX ADMIN — FAMOTIDINE 20 MG: 20 TABLET ORAL at 19:32

## 2019-12-30 RX ADMIN — MIDODRINE HYDROCHLORIDE 10 MG: 10 TABLET ORAL at 11:18

## 2019-12-30 RX ADMIN — ENOXAPARIN SODIUM 40 MG: 40 INJECTION, SOLUTION INTRAVENOUS; SUBCUTANEOUS at 08:31

## 2019-12-30 RX ADMIN — GABAPENTIN 800 MG: 400 CAPSULE ORAL at 19:32

## 2019-12-30 RX ADMIN — VITAMIN D, TAB 1000IU (100/BT) 1000 UNITS: 25 TAB at 08:29

## 2019-12-30 RX ADMIN — BUPROPION HYDROCHLORIDE 150 MG: 150 TABLET, EXTENDED RELEASE ORAL at 08:28

## 2019-12-30 RX ADMIN — OXYBUTYNIN CHLORIDE 15 MG: 5 TABLET, EXTENDED RELEASE ORAL at 08:29

## 2019-12-30 RX ADMIN — BACLOFEN 10 MG: 10 TABLET ORAL at 19:33

## 2019-12-30 RX ADMIN — GABAPENTIN 600 MG: 300 CAPSULE ORAL at 14:28

## 2019-12-30 RX ADMIN — FAMOTIDINE 20 MG: 20 TABLET ORAL at 08:29

## 2019-12-30 RX ADMIN — SENNOSIDES AND DOCUSATE SODIUM 2 TABLET: 8.6; 5 TABLET ORAL at 08:29

## 2019-12-30 ASSESSMENT — PAIN SCALES - GENERAL: PAIN_LEVEL: 2

## 2019-12-30 NOTE — FLOWSHEET NOTE
12/30/19 1032   Events/Summary/Plan   Events/Summary/Plan 02 spot check, IS   Interdisciplinary Plan of Care-Goals (Indications)   Bronchodilator Indications History / Diagnosis   Interdisciplinary Plan of Care-Outcomes    Bronchodilator Outcome Patient at Stable Baseline   Education   Education Yes - Pt. / Family has been Instructed in use of Respiratory Equipment   Incentive Spirometry Group   Incentive Spirometry Instruction Yes   Breathing Exercises Yes   Incentive Spirometer Volume 1500 mL   Incentive Spirometer Date Last Changed 12/30/19   Incentive Spirometer Next Change Date (Q 30 Days) 01/30/20   Respiratory WDL   Respiratory (WDL) X   Chest Exam   Respiration 18   Pulse 76   Secretions   Cough Non Productive   Oximetry   #Pulse Oximetry (Single Determination) Yes   Oxygen   Home O2 Use Prior To Admission? No   Pulse Oximetry 96 %   O2 Daily Delivery Respiratory  Room Air with O2 Available

## 2019-12-30 NOTE — CARE PLAN
Problem: Safety  Goal: Will remain free from injury  Outcome: PROGRESSING AS EXPECTED   Pt uses call light consistently and appropriately. Waits for assistance does not attempt self transfer this shift. Able to verbalize needs.   Problem: Venous Thromboembolism (VTW)/Deep Vein Thrombosis (DVT) Prevention:  Goal: Patient will participate in Venous Thrombosis (VTE)/Deep Vein Thrombosis (DVT)Prevention Measures  Outcome: PROGRESSING AS EXPECTED   Patient on Lovenox therapy for DVT prophylaxis.   Problem: Pain Management  Goal: Pain level will decrease to patient's comfort goal  Outcome: PROGRESSING AS EXPECTED   Patient able to verbalize pain level and verbalize an acceptable level of pain.

## 2019-12-30 NOTE — DISCHARGE PLANNING
CASE MANAGEMENT INITIAL ASSESSMENT    Admit Date:  12/27/2019     I met with patient to discuss role of case management / discharge planning / team conference.  Patient is a  54 y.o. female transferred from [facility].  She reports that she has been living with a female roommate in [place] but will not return there.  She plans to go home with her son and his wife.  Her admitting physician for rehab is Dr. Carmen for Dr. Gregory.    Diagnosis: 04.57846 quadriplegia incomplete c1-4  Central cord syndrome (HCC)    Co-morbidities:   Patient Active Problem List    Diagnosis Date Noted   • Cervical spine pain 12/19/2019     Priority: High   • Lumbar radiculopathy 12/19/2019     Priority: Medium   • Mild intermittent asthma 06/19/2017     Priority: Low   • Tobacco use 06/19/2017     Priority: Low   • Anxiety and depression 06/19/2017     Priority: Low   • Idiopathic hypotension 12/26/2019   • Foraminal stenosis of cervical region 04/12/2019   • Post concussive syndrome 02/22/2019   • Epistaxis 02/22/2019   • Vertigo 02/22/2019   • Risk for falls 12/31/2018   • Cough 10/30/2018   • Corns and callus 08/21/2018   • GBS (Guillain Knickerbocker syndrome) (Formerly [facility]) 04/10/2018   • Pelvic pain 10/03/2017   • Vitamin D deficiency 07/31/2017   • Hyperlipidemia, unspecified 07/31/2017   • Assistance with transportation 07/31/2017   • Acid indigestion 06/19/2017   • DDD (degenerative disc disease), lumbar 06/19/2017   • Dolly-menopause 06/19/2017   • Chest congestion 06/19/2017   • Pain in both feet 06/19/2017   • Poor vision 06/19/2017     Prior Living Situation:  Housing / Facility: 1 Story House  Lives with - Patient's Self Care Capacity: Unrelated Adult    Prior Level of Function:  Medication Management: Independent  Finances: Independent  Home Management: Independent  Shopping: Independent  Prior Level Of Mobility: Independent With Device in Community, Independent With Steps in Community, Independent Without Device in Home, Independent With Device  in Home, Independent With Steps in Home    Support Systems:  Primary : Son is Sampson Wolfe at [phone]  Other support systems: daughter in law is Shawna Roque at [phone]     Previous Services Utilized:   Equipment Owned: Quad Cane(has a loaner 4ww)  Prior Services: None, Home-Independent    Other Information:  Occupation (Pre-Hospital Vocational): Retired Due To Disability  Primary Payor Source: Medicare A, Medicare B  Primary Care Practitioner : Dr. Edwar Platt at   Other MDs: Dr. Linton for neurosurgery    Additional Case Management Questions:  Have you ever received case management services for yourself or a family member?  yes    Do you feel you have and an understanding of what services  provide? Patient seems to have a good understanding of services.    Do you have any additional questions regarding case management?  Yes, patient would like information about hud housing.      CASE MANAGEMENT PLAN OF CARE   Individualized Goals:   1. Patient would like information about housing.  2. Patient would like to transition to a 4ww.    Barriers:   1. Patient is in between housing    Patient / Family Goal:  Patient / Family Goal: Patient plans to go to her son's home in [place] at discharge.  She would like to eventually get housing of her own.  I will provide [facility] housing information per her request.  She will likely need additional dme.  She will probably need home health therapy initially but I will await recommendations.  Will follow.    Plan:  1. Continue to follow patient through hospitalization and provide discharge planning in collaboration with patient, family, physicians and ancillary services.     2. Utilize community resources to ensure a safe discharge.

## 2019-12-30 NOTE — THERAPY
Occupational Therapy  Daily Treatment     Patient Name: Marilyn Salinas  Age:  54 y.o., Sex:  female  Medical Record #: [id number]  Today's Date: 2019     Precautions  Precautions: (P) Fall Risk, Spinal / Back Precautions , Cervical Collar    Comments: (P) C-collar OOB and for showers, wound great toe R foot         Subjective    Patient in bed attempting to nap. Agreeable to shower.     Objective       19 1231   Precautions   Precautions Fall Risk;Spinal / Back Precautions ;Cervical Collar     Comments C-collar OOB and for showers, wound great toe R foot   Cognition    Safety Awareness Impaired   Bed Mobility    Supine to Sit Stand by Assist   Scooting Stand by Assist   Interdisciplinary Plan of Care Collaboration   Patient Position at End of Therapy In Bed;Call Light within Reach;Tray Table within Reach;Phone within Reach   OT Total Time Spent   OT Individual Total Time Spent (Mins) 60   OT Charge Group   OT Self Care / ADL 4     Educated patient on changing c collar pads after shower.  Required min A and mod verbal cues.    FIM Grooming Score:  7 - Independent  Grooming Description:       FIM Bathing Score:  5 - Standby Prompting/Supervision or Set-up  Bathing Description:       FIM Upper Body Dressin - Modified Independent  Upper Body Dressing Description:  Assist device equipment(mod I in w/c to retrieve, indep to don/doff bra and shirt)    FIM Lower Body Dressing Score:  5 - Standby Prompting/Supervsion or Set-up  Lower Body Dressing Description:  Sock aid, Dressing stick, Increased time, Supervision for safety, Verbal cueing, Set-up of equipment, Initial preparation for task(setup/sba with grab bar and AE)    FIM Tub/Shower Transfers Score:  5 - Standby Prompting/Supervision or Set-up  Tub/Shower Transfers Description:  Grab bar, Adaptive equipment, Supervision for safety, Set-up of equipment(setup/sba with grab bar and bench)      Assessment    Patient gets frustrated at self easily when not  successful with a task right away.  Has limited patience. Showed improvement in some ADLs.    Plan    ADL's/IADLs, safety with transfers, bilateral FMC, standing tolerance/balance

## 2019-12-30 NOTE — PROGRESS NOTES
1915 received report from day shift nurse and assume patient care. Pt is calm and stable. Denies any pain at this time. No s/sx of distress. Safety precautions in place. Call light with in reach. Will continue to monitor.

## 2019-12-30 NOTE — ANESTHESIA POSTPROCEDURE EVALUATION
Patient: Marilyn Salinas    Procedure Summary     Date:  12/20/19 Room / Location:  [facility] 05 / SURGERY [facility]    Anesthesia Start:  1425 Anesthesia Stop:  1724    Procedures:       DISCECTOMY, SPINE, CERVICAL, ANTERIOR APPROACH, WITH FUSION - C3-5 (N/A Neck)      CORPECTOMY (N/A Neck) Diagnosis:  (Cervical spondylarthritis)    Surgeon:  Connor Linton M.D. Responsible Provider:  Kaleb Dye M.D.    Anesthesia Type:  general ASA Status:  3          Final Anesthesia Type: general  Last vitals  BP WNL      Temp WNL   Pulse WNL   Resp WNL   SpO2 WNL     Anesthesia Post Evaluation    Patient location during evaluation: PACU  Patient participation: complete - patient participated  Level of consciousness: awake and alert  Pain score: 2    Airway patency: patent  Anesthetic complications: no  Cardiovascular status: hemodynamically stable  Respiratory status: acceptable  Hydration status: euvolemic    PONV: none           Nurse Pain Score: 0 (NPRS)

## 2019-12-30 NOTE — DIETARY
Nutrition Services - Poor po reported on admission. Malnutrition Screening Tool score <2. Nutrition assessment not indicated at this time. RD will monitor per department guidelines. Please consult RD for nutrition concerns.

## 2019-12-30 NOTE — THERAPY
Occupational Therapy  Daily Treatment     Patient Name: Marilyn Salinas  Age:  54 y.o., Sex:  female  Medical Record #: [id number]  Today's Date: 12/30/2019     Precautions  Precautions: (P) Fall Risk, Spinal / Back Precautions , Cervical Collar    Comments: (P) C-collar OOB + showers, wound great toe R foot         Subjective    Patient agreeable to work on LB dressing with AE     Objective       12/30/19 0831   Precautions   Precautions Fall Risk;Spinal / Back Precautions ;Cervical Collar     Comments C-collar OOB + showers, wound great toe R foot   Interdisciplinary Plan of Care Collaboration   Patient Position at End of Therapy Seated;Self Releasing Lap Belt Applied   OT Total Time Spent   OT Individual Total Time Spent (Mins) 30   OT Charge Group   OT Self Care / ADL 2     Education and training on how to use AE for LB dressing, including sock aide and dressing stick.      FIM Lower Body Dressing Score:  5 - Standby Prompting/Supervsion or Set-up  Lower Body Dressing Description:  Sock aid, Dressing stick, Supervision for safety, Verbal cueing, Set-up of equipment(setup/sba with AE to don/doff socks and shoes)      Assessment    Patient able to don/doff socks and shoes with AE.     Plan    ADL's/IADLs, safety with transfers, bilateral FMC, standing tolerance/balance

## 2019-12-30 NOTE — CARE PLAN
Problem: Safety  Goal: Will remain free from injury  Outcome: PROGRESSING AS EXPECTED  Note:   Call light with in reach. Redirection to use call light for assistance. Non skid socks. Upper siderails up x2 while in bed. No complains of pain at this time. No respiratory distress. Complains of unable to sleep, as needed medication given with good effect. Will continue to monitor.

## 2019-12-31 PROCEDURE — 700102 HCHG RX REV CODE 250 W/ 637 OVERRIDE(OP): Performed by: PHYSICAL MEDICINE & REHABILITATION

## 2019-12-31 PROCEDURE — 94760 N-INVAS EAR/PLS OXIMETRY 1: CPT

## 2019-12-31 PROCEDURE — A9270 NON-COVERED ITEM OR SERVICE: HCPCS | Performed by: PHYSICAL MEDICINE & REHABILITATION

## 2019-12-31 PROCEDURE — 700111 HCHG RX REV CODE 636 W/ 250 OVERRIDE (IP): Performed by: PHYSICAL MEDICINE & REHABILITATION

## 2019-12-31 PROCEDURE — 770010 HCHG ROOM/CARE - REHAB SEMI PRIVAT*

## 2019-12-31 PROCEDURE — 97150 GROUP THERAPEUTIC PROCEDURES: CPT

## 2019-12-31 PROCEDURE — 97535 SELF CARE MNGMENT TRAINING: CPT

## 2019-12-31 PROCEDURE — 97116 GAIT TRAINING THERAPY: CPT

## 2019-12-31 PROCEDURE — 99232 SBSQ HOSP IP/OBS MODERATE 35: CPT | Performed by: PHYSICAL MEDICINE & REHABILITATION

## 2019-12-31 PROCEDURE — 97110 THERAPEUTIC EXERCISES: CPT

## 2019-12-31 PROCEDURE — 97530 THERAPEUTIC ACTIVITIES: CPT

## 2019-12-31 RX ORDER — BACLOFEN 10 MG/1
15 TABLET ORAL 3 TIMES DAILY
Status: DISCONTINUED | OUTPATIENT
Start: 2019-12-31 | End: 2020-01-02

## 2019-12-31 RX ADMIN — BACLOFEN 10 MG: 10 TABLET ORAL at 15:11

## 2019-12-31 RX ADMIN — GABAPENTIN 800 MG: 400 CAPSULE ORAL at 08:14

## 2019-12-31 RX ADMIN — SENNOSIDES AND DOCUSATE SODIUM 2 TABLET: 8.6; 5 TABLET ORAL at 20:20

## 2019-12-31 RX ADMIN — FAMOTIDINE 20 MG: 20 TABLET ORAL at 08:14

## 2019-12-31 RX ADMIN — VITAMIN D, TAB 1000IU (100/BT) 1000 UNITS: 25 TAB at 08:14

## 2019-12-31 RX ADMIN — ENOXAPARIN SODIUM 40 MG: 40 INJECTION, SOLUTION INTRAVENOUS; SUBCUTANEOUS at 08:17

## 2019-12-31 RX ADMIN — BACLOFEN 15 MG: 10 TABLET ORAL at 20:21

## 2019-12-31 RX ADMIN — BUPROPION HYDROCHLORIDE 150 MG: 150 TABLET, EXTENDED RELEASE ORAL at 20:20

## 2019-12-31 RX ADMIN — OXYBUTYNIN CHLORIDE 15 MG: 5 TABLET, EXTENDED RELEASE ORAL at 08:14

## 2019-12-31 RX ADMIN — GABAPENTIN 800 MG: 400 CAPSULE ORAL at 15:11

## 2019-12-31 RX ADMIN — MIDODRINE HYDROCHLORIDE 5 MG: 2.5 TABLET ORAL at 17:25

## 2019-12-31 RX ADMIN — OXYBUTYNIN CHLORIDE 15 MG: 5 TABLET, EXTENDED RELEASE ORAL at 20:21

## 2019-12-31 RX ADMIN — VENLAFAXINE HYDROCHLORIDE 75 MG: 75 CAPSULE, EXTENDED RELEASE ORAL at 20:21

## 2019-12-31 RX ADMIN — FAMOTIDINE 20 MG: 20 TABLET ORAL at 20:21

## 2019-12-31 RX ADMIN — SENNOSIDES AND DOCUSATE SODIUM 2 TABLET: 8.6; 5 TABLET ORAL at 08:14

## 2019-12-31 RX ADMIN — MIDODRINE HYDROCHLORIDE 5 MG: 2.5 TABLET ORAL at 08:14

## 2019-12-31 RX ADMIN — GABAPENTIN 800 MG: 400 CAPSULE ORAL at 20:21

## 2019-12-31 RX ADMIN — BACLOFEN 10 MG: 10 TABLET ORAL at 08:16

## 2019-12-31 RX ADMIN — MIDODRINE HYDROCHLORIDE 5 MG: 2.5 TABLET ORAL at 11:20

## 2019-12-31 RX ADMIN — BUPROPION HYDROCHLORIDE 150 MG: 150 TABLET, EXTENDED RELEASE ORAL at 08:13

## 2019-12-31 ASSESSMENT — PATIENT HEALTH QUESTIONNAIRE - PHQ9
2. FEELING DOWN, DEPRESSED, IRRITABLE, OR HOPELESS: NOT AT ALL
1. LITTLE INTEREST OR PLEASURE IN DOING THINGS: NOT AT ALL
SUM OF ALL RESPONSES TO PHQ9 QUESTIONS 1 AND 2: 0
2. FEELING DOWN, DEPRESSED, IRRITABLE, OR HOPELESS: NOT AT ALL
1. LITTLE INTEREST OR PLEASURE IN DOING THINGS: NOT AT ALL
SUM OF ALL RESPONSES TO PHQ9 QUESTIONS 1 AND 2: 0

## 2019-12-31 NOTE — REHAB-CM IDT TEAM NOTE
Case Management    DC Planning  DC destination/dispostion:  Patient has been living in [place] with a roommate but plans to go to [place] with her son and daughter in law.    Referrals: Provided information for [place] [facility].    DC Needs:  She would like a 4ww.  Her current one is a loaner.  Therapy recommends fww instead and manual w/c. She will probably need home health in [place].  Son will arrange for safety bars and obtain shower chair.    Barriers to discharge:   Relocating to her son's home.    Strengths:     Section completed by:  Katya Singh R.N.

## 2019-12-31 NOTE — DISCHARGE PLANNING
Returned a call to patient's daughter in law with patient's permission.  She wanted to make sure that patient would be seen by wound care nurse for toenail care.  I contacted charge nurse and confirmed that wound care consult is ordered and wound care nurse is on site.

## 2019-12-31 NOTE — WOUND TEAM
Wound consult for foot and nail care.  Patient is unable to reach her feet due to mobility.  She had trimmed right toenails which are all thick and mycotic.  Note thick callous medial and lateral right foot.  Left toenails are thick and mycotic with #4 toenail karina horn and thick callous under nail and tip of toe.  Wrapped both feet with wet washcloth x 10 minutes.  Trimmed and filed left toenails #2-5 without incident.  Using scalpel trimmed callous left # 4 toe 90% with small nip near toenail.  Using scalpel trimmed medial right foot callous revealing intact skin; unable to debride completely due to patient complaint of pain.  Lateral callous left intact.  Patient pleased with process.  Discussed with staff RN.

## 2019-12-31 NOTE — THERAPY
"Physical Therapy   Daily Treatment     Patient Name: Marilyn Salinas  Age:  54 y.o., Sex:  female  Medical Record #: [id number]  Today's Date: 12/31/2019     Precautions  Precautions: Fall Risk, Spinal / Back Precautions , Cervical Collar    Comments: C-collar OOB and for showers, wound great toe R foot    Subjective    Pt was lying in bed upon arrival; agreeable to tx. Requested to use restroom     Objective       12/31/19 0931   Precautions   Precautions Fall Risk;Spinal / Back Precautions ;Cervical Collar     Comments C-collar OOB and for showers, wound great toe R foot   Interdisciplinary Plan of Care Collaboration   Patient Position at End of Therapy Seated;Call Light within Reach;Tray Table within Reach;Phone within Reach   PT Total Time Spent   PT Individual Total Time Spent (Mins) 60   PT Charge Group   PT Gait Training 2   PT Therapeutic Activities 2       FIM Bed/Chair/Wheelchair Transfers Score: 5 - Standby Prompting/Supervision or Set-up  Bed/Chair/Wheelchair Transfers Description:  (bed mob: mod I; transfer: SBA FWW)    FIM Walking Score:  2 - Max Assistance  Walking Description:  (115ft x 1, FWW, SBA)    FIM Wheelchair Score:  6 - Modified Independent  Wheelchair Description:       Don/doff brace with set up assist    Toilet transfer/ hygiene with SPV    2inch curb ascend/descend x2, 4inch curb x 2 with FWW and CGA  6\" curb x1 with Pranay      Car transfer SBA with FWW    Assessment    Pt with within session improvements in curb training but will require increased practice for sequencing/safety and stability to prepare for entrance to home.    Plan    Progress through passport, vector for AMB, consider use of , endurance in AMB with FWW, standing balance/ UE functional reaching activities, education on improving safety in the home/ floor recovery, curb training with FWW to mimic home enviornment  "

## 2019-12-31 NOTE — THERAPY
"Physical Therapy   Daily Treatment     Patient Name: Marilyn Salinas  Age:  54 y.o., Sex:  female  Medical Record #: [id number]  Today's Date: 12/30/2019     Precautions  Precautions: Fall Risk, Spinal / Back Precautions , Cervical Collar    Comments: C-collar OOB and for showers, wound great toe R foot    Subjective    Pt was sitting in w/c upon arrival and agreeable to tx     Objective       12/30/19 0931   Precautions   Precautions Fall Risk;Spinal / Back Precautions ;Cervical Collar     Comments C-collar OOB and for showers, wound great toe R foot   Interdisciplinary Plan of Care Collaboration   Patient Position at End of Therapy Seated;Call Light within Reach;Tray Table within Reach;Phone within Reach   PT Total Time Spent   PT Individual Total Time Spent (Mins) 60   PT Charge Group   PT Gait Training 2   PT Therapeutic Activities 2       FIM Bed/Chair/Wheelchair Transfers Score: 5 - Standby Prompting/Supervision or Set-up  Bed/Chair/Wheelchair Transfers Description:  (bed mob: mod I; transfer: SPV with FWW)    FIM Walking Score:  2 - Max Assistance  Walking Description:  (115ft x 1 FWW SBA; vector 231ft x 1 side step,  retrostep, B UE support no AD) 10% unweighted on vector    FIM Wheelchair Score:  6 - Modified Independent  Wheelchair Description:  (200ft x1, mod I, level terrain)    FIM Stairs Score:  4 - Minimal Assistance  Stairs Description:  (12 x 1 6 \" steps, B HR, CGA step to ascend, retro step to descend)      Assessment    Pt tolerated vector well and demonstrated increasing stride length with practice. Pt benefit from external cues to prevent abduction. Pt most limited by PF and adduct tone in AMB    Plan    Progress through passport, west vector for AMB, consider use of , endurance in AMB with FWW, standing balance/ UE functional reaching activities, abduction strengthening, issue adduction/PF HEP for PROM    "

## 2019-12-31 NOTE — THERAPY
Occupational Therapy  Daily Treatment     Patient Name: Marilyn Salinas  Age:  54 y.o., Sex:  female  Medical Record #: [id number]  Today's Date: 12/31/2019     Precautions  Precautions: (P) Fall Risk, Spinal / Back Precautions , Cervical Collar    Comments: (P) C-collar OOB and for showers, wound great toe R foot         Subjective    Patient asleep in bed upon OT arrival.  Agreeable to participate in OT.  Stated she slept well.     Objective       12/31/19 0701   Precautions   Precautions Fall Risk;Spinal / Back Precautions ;Cervical Collar     Comments C-collar OOB and for showers, wound great toe R foot   IADL Treatments   Kitchen Mobility Education Patient educated on safe kitchen mobility techniques using FWW.  Patient stated at home she normally does not use the walker in the kitchen, but holds onto the counter.  Able to retrieve ten items from high/low cupboards, counter and various appliances while holding counter with SBA. No losses of balance   Sitting Lower Body Exercises   Sitting Lower Body Exercises Yes   Nustep Time (See Comments)  (10 minutes with LE only on level 3 w/ assist to abduct L LE)   Bed Mobility    Supine to Sit Supervised   Scooting Supervised   Rolling Supervised   Interdisciplinary Plan of Care Collaboration   Patient Position at End of Therapy Seated;Self Releasing Lap Belt Applied   OT Total Time Spent   OT Individual Total Time Spent (Mins) 60   OT Charge Group   OT Self Care / ADL 1   OT Therapy Activity 2   OT Therapeutic Exercise  1     Min A SPT bed to w/c without AD.    FIM Grooming Score:  7 - Independent  Grooming Description:       FIM Lower Body Dressing Score:  5 - Standby Prompting/Supervsion or Set-up  Lower Body Dressing Description:  Set-up of equipment, Supervision for safety(to don shoes)      Assessment    L LE appears to have adductor spasticity and required assist of therapist to keep hip in neutral position during use of nustep.      Plan    ADL's/IADLs, safety  with transfers, bilateral FMC, standing tolerance/balance

## 2019-12-31 NOTE — FLOWSHEET NOTE
12/31/19 1537   Events/Summary/Plan   Events/Summary/Plan 02 spot check, IS   Interdisciplinary Plan of Care-Goals (Indications)   Bronchodilator Indications History / Diagnosis   Interdisciplinary Plan of Care-Outcomes    Bronchodilator Outcome Patient at Stable Baseline   Education   Education Yes - Pt. / Family has been Instructed in use of Respiratory Equipment   Incentive Spirometry Group   Breathing Exercises Yes   Incentive Spirometer Volume 2000 mL   Respiratory WDL   Respiratory (WDL) X   Chest Exam   Respiration 18   Pulse 82   Oximetry   #Pulse Oximetry (Single Determination) Yes   Oxygen   Home O2 Use Prior To Admission? No   Pulse Oximetry 94 %   O2 Daily Delivery Respiratory  Room Air with O2 Available

## 2019-12-31 NOTE — PROGRESS NOTES
"Rehab Progress Note     Encounter Date: 12/30/2019    CC: Central cord syndrome, neck pain    Interval Events (Subjective)  Patient sitting up in room. She reports the pain is 10/10 but she does not want to take any more pain pills. She reports she had severe constipation last week and does not want that to return. She reports she is still annoyed with the C collar but understands. Patient reports anxiety. Denies NVD. Denies SOB.    Objective:  VITAL SIGNS: /79   Pulse 80   Temp 36.5 °C (97.7 °F) (Oral)   Resp 18   Ht 1.676 m (5' 6\")   Wt 57.5 kg (126 lb 12.2 oz)   LMP 01/11/2017   SpO2 94%   BMI 20.46 kg/m²   Gen: NAD  Psych: Mood and affect appropriate  CV: RRR, no edema  Resp: CTAB, no upper airway sounds  Abd: NTND  Neuro: AOx4, following simple commands    Recent Results (from the past 72 hour(s))   CBC with Differential    Collection Time: 12/28/19  5:12 AM   Result Value Ref Range    WBC 7.0 4.8 - 10.8 K/uL    RBC 4.07 (L) 4.20 - 5.40 M/uL    Hemoglobin 12.4 12.0 - 16.0 g/dL    Hematocrit 37.9 37.0 - 47.0 %    MCV 93.1 81.4 - 97.8 fL    MCH 30.5 27.0 - 33.0 pg    MCHC 32.7 (L) 33.6 - 35.0 g/dL    RDW 46.5 35.9 - 50.0 fL    Platelet Count 324 164 - 446 K/uL    MPV 9.6 9.0 - 12.9 fL    Neutrophils-Polys 29.40 (L) 44.00 - 72.00 %    Lymphocytes 56.70 (H) 22.00 - 41.00 %    Monocytes 9.20 0.00 - 13.40 %    Eosinophils 3.40 0.00 - 6.90 %    Basophils 1.00 0.00 - 1.80 %    Immature Granulocytes 0.30 0.00 - 0.90 %    Nucleated RBC 0.00 /100 WBC    Neutrophils (Absolute) 2.07 2.00 - 7.15 K/uL    Lymphs (Absolute) 3.99 1.00 - 4.80 K/uL    Monos (Absolute) 0.65 0.00 - 0.85 K/uL    Eos (Absolute) 0.24 0.00 - 0.51 K/uL    Baso (Absolute) 0.07 0.00 - 0.12 K/uL    Immature Granulocytes (abs) 0.02 0.00 - 0.11 K/uL    NRBC (Absolute) 0.00 K/uL   Comp Metabolic Panel (CMP)    Collection Time: 12/28/19  5:12 AM   Result Value Ref Range    Sodium 137 135 - 145 mmol/L    Potassium 4.0 3.6 - 5.5 mmol/L    Chloride " 103 96 - 112 mmol/L    Co2 29 20 - 33 mmol/L    Anion Gap 5.0 0.0 - 11.9    Glucose 75 65 - 99 mg/dL    Bun 18 8 - 22 mg/dL    Creatinine 0.72 0.50 - 1.40 mg/dL    Calcium 8.9 8.5 - 10.5 mg/dL    AST(SGOT) 68 (H) 12 - 45 U/L    ALT(SGPT) 129 (H) 2 - 50 U/L    Alkaline Phosphatase 75 30 - 99 U/L    Total Bilirubin 0.3 0.1 - 1.5 mg/dL    Albumin 3.5 3.2 - 4.9 g/dL    Total Protein 5.9 (L) 6.0 - 8.2 g/dL    Globulin 2.4 1.9 - 3.5 g/dL    A-G Ratio 1.5 g/dL   TSH with Reflex to FT4    Collection Time: 12/28/19  5:12 AM   Result Value Ref Range    TSH 3.940 0.380 - 5.330 uIU/mL   Vitamin D, 25-hydroxy (blood)    Collection Time: 12/28/19  5:12 AM   Result Value Ref Range    25-Hydroxy   Vitamin D 25 23 (L) 30 - 100 ng/mL   ESTIMATED GFR    Collection Time: 12/28/19  5:12 AM   Result Value Ref Range    GFR If African American >60 >60 mL/min/1.73 m 2    GFR If Non African American >60 >60 mL/min/1.73 m 2       Current Facility-Administered Medications   Medication Frequency   • gabapentin (NEURONTIN) capsule 600 mg TID   • vitamin D (cholecalciferol) 1000 UNIT tablet 1,000 Units DAILY   • midodrine (PROAMATINE) tablet 10 mg TID WITH MEALS   • albuterol inhaler 2 Puff Q4H PRN (RT)   • baclofen (LIORESAL) tablet 10 mg TID   • buPROPion SR (WELLBUTRIN-SR) tablet 150 mg BID   • enoxaparin (LOVENOX) inj 40 mg DAILY   • famotidine (PEPCID) tablet 20 mg BID   • methocarbamol (ROBAXIN) tablet 500 mg BID PRN   • oxybutynin SR (DITROPAN-XL) tablet 15 mg BID   • venlafaxine XR (EFFEXOR XR) capsule 75 mg Q EVENING   • Respiratory Care per Protocol Continuous RT   • oxyCODONE immediate-release (ROXICODONE) tablet 5 mg Q3HRS PRN   • oxyCODONE immediate release (ROXICODONE) tablet 10 mg Q3HRS PRN   • hydrALAZINE (APRESOLINE) tablet 25 mg Q8HRS PRN   • acetaminophen (TYLENOL) tablet 650 mg Q4HRS PRN   • senna-docusate (PERICOLACE or SENOKOT S) 8.6-50 MG per tablet 2 Tab BID    And   • polyethylene glycol/lytes (MIRALAX) PACKET 1 Packet  QDAY PRN    And   • magnesium hydroxide (MILK OF MAGNESIA) suspension 30 mL QDAY PRN    And   • bisacodyl (DULCOLAX) suppository 10 mg QDAY PRN   • artificial tears ophthalmic solution 1 Drop PRN   • benzocaine-menthol (CEPACOL) lozenge 1 Lozenge Q2HRS PRN   • mag hydrox-al hydrox-simeth (MAALOX PLUS ES or MYLANTA DS) suspension 20 mL Q2HRS PRN   • ondansetron (ZOFRAN ODT) dispertab 4 mg 4X/DAY PRN    Or   • ondansetron (ZOFRAN) syringe/vial injection 4 mg 4X/DAY PRN   • sodium chloride (OCEAN) 0.65 % nasal spray 2 Spray PRN   • nicotine (NICODERM) 14 MG/24HR 14 mg Daily-0600    And   • nicotine polacrilex (NICORETTE) 2 MG piece 2 mg Q HOUR PRN   • diphenhydrAMINE (BENADRYL) tablet/capsule 25 mg Q6HRS PRN    Or   • diphenhydrAMINE (BENADRYL) injection 25 mg Q6HRS PRN   • zolpidem (AMBIEN) tablet 10 mg HS PRN       Orders Placed This Encounter   Procedures   • Diet Order Regular     Standing Status:   Standing     Number of Occurrences:   1     Order Specific Question:   Diet:     Answer:   Regular [1]     Order Specific Question:   Texture/Fiber modifications:     Answer:   Dysphagia 2(Pureed/Chopped)specify fluid consistency(question 6) [2]     Order Specific Question:   Consistency/Fluid modifications:     Answer:   Thin Liquids [3]       Assessment:  Active Hospital Problems    Diagnosis   • Cervical spine pain   • Tobacco use   • Anxiety and depression   • Foraminal stenosis of cervical region   • Corns and callus   • Hyperlipidemia, unspecified       Medical Decision Making and Plan:  Central cord syndrome / C2 AISD - s/p fall at home with worsening weakness, found to have severe stenosis. Patient underwent C3-C5 ACDF with Dr. Linton on 12/20/19  -PT and OT for mobility and ADLs     Neurogenic bowel/bladder - Patient without incontinence. Continue to monitor. Patient on Oxybutynin 15 mg BID on transfer.  Ongoing constipation, continue to monitor     Neuropathy - Continue Gabapentin 600 mg BID. Increase to TID.  Increase to 800     Spasticity - Patient on Baclofen 10 mg TID on transfer, mostly affecting proximal LE.     Hypotension - Most likely 2/2 SCI. Continue Midodrine 15 mg TID and wean as tolerated  -Decrease to 10 mg TID. SBP into 140s, decrease to 5 mg     Depression/Anxiety - Patient on Buproprion  BID and Venlafaxine 75 mg      Hammer toes - Wound care consult.      Skin - Patient with patchy sensation. Q2H turning and good monitoring of skin.      Tobacco abuse - Will need smoking cessation counseling.   -Smoking cessation 12/30/19. Patient no longer wants nicotine patch.      Vitamin D - 23 on admission. Start 1000 U    GI Ppx - Patient on pepcid on transfer.      DVT ppx - Patient on Lovenox on transfer.    Total time:  28 minutes.  I spent greater than 50% of the time for patient care, counseling, and coordination on this date, including unit/floor time, and face-to-face time with the patient as per interval events and assessment and plan above. Topics discussed included increase gabapentin for pain, decrease midodrine, and discontinue nicotine patch.     Elvira Carmen M.D.    I had a 5 minute discussion with patient on 12/30/2019 about smoking cessation.  Discussed that smoking is associated with respiratory, cardiovascular, oncologic, and neurologic illnesses.  Patient was provided advice and counseling on different methods of smoking cessation as well as provided supportive listening for psychologic aspect of addiction.

## 2019-12-31 NOTE — THERAPY
Physical Therapy   Daily Treatment     Patient Name: Marilyn Salinas  Age:  54 y.o., Sex:  female  Medical Record #: [id number]  Today's Date: 12/31/2019     Precautions  Precautions: (P) Fall Risk, Spinal / Back Precautions , Cervical Collar    Comments: (P) C-collar OOB and for showers, wound great toe R foot    Subjective    Pt was sitting in w/c upon arrival and agreeable to tx     Objective       12/31/19 1231   Precautions   Precautions Fall Risk;Spinal / Back Precautions ;Cervical Collar     Comments C-collar OOB and for showers, wound great toe R foot   Interdisciplinary Plan of Care Collaboration   Patient Position at End of Therapy Seated;Call Light within Reach;Tray Table within Reach;Phone within Reach;Self Releasing Lap Belt Applied   PT Total Time Spent   PT Individual Total Time Spent (Mins) 30   PT Charge Group   PT Therapeutic Exercise 1   PT Therapeutic Activities 1       30sec x 3 R/ L LE soleous mm and adductor mm stretch  PF into self applied resistance of gait belt  Toilet transfer grab bar SPV    AMB 30ft x 1 with FWW and CGA    Assessment    Pt with decrease PF tone with AMB post intervention. Continues to demonstrate significant adductor tone    Plan    Progress through passport, west vector for AMB, consider use of , endurance in AMB with FWW, standing balance/ UE functional reaching activities, abduction strengthening

## 2019-12-31 NOTE — CARE PLAN
Problem: Communication  Goal: The ability to communicate needs accurately and effectively will improve  Outcome: PROGRESSING AS EXPECTED  Note:   Pt is able to communicate her needs effectively to staff.      Problem: Safety  Goal: Will remain free from injury  Outcome: PROGRESSING AS EXPECTED  Note:   Pt uses call light consistently for staff assistance. Pt has good safety awareness, no impulsivity observed.      Problem: Infection  Goal: Will remain free from infection  Outcome: PROGRESSING AS EXPECTED  Note:   No s/s of infection present      Problem: Venous Thromboembolism (VTW)/Deep Vein Thrombosis (DVT) Prevention:  Goal: Patient will participate in Venous Thrombosis (VTE)/Deep Vein Thrombosis (DVT)Prevention Measures  Outcome: PROGRESSING AS EXPECTED  Note:   Pt receives Lovenox SC injections for DVT prophylaxis      Problem: Respiratory:  Goal: Respiratory status will improve  Outcome: PROGRESSING AS EXPECTED  Note:   Pt is on room air and respirations are WNL.

## 2019-12-31 NOTE — DISCHARGE PLANNING
Provided patient with information for section 8 housing in [place].  Also, list of community resources provided.  Will follow.

## 2019-12-31 NOTE — THERAPY
Physical Therapy   Daily Treatment     Patient Name: Marilyn Salinas  Age:  54 y.o., Sex:  female  Medical Record #: [id number]  Today's Date: 12/30/2019     Precautions  Precautions: (P) Fall Risk, Spinal / Back Precautions , Cervical Collar    Comments: (P) C-collar OOB and for showers, wound great toe R foot    Subjective    Pt lying in bed upon arrival; distressed with family situation/ inability to obtain proper clothes. Agreeable to therapy session     Objective       12/30/19 1401   Precautions   Precautions Fall Risk;Spinal / Back Precautions ;Cervical Collar     Comments C-collar OOB and for showers, wound great toe R foot   Interdisciplinary Plan of Care Collaboration   Patient Position at End of Therapy Seated;Call Light within Reach;Tray Table within Reach;Phone within Reach   PT Total Time Spent   PT Individual Total Time Spent (Mins) 30   PT Charge Group   PT Therapeutic Activities 2       Bed mobility: SPV; transfer: SPV with FWW    Standing/ reaching for clothes in closet: 7minutes x1 with SBA and FWW    UB dressing in sitting with set up assist    AMB 95ft x 1 FWW SBA    Assessment    Pt with increased standing endurance when motivated with functional task. Able to find proper clothes from donation bin and mood was significantly improved by end of session    Plan    Progress through passport, set up on vector for AMB, consider use of , endurance in AMB with FWW, standing balance/ UE functional reaching activities

## 2019-12-31 NOTE — THERAPY
Occupational Therapy  Daily Treatment     Patient Name: Marilyn Salinas  Age:  54 y.o., Sex:  female  Medical Record #: [id number]  Today's Date: 12/31/2019     Precautions  Precautions: (P) Fall Risk, Swallow Precautions ( See Comments), Cervical Collar    Comments: C-collar OOB and for showers, wound great toe R foot         Subjective    Patient lying in bed asleep.  Patient stated she would prefer more back to back therapy rather than breaks between them so she can get more rest.     Objective       12/31/19 1401   Precautions   Precautions Fall Risk;Swallow Precautions ( See Comments);Cervical Collar     Fine Motor / Dexterity    Fine Motor / Dexterity Yes   Fine Motor / Dexterity Interventions Bilateral FMC integration task of picking out small objects from bucket, manipulating and placing on string x 25 attempts.     Bed Mobility    Supine to Sit Supervised   Scooting Supervised   Interdisciplinary Plan of Care Collaboration   Patient Position at End of Therapy Seated;Self Releasing Lap Belt Applied  (dropped off at Skin Care Class with PT)   OT Total Time Spent   OT Individual Total Time Spent (Mins) 30   OT Charge Group   OT Therapy Activity 2       FIM Lower Body Dressing Score:  5 - Standby Prompting/Supervsion or Set-up  Lower Body Dressing Description:  Supervision for safety(supervised seated EOB to reach for and don shoes)    SPT bed to w/c with close SBA and no AD.    Assessment    Patient did well with picking up, manipulating small objects and stringing them using bilateral hands.    Plan    ADL's/IADLs, safety with transfers, bilateral FMC, standing tolerance/balance

## 2019-12-31 NOTE — PROGRESS NOTES
"Rehab Progress Note     Encounter Date: 12/31/2019    CC: Central cord syndrome, neck pain    Interval Events (Subjective)  Patient sitting up in room. She reports she is doing well. She reports long history of weakness which comes and goes with temperature. Interestingly is worse with colder temperatures and not warmer.  She reports that she has been worked up many times and diagnosed previously with GBS as well but then told she definitely did not have GBS. Reports therapy is going well. Discussed primary team would be back on Thursday and discuss discharge planning. Denies SOB. Denies NVD    Objective:  VITAL SIGNS: /72   Pulse (P) 82   Temp 36.7 °C (98 °F) (Oral)   Resp (P) 18   Ht 1.676 m (5' 6\")   Wt 57.5 kg (126 lb 12.2 oz)   LMP 01/11/2017   SpO2 (P) 94%   BMI 20.46 kg/m²   Gen: NAD  Psych: Mood and affect appropriate  CV: RRR, no edema  Resp: CTAB, no upper airway sounds  Abd: NTND  Neuro: AOx4, following simple commands  Unchanged from 12/30/19 except notable hip adductor pattern in wheelchair    No results found for this or any previous visit (from the past 72 hour(s)).    Current Facility-Administered Medications   Medication Frequency   • gabapentin (NEURONTIN) capsule 800 mg TID   • midodrine (PROAMATINE) tablet 5 mg TID WITH MEALS   • vitamin D (cholecalciferol) 1000 UNIT tablet 1,000 Units DAILY   • albuterol inhaler 2 Puff Q4H PRN (RT)   • baclofen (LIORESAL) tablet 10 mg TID   • buPROPion SR (WELLBUTRIN-SR) tablet 150 mg BID   • enoxaparin (LOVENOX) inj 40 mg DAILY   • famotidine (PEPCID) tablet 20 mg BID   • methocarbamol (ROBAXIN) tablet 500 mg BID PRN   • oxybutynin SR (DITROPAN-XL) tablet 15 mg BID   • venlafaxine XR (EFFEXOR XR) capsule 75 mg Q EVENING   • Respiratory Care per Protocol Continuous RT   • oxyCODONE immediate-release (ROXICODONE) tablet 5 mg Q3HRS PRN   • oxyCODONE immediate release (ROXICODONE) tablet 10 mg Q3HRS PRN   • hydrALAZINE (APRESOLINE) tablet 25 mg Q8HRS " PRN   • acetaminophen (TYLENOL) tablet 650 mg Q4HRS PRN   • senna-docusate (PERICOLACE or SENOKOT S) 8.6-50 MG per tablet 2 Tab BID    And   • polyethylene glycol/lytes (MIRALAX) PACKET 1 Packet QDAY PRN    And   • magnesium hydroxide (MILK OF MAGNESIA) suspension 30 mL QDAY PRN    And   • bisacodyl (DULCOLAX) suppository 10 mg QDAY PRN   • artificial tears ophthalmic solution 1 Drop PRN   • benzocaine-menthol (CEPACOL) lozenge 1 Lozenge Q2HRS PRN   • mag hydrox-al hydrox-simeth (MAALOX PLUS ES or MYLANTA DS) suspension 20 mL Q2HRS PRN   • ondansetron (ZOFRAN ODT) dispertab 4 mg 4X/DAY PRN    Or   • ondansetron (ZOFRAN) syringe/vial injection 4 mg 4X/DAY PRN   • sodium chloride (OCEAN) 0.65 % nasal spray 2 Spray PRN   • diphenhydrAMINE (BENADRYL) tablet/capsule 25 mg Q6HRS PRN    Or   • diphenhydrAMINE (BENADRYL) injection 25 mg Q6HRS PRN   • zolpidem (AMBIEN) tablet 10 mg HS PRN       Orders Placed This Encounter   Procedures   • Diet Order Regular     Standing Status:   Standing     Number of Occurrences:   1     Order Specific Question:   Diet:     Answer:   Regular [1]     Order Specific Question:   Texture/Fiber modifications:     Answer:   Dysphagia 2(Pureed/Chopped)specify fluid consistency(question 6) [2]     Order Specific Question:   Consistency/Fluid modifications:     Answer:   Thin Liquids [3]       Assessment:  Active Hospital Problems    Diagnosis   • Cervical spine pain   • Tobacco use   • Anxiety and depression   • Foraminal stenosis of cervical region   • Corns and callus   • Hyperlipidemia, unspecified       Medical Decision Making and Plan:  Central cord syndrome / C2 AISD - s/p fall at home with worsening weakness, found to have severe stenosis. Patient underwent C3-C5 ACDF with Dr. Linton on 12/20/19  -PT and OT for mobility and ADLs     Neurogenic bowel/bladder - Patient without incontinence. Continue to monitor. Patient on Oxybutynin 15 mg BID on transfer.  Multiple BMs on  12/31/19     Neuropathy - Continue Gabapentin 600 mg BID. Increase to TID. Increase to 800     Spasticity - Patient on Baclofen 10 mg TID on transfer, mostly affecting proximal LE.  -Hip adductor and ankle flexor tone per evaluation with PT. Increase Baclofen to 15 mg. Discussed may benefit at later date for Botox but patient concerned for weakness     Hypotension - Most likely 2/2 SCI. Continue Midodrine 15 mg TID and wean as tolerated  -Decrease to 10 mg TID. SBP into 140s, decrease to 5 mg. Stable on 5 mg     Depression/Anxiety - Patient on Buproprion  BID and Venlafaxine 75 mg      Hammer toes - Wound care consult. Evaluated and treated on 12/30     Skin - Patient with patchy sensation. Q2H turning and good monitoring of skin.      Tobacco abuse - Will need smoking cessation counseling.   -Smoking cessation 12/30/19. Patient no longer wants nicotine patch.      Vitamin D - 23 on admission. Start 1000 U    GI Ppx - Patient on pepcid on transfer.      DVT ppx - Patient on Lovenox on transfer.    Total time:  25 minutes.  I spent greater than 50% of the time for patient care, counseling, and coordination on this date, including unit/floor time, and face-to-face time with the patient as per interval events and assessment and plan above. Topics discussed included spasticity, increase Baclofen and monitor for sedation, and stable SBP on lower dose of midodrine.     Elvira Carmen M.D.

## 2020-01-01 PROCEDURE — 700102 HCHG RX REV CODE 250 W/ 637 OVERRIDE(OP): Performed by: PHYSICAL MEDICINE & REHABILITATION

## 2020-01-01 PROCEDURE — 770010 HCHG ROOM/CARE - REHAB SEMI PRIVAT*

## 2020-01-01 PROCEDURE — 97112 NEUROMUSCULAR REEDUCATION: CPT

## 2020-01-01 PROCEDURE — 94760 N-INVAS EAR/PLS OXIMETRY 1: CPT

## 2020-01-01 PROCEDURE — 97530 THERAPEUTIC ACTIVITIES: CPT

## 2020-01-01 PROCEDURE — A9270 NON-COVERED ITEM OR SERVICE: HCPCS | Performed by: PHYSICAL MEDICINE & REHABILITATION

## 2020-01-01 PROCEDURE — 700111 HCHG RX REV CODE 636 W/ 250 OVERRIDE (IP): Performed by: PHYSICAL MEDICINE & REHABILITATION

## 2020-01-01 PROCEDURE — 97535 SELF CARE MNGMENT TRAINING: CPT

## 2020-01-01 PROCEDURE — 97116 GAIT TRAINING THERAPY: CPT

## 2020-01-01 PROCEDURE — 97110 THERAPEUTIC EXERCISES: CPT

## 2020-01-01 RX ADMIN — OXYBUTYNIN CHLORIDE 15 MG: 5 TABLET, EXTENDED RELEASE ORAL at 08:26

## 2020-01-01 RX ADMIN — ENOXAPARIN SODIUM 40 MG: 40 INJECTION, SOLUTION INTRAVENOUS; SUBCUTANEOUS at 08:26

## 2020-01-01 RX ADMIN — OXYBUTYNIN CHLORIDE 15 MG: 5 TABLET, EXTENDED RELEASE ORAL at 20:40

## 2020-01-01 RX ADMIN — MIDODRINE HYDROCHLORIDE 5 MG: 2.5 TABLET ORAL at 17:30

## 2020-01-01 RX ADMIN — MIDODRINE HYDROCHLORIDE 5 MG: 2.5 TABLET ORAL at 12:07

## 2020-01-01 RX ADMIN — BACLOFEN 15 MG: 10 TABLET ORAL at 08:27

## 2020-01-01 RX ADMIN — BUPROPION HYDROCHLORIDE 150 MG: 150 TABLET, EXTENDED RELEASE ORAL at 20:40

## 2020-01-01 RX ADMIN — VENLAFAXINE HYDROCHLORIDE 75 MG: 75 CAPSULE, EXTENDED RELEASE ORAL at 20:41

## 2020-01-01 RX ADMIN — ZOLPIDEM TARTRATE 10 MG: 5 TABLET ORAL at 20:52

## 2020-01-01 RX ADMIN — FAMOTIDINE 20 MG: 20 TABLET ORAL at 20:41

## 2020-01-01 RX ADMIN — VITAMIN D, TAB 1000IU (100/BT) 1000 UNITS: 25 TAB at 08:26

## 2020-01-01 RX ADMIN — GABAPENTIN 800 MG: 400 CAPSULE ORAL at 20:40

## 2020-01-01 RX ADMIN — GABAPENTIN 800 MG: 400 CAPSULE ORAL at 14:56

## 2020-01-01 RX ADMIN — BUPROPION HYDROCHLORIDE 150 MG: 150 TABLET, EXTENDED RELEASE ORAL at 08:27

## 2020-01-01 RX ADMIN — ZOLPIDEM TARTRATE 10 MG: 5 TABLET ORAL at 00:04

## 2020-01-01 RX ADMIN — SENNOSIDES AND DOCUSATE SODIUM 2 TABLET: 8.6; 5 TABLET ORAL at 20:39

## 2020-01-01 RX ADMIN — FAMOTIDINE 20 MG: 20 TABLET ORAL at 08:27

## 2020-01-01 RX ADMIN — BACLOFEN 15 MG: 10 TABLET ORAL at 14:56

## 2020-01-01 RX ADMIN — GABAPENTIN 800 MG: 400 CAPSULE ORAL at 08:27

## 2020-01-01 RX ADMIN — BACLOFEN 15 MG: 10 TABLET ORAL at 20:39

## 2020-01-01 RX ADMIN — SENNOSIDES AND DOCUSATE SODIUM 2 TABLET: 8.6; 5 TABLET ORAL at 08:26

## 2020-01-01 RX ADMIN — MIDODRINE HYDROCHLORIDE 5 MG: 2.5 TABLET ORAL at 08:26

## 2020-01-01 NOTE — THERAPY
"Physical Therapy   Daily Treatment     Patient Name: Marilyn Salinas  Age:  54 y.o., Sex:  female  Medical Record #: [id number]  Today's Date: 1/1/2020     Precautions  Precautions: Fall Risk, Swallow Precautions ( See Comments), Cervical Collar    Comments: C collar OOB, wound great toe R foot    Subjective    Pt was sitting in w/c upon arrival and agreeable to tx. Embarrassed about fall last night; stated when she woke up she was confused and just thought she was at home.      Objective       01/01/20 0931   Precautions   Precautions Fall Risk;Swallow Precautions ( See Comments);Cervical Collar     Comments C collar OOB, wound great toe R foot   Interdisciplinary Plan of Care Collaboration   Patient Position at End of Therapy Seated;Self Releasing Lap Belt Applied;Call Light within Reach;Tray Table within Reach;Phone within Reach   PT Total Time Spent   PT Individual Total Time Spent (Mins) 30   PT Charge Group   PT Gait Training 1   PT Therapeutic Exercise 1       FIM Stairs Score:  2 - Max Assistance  Stairs Description:  (8 x 1 6\" B UE ascend fwd, descend retro CGA step to pattern)    Gastroc mm stretch on incline board 60sec x 2    Side stepping 1 x 10 both R/L LE on 6\" step with physical assist for correction on pelvic rotation, ankle DF ROM and to prevent valgus on SLS, B UE support on unilateral HR    Assessment    Pt requires external support to prevent valgus collapse on SLS. Improved DF ROM post passive stretch on incline board. Increased awareness of valgus collapse.     Plan  Progress through passport, west vector for AMB, consider use of , endurance in AMB with FWW, standing balance/ UE functional reaching activities, abduction strengthening    "

## 2020-01-01 NOTE — FLOWSHEET NOTE
01/01/20 1034   Events/Summary/Plan   Events/Summary/Plan 02 spot check, IS   Interdisciplinary Plan of Care-Goals (Indications)   Bronchodilator Indications History / Diagnosis   Interdisciplinary Plan of Care-Outcomes    Bronchodilator Outcome Patient at Stable Baseline   Education   Education Yes - Pt. / Family has been Instructed in use of Respiratory Equipment  (DB benefits with asthma and1st signs of bronchitis)   Incentive Spirometry Group   Breathing Exercises Yes   Incentive Spirometer Volume 2000 mL   Respiratory WDL   Respiratory (WDL) X   Chest Exam   Respiration 18   Pulse 86   Secretions   Cough Non Productive   Oximetry   #Pulse Oximetry (Single Determination) Yes   Oxygen   Home O2 Use Prior To Admission? No   Pulse Oximetry 95 %   O2 Daily Delivery Respiratory  Room Air with O2 Available

## 2020-01-01 NOTE — CARE PLAN
Problem: Safety  Goal: Will remain free from injury  Outcome: PROGRESSING AS EXPECTED  Note:   Reinforced safety precautions to pt. Encouraged pt to use call light when assistance is needed, call light is within easy reach, bed is locked and at lowest position.       Problem: Bowel/Gastric:  Goal: Normal bowel function is maintained or improved  Outcome: PROGRESSING AS EXPECTED  Note:   Patient remains continent of bowel. Had BM today. Denied s/s of constipation.

## 2020-01-01 NOTE — REHAB-PHARMACY IDT TEAM NOTE
Pharmacy   Pharmacy  Antibiotics/Antifungals/Antivirals:  Medication:      Active Orders (From admission, onward)    None        Route:         None  Stop Date:  N/A  Reason:   Antihypertensives/Cardiac:  Medication:    Orders (72h ago, onward)     Start     Ordered    12/30/19 1730  midodrine (PROAMATINE) tablet 5 mg  3 TIMES DAILY WITH MEALS      12/30/19 1616    12/28/19 1730  midodrine (PROAMATINE) tablet 10 mg  3 TIMES DAILY WITH MEALS,   Status:  Discontinued      12/28/19 1240    12/27/19 1543  hydrALAZINE (APRESOLINE) tablet 25 mg  EVERY 8 HOURS PRN      12/27/19 1543              Patient Vitals for the past 24 hrs:   BP Pulse   01/01/20 0430 107/76 80   01/01/20 0400 108/69 73   01/01/20 0325 113/70 70   01/01/20 0310 120/82 79   12/31/19 1830 115/73 88   12/31/19 1537 -- 82   12/31/19 1224 109/72 87     Anticoagulation:  Medication:  enoxaparin  INR:      Other key medications:       A review of the medication list reveals no issues at this time.    Section completed by:  Francisco Soler [facility]

## 2020-01-01 NOTE — THERAPY
Occupational Therapy  Daily Treatment     Patient Name: Marilyn Salinas  Age:  54 y.o., Sex:  female  Medical Record #: [id number]  Today's Date: 2020     Precautions  Precautions: Fall Risk, Swallow Precautions ( See Comments), Cervical Collar    Comments: C collar OOB, wound great toe R foot         Subjective    Patient lying in bed eating breakfast.  States she is not in a good mood because the shot she was given this morning in her abdomen caused her discomfort.     Objective       20 0831   Precautions   Precautions Fall Risk;Swallow Precautions ( See Comments);Cervical Collar     Cognition    Safety Awareness Impaired   Comments cues to lock w/c brakes   IADL Treatments   Home Management Patient gathered dirty clothing from closet while seated in w/c (extra time required) and placed in laundry bags mod I.  Stood at washing machine with SBA/supervision to load washing machine with clothing and soap and start machine.     Bed Mobility    Supine to Sit Supervised   Scooting Supervised   Rolling Supervised   Interdisciplinary Plan of Care Collaboration   Patient Position at End of Therapy Seated;Self Releasing Lap Belt Applied  (in gym awaiting PT)   OT Total Time Spent   OT Individual Total Time Spent (Mins) 60   OT Charge Group   OT Self Care / ADL 3   OT Therapy Activity 1       FIM Eating Score:  7 - Independent  Eating Description:       FIM Grooming Score:  7 - Independent  Grooming Description:       FIM Upper Body Dressin - Modified Independent  Upper Body Dressing Description:  Assist device equipment(w/c to retrieve from closet, indep to don/doff )    FIM Lower Body Dressing Score:  5 - Standby Prompting/Supervsion or Set-up  Lower Body Dressing Description:  Assist device/equipment, Supervision for safety(w/c to retrieve clothing, supervised to don/doff pants, shoes)    FIM Toiletin - Standby Prompting/Supervision or Set-up  Toileting Description:  Grab bar, Supervision for  safety    FIM Toilet Transfer Score:  5 - Standby Prompting/Supervision or Set-up  Toilet Transfer Description:  Grab bar, Supervision for safety      Assessment    Patient completed adl routine (except shower) with SBA/supervised level this morning.  Happy that she is able to wash her clothes here.    Plan    ADL's/IADLs, safety with transfers, bilateral FMC, standing tolerance/balance

## 2020-01-01 NOTE — CARE PLAN
Problem: Safety  Goal: Will remain free from falls  Intervention: Implement fall precautions  Note:   Use of call light encouraged to make needs known.Fall precautions and frequent rounding in place for safety.Call light within reach.Will continue to monitor and assess needs and safety.     Problem: Bowel/Gastric:  Goal: Normal bowel function is maintained or improved  Intervention: Educate patient and significant other/support system about signs and symptoms of constipation and interventions to implement  Note:   Pt is continent of bowel per report.Scheduled senna given at hs.LBM 12/31.Will continue to monitor and assess for s/sx of constipation.     Problem: Pain Management  Goal: Pain level will decrease to patient's comfort goal  Intervention: Follow pain managment plan developed in collaboration with patient and Interdisciplinary Team  Note:   Pain is manageable with scheduled medication at this time.Repositioned with pillows for comfort.Will continue to monitor and assess pain level and medicate as needed.

## 2020-01-01 NOTE — PROGRESS NOTES
"Rehab Fall Note    Date of fall: 1/1/2020     Time of incident/discovery? 0300     Witnessed or Unwitnessed: unwitnessed      Assisted or unassisted?: unassisted      Staff member present? no     Head strike?: no    What is the patient's orientation status? oriented x 4    Location of fall: patient's room     Was this a fall with therapy? No      Patient had a fall from: bed     Injury from fall: none     Persons contacted regarding the fall: family/caregiver, physician and charge nurse     Post fall huddle called: yes     Persons present at post fall huddle: nurse, nursing supervisor and nursing aide     Interventions to prevent another fall: alarms on, call light within reach, room close to nursing station and signs on patient's door     Was the call light used? No     Did the bed or chair alarm sound? No      If no alarm sounded, do the alarms work? Yes     If alarm malfunctioned, was a maintenance request submitted? Yes     Was the pt. wearing a seatbelt prior to the fall? No     If wearing, did the pt. take off the seatbelt? No     What is the patient's transfer status? SBA-CGA     Other fall details:    0300 Pt found on the floor while rounding.Pt stated she just woke up and wants to use the bathroom, \" I just want to use the bathroom okay \" verbalized by the pt when asked why  she did not call for assistance.Pt always uses call light for assistance, SBA-CGA  and never impulsive.Built in bed alarm is on and did not went off.Per CNA the built in bed alarm is working when she set the alarm..Assisted to the bathroom, cleaned and repositioned back in bed.Bed alarms x2 in place, call light within reach.Pt is alert and oriented, no head strike, denies any pain.Will continue to monitor.    "

## 2020-01-01 NOTE — THERAPY
Physical Therapy   Daily Treatment     Patient Name: Marilyn Salinas  Age:  54 y.o., Sex:  female  Medical Record #: [id number]  Today's Date: 12/31/2019     Precautions  Precautions: (P) Fall Risk, Swallow Precautions ( See Comments), Cervical Collar    Comments: (P) C collar OOB, wound great toe R foot       12/31/19 1431   Precautions   Precautions Fall Risk;Swallow Precautions ( See Comments);Cervical Collar     Comments C collar OOB, wound great toe R foot   Interdisciplinary Plan of Care Collaboration   Patient Position at End of Therapy Seated;Other (Comments)  (hand off to therapy tech)   PT Total Time Spent   PT Individual Total Time Spent (Mins) 60   PT Charge Group   PT Group Therapy Group Activities           Subjective:     Pt attended a 60 minute Safe Skin education group targeting prevention and treatment of pressure injuries. Pt was alert during group training session.      Reason for group therapy: To Increase Active Participation through Peer Pressure; To Enhance Motivation; To Increase Patient Interaction during Physical Recovery; To Facilitate Goal Discussion    Other treatments provided: Group therapy topic: Post session, pt indicated good level of knowledge of risk factors and was able questions about skin care risks or prevention. Pt was educated on pressure injury risk factors via auditory and visual modalities (printed handout reviewed and provided) in addition to demonstration, practice and teach back methods. Topics reviewed included risk factors for pressure injuries, staging of pressure injuries, staging pressure injuries and prevention of pressure injuries. Patient was encouraged to ask questions, did so, and all questions were answered to patient’s satisfaction. Patient was counseled on the importance of active communication with interdisciplinary team to address risk factors and prevent pressure injuries. Pt benefited from participating in this skin education class as evidenced by  verbalizing improved understanding of prevention of pressure injuries and modifications to mitigate risk factors.      Assessment:   Pt benefited from participating in the Safe Skin education class and demonstrated increased understanding of controllable risk factors as evidenced by participation in group Safe Skin class and ability to demonstrate adequate pressure relief techniques.      Plan:   Continue ongoing education to prevent pressure injuries including assessment of areas at high risk and strategies to mitigate this risk. Assess for carryover of education provided in Safe Skin class.

## 2020-01-01 NOTE — THERAPY
Occupational Therapy  Daily Treatment     Patient Name: Marilyn Salinas  Age:  54 y.o., Sex:  female  Medical Record #: [id number]  Today's Date: 1/1/2020     Precautions  Precautions: (P) Fall Risk, Swallow Precautions ( See Comments), Cervical Collar    Comments: (P) C collar OOB, wound great toe R foot         Subjective    Patient lying in bed upon arrival.  Agreeable to continue working on laundry, which was initiated earlier today.     Objective       01/01/20 1231   Precautions   Precautions Fall Risk;Swallow Precautions ( See Comments);Cervical Collar     Comments C collar OOB, wound great toe R foot   IADL Treatments   Home Management Patient removed clean, dry clothing from dryer seated in w/c using reacher as needed mod I.  Sat in w/c and folded/organized clothing mod I.  Organized, placed back in closet mod I in w/c.     Interdisciplinary Plan of Care Collaboration   Patient Position at End of Therapy Seated   OT Total Time Spent   OT Individual Total Time Spent (Mins) 60   OT Charge Group   OT Therapy Activity 4       Supine to sit mod I, supervised reach pivot bed to w/c.  Donned c collar independently.    Therapist added a screw and tightened other screws on w/c arm rests.    Discussed using [facility] to acquire hip kit tools.    Assessment    Patient completed second half of laundry task mod I with good safety and use of reacher.      Plan    ADL's/IADLs (meal prep, kitchen cleaning), safety with transfers, bilateral FMC, standing tolerance/balance

## 2020-01-02 ENCOUNTER — APPOINTMENT (OUTPATIENT)
Dept: RADIOLOGY | Facility: REHABILITATION | Age: 55
DRG: 092 | End: 2020-01-02
Attending: PHYSICAL MEDICINE & REHABILITATION
Payer: MEDICAID

## 2020-01-02 PROCEDURE — 97535 SELF CARE MNGMENT TRAINING: CPT

## 2020-01-02 PROCEDURE — 97530 THERAPEUTIC ACTIVITIES: CPT

## 2020-01-02 PROCEDURE — 97112 NEUROMUSCULAR REEDUCATION: CPT

## 2020-01-02 PROCEDURE — 700102 HCHG RX REV CODE 250 W/ 637 OVERRIDE(OP): Performed by: PHYSICAL MEDICINE & REHABILITATION

## 2020-01-02 PROCEDURE — A9270 NON-COVERED ITEM OR SERVICE: HCPCS | Performed by: PHYSICAL MEDICINE & REHABILITATION

## 2020-01-02 PROCEDURE — 770010 HCHG ROOM/CARE - REHAB SEMI PRIVAT*

## 2020-01-02 PROCEDURE — 97116 GAIT TRAINING THERAPY: CPT

## 2020-01-02 PROCEDURE — 700111 HCHG RX REV CODE 636 W/ 250 OVERRIDE (IP): Performed by: PHYSICAL MEDICINE & REHABILITATION

## 2020-01-02 PROCEDURE — 97110 THERAPEUTIC EXERCISES: CPT

## 2020-01-02 PROCEDURE — 94760 N-INVAS EAR/PLS OXIMETRY 1: CPT

## 2020-01-02 PROCEDURE — 74018 RADEX ABDOMEN 1 VIEW: CPT

## 2020-01-02 PROCEDURE — 99233 SBSQ HOSP IP/OBS HIGH 50: CPT | Performed by: PHYSICAL MEDICINE & REHABILITATION

## 2020-01-02 RX ORDER — BACLOFEN 20 MG/1
20 TABLET ORAL 3 TIMES DAILY
Status: DISCONTINUED | OUTPATIENT
Start: 2020-01-02 | End: 2020-01-06

## 2020-01-02 RX ORDER — SENNOSIDES A AND B 8.6 MG/1
17.2 TABLET, FILM COATED ORAL
Status: DISCONTINUED | OUTPATIENT
Start: 2020-01-02 | End: 2020-01-07 | Stop reason: HOSPADM

## 2020-01-02 RX ADMIN — OXYBUTYNIN CHLORIDE 15 MG: 5 TABLET, EXTENDED RELEASE ORAL at 08:43

## 2020-01-02 RX ADMIN — MIDODRINE HYDROCHLORIDE 5 MG: 2.5 TABLET ORAL at 17:54

## 2020-01-02 RX ADMIN — MIDODRINE HYDROCHLORIDE 5 MG: 2.5 TABLET ORAL at 11:41

## 2020-01-02 RX ADMIN — FAMOTIDINE 20 MG: 20 TABLET ORAL at 08:42

## 2020-01-02 RX ADMIN — VENLAFAXINE HYDROCHLORIDE 75 MG: 75 CAPSULE, EXTENDED RELEASE ORAL at 20:22

## 2020-01-02 RX ADMIN — FAMOTIDINE 20 MG: 20 TABLET ORAL at 20:23

## 2020-01-02 RX ADMIN — GABAPENTIN 800 MG: 400 CAPSULE ORAL at 08:42

## 2020-01-02 RX ADMIN — VITAMIN D, TAB 1000IU (100/BT) 1000 UNITS: 25 TAB at 08:42

## 2020-01-02 RX ADMIN — BACLOFEN 20 MG: 20 TABLET ORAL at 20:23

## 2020-01-02 RX ADMIN — BACLOFEN 15 MG: 10 TABLET ORAL at 14:41

## 2020-01-02 RX ADMIN — GABAPENTIN 800 MG: 400 CAPSULE ORAL at 20:22

## 2020-01-02 RX ADMIN — BACLOFEN 15 MG: 10 TABLET ORAL at 08:43

## 2020-01-02 RX ADMIN — BUPROPION HYDROCHLORIDE 150 MG: 150 TABLET, EXTENDED RELEASE ORAL at 08:43

## 2020-01-02 RX ADMIN — GABAPENTIN 800 MG: 400 CAPSULE ORAL at 14:41

## 2020-01-02 RX ADMIN — MIDODRINE HYDROCHLORIDE 5 MG: 2.5 TABLET ORAL at 08:42

## 2020-01-02 RX ADMIN — OXYBUTYNIN CHLORIDE 15 MG: 5 TABLET, EXTENDED RELEASE ORAL at 20:22

## 2020-01-02 RX ADMIN — BUPROPION HYDROCHLORIDE 150 MG: 150 TABLET, EXTENDED RELEASE ORAL at 20:23

## 2020-01-02 RX ADMIN — ENOXAPARIN SODIUM 40 MG: 40 INJECTION, SOLUTION INTRAVENOUS; SUBCUTANEOUS at 08:44

## 2020-01-02 RX ADMIN — ZOLPIDEM TARTRATE 10 MG: 5 TABLET ORAL at 20:53

## 2020-01-02 NOTE — REHAB-OT IDT TEAM NOTE
Occupational Therapy   Activities of Daily Living  Eating Initial:  6 - Modified Independent  Eating Current:  7 - Independent   Eating Description:  Increased time  Grooming Initial:  5 - Standby Prompting/Supervision or Set-up  Grooming Current:  7 - Independent   Grooming Description:  Supervision for safety, Set-up of equipment(WC level at sink side, therapist switched out pads for c-collar for shower)  Bathing Initial:  3 - Moderate Assistance  Bathing Current:  5 - Standby Prompting/Supervision or Set-up   Bathing Description:  Tub bench, Hand held shower, Supervision for safety, Set-up of equipment, Initial preparation for task(setup and supervised seated)  Upper Body Dressing Initial:  4 - Minimal Assistance  Upper Body Dressing Current:  6 - Modified Independent   Upper Body Dressing Description:  Assist device equipment(w/c to retrieve from closet, indep to don/doff )  Lower Body Dressing Initial:  2 - Max Assistance  Lower Body Dressing Current:  5 - Standby Prompting/Supervsion or Set-up   Lower Body Dressing Description:  5 - Standby Prompting/Supervsion or Set-up  Toileting Initial:  4 - Minimal Assistance  Toileting Current:  5 - Standby Prompting/Supervision or Set-up   Toileting Description:  Grab bar, Supervision for safety  Toilet Transfer Initial:  4 - Minimal Assistance  Toilet Transfer Current:  5 - Standby Prompting/Supervision or Set-up   Toilet Transfer Description:  5 - Standby Prompting/Supervision or Set-up  Tub / Shower Transfer Initial:  4 - Minimal Assistance  Tub / Shower Transfer Current:  5 - Standby Prompting/Supervision or Set-up   Tub / Shower Transfer Description:  Grab bar, Adaptive equipment, Supervision for safety, Set-up of equipment(setup/sba with grab bar and bench)  IADL:  Supervised with laundry and kitchen mobility  Family Training/Education:  Scheduled for tomorrow with patient's son   DME/DC Recommendations:  W/C, FWW, shower chair, grab bars in shower and by toilets,  reacher (patient educated on [facility])    Strengths:  Alert and oriented, Effective communication skills, Independent PLOF, Making steady progress towards goals, Motivated for self care and independence, Supportive family and Willingly participates in therapeutic activities, moving in with son and daughter in law  Barriers:  Emotional lability, Generalized weakness, Poor balance and Other: decreased safety, LE spasticity     # of short term goals set= 5   # of short term goals met= 5     Occupational Therapy Goals     Problem: OT Long Term Goals     Dates: Start: 12/28/19       Description:     Goal: LTG-By discharge, patient will complete basic self care tasks     Dates: Start: 12/28/19       Description: 1) Individualized Goal:  Mod I for BADL's via AE/DME PRN  2) Interventions:  OT Self Care/ADL, OT Neuro Re-Ed/Balance, OT Therapeutic Activity, OT Evaluation and OT Therapeutic Exercise             Goal: LTG-By discharge, patient will perform bathroom transfers     Dates: Start: 12/28/19       Description: 1) Individualized Goal:  Mod I for shower and commode transfers via DME PRN  2) Interventions:  OT Self Care/ADL, OT Neuro Re-Ed/Balance, OT Therapeutic Activity, OT Evaluation and OT Therapeutic Exercise                         Section completed by:  Claudio Cage, MS,OTR/L

## 2020-01-02 NOTE — DISCHARGE PLANNING
Met with patient to review team conference discussion.  I discussed possible home health therapy.  I did let her know that we need to find out if agencies in her area accept her insurance.  Discussed outpatient therapy as a second option.  I will find out who does a w/c in [place] and let her know.  Will follow.

## 2020-01-02 NOTE — CARE PLAN
Problem: Communication  Goal: The ability to communicate needs accurately and effectively will improve  Outcome: PROGRESSING AS EXPECTED  Patient is alert and oriented x 4.      Problem: Safety  Goal: Will remain free from injury  Outcome: PROGRESSING AS EXPECTED  Pt uses call light consistently and appropriately. Waits for assistance does not attempt self transfer this shift. Able to verbalize needs.

## 2020-01-02 NOTE — THERAPY
"Physical Therapy   Daily Treatment     Patient Name: Marilyn Salinas  Age:  54 y.o., Sex:  female  Medical Record #: [id number]  Today's Date: 1/2/2020     Precautions  Precautions: Fall Risk, Swallow Precautions ( See Comments), Cervical Collar    Comments: C collar OOB, wound great toe R foot    Subjective    \"My aunt called me last night and told me I have muscular dystrophy\"     Objective       01/02/20 0901   Precautions   Precautions Fall Risk;Swallow Precautions ( See Comments);Cervical Collar     Comments C collar OOB, wound great toe R foot   Supine Lower Body Exercise   Bridges 1 set of 10   Hip Abduction 3 sets of 10   Short Arc Quad 1 set of 10   Other Exercises Active stretching with pt using gait belt to assist for bilateral adductors and plantarflexors, ~3x10 each side    Interdisciplinary Plan of Care Collaboration   IDT Collaboration with  Occupational Therapist   Patient Position at End of Therapy Seated;Call Light within Reach;Tray Table within Reach   Collaboration Comments re: missed minutes and CLOF   PT Total Time Spent   PT Individual Total Time Spent (Mins) 60   PT Charge Group   PT Therapeutic Exercise 1   PT Neuromuscular Re-Education / Balance 1   PT Therapeutic Activities 2     Pt completed dressing activity seated in WC and intermittent standing, CGA for balance when standing, otherwise set-up only: doffed t-shirt, tank top, and pajama pants; donned tank-top, t-shirt, and sweatpants.     Pt's WC switched from 14\" wide to 16\" wide for better fit and decreased risk of pressure injuries.     Pt education/ discussion of possible diagnosis of muscular dystrophy- pt encouraged to discuss further with Dr Gregory.     FIM Bed/Chair/Wheelchair Transfers Score: 4 - Minimal Assistance  Bed/Chair/Wheelchair Transfers Description:  Increased time, Set-up of equipment(WC <> supine on treatment mat, min A supine > sit, CGA SPT )    FIM Wheelchair Score:  6 - Modified Independent  Wheelchair Description: " " Extra time(room <> back gym, mod I )      Assessment    Pt fixated on \"diagnosis\" of muscular dystrophy according to aunt. Participatory in LE strengthening and stretching this session.     Plan    Progress through [place], west vector for AMB, endurance in AMB with FWW, standing balance/ UE functional reaching activities, abduction strengthening, discuss ramp to enter home, discuss fit of w/c [id number]             "

## 2020-01-02 NOTE — THERAPY
Missed Therapy     Patient Name: Marilyn Salinas  Age:  54 y.o., Sex:  female  Medical Record #: [id number]  Today's Date: 1/2/2020    Discussed missed therapy with RN, therapy supervisor.

## 2020-01-02 NOTE — REHAB-NURSING IDT TEAM NOTE
Nursing   Nursing  Diet/Nutrition:  Dysphagia II  Medication Administration:  Whole with Liquid Wash  % consumed at meals in last 24 hours:  Consumed % of meals per documentation.  Meal/Snack Consumption for the past 24 hrs:   Oral Nutrition   01/01/20 1900 Dinner;Between % Consumed   01/01/20 1200 Lunch;Between % Consumed     Snack schedule:  None    Appetite:  Good  Fluid Intake/Output in past 24 hours: In: 780 [P.O.:780]  Out: 1859   Admit Weight:  Weight: 59 kg (130 lb)  Weight Last 7 Days   [57.5 kg (126 lb 12.2 oz)-59 kg (130 lb)] 57.5 kg (126 lb 12.2 oz) (12/29 1200)    Pain Issues:    Location:  --  --         Severity:  Denies   Scheduled pain medications:  gabapentin (NEURONTIN)      PRN pain medications used in last 24 hours:  None   Non Pharmacologic Interventions:  relaxation, repositioned and rest  Effectiveness of pain management plan:  good=patient states acceptable comfort after interventions    Bowel:    Bowel Assist Initial Score:  4 - Minimal Assistance  Bowel Assist Current Score:  3 - Moderate Assistance  Bowl Accidents in last 7 days:     Last bowel movement: 01/01/20  Stool Description: Large     Usual bowel pattern:  daily  Scheduled bowel medications:  senna-docusate (PERICOLACE or SENOKOT S)   PRN bowel medications used in last 24 hours:  None  Nursing Interventions:  Increased time, PRN medication, Scheduled medication, Supervision, Verbal cueing, Positioning on commode/toilet, Brief  Effectiveness of bowel program:   good=regular, predictable, controlled emptying of bowel  Bladder:    Bladder Assist Initial Score:  4 - Minimal Assistance  Bladder Assist Current Score:  1 - Total Assistance  Bladder Accidents in last 7 days:  1  PVR range for past 24-48 hours:  [29-97]  ()  Intermittent Catheterization: N/A  Medications affecting bladder:  Ditropan    Time void schedule/voiding pattern:  Voiding every 2-4 hours  Interventions:  Increased time, Supervision, Verbal cueing,  Brief  Effectiveness of bladder training:  Good=regular, predictable, emptying of bladder, patient initiates time voiding    Sleep/wake cycle:   Average hours slept:  Sleeps 4-6 hours without waking  Sleep medication usage:  zolpidem (AMBIEN) tablet 5 mg nightly    Patient/Family Training/Education:  General Self Care  Discharge Supply Recommendations:  Strengths: Alert and oriented, Willingly participates in therapeutic activities and Able to follow instructions   Barriers:   Generalized weakness       Nursing Problems     Problem: Bowel/Gastric:     Description:     Goal: Normal bowel function is maintained or improved     Description:           Goal: Will not experience complications related to bowel motility     Description:                 Problem: Communication     Description:     Goal: The ability to communicate needs accurately and effectively will improve     Description:                 Problem: Discharge Barriers/Planning     Description:     Goal: Patient's continuum of care needs will be met     Description:                 Problem: Infection     Description:     Goal: Will remain free from infection     Description:                 Problem: Knowledge Deficit     Description:     Goal: Knowledge of disease process/condition, treatment plan, diagnostic tests, and medications will improve     Description:           Goal: Knowledge of the prescribed therapeutic regimen will improve     Description:                 Problem: Pain Management     Description:     Goal: Pain level will decrease to patient's comfort goal     Description:                 Problem: Psychosocial Needs:     Description:     Goal: Level of anxiety will decrease     Description:                 Problem: Respiratory:     Description:     Goal: Respiratory status will improve     Description:                 Problem: Safety     Description:     Goal: Will remain free from injury     Description:           Goal: Will remain free from  falls     Description:                 Problem: Venous Thromboembolism (VTW)/Deep Vein Thrombosis (DVT) Prevention:     Description:     Goal: Patient will participate in Venous Thrombosis (VTE)/Deep Vein Thrombosis (DVT)Prevention Measures     Description:     Flowsheet:     Taken at 12/28/19 3783    Pharmacologic Prophylaxis Used LMWH: Enoxaparin(Lovenox) by  Whit Barrow R.N.                             Long Term Goals:   At discharge patient will be able to function safely at home with support.    Section completed by:  Angela Olivas L.P.N.

## 2020-01-02 NOTE — THERAPY
Occupational Therapy  Daily Treatment     Patient Name: Marilyn Salinas  Age:  54 y.o., Sex:  female  Medical Record #: [id number]  Today's Date: 2020     Precautions  Precautions: (P) Fall Risk, Swallow Precautions ( See Comments), Cervical Collar    Comments: (P) C collar OOB, wound great toe R foot         Subjective    Patient in room talking with her friend.  Agreeable to shower.  Friend is very appreciative of rehab staff helping the patient so much.     Objective       20 1301   Precautions   Precautions Fall Risk;Swallow Precautions ( See Comments);Cervical Collar     Comments C collar OOB, wound great toe R foot   Interdisciplinary Plan of Care Collaboration   IDT Collaboration with  Other (See Comments);Nursing   Patient Position at End of Therapy Seated;Self Releasing Lap Belt Applied;Call Light within Reach;Tray Table within Reach;Phone within Reach   Collaboration Comments pt's friend present at start of session; asked charge nurse for functioning telephone for pt's room   OT Total Time Spent   OT Individual Total Time Spent (Mins) 60   OT Charge Group   OT Self Care / ADL 4     OT cleaned patient's c collar pads post shower.    FIM Grooming Score:  7 - Independent  Grooming Description:       FIM Bathing Score:  5 - Standby Prompting/Supervision or Set-up  Bathing Description:       FIM Upper Body Dressin - Modified Independent  Upper Body Dressing Description:  Assist device equipment(mod I w/ w/c to retrieve)    FIM Lower Body Dressing Score:  6 - Modified Independent  Lower Body Dressing Description:  Assist device/equipment(mod I using w/c to retrieve, grab bar for standing balance )    FIM Tub/Shower Transfers Score:  5 - Standby Prompting/Supervision or Set-up  Tub/Shower Transfers Description:  Grab bar, Adaptive equipment, Supervision for safety(supervised with grab bar and bench)      Assessment    Patient continues to show improvement with adl function and safety. No pain  complaints during session.    Plan    Family training with pt's son tomorrow afternoon; ADL's/IADLs (meal prep, kitchen cleaning), safety with transfers, bilateral FMC, standing tolerance/balance

## 2020-01-02 NOTE — THERAPY
Physical Therapy   Daily Treatment     Patient Name: Marilyn Salinas  Age:  54 y.o., Sex:  female  Medical Record #: [id number]  Today's Date: 1/1/2020     Precautions  Precautions: Fall Risk, Swallow Precautions ( See Comments), Cervical Collar    Comments: Fall Risk;Swallow Precautions ( See Comments);Cervical Collar      Subjective    Pt was sitting in w/c upon arrival and agreeable to tx     Objective       01/01/20 1101   Precautions   Precautions Fall Risk;Swallow Precautions ( See Comments);Cervical Collar     Comments Fall Risk;Swallow Precautions ( See Comments);Cervical Collar     Interdisciplinary Plan of Care Collaboration   Patient Position at End of Therapy Seated;Call Light within Reach;Tray Table within Reach;Phone within Reach   PT Total Time Spent   PT Individual Total Time Spent (Mins) 30   PT Charge Group   PT Therapeutic Activities 2      w/c mobility 200ft  Mod I on level terrain    Stance to unload washer, load dryer full of clothes, SBA with FWW 12 minutes    Toilet transfer: mod I with grab bar    Assessment    Pt requires VCs for adherence to precautions with stance for laundry tasks but with improved standing endurance. Pt requires correction on brace fit as pt often has it placed loosely.     Plan    Progress through passport, west vector for AMB, endurance in AMB with FWW, standing balance/ UE functional reaching activities, abduction strengthening, discuss ramp to enter home, discuss fit of w/c [id number]

## 2020-01-02 NOTE — CARE PLAN
Problem: Safety  Goal: Will remain free from injury  Outcome: PROGRESSING AS EXPECTED  Note:   Reinforced safety precautions to pt. Encouraged pt to use call light when assistance is needed, call light is within easy reach, bed is locked and at lowest position.       Problem: Bowel/Gastric:  Goal: Normal bowel function is maintained or improved  Outcome: PROGRESSING AS EXPECTED  Note:   Patient refused bowel meds, Had BM twice during shift and is continent of bowel.

## 2020-01-02 NOTE — CARE PLAN
Problem: Dressing  Goal: STG-Within one week, patient will dress UB  Description  1) Individualized Goal: Mod I for UB clothing management  2) Interventions:  OT Self Care/ADL, OT Neuro Re-Ed/Balance, OT Therapeutic Activity, OT Evaluation and OT Therapeutic Exercise     Outcome: MET  Note:   Mod I from w/c level     Problem: Dressing  Goal: STG-Within one week, patient will dress LB  Description  1) Individualized Goal:  Min assist for LB clothing management via AE/DME PRN  2) Interventions:  OT Self Care/ADL, OT Neuro Re-Ed/Balance, OT Therapeutic Activity, OT Evaluation and OT Therapeutic Exercise     Outcome: MET  Note:   Supervised using w/c to retrieve, grab bar for standing balance as needed     Problem: Dressing  Goal: STG-Within one week, patient will  Description  1) Individualized Goal: Min assist for cervical collar management + swapping out pads  2) Interventions:  OT Orthotics Training, OT Self Care/ADL, OT Neuro Re-Ed/Balance, OT Therapeutic Activity, OT Evaluation and OT Therapeutic Exercise     Outcome: MET  Note:   Min A for swapping out pads on c collar, able to don/doff with supervision and cues as needed     Problem: Toileting  Goal: STG-Within one week, patient will complete toileting tasks  Description  1) Individualized Goal:  Sup/SBA for toileting (LB clothing management and toilet hygiene) via DME PRN  2) Interventions:   OT Self Care/ADL, OT Neuro Re-Ed/Balance, OT Therapeutic Activity, OT Evaluation and OT Therapeutic Exercise     Outcome: MET  Note:   Supervised with grab bar     Problem: Functional Transfers  Goal: STG-Within one week, patient will transfer to toilet  Description  1) Individualized Goal:  Sup/SBA for commode transfer via DME PRN  2) Interventions:   OT Self Care/ADL, OT Neuro Re-Ed/Balance, OT Therapeutic Activity, OT Evaluation and OT Therapeutic Exercise     Outcome: MET  Note:   Supervised with grab bar

## 2020-01-02 NOTE — DISCHARGE PLANNING
Home health referral sent to [facility].  Awaiting response.  Per [facility], they are not accepting patient's insurance at this time.  Per Nancy at [facility], they are not accepting patient's insurance at this time.

## 2020-01-02 NOTE — REHAB-PT IDT TEAM NOTE
"Physical Therapy   Mobility  Bed mobility:   5  Bed /Chair/Wheelchair Transfer Initial:  4 - Minimal Assistance  Bed /Chair/Wheelchair Transfer Current:  5 - Standby Prompting/Supervision or Set-up   Bed/Chair/Wheelchair Transfer Description:  (bed mob: mod I; transfer: SPV with FWW)  Walk Initial:  2 - Max Assistance  Walk Current:  2 - Max Assistance   Walk Description:  (115ft x 1, FWW, SBA)  Wheelchair Initial:  5 - Standby Prompting/Supervision or Set-up  Wheelchair Current:  6 - Modified Independent   Wheelchair Description:  (200ft x1, mod I, level terrain)  Stairs Initial:  0 - Not tested,unsafe activity  Stairs Current: 2 - Max Assistance   Stairs Description: (8 x 1 6\" B UE ascend fwd, descend retro CGA step to pattern)  Patient/Family Training/Education:  Curb transfers, access to home, strength HEP, passport, transfers  DME/DC Recommendations:  W/c 14w, 18d, 18h with cushion/ swing away arm rests, home health therapy , (pt to purchase FWW if not able to get covered), family training, voc rehab, 6-7ft ramp to access home, SCI support group   Strengths:  Able to follow instructions, Making steady progress towards goals, Pleasant and cooperative and Willingly participates in therapeutic activities  Barriers:   Other: poor balance, adductor tone, PF tone, impaired DF ROM, impaired endurance, poor family support, 1 step to enter home, recent fall d/t confusion upon waking  # of short term goals set= 4  # of short term goals met= 4  Physical Therapy Problems     Problem: Mobility     Dates: Start: 12/28/19       Description:     Goal: STG-Within one week, patient will propel wheelchair community     Dates: Start: 12/28/19       Description: 1) Individualized goal: Mod I indoors for all distances   2) Interventions:  PT Group Therapy, PT E Stim Attended, PT Gait Training, PT Therapeutic Exercises, PT TENS Application, PT Neuro Re-Ed/Balance, PT Aquatic Therapy, PT Therapeutic Activity and PT Manual Therapy    " "         Goal: STG-Within one week, patient will ambulate up/down a curb     Dates: Start: 12/28/19       Description: 1) Individualized goal:  4\" curb in // bars or with FWW, min A   2) Interventions: PT Group Therapy, PT E Stim Attended, PT Gait Training, PT Therapeutic Exercises, PT TENS Application, PT Neuro Re-Ed/Balance, PT Aquatic Therapy, PT Therapeutic Activity and PT Manual Therapy                       Problem: Mobility Transfers     Dates: Start: 12/28/19       Description:     Goal: STG-Within one week, patient will transfer bed to chair     Dates: Start: 12/28/19       Description: 1) Individualized goal:  Min A with or without FWW   2) Interventions: PT Group Therapy, PT E Stim Attended, PT Gait Training, PT Therapeutic Exercises, PT TENS Application, PT Neuro Re-Ed/Balance, PT Aquatic Therapy, PT Therapeutic Activity and PT Manual Therapy                 Goal: STG-Within one week, patient will move supine to sit     Dates: Start: 12/28/19       Description: 1) Individualized goal:  SBA with use of AD as needed  2) Interventions: PT Group Therapy, PT E Stim Attended, PT Gait Training, PT Therapeutic Exercises, PT TENS Application, PT Neuro Re-Ed/Balance, PT Aquatic Therapy, PT Therapeutic Activity and PT Manual Therapy                       Problem: PT-Long Term Goals     Dates: Start: 12/28/19       Description:     Goal: LTG-By discharge, patient will propel wheelchair     Dates: Start: 12/28/19       Description: 1) Individualized goal:  Mod I all surfaces and distances  2) Interventions: PT Group Therapy, PT E Stim Attended, PT Gait Training, PT Therapeutic Exercises, PT TENS Application, PT Neuro Re-Ed/Balance, PT Aquatic Therapy, PT Therapeutic Activity and PT Manual Therapy                 Goal: LTG-By discharge, patient will ambulate     Dates: Start: 12/28/19       Description: 1) Individualized goal:  150 ft with LRAD and spv  2) Interventions: PT Group Therapy, PT E Stim Attended, PT Gait " Training, PT Therapeutic Exercises, PT TENS Application, PT Neuro Re-Ed/Balance, PT Aquatic Therapy, PT Therapeutic Activity and PT Manual Therapy                 Goal: LTG-By discharge, patient will transfer one surface to another     Dates: Start: 12/28/19       Description: 1) Individualized goal:  Mod I with LRAD  2) Interventions: PT Group Therapy, PT E Stim Attended, PT Gait Training, PT Therapeutic Exercises, PT TENS Application, PT Neuro Re-Ed/Balance, PT Aquatic Therapy, PT Therapeutic Activity and PT Manual Therapy                 Goal: LTG-By discharge, patient will ambulate up/down 4-6 stairs     Dates: Start: 12/28/19       Description: 1) Individualized goal:  2 steps or ramp to mimic home entry, spv   2) Interventions: PT Group Therapy, PT E Stim Attended, PT Gait Training, PT Therapeutic Exercises, PT TENS Application, PT Neuro Re-Ed/Balance, PT Aquatic Therapy, PT Therapeutic Activity and PT Manual Therapy                               Section completed by:  Alina Young, PT

## 2020-01-02 NOTE — FLOWSHEET NOTE
01/02/20 0813   Events/Summary/Plan   Events/Summary/Plan SpO2 check, IS   Education   Education Yes - Pt. / Family has been Instructed in use of Respiratory Equipment  (Education re passport to independence goals)   Incentive Spirometry Group   Breathing Exercises Yes   Incentive Spirometer Volume 2000 mL  (Good effort and technique)   Chest Exam   Respiration 16   Pulse 70   Breath Sounds   RUL Breath Sounds Clear   RML Breath Sounds Clear   RLL Breath Sounds Clear   PREM Breath Sounds Clear   LLL Breath Sounds Clear   Secretions   Cough Strong;Non Productive   Oximetry   #Pulse Oximetry (Single Determination) Yes   Oxygen   Home O2 Use Prior To Admission? No   Pulse Oximetry 95 %   O2 Daily Delivery Respiratory  Room Air with O2 Available

## 2020-01-02 NOTE — PROGRESS NOTES
"Rehab Progress Note     Encounter Date: 1/2/2020    CC: LE weakness    Interval Events (Subjective)    She is very concerned, her aunt told her yesterday that she was diagnosed with muscular dystrophy as a child, required bracing.    He complains of significant spasticity in bilateral abductor muscles, worse at night, worse with increased fatigue, making transfers difficult at night.    He is very anxious about going home in the amount of burden that would be placed on her son and his wife.      Last BM: 01/02/20, multiple BM's per day  Bladder: PVR <60cc    ROS:  Gen: No fatigue, confusion, significant weight loss  Eyes: no blurry vision, double vision or pain in eyes  ENT: no changes in hearing, runny nose, nose bleeds, sinus pain  CV: No CP, palpitations,  Lungs: no shortness of breath, changes in secretions, changes in cough, pain with coughing  Abd: No abd pain, nausea, vomiting, pain with eating  : no blood in urine,  suprapubic pain  Ext: No swelling in legs, asymmetric atrophy  Neuro: no changes in strength or sensation  Skin: no new ulcers/skin breakdown appreciated by patient  Mood: No changes in mood today, increase in depression or anxiety  Heme: no bruising, or bleeding  Rest of 14 point review of systems is negative    Objective:  Vitals: /81   Pulse 70   Temp 37 °C (98.6 °F) (Temporal)   Resp 16   Ht 1.676 m (5' 6\")   Wt 57.5 kg (126 lb 12.2 oz)   SpO2 95%   Gen: NAD, Body mass index is 20.46 kg/m².  HEENT: NC/AT, PERRLA, moist mucous membranes  Cardio: RRR, no mumurs  Pulm: CTAB, with normal respiratory effort  Abd: Soft NTND, active bowel sounds,   Ext: No peripheral edema. No calf tenderness. No clubbing/cyanosis. +dorsal pedalis pulses bilaterally.    Tone: MAS 1/4 abductor tone bilaterally    No results found for this or any previous visit (from the past 72 hour(s)).    Current Facility-Administered Medications   Medication Frequency   • baclofen (LIORESAL) tablet 15 mg TID   • " gabapentin (NEURONTIN) capsule 800 mg TID   • midodrine (PROAMATINE) tablet 5 mg TID WITH MEALS   • vitamin D (cholecalciferol) 1000 UNIT tablet 1,000 Units DAILY   • albuterol inhaler 2 Puff Q4H PRN (RT)   • buPROPion SR (WELLBUTRIN-SR) tablet 150 mg BID   • enoxaparin (LOVENOX) inj 40 mg DAILY   • famotidine (PEPCID) tablet 20 mg BID   • methocarbamol (ROBAXIN) tablet 500 mg BID PRN   • oxybutynin SR (DITROPAN-XL) tablet 15 mg BID   • venlafaxine XR (EFFEXOR XR) capsule 75 mg Q EVENING   • Respiratory Care per Protocol Continuous RT   • oxyCODONE immediate-release (ROXICODONE) tablet 5 mg Q3HRS PRN   • oxyCODONE immediate release (ROXICODONE) tablet 10 mg Q3HRS PRN   • hydrALAZINE (APRESOLINE) tablet 25 mg Q8HRS PRN   • acetaminophen (TYLENOL) tablet 650 mg Q4HRS PRN   • senna-docusate (PERICOLACE or SENOKOT S) 8.6-50 MG per tablet 2 Tab BID    And   • polyethylene glycol/lytes (MIRALAX) PACKET 1 Packet QDAY PRN    And   • magnesium hydroxide (MILK OF MAGNESIA) suspension 30 mL QDAY PRN    And   • bisacodyl (DULCOLAX) suppository 10 mg QDAY PRN   • artificial tears ophthalmic solution 1 Drop PRN   • benzocaine-menthol (CEPACOL) lozenge 1 Lozenge Q2HRS PRN   • mag hydrox-al hydrox-simeth (MAALOX PLUS ES or MYLANTA DS) suspension 20 mL Q2HRS PRN   • ondansetron (ZOFRAN ODT) dispertab 4 mg 4X/DAY PRN    Or   • ondansetron (ZOFRAN) syringe/vial injection 4 mg 4X/DAY PRN   • sodium chloride (OCEAN) 0.65 % nasal spray 2 Spray PRN   • diphenhydrAMINE (BENADRYL) tablet/capsule 25 mg Q6HRS PRN    Or   • diphenhydrAMINE (BENADRYL) injection 25 mg Q6HRS PRN   • zolpidem (AMBIEN) tablet 10 mg HS PRN       Orders Placed This Encounter   Procedures   • Diet Order Regular     Standing Status:   Standing     Number of Occurrences:   1     Order Specific Question:   Diet:     Answer:   Regular [1]     Order Specific Question:   Texture/Fiber modifications:     Answer:   Dysphagia 2(Pureed/Chopped)specify fluid consistency(question  6) [2]     Order Specific Question:   Consistency/Fluid modifications:     Answer:   Thin Liquids [3]       Assessment:  Active Hospital Problems    Diagnosis   • Cervical spine pain   • Tobacco use   • Anxiety and depression   • Foraminal stenosis of cervical region   • Corns and callus   • Hyperlipidemia, unspecified       Medical Decision Making and Plan:    C2 AISD: Nontraumatic incomplete spinal cord injury, status post fall at home with worsening weakness, found to have severe cervical stenosis.  Status post C3-C5 ACDF with Dr. Linton on 12/20  - Continue comprehensive acute inpatient rehabilitation    Muscular dystrophy: Patient reports history of previous diagnosis  - Unlikely diagnosis at this time, may be symptomatic female carrier  - Will plan for patient to follow-up with Dr. Hoover as outpatient for EMG evaluation.    Neurogenic bowel: Goal of BM daily  -Upper motor neuron bowel program with senna 2 tablets q. noon, suppository 10 mg MA every afternoon  -Repeat KUB to evaluate for stool load    Neurogenic bladder: Previous history of bladder spasticity  - Oxybutynin SR 15 mg twice daily, started by urologist as outpatient    Neuropathic pain:  -Gabapentin 800 mg 3 times a day    Spasticity:  -Increase baclofen to 20 mg 3 times daily    Hypotension: Likely secondary to cervical spinal cord injury, was on Midodrine 15 mg 3 times daily on transfer  - Midodrine 5mg tid    Depression/anxiety:  - Bupropion  mg twice daily  - venlafaxine XR to 75 mg daily    Vitamin D insufficiency: Vitamin D level of 23 on admission, goal of 30  -Increase cholecalciferol to 2000 units daily    GI prophylaxis:  -Pepcid 20 mg twice daily    DVT prophylaxis:  -Lovenox 40 mg daily        IDT Team Meeting 1/2/2020    IEdinson DRICKY, was present and led the interdisciplinary team conference on 1/2/2020.  I led the IDT conference and agree with the IDT conference documentation and plan of care as noted  below.     RN:  Diet Regular diet   % Meal     Pain    Sleep    Bowel cont   Bladder incont   In's & Out's      Fell yesterday trying to go to the bathroom    PT:  Bed Mobility    Transfers    Mobility Max A for walking   Stairs Max A     Barriers: spasticity   Will advance spasticity medication    OT:  Eating    Grooming    Bathing    UB Dressing    LB Dressing SBA   Toileting SBA   Shower & Transfer      5/5 stg  W/C, walker, grab bars,shower chair, reacher   Son (lamberto) is suppose to come in tomorrow for training      CM:  Continues to work on disposition and DME needs.      DC/Disposition:    1/7/20    Total time:  40 minutes.  I spent greater than 50% of the time for patient care and coordination on this date, including unit/floor time, and face-to-face time with the patient as per assessment and plan above including spasticity with increased dose of baclofen to 20 mg 3 times daily, neurogenic bowel with modification of laxatives, check KUB, neurogenic bladder with timed voiding, prolonged discussion about previous diagnosis of muscular dystrophy.    Edinson Gregory D.O.

## 2020-01-02 NOTE — THERAPY
"Physical Therapy   Daily Treatment     Patient Name: Marilyn Salinas  Age:  54 y.o., Sex:  female  Medical Record #: [id number]  Today's Date: 1/1/2020     Precautions  Precautions: Fall Risk, Swallow Precautions ( See Comments), Cervical Collar    Comments: Fall Risk;Swallow Precautions ( See Comments);Cervical Collar      Subjective    Pt was sitting in w/c upon arrival and agreeable to tx     Objective       01/01/20 1331   Precautions   Precautions Fall Risk;Swallow Precautions ( See Comments);Cervical Collar     Comments Fall Risk;Swallow Precautions ( See Comments);Cervical Collar     Interdisciplinary Plan of Care Collaboration   Patient Position at End of Therapy Seated;Call Light within Reach;Tray Table within Reach;Phone within Reach   PT Total Time Spent   PT Individual Total Time Spent (Mins) 60   PT Charge Group   PT Gait Training 2   PT Neuromuscular Re-Education / Balance 1   PT Therapeutic Activities 1       FIM Bed/Chair/Wheelchair Transfers Score: 5 - Standby Prompting/Supervision or Set-up  Bed/Chair/Wheelchair Transfers Description:  (bed mob: mod I; transfer: SPV with FWW)    Calf stretch on incline board in standing frame 60sec x 4, stance on rolled towel in // bars for HEP upon d/c home 60sec x 4    6\" curb training x 2 with Pranay for stability. Education on passive transfer of w/c on curb with practice/demo.    Education on floor recovery and improved safety in the home through demo/ handout      Assessment    Pt would benefit from w/c for in home use for safety and home health physical therapy. Pt would benefit from ramp to enter home    Plan    Progress through [place] west vector for AMB, endurance in AMB with FWW, standing balance/ UE functional reaching activities, abduction strengthening, discuss ramp to enter home, discuss fit of w/c [id number]   "

## 2020-01-02 NOTE — REHAB-COLLABORATIVE ONGOING IDT TEAM NOTE
Weekly Interdisciplinary Team Conference Note    Weekly Interdisciplinary Team Conference # 1  Date:  1/2/2020    Clinicians present and reporting at team conference include the following:   MD: Edinson Gregory DO   RN:  Flora Vargas RN   PT:   Michelle Mccloud PT, DPT  OT:  Claudio Cage MS, OT/L   ST:  Not Applicable.   CM:  Katya Singh RN [facility]  REC:  None  RT:  None  RPh:  Tra Coker [facility]  Other:   None  All reporting clinicians have a working knowledge of this patient's plan of care.    Targeted DC Date:  1/7/19     Medical    Patient Active Problem List    Diagnosis Date Noted   • Cervical spine pain 12/19/2019     Priority: High   • Lumbar radiculopathy 12/19/2019     Priority: Medium   • Mild intermittent asthma 06/19/2017     Priority: Low   • Tobacco use 06/19/2017     Priority: Low   • Anxiety and depression 06/19/2017     Priority: Low   • Idiopathic hypotension 12/26/2019   • Foraminal stenosis of cervical region 04/12/2019   • Post concussive syndrome 02/22/2019   • Epistaxis 02/22/2019   • Vertigo 02/22/2019   • Risk for falls 12/31/2018   • Cough 10/30/2018   • Corns and callus 08/21/2018   • GBS (Guillain [place] syndrome) (HCC) 04/10/2018   • Pelvic pain 10/03/2017   • Vitamin D deficiency 07/31/2017   • Hyperlipidemia, unspecified 07/31/2017   • Assistance with transportation 07/31/2017   • Acid indigestion 06/19/2017   • DDD (degenerative disc disease), lumbar 06/19/2017   • Dolly-menopause 06/19/2017   • Chest congestion 06/19/2017   • Pain in both feet 06/19/2017   • Poor vision 06/19/2017     Results     ** No results found for the last 24 hours. **        Nursing  Diet/Nutrition:  Dysphagia II  Medication Administration:  Whole with Liquid Wash  % consumed at meals in last 24 hours:  Consumed % of meals per documentation.  Meal/Snack Consumption for the past 24 hrs:   Oral Nutrition   01/01/20 1900 Dinner;Between % Consumed   01/01/20 1200 Lunch;Between % Consumed     Snack  schedule:  None    Appetite:  Good  Fluid Intake/Output in past 24 hours: In: 780 [P.O.:780]  Out: 1859   Admit Weight:  Weight: 59 kg (130 lb)  Weight Last 7 Days   [57.5 kg (126 lb 12.2 oz)-59 kg (130 lb)] 57.5 kg (126 lb 12.2 oz) (12/29 1200)    Pain Issues:    Location:  --  --         Severity:  Denies   Scheduled pain medications:  gabapentin (NEURONTIN)      PRN pain medications used in last 24 hours:  None   Non Pharmacologic Interventions:  relaxation, repositioned and rest  Effectiveness of pain management plan:  good=patient states acceptable comfort after interventions    Bowel:    Bowel Assist Initial Score:  4 - Minimal Assistance  Bowel Assist Current Score:  3 - Moderate Assistance  Bowl Accidents in last 7 days:     Last bowel movement: 01/01/20  Stool Description: Large     Usual bowel pattern:  daily  Scheduled bowel medications:  senna-docusate (PERICOLACE or SENOKOT S)   PRN bowel medications used in last 24 hours:  None  Nursing Interventions:  Increased time, PRN medication, Scheduled medication, Supervision, Verbal cueing, Positioning on commode/toilet, Brief  Effectiveness of bowel program:   good=regular, predictable, controlled emptying of bowel  Bladder:    Bladder Assist Initial Score:  4 - Minimal Assistance  Bladder Assist Current Score:  1 - Total Assistance  Bladder Accidents in last 7 days:  1  PVR range for past 24-48 hours:  [29-97]  ()  Intermittent Catheterization: N/A  Medications affecting bladder:  Ditropan    Time void schedule/voiding pattern:  Voiding every 2-4 hours  Interventions:  Increased time, Supervision, Verbal cueing, Brief  Effectiveness of bladder training:  Good=regular, predictable, emptying of bladder, patient initiates time voiding    Sleep/wake cycle:   Average hours slept:  Sleeps 4-6 hours without waking  Sleep medication usage:  zolpidem (AMBIEN) tablet 5 mg nightly    Patient/Family Training/Education:  General Self Care  Discharge Supply  Recommendations:  Strengths: Alert and oriented, Willingly participates in therapeutic activities and Able to follow instructions   Barriers:   Generalized weakness       Nursing Problems     Problem: Bowel/Gastric:     Description:     Goal: Normal bowel function is maintained or improved     Description:           Goal: Will not experience complications related to bowel motility     Description:                 Problem: Communication     Description:     Goal: The ability to communicate needs accurately and effectively will improve     Description:                 Problem: Discharge Barriers/Planning     Description:     Goal: Patient's continuum of care needs will be met     Description:                 Problem: Infection     Description:     Goal: Will remain free from infection     Description:                 Problem: Knowledge Deficit     Description:     Goal: Knowledge of disease process/condition, treatment plan, diagnostic tests, and medications will improve     Description:           Goal: Knowledge of the prescribed therapeutic regimen will improve     Description:                 Problem: Pain Management     Description:     Goal: Pain level will decrease to patient's comfort goal     Description:                 Problem: Psychosocial Needs:     Description:     Goal: Level of anxiety will decrease     Description:                 Problem: Respiratory:     Description:     Goal: Respiratory status will improve     Description:                 Problem: Safety     Description:     Goal: Will remain free from injury     Description:           Goal: Will remain free from falls     Description:                 Problem: Venous Thromboembolism (VTW)/Deep Vein Thrombosis (DVT) Prevention:     Description:     Goal: Patient will participate in Venous Thrombosis (VTE)/Deep Vein Thrombosis (DVT)Prevention Measures     Description:     Flowsheet:     Taken at 12/28/19 2754    Pharmacologic Prophylaxis Used LMWH:  "Enoxaparin(Lovenox) by  Whit Barrow R.N.                             Long Term Goals:   At discharge patient will be able to function safely at home with support.    Section completed by:  Angela Olivas L.P.N.           Mobility  Bed mobility:   5  Bed /Chair/Wheelchair Transfer Initial:  4 - Minimal Assistance  Bed /Chair/Wheelchair Transfer Current:  5 - Standby Prompting/Supervision or Set-up   Bed/Chair/Wheelchair Transfer Description:  (bed mob: mod I; transfer: SPV with FWW)  Walk Initial:  2 - Max Assistance  Walk Current:  2 - Max Assistance   Walk Description:  (115ft x 1, FWW, SBA)  Wheelchair Initial:  5 - Standby Prompting/Supervision or Set-up  Wheelchair Current:  6 - Modified Independent   Wheelchair Description:  (200ft x1, mod I, level terrain)  Stairs Initial:  0 - Not tested,unsafe activity  Stairs Current: 2 - Max Assistance   Stairs Description: (8 x 1 6\" B UE ascend fwd, descend retro CGA step to pattern)  Patient/Family Training/Education:  Curb transfers, access to home, strength HEP, passport, transfers  DME/DC Recommendations:  W/c 14w, 18d, 18h with cushion/ swing away arm rests, home health therapy , (pt to purchase FWW if not able to get covered), family training, voc rehab, 6-7ft ramp to access home, SCI support group   Strengths:  Able to follow instructions, Making steady progress towards goals, Pleasant and cooperative and Willingly participates in therapeutic activities  Barriers:   Other: poor balance, adductor tone, PF tone, impaired DF ROM, impaired endurance, poor family support, 1 step to enter home, recent fall d/t confusion upon waking  # of short term goals set= 4  # of short term goals met= 4  Physical Therapy Problems     Problem: Mobility     Dates: Start: 12/28/19       Description:     Goal: STG-Within one week, patient will propel wheelchair community     Dates: Start: 12/28/19       Description: 1) Individualized goal: Mod I indoors for all distances   2) " "Interventions:  PT Group Therapy, PT E Stim Attended, PT Gait Training, PT Therapeutic Exercises, PT TENS Application, PT Neuro Re-Ed/Balance, PT Aquatic Therapy, PT Therapeutic Activity and PT Manual Therapy             Goal: STG-Within one week, patient will ambulate up/down a curb     Dates: Start: 12/28/19       Description: 1) Individualized goal:  4\" curb in // bars or with FWW, min A   2) Interventions: PT Group Therapy, PT E Stim Attended, PT Gait Training, PT Therapeutic Exercises, PT TENS Application, PT Neuro Re-Ed/Balance, PT Aquatic Therapy, PT Therapeutic Activity and PT Manual Therapy                       Problem: Mobility Transfers     Dates: Start: 12/28/19       Description:     Goal: STG-Within one week, patient will transfer bed to chair     Dates: Start: 12/28/19       Description: 1) Individualized goal:  Min A with or without FWW   2) Interventions: PT Group Therapy, PT E Stim Attended, PT Gait Training, PT Therapeutic Exercises, PT TENS Application, PT Neuro Re-Ed/Balance, PT Aquatic Therapy, PT Therapeutic Activity and PT Manual Therapy                 Goal: STG-Within one week, patient will move supine to sit     Dates: Start: 12/28/19       Description: 1) Individualized goal:  SBA with use of AD as needed  2) Interventions: PT Group Therapy, PT E Stim Attended, PT Gait Training, PT Therapeutic Exercises, PT TENS Application, PT Neuro Re-Ed/Balance, PT Aquatic Therapy, PT Therapeutic Activity and PT Manual Therapy                       Problem: PT-Long Term Goals     Dates: Start: 12/28/19       Description:     Goal: LTG-By discharge, patient will propel wheelchair     Dates: Start: 12/28/19       Description: 1) Individualized goal:  Mod I all surfaces and distances  2) Interventions: PT Group Therapy, PT E Stim Attended, PT Gait Training, PT Therapeutic Exercises, PT TENS Application, PT Neuro Re-Ed/Balance, PT Aquatic Therapy, PT Therapeutic Activity and PT Manual Therapy              "    Goal: LTG-By discharge, patient will ambulate     Dates: Start: 12/28/19       Description: 1) Individualized goal:  150 ft with LRAD and spv  2) Interventions: PT Group Therapy, PT E Stim Attended, PT Gait Training, PT Therapeutic Exercises, PT TENS Application, PT Neuro Re-Ed/Balance, PT Aquatic Therapy, PT Therapeutic Activity and PT Manual Therapy                 Goal: LTG-By discharge, patient will transfer one surface to another     Dates: Start: 12/28/19       Description: 1) Individualized goal:  Mod I with LRAD  2) Interventions: PT Group Therapy, PT E Stim Attended, PT Gait Training, PT Therapeutic Exercises, PT TENS Application, PT Neuro Re-Ed/Balance, PT Aquatic Therapy, PT Therapeutic Activity and PT Manual Therapy                 Goal: LTG-By discharge, patient will ambulate up/down 4-6 stairs     Dates: Start: 12/28/19       Description: 1) Individualized goal:  2 steps or ramp to mimic home entry, spv   2) Interventions: PT Group Therapy, PT E Stim Attended, PT Gait Training, PT Therapeutic Exercises, PT TENS Application, PT Neuro Re-Ed/Balance, PT Aquatic Therapy, PT Therapeutic Activity and PT Manual Therapy                               Section completed by:  Alina Young, PT    Activities of Daily Living  Eating Initial:  6 - Modified Independent  Eating Current:  7 - Independent   Eating Description:  Increased time  Grooming Initial:  5 - Standby Prompting/Supervision or Set-up  Grooming Current:  7 - Independent   Grooming Description:  Supervision for safety, Set-up of equipment(WC level at sink side, therapist switched out pads for c-collar for shower)  Bathing Initial:  3 - Moderate Assistance  Bathing Current:  5 - Standby Prompting/Supervision or Set-up   Bathing Description:  Tub bench, Hand held shower, Supervision for safety, Set-up of equipment, Initial preparation for task(setup and supervised seated)  Upper Body Dressing Initial:  4 - Minimal Assistance  Upper Body Dressing  Current:  6 - Modified Independent   Upper Body Dressing Description:  Assist device equipment(w/c to retrieve from closet, indep to don/doff )  Lower Body Dressing Initial:  2 - Max Assistance  Lower Body Dressing Current:  5 - Standby Prompting/Supervsion or Set-up   Lower Body Dressing Description:  5 - Standby Prompting/Supervsion or Set-up  Toileting Initial:  4 - Minimal Assistance  Toileting Current:  5 - Standby Prompting/Supervision or Set-up   Toileting Description:  Grab bar, Supervision for safety  Toilet Transfer Initial:  4 - Minimal Assistance  Toilet Transfer Current:  5 - Standby Prompting/Supervision or Set-up   Toilet Transfer Description:  5 - Standby Prompting/Supervision or Set-up  Tub / Shower Transfer Initial:  4 - Minimal Assistance  Tub / Shower Transfer Current:  5 - Standby Prompting/Supervision or Set-up   Tub / Shower Transfer Description:  Grab bar, Adaptive equipment, Supervision for safety, Set-up of equipment(setup/sba with grab bar and bench)  IADL:  Supervised with laundry and kitchen mobility  Family Training/Education:  Scheduled for tomorrow with patient's son   DME/DC Recommendations:  W/C, FWW, shower chair, grab bars in shower and by toilets, reacher (patient educated on [facility])    Strengths:  Alert and oriented, Effective communication skills, Independent PLOF, Making steady progress towards goals, Motivated for self care and independence, Supportive family and Willingly participates in therapeutic activities, moving in with son and daughter in law  Barriers:  Emotional lability, Generalized weakness, Poor balance and Other: decreased safety, LE spasticity     # of short term goals set= 5   # of short term goals met= 5     Occupational Therapy Goals     Problem: OT Long Term Goals     Dates: Start: 12/28/19       Description:     Goal: LTG-By discharge, patient will complete basic self care tasks     Dates: Start: 12/28/19       Description: 1) Individualized Goal:  Mod  I for BADL's via AE/DME PRN  2) Interventions:  OT Self Care/ADL, OT Neuro Re-Ed/Balance, OT Therapeutic Activity, OT Evaluation and OT Therapeutic Exercise             Goal: LTG-By discharge, patient will perform bathroom transfers     Dates: Start: 12/28/19       Description: 1) Individualized Goal:  Mod I for shower and commode transfers via DME PRN  2) Interventions:  OT Self Care/ADL, OT Neuro Re-Ed/Balance, OT Therapeutic Activity, OT Evaluation and OT Therapeutic Exercise                         Section completed by:  Claudio Cage, MS,OTR/L             REHAB-Pharmacy IDT Team Note by Francisco Soler RPH at 1/1/2020  8:18 AM  Version 1 of 1    Author:  Francisco Soler RPH Service:  -- Author Type:  Pharmacist    Filed:  1/1/2020  8:20 AM Date of Service:  1/1/2020  8:18 AM Status:  Signed    :  Francisco Soler RPH (Pharmacist)         Pharmacy   Pharmacy  Antibiotics/Antifungals/Antivirals:  Medication:      Active Orders (From admission, onward)    None        Route:         None  Stop Date:  N/A  Reason:   Antihypertensives/Cardiac:  Medication:    Orders (72h ago, onward)     Start     Ordered    12/30/19 1730  midodrine (PROAMATINE) tablet 5 mg  3 TIMES DAILY WITH MEALS      12/30/19 1616    12/28/19 1730  midodrine (PROAMATINE) tablet 10 mg  3 TIMES DAILY WITH MEALS,   Status:  Discontinued      12/28/19 1240    12/27/19 1543  hydrALAZINE (APRESOLINE) tablet 25 mg  EVERY 8 HOURS PRN      12/27/19 1543              Patient Vitals for the past 24 hrs:   BP Pulse   01/01/20 0430 107/76 80   01/01/20 0400 108/69 73   01/01/20 0325 113/70 70   01/01/20 0310 120/82 79   12/31/19 1830 115/73 88   12/31/19 1537 -- 82   12/31/19 1224 109/72 87     Anticoagulation:  Medication:  enoxaparin  INR:      Other key medications:       A review of the medication list reveals no issues at this time.    Section completed by:  Francisco Soler RPH[WR.1]        Attribution Zhang     WR.1 - Francisco  TOMY Soler [facility] on 1/1/2020  8:18 AM                  DC Planning  DC destination/dispostion:  Patient has been living in [place] with a roommate but plans to go to [place] with her son and daughter in law.    Referrals: Provided information for [facility].    DC Needs:  She would like a 4ww.  Her current one is a loaner.  Therapy recommends fww instead and manual w/c. She will probably need home health in [place].  Son will arrange for safety bars and obtain shower chair.    Barriers to discharge:   Relocating to her son's home.    Strengths:     Section completed by:  Katya Singh R.N.      Physician Summary  Edinson Gregory DO participated and led team conference discussion.

## 2020-01-02 NOTE — CARE PLAN
"  Problem: Mobility  Goal: STG-Within one week, patient will propel wheelchair community  Description  1) Individualized goal: Mod I indoors for all distances   2) Interventions:  PT Group Therapy, PT E Stim Attended, PT Gait Training, PT Therapeutic Exercises, PT TENS Application, PT Neuro Re-Ed/Balance, PT Aquatic Therapy, PT Therapeutic Activity and PT Manual Therapy     Outcome: MET  Goal: STG-Within one week, patient will ambulate up/down a curb  Description  1) Individualized goal:  4\" curb in // bars or with FWW, min A   2) Interventions: PT Group Therapy, PT E Stim Attended, PT Gait Training, PT Therapeutic Exercises, PT TENS Application, PT Neuro Re-Ed/Balance, PT Aquatic Therapy, PT Therapeutic Activity and PT Manual Therapy         Outcome: MET     Problem: Mobility Transfers  Goal: STG-Within one week, patient will transfer bed to chair  Description  1) Individualized goal:  Min A with or without FWW   2) Interventions: PT Group Therapy, PT E Stim Attended, PT Gait Training, PT Therapeutic Exercises, PT TENS Application, PT Neuro Re-Ed/Balance, PT Aquatic Therapy, PT Therapeutic Activity and PT Manual Therapy         Outcome: MET  Goal: STG-Within one week, patient will move supine to sit  Description  1) Individualized goal:  SBA with use of AD as needed  2) Interventions: PT Group Therapy, PT E Stim Attended, PT Gait Training, PT Therapeutic Exercises, PT TENS Application, PT Neuro Re-Ed/Balance, PT Aquatic Therapy, PT Therapeutic Activity and PT Manual Therapy         Outcome: MET     "

## 2020-01-03 LAB — CK SERPL-CCNC: 27 U/L (ref 0–154)

## 2020-01-03 PROCEDURE — 94760 N-INVAS EAR/PLS OXIMETRY 1: CPT

## 2020-01-03 PROCEDURE — 82550 ASSAY OF CK (CPK): CPT

## 2020-01-03 PROCEDURE — 99232 SBSQ HOSP IP/OBS MODERATE 35: CPT | Performed by: PHYSICAL MEDICINE & REHABILITATION

## 2020-01-03 PROCEDURE — 700111 HCHG RX REV CODE 636 W/ 250 OVERRIDE (IP): Performed by: PHYSICAL MEDICINE & REHABILITATION

## 2020-01-03 PROCEDURE — A9270 NON-COVERED ITEM OR SERVICE: HCPCS | Performed by: PHYSICAL MEDICINE & REHABILITATION

## 2020-01-03 PROCEDURE — 97530 THERAPEUTIC ACTIVITIES: CPT

## 2020-01-03 PROCEDURE — 700102 HCHG RX REV CODE 250 W/ 637 OVERRIDE(OP): Performed by: PHYSICAL MEDICINE & REHABILITATION

## 2020-01-03 PROCEDURE — 97535 SELF CARE MNGMENT TRAINING: CPT

## 2020-01-03 PROCEDURE — 36415 COLL VENOUS BLD VENIPUNCTURE: CPT

## 2020-01-03 PROCEDURE — 770010 HCHG ROOM/CARE - REHAB SEMI PRIVAT*

## 2020-01-03 PROCEDURE — 97110 THERAPEUTIC EXERCISES: CPT

## 2020-01-03 PROCEDURE — 97116 GAIT TRAINING THERAPY: CPT

## 2020-01-03 RX ORDER — GABAPENTIN 300 MG/1
900 CAPSULE ORAL 3 TIMES DAILY
Status: DISCONTINUED | OUTPATIENT
Start: 2020-01-03 | End: 2020-01-07 | Stop reason: HOSPADM

## 2020-01-03 RX ADMIN — OXYBUTYNIN CHLORIDE 15 MG: 5 TABLET, EXTENDED RELEASE ORAL at 20:50

## 2020-01-03 RX ADMIN — MIDODRINE HYDROCHLORIDE 5 MG: 2.5 TABLET ORAL at 08:37

## 2020-01-03 RX ADMIN — OXYBUTYNIN CHLORIDE 15 MG: 5 TABLET, EXTENDED RELEASE ORAL at 08:37

## 2020-01-03 RX ADMIN — GABAPENTIN 900 MG: 300 CAPSULE ORAL at 15:40

## 2020-01-03 RX ADMIN — ENOXAPARIN SODIUM 40 MG: 40 INJECTION, SOLUTION INTRAVENOUS; SUBCUTANEOUS at 08:37

## 2020-01-03 RX ADMIN — OXYCODONE HYDROCHLORIDE 5 MG: 5 TABLET ORAL at 05:24

## 2020-01-03 RX ADMIN — BUPROPION HYDROCHLORIDE 150 MG: 150 TABLET, EXTENDED RELEASE ORAL at 20:51

## 2020-01-03 RX ADMIN — GABAPENTIN 800 MG: 400 CAPSULE ORAL at 08:37

## 2020-01-03 RX ADMIN — FAMOTIDINE 20 MG: 20 TABLET ORAL at 08:37

## 2020-01-03 RX ADMIN — FAMOTIDINE 20 MG: 20 TABLET ORAL at 20:50

## 2020-01-03 RX ADMIN — BUPROPION HYDROCHLORIDE 150 MG: 150 TABLET, EXTENDED RELEASE ORAL at 08:37

## 2020-01-03 RX ADMIN — GABAPENTIN 900 MG: 300 CAPSULE ORAL at 20:50

## 2020-01-03 RX ADMIN — BACLOFEN 20 MG: 20 TABLET ORAL at 08:37

## 2020-01-03 RX ADMIN — ZOLPIDEM TARTRATE 10 MG: 5 TABLET ORAL at 20:51

## 2020-01-03 RX ADMIN — BACLOFEN 20 MG: 20 TABLET ORAL at 15:41

## 2020-01-03 RX ADMIN — VENLAFAXINE HYDROCHLORIDE 75 MG: 75 CAPSULE, EXTENDED RELEASE ORAL at 20:49

## 2020-01-03 RX ADMIN — VITAMIN D, TAB 1000IU (100/BT) 2000 UNITS: 25 TAB at 08:37

## 2020-01-03 RX ADMIN — BACLOFEN 20 MG: 20 TABLET ORAL at 20:51

## 2020-01-03 NOTE — THERAPY
Physical Therapy   Daily Treatment     Patient Name: Marilyn Salinas  Age:  54 y.o., Sex:  female  Medical Record #: [id number]  Today's Date: 1/3/2020     Precautions  Precautions: Fall Risk, Swallow Precautions ( See Comments), Cervical Collar    Comments: C collar OOB, wound great toe R foot    Subjective    Pt was sitting in w/c upon arrival and agreeable to tx     Objective       01/03/20 0701   Precautions   Precautions Fall Risk;Swallow Precautions ( See Comments);Cervical Collar     Comments C collar OOB, wound great toe R foot   Interdisciplinary Plan of Care Collaboration   Patient Position at End of Therapy Seated;Call Light within Reach;Tray Table within Reach;Phone within Reach   PT Total Time Spent   PT Individual Total Time Spent (Mins) 60   PT Charge Group   PT Gait Training 1   PT Therapeutic Activities 3     FIM Walking Score:  2 - Max Assistance  Walking Description:  (125ft x 1, FWW, SPV, external cues provided to increase abduction)    FIM Bed/Chair/Wheelchair Transfers Score: 5 - Standby Prompting/Supervision or Set-up  Bed/Chair/Wheelchair Transfers Description:  (bed mob: mod I; transfer: SPV squat pivot )    Grooming: brushing teeth, brush hair, don shoes and brace, put on deodorant all mod I from seated level     Stance at washing machine to attempt to wash clothes with FWW and SPV (washer being used at this time, will need to practice at later session)    Review of floor recovery, improving safety in the home and family training    Stance with no UE support with unilateral cross midline 2 x 10 R/ L    Assessment    Pt with improved stability and endurance in AMB. Continues to demo heavy UE use with FWW. Limited by increased tone in adductors/ plantar flexors.    Plan    Progress through passport, west vector for AMB, endurance in AMB with FWW (promote upright posture, increased abduction), standing balance/ UE functional reaching activities, abduction strengthening, discuss ramp to enter  home

## 2020-01-03 NOTE — CARE PLAN
Problem: Communication  Goal: The ability to communicate needs accurately and effectively will improve  Outcome: PROGRESSING AS EXPECTED  Patient is alert and oriented x 4.      Problem: Safety  Goal: Will remain free from injury  Outcome: PROGRESSING AS EXPECTED  Bed and chair alarms are in place and functioning properly.        Problem: Bowel/Gastric:  Goal: Normal bowel function is maintained or improved  Outcome: PROGRESSING AS EXPECTED   Patient having regular bowel movements; last BM today.   Denies s/s constipation; bowel meds available if needed.  Will continue to monitor.

## 2020-01-03 NOTE — DISCHARGE PLANNING
Met with patient.  I have provided information for section 8 housing per her request.  Home health in [place] will not accept Medicaid.  She is agreeable to outpatient therapy at [facility].  I also provided [employer] brochure for transportation.  Will follow.

## 2020-01-03 NOTE — THERAPY
Physical Therapy   Daily Treatment     Patient Name: Marilyn Salinas  Age:  54 y.o., Sex:  female  Medical Record #: [id number]  Today's Date: 1/2/2020     Precautions  Precautions: Fall Risk, Swallow Precautions ( See Comments), Cervical Collar    Comments: C collar OOB, wound great toe R foot    Subjective    Pt seated in room, retrieving clothing items from closet, agreeable to PT.      Objective       01/02/20 1401   Precautions   Precautions Fall Risk;Swallow Precautions ( See Comments);Cervical Collar     Comments C collar OOB, wound great toe R foot   Interdisciplinary Plan of Care Collaboration   Patient Position at End of Therapy Seated;Self Releasing Lap Belt Applied;Call Light within Reach;Tray Table within Reach   PT Total Time Spent   PT Individual Total Time Spent (Mins) 30   PT Charge Group   PT Gait Training 1   PT Therapeutic Activities 1     Pt donned t-shirt, jacket, and shoes, spv while seated in WC.     FIM Walking Score:  2 - Max Assistance  Walking Description:  Extra time, Walker, Supervision for safety(105 ft with FWW and close SBA)    FIM Wheelchair Score:  6 - Modified Independent  Wheelchair Description:  Extra time      Assessment    Pt motivated for ambulation this session. Reported UE fatigue at end of session.     Plan    Progress through passport, west vector for AMB, endurance in AMB with FWW, standing balance/ UE functional reaching activities, abduction strengthening, discuss ramp to enter home

## 2020-01-03 NOTE — THERAPY
"Occupational Therapy  Daily Treatment     Patient Name: Marilyn Salinas  Age:  54 y.o., Sex:  female  Medical Record #: [id number]  Today's Date: 1/3/2020     Precautions  Precautions: (P) Fall Risk, Spinal / Back Precautions , Cervical Collar    Comments: (P) C collar OOB, wound great toe R foot         Subjective    \"I like this.  This is really good for me.\"     Objective       01/03/20 1001   Precautions   Precautions Fall Risk;Spinal / Back Precautions ;Cervical Collar     Comments C collar OOB, wound great toe R foot   Sitting Upper Body Exercises   Sitting Upper Body Exercises Yes   Tricep Press 3 sets of 10;Bilateral;Weight (See Comments for lbs)  (20, 20, 15 lbs on rickshaw forward)   IADL Treatments   Home Management Patient loaded clothing into washing machine and started it up with supervision standing.  Unloaded clean clothes and transferred to dryer with supervision.   Bed Mobility    Supine to Sit Supervised   Scooting Supervised   Interdisciplinary Plan of Care Collaboration   Patient Position at End of Therapy Seated;Self Releasing Lap Belt Applied  (taking self back to room in w/c)   OT Total Time Spent   OT Individual Total Time Spent (Mins) 60   OT Charge Group   OT Therapy Activity 3   OT Therapeutic Exercise  1     Standing dynavision for dynamic standing balance and UE AROM/coordination for 4 minutes x 2 with SBA/CGA with single UE support on FWW.  Patient completed mode A x 4 minutes x 2.  First attempt hit 93 buttons for avg reaction time of 2.58 seconds.  Second attempt hit 138 buttons for avg reaction time of 1.74 seconds.      FIM Lower Body Dressing Score:  7 - Independent  Lower Body Dressing Description:  (to don shoes seated EOB)      Assessment    Patient increased speed and accuracy on second attempt with dynavision versus first attempt.  Patient seemed to really enjoy this balance/ROM/coordination activity.    Plan    Family training later today; continue UE strenthening, B hand " strengthening, dynavision for standing balance/ROM/coordination

## 2020-01-03 NOTE — DISCHARGE PLANNING
Per faxed reply from [facility], referral declined as they are not accepting patient's insurance at this time.

## 2020-01-03 NOTE — PROGRESS NOTES
"Rehab Progress Note     Encounter Date: 1/3/2020    CC: LE weakness    Interval Events (Subjective)  She complains of tingling pain going down the left upper extremity, C5 distribution, constant, radiating from the neck, worse without movement, rest, improved with movement, worse at night.  Is resulting in her waking up at night.  No other associated symptoms  She also complains of soreness in bilateral calfs, worse with palpation and compression.    She is very concerned about the diagnosis of muscular dystrophy    Bowel: she has not had a bowel movement yet, feels bloated,   Bladder: Multiple small incontinence, urgency, PVRs of less than 60    ROS:  Gen: No fatigue, confusion, significant weight loss  Eyes: no blurry vision, double vision or pain in eyes  ENT: no changes in hearing, runny nose, nose bleeds, sinus pain  CV: No CP, palpitations, symptoms of AUTONOMIC DYSREFLEXIA  Lungs: no shortness of breath, changes in secretions, changes in cough, pain with coughing  Abd: No abd pain, nausea, vomiting, pain with eating  : no blood in urine,  suprapubic pain  Ext: No swelling in legs, asymmetric atrophy  Neuro: no changes in strength or sensation  Skin: no new ulcers/skin breakdown appreciated by patient  Mood: No changes in mood today, increase in depression or anxiety  Heme: no bruising, or bleeding  Rest of 14 point review of systems is negative    Objective:  Vitals: /68   Pulse 77   Temp 37 °C (98.6 °F) (Temporal)   Resp 18   Ht 1.676 m (5' 6\")   Wt 57.5 kg (126 lb 12.2 oz)   SpO2 98%   Gen: NAD, Body mass index is 20.46 kg/m².  HEENT:  NC/AT, PERRLA, moist mucous membranes, anterior cervical incision is clean dry and intact, minimal swelling, no erythema  Cardio: RRR, no mumurs  Pulm: CTAB, with normal respiratory effort  Abd: Soft NTND, active bowel sounds,   Ext: No peripheral edema. No calf tenderness. No clubbing/cyanosis. +dorsal pedalis pulses bilaterally.  Tenderness palpation over " bilateral calfs    Tone: MAS 1/4 abductor tone bilaterally    Recent Results (from the past 72 hour(s))   CREATINE KINASE    Collection Time: 01/03/20  5:08 AM   Result Value Ref Range    CPK Total 27 0 - 154 U/L       Current Facility-Administered Medications   Medication Frequency   • baclofen (LIORESAL) tablet 20 mg TID   • vitamin D (cholecalciferol) 1000 UNIT tablet 2,000 Units DAILY   • sennosides (SENOKOT) 8.6 MG tablet 17.2 mg DAILY AT NOON   • gabapentin (NEURONTIN) capsule 800 mg TID   • midodrine (PROAMATINE) tablet 5 mg TID WITH MEALS   • albuterol inhaler 2 Puff Q4H PRN (RT)   • buPROPion SR (WELLBUTRIN-SR) tablet 150 mg BID   • enoxaparin (LOVENOX) inj 40 mg DAILY   • famotidine (PEPCID) tablet 20 mg BID   • methocarbamol (ROBAXIN) tablet 500 mg BID PRN   • oxybutynin SR (DITROPAN-XL) tablet 15 mg BID   • venlafaxine XR (EFFEXOR XR) capsule 75 mg Q EVENING   • Respiratory Care per Protocol Continuous RT   • oxyCODONE immediate-release (ROXICODONE) tablet 5 mg Q3HRS PRN   • oxyCODONE immediate release (ROXICODONE) tablet 10 mg Q3HRS PRN   • hydrALAZINE (APRESOLINE) tablet 25 mg Q8HRS PRN   • acetaminophen (TYLENOL) tablet 650 mg Q4HRS PRN   • polyethylene glycol/lytes (MIRALAX) PACKET 1 Packet QDAY PRN    And   • magnesium hydroxide (MILK OF MAGNESIA) suspension 30 mL QDAY PRN    And   • bisacodyl (DULCOLAX) suppository 10 mg QDAY PRN   • artificial tears ophthalmic solution 1 Drop PRN   • benzocaine-menthol (CEPACOL) lozenge 1 Lozenge Q2HRS PRN   • mag hydrox-al hydrox-simeth (MAALOX PLUS ES or MYLANTA DS) suspension 20 mL Q2HRS PRN   • ondansetron (ZOFRAN ODT) dispertab 4 mg 4X/DAY PRN    Or   • ondansetron (ZOFRAN) syringe/vial injection 4 mg 4X/DAY PRN   • sodium chloride (OCEAN) 0.65 % nasal spray 2 Spray PRN   • diphenhydrAMINE (BENADRYL) tablet/capsule 25 mg Q6HRS PRN    Or   • diphenhydrAMINE (BENADRYL) injection 25 mg Q6HRS PRN   • zolpidem (AMBIEN) tablet 10 mg HS PRN       Orders Placed This  Encounter   Procedures   • Diet Order Regular     Standing Status:   Standing     Number of Occurrences:   1     Order Specific Question:   Diet:     Answer:   Regular [1]     Order Specific Question:   Texture/Fiber modifications:     Answer:   Dysphagia 2(Pureed/Chopped)specify fluid consistency(question 6) [2]     Order Specific Question:   Consistency/Fluid modifications:     Answer:   Thin Liquids [3]       Assessment:  Active Hospital Problems    Diagnosis   • Cervical spine pain   • Tobacco use   • Anxiety and depression   • Foraminal stenosis of cervical region   • Corns and callus   • Hyperlipidemia, unspecified       Medical Decision Making and Plan:    C2 AISD: Nontraumatic incomplete spinal cord injury, status post fall at home with worsening weakness, found to have severe cervical stenosis.  Status post C3-C5 ACDF with Dr. Linton on 12/20  -Continue comprehensive acute inpatient rehabilitation    Muscular dystrophy: Patient reports history of previous diagnosis, CPK 27, within normal limits  - Unlikely diagnosis at this time as this is a sex-linked genetic disorder, may be symptomatic female carrier, discussed with patient  - Will plan for patient to follow-up with Dr. Gutiérrez as outpatient for EMG evaluation.  She has not had an EMG in the past    Neurogenic bowel: Goal of BM daily  -Upper motor neuron bowel program with senna 2 tablets q. noon, suppository 10 mg CA every afternoon  -KUB shows no significant stool load    Neurogenic bladder: Previous history of bladder spasticity  - Oxybutynin SR 15 mg twice daily, started by urologist as outpatient    Neuropathic pain: Left C5 distribution, bilateral L4-S1  - increase gabapentin to 900 mg 3 times a day    Spasticity:  - baclofen to 20 mg 3 times daily    Hypotension: Likely secondary to cervical spinal cord injury, was on Midodrine 15 mg 3 times daily on transfer  - DC midodrine 5mg tid    Depression/anxiety:  - Bupropion  mg twice daily  -  venlafaxine XR to 75 mg daily    Vitamin D insufficiency: Vitamin D level of 23 on admission, goal of 30  -Increase cholecalciferol to 2000 units daily    GI prophylaxis:  -Pepcid 20 mg twice daily    DVT prophylaxis:  -Lovenox 40 mg daily    Patient was seen for 28 minutes on unit/floor of which > 50% of time was spent on counseling and coordination of care regarding the above, including prognosis, risk reduction, benefits of treatment, and options for next stage of care including neuropathic pain with increase in gabapentin to 900 mg 3 times a day, discussion muscular dystrophy diagnosis, neurogenic bowel with evaluation of KUB, hypotension, DC Midodrine.      Edinson Gregory D.O.

## 2020-01-03 NOTE — THERAPY
Occupational Therapy  Daily Treatment     Patient Name: Marilyn Salinas  Age:  54 y.o., Sex:  female  Medical Record #: [id number]  Today's Date: 1/3/2020     Precautions  Precautions: (P) Fall Risk, Spinal / Back Precautions , Cervical Collar    Comments: C collar OOB, wound great toe R foot         Subjective    Patient in bed upon OT arrival.  Daughter in law present for family training.     Objective       20 1331   Precautions   Precautions Fall Risk;Spinal / Back Precautions ;Cervical Collar     Sitting Upper Body Exercises   Upper Extremity Bike Minutes / Rest Breaks (See Comments)  (UB motomed gear 6 x 3.5 mins, gear 4 x 6.5 minutes)   Bed Mobility    Supine to Sit Modified Independent   Scooting Modified Independent   Interdisciplinary Plan of Care Collaboration   IDT Collaboration with  Family / Caregiver;Physical Therapist   Patient Position at End of Therapy Seated   Collaboration Comments family training with daughter in law; hand off to PT   OT Total Time Spent   OT Individual Total Time Spent (Mins) 60   OT Charge Group   OT Self Care / ADL 3   OT Therapeutic Exercise  1     Family training completed with daughter in law (Brittany).  OT verbally reviewed CLOF regarding ADLs and bathroom transfers.  OT demonstrated a tub transfer with tub bench, then patient did a dry tub/shower transfer with bench and supervision.  Completed a dry stall shower transfer using FWW and shower chair with patient backing in with CGA and good safety.  Patient will need a shower chair to use at home for increased safety for the stall shower.  Informed daughter in law that patient has completed laundry with supervision several times.    Informed  via phone and email about need for shower chair.    FIM Lower Body Dressing Score:  7 - Independent  Lower Body Dressing Description:  (to don shoes)    FIM Toiletin - Modified Independent  Toileting Description:  Grab bar    FIM Toilet Transfer Score:  6 -  Modified Independent  Toilet Transfer Description:  Grab bar      Assessment    Patient will need a shower chair for home use. All questions and concerns regarding OT related areas covered by OT.     Plan    Continue ROSSY Acevedo hand strengthening, dynavision for standing balance/ROM/coordination

## 2020-01-04 PROCEDURE — A9270 NON-COVERED ITEM OR SERVICE: HCPCS | Performed by: PHYSICAL MEDICINE & REHABILITATION

## 2020-01-04 PROCEDURE — 770010 HCHG ROOM/CARE - REHAB SEMI PRIVAT*

## 2020-01-04 PROCEDURE — 700102 HCHG RX REV CODE 250 W/ 637 OVERRIDE(OP): Performed by: PHYSICAL MEDICINE & REHABILITATION

## 2020-01-04 PROCEDURE — 700111 HCHG RX REV CODE 636 W/ 250 OVERRIDE (IP): Performed by: PHYSICAL MEDICINE & REHABILITATION

## 2020-01-04 RX ADMIN — ZOLPIDEM TARTRATE 10 MG: 5 TABLET ORAL at 22:02

## 2020-01-04 RX ADMIN — OXYBUTYNIN CHLORIDE 15 MG: 5 TABLET, EXTENDED RELEASE ORAL at 21:58

## 2020-01-04 RX ADMIN — GABAPENTIN 900 MG: 300 CAPSULE ORAL at 08:34

## 2020-01-04 RX ADMIN — FAMOTIDINE 20 MG: 20 TABLET ORAL at 21:58

## 2020-01-04 RX ADMIN — VITAMIN D, TAB 1000IU (100/BT) 2000 UNITS: 25 TAB at 08:34

## 2020-01-04 RX ADMIN — BACLOFEN 20 MG: 20 TABLET ORAL at 21:58

## 2020-01-04 RX ADMIN — SENNOSIDES 17.2 MG: 8.6 TABLET, FILM COATED ORAL at 11:49

## 2020-01-04 RX ADMIN — BACLOFEN 20 MG: 20 TABLET ORAL at 08:34

## 2020-01-04 RX ADMIN — OXYBUTYNIN CHLORIDE 15 MG: 5 TABLET, EXTENDED RELEASE ORAL at 08:33

## 2020-01-04 RX ADMIN — GABAPENTIN 900 MG: 300 CAPSULE ORAL at 14:16

## 2020-01-04 RX ADMIN — VENLAFAXINE HYDROCHLORIDE 75 MG: 75 CAPSULE, EXTENDED RELEASE ORAL at 21:58

## 2020-01-04 RX ADMIN — BUPROPION HYDROCHLORIDE 150 MG: 150 TABLET, EXTENDED RELEASE ORAL at 21:58

## 2020-01-04 RX ADMIN — BACLOFEN 20 MG: 20 TABLET ORAL at 14:16

## 2020-01-04 RX ADMIN — GABAPENTIN 900 MG: 300 CAPSULE ORAL at 21:57

## 2020-01-04 RX ADMIN — ENOXAPARIN SODIUM 40 MG: 40 INJECTION, SOLUTION INTRAVENOUS; SUBCUTANEOUS at 08:33

## 2020-01-04 RX ADMIN — FAMOTIDINE 20 MG: 20 TABLET ORAL at 08:34

## 2020-01-04 RX ADMIN — BUPROPION HYDROCHLORIDE 150 MG: 150 TABLET, EXTENDED RELEASE ORAL at 08:34

## 2020-01-04 NOTE — FLOWSHEET NOTE
01/03/20 7362   Events/Summary/Plan   Events/Summary/Plan SpO2 check, IS   Incentive Spirometry Group   Breathing Exercises Yes   Incentive Spirometer Volume 2000 mL  (good effort and technique)   Chest Exam   Respiration 16   Pulse 84   Oximetry   #Pulse Oximetry (Single Determination) Yes   Oxygen   Home O2 Use Prior To Admission? No   Pulse Oximetry 94 %   O2 Daily Delivery Respiratory  Room Air with O2 Available

## 2020-01-04 NOTE — CARE PLAN
Problem: Safety  Goal: Will remain free from injury  Outcome: PROGRESSING AS EXPECTED  Note:   Reviewed fall and safety precautions with patient and reminded patient to call for assistance before getting out of bed. Patient verbalized understanding and has not attempted self transfer this shift.      Problem: Bowel/Gastric:  Goal: Normal bowel function is maintained or improved  Outcome: PROGRESSING AS EXPECTED  Note:   Patient refused bowel meds today. Had BM yesterday and denied any s/s of constipation.

## 2020-01-04 NOTE — THERAPY
Physical Therapy   Daily Treatment     Patient Name: Marilyn Salinas  Age:  54 y.o., Sex:  female  Medical Record #: [id number]  Today's Date: 1/3/2020     Precautions  Precautions: (P) Fall Risk, Spinal / Back Precautions , Cervical Collar  (treatment completed at 1431)  Comments: (P) C collar OOB, wound great toe R foot    Subjective    Pt was sitting in w/c upon arrival and agreeable to tx. Daughter in law present for treatment.      Objective       01/03/20 1421   Precautions   Precautions Fall Risk;Spinal / Back Precautions ;Cervical Collar    (treatment completed at 1431)   Comments C collar OOB, wound great toe R foot   Interdisciplinary Plan of Care Collaboration   IDT Collaboration with  Family / Caregiver   Patient Position at End of Therapy Seated;Call Light within Reach;Tray Table within Reach;Phone within Reach   Collaboration Comments family training with daughter in law   PT Total Time Spent   PT Individual Total Time Spent (Mins) 30   PT Charge Group   PT Therapeutic Activities 2       Toilet transfer with FWW from standard height toilet to mimic home environment with modA; education on elevated commodes/ toilet seats, grab bar placement and physical assist with transfers.    Education on w/c components and parts.     Education on access home/enterance to home, recommendation for ramp     Assessment    Daughter in law present for family training. Reinforcement will be needed on curb transfers and guarding with FWW during AMB    Plan    Family training Sunday for car transfer, AMB with FWW, curb transfer, endurance in AMB/ standing balance, abduction strengthening, progress through passport

## 2020-01-04 NOTE — PROGRESS NOTES
Patient care assumed. Report received from [facility] IBAN Gutierrez. Patient is alert and calm, resting in bed. Call light and bedside table within reach. Will continue to monitor.

## 2020-01-05 PROCEDURE — 97530 THERAPEUTIC ACTIVITIES: CPT

## 2020-01-05 PROCEDURE — A9270 NON-COVERED ITEM OR SERVICE: HCPCS | Performed by: PHYSICAL MEDICINE & REHABILITATION

## 2020-01-05 PROCEDURE — 700111 HCHG RX REV CODE 636 W/ 250 OVERRIDE (IP): Performed by: PHYSICAL MEDICINE & REHABILITATION

## 2020-01-05 PROCEDURE — 770010 HCHG ROOM/CARE - REHAB SEMI PRIVAT*

## 2020-01-05 PROCEDURE — 700102 HCHG RX REV CODE 250 W/ 637 OVERRIDE(OP): Performed by: PHYSICAL MEDICINE & REHABILITATION

## 2020-01-05 PROCEDURE — 97535 SELF CARE MNGMENT TRAINING: CPT

## 2020-01-05 PROCEDURE — 97116 GAIT TRAINING THERAPY: CPT

## 2020-01-05 RX ADMIN — BACLOFEN 20 MG: 20 TABLET ORAL at 14:48

## 2020-01-05 RX ADMIN — FAMOTIDINE 20 MG: 20 TABLET ORAL at 08:09

## 2020-01-05 RX ADMIN — BACLOFEN 20 MG: 20 TABLET ORAL at 08:09

## 2020-01-05 RX ADMIN — ZOLPIDEM TARTRATE 10 MG: 5 TABLET ORAL at 20:14

## 2020-01-05 RX ADMIN — GABAPENTIN 900 MG: 300 CAPSULE ORAL at 08:09

## 2020-01-05 RX ADMIN — BACLOFEN 20 MG: 20 TABLET ORAL at 20:15

## 2020-01-05 RX ADMIN — OXYBUTYNIN CHLORIDE 15 MG: 5 TABLET, EXTENDED RELEASE ORAL at 08:09

## 2020-01-05 RX ADMIN — FAMOTIDINE 20 MG: 20 TABLET ORAL at 20:15

## 2020-01-05 RX ADMIN — BUPROPION HYDROCHLORIDE 150 MG: 150 TABLET, EXTENDED RELEASE ORAL at 08:09

## 2020-01-05 RX ADMIN — OXYBUTYNIN CHLORIDE 15 MG: 5 TABLET, EXTENDED RELEASE ORAL at 20:14

## 2020-01-05 RX ADMIN — GABAPENTIN 900 MG: 300 CAPSULE ORAL at 14:48

## 2020-01-05 RX ADMIN — ACETAMINOPHEN 650 MG: 325 TABLET, FILM COATED ORAL at 20:13

## 2020-01-05 RX ADMIN — ACETAMINOPHEN 650 MG: 325 TABLET, FILM COATED ORAL at 10:20

## 2020-01-05 RX ADMIN — BUPROPION HYDROCHLORIDE 150 MG: 150 TABLET, EXTENDED RELEASE ORAL at 20:14

## 2020-01-05 RX ADMIN — VITAMIN D, TAB 1000IU (100/BT) 2000 UNITS: 25 TAB at 08:09

## 2020-01-05 RX ADMIN — VENLAFAXINE HYDROCHLORIDE 75 MG: 75 CAPSULE, EXTENDED RELEASE ORAL at 20:13

## 2020-01-05 RX ADMIN — GABAPENTIN 900 MG: 300 CAPSULE ORAL at 20:13

## 2020-01-05 RX ADMIN — ENOXAPARIN SODIUM 40 MG: 40 INJECTION, SOLUTION INTRAVENOUS; SUBCUTANEOUS at 08:10

## 2020-01-05 RX ADMIN — SENNOSIDES 17.2 MG: 8.6 TABLET, FILM COATED ORAL at 12:06

## 2020-01-05 NOTE — PROGRESS NOTES
Patient had an unwitnessed by staff fall 1700, patient was in family lounge in her w/c and took off seatbelt to bend over to  a paper clip off of floor and fell to her knees. Patient denies complaints of head, neck, back pain, patient denies complaints. Other patient's and their families witnessed the fall describing the above mentioned.  Patient assisted back into w/c, v/s taken per protocol, Dr Huerta was notified via tiger text by Charge Nurse Meghna. Will continue to monitor.  Patient son Sampson was notified regarding fall.  MIDAS report filled out.

## 2020-01-05 NOTE — PROGRESS NOTES
Pt had fall in family lounge unwitnessed by staff, however there were family members of another patient in there that came to get help. Pt did not have alarms due to refusal, she states that she took off her seat belt to bend over and  a paper clip. She was found on her knees. Patient brought back in  To her room, VSS denies any pain. Family, and Dr notified. Pt upset and frustrated, she was refusing alarms again, but we were able to convince her.

## 2020-01-05 NOTE — FLOWSHEET NOTE
01/04/20 1430   Events/Summary/Plan   Events/Summary/Plan 02check   Non-Invasive Resp Device Site Inspection Completed Intact   Education   Education Yes - Pt. / Family has been Instructed in use of Respiratory Equipment   Incentive Spirometry Group   Incentive Spirometry Instruction Yes   Breathing Exercises Yes   Incentive Spirometer Volume 2750 mL   Incentive Spirometer Date Last Changed 12/30/19   Incentive Spirometer Next Change Date (Q 30 Days) 01/30/20   Chest Exam   Respiration 16   Pulse 78   Oxygen   Home O2 Use Prior To Admission? No   Pulse Oximetry 95 %   O2 Daily Delivery Respiratory  Room Air with O2 Available

## 2020-01-05 NOTE — THERAPY
"Occupational Therapy  Daily Treatment     Patient Name: Marilyn Salinas  Age:  54 y.o., Sex:  female  Medical Record #: [id number]  Today's Date: 1/5/2020     Precautions  Precautions: Fall Risk, Spinal / Back Precautions , Cervical Collar    Comments: C collar OOB, wound great toe R foot         Subjective    \"Everybody is mad at me because I feel in the family lounge last night.\"     \"I'm not supposed to eat regular foods because of the neck surgery.  I'm on a liquid only diet. (What did you have to eat yesterday?). I had mac n cheese and brussel sprouts for dinner yesterday.\"      Objective       01/05/20 1001   Precautions   Precautions Fall Risk;Spinal / Back Precautions ;Cervical Collar     Comments C collar OOB, wound great toe R foot   Pain 0 - 10 Group   Location Neck;Shoulder   Description Aching   Therapist Pain Assessment Prior to Activity;Nurse Notified;6   Non Verbal Descriptors   Non Verbal Scale  Calm   Cognition    Cognition / Consciousness X   Orientation Level Oriented x 4   Level of Consciousness Alert   Safety Awareness Impaired   Comments Assist troubleshooting w/c position in anticipation of oven use/ use.    Passive ROM Upper Body   Passive ROM Upper Body WDL   Active ROM Upper Body   Active ROM Upper Body  WDL   Dominant Hand Right   Hand Strengthening   Hand Strengthening Gross Grasp Right;Gross Grasp Left   Comment Modified bimanual grasp/release to mix cookie dough due to noted diffculty maintaining stirring utensil with added force.    Fine Motor / Dexterity    Fine Motor / Dexterity Yes   Fine Motor / Dexterity Interventions R FMC coloring task with crayon to increase control with increase friction   IADL Treatments   Meal Preparation Pt SBA for seated/standing baking task with fair adherence to baking steps, preparation, hygiene. Cues required to troubleshoot novel approaches to oven/ from seated position.  Additional cues in standing for hand/foot placement when " transitioning from STS.  Additional cues for foot placement in supported standing position.    Bed Mobility    Supine to Sit Modified Independent   Interdisciplinary Plan of Care Collaboration   IDT Collaboration with  Nursing;Certified Nursing Assistant   Patient Position at End of Therapy Seated;Self Releasing Lap Belt Applied;Chair Alarm On   Collaboration Comments Pt escorted to dining byrne for lunch    OT Total Time Spent   OT Individual Total Time Spent (Mins) 90   OT Charge Group   Charges Yes   OT Self Care / ADL 1   OT Therapy Activity 5       FIM Eating Score:     Eating Description:       FIM Grooming Score:  7 - Independent  Grooming Description:  (Hair care seated)    FIM Bathing Score:     Bathing Description:       FIM Upper Body Dressing:     Upper Body Dressing Description:       FIM Lower Body Dressing Score:  6 - Modified Independent  Lower Body Dressing Description:  (Don socks/shoes seated EOB)    FIM Toileting Body Dressing:     Toileting Description:       FIM Bed/Chair/Wheelchair Transfers Score: 5 - Standby Prompting/Supervision or Set-up  Bed/Chair/Wheelchair Transfers Description:  Verbal cueing, Supervision for safety, Increased time(SBA SPT)    FIM Toilet Transfer Score:     Toilet Transfer Description:       FIM Tub/Shower Transfers Score:     Tub/Shower Transfers Description:         Assessment    Pt presented with head and neck pain upon therapist arrival but with application of sunglasses and medication, pt tolerated remaining session well.  Simple baking activity completed at w/c and standing level with patient identifying positioning and safety techniques to increase overall safety and independce with task.  W/C positioning required min verbal cues to increase safety with reaching/grabbing.  Pt demonstrated a significant anterior lean for narrow foot stance during standing tasks requiring bimanual engagement, with Min cues patient SBA in standing with no observable LOB.      Plan    D/C FIMs tomorrow for anticipated Tuesday D/C

## 2020-01-06 PROCEDURE — 700111 HCHG RX REV CODE 636 W/ 250 OVERRIDE (IP): Performed by: PHYSICAL MEDICINE & REHABILITATION

## 2020-01-06 PROCEDURE — 700102 HCHG RX REV CODE 250 W/ 637 OVERRIDE(OP): Performed by: PHYSICAL MEDICINE & REHABILITATION

## 2020-01-06 PROCEDURE — A9270 NON-COVERED ITEM OR SERVICE: HCPCS | Performed by: PHYSICAL MEDICINE & REHABILITATION

## 2020-01-06 PROCEDURE — 770010 HCHG ROOM/CARE - REHAB SEMI PRIVAT*

## 2020-01-06 PROCEDURE — 97530 THERAPEUTIC ACTIVITIES: CPT

## 2020-01-06 PROCEDURE — 97110 THERAPEUTIC EXERCISES: CPT

## 2020-01-06 PROCEDURE — 97597 DBRDMT OPN WND 1ST 20 CM/<: CPT

## 2020-01-06 PROCEDURE — 99232 SBSQ HOSP IP/OBS MODERATE 35: CPT | Performed by: PHYSICAL MEDICINE & REHABILITATION

## 2020-01-06 PROCEDURE — 97535 SELF CARE MNGMENT TRAINING: CPT

## 2020-01-06 PROCEDURE — 97116 GAIT TRAINING THERAPY: CPT

## 2020-01-06 RX ORDER — BACLOFEN 20 MG/1
30 TABLET ORAL 3 TIMES DAILY
Status: DISCONTINUED | OUTPATIENT
Start: 2020-01-06 | End: 2020-01-07 | Stop reason: HOSPADM

## 2020-01-06 RX ADMIN — OXYCODONE HYDROCHLORIDE 5 MG: 5 TABLET ORAL at 18:30

## 2020-01-06 RX ADMIN — GABAPENTIN 900 MG: 300 CAPSULE ORAL at 14:25

## 2020-01-06 RX ADMIN — ENOXAPARIN SODIUM 40 MG: 40 INJECTION, SOLUTION INTRAVENOUS; SUBCUTANEOUS at 08:19

## 2020-01-06 RX ADMIN — ACETAMINOPHEN 650 MG: 325 TABLET, FILM COATED ORAL at 20:47

## 2020-01-06 RX ADMIN — GABAPENTIN 900 MG: 300 CAPSULE ORAL at 08:19

## 2020-01-06 RX ADMIN — BUPROPION HYDROCHLORIDE 150 MG: 150 TABLET, EXTENDED RELEASE ORAL at 08:19

## 2020-01-06 RX ADMIN — ZOLPIDEM TARTRATE 10 MG: 5 TABLET ORAL at 20:48

## 2020-01-06 RX ADMIN — BACLOFEN 30 MG: 20 TABLET ORAL at 14:25

## 2020-01-06 RX ADMIN — VITAMIN D, TAB 1000IU (100/BT) 2000 UNITS: 25 TAB at 08:19

## 2020-01-06 RX ADMIN — GABAPENTIN 900 MG: 300 CAPSULE ORAL at 20:51

## 2020-01-06 RX ADMIN — SENNOSIDES 17.2 MG: 8.6 TABLET, FILM COATED ORAL at 11:46

## 2020-01-06 RX ADMIN — VENLAFAXINE HYDROCHLORIDE 75 MG: 75 CAPSULE, EXTENDED RELEASE ORAL at 20:51

## 2020-01-06 RX ADMIN — OXYBUTYNIN CHLORIDE 15 MG: 5 TABLET, EXTENDED RELEASE ORAL at 20:52

## 2020-01-06 RX ADMIN — BACLOFEN 20 MG: 20 TABLET ORAL at 08:19

## 2020-01-06 RX ADMIN — OXYBUTYNIN CHLORIDE 15 MG: 5 TABLET, EXTENDED RELEASE ORAL at 08:19

## 2020-01-06 RX ADMIN — BUPROPION HYDROCHLORIDE 150 MG: 150 TABLET, EXTENDED RELEASE ORAL at 20:52

## 2020-01-06 RX ADMIN — FAMOTIDINE 20 MG: 20 TABLET ORAL at 20:53

## 2020-01-06 RX ADMIN — FAMOTIDINE 20 MG: 20 TABLET ORAL at 08:19

## 2020-01-06 RX ADMIN — BACLOFEN 30 MG: 20 TABLET ORAL at 20:52

## 2020-01-06 NOTE — DISCHARGE PLANNING
Per Christi at [facility], referral accepted for w/c, cushion and tub transfer bench.  Per Pham at [facility], referral accepted.

## 2020-01-06 NOTE — PROGRESS NOTES
"Rehab Progress Note     Encounter Date: 1/6/2020    CC: LE weakness    Interval Events (Subjective)  She had a fall on Saturday when she removed her seatbelt and stood up to pick a paperclip off of the floor.  She denied hitting her head, denied any lasting pain, denied any bruising.    She is very anxious about going home    She is having difficulty with neck positioning while sleeping.  She is requesting a softer collar when trying to sleep at night.    She reports spasticity is improved with the increased dose of baclofen  Family training went well with her daughter-in-law.    Bladder: PVRs< 20cc no incontinence documented  Bowel: Large soft BM on 1/5, no incontinence    ROS:  Gen: No fatigue, confusion, significant weight loss  Eyes: no blurry vision, double vision or pain in eyes  ENT: no changes in hearing, runny nose, nose bleeds, sinus pain  CV: No CP, palpitations,  Lungs: no shortness of breath, changes in secretions, changes in cough, pain with coughing  Abd: No abd pain, nausea, vomiting, pain with eating  : no blood in urine,  suprapubic pain  Ext: No swelling in legs, asymmetric atrophy  Neuro: no changes in strength or sensation  Skin: no new ulcers/skin breakdown appreciated by patient  Mood: No changes in mood today, increase in depression or anxiety  Heme: no bruising, or bleeding  Rest of 14 point review of systems is negative    Objective:  Vitals: /70   Pulse 77   Temp 36.6 °C (97.9 °F) (Temporal)   Resp 18   Ht 1.676 m (5' 6\")   Wt 57.5 kg (126 lb 12.2 oz)   SpO2 94%   Gen: NAD, Body mass index is 20.46 kg/m².  HEENT: NC/AT, PERRLA, moist mucous membranes, anterior cervical incision is clean dry and intact no significant swelling appreciated  Cardio: RRR, no mumurs  Pulm: CTAB, with normal respiratory effort  Abd: Soft NTND, active bowel sounds,  Ext: No peripheral edema. No calf tenderness. No clubbing/cyanosis. +dorsal pedalis pulses bilaterally.    Tone: MAS 1/4 adductor tone " bilaterally    No results found for this or any previous visit (from the past 72 hour(s)).    Current Facility-Administered Medications   Medication Frequency   • gabapentin (NEURONTIN) capsule 900 mg TID   • baclofen (LIORESAL) tablet 20 mg TID   • vitamin D (cholecalciferol) 1000 UNIT tablet 2,000 Units DAILY   • sennosides (SENOKOT) 8.6 MG tablet 17.2 mg DAILY AT NOON   • albuterol inhaler 2 Puff Q4H PRN (RT)   • buPROPion SR (WELLBUTRIN-SR) tablet 150 mg BID   • enoxaparin (LOVENOX) inj 40 mg DAILY   • famotidine (PEPCID) tablet 20 mg BID   • methocarbamol (ROBAXIN) tablet 500 mg BID PRN   • oxybutynin SR (DITROPAN-XL) tablet 15 mg BID   • venlafaxine XR (EFFEXOR XR) capsule 75 mg Q EVENING   • Respiratory Care per Protocol Continuous RT   • oxyCODONE immediate-release (ROXICODONE) tablet 5 mg Q3HRS PRN   • oxyCODONE immediate release (ROXICODONE) tablet 10 mg Q3HRS PRN   • hydrALAZINE (APRESOLINE) tablet 25 mg Q8HRS PRN   • acetaminophen (TYLENOL) tablet 650 mg Q4HRS PRN   • polyethylene glycol/lytes (MIRALAX) PACKET 1 Packet QDAY PRN    And   • magnesium hydroxide (MILK OF MAGNESIA) suspension 30 mL QDAY PRN    And   • bisacodyl (DULCOLAX) suppository 10 mg QDAY PRN   • artificial tears ophthalmic solution 1 Drop PRN   • benzocaine-menthol (CEPACOL) lozenge 1 Lozenge Q2HRS PRN   • mag hydrox-al hydrox-simeth (MAALOX PLUS ES or MYLANTA DS) suspension 20 mL Q2HRS PRN   • ondansetron (ZOFRAN ODT) dispertab 4 mg 4X/DAY PRN    Or   • ondansetron (ZOFRAN) syringe/vial injection 4 mg 4X/DAY PRN   • sodium chloride (OCEAN) 0.65 % nasal spray 2 Spray PRN   • diphenhydrAMINE (BENADRYL) tablet/capsule 25 mg Q6HRS PRN    Or   • diphenhydrAMINE (BENADRYL) injection 25 mg Q6HRS PRN   • zolpidem (AMBIEN) tablet 10 mg HS PRN       Orders Placed This Encounter   Procedures   • Diet Order Regular     Standing Status:   Standing     Number of Occurrences:   1     Order Specific Question:   Diet:     Answer:   Regular [1]      Order Specific Question:   Texture/Fiber modifications:     Answer:   Dysphagia 2(Pureed/Chopped)specify fluid consistency(question 6) [2]     Order Specific Question:   Consistency/Fluid modifications:     Answer:   Thin Liquids [3]       Assessment:  Active Hospital Problems    Diagnosis   • Cervical spine pain   • Tobacco use   • Anxiety and depression   • Foraminal stenosis of cervical region   • Corns and callus   • Hyperlipidemia, unspecified       Medical Decision Making and Plan:    C2 AISD: Nontraumatic incomplete spinal cord injury, status post fall at home with worsening weakness, found to have severe cervical stenosis.  Status post C3-C5 ACDF with Dr. Linton on 12/20  -C-collar on when out of bed, soft collar when in bed for comfort  -Incision is clean dry and intact, follow-up with neurosurgery  -Continue comprehensive acute inpatient rehabilitation    Muscular dystrophy: Patient reports history of previous diagnosis, CPK 27, within normal limits  - Unlikely diagnosis at this time as this is a sex-linked genetic disorder, may be symptomatic female carrier, discussed with patient  - Will plan for patient to follow-up with Dr. Gutiérrez as outpatient for EMG evaluation.  She has not had an EMG in the past    Neurogenic bowel: Goal of BM daily  -Upper motor neuron bowel program with senna 2 tablets q. noon, suppository 10 mg GA every afternoon  -KUB shows no significant stool load, underwent bowel cleanout with magnesium citrate and soapsuds enema    Neurogenic bladder: Previous history of bladder spasticity  - Oxybutynin SR 15 mg twice daily, started by urologist as outpatient  -Follow-up with urology as outpatient    Neuropathic pain: Left C5 distribution, bilateral L4-S1  - gabapentin to 900 mg 3 times a day    Spasticity: Significant adductor tone/spasticity  -Increase baclofen to 30 mg 3 times daily    Hypotension: Likely secondary to cervical spinal cord injury, was on Midodrine 15 mg 3 times daily on  transfer  - DC'd midodrine 5mg tid    Depression/anxiety:  - Bupropion  mg twice daily  - venlafaxine XR to 75 mg daily    Vitamin D insufficiency: Vitamin D level of 23 on admission, goal of 30  -Increase cholecalciferol to 2000 units daily    Left onychomycoses:  - Nail care done by wound care during acute rehabilitation hospitalization  - Consults podiatry as outpatient    GI prophylaxis:  -Pepcid 20 mg twice daily    DVT prophylaxis:  -Lovenox 40 mg daily    Patient was seen for 28 minutes on unit/floor of which > 50% of time was spent on counseling and coordination of care regarding the above, including prognosis, risk reduction, benefits of treatment, and options for next stage of care including spasticity, increase in baclofen, neurogenic bowel, discussion of goal of daily bowel program, C2 AISD follow-up with physical medicine and rehabilitation as outpatient.        Edinson Gregory D.O.

## 2020-01-06 NOTE — CARE PLAN
Problem: Safety  Goal: Will remain free from injury  Outcome: PROGRESSING AS EXPECTED  Note:   Pt uses call light  appropriately. Waits for assistance and does not attempt self transfer this shift. Able to verbalize needs.      Problem: Pain Management  Goal: Pain level will decrease to patient's comfort goal  Outcome: PROGRESSING AS EXPECTED  Note:   Tylenol given PRN per MAR for c/o neck pain with moderate relief. Pt sleeps good with requested ambien. Will continue to monitor.

## 2020-01-06 NOTE — THERAPY
Physical Therapy   Daily Treatment     Patient Name: Marilyn Salinas  Age:  54 y.o., Sex:  female  Medical Record #: [id number]  Today's Date: 1/5/2020     Precautions  Precautions: (P) Fall Risk, Spinal / Back Precautions , Cervical Collar    Comments: (P) C collar OOB, wound great toe R foot    Subjective    I am nervous to go home. I want to be independent.     Objective     01/05/20 1501   Precautions   Precautions Fall Risk;Spinal / Back Precautions ;Cervical Collar     Comments C collar OOB, wound great toe R foot   Non Verbal Descriptors   Non Verbal Scale  Calm   Cognition    Orientation Level Oriented x 4   Neuro-Muscular Treatments   Neuro-Muscular Treatments Verbal Cuing;Sequencing;Postural Facilitation;Tactile Cuing   Comments Weight shifting and postural faciliation with standing and gait training, sequencing and positioning with car transfer, CGA   Interdisciplinary Plan of Care Collaboration   Patient Position at End of Therapy Seated  (patient in bathroom, CNA called to assist with transfer)   PT Total Time Spent   PT Individual Total Time Spent (Mins) 30   PT Charge Group   PT Gait Training 1   PT Therapeutic Activities 1     WC mobility, gait training and car transfer completed with FWW. Patient gaining more confidence with going discharge plan.    FIM Walking Score:  2 - Max Assistance  Walking Description:  Walker, Supervision for safety, Verbal cueing(135 ft with fww, SBA, VC for erect posture and positioning in fww)    FIM Wheelchair Score:  6 - Modified Independent  Wheelchair Description:  (B UE propulsion 200 ft, level surfaces, 3 corners)      Assessment    Good balance with gait training with fww. No manual assist needed. Patient aware of need to increase base of support for balance. Increased VC required for mngt of fww with turning and pivoting. No manual assist required for car transfer into [facility]. Patient happy with increased independence.    Plan    Family training Sunday for car  transfer, AMB with FWW, curb transfer, endurance in AMB/ standing balance, abduction strengthening, progress through passport

## 2020-01-06 NOTE — CARE PLAN
Problem: Safety  Goal: Will remain free from injury  Outcome: PROGRESSING AS EXPECTED  Patient unsteady with hx of falls, assisted with transfers to prevent falls/injury.     Problem: Pain Management  Goal: Pain level will decrease to patient's comfort goal  Outcome: PROGRESSING AS EXPECTED  Patient medicated with Tylenol for generalized pain with good relief.

## 2020-01-06 NOTE — THERAPY
Physical Therapy   Daily Treatment     Patient Name: Marilyn Salinas  Age:  54 y.o., Sex:  female  Medical Record #: [id number]  Today's Date: 1/6/2020     Precautions  Precautions: (P) Fall Risk, Spinal / Back Precautions , Cervical Collar    Comments: (P) C collar OOB, wound great toe R foot    Subjective    Pt just finished shower; requested UB dressing and shoes/ socks donned     Objective       01/06/20 1031   Precautions   Precautions Fall Risk;Spinal / Back Precautions ;Cervical Collar     Comments C collar OOB, wound great toe R foot   Interdisciplinary Plan of Care Collaboration   Patient Position at End of Therapy Seated;Call Light within Reach;Tray Table within Reach;Phone within Reach   PT Total Time Spent   PT Individual Total Time Spent (Mins) 30   PT Charge Group   PT Therapeutic Activities 2       Set up assist for donning of bra, shirt, socks, shoes and increased time  Assessed w/c fit/ cushion/ education on components and parts    Assessment    Pt agreeable to loaner w/c components and parts upon inspection; feels prepared to dc home tomorrow with support of family, outpatient therapy and w/c     Plan    Dc home tomorrow

## 2020-01-06 NOTE — THERAPY
"Physical Therapy   Daily Treatment     Patient Name: Marilyn Salinas  Age:  54 y.o., Sex:  female  Medical Record #: [id number]  Today's Date: 1/5/2020     Precautions  Precautions: Fall Risk, Spinal / Back Precautions , Cervical Collar    Comments: C collar OOB, wound great toe R foot    Subjective    Pt was sitting in w/c upon arrival and agreeable to tx     Objective       01/05/20 1231   Precautions   Precautions Fall Risk;Spinal / Back Precautions ;Cervical Collar     Comments C collar OOB, wound great toe R foot   Interdisciplinary Plan of Care Collaboration   IDT Collaboration with  Family / Caregiver   Patient Position at End of Therapy Seated;Chair Alarm On;Self Releasing Lap Belt Applied;Family / Friend in Room   Collaboration Comments daughter in law present for family training   PT Total Time Spent   PT Individual Total Time Spent (Mins) 60   PT Charge Group   PT Gait Training 1   PT Therapeutic Activities 3       FIM Bed/Chair/Wheelchair Transfers Score: 5 - Standby Prompting/Supervision or Set-up  Bed/Chair/Wheelchair Transfers Description:  (bedmob: mod I; transfer: SPT with FWW SPV)    FIM Walking Score:  2 - Max Assistance  Walking Description:  (135ft x 1, FWW, SPV)    FIM Wheelchair Score:  6 - Modified Independent  Wheelchair Description:       Family training with practice x 2 each 6\" curb transfer to 8\" curb transfer progressing to independence with totalA from daughter in law to mimic home environment    Education on floor recovery/ improving safety in the home with demo and practice, education on proper posture / safety / guarding during AMB with FWW, education on w/c components and parts with demo/practice of loading into a vehicle , education on bed mobility/ safety/ bed rail, where to purchase reacher/ practice picking up an object mod I, education on HEP       Assessment    Daughter in law very receptive to all education. Wheelchair and walker measurements taken and daughter in law " believes it will work in home environment. Feels prepared for a Tuesday dc    Plan    Collect all dc FIMs and IRF JANAY to prepare for dc home Tuesday

## 2020-01-06 NOTE — THERAPY
"Occupational Therapy  Daily Treatment     Patient Name: Marilyn Salinas  Age:  54 y.o., Sex:  female  Medical Record #: [id number]  Today's Date: 2020     Precautions  Precautions: Fall Risk, Spinal / Back Precautions , Cervical Collar    Comments: C collar OOB, wound great toe R foot         Subjective    \"  I need to have wound care before I leave here. This toe is infected!.\"  \" Oh man!  I have pressure sores on my feet now!!.\"  Therapist observed feet in shower  No pressure sores noted  She had what maybe looked like an abrasion.        Objective       20 0931   Precautions   Precautions Fall Risk;Spinal / Back Precautions ;Cervical Collar     Comments C collar OOB, wound great toe R foot   Interdisciplinary Plan of Care Collaboration   IDT Collaboration with  Nursing;Physical Therapist   Patient Position at End of Therapy Seated  (hand off to PT to complete dressing tasks post shower )   Collaboration Comments PT patient in bathroom completing dressing tasks post shower.   nursing patient demanding  \" wound care\" prior to d/c  due to   self reported toe infection.    OT Total Time Spent   OT Individual Total Time Spent (Mins) 60   OT Charge Group   OT Self Care / ADL 4       FIM Bathing Score:  6 - Modified Independent  Bathing Description:      FIM Upper Body Dressin - Modified Independent  Upper Body Dressing Description:      cues for pad placement for  C Collar .     FIM Lower Body Dressing Score:  6 - Modified Independent  Lower Body Dressing Description:  (extra time and encouragement of ability )    FIM Tub/Shower Transfers Score:  5 - Standby Prompting/Supervision or Set-up  Tub/Shower Transfers Description:         Assessment    Patient  With scattered thought processing and planning through out tx activity    reminders to slow down and attend to one task a time   Plan    D/c home with family support  2020    "

## 2020-01-06 NOTE — CARE PLAN
Problem: Safety  Goal: Will remain free from injury  Outcome: PROGRESSING AS EXPECTED  Patient with poor safety awareness and hx of falls, reminded to use call light for assist with transfers.     Problem: Pain Management  Goal: Pain level will decrease to patient's comfort goal  Outcome: PROGRESSING AS EXPECTED  Patient with episodes of cervical pain, will, continue to monitor & offer pain medicine as needed.

## 2020-01-06 NOTE — CARE PLAN
Problem: PT-Long Term Goals  Goal: LTG-By discharge, patient will ambulate  Description  1) Individualized goal:  150 ft with LRAD and spv  2) Interventions: PT Group Therapy, PT E Stim Attended, PT Gait Training, PT Therapeutic Exercises, PT TENS Application, PT Neuro Re-Ed/Balance, PT Aquatic Therapy, PT Therapeutic Activity and PT Manual Therapy         Outcome: NOT MET  Note:   Pt limited by endurance at this time  Goal: LTG-By discharge, patient will transfer one surface to another  Description  1) Individualized goal:  Mod I with LRAD  2) Interventions: PT Group Therapy, PT E Stim Attended, PT Gait Training, PT Therapeutic Exercises, PT TENS Application, PT Neuro Re-Ed/Balance, PT Aquatic Therapy, PT Therapeutic Activity and PT Manual Therapy         Outcome: NOT MET  Note:   Family training has been completed to address CLOF     Problem: PT-Long Term Goals  Goal: LTG-By discharge, patient will propel wheelchair  Description  1) Individualized goal:  Mod I all surfaces and distances  2) Interventions: PT Group Therapy, PT E Stim Attended, PT Gait Training, PT Therapeutic Exercises, PT TENS Application, PT Neuro Re-Ed/Balance, PT Aquatic Therapy, PT Therapeutic Activity and PT Manual Therapy         Outcome: MET  Goal: LTG-By discharge, patient will ambulate up/down 4-6 stairs  Description  1) Individualized goal:  2 steps or ramp to mimic home entry, spv   2) Interventions: PT Group Therapy, PT E Stim Attended, PT Gait Training, PT Therapeutic Exercises, PT TENS Application, PT Neuro Re-Ed/Balance, PT Aquatic Therapy, PT Therapeutic Activity and PT Manual Therapy         Outcome: MET

## 2020-01-06 NOTE — THERAPY
Physical Therapy   Daily Treatment     Patient Name: Marilyn Salinas  Age:  54 y.o., Sex:  female  Medical Record #: [id number]  Today's Date: 1/6/2020     Precautions  Precautions: Fall Risk, Spinal / Back Precautions , Cervical Collar    Comments: C collar OOB, wound great toe R foot    Subjective    Pt up in wc, willing to participate     Objective       01/06/20 1500   Supine Lower Body Exercise   Hip Abduction Hook Lying;3 sets of 10;Light Resistance Theraband   Straight Leg Raises 3 sets of 10   Knee to Chest 3 sets of 10;Bilateral  (LE's propped on therapy ball for mass flexion)   PT Total Time Spent   PT Individual Total Time Spent (Mins) 60   PT Charge Group   PT Gait Training 2   PT Therapeutic Exercise 2     Gait/ endurance training with FWW and SBA/  ft x 2  Curb step training with FWW and CGA / min A for balance and walker positioning    Assessment    Pt presents with fear of falling on curbs, requires min A for steadying during walker positioning, slow collin throughout gait.     Plan    D/c tomorrow

## 2020-01-07 VITALS
DIASTOLIC BLOOD PRESSURE: 65 MMHG | RESPIRATION RATE: 18 BRPM | BODY MASS INDEX: 20.37 KG/M2 | SYSTOLIC BLOOD PRESSURE: 99 MMHG | OXYGEN SATURATION: 96 % | WEIGHT: 126.76 LBS | TEMPERATURE: 98.9 F | HEART RATE: 78 BPM | HEIGHT: 66 IN

## 2020-01-07 PROBLEM — M79.2 NEUROPATHIC PAIN: Status: ACTIVE | Noted: 2020-01-07

## 2020-01-07 PROBLEM — G82.52 QUADRIPLEGIA, C1-C4 INCOMPLETE (HCC): Status: ACTIVE | Noted: 2020-01-07

## 2020-01-07 PROBLEM — N31.9 NEUROGENIC BLADDER: Status: ACTIVE | Noted: 2020-01-07

## 2020-01-07 PROBLEM — K59.2 NEUROGENIC BOWEL: Status: ACTIVE | Noted: 2020-01-07

## 2020-01-07 PROBLEM — G89.18 POSTOPERATIVE PAIN: Status: ACTIVE | Noted: 2020-01-07

## 2020-01-07 PROCEDURE — A9270 NON-COVERED ITEM OR SERVICE: HCPCS | Performed by: PHYSICAL MEDICINE & REHABILITATION

## 2020-01-07 PROCEDURE — 700111 HCHG RX REV CODE 636 W/ 250 OVERRIDE (IP): Performed by: PHYSICAL MEDICINE & REHABILITATION

## 2020-01-07 PROCEDURE — 700102 HCHG RX REV CODE 250 W/ 637 OVERRIDE(OP): Performed by: PHYSICAL MEDICINE & REHABILITATION

## 2020-01-07 PROCEDURE — 99239 HOSP IP/OBS DSCHRG MGMT >30: CPT | Performed by: PHYSICAL MEDICINE & REHABILITATION

## 2020-01-07 RX ORDER — BUPROPION HYDROCHLORIDE 150 MG/1
150 TABLET, EXTENDED RELEASE ORAL 2 TIMES DAILY
Qty: 60 TAB | Refills: 3 | Status: SHIPPED | OUTPATIENT
Start: 2020-01-07 | End: 2021-08-30

## 2020-01-07 RX ORDER — VENLAFAXINE HYDROCHLORIDE 75 MG/1
75 CAPSULE, EXTENDED RELEASE ORAL EVERY EVENING
Qty: 30 CAP | Refills: 3 | Status: SHIPPED | OUTPATIENT
Start: 2020-01-07 | End: 2021-11-22

## 2020-01-07 RX ORDER — FAMOTIDINE 20 MG/1
20 TABLET, FILM COATED ORAL 2 TIMES DAILY
Qty: 60 TAB | Refills: 3 | Status: SHIPPED | OUTPATIENT
Start: 2020-01-07 | End: 2020-05-27

## 2020-01-07 RX ORDER — OXYCODONE HYDROCHLORIDE 5 MG/1
5 TABLET ORAL EVERY 6 HOURS PRN
Qty: 14 TAB | Refills: 0 | Status: SHIPPED | OUTPATIENT
Start: 2020-01-07 | End: 2020-01-14

## 2020-01-07 RX ORDER — BACLOFEN 10 MG/1
30 TABLET ORAL 3 TIMES DAILY
Qty: 270 TAB | Refills: 3 | Status: SHIPPED
Start: 2020-01-07 | End: 2020-06-22

## 2020-01-07 RX ORDER — GABAPENTIN 300 MG/1
900 CAPSULE ORAL 3 TIMES DAILY
Qty: 270 CAP | Refills: 3 | Status: SHIPPED | OUTPATIENT
Start: 2020-01-07 | End: 2020-07-31

## 2020-01-07 RX ADMIN — FAMOTIDINE 20 MG: 20 TABLET ORAL at 08:16

## 2020-01-07 RX ADMIN — VITAMIN D, TAB 1000IU (100/BT) 2000 UNITS: 25 TAB at 08:16

## 2020-01-07 RX ADMIN — SENNOSIDES 17.2 MG: 8.6 TABLET, FILM COATED ORAL at 13:14

## 2020-01-07 RX ADMIN — GABAPENTIN 900 MG: 300 CAPSULE ORAL at 08:16

## 2020-01-07 RX ADMIN — OXYCODONE HYDROCHLORIDE 5 MG: 5 TABLET ORAL at 13:17

## 2020-01-07 RX ADMIN — ENOXAPARIN SODIUM 40 MG: 40 INJECTION, SOLUTION INTRAVENOUS; SUBCUTANEOUS at 08:18

## 2020-01-07 RX ADMIN — BUPROPION HYDROCHLORIDE 150 MG: 150 TABLET, EXTENDED RELEASE ORAL at 08:16

## 2020-01-07 RX ADMIN — OXYBUTYNIN CHLORIDE 15 MG: 5 TABLET, EXTENDED RELEASE ORAL at 08:16

## 2020-01-07 RX ADMIN — BACLOFEN 30 MG: 20 TABLET ORAL at 08:16

## 2020-01-07 ASSESSMENT — ACTIVITIES OF DAILY LIVING (ADL)
TOILETING_LEVEL_OF_ASSIST: ABLE TO COMPLETE TOILETING WITHOUT ASSIST
SHOWER_TRANSFER_LEVEL_OF_ASSIST: REQUIRES SUPERVISION WITH SHOWER TRANSFER
TOILET_TRANSFER_LEVEL_OF_ASSIST: REQUIRES SUPERVISION WITH TOILET TRANSFER

## 2020-01-07 NOTE — PROGRESS NOTES
Patient discharged to home per order.  Discharge instructions reviewed with patient and daughter in law; they verbalize understanding and signed copies placed in chart.  Patient has all belongings; signed copy of form in chart.  Patient left facility at 1400 via w/c accompanied by rehab staff and daughter in law.  Have enjoyed working with this pleasant patient.

## 2020-01-07 NOTE — DISCHARGE PLANNING
01/06/20  1212   Discharge Instructions - Completed by Case Mgmt   Discharge Location     Agency Name / Address / Phone [facility] at [address], [place] [address], [phone] (they will call you to schedule visits)   Outpatient Services Physical Therapy   Medical equipment Provider / Phone [facility] at [phone]   Medical Equipment Ordered Wheelchair; Cushion, [employer] in One Commode; shower chair   Comments MTM transportation information provided. Care Chest information provided.            Follow-up With  Details  Why  Contact Info   Edwar Platt M.D. (Primary Care)  On 1/10/2020  PCP: Friday at 3:20 pm (please check in at 3:05 pm)(records will be sent)  [address] [facility] [address]  [phone]     Connor Linton M.D. (Neurosurgery)  On 1/14/2020  Neurosurgery: Tuesday at 1:15 pm      [address] [facility] [address]  [phone]     Julianna Guillory D.O. (Physiatry)  On 3/5/2020  Physiatry: Thursday at 10:00 am (please check in at 9:30 pm)  [address] [facility] [id number] [phone]

## 2020-01-07 NOTE — DISCHARGE SUMMARY
Rehab Discharge Summary    Admission Date: 12/27/2019    Discharge Date: 1/7/2020    Attending Provider: Dr Edinson Gregory    Admission Diagnosis:   Active Hospital Problems    Diagnosis   • Cervical spine pain   • Tobacco use   • Anxiety and depression   • Foraminal stenosis of cervical region   • Corns and callus   • Hyperlipidemia, unspecified       Discharge Diagnosis:  Active Hospital Problems    Diagnosis   • Cervical spine pain   • Tobacco use   • Anxiety and depression   • Foraminal stenosis of cervical region   • Corns and callus   • Hyperlipidemia, unspecified       HPI per H&P:  The patient is a 54 y.o. female with a past medical history of chronic back and neck pain, presented on 12/19/2019  1:32 PM with increasing weakness and bilateral lower extremities and upper extremities, worsened after ground-level fall.  She denies any loss of consciousness.  She was found to have disc herniation at C4-5 and C3- C4, with T2 hyperintensity of the cord at this level. she underwent ACDF C3-5 on 12/20/19 with Dr. Linton.  Post-operatively she was on hyperperfusion protocol with a goal of map greater than 85. Patient was transferred out of ICU and has been making progress with therapy.     Patient tolerated transfer to [facility]. She reports she has patchy numbness in her LE as well as around her neck. She reports diffuse weakness without any focal weakness. She reports she has pain in her neck at 10/10 as well as chronic pain in her left foot due hammer toe as well as other injuries.  She denies SOB. Denies NVD. She reports she did have constipation. Denies dysuria or bladder incontinenceDenies easy bruising or bleeding.     Patient was admitted to [facility] on 12/27/2019.     Hospital Course by Problem List:  C2 AISD: Nontraumatic incomplete spinal cord injury, status post fall at home with worsening weakness, found to have severe cervical stenosis.  Status post C3-C5 ACDF with Dr. 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 Gutiérrez as outpatient for EMG evaluation.  She has not had an EMG in the past     Neurogenic bowel: Goal of BM daily  -Upper motor neuron bowel program with senna 2 tablets q. noon, suppository 10 mg OH every afternoon  -KUB shows no significant stool load, underwent bowel cleanout with magnesium citrate and soapsuds enema     Neurogenic bladder: Previous history of bladder spasticity  - Oxybutynin SR 15 mg twice daily, started by urologist as outpatient  -Follow-up with urology as outpatient     Neuropathic pain: Left C5 distribution, bilateral L4-S1  - gabapentin to 900 mg 3 times a day     Spasticity: Significant adductor tone/spasticity  -Baclofen 30 mg 3 times daily     Hypotension: Likely secondary to cervical spinal cord injury, was on Midodrine 15 mg 3 times daily on transfer  - DC'd midodrine 5mg tid     Depression/anxiety:  - Bupropion  mg twice daily  - venlafaxine XR to 75 mg daily     Vitamin D insufficiency: Vitamin D level of 23 on admission, goal of 30  - cholecalciferol to 2000 units daily  -Follow-up with primary care physician, recheck vitamin D level and 4 weeks    Left onychomycoses:  - Nail care done by wound care during acute rehabilitation hospitalization  - Consults podiatry as outpatient     GI prophylaxis:  -Pepcid 20 mg twice daily     DVT prophylaxis:  -Lovenox 40 mg daily    Functional Status at Discharge  Eatin - Independent  Eating Description:  Increased time  Groomin - Independent  Grooming Description:  (Hair care seated)  Bathin - Modified  "Independent  Bathing Description:  Grab bar, Tub bench, Hand held shower  Upper Body Dressin - Modified Independent  Upper Body Dressing Description:  Assist device equipment(mod I w/ w/c to retrieve)  Lower Body Dressin - Modified Independent  Lower Body Dressing Description:  6 - Modified Independent  Discharge Location : Home  Patient Discharging with Assist of: Family   Level of Supervision Required: Intermittent Supervision  Recommended Equipment for Discharge: Manual Wheelchair;Shower Chair;Grab Bars in Tub / Shower  Recommended Services Upon Discharge: Other (See Comments)(out patient  PT )  Long Term Goals Met: mod I Basic self care and related transfer   Criteria for Termination of Services: Maximum Function Achieved for Inpatient Rehabilitation  Walk:  2 - Max Assistance  Distance Walked:  Walks  feet  Walk Description:  (120ft x 1, FWW, SPV)  Wheelchair:  6 - Modified Independent  Distance Propelled:  Propels a minimum of 150 feet   Wheelchair Description:  (B UE propulsion 200 ft, level surfaces, 3 corners)  Stairs 5 - Standby Prompting/Supervision or Set-up  Stairs Description(12 x  6\" steps, B HR, SPV step to pattern)  Discharge Location: Home  Patient Discharging with Assist of: Family  Level of Supervision Required Upon Discharge: Intermittent Supervision  Recommended Equipment for Discharge: Front-Wheeled Walker;Manual Wheelchair  Recommeded Services Upon Discharge: Outpatient Physical Therapy  Long Term Goals Met: 2  Long Term Goals Not Met: 2  Reason(s) for Goals Not Met: Pt limited by endurance; will continue on outpatient basis. family education on CLOF and adaptations; feels confident to bring pt home at [facility]  Criteria for Termination of Services: Maximum Function Achieved for Inpatient Rehabilitation  Comprehension Mode:  Both  Comprehension:  6 - Modified Independent  Comprehension Description:  Glasses(Pt reported wearing glasses - had been lost at hospital)  Expression Mode: "  Both  Expression:  6 - Modified Independent  Expression Description:     Social Interaction:  6 - Modified Independent  Social Interaction Description:  Schedule  Problem Solvin - Standby Prompting/Supervision or Set-up  Problem Solving Description:  Verbal cueing, Therapy schedule  Memory:  6 - Modified Independent  Memory Description:  Therapy schedule       I, Edinson Gregory D.O., personally performed a complete drug regimen review and no potential clinically significant medication issues were identified.   Discharge Medication:     Medication List      START taking these medications      Instructions   famotidine 20 MG Tabs  Commonly known as:  PEPCID  Replaces:  raNITidine 150 MG Tabs   Take 1 Tab by mouth 2 Times a Day.  Dose:  20 mg     Sennosides 17.2 MG Tabs   Doctor's comments:  At noon  Take 1 Tab by mouth every day.  Dose:  17.2 mg        CHANGE how you take these medications      Instructions   baclofen 10 MG Tabs  What changed:  how much to take  Commonly known as:  LIORESAL   Take 3 Tabs by mouth 3 times a day.  Dose:  30 mg     Cholecalciferol 2000 UNIT Tabs  Start taking on:  2020  What changed:    · medication strength  · how much to take  · when to take this   Take 1 Tab by mouth every day.  Dose:  2,000 Units     gabapentin 300 MG Caps  What changed:    · how much to take  · when to take this  Commonly known as:  NEURONTIN   Take 3 Caps by mouth 3 times a day.  Dose:  900 mg     oxyCODONE immediate-release 5 MG Tabs  What changed:  when to take this  Commonly known as:  ROXICODONE   Take 1 Tab by mouth every 6 hours as needed for up to 7 days.  Dose:  5 mg        CONTINUE taking these medications      Instructions   buPROPion  MG Tb12 sustained-release tablet  Commonly known as:  WELLBUTRIN-SR   Take 1 Tab by mouth 2 Times a Day.  Dose:  150 mg     oxybutynin 15 MG CR tablet  Commonly known as:  DITROPAN XL   Take 15 mg by mouth 2 Times a Day.  Dose:  15 mg      venlafaxine XR 75 MG Cp24  Commonly known as:  EFFEXOR XR   Take 1 Cap by mouth every evening.  Dose:  75 mg     zolpidem 10 MG Tabs  Commonly known as:  AMBIEN   Take 10 mg by mouth at bedtime as needed.  Dose:  10 mg        STOP taking these medications    albuterol 108 (90 Base) MCG/ACT Aers inhalation aerosol     fluticasone 50 MCG/ACT nasal spray  Commonly known as:  FLONASE     loratadine 10 MG Tabs  Commonly known as:  CLARITIN     methocarbamol 500 MG Tabs  Commonly known as:  ROBAXIN     midodrine 5 MG Tabs  Commonly known as:  PROAMATINE     nicotine 14 MG/24HR Pt24  Commonly known as:  NICODERM     raNITidine 150 MG Tabs  Commonly known as:  ZANTAC  Replaced by:  famotidine 20 MG Tabs            Discharge Diet:  Dysph II with thin liquid, follow-up with outpatient speech therapy, advance diet as tolerated likely from Charly surgical edema    Discharge Activity:  C-collar on when out of bed, see physical therapy and Occupational Therapy notes    Disposition:  Patient to discharge home with family support and community resources.     Equipment:  See Occupational Therapy and physical therapy discharge notes    Follow-up & Discharge Instructions:  Follow up with your primary care provider (PCP) within 7-10 days of discharge to review your medications and take over your care.     If you develop chest pain, fever, chills, change in neurologic function (weakness, sensation changes, vision changes), or other concerning sxs, seek immediate medical attention or call 911.      Condition on Discharge:  Good    More than 35 minutes was spent on discharging this patient, including face-to-face time, prescription management, and the dictation of this note.    Edinson Gregory D.O.    Date of Service: 1/7/2020

## 2020-01-07 NOTE — CARE PLAN
Problem: Communication  Goal: The ability to communicate needs accurately and effectively will improve  Note:   Makes needs known to staff.  Encouraged to use call light for staff assist.      Problem: Safety  Goal: Will remain free from falls  Note:   Call light kept within reach and encouraged to use for assistance and with toileting needs. Encouraged to call staff and to wait for staff before transfers.  Hourly rounding in effect.  Bed alarm is in place.     Problem: Pain Management  Goal: Pain level will decrease to patient's comfort goal  Note:   Complains of headache, medicated with Tylenol and has + relief.  See MAR and doc flow sheet.  Will continue to monitor patient.

## 2020-01-07 NOTE — DISCHARGE PLANNING
Met with patient multiple times today.  On track for d/c tomorrow.  She has w/c, cushion, comode and shower chair delivered from [facility].  She has belindaaner walker in room for home.  We have referred her to [facility] per our discussion.  She continues to be emotionally labile at times. Will follow in am.

## 2020-01-07 NOTE — PROGRESS NOTES
Received bedside shift report from Angelica EDWARDS RN regarding patient and assumed care. Patient awake, calm and stable, currently positioned in bed for comfort and safety; call light within reach. Denies pain or discomfort at this time. Will continue to monitor.

## 2020-01-07 NOTE — CARE PLAN
Problem: OT Long Term Goals  Goal: LTG-By discharge, patient will complete basic self care tasks  Description  1) Individualized Goal:  Mod I for BADL's via AE/DME PRN  2) Interventions:  OT Self Care/ADL, OT Neuro Re-Ed/Balance, OT Therapeutic Activity, OT Evaluation and OT Therapeutic Exercise     Outcome: MET  Goal: LTG-By discharge, patient will perform bathroom transfers  Description  1) Individualized Goal:  Mod I for shower and commode transfers via DME PRN  2) Interventions:  OT Self Care/ADL, OT Neuro Re-Ed/Balance, OT Therapeutic Activity, OT Evaluation and OT Therapeutic Exercise     Outcome: MET

## 2020-01-07 NOTE — DISCHARGE INSTRUCTIONS
Fall Prevention in the Home  Falls can cause injuries and can affect people from all age groups. There are many simple things that you can do to make your home safe and to help prevent falls.  What can I do on the outside of my home?  · Regularly repair the edges of walkways and driveways and fix any cracks.  · Remove high doorway thresholds.  · Trim any shrubbery on the main path into your home.  · Use bright outdoor lighting.  · Clear walkways of debris and clutter, including tools and rocks.  · Regularly check that handrails are securely fastened and in good repair. Both sides of any steps should have handrails.  · Install guardrails along the edges of any raised decks or porches.  · Have leaves, snow, and ice cleared regularly.  · Use sand or salt on walkways during winter months.  · In the garage, clean up any spills right away, including grease or oil spills.  What can I do in the bathroom?  · Use night lights.  · Install grab bars by the toilet and in the tub and shower. Do not use towel bars as grab bars.  · Use non-skid mats or decals on the floor of the tub or shower.  · If you need to sit down while you are in the shower, use a plastic, non-slip stool.  · Keep the floor dry. Immediately clean up any water that spills on the floor.  · Remove soap buildup in the tub or shower on a regular basis.  · Attach bath mats securely with double-sided non-slip rug tape.  · Remove throw rugs and other tripping hazards from the floor.  What can I do in the bedroom?  · Use night lights.  · Make sure that a bedside light is easy to reach.  · Do not use oversized bedding that drapes onto the floor.  · Have a firm chair that has side arms to use for getting dressed.  · Remove throw rugs and other tripping hazards from the floor.  What can I do in the kitchen?  · Clean up any spills right away.  · Avoid walking on wet floors.  · Place frequently used items in easy-to-reach places.  · If you need to reach for something above  you, use a sturdy step stool that has a grab bar.  · Keep electrical cables out of the way.  · Do not use floor polish or wax that makes floors slippery. If you have to use wax, make sure that it is non-skid floor wax.  · Remove throw rugs and other tripping hazards from the floor.  What can I do in the stairways?  · Do not leave any items on the stairs.  · Make sure that there are handrails on both sides of the stairs. Fix handrails that are broken or loose. Make sure that handrails are as long as the stairways.  · Check any carpeting to make sure that it is firmly attached to the stairs. Fix any carpet that is loose or worn.  · Avoid having throw rugs at the top or bottom of stairways, or secure the rugs with carpet tape to prevent them from moving.  · Make sure that you have a light switch at the top of the stairs and the bottom of the stairs. If you do not have them, have them installed.  What are some other fall prevention tips?  · Wear closed-toe shoes that fit well and support your feet. Wear shoes that have rubber soles or low heels.  · When you use a stepladder, make sure that it is completely opened and that the sides are firmly locked. Have someone hold the ladder while you are using it. Do not climb a closed stepladder.  · Add color or contrast paint or tape to grab bars and handrails in your home. Place contrasting color strips on the first and last steps.  · Use mobility aids as needed, such as canes, walkers, scooters, and crutches.  · Turn on lights if it is dark. Replace any light bulbs that burn out.  · Set up furniture so that there are clear paths. Keep the furniture in the same spot.  · Fix any uneven floor surfaces.  · Choose a carpet design that does not hide the edge of steps of a stairway.  · Be aware of any and all pets.  · Review your medicines with your healthcare provider. Some medicines can cause dizziness or changes in blood pressure, which increase your risk of falling.  Talk with  your health care provider about other ways that you can decrease your risk of falls. This may include working with a physical therapist or  to improve your strength, balance, and endurance.  This information is not intended to replace advice given to you by your health care provider. Make sure you discuss any questions you have with your health care provider.  Document Released: 12/08/2003 Document Revised: 05/16/2017 Document Reviewed: 01/22/2016  [employer] Interactive Patient Education © 2017 [employer]    Helping Someone Who is Suicidal  Suicide is when someone takes his or her own life. Someone who is thinking about suicide needs immediate help. Although you might not know what to say or do to help, start by letting that person know you care. Listen to him or her. Then talk about how to get help. Help is available through therapy, medicine, and other treatments.  What are signs that someone is suicidal?  Common signs include:  · Signs of depression, such as:  ¨ Rage.  ¨ Irritability.  ¨ Shame.  ¨ Excessive worry.  ¨ Loss of interest in things the person once enjoyed.  · Changes in social behaviors and relationships, including:  ¨ Isolating oneself.  ¨ Withdrawing from friends and family.  ¨ Giving away possessions.  ¨ Saying good-bye.  ¨ Acting aggressively.  ¨ Sleeping more or less than usual.  ¨ Having trouble managing school or work.  ¨ Talking about feeling hopeless or being a burden.  ¨ Engaging in risky behaviors, such as drinking more alcohol or using more drugs.  What are the risk factors for suicide?  Risk factors for suicide include:  · Other suicides in the family.  · A history of suicide attempts.  · Depression or other mental health issues.  · Being in skilled nursing or facing skilled nursing time.  · Having had close friends who have committed suicide.  · Alcohol or drug abuse, especially combined with a mental illness.  What should I do if someone is suicidal?  If you believe a person is in immediate danger  of committing suicide, call your local emergency services (911 in the [place] for help.  If a person says he or she wants to commit suicide, take the threat seriously. Help the person get help right away by:  · Calling your local emergency services.  · Calling a suicide prevention hotline.  · Contacting a crisis center or a local suicide prevention center. These are often located at hospitals, clinics, community service organizations, social service providers, or health departments.  If a person confides in you that he or she is considering suicide:  · Listen to the person's thoughts and concerns with compassion.  · Let the person know you will stay with him or her.  · Ask if the person is having thoughts of hurting himself or herself.  · Offer to help the person get to a doctor or mental health professional.  · Remove all weapons and medicines from the person's living space.  · Do not promise to keep his or her thoughts of suicide a secret.  This information is not intended to replace advice given to you by your health care provider. Make sure you discuss any questions you have with your health care provider.  Document Released: 06/23/2004 Document Revised: 05/25/2017 Document Reviewed: 05/28/2015  [employer] Interactive Patient Education © 2017 [employer]      Physical Therapy Discharge Instructions for Marilyn Salinas    1/6/2020    Level of Assist Required for Ambulation: Supervision on Flat Surfaces, Supervision on Stairs, Physical Assist on Curbs  Distance Patient May Ambulate: (household distances)  Device Recommended for Ambulation: Front-Wheeled Walker  Level of Assist Required to Propel Wheelchair: Requires No Assist  Level of Assist Required for Transfers: Supervision  Device Recommended for Transfers: Front-Wheeled Walker  Home Exercise Program: Refer to Home Exercise Program Handout for Details  It was a pleasure working with you! Best of luck on your continued recovery.  Sincerely,  Alina Young PT,  DPT    Occupational Therapy Discharge Instructions for Marilyn Salinas    1/7/2020    Level of Assist Required for Eating: Able to Complete Eating without Assist  Level of Assist Required for Grooming: Able to Complete Grooming without Assist  Level of Assist Required for Dressing: Able to Complete Dressing without Assist  Level of Assist Required for Toileting: Able to Complete Toileting without Assist  Level of Assist Required for Toilet Transfer: Requires Supervision with Toilet Transfer  Level of Assist Required for Bathing: Requires Supervision with Bathing  Level of Assist Required for Shower Transfer: Requires Supervision with Shower Transfer  Have someone with you the first few times getting in / out of the shower and someone near by the first few times showering just in case you get in a tough spot and need some help.   Level of Assist Required for Home Mgmt: Requires Supervision with Home Management  Level of Assist Required for Meal Prep: Requires Supervision with Meal Preparation   Remember to not white through your tasks. It will be less stressful and you will have more success

## 2020-01-07 NOTE — WOUND TEAM
"Consult placed for Wound Team to provide nail care. Nail care was done on 12/30/2019 by Wound Team nail certified RN. Nails are still short. Patient stating that the left foot 4th digit \"is really infected. If I could feel it I would be screaming in pain.\" the toe and foot is normal temperature with no edema or erythema. There is a tiny area of crust lateral to the nail. There is a previously debrided callus on the plantar aspect of this toe. Used curette to remove residual callus including dark spot in center and there is no wound underneath. Explained this to patient and that there are no signs of infection. Patient reluctantly agreed. No dressing needed.  "

## 2020-01-08 NOTE — DISCHARGE PLANNING
Late entry for 1/7/20  Case management Summary:   Met with patient prior to discharge. Reviewed all follow up appointments.   Referral made to [facility] and they are have accepted referral and are ready to follow.  [facility] has delivered comode, shower chair and w/c to patient.  During hospitalization, I have provided support and education and have been available for questions and information during hours of operation, communicated with therapy team and MD along with providing links/resources  to outside services.    Patient verbalizes agreement with all plans and has an understanding of the next steps within the post acute services.     CASE MANAGEMENT PLAN OF CARE   Individualized Goals:   1. Patient would like information about housing.  2. Patient would like to transition to a 4ww.    Outcome:   1.  Met: provided this for patient and she was able to register online.  2.  Not met: this was not deemed safe for patient so she was discharged using f[facility].

## 2020-01-13 ENCOUNTER — TELEPHONE (OUTPATIENT)
Dept: MEDICAL GROUP | Facility: MEDICAL CENTER | Age: 55
End: 2020-01-13

## 2020-01-14 NOTE — TELEPHONE ENCOUNTER
Phone number is disconnected.  Emailed patient about no show to appointment Friday 1/10/20.  Explained this was her 2nd no show and the no show policy.

## 2020-01-17 ENCOUNTER — OFFICE VISIT (OUTPATIENT)
Dept: MEDICAL GROUP | Facility: MEDICAL CENTER | Age: 55
End: 2020-01-17
Attending: FAMILY MEDICINE
Payer: MEDICAID

## 2020-01-17 VITALS
SYSTOLIC BLOOD PRESSURE: 126 MMHG | DIASTOLIC BLOOD PRESSURE: 64 MMHG | WEIGHT: 132 LBS | OXYGEN SATURATION: 96 % | BODY MASS INDEX: 21.21 KG/M2 | TEMPERATURE: 98.7 F | RESPIRATION RATE: 16 BRPM | HEART RATE: 100 BPM | HEIGHT: 66 IN

## 2020-01-17 DIAGNOSIS — G82.52 QUADRIPLEGIA, C1-C4 INCOMPLETE (HCC): ICD-10-CM

## 2020-01-17 PROCEDURE — 99213 OFFICE O/P EST LOW 20 MIN: CPT | Performed by: FAMILY MEDICINE

## 2020-01-17 NOTE — PROGRESS NOTES
"Subjective:      Marilyn Salinas is a 54 y.o. female who presents with No chief complaint on file.            HPI C1-C4 incomplete quadriplegia-patient underwent cervical fusion with placement of titanium spacer, along with plate fusion and bilateral foraminotomies from C3-C5, Dr. Linton, on 12/19/2019.  Patient was transferred from [facility] to [facility] where she was discharged on 1/7/2020.  She is now recuperating at her son's home in [facility].  She has been referred to physical therapy in [place] and will be contacting them today to get treatment started.  She currently is using a 2 wheeled walker at home and occasionally a self-propelled wheelchair.  She does have urinary incontinence on the level of pre-existing stress urinary incontinence that dates back to her distant hysterectomy.  No fecal incontinence.  She is restricted to a soft diet at this time due to continued pain with swallowing at the site of her anterior incision.    ROS negative for fever, emesis, dyspnea       Objective:     /64 (BP Location: Left arm, Patient Position: Sitting, BP Cuff Size: Adult)   Pulse 100   Temp 37.1 °C (98.7 °F) (Temporal)   Resp 16   Ht 1.676 m (5' 5.98\")   Wt 59.9 kg (132 lb)   LMP 01/11/2017   SpO2 96%   BMI 21.32 kg/m²      Physical Exam  General-alert cooperative female no acute distress  Lower extremities-mildly decreased light touch over the anterior left thigh medially and laterally compared to the right.  Strength is preserved over both lower extremities.  Neck-healing 5 to 6 cm incision noted in the anterior medial right neck with no swelling or drainage.   Chest- clear breath sounds without wheezes, rales, ronchi. No retractions. Chest wall nontender.  Cardiac-regular rate and rhythm. No heave or thrill. Murmur absent     Assessment/Plan:       1. Quadriplegia, C1-C4 incomplete (HCC) now improved following neck surgery    Plan: 1.  Continue current medications  2.  Current next " step is to get started in outpatient physical therapy  3.  Continue follow-up with her neurosurgeon  4.  Follow-up with me in 1 month

## 2020-02-07 ENCOUNTER — APPOINTMENT (OUTPATIENT)
Dept: RADIOLOGY | Facility: IMAGING CENTER | Age: 55
End: 2020-02-07
Attending: NEUROLOGICAL SURGERY
Payer: MEDICAID

## 2020-02-07 ENCOUNTER — APPOINTMENT (OUTPATIENT)
Dept: URGENT CARE | Facility: PHYSICIAN GROUP | Age: 55
End: 2020-02-07
Payer: MEDICAID

## 2020-02-07 DIAGNOSIS — T14.8XXD OTHER INJURY OF UNSPECIFIED BODY REGION, SUBSEQUENT ENCOUNTER: ICD-10-CM

## 2020-02-07 PROCEDURE — 72040 X-RAY EXAM NECK SPINE 2-3 VW: CPT | Mod: TC | Performed by: FAMILY MEDICINE

## 2020-02-20 ENCOUNTER — OFFICE VISIT (OUTPATIENT)
Dept: MEDICAL GROUP | Facility: MEDICAL CENTER | Age: 55
End: 2020-02-20
Attending: FAMILY MEDICINE
Payer: MEDICAID

## 2020-02-20 VITALS
HEIGHT: 66 IN | OXYGEN SATURATION: 93 % | RESPIRATION RATE: 16 BRPM | TEMPERATURE: 98 F | BODY MASS INDEX: 21.05 KG/M2 | WEIGHT: 131 LBS | DIASTOLIC BLOOD PRESSURE: 64 MMHG | HEART RATE: 88 BPM | SYSTOLIC BLOOD PRESSURE: 98 MMHG

## 2020-02-20 DIAGNOSIS — M79.672 LEFT FOOT PAIN: ICD-10-CM

## 2020-02-20 DIAGNOSIS — R13.10 DYSPHAGIA, UNSPECIFIED TYPE: ICD-10-CM

## 2020-02-20 DIAGNOSIS — K59.03 DRUG-INDUCED CONSTIPATION: ICD-10-CM

## 2020-02-20 PROCEDURE — 99213 OFFICE O/P EST LOW 20 MIN: CPT | Performed by: FAMILY MEDICINE

## 2020-02-20 RX ORDER — LORATADINE 10 MG/1
10 TABLET ORAL DAILY
COMMUNITY
End: 2020-05-27

## 2020-02-20 RX ORDER — ALBUTEROL SULFATE 90 UG/1
2 AEROSOL, METERED RESPIRATORY (INHALATION) EVERY 6 HOURS PRN
COMMUNITY
End: 2020-03-20 | Stop reason: SDUPTHER

## 2020-02-20 RX ORDER — FLUTICASONE PROPIONATE 50 MCG
1 SPRAY, SUSPENSION (ML) NASAL DAILY
COMMUNITY
End: 2020-03-20 | Stop reason: SDUPTHER

## 2020-02-20 RX ORDER — LACTULOSE 10 G/15ML
10 SOLUTION ORAL 2 TIMES DAILY
Qty: 1 BOTTLE | Refills: 11 | Status: SHIPPED | OUTPATIENT
Start: 2020-02-20 | End: 2021-08-27

## 2020-02-20 NOTE — PROGRESS NOTES
"Subjective:      Marilyn Salinas is a 54 y.o. female who presents with Follow-Up            HPI 1.  Dysphasia-patient reports difficult with swallowing meat, anything with skin on it and bread since her neck surgery mid December 2019.  She rarely will regurgitate which she has attempted to swallow.  2.  Drug-induced constipation-patient notes constipation tending towards a bowel movement only every second day with increasing abdominal distention and tenderness by that time.  This also began in mid December.  She denies black or bloody stools.    ROS negative for altered speech, near syncope, dyspnea       Objective:     BP (!) 98/64 (BP Location: Right arm, Patient Position: Sitting, BP Cuff Size: Adult)   Pulse 88   Temp 36.7 °C (98 °F) (Temporal)   Resp 16   Ht 1.676 m (5' 5.98\")   Wt 59.4 kg (131 lb)   LMP 01/11/2017   SpO2 93%   BMI 21.15 kg/m²      Physical Exam  General-left foot-notable for moderate swelling of the left fourth toe which is medially deviated.  All toes on the left foot have thickened nails consistent with onychomycosis.  No redness, rash or discharge seen   Abdomen-slightly distended, nontender, no palpable masses, normal bowel sounds  Neck- trachea is midline, thyroid is symmetric and nontender, no cervical adenopathy, supple  Chest- clear breath sounds without wheezes, rales, ronchi. No retractions. Chest wall nontender.     Assessment/Plan:       1. Dysphagia, unspecified type    - REFERRAL speech therapy reason for Therapy: Eval/Treat/Report of swallowing    2. Drug-induced constipation      3. Left foot pain    - REFERRAL TO PODIATRY    "

## 2020-03-03 ENCOUNTER — TELEPHONE (OUTPATIENT)
Dept: MEDICAL GROUP | Facility: MEDICAL CENTER | Age: 55
End: 2020-03-03

## 2020-03-04 NOTE — TELEPHONE ENCOUNTER
1. Caller Name: Marilyn                        Call Back Number: [phone] (home) [phone] (work)        How would the patient prefer to be contacted with a response: Phone call OK to leave a detailed message    Pt is currently taking liquid antacid and the bottle states do not use in conjunction with an antacid. pt is currently on famotidine and wants to know if she can continue taking it. Please advise

## 2020-03-04 NOTE — TELEPHONE ENCOUNTER
Yes patient may take the liquid antacid along with taking famotidine which is considered an H2 blocker.  I would not take both medications at exactly the same time as the absorption of the tablet may be reduced if it travels through with the bolus of the liquid antacid.  Separation of 60 to 90 minutes should be sufficient.

## 2020-03-05 ENCOUNTER — OFFICE VISIT (OUTPATIENT)
Dept: PHYSICAL MEDICINE AND REHAB | Facility: REHABILITATION | Age: 55
End: 2020-03-05
Payer: MEDICAID

## 2020-03-05 VITALS
SYSTOLIC BLOOD PRESSURE: 110 MMHG | DIASTOLIC BLOOD PRESSURE: 78 MMHG | TEMPERATURE: 98.4 F | HEART RATE: 78 BPM | HEIGHT: 66 IN | WEIGHT: 130 LBS | OXYGEN SATURATION: 98 % | BODY MASS INDEX: 20.89 KG/M2

## 2020-03-05 DIAGNOSIS — M79.602 BILATERAL ARM PAIN: ICD-10-CM

## 2020-03-05 DIAGNOSIS — Z86.69 HISTORY OF MUSCULAR DYSTROPHY: ICD-10-CM

## 2020-03-05 DIAGNOSIS — M79.601 BILATERAL ARM PAIN: ICD-10-CM

## 2020-03-05 DIAGNOSIS — N31.9 NEUROGENIC BLADDER: ICD-10-CM

## 2020-03-05 DIAGNOSIS — M79.2 NEUROPATHIC PAIN: ICD-10-CM

## 2020-03-05 DIAGNOSIS — E55.9 VITAMIN D DEFICIENCY: ICD-10-CM

## 2020-03-05 DIAGNOSIS — G82.50 TETRAPLEGIA (HCC): ICD-10-CM

## 2020-03-05 DIAGNOSIS — K59.2 NEUROGENIC BOWEL: ICD-10-CM

## 2020-03-05 DIAGNOSIS — R45.89 DEPRESSED MOOD: ICD-10-CM

## 2020-03-05 DIAGNOSIS — S14.109A INJURY OF CERVICAL SPINAL CORD, INITIAL ENCOUNTER (HCC): Primary | ICD-10-CM

## 2020-03-05 DIAGNOSIS — R25.2 SPASTICITY: ICD-10-CM

## 2020-03-05 DIAGNOSIS — G47.9 SLEEPING DIFFICULTY: ICD-10-CM

## 2020-03-05 PROCEDURE — 99215 OFFICE O/P EST HI 40 MIN: CPT | Performed by: PHYSICAL MEDICINE & REHABILITATION

## 2020-03-05 RX ORDER — MELOXICAM 15 MG/1
TABLET ORAL
COMMUNITY
Start: 2020-02-24 | End: 2020-06-19

## 2020-03-05 RX ORDER — RANITIDINE 150 MG/1
150 TABLET ORAL 2 TIMES DAILY
COMMUNITY
End: 2020-05-27

## 2020-03-05 ASSESSMENT — ENCOUNTER SYMPTOMS
NECK PAIN: 1
SENSORY CHANGE: 1
SORE THROAT: 0
CONSTIPATION: 1
FALLS: 1
WEAKNESS: 1
CHILLS: 0
FEVER: 0
DIARRHEA: 0

## 2020-03-05 ASSESSMENT — PATIENT HEALTH QUESTIONNAIRE - PHQ9
CLINICAL INTERPRETATION OF PHQ2 SCORE: 3
SUM OF ALL RESPONSES TO PHQ QUESTIONS 1-9: 8
5. POOR APPETITE OR OVEREATING: 1 - SEVERAL DAYS

## 2020-03-05 ASSESSMENT — FIBROSIS 4 INDEX: FIB4 SCORE: 0.98

## 2020-03-05 NOTE — PROGRESS NOTES
[facility]  PM&R Neuro Rehabilitation [facility]  [facility] [place] [address]  Ph: [phone]    NEW PATIENT EVALUATION    Patient Name: Marilyn Salinas   Patient : 1965  Patient Age: 54 y.o.   PCP: Edwar Platt M.D.    Referring Physician: Bri    Reason for Referral: ARU D/C  Examining Physician: Dr. Julianna Guillory DO  Date of Service: 3/5/2020    *Patient established in PM&R practice, however, patient new to writer as patient is transferring care. Therefore, patient billed as established.     SUBJECTIVE:   Patient Identification: Marilyn Salinas is a 54 y.o. RHD female with PMH significant for chronic back and neck pain and rehabilitation history significant for premorbid weakness and paresthesias, had GLF, imaging showing Disc herniation at C4-5 and C3- C4, with T2 hyperintensity of the cord and s/p ACDF C3-5 on 19 with Dr. Linton and is presenting to PM&R clinic for a NEW OUTPATIENT evaluation with the following chief complaint/s:    Chief Complaint: Weakness    PM&R History to Date - Background Information:  Original Date of Injury: 19 GLF and worsening weakness, pain.   Pertinent Procedure History: Disc herniation at C4-5 and C3- C4, with T2 hyperintensity of the cord at this level. she underwent ACDF C3-5 on 19 with Dr. Linton  Dates of Admission/Discharge to ARU: 19-20    Accompanied by Today: Self  History of Present Illness:   - Has only had 1 PT appointment. Scheduled in [place]. They are very busy  - For this reason feels like she is weaker than she should be.   - Has bilateral arm pain lateral proximal aspect  - + falls; her C collar is broken   - Transition home difficult, had trouble getting out of shower. Son had to help her, has managed to figure it out now, but was difficult for her at first.   - After first fall after the surgery she states has sensation change, hit back of neck at C collar. Has CT planned 3/10.  - Has had constipation, but  has not been able to fill her prescription for bowel meds. Will do today or tomorrow, DIL will help.  - Has had some trouble swallowing which was present when in ARU as well; PCP has referred for swallow eval.     Review of Systems:  Review of Systems   Constitutional: Negative for chills and fever.   HENT: Negative for congestion and sore throat.         Difficulty swallowing some foods.    Cardiovascular: Negative for leg swelling.   Gastrointestinal: Positive for constipation. Negative for diarrhea.   Genitourinary: Negative for dysuria, frequency and urgency.        Not having frequency now, but does if not taking Oxybutynin.    Musculoskeletal: Positive for falls and neck pain.        BL arm pain   Skin:        Denies skin breakdown.   Neurological: Positive for sensory change and weakness.      All other pertinent positive review of systems are noted above in HPI.   All other systems reviewed and are negative.    Past Medical History:  Past Medical History:   Diagnosis Date   • Anxiety    • Arthritis     spine, feet    • Asthma    • Cataract    • Depression    • High cholesterol       Past Surgical History:   Procedure Laterality Date   • CERVICAL DISK AND FUSION ANTERIOR N/A 12/20/2019    Procedure: DISCECTOMY, SPINE, CERVICAL, ANTERIOR APPROACH, WITH FUSION - C3-5;  Surgeon: Connor Linton M.D.;  Location: [facility];  Service: Neurosurgery   • CORPECTOMY N/A 12/20/2019    Procedure: CORPECTOMY;  Surgeon: Connor Linton M.D.;  Location: [facility];  Service: Neurosurgery   • VAGINAL HYSTERECTOMY SCOPE TOTAL N/A 10/3/2017    Procedure: VAGINAL HYSTERECTOMY SCOPE TOTAL;  Surgeon: Husdon Driscoll M.D.;  Location: SURGERY SAME DAY [facility];  Service: Gynecology   • SALPINGECTOMY Bilateral 10/3/2017    Procedure: SALPINGECTOMY;  Surgeon: Hudson Driscoll M.D.;  Location: SURGERY SAME DAY [facility];  Service: Gynecology   • OOPHORECTOMY Bilateral 10/3/2017    Procedure:  OOPHORECTOMY;  Surgeon: Hudson Driscoll M.D.;  Location: SURGERY SAME DAY [facility];  Service: Gynecology   • ANTERIOR AND POSTERIOR REPAIR Bilateral 10/3/2017    Procedure: ANTERIOR AND POSTERIOR REPAIR;  Surgeon: Hudson Driscoll M.D.;  Location: SURGERY SAME DAY [facility];  Service: Gynecology   • ENTEROCELE REPAIR N/A 10/3/2017    Procedure: ENTEROCELE REPAIR, PERINEOPLASTY;  Surgeon: Hudson Driscoll M.D.;  Location: SURGERY SAME DAY [facility];  Service: Gynecology   • BLADDER SLING FEMALE N/A 10/3/2017    Procedure: BLADDER SLING FEMALE TOT, CYSTOSCOPY;  Surgeon: Hudson Driscoll M.D.;  Location: SURGERY SAME DAY [facility];  Service: Gynecology   • VAGINAL SUSPENSION N/A 10/3/2017    Procedure: VAGINAL SUSPENSION SACROSPINOUS VAULT POSSIBLE;  Surgeon: Hudson Driscoll M.D.;  Location: SURGERY SAME [facility];  Service: Gynecology   • OTHER Left 2005    hammertoe x2   • EYE SURGERY  1972    for lazy eye   • LUMPECTOMY          Current Outpatient Medications:   •  meloxicam (MOBIC) 15 MG tablet, , Disp: , Rfl:   •  raNITidine (ZANTAC) 150 MG Tab, Take 150 mg by mouth 2 times a day., Disp: , Rfl:   •  oxybutynin (DITROPAN XL) 15 MG CR tablet, TAKE 1 TABLET BY MOUTH TWO TIMES DAILY, Disp: 60 Tab, Rfl: 3  •  albuterol 108 (90 Base) MCG/ACT Aero Soln inhalation aerosol, Inhale 2 Puffs by mouth every 6 hours as needed for Shortness of Breath., Disp: , Rfl:   •  loratadine (CLARITIN) 10 MG Tab, Take 10 mg by mouth every day., Disp: , Rfl:   •  fluticasone (FLONASE) 50 MCG/ACT nasal spray, Spray 1 Spray in nose every day., Disp: , Rfl:   •  baclofen (LIORESAL) 10 MG Tab, Take 3 Tabs by mouth 3 times a day., Disp: 270 Tab, Rfl: 3  •  buPROPion SR (WELLBUTRIN-SR) 150 MG TABLET SR 12 HR sustained-release tablet, Take 1 Tab by mouth 2 Times a Day., Disp: 60 Tab, Rfl: 3  •  vitamin D 2000 UNIT Tab, Take 1 Tab by mouth every day., Disp: 60 Tab, Rfl: 3  •  gabapentin (NEURONTIN) 300 MG Cap, Take 3 Caps by  mouth 3 times a day., Disp: 270 Cap, Rfl: 3  •  venlafaxine XR (EFFEXOR XR) 75 MG CAPSULE SR 24 HR, Take 1 Cap by mouth every evening., Disp: 30 Cap, Rfl: 3  •  zolpidem (AMBIEN) 10 MG Tab, Take 10 mg by mouth at bedtime as needed., Disp: , Rfl:   •  lactulose 10 GM/15ML Solution, Take 15 mL by mouth 2 times a day. (Patient not taking: Reported on 3/5/2020), Disp: 1 Bottle, Rfl: 11  •  famotidine (PEPCID) 20 MG Tab, Take 1 Tab by mouth 2 Times a Day. (Patient not taking: Reported on 3/5/2020), Disp: 60 Tab, Rfl: 3  •  sennosides 17.2 MG Tab, Take 1 Tab by mouth every day. (Patient not taking: Reported on 3/5/2020), Disp: 60 Tab, Rfl: 3  Allergies   Allergen Reactions   • Codeine Unspecified     Unknown reaction          Past Social History:  Social History     Socioeconomic History   • Marital status: Single     Spouse name: Not on file   • Number of children: Not on file   • Years of education: Not on file   • Highest education level: Not on file   Occupational History   • Not on file   Social Needs   • Financial resource strain: Not on file   • Food insecurity     Worry: Never true     Inability: Never true   • Transportation needs     Medical: No     Non-medical: No   Tobacco Use   • Smoking status: Former Smoker     Packs/day: 0.25     Years: 26.00     Pack years: 6.50     Types: Cigarettes     Last attempt to quit: 3/1/2020     Years since quittin.0   • Smokeless tobacco: Never Used   Substance and Sexual Activity   • Alcohol use: No   • Drug use: Yes     Types: Marijuana     Comment: marijuana daily   • Sexual activity: Not Currently   Lifestyle   • Physical activity     Days per week: Not on file     Minutes per session: Not on file   • Stress: Not on file   Relationships   • Social connections     Talks on phone: Not on file     Gets together: Not on file     Attends Quaker service: Not on file     Active member of club or organization: Not on file     Attends meetings of clubs or organizations: Not  on file     Relationship status: Not on file   • Intimate partner violence     Fear of current or ex partner: Not on file     Emotionally abused: Not on file     Physically abused: Not on file     Forced sexual activity: Not on file   Other Topics Concern   •  Service No   • Blood Transfusions No   • Caffeine Concern No   • Occupational Exposure No   • Hobby Hazards No   • Sleep Concern Yes   • Stress Concern Yes   • Weight Concern Yes   • Special Diet No   • Back Care Yes   • Exercise No   • Bike Helmet Yes   • Seat Belt Yes   • Self-Exams Yes   Social History Narrative   • Not on file        Family History:  Family History   Problem Relation Age of Onset   • Cancer Mother    • Alcohol/Drug Mother    • Cancer Brother    • Diabetes Maternal Aunt        Depression and Opioid Screening  PHQ-9:  Depression Screen (PHQ-2/PHQ-9) 1/2/2020 1/3/2020 3/5/2020   PHQ-2 Total Score 0 0 -   PHQ-2 Total Score - - 3   PHQ-9 Total Score - - 8     Interpretation of PHQ-9 Total Score   Score Severity   1-4 No Depression   5-9 Mild Depression   10-14 Moderate Depression   15-19 Moderately Severe Depression   20-27 Severe Depression     Opioid Risk Score: No value filed.  Interpretation of Opioid Risk Score   Score 0-3 = Low risk of abuse. Do UDS at least once per year.  Score 4-7 = Moderate risk of abuse. Do UDS 1-4 times per year.  Score 8+ = High risk of abuse. Refer to specialist.      OBJECTIVE:   Vital Signs:  Vitals:    03/05/20 0929   BP: 110/78   Pulse: 78   Temp: 36.9 °C (98.4 °F)   SpO2: 98%        Pertinent Labs:  Lab Results   Component Value Date/Time    SODIUM 137 12/28/2019 05:12 AM    POTASSIUM 4.0 12/28/2019 05:12 AM    CHLORIDE 103 12/28/2019 05:12 AM    CO2 29 12/28/2019 05:12 AM    GLUCOSE 75 12/28/2019 05:12 AM    BUN 18 12/28/2019 05:12 AM    CREATININE 0.72 12/28/2019 05:12 AM       Lab Results   Component Value Date/Time    HBA1C 5.6 07/27/2017 09:39 AM       Lab Results   Component Value Date/Time     WBC 7.0 12/28/2019 05:12 AM    RBC 4.07 (L) 12/28/2019 05:12 AM    HEMOGLOBIN 12.4 12/28/2019 05:12 AM    HEMATOCRIT 37.9 12/28/2019 05:12 AM    MCV 93.1 12/28/2019 05:12 AM    MCH 30.5 12/28/2019 05:12 AM    MCHC 32.7 (L) 12/28/2019 05:12 AM    MPV 9.6 12/28/2019 05:12 AM    NEUTSPOLYS 29.40 (L) 12/28/2019 05:12 AM    LYMPHOCYTES 56.70 (H) 12/28/2019 05:12 AM    MONOCYTES 9.20 12/28/2019 05:12 AM    EOSINOPHILS 3.40 12/28/2019 05:12 AM    BASOPHILS 1.00 12/28/2019 05:12 AM       Lab Results   Component Value Date/Time    ASTSGOT 68 (H) 12/28/2019 05:12 AM    ALTSGPT 129 (H) 12/28/2019 05:12 AM        Physical Exam:   Physical Exam   Constitutional: She is oriented to person, place, and time and well-developed, well-nourished, and in no distress.   HENT:   Head: Normocephalic.   C collar in place   Eyes: Pupils are equal, round, and reactive to light. Right eye exhibits no discharge. Left eye exhibits no discharge.   Neck:   C collar in place   Pulmonary/Chest: Effort normal and breath sounds normal. No respiratory distress.   Abdominal: Soft. She exhibits no distension.   Musculoskeletal:      Comments: ROM BLE limited due to spasticity   Neurological: She is alert and oriented to person, place, and time.   Skin: Skin is warm and dry.   Psychiatric: Affect normal.   Loquacious    Nursing note and vitals reviewed.     Neuro Cont'd:  Alert, awake. Conversational. Logical thought content.     Motor Exam Upper Extremities   ? Myotome R L   Shoulder Abduction C5 5 5   Elbow flexion C5 5 5   Wrist extension C6 5 5   Elbow extension C7 5 5   Finger flexion C8 5 5   Finger abduction T1 5 5     Motor Exam Lower Extremities**  ? Myotome R L   Hip flexion L2 4 4   Knee extension L3 4 4   Ankle dorsiflexion L4 4 4   Toe extension L5 4 4   Ankle plantarflexion S1 4 4   **KATYA fully given that patient has very appreciable tone in LEs - Is in wheelchair but ambulates with FWW    Tone on Modified Rito Scale    R L   Hip Flexion  NT NT   Hip Extension NT NT   Hip Adduction 1 1   Knee Extension 2 2   Knee Flexion 2 2   Dorsiflexion 1 1   Plantar Flexion 1 1   NO significant tone BUE     Burrows's positive bilaterally.  No clonus appreciated at BL ankles.    Imaging:   MRI C Spine 11/2019  IMPRESSION:     Multilevel degenerative disc disease, uncinate and facet degeneration. There is moderate central canal narrowing at C4-5. There are varying degrees of neural foraminal narrowing at levels as specifically described above. These changes are mildly   progressed from comparison.    ASSESSMENT/PLAN: Marilyn Salinas is a 54 y.o. RHD female with PMH significant for chronic back and neck pain and rehabilitation history significant for premorbid weakness and paresthesias, had GLF, imaging showing Disc herniation at C4-5 and C3- C4, with T2 hyperintensity of the cord and s/p ACDF C3-5 on 12/20/19 with Dr. Linton, here 3/5/2020 for ARU discharge follow up. The following plan was discussed with the patient who is in agreement.     Visit Diagnoses     ICD-10-CM   1. Injury of cervical spinal cord, initial encounter ([facility]) S14.109A   2. History of muscular dystrophy Z86.69   3. Neurogenic bowel K59.2   4. Neurogenic bladder N31.9   5. Vitamin D deficiency E55.9   6. Sleeping difficulty G47.9   7. Neuropathic pain M79.2   8. Depressed mood F32.9   9. Spasticity R25.2   10. Tetraplegia ([facility]) G82.50   11. Bilateral arm pain M79.601    M79.602        Rehab/Neuro:   1. C2 AISD: Nontraumatic incomplete spinal cord injury, status post fall at home with worsening weakness, found to have severe cervical stenosis.  Status post C3-C5 ACDF with Dr. Linton on 12/20: Reviewed all pertinent previous medical records leading up to today's clinic visit.  Initial imaging reports reviewed.  - Follow up Dr. Linton in April   - CT 3/10 per Dr. Linton  - + Falls--> Counseled patient extensively to call Dr. Linton office and to report to ED if sudden onset worsening symptoms.  -  "Continue PT in [place]  - Swallow eval on 3/10 due to continued swallow difficulties per [place] therapy.     2. Muscular dystrophy: Reportedly diagnosed in '60's per aunt. Normal CPK. Was seeing Nikunj back in 2018 - was supposed to have EMG/NCS. Medicaid wouldn't cover EMG/NCS at that time.   - REFERRAL to Neurology, Nikunj, to continue ongoing work up if necessary per patient request    Spasticity:   1. Secondary to #1 in \"rehab/neuro\" section: D/C on Baclofen 30mg TID.  - Continue Baclofen 30mg TID.   - Patient has not been taking as prescribed, only been taking twice daily. Since not consistent will not change dose, but patient will need continued spasticity management.    Neuropathic Pain:   1. Secondary to #1 in \"rehab/neuro\" section: D/c on 900mg TID  - Continue Gabapentin 900mg TID    Neurogenic Bladder:   1. Neurogenic Bladder secondary to #1 in \"rehab/neuro\" section: Most recent BUN/Cr reviewed from 18/0.72 12/2019.  - Continue Oxybutynin 15mg BID  - Will need annual RBUS and BUN/Cr--> 12/2020  - Follow up Dr. Driscoll who is managing.     Neurogenic Bowel:   1. Neurogenic Bowel secondary to #1 in \"rehab/neuro\" section: Constipation. D/C on senna and suppository.   - Counseled on neurogenic bowel and need for bowel program. Has not been taking bowel meds.   - Bowel meds at pharmacy, patient needs measuring device.     Endo:   1. Vitamin D Deficiency secondary to #1 in \"rehab/neuro\" section: Most recent Vitamin D lab reviewed from 23 and was 12/2019.   - Cholecalciferol to 2000 units daily    Mood/Sleep:   1. Secondary to adjustment disorder resulting from #1 in \"neuro/rehab section\": Underlying depression/anxiety.  - Bupropion  mg twice daily  - venlafaxine XR to 75 mg daily    GI/Diet:   1. Dysphagia secondary to #1 in \"rehab/neuro\" section:  - Swallow study 3/10    Skin: Due to neurologic diagnosis of non-traumatic SCI and subsequent sensorimotor impairments, patient is at great risk for skin " breakdown.   - Counseled on continued frequency of weight shifting q2hrs and to avoid shearing with transfers or other positionally related movements to prevent development of skin breakdown.      Follow up: 3 months    Total time spent face to face with patient was 42 minutes. Greater then 50% of my visit was spent on counseling and coordination of care regarding the primary medical diagnosis and secondary medical complications as aforementioned in the assessment and plan. Extensive discussion involved the patient. Discussed on medical issues related to SCI and medication management. Counseled on red flag symptoms, avoid falling.     Please note that this dictation was created using voice recognition software. I have made every reasonable attempt to correct obvious errors but there may be errors of grammar and content that I may have overlooked prior to finalization of this note.    Dr. Julianna Guillory DO, MS  Department of Physical Medicine & Rehabilitation  Neuro Rehabilitation Clinic  [facility]  3/5/2020 9:43 AM

## 2020-03-06 ENCOUNTER — TELEPHONE (OUTPATIENT)
Dept: MEDICAL GROUP | Facility: MEDICAL CENTER | Age: 55
End: 2020-03-06

## 2020-03-06 DIAGNOSIS — M79.672 LEFT FOOT PAIN: ICD-10-CM

## 2020-03-07 NOTE — TELEPHONE ENCOUNTER
1. Caller Name: Marilyn                        Call Back Number: [phone] (home) [phone] (work)        How would the patient prefer to be contacted with a response: Phone call OK to leave a detailed message    2. SPECIFIC Action To Be Taken: Referral to podiatry, office cant get he in until August. Would like referral sent to Dr. Azevedo in [place]    3. Diagnosis/Clinical Reason for Request: Left foot pain    4. Specialty & Provider Name/Lab/Imaging Location: Dr. Azevedo [facility] [phone]    5. Is appointment scheduled for requested order/referral: no    Patient was informed they will receive a return phone call from the office ONLY if there are any questions before processing their request. Advised to call back if they haven't received a call from the referral department in 5 days.

## 2020-03-11 ENCOUNTER — TELEPHONE (OUTPATIENT)
Dept: MEDICAL GROUP | Facility: MEDICAL CENTER | Age: 55
End: 2020-03-11

## 2020-03-11 NOTE — TELEPHONE ENCOUNTER
DOCUMENTATION OF PAR STATUS:    1. Name of Medication & Dose: Lactulose     2. Name of Prescription Coverage Company & phone #: Medicaid FFS    3. Date Prior Auth Submitted: 03/11/2020    4. What information was given to obtain insurance decision? Chart notes, dx codes    5. Prior Auth Status? Pending    6. Patient Notified: no

## 2020-03-20 ENCOUNTER — OFFICE VISIT (OUTPATIENT)
Dept: MEDICAL GROUP | Facility: MEDICAL CENTER | Age: 55
End: 2020-03-20
Attending: FAMILY MEDICINE
Payer: MEDICAID

## 2020-03-20 VITALS
WEIGHT: 133 LBS | DIASTOLIC BLOOD PRESSURE: 60 MMHG | HEIGHT: 66 IN | HEART RATE: 88 BPM | OXYGEN SATURATION: 96 % | BODY MASS INDEX: 21.38 KG/M2 | SYSTOLIC BLOOD PRESSURE: 102 MMHG | TEMPERATURE: 98.5 F | RESPIRATION RATE: 16 BRPM

## 2020-03-20 DIAGNOSIS — R13.12 OROPHARYNGEAL DYSPHAGIA: ICD-10-CM

## 2020-03-20 DIAGNOSIS — J45.20 MILD INTERMITTENT ASTHMA WITHOUT COMPLICATION: ICD-10-CM

## 2020-03-20 PROCEDURE — 99213 OFFICE O/P EST LOW 20 MIN: CPT | Performed by: FAMILY MEDICINE

## 2020-03-20 RX ORDER — FLUTICASONE PROPIONATE 50 MCG
1 SPRAY, SUSPENSION (ML) NASAL DAILY
Qty: 1 BOTTLE | Refills: 6 | Status: SHIPPED | OUTPATIENT
Start: 2020-03-20 | End: 2020-12-21 | Stop reason: SDUPTHER

## 2020-03-20 RX ORDER — ALBUTEROL SULFATE 90 UG/1
2 AEROSOL, METERED RESPIRATORY (INHALATION) EVERY 6 HOURS PRN
Qty: 8.5 G | Refills: 6 | Status: SHIPPED | OUTPATIENT
Start: 2020-03-20 | End: 2020-05-23 | Stop reason: SDUPTHER

## 2020-03-20 ASSESSMENT — FIBROSIS 4 INDEX: FIB4 SCORE: 0.98

## 2020-03-20 NOTE — PROGRESS NOTES
Subjective:      Marilyn Salinas is a 54 y.o. female who presents with Dysphagia            HPI 1.  Intermittent asthma-patient requests refills for her as needed albuterol and as needed nasal steroid no spray.  She does have history of spring allergies.  Denies current sputum production  2.  Dysphagia-patient reports still extreme difficulty chewing and swallowing most foods and therefore she is under eating.  This is exacerbating tendencies towards low blood sugars.  She also notes nausea and a tendency to rapidly eat large quantities of cookies when they are available which does make her feel better on a certain level.    ROS positive for occasional nausea (baclofen), negative for current emesis, positive for intermittent wheezing       Objective:     /60   Wt 60.3 kg (133 lb)   LMP 01/11/2017   BMI 21.47 kg/m²      Physical Exam  Gen.- alert, cooperative, in no acute distress.  Patient is examined today wearing a hard collar  Neck- midline trachea, thyroid not enlarged or tender,supple, no cervical adenopathy  Chest-clear to auscultation and percussion with normal breath sounds. No retractions. Chest wall nontender  Cardiac- regular rhythm and rate. No murmur, thrill, or heave  Abdomen- normal bowel sounds, soft without guarding. Liver and spleen not enlarged, no palpable masses or tenderness.          Assessment/Plan:       1. Mild intermittent asthma without complication    - albuterol 108 (90 Base) MCG/ACT Aero Soln inhalation aerosol; Inhale 2 Puffs by mouth every 6 hours as needed for Shortness of Breath.  Dispense: 8.5 g; Refill: 6  - fluticasone (FLONASE) 50 MCG/ACT nasal spray; Spray 1 Spray in nose every day.  Dispense: 1 Bottle; Refill: 6    2. Oropharyngeal dysphagia    Plan: 1.  Renew albuterol inhaler, Flonase nasal spray  2.  Will place a order for Ensure 1 can twice daily  3.  Patient has neurosurgical follow-up in about 2 weeks and will follow-up with us in 4 weeks  4.  Patient has a  legitimate concern that perhaps she has cerebral palsy (she was told it was muscular dystrophy)-she will continue to try to make contact with [facility] where we have placed a referral to have this issue clarified

## 2020-04-24 ENCOUNTER — HOSPITAL ENCOUNTER (OUTPATIENT)
Dept: RADIOLOGY | Facility: MEDICAL CENTER | Age: 55
End: 2020-04-24
Attending: NEUROLOGICAL SURGERY
Payer: MEDICAID

## 2020-04-24 DIAGNOSIS — T14.8XXD OTHER INJURY OF UNSPECIFIED BODY REGION, SUBSEQUENT ENCOUNTER: ICD-10-CM

## 2020-04-24 PROCEDURE — 72125 CT NECK SPINE W/O DYE: CPT

## 2020-04-27 ENCOUNTER — PATIENT MESSAGE (OUTPATIENT)
Dept: SCHEDULING | Facility: IMAGING CENTER | Age: 55
End: 2020-04-27

## 2020-04-27 ENCOUNTER — OFFICE VISIT (OUTPATIENT)
Dept: MEDICAL GROUP | Facility: MEDICAL CENTER | Age: 55
End: 2020-04-27
Attending: FAMILY MEDICINE
Payer: MEDICAID

## 2020-04-27 DIAGNOSIS — J45.20 MILD INTERMITTENT ASTHMA WITHOUT COMPLICATION: ICD-10-CM

## 2020-04-27 DIAGNOSIS — G82.52 QUADRIPLEGIA, C1-C4 INCOMPLETE (HCC): ICD-10-CM

## 2020-04-27 DIAGNOSIS — Z91.81 RISK FOR FALLS: ICD-10-CM

## 2020-04-27 PROCEDURE — 99213 OFFICE O/P EST LOW 20 MIN: CPT | Mod: CR | Performed by: FAMILY MEDICINE

## 2020-04-27 RX ORDER — LORATADINE 10 MG/1
10 TABLET, ORALLY DISINTEGRATING ORAL DAILY
Qty: 30 TAB | Refills: 11 | Status: SHIPPED | OUTPATIENT
Start: 2020-04-27 | End: 2021-06-22 | Stop reason: SDUPTHER

## 2020-04-27 NOTE — PROGRESS NOTES
Telephone Appointment Visit   As a means of avoiding spread of COVID-19, this visit is being conducted by telephone. This telephone visit was initiated by the patient and they verbally consented.    Time at start of call: 9:15 AM    Reason for Call:  Symptom Follow-up    Patient Comments / History:   1.  Asthma-patient reports she has still not been able to get a albuterol inhaler from her pharmacy.  Order has been placed.  (Generic albuterol).  Reports breathing is comfortable most of the time but gets mildly tight and mildly wheezy if it is either cloudy or windy.  Is not using any loratadine but has found that helpful for allergic triggering in the past.  2.  Extremity weakness-patient underwent C3-5 anterior fusion and partial L4 corpectomy on 12/19/2019.  She reports that she subsequently was sent to the rehab hospital and then given outpatient physical therapy which recently expanded her initial visits.  She found that they were working on stretching and strengthening her limbs with significant loss of strength in both her upper and lower extremities associated with her condition and subsequent surgery.  She reports she still has an unsteady gait and is falling several times per week typically associated with changes in head position.  She had been attending [facility] who has indicated to her that they would like to see her continue with further physical therapy to improve her situation    Labs / Images Reviewed        Assessment and Plan:     1. Mild intermittent asthma without complication    2. Quadriplegia, C1-C4 incomplete (HCC)    3. Risk for falls      Follow-up: 1.  Renew physical therapy referral to [facility], 8 visits  2.  Patient will check with her pharmacy regarding availability of the previously ordered albuterol rescue inhaler  3.  Rx Claritin RediTabs-on formulary  Time at end of call: 9:34 AM  Total Time Spent: 11-20 minutes    Edwar Platt M.D.

## 2020-04-30 ENCOUNTER — PATIENT MESSAGE (OUTPATIENT)
Dept: MEDICAL GROUP | Facility: MEDICAL CENTER | Age: 55
End: 2020-04-30

## 2020-04-30 NOTE — TELEPHONE ENCOUNTER
From: Marilyn Salinas  To: Edwar Platt M.D.  Sent: 4/29/2020 12:38 PM PDT  Subject: RE:Your Referral Information    Thank you, I will check with them later.  I called [facility] re: Instant breakfast. They haven't received any request from you. Their fax is: [phone]      ----- Message -----   From:Edwar Platt M.D.   Sent:4/28/2020 2:43 PM PDT   To:Marilyn Salinas   Subject:RE:Your Referral Information    Will fax it this afternoon. Dr Whitten      ----- Message -----   From:Marilyn Salinas   Sent:4/28/2020 11:04 AM PDT   To:Gabriela Zayas   Subject:RE:Your Referral Information    [facility] hasn't received this new prescription. Please fax to: [phone]      ----- Message -----   From:Gabriela Zayas   Sent:4/27/2020 11:26 AM PDT   To:Marilyn Salinas   Subject:Your Referral Information    Referral information sent to the following:    [facility]  [address] [place] [address]. [address]  Phone: [phone]    Hello,     We have processed your referral and sent it to the office above.   Please call their office and schedule your appointment if you don't hear from them within 3 business days.     Thank you,  Gabriela PORTER

## 2020-05-05 ENCOUNTER — APPOINTMENT (OUTPATIENT)
Dept: NEUROLOGY | Facility: MEDICAL CENTER | Age: 55
End: 2020-05-05
Payer: MEDICAID

## 2020-05-13 ENCOUNTER — TELEPHONE (OUTPATIENT)
Dept: PHYSICAL MEDICINE AND REHAB | Facility: REHABILITATION | Age: 55
End: 2020-05-13

## 2020-05-27 ENCOUNTER — OFFICE VISIT (OUTPATIENT)
Dept: NEUROLOGY | Facility: MEDICAL CENTER | Age: 55
End: 2020-05-27
Payer: MEDICAID

## 2020-05-27 VITALS
TEMPERATURE: 98 F | BODY MASS INDEX: 20.89 KG/M2 | SYSTOLIC BLOOD PRESSURE: 104 MMHG | DIASTOLIC BLOOD PRESSURE: 60 MMHG | RESPIRATION RATE: 18 BRPM | OXYGEN SATURATION: 97 % | HEIGHT: 66 IN | HEART RATE: 110 BPM | WEIGHT: 130 LBS

## 2020-05-27 DIAGNOSIS — G82.20: Primary | ICD-10-CM

## 2020-05-27 PROCEDURE — 99215 OFFICE O/P EST HI 40 MIN: CPT | Performed by: PSYCHIATRY & NEUROLOGY

## 2020-05-27 ASSESSMENT — ENCOUNTER SYMPTOMS
BACK PAIN: 1
NECK PAIN: 1
SENSORY CHANGE: 1
FOCAL WEAKNESS: 1

## 2020-05-27 ASSESSMENT — FIBROSIS 4 INDEX: FIB4 SCORE: 0.98

## 2020-05-28 ENCOUNTER — TELEPHONE (OUTPATIENT)
Dept: PHYSICAL MEDICINE AND REHAB | Facility: REHABILITATION | Age: 55
End: 2020-05-28

## 2020-05-28 NOTE — PROGRESS NOTES
Subjective:      Marilyn Salinas is a 54 y.o. female who presents for follow-up, seen in September, 2018 for complaints of several episodes of sudden weakness, visual change and facial paresthesias.  With her recent spinal cord injury following a fall and subsequent cervical spine fusion in December, 2019, there were questions of whether there was a predisposing neurologic cause for the weakness and symptoms she is now having.     HPI    I reviewed the records of my initial office evaluation in September, 2018.  At that time she was complaining of a progressive paraparesis but bilateral upper extremity paresthesias.  What she described were episodes of sudden symptom onset with rapid resolution back to baseline.  She was told that these were due to GBS, though she remembers few specifics of the diagnostics that were performed, and remember even less about the treatments that were offered.  Always, her recovery was complete.    What she does relate is that her aunt had told her when she was born that her feet were malformed and small, internally rotated.  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  At home and as she got older, she required the use of crutches and ankle braces, her feet were always small and malformed, left more so than right, and she walked as if she was standing on her toes.  She does not remember as an adult ever having chronic paresthesias or sensory distortions.  She stated she never had any upper extremity motor deficits.    With my evaluations, the musculoskeletal deformities were mentioned.  There was limited range of motion at the ankles, the extensor posturing as well.  Though I did not make note, I do recall that the feet were very small, out of proportion to her overall body size, consistent with true neurogenic atrophy.  At the time it was my impression she never suffered from GBS, and that the difficulties with her lower extremities on top of something chronic  "or more likely related to a lumbosacral, musculoskeletal pathology that was already being addressed by her neurosurgeons.    With her neck injury, a lot of the spasticity and internal rotation that she had outgrown as an adult had returned.  She is undergoing rather aggressive physical therapy, but she is now back using a walker, and this has become frustrating.  She still has some of the sensory distortions in the hands that resulted following her fall, though these are improving.    I reviewed the electronic health record surrounding her admission and the surgery itself, a central cervical cord injury was documented.  Since her discharge home, she has been on baclofen 30 mg, 3 times daily, Neurontin 900 mg, 3 times daily, Mobic, Ditropan, and Effexor.    Medical, surgical and family histories are reviewed, there are no new drug allergies.    Review of Systems   Genitourinary: Positive for frequency and urgency.   Musculoskeletal: Positive for back pain and neck pain.   Neurological: Positive for sensory change and focal weakness.   All other systems reviewed and are negative.       Objective:     /60 (BP Location: Left arm, Patient Position: Sitting, BP Cuff Size: Adult)   Pulse (!) 110   Temp 36.7 °C (98 °F) (Temporal)   Resp 18   Ht 1.676 m (5' 6\")   Wt 59 kg (130 lb)   LMP 01/11/2017   SpO2 97%   BMI 20.98 kg/m²      Physical Exam    She appears in no acute distress.  She is quite upset having to deal with everything that has happened since her fall and surgery.  She is cooperative.  Vital signs are stable.  There is no malar rash.  Cardiac evaluation reveals a regular rhythm.  Straight leg raising is negative bilaterally.    Overall, cognition is fully intact, she is fully oriented, there is no aphasia or inattention.  She is quite anxious, attention is limited to a degree.  Still she converses appropriately.    PERRLA/EOMI, visual fields are full, facial movements are symmetric, there is some " mild dysarthria.  Sensory exam is intact to temperature.  The tongue and uvula are midline, jaw jerk is present, shoulder shrug is symmetric.    Musculoskeletal exam does reveal spastic paraparesis, seemingly unchanged from her initial evaluation in September, 2018.  Strength is intact in the upper extremities, tone might be slightly increased.  She is more tremulous in the arms.  There is no my clonus.  Strength assessment of the lower extremities is limited because of the restricted range of motion at the ankles, but knee flexion and extension as well as hip movements are maintained.  She is diffusely hyperreflexic except at the ankles where they are absent bilaterally due to the musculoskeletal constraints.  Both toes are downgoing.    When she walks it is clearly with spastic quality, both lower extremities now involved.  She uses her walker to maintain balance.  In the upper extremities there is no appendicular dystaxia, though the tremulousness is seen through full range of motion.  This attenuates at rest.  Repetitive movements with the fingers are slowed but still show good amplitudes.  The movements are notably slowed with the feet.    Sensory exam in the lower extremities is intact to vibration and temperature.  Vibration, pinprick and temperature are intact in the upper extremities.     Assessment/Plan:     1. Cerebral paraparesis (HCC)  It is certainly reasonable that this woman's lower extremity spasticity was a birth related issue, especially as it might of been reported by family members.  This obviously can never be determined with any greater accuracy.  She was told that regardless of cause, since she had such significant problems at baseline, with a superimposed neck injury and central cord syndrome, her symptoms will worsen and even regress past where she had been prior to the injury.  She was told it will take her quite a while to begin to recover though her potential for continuing improvement  is greater.  She was also told it will take a lot of work in physical therapy though I still think she has the potential to lose the walker completely or at least minimize her dependency on it.  She was told that I do not think an additional neurologic illness or condition exists that would impair or prevent more complete recovery.  Thus, there is no need for additional neurologic follow-up.    She is still scheduled to have EMG/NCV studies done in the lower extremities and lumbar spine, an ongoing problem for her that preexisted the problems with her neck.  I encouraged her to follow through with that.  Again, this would serve as a superimposed condition on top of the chronic spasticity she has had all her life, but may be amenable to treatment, preventing further worsening.     Time: 40-minute spent face-to-face for exam, review, discussion, and education, of this over 50% of the time spent counseling and coordinating care.

## 2020-05-28 NOTE — TELEPHONE ENCOUNTER
I spoke with Marilyn to reschedule her appt on 6/5/2020 and she moved her appt, but wanted Dr. Guillory to know that Dr. No in Neuro diagnosed her with cerebral palsy yesterday.

## 2020-06-15 NOTE — PROGRESS NOTES
[facility]  [facility]  [address] [place] [address]  Ph: [phone]    FOLLOW UP PATIENT EVALUATION    Patient Name: Marilyn Salinas   Patient : 1965  Patient Age: 54 y.o.   PCP: Edwar Platt M.D.    Examining Physician: Dr. Julianna Guillory DO  Date of Service: 20    SUBJECTIVE:   Patient Identification: Marilyn Salinas is a 54 y.o. RHD female with PMH significant for chronic back and neck pain and rehabilitation history significant for premorbid weakness and paresthesias, had GLF, imaging showing Disc herniation at C4-5 and C3- C4, with T2 hyperintensity of the cord and s/p ACDF C3-5 on 19 with Dr. Linton  and is presenting to PM&R clinic for a FOLLOW UP outpatient evaluation with the following chief complaint/s:    PM&R Background Information:  Original Date of Injury: 19 GLF and worsening weakness, pain.   Pertinent Procedure History: Disc herniation at C4-5 and C3- C4, with T2 hyperintensity of the cord at this level. she underwent ACDF C3-5 on 19 with Dr. Linton  Dates of Admission/Discharge to ARU: 19-20    Chief Complaint: Spinal cord injury    Date of Last Clinic Visit: 3/5/2020  Accompanied by Today: Self  Interval History:    -Reviewed neurology note from visit 2020: Potentially could have had weakness and spasticity related to congenital issue.  Has EMG/NCS scheduled and encouraged her to continue to follow through with that.  No other neurologic work-up recommended.  -[facility] neurosurgery notes reviewed from visit date 2020 (Dr. Linton): CT scan showed good fusion across the posterior facets at levels 3 through 5.  There is excessive bone growth, dystrophic calcification behind the cage, but there is no encroachment into the canal space and no compression of the thecal sac.  Cervical collar discontinued.  Follow-up around 2020, referred to physical therapy.  -Patient states that she is depressed.  She still  has not been able to find a place on but is living with her son.  She states that her son would like her to move out but she has nowhere to move out to.  Patient does not have a .  -Functionally patient thinks that she is mildly improving.  She is still in physical therapy.  She is ambulating with a walker.  She has spasticity of the bilateral lower extremities.  Patient states that she was walking with a cane several months prior to her surgery then required a walker and has continued to require a walker.  She states that the therapist tried to have her use a cane however, she was too afraid to use a cane due to risk of falling.  -Patient states that she is very depressed about her current social circumstances, but that she does not have any suicidal ideation, homicidal ideation.  She just states that she does not like the situation she is in.  She used to see a psychologist in [place] but was told that she cannot see that person again because they are full.  She is interested in re-referral to the psychologist she was seen in [place].  Patient states that she does have a psychiatrist and has a telemedicine visit today.  -In the interim patient was told that she has likely had cerebral palsy since birth.  This has made her angry at her mother.  -Bladder: Patient states that she has urinary urgency, but not any infections.  She is taking oxybutynin, but is seeing her gynecologist and being switched to Detrol per her report.  -States that she tends towards constipation has been taking lactulose almost every day.  Due to taking lactulose gets fecal urgency.  -Continues to have neuropathic pain controlled on gabapentin.    Review of Systems:  Review of Systems   Constitutional: Negative for chills and fever.   HENT: Negative for congestion and sore throat.    Eyes: Negative for blurred vision and double vision.   Respiratory: Negative for cough and shortness of breath.    Cardiovascular: Negative for  palpitations and leg swelling.   Gastrointestinal: Negative for constipation and diarrhea.   Genitourinary: Positive for urgency. Negative for dysuria and frequency.   Musculoskeletal: Negative for falls and joint pain.   Neurological: Positive for weakness.        + spasticity. + Neuropathic pain.   Endo/Heme/Allergies: Does not bruise/bleed easily.   Psychiatric/Behavioral: Negative for memory loss.      All other pertinent positive review of systems are noted above in HPI.     Past Medical History:  Past Medical History:   Diagnosis Date   • Anxiety    • Arthritis     spine, feet    • Asthma    • Cataract    • Depression    • High cholesterol       Past Surgical History:   Procedure Laterality Date   • CERVICAL DISK AND FUSION ANTERIOR N/A 12/20/2019    Procedure: DISCECTOMY, SPINE, CERVICAL, ANTERIOR APPROACH, WITH FUSION - C3-5;  Surgeon: Connor Linton M.D.;  Location: SURGERY [facility];  Service: Neurosurgery   • CORPECTOMY N/A 12/20/2019    Procedure: CORPECTOMY;  Surgeon: Connor Linton M.D.;  Location: SURGERY [facility];  Service: Neurosurgery   • VAGINAL HYSTERECTOMY SCOPE TOTAL N/A 10/3/2017    Procedure: VAGINAL HYSTERECTOMY SCOPE TOTAL;  Surgeon: Hudson Driscoll M.D.;  Location: SURGERY SAME DAY [facility];  Service: Gynecology   • SALPINGECTOMY Bilateral 10/3/2017    Procedure: SALPINGECTOMY;  Surgeon: Hudson Driscoll M.D.;  Location: SURGERY SAME DAY [facility];  Service: Gynecology   • OOPHORECTOMY Bilateral 10/3/2017    Procedure: OOPHORECTOMY;  Surgeon: Hudson Driscoll M.D.;  Location: SURGERY SAME DAY [facility];  Service: Gynecology   • ANTERIOR AND POSTERIOR REPAIR Bilateral 10/3/2017    Procedure: ANTERIOR AND POSTERIOR REPAIR;  Surgeon: Hudson Driscoll M.D.;  Location: SURGERY SAME DAY [facility];  Service: Gynecology   • ENTEROCELE REPAIR N/A 10/3/2017    Procedure: ENTEROCELE REPAIR, PERINEOPLASTY;  Surgeon: Hudson Driscoll M.D.;  Location: SURGERY SAME DAY  [facility];  Service: Gynecology   • BLADDER SLING FEMALE N/A 10/3/2017    Procedure: BLADDER SLING FEMALE TOT, CYSTOSCOPY;  Surgeon: Hudson Driscoll M.D.;  Location: SURGERY SAME DAY [facility];  Service: Gynecology   • VAGINAL SUSPENSION N/A 10/3/2017    Procedure: VAGINAL SUSPENSION SACROSPINOUS VAULT POSSIBLE;  Surgeon: Hudson Driscoll M.D.;  Location: SURGERY SAME DAY [facility];  Service: Gynecology   • OTHER Left 2005    hammertoe x2   • EYE SURGERY  1972    for lazy eye   • LUMPECTOMY          Current Outpatient Medications:   •  docusate sodium (COLACE) 100 MG Cap, Take 2 Caps by mouth 2 times a day., Disp: 120 Cap, Rfl: 2  •  baclofen (LIORESAL) 20 MG tablet, Take 2 Tabs by mouth 3 times a day., Disp: 180 Tab, Rfl: 2  •  meloxicam (MOBIC) 15 MG tablet, TAKE 1 TABLET BY MOUTH DAILY, Disp: 30 Tab, Rfl: 3  •  albuterol 108 (90 Base) MCG/ACT Aero Soln inhalation aerosol, Inhale 2 Puffs by mouth every 6 hours as needed for Shortness of Breath., Disp: 8.5 g, Rfl: 6  •  loratadine (CLARITIN REDITABS) 10 MG dissolvable tablet, Take 1 Tab by mouth every day., Disp: 30 Tab, Rfl: 11  •  fluticasone (FLONASE) 50 MCG/ACT nasal spray, Spray 1 Spray in nose every day., Disp: 1 Bottle, Rfl: 6  •  oxybutynin (DITROPAN XL) 15 MG CR tablet, TAKE 1 TABLET BY MOUTH TWO TIMES DAILY, Disp: 60 Tab, Rfl: 3  •  lactulose 10 GM/15ML Solution, Take 15 mL by mouth 2 times a day., Disp: 1 Bottle, Rfl: 11  •  buPROPion SR (WELLBUTRIN-SR) 150 MG TABLET SR 12 HR sustained-release tablet, Take 1 Tab by mouth 2 Times a Day., Disp: 60 Tab, Rfl: 3  •  vitamin D 2000 UNIT Tab, Take 1 Tab by mouth every day., Disp: 60 Tab, Rfl: 3  •  gabapentin (NEURONTIN) 300 MG Cap, Take 3 Caps by mouth 3 times a day., Disp: 270 Cap, Rfl: 3  •  venlafaxine XR (EFFEXOR XR) 75 MG CAPSULE SR 24 HR, Take 1 Cap by mouth every evening., Disp: 30 Cap, Rfl: 3  •  zolpidem (AMBIEN) 10 MG Tab, Take 10 mg by mouth at bedtime as needed., Disp: , Rfl:   Allergies    Allergen Reactions   • Codeine Unspecified     Unknown reaction          Past Social History:  Social History     Socioeconomic History   • Marital status: Single     Spouse name: Not on file   • Number of children: Not on file   • Years of education: Not on file   • Highest education level: Not on file   Occupational History   • Not on file   Social Needs   • Financial resource strain: Not on file   • Food insecurity     Worry: Never true     Inability: Never true   • Transportation needs     Medical: No     Non-medical: No   Tobacco Use   • Smoking status: Former Smoker     Packs/day: 0.25     Years: 26.00     Pack years: 6.50     Types: Cigarettes     Last attempt to quit: 3/1/2020     Years since quittin.3   • Smokeless tobacco: Never Used   Substance and Sexual Activity   • Alcohol use: No   • Drug use: Yes     Types: Marijuana     Comment: marijuana daily   • Sexual activity: Not Currently   Lifestyle   • Physical activity     Days per week: Not on file     Minutes per session: Not on file   • Stress: Not on file   Relationships   • Social connections     Talks on phone: Not on file     Gets together: Not on file     Attends Taoist service: Not on file     Active member of club or organization: Not on file     Attends meetings of clubs or organizations: Not on file     Relationship status: Not on file   • Intimate partner violence     Fear of current or ex partner: Not on file     Emotionally abused: Not on file     Physically abused: Not on file     Forced sexual activity: Not on file   Other Topics Concern   •  Service No   • Blood Transfusions No   • Caffeine Concern No   • Occupational Exposure No   • Hobby Hazards No   • Sleep Concern Yes   • Stress Concern Yes   • Weight Concern Yes   • Special Diet No   • Back Care Yes   • Exercise No   • Bike Helmet Yes   • Seat Belt Yes   • Self-Exams Yes   Social History Narrative   • Not on file        Family History:  Family History   Problem  Relation Age of Onset   • Cancer Mother    • Alcohol/Drug Mother    • Cancer Brother    • Diabetes Maternal Aunt        Depression and Opioid Screening  PHQ-9:  Depression Screen (PHQ-2/PHQ-9) 1/3/2020 3/5/2020 6/22/2020   PHQ-2 Total Score 0 - -   PHQ-2 Total Score - 3 6   PHQ-9 Total Score - 8 24     Interpretation of PHQ-9 Total Score   Score Severity   1-4 No Depression   5-9 Mild Depression   10-14 Moderate Depression   15-19 Moderately Severe Depression   20-27 Severe Depression     Opioid Risk Score: No value filed.  Interpretation of Opioid Risk Score   Score 0-3 = Low risk of abuse. Do UDS at least once per year.  Score 4-7 = Moderate risk of abuse. Do UDS 1-4 times per year.  Score 8+ = High risk of abuse. Refer to specialist.      OBJECTIVE:   Vital Signs:  Vitals:    06/22/20 0941   BP: (!) 94/66   Pulse: 72   Temp: 36.8 °C (98.3 °F)   SpO2: 95%        Pertinent Labs:  Lab Results   Component Value Date/Time    SODIUM 137 12/28/2019 05:12 AM    POTASSIUM 4.0 12/28/2019 05:12 AM    CHLORIDE 103 12/28/2019 05:12 AM    CO2 29 12/28/2019 05:12 AM    GLUCOSE 75 12/28/2019 05:12 AM    BUN 18 12/28/2019 05:12 AM    CREATININE 0.72 12/28/2019 05:12 AM       Lab Results   Component Value Date/Time    HBA1C 5.6 07/27/2017 09:39 AM       Lab Results   Component Value Date/Time    WBC 7.0 12/28/2019 05:12 AM    RBC 4.07 (L) 12/28/2019 05:12 AM    HEMOGLOBIN 12.4 12/28/2019 05:12 AM    HEMATOCRIT 37.9 12/28/2019 05:12 AM    MCV 93.1 12/28/2019 05:12 AM    MCH 30.5 12/28/2019 05:12 AM    MCHC 32.7 (L) 12/28/2019 05:12 AM    MPV 9.6 12/28/2019 05:12 AM    NEUTSPOLYS 29.40 (L) 12/28/2019 05:12 AM    LYMPHOCYTES 56.70 (H) 12/28/2019 05:12 AM    MONOCYTES 9.20 12/28/2019 05:12 AM    EOSINOPHILS 3.40 12/28/2019 05:12 AM    BASOPHILS 1.00 12/28/2019 05:12 AM       Lab Results   Component Value Date/Time    ASTSGOT 68 (H) 12/28/2019 05:12 AM    ALTSGPT 129 (H) 12/28/2019 05:12 AM        Physical Exam:   GEN: No apparent  distress, sitting comfortably in chair in room.   HEENT: Head normocephalic, atraumatic.  Sclera nonicteric bilaterally, no ocular discharge appreciated bilaterally. Mask donned.   CV: Extremities warm and well-perfused, no peripheral edema appreciated bilaterally.  PULMONARY: Breathing nonlabored on room air, no respiratory accessory muscle use.  Not requiring supplemental oxygen.  ABD: Soft, nontender.  SKIN: No appreciable skin breakdown on exposed areas of skin.  PSYCH: Depressed and irritated mood.  NEURO: Awake alert.  Conversational.  Logical thought content.     Motor Exam Upper Extremities   ? Myotome R L   Shoulder Abduction C5 5 5   Elbow flexion C5 5 5   Wrist extension C6 5 5   Elbow extension C7 5 5   Finger flexion C8 5 5   Finger abduction T1 5 5     Motor Exam Lower Extremities  ? Myotome R L   Hip flexion L2 4 4   Knee extension L3 4+ 4+   Ankle dorsiflexion L4 4 4   Toe extension L5 NT NT   Ankle plantarflexion S1 4 4       Tone on Modified Rito Scale    R L  R L   Elbow extension (testing tone of elbow flexors) 0 0 Hip extension (testing hip flexors) NT NT   Elbow flexion (testing tone of elbow extensors) 0 0 Hip abduction (testing adductors) 1 1   Wrist extension (testing tone of wrist flexors)  0 0 Knee extension (testing knee flexors) 2 2   Finger extension (testing tone of finger flexors) 0 0 Knee flexion (testing knee extensors) 2 2   Supination (testing forearm pronators) 0 0 Dorsiflexion (testing plantarflexors) 0 0      Plantarflexion (testing dorsiflexors) 1 1     Burrows's positive bilaterally.  No clonus appreciated at BL ankles.    Imaging:   CT scan cervical spine 4/24/2020  1.  Status post post anterior fusion C3-C5 with C4 corpectomy changes and metallic expansion device placement.  2.  No acute cervical spine fracture or subluxation.    ASSESSMENT/PLAN: Marilyn Salinas  is a 54 y.o. female with rehabilitation history significant for premorbid weakness and paresthesias, had  "GLF, imaging showing Disc herniation at C4-5 and C3- C4, with T2 hyperintensity of the cord and s/p ACDF C3-5 on 12/20/19 with Dr. Linton, here 6/15/2020 for SCI follow up. The following plan was discussed with the patient who is in agreement.     Visit Diagnoses     ICD-10-CM   1. Injury of cervical spinal cord, initial encounter ([facility])  S14.109A   2. Tetraplegia ([facility])  G82.50   3. Depressed mood  R45.89   4. Neurogenic bowel  K59.2   5. Spasticity  R25.2   6. Severe depression ([facility])  F32.2   7. Neuropathic pain  M79.2   8. Neurogenic bladder  N31.9   9. Sleeping difficulty  G47.9        Rehab/Neuro:   1. C2 AISD: Nontraumatic incomplete spinal cord injury, status post fall at home with worsening weakness, found to have severe cervical stenosis.  Status post C3-C5 ACDF with Dr. Linton on 12/20: Reviewed all pertinent previous medical records leading up to today's clinic visit.   -Continue PT ([facility])  -Swallow evaluation never completed.  Patient states she cannot swallow some pills; consider re-referral if continues once patient has resolution of social issues.  -Great difficulty with psychosocial issues: REFERRAL to social work for assistance with housing     2. Muscular dystrophy: ? CP per recent neurology evaluation.  Reportedly diagnosed in '60's per aunt. Normal CPK. Was seeing Nikunj back in 2018 - was supposed to have EMG/NCS. Medicaid wouldn't cover EMG/NCS at that time.   -Patient to have EMG/NCS, no further neurology work-up     Spasticity:   1. Secondary to #1 in \"rehab/neuro\" section:  Marked spasticity of the bilateral lower extremities on exam.  - 6/22: Increase baclofen to 40 mg 3 times daily     Neuropathic Pain:   1. Secondary to #1 in \"rehab/neuro\" section:  Present but stable.  - Continue Gabapentin 900mg TID     Neurogenic Bladder:   1. Neurogenic Bladder secondary to #1 in \"rehab/neuro\" section: Most recent BUN/Cr reviewed from 18/0.72 12/2019.  Oxybutynin 15 mg twice daily being changed to " "Detrol per patient report.  - Continue Oxybutynin 15mg BID  - Will need annual RBUS and BUN/Cr--> 12/2020  - Follow up Dr. Driscoll (gynecology) who is managing.      Neurogenic Bowel:   1. Neurogenic Bowel secondary to #1 in \"rehab/neuro\" section: Constipation. D/C on senna and suppository.   - Counseled on discontinuing daily lactulose use due to bowel dependency  -Prescription sent to pharmacy for Colace 200 mg twice daily  -Counseled on use of enema or suppository; patient declined     Mood/Sleep:   1. Secondary to adjustment disorder resulting from #1 in \"neuro/rehab section\": Underlying depression/anxiety.   today 6/22.  - Bupropion  mg twice daily  - venlafaxine XR to 75 mg daily  -Patient reports she has psychiatry telemedicine visit today  -Has been seeing psychologist in [place] who she would like referral to.  - Denies SI/HI  -Referral to [facility]; specifically to patient's previous psychologist    Follow up: 4 months    Please note that this dictation was created using voice recognition software. I have made every reasonable attempt to correct obvious errors but there may be errors of grammar and content that I may have overlooked prior to finalization of this note.    Dr. Julianna Guillory DO, MS  Department of Physical Medicine & Rehabilitation  Neuro Rehabilitation [facility]  6/15/2020 8:41 AM  "

## 2020-06-19 ENCOUNTER — TELEMEDICINE (OUTPATIENT)
Dept: MEDICAL GROUP | Facility: MEDICAL CENTER | Age: 55
End: 2020-06-19
Attending: FAMILY MEDICINE
Payer: MEDICAID

## 2020-06-19 ENCOUNTER — TELEPHONE (OUTPATIENT)
Dept: NEUROLOGY | Facility: MEDICAL CENTER | Age: 55
End: 2020-06-19

## 2020-06-19 DIAGNOSIS — G83.89 CEREBRAL PARESIS WITH HOMOLATERAL ATAXIA (HCC): ICD-10-CM

## 2020-06-19 DIAGNOSIS — M51.36 DDD (DEGENERATIVE DISC DISEASE), LUMBAR: ICD-10-CM

## 2020-06-19 DIAGNOSIS — Z98.1 STATUS POST CERVICAL SPINAL FUSION: ICD-10-CM

## 2020-06-19 DIAGNOSIS — R29.898 WEAKNESS OF BOTH LOWER EXTREMITIES: ICD-10-CM

## 2020-06-19 PROCEDURE — 99213 OFFICE O/P EST LOW 20 MIN: CPT | Mod: CR | Performed by: FAMILY MEDICINE

## 2020-06-19 RX ORDER — MELOXICAM 15 MG/1
TABLET ORAL
Qty: 30 TAB | Refills: 3 | Status: SHIPPED | OUTPATIENT
Start: 2020-06-19 | End: 2020-11-19

## 2020-06-19 NOTE — TELEPHONE ENCOUNTER
Patient PT called (Paola) phone number: [phone],  Wanted most recent visit notes faxed to her at [phone].  Faxed most recent notes to Paola.

## 2020-06-19 NOTE — PROGRESS NOTES
Telemedicine Video Visit: Established Patient   This Remote Face to Face encounter was conducted via Zoom. Given the importance of social distancing and other strategies recommended to reduce the risk of COVID-19 transmission, I am providing medical care to this patient via audio/video visit in place of an in person visit at the request of the patient. Verbal consent to telehealth, risks, benefits, and consequences were discussed. Patient retains the right to withdraw at any time. All existing confidentiality protections apply. The patient has access to all transmitted medical information. No dissemination of any patient images or information to other entities without further written consent.  Subjective:   No chief complaint on file.      Marilyn Salinas is a 54 y.o. female presenting for evaluation and management of:    1.  Cerebral palsy/paresis-patient met with Dr. No, [facility], on 5/27/2020.  He felt that he did have a component of cerebral palsy causing lifelong lower leg weakness and lack of coordination.  Patient reports that she has been using a walker since the summer 2019 and previously started using a cane for stability 1 year prior to that in 2018.  She also is scheduled to see Dr. Julianna Guillory, [facility], in 1 week.  Patient had been scheduled last year for EMG and nerve conduction study which was not completed before she had fallen and injured her neck.  Review of her MRI from last year is notable for significant disc disease with severe foraminal narrowing at L5-S1 bilaterally.  2.  Status post neck surgery-patient underwent neck surgery in December 2019 with Dr. Linton.  She is still completing physical therapy after that event.  She is now been out of her neck brace for 1 month.  Had a stable CT scan of the cervical spine 1 month ago.    ROS   Denies any recent fevers or chills. No nausea or vomiting. No chest pains or shortness of breath.     Allergies   Allergen Reactions    • Codeine Unspecified     Unknown reaction         Current medicines (including changes today)  Current Outpatient Medications   Medication Sig Dispense Refill   • albuterol 108 (90 Base) MCG/ACT Aero Soln inhalation aerosol Inhale 2 Puffs by mouth every 6 hours as needed for Shortness of Breath. 8.5 g 6   • loratadine (CLARITIN REDITABS) 10 MG dissolvable tablet Take 1 Tab by mouth every day. 30 Tab 11   • fluticasone (FLONASE) 50 MCG/ACT nasal spray Spray 1 Spray in nose every day. 1 Bottle 6   • meloxicam (MOBIC) 15 MG tablet      • oxybutynin (DITROPAN XL) 15 MG CR tablet TAKE 1 TABLET BY MOUTH TWO TIMES DAILY 60 Tab 3   • lactulose 10 GM/15ML Solution Take 15 mL by mouth 2 times a day. 1 Bottle 11   • baclofen (LIORESAL) 10 MG Tab Take 3 Tabs by mouth 3 times a day. 270 Tab 3   • buPROPion SR (WELLBUTRIN-SR) 150 MG TABLET SR 12 HR sustained-release tablet Take 1 Tab by mouth 2 Times a Day. 60 Tab 3   • vitamin D 2000 UNIT Tab Take 1 Tab by mouth every day. 60 Tab 3   • gabapentin (NEURONTIN) 300 MG Cap Take 3 Caps by mouth 3 times a day. 270 Cap 3   • venlafaxine XR (EFFEXOR XR) 75 MG CAPSULE SR 24 HR Take 1 Cap by mouth every evening. 30 Cap 3   • zolpidem (AMBIEN) 10 MG Tab Take 10 mg by mouth at bedtime as needed.       No current facility-administered medications for this visit.        Patient Active Problem List    Diagnosis Date Noted   • Cervical spine pain 12/19/2019     Priority: High   • Lumbar radiculopathy 12/19/2019     Priority: Medium   • Mild intermittent asthma 06/19/2017     Priority: Low   • Tobacco use 06/19/2017     Priority: Low   • Anxiety and depression 06/19/2017     Priority: Low   • Neurogenic bowel 01/07/2020   • Neurogenic bladder 01/07/2020   • Neuropathic pain 01/07/2020   • Quadriplegia, C1-C4 incomplete (HCC) 01/07/2020   • Postoperative pain 01/07/2020   • Idiopathic hypotension 12/26/2019   • Foraminal stenosis of cervical region 04/12/2019   • Post concussive syndrome  02/22/2019   • Epistaxis 02/22/2019   • Vertigo 02/22/2019   • Risk for falls 12/31/2018   • Cough 10/30/2018   • Corns and callus 08/21/2018   • Pelvic pain 10/03/2017   • Vitamin D deficiency 07/31/2017   • Hyperlipidemia, unspecified 07/31/2017   • Assistance with transportation 07/31/2017   • Acid indigestion 06/19/2017   • DDD (degenerative disc disease), lumbar 06/19/2017   • Dolly-menopause 06/19/2017   • Chest congestion 06/19/2017   • Pain in both feet 06/19/2017   • Poor vision 06/19/2017       Family History   Problem Relation Age of Onset   • Cancer Mother    • Alcohol/Drug Mother    • Cancer Brother    • Diabetes Maternal Aunt        She  has a past medical history of Anxiety, Arthritis, Asthma, Cataract, Depression, and High cholesterol.  She  has a past surgical history that includes eye surgery (1972); lumpectomy; other (Left, 2005); vaginal hysterectomy scope total (N/A, 10/3/2017); salpingectomy (Bilateral, 10/3/2017); oophorectomy (Bilateral, 10/3/2017); anterior and posterior repair (Bilateral, 10/3/2017); enterocele repair (N/A, 10/3/2017); bladder sling female (N/A, 10/3/2017); vaginal suspension (N/A, 10/3/2017); cervical disk and fusion anterior (N/A, 12/20/2019); and corpectomy (N/A, 12/20/2019).       Objective:   Vitals obtained by patient:  LMP 01/11/2017     Physical Exam:  Constitutional: Alert, no distress, well-groomed.  Skin: No rashes in visible areas.  Eye: Round. Conjunctiva clear, lids normal. No icterus.   ENMT: Lips pink without lesions, good dentition, moist mucous membranes. Phonation normal.  Neck: No masses, no thyromegaly. Moves freely without pain.  CV: Pulse as reported by patient  Respiratory: Unlabored respiratory effort, no cough or audible wheeze  Psych: Alert and oriented x3, normal affect and mood.       Assessment and Plan:   The following treatment plan was discussed:     1.  Cerebral paresis  2.  Severe lumbar degenerative disc disease with bilateral foraminal  [id number]  3.  Status post cervical fusion    Follow-up: 1.  Dr. Guillory, physiatry 1 week  2.  Follow-up with Dr. Linton, neurosurgery  3.  Can continue current physical therapy status post neck surgery    Face to Face Video Visit:   I spent 20 minutes with patient/guardian and I conducted this visit with audio and video present.  Edwar Platt M.D.

## 2020-06-22 ENCOUNTER — OFFICE VISIT (OUTPATIENT)
Dept: PHYSICAL MEDICINE AND REHAB | Facility: REHABILITATION | Age: 55
End: 2020-06-22
Payer: MEDICAID

## 2020-06-22 VITALS
DIASTOLIC BLOOD PRESSURE: 66 MMHG | HEART RATE: 72 BPM | HEIGHT: 66 IN | TEMPERATURE: 98.3 F | WEIGHT: 130 LBS | SYSTOLIC BLOOD PRESSURE: 94 MMHG | OXYGEN SATURATION: 95 % | BODY MASS INDEX: 20.89 KG/M2

## 2020-06-22 DIAGNOSIS — K59.2 NEUROGENIC BOWEL: ICD-10-CM

## 2020-06-22 DIAGNOSIS — M79.2 NEUROPATHIC PAIN: ICD-10-CM

## 2020-06-22 DIAGNOSIS — F32.2 SEVERE DEPRESSION (HCC): ICD-10-CM

## 2020-06-22 DIAGNOSIS — G82.50 TETRAPLEGIA (HCC): ICD-10-CM

## 2020-06-22 DIAGNOSIS — S14.109A INJURY OF CERVICAL SPINAL CORD, INITIAL ENCOUNTER (HCC): Primary | ICD-10-CM

## 2020-06-22 DIAGNOSIS — R25.2 SPASTICITY: ICD-10-CM

## 2020-06-22 DIAGNOSIS — R45.89 DEPRESSED MOOD: ICD-10-CM

## 2020-06-22 DIAGNOSIS — N31.9 NEUROGENIC BLADDER: ICD-10-CM

## 2020-06-22 DIAGNOSIS — G47.9 SLEEPING DIFFICULTY: ICD-10-CM

## 2020-06-22 PROCEDURE — 99214 OFFICE O/P EST MOD 30 MIN: CPT | Performed by: PHYSICAL MEDICINE & REHABILITATION

## 2020-06-22 RX ORDER — DOCUSATE SODIUM 100 MG/1
200 CAPSULE, LIQUID FILLED ORAL 2 TIMES DAILY
Qty: 120 CAP | Refills: 2 | Status: SHIPPED | OUTPATIENT
Start: 2020-06-22 | End: 2020-10-07 | Stop reason: SDUPTHER

## 2020-06-22 RX ORDER — BACLOFEN 20 MG/1
40 TABLET ORAL 3 TIMES DAILY
Qty: 180 TAB | Refills: 2 | Status: SHIPPED
Start: 2020-06-22 | End: 2020-08-03

## 2020-06-22 ASSESSMENT — FIBROSIS 4 INDEX: FIB4 SCORE: 0.98

## 2020-06-22 ASSESSMENT — ENCOUNTER SYMPTOMS
PALPITATIONS: 0
CONSTIPATION: 0
SORE THROAT: 0
FEVER: 0
BLURRED VISION: 0
DOUBLE VISION: 0
CHILLS: 0
MEMORY LOSS: 0
COUGH: 0
WEAKNESS: 1
SHORTNESS OF BREATH: 0
FALLS: 0
BRUISES/BLEEDS EASILY: 0
DIARRHEA: 0

## 2020-06-22 ASSESSMENT — PATIENT HEALTH QUESTIONNAIRE - PHQ9
CLINICAL INTERPRETATION OF PHQ2 SCORE: 6
SUM OF ALL RESPONSES TO PHQ QUESTIONS 1-9: 24
5. POOR APPETITE OR OVEREATING: 3 - NEARLY EVERY DAY

## 2020-06-22 ASSESSMENT — PAIN SCALES - GENERAL: PAINLEVEL: 6=MODERATE PAIN

## 2020-06-23 ENCOUNTER — PATIENT OUTREACH (OUTPATIENT)
Dept: HEALTH INFORMATION MANAGEMENT | Facility: OTHER | Age: 55
End: 2020-06-23

## 2020-07-31 ENCOUNTER — TELEPHONE (OUTPATIENT)
Dept: PHYSICAL MEDICINE AND REHAB | Facility: REHABILITATION | Age: 55
End: 2020-07-31

## 2020-07-31 RX ORDER — GABAPENTIN 300 MG/1
CAPSULE ORAL
Qty: 270 CAP | Refills: 3 | Status: SHIPPED | OUTPATIENT
Start: 2020-07-31 | End: 2021-03-09 | Stop reason: SDUPTHER

## 2020-07-31 NOTE — TELEPHONE ENCOUNTER
Marilyn said she would like to go back to taking Baclofen 30 mg instead of 40 mg because it makes her too sleepy. She said she fell asleep watching her grandchildren and now is not allowed to watch them anymore. She said she is only taking one or two tabs per day now, but would like a new Rx for the 10 mg tabs to take 30 mg three times per day.    I let her know Dr. Guillory may not get this message until Monday and she said she has enough medication to last through the weekend.

## 2020-08-03 DIAGNOSIS — R25.2 SPASTICITY: ICD-10-CM

## 2020-08-03 DIAGNOSIS — S14.109A INJURY OF CERVICAL SPINAL CORD, INITIAL ENCOUNTER (HCC): ICD-10-CM

## 2020-08-03 RX ORDER — BACLOFEN 10 MG/1
30 TABLET ORAL 3 TIMES DAILY
Qty: 270 TAB | Refills: 2 | Status: SHIPPED | OUTPATIENT
Start: 2020-08-03 | End: 2020-11-30

## 2020-08-03 NOTE — TELEPHONE ENCOUNTER
I let Marilyn know Dr. Guillory electronically sent Rx to [facility]'s pharmacy and to call to make sure it is there before she picks up. She agreed.

## 2020-08-20 ENCOUNTER — TELEPHONE (OUTPATIENT)
Dept: MEDICAL GROUP | Facility: MEDICAL CENTER | Age: 55
End: 2020-08-20

## 2020-08-21 NOTE — TELEPHONE ENCOUNTER
Pt lvm wanting to inform Dr. Platt that she has been having a lot of falls lately and she would like to know if she can get a 4 leg walker with a seat, please advise

## 2020-08-21 NOTE — TELEPHONE ENCOUNTER
I need someone to start the paper order for a 4 wheeled walker with seat  Dx: 1. Cerebral paraparesis  2. Cervical fusion C3-5  3. Fall risk  Thank you, Dr Whitten

## 2020-09-08 ENCOUNTER — HOSPITAL ENCOUNTER (OUTPATIENT)
Dept: RADIOLOGY | Facility: MEDICAL CENTER | Age: 55
End: 2020-09-08
Attending: NURSE PRACTITIONER
Payer: MEDICAID

## 2020-09-08 DIAGNOSIS — R27.0 ATAXIA: ICD-10-CM

## 2020-09-08 PROCEDURE — 70551 MRI BRAIN STEM W/O DYE: CPT

## 2020-09-17 DIAGNOSIS — N31.9 NEUROGENIC BLADDER: ICD-10-CM

## 2020-09-18 RX ORDER — OXYBUTYNIN CHLORIDE 15 MG/1
TABLET, EXTENDED RELEASE ORAL
Qty: 60 TAB | Refills: 1 | Status: SHIPPED | OUTPATIENT
Start: 2020-09-18 | End: 2021-08-27

## 2020-09-18 NOTE — TELEPHONE ENCOUNTER
Received request via: Pharmacy    Was the patient seen in the last year in this department? Yes    Does the patient have an active prescription (recently filled or refills available) for medication(s) requested? No   Future Appointments       Provider Department Center    10/22/2020 11:10 AM (Arrive by 10:55 AM) Julianna Guillory D.O. [facility]

## 2020-10-07 DIAGNOSIS — S14.109A INJURY OF CERVICAL SPINAL CORD, INITIAL ENCOUNTER (HCC): ICD-10-CM

## 2020-10-07 DIAGNOSIS — K59.2 NEUROGENIC BOWEL: ICD-10-CM

## 2020-10-07 DIAGNOSIS — G82.50 TETRAPLEGIA (HCC): ICD-10-CM

## 2020-10-07 RX ORDER — FAMOTIDINE 20 MG/1
TABLET, FILM COATED ORAL
Qty: 60 TAB | Refills: 3 | OUTPATIENT
Start: 2020-10-07

## 2020-10-07 RX ORDER — DOCUSATE SODIUM 100 MG/1
200 CAPSULE, LIQUID FILLED ORAL 2 TIMES DAILY
Qty: 120 CAP | Refills: 2 | Status: SHIPPED | OUTPATIENT
Start: 2020-10-07 | End: 2021-08-27

## 2020-10-07 RX ORDER — FAMOTIDINE 20 MG/1
TABLET, FILM COATED ORAL
COMMUNITY
Start: 2020-09-03 | End: 2020-10-22

## 2020-10-07 RX ORDER — FAMOTIDINE 20 MG/1
TABLET, FILM COATED ORAL
Qty: 60 TAB | OUTPATIENT
Start: 2020-10-07

## 2020-10-07 NOTE — TELEPHONE ENCOUNTER
I called Marilyn to let her know famotidine was prescribed, but when I looked it did not go through. She said she takes two 20mg tabs in the morning.     She also needs a refill on colace, which I will add to this Rx request.

## 2020-10-22 ENCOUNTER — OFFICE VISIT (OUTPATIENT)
Dept: PHYSICAL MEDICINE AND REHAB | Facility: REHABILITATION | Age: 55
End: 2020-10-22
Payer: MEDICAID

## 2020-10-22 VITALS
WEIGHT: 135 LBS | BODY MASS INDEX: 21.69 KG/M2 | OXYGEN SATURATION: 95 % | HEART RATE: 72 BPM | DIASTOLIC BLOOD PRESSURE: 64 MMHG | TEMPERATURE: 98.4 F | SYSTOLIC BLOOD PRESSURE: 102 MMHG | HEIGHT: 66 IN

## 2020-10-22 DIAGNOSIS — K59.2 NEUROGENIC BOWEL: ICD-10-CM

## 2020-10-22 DIAGNOSIS — M79.2 NEUROPATHIC PAIN: ICD-10-CM

## 2020-10-22 DIAGNOSIS — Z78.9 IMPAIRED MOBILITY AND ACTIVITIES OF DAILY LIVING: ICD-10-CM

## 2020-10-22 DIAGNOSIS — R45.89 DEPRESSED MOOD: ICD-10-CM

## 2020-10-22 DIAGNOSIS — K21.9 GASTROESOPHAGEAL REFLUX DISEASE, UNSPECIFIED WHETHER ESOPHAGITIS PRESENT: ICD-10-CM

## 2020-10-22 DIAGNOSIS — Z74.09 IMPAIRED MOBILITY AND ACTIVITIES OF DAILY LIVING: ICD-10-CM

## 2020-10-22 DIAGNOSIS — R25.2 SPASTICITY: ICD-10-CM

## 2020-10-22 DIAGNOSIS — G82.50 TETRAPLEGIA (HCC): ICD-10-CM

## 2020-10-22 DIAGNOSIS — N31.9 NEUROGENIC BLADDER: ICD-10-CM

## 2020-10-22 DIAGNOSIS — S14.109D INJURY OF CERVICAL SPINAL CORD, SUBSEQUENT ENCOUNTER (HCC): Primary | ICD-10-CM

## 2020-10-22 PROCEDURE — 99214 OFFICE O/P EST MOD 30 MIN: CPT | Performed by: PHYSICAL MEDICINE & REHABILITATION

## 2020-10-22 RX ORDER — BUPROPION HYDROCHLORIDE 150 MG/1
TABLET ORAL
COMMUNITY
Start: 2020-10-12 | End: 2021-11-22

## 2020-10-22 RX ORDER — ARIPIPRAZOLE 20 MG/1
TABLET ORAL
COMMUNITY
Start: 2020-08-28 | End: 2021-11-11

## 2020-10-22 RX ORDER — POLYETHYLENE GLYCOL 3350 17 G/17G
17 POWDER, FOR SOLUTION ORAL DAILY
Qty: 507 G | Refills: 3 | Status: SHIPPED | OUTPATIENT
Start: 2020-10-22 | End: 2021-08-27

## 2020-10-22 RX ORDER — LORATADINE 10 MG/1
TABLET ORAL
COMMUNITY
Start: 2020-09-03 | End: 2022-03-17

## 2020-10-22 RX ORDER — IBUPROFEN 800 MG/1
TABLET ORAL
COMMUNITY
Start: 2020-09-18 | End: 2021-08-27

## 2020-10-22 RX ORDER — CHLORHEXIDINE GLUCONATE ORAL RINSE 1.2 MG/ML
SOLUTION DENTAL
COMMUNITY
Start: 2020-09-18 | End: 2021-11-11

## 2020-10-22 RX ORDER — PANTOPRAZOLE SODIUM 20 MG/1
20 TABLET, DELAYED RELEASE ORAL DAILY
Qty: 30 TAB | Refills: 2 | Status: SHIPPED | OUTPATIENT
Start: 2020-10-22 | End: 2021-03-05

## 2020-10-22 RX ORDER — TIZANIDINE HYDROCHLORIDE 2 MG/1
2 CAPSULE, GELATIN COATED ORAL 3 TIMES DAILY
Qty: 90 CAP | Refills: 0 | Status: SHIPPED | OUTPATIENT
Start: 2020-10-22 | End: 2020-12-28

## 2020-10-22 RX ORDER — FESOTERODINE FUMARATE 4 MG/1
TABLET, FILM COATED, EXTENDED RELEASE ORAL EVERY EVENING
COMMUNITY
Start: 2020-10-20 | End: 2022-05-17

## 2020-10-22 RX ORDER — HYDROCODONE BITARTRATE AND ACETAMINOPHEN 5; 325 MG/1; MG/1
TABLET ORAL
COMMUNITY
Start: 2020-09-18 | End: 2021-08-27

## 2020-10-22 RX ORDER — AMOXICILLIN 500 MG/1
CAPSULE ORAL
COMMUNITY
Start: 2020-09-18 | End: 2021-08-27

## 2020-10-22 ASSESSMENT — ENCOUNTER SYMPTOMS
BRUISES/BLEEDS EASILY: 0
TINGLING: 1
MEMORY LOSS: 0
FALLS: 0
FEVER: 0
COUGH: 0
CHILLS: 0
CONSTIPATION: 1
SHORTNESS OF BREATH: 0
PALPITATIONS: 0
DIARRHEA: 0
FOCAL WEAKNESS: 1
SORE THROAT: 0
WEAKNESS: 1

## 2020-10-22 ASSESSMENT — PATIENT HEALTH QUESTIONNAIRE - PHQ9
5. POOR APPETITE OR OVEREATING: 3 - NEARLY EVERY DAY
CLINICAL INTERPRETATION OF PHQ2 SCORE: 6
SUM OF ALL RESPONSES TO PHQ QUESTIONS 1-9: 21

## 2020-10-22 ASSESSMENT — FIBROSIS 4 INDEX: FIB4 SCORE: 1

## 2020-10-22 NOTE — PROGRESS NOTES
[facility]  PM&R Neuro Rehabilitation Clinic  [facility] [place] [address]  Ph: [phone]    FOLLOW UP PATIENT EVALUATION    Patient Name: Marilyn Salinas   Patient : 1965  Patient Age: 54 y.o.   PCP: Edwar Platt M.D.    Examining Physician: Dr. Julianna Guillory, DO    SUBJECTIVE:   Patient Identification: Marilyn Salinas is a 54 y.o. RHD female with PMH significant for chronic back and neck pain and rehabilitation history significant for premorbid weakness and paresthesias, had GLF, imaging showing Disc herniation at C4-5 and C3- C4, with T2 hyperintensity of the cord and s/p ACDF C3-5 on 19 with Dr. Linton  and is presenting to PM&R clinic for a FOLLOW UP outpatient evaluation with the following chief complaint/s:    PM&R Background Information:  Original Date of Injury: 19 GLF and worsening weakness, pain.   Pertinent Procedure History: Disc herniation at C4-5 and C3- C4, with T2 hyperintensity of the cord at this level. she underwent ACDF C3-5 on 19 with Dr. Linton  Dates of Admission/Discharge to ARU: 19-20    Chief Complaint: Spinal cord injury    Accompanied by Today: Self  Interval History:    - Went to get new shoes. Got New Balance and didn't work.   - Patient living with her son, but he does not want her there.   - Has to get custom shoes but dont know how to get to appointment.   - Very limited financially.  -Stable on dose of gabapentin at 900 mg 3 times daily  -Continues to have very bad spasticity, baclofen gives her constipation.  Would like to try other other antispasmodics  -We will be getting EMG tomorrow  -Had EEG which she states she was told was negative or unremarkable.  -MRI of the C-spine ordered by neurosurgery    Review of Systems:  Review of Systems   Constitutional: Negative for chills and fever.   HENT: Negative for congestion and sore throat.    Respiratory: Negative for cough and shortness of breath.    Cardiovascular: Negative  for palpitations and leg swelling.   Gastrointestinal: Positive for constipation. Negative for diarrhea.   Genitourinary: Negative for dysuria, frequency and urgency.        Incontinences.   Musculoskeletal: Negative for falls and joint pain.   Neurological: Positive for tingling, focal weakness and weakness.        Spasticity   Endo/Heme/Allergies: Does not bruise/bleed easily.   Psychiatric/Behavioral: Negative for memory loss.      All other pertinent positive review of systems are noted above in HPI.     Past Medical History:  Past Medical History:   Diagnosis Date   • Anxiety    • Arthritis     spine, feet    • Asthma    • Cataract    • Depression    • High cholesterol       Past Surgical History:   Procedure Laterality Date   • CERVICAL DISK AND FUSION ANTERIOR N/A 12/20/2019    Procedure: DISCECTOMY, SPINE, CERVICAL, ANTERIOR APPROACH, WITH FUSION - C3-5;  Surgeon: Connor Linton M.D.;  Location: SURGERY [facility];  Service: Neurosurgery   • CORPECTOMY N/A 12/20/2019    Procedure: CORPECTOMY;  Surgeon: Connor Linton M.D.;  Location: SURGERY [facility];  Service: Neurosurgery   • VAGINAL HYSTERECTOMY SCOPE TOTAL N/A 10/3/2017    Procedure: VAGINAL HYSTERECTOMY SCOPE TOTAL;  Surgeon: Hudson Driscoll M.D.;  Location: SURGERY SAME DAY [facility];  Service: Gynecology   • SALPINGECTOMY Bilateral 10/3/2017    Procedure: SALPINGECTOMY;  Surgeon: Hudson Driscoll M.D.;  Location: SURGERY SAME DAY [facility];  Service: Gynecology   • OOPHORECTOMY Bilateral 10/3/2017    Procedure: OOPHORECTOMY;  Surgeon: Hudson Driscoll M.D.;  Location: SURGERY SAME DAY [facility];  Service: Gynecology   • ANTERIOR AND POSTERIOR REPAIR Bilateral 10/3/2017    Procedure: ANTERIOR AND POSTERIOR REPAIR;  Surgeon: Hudson Driscoll M.D.;  Location: SURGERY SAME DAY [facility];  Service: Gynecology   • ENTEROCELE REPAIR N/A 10/3/2017    Procedure: ENTEROCELE REPAIR, PERINEOPLASTY;  Surgeon: Hudson Driscoll M.D.;   Location: SURGERY SAME DAY [facility];  Service: Gynecology   • BLADDER SLING FEMALE N/A 10/3/2017    Procedure: BLADDER SLING FEMALE TOT, CYSTOSCOPY;  Surgeon: Hudson Driscoll M.D.;  Location: SURGERY SAME DAY [facility];  Service: Gynecology   • VAGINAL SUSPENSION N/A 10/3/2017    Procedure: VAGINAL SUSPENSION SACROSPINOUS VAULT POSSIBLE;  Surgeon: Hudson Driscoll M.D.;  Location: SURGERY SAME DAY [facility];  Service: Gynecology   • OTHER Left 2005    hammertoe x2   • EYE SURGERY  1972    for lazy eye   • LUMPECTOMY          Current Outpatient Medications:   •  ALLERGY RELIEF 10 MG Tab, , Disp: , Rfl:   •  polyethylene glycol 3350 (MIRALAX) 17 GM/SCOOP Powder, Take 17 g by mouth every day., Disp: 507 g, Rfl: 3  •  pantoprazole (PROTONIX) 20 MG tablet, Take 1 Tab by mouth every day., Disp: 30 Tab, Rfl: 2  •  tizanidine (ZANAFLEX) 2 MG capsule, Take 1 Cap by mouth 3 times a day., Disp: 90 Cap, Rfl: 0  •  docusate sodium (COLACE) 100 MG Cap, Take 2 Caps by mouth 2 times a day., Disp: 120 Cap, Rfl: 2  •  oxybutynin (DITROPAN XL) 15 MG CR tablet, TAKE 1 TABLET BY MOUTH TWO TIMES DAILY, Disp: 60 Tab, Rfl: 1  •  baclofen (LIORESAL) 10 MG Tab, Take 3 Tabs by mouth 3 times a day., Disp: 270 Tab, Rfl: 2  •  gabapentin (NEURONTIN) 300 MG Cap, TAKE 3 CAPSULES BY MOUTH THREE TIMES DAILY, Disp: 270 Cap, Rfl: 3  •  meloxicam (MOBIC) 15 MG tablet, TAKE 1 TABLET BY MOUTH DAILY, Disp: 30 Tab, Rfl: 3  •  albuterol 108 (90 Base) MCG/ACT Aero Soln inhalation aerosol, Inhale 2 Puffs by mouth every 6 hours as needed for Shortness of Breath., Disp: 8.5 g, Rfl: 6  •  loratadine (CLARITIN REDITABS) 10 MG dissolvable tablet, Take 1 Tab by mouth every day., Disp: 30 Tab, Rfl: 11  •  fluticasone (FLONASE) 50 MCG/ACT nasal spray, Spray 1 Spray in nose every day., Disp: 1 Bottle, Rfl: 6  •  lactulose 10 GM/15ML Solution, Take 15 mL by mouth 2 times a day., Disp: 1 Bottle, Rfl: 11  •  buPROPion SR (WELLBUTRIN-SR) 150 MG TABLET SR 12 HR  sustained-release tablet, Take 1 Tab by mouth 2 Times a Day., Disp: 60 Tab, Rfl: 3  •  vitamin D 2000 UNIT Tab, Take 1 Tab by mouth every day., Disp: 60 Tab, Rfl: 3  •  venlafaxine XR (EFFEXOR XR) 75 MG CAPSULE SR 24 HR, Take 1 Cap by mouth every evening., Disp: 30 Cap, Rfl: 3  •  zolpidem (AMBIEN) 10 MG Tab, Take 10 mg by mouth at bedtime as needed., Disp: , Rfl:   •  ibuprofen (MOTRIN) 800 MG Tab, , Disp: , Rfl:   •  HYDROcodone-acetaminophen (NORCO) 5-325 MG Tab per tablet, , Disp: , Rfl:   •  TOVIAZ 4 MG TABLET SR 24 HR, , Disp: , Rfl:   •  chlorhexidine (PERIDEX) 0.12 % Solution, , Disp: , Rfl:   •  buPROPion (WELLBUTRIN XL) 150 MG XL tablet, , Disp: , Rfl:   •  aripiprazole (ABILIFY) 20 MG tablet, , Disp: , Rfl:   •  amoxicillin (AMOXIL) 500 MG Cap, , Disp: , Rfl:   Allergies   Allergen Reactions   • Codeine Unspecified     Unknown reaction          Past Social History:  Social History     Socioeconomic History   • Marital status: Single     Spouse name: Not on file   • Number of children: Not on file   • Years of education: Not on file   • Highest education level: Not on file   Occupational History   • Not on file   Social Needs   • Financial resource strain: Not on file   • Food insecurity     Worry: Never true     Inability: Never true   • Transportation needs     Medical: No     Non-medical: No   Tobacco Use   • Smoking status: Former Smoker     Packs/day: 0.25     Years: 26.00     Pack years: 6.50     Types: Cigarettes     Quit date: 3/1/2020     Years since quittin.6   • Smokeless tobacco: Never Used   Substance and Sexual Activity   • Alcohol use: No   • Drug use: Yes     Types: Marijuana     Comment: marijuana daily   • Sexual activity: Not Currently   Lifestyle   • Physical activity     Days per week: Not on file     Minutes per session: Not on file   • Stress: Not on file   Relationships   • Social connections     Talks on phone: Not on file     Gets together: Not on file     Attends Druze  service: Not on file     Active member of club or organization: Not on file     Attends meetings of clubs or organizations: Not on file     Relationship status: Not on file   • Intimate partner violence     Fear of current or ex partner: Not on file     Emotionally abused: Not on file     Physically abused: Not on file     Forced sexual activity: Not on file   Other Topics Concern   •  Service No   • Blood Transfusions No   • Caffeine Concern No   • Occupational Exposure No   • Hobby Hazards No   • Sleep Concern Yes   • Stress Concern Yes   • Weight Concern Yes   • Special Diet No   • Back Care Yes   • Exercise No   • Bike Helmet Yes   • Seat Belt Yes   • Self-Exams Yes   Social History Narrative   • Not on file        Family History:  Family History   Problem Relation Age of Onset   • Cancer Mother    • Alcohol/Drug Mother    • Cancer Brother    • Diabetes Maternal Aunt        Depression and Opioid Screening  PHQ-9:  Depression Screen (PHQ-2/PHQ-9) 3/5/2020 6/22/2020 10/22/2020   PHQ-2 Total Score - - -   PHQ-2 Total Score 3 6 6   PHQ-9 Total Score 8 24 21     Interpretation of PHQ-9 Total Score   Score Severity   1-4 No Depression   5-9 Mild Depression   10-14 Moderate Depression   15-19 Moderately Severe Depression   20-27 Severe Depression     Opioid Risk Score: No value filed.  Interpretation of Opioid Risk Score   Score 0-3 = Low risk of abuse. Do UDS at least once per year.  Score 4-7 = Moderate risk of abuse. Do UDS 1-4 times per year.  Score 8+ = High risk of abuse. Refer to specialist.      OBJECTIVE:   Vital Signs:  Vitals:    10/22/20 1050   BP: 102/64   Pulse: 72   Temp: 36.9 °C (98.4 °F)   SpO2: 95%        Pertinent Labs:  Lab Results   Component Value Date/Time    SODIUM 137 12/28/2019 05:12 AM    POTASSIUM 4.0 12/28/2019 05:12 AM    CHLORIDE 103 12/28/2019 05:12 AM    CO2 29 12/28/2019 05:12 AM    GLUCOSE 75 12/28/2019 05:12 AM    BUN 18 12/28/2019 05:12 AM    CREATININE 0.72 12/28/2019 05:12  AM       Lab Results   Component Value Date/Time    HBA1C 5.6 07/27/2017 09:39 AM       Lab Results   Component Value Date/Time    WBC 7.0 12/28/2019 05:12 AM    RBC 4.07 (L) 12/28/2019 05:12 AM    HEMOGLOBIN 12.4 12/28/2019 05:12 AM    HEMATOCRIT 37.9 12/28/2019 05:12 AM    MCV 93.1 12/28/2019 05:12 AM    MCH 30.5 12/28/2019 05:12 AM    MCHC 32.7 (L) 12/28/2019 05:12 AM    MPV 9.6 12/28/2019 05:12 AM    NEUTSPOLYS 29.40 (L) 12/28/2019 05:12 AM    LYMPHOCYTES 56.70 (H) 12/28/2019 05:12 AM    MONOCYTES 9.20 12/28/2019 05:12 AM    EOSINOPHILS 3.40 12/28/2019 05:12 AM    BASOPHILS 1.00 12/28/2019 05:12 AM       Lab Results   Component Value Date/Time    ASTSGOT 68 (H) 12/28/2019 05:12 AM    ALTSGPT 129 (H) 12/28/2019 05:12 AM        Physical Exam:   GEN: No apparent distress, sitting comfortably in chair in room.   HEENT: Head normocephalic, atraumatic.  Sclera nonicteric bilaterally, no ocular discharge appreciated bilaterally. Mask donned.   CV: Extremities warm and well-perfused, no peripheral edema appreciated bilaterally.  PULMONARY: Breathing nonlabored on room air, no respiratory accessory muscle use.  Not requiring supplemental oxygen.  ABD: Soft, nontender.  SKIN: No appreciable skin breakdown on exposed areas of skin.  PSYCH: Depressed and irritated mood.  NEURO: Awake alert.  Conversational.  Logical thought content.     Motor Exam Upper Extremities   ? Myotome R L   Shoulder Abduction C5 5 5   Elbow flexion C5 5 5   Wrist extension C6 5 5   Elbow extension C7 5 5   Finger flexion C8 5 5   Finger abduction T1 5 5     Motor Exam Lower Extremities  ? Myotome R L   Hip flexion L2 4 4   Knee extension L3 4+ 4+   Ankle dorsiflexion L4 4 4   Toe extension L5 NT NT   Ankle plantarflexion S1 4 4       Tone on Modified Rito Scale    R L  R L   Elbow extension (testing tone of elbow flexors) 0 0 Hip extension (testing hip flexors) NT NT   Elbow flexion (testing tone of elbow extensors) 0 0 Hip abduction (testing  adductors) 1 1   Wrist extension (testing tone of wrist flexors)  0 0 Knee extension (testing knee flexors) 2 2   Finger extension (testing tone of finger flexors) 0 0 Knee flexion (testing knee extensors) 2 2   Supination (testing forearm pronators) 0 0 Dorsiflexion (testing plantarflexors) 0 0      Plantarflexion (testing dorsiflexors) 1 1     Burrows's positive bilaterally.  No clonus appreciated at BL ankles.    Imaging: No new pertinent to todays visit.     ASSESSMENT/PLAN: Marilyn Salinas  is a 54 y.o. female with rehabilitation history significant for premorbid weakness and paresthesias, had GLF, imaging showing Disc herniation at C4-5 and C3- C4, with T2 hyperintensity of the cord and s/p ACDF C3-5 on 12/20/19 with Dr. Linton, here for SCI follow up. The following plan was discussed with the patient who is in agreement.     Visit Diagnoses     ICD-10-CM   1. Injury of cervical spinal cord, subsequent encounter ([facility])  S14.109D   2. Tetraplegia ([facility])  G82.50   3. Impaired mobility and activities of daily living  Z74.09    Z78.9   4. Neurogenic bowel  K59.2   5. Gastroesophageal reflux disease, unspecified whether esophagitis present  K21.9   6. Neurogenic bladder  N31.9   7. Spasticity  R25.2   8. Neuropathic pain  M79.2   9. Depressed mood  R45.89        Rehab/Neuro:   1. C2 AISD: Nontraumatic incomplete spinal cord injury, status post fall at home with worsening weakness, found to have severe cervical stenosis.  Status post C3-C5 ACDF with Dr. Linton on 12/20/19  - Therapy: Referral to PT  - MRI pending per neurosurgery.  -DME: Walker with seat given significant spasticity in limited distance ambulation, need for rest breaks.     2. Muscular dystrophy: ? CP per recent neurology evaluation.  Reportedly diagnosed in '60's per aunt. Normal CPK. Was seeing Nikunj back in 2018 - was supposed to have EMG/NCS. Medicaid wouldn't cover EMG/NCS at that time.  Scheduled for EMG/NCS 10/23/2020.  EEG reportedly  "negative.  Patient has since changed her neurologist and is now seeing Dr. Hartman.  - Patient to have EMG/NCS tomorrow.   - Follow up with Neurology     Spasticity:   1. Secondary to #1 in \"rehab/neuro\" section:  Marked spasticity of the bilateral lower extremities on exam. Taking 30mg TID.   -Med management: Significant side effects with constipation with baclofen. Continue baclofen 30 mg 3 times daily for now.  -Med management: Start tizanidine 2 mg 3 times daily.    -Labs: CMP to evaluate LFTs.    Neuropathic Pain:   1. Secondary to #1 in \"rehab/neuro\" section:  Present but stable.  - Continue Gabapentin 900mg TID     Neurogenic Bladder:   1. Neurogenic Bladder secondary to #1 in \"rehab/neuro\" section: Most recent BUN/Cr reviewed from 18/0.72 12/2019.  Oxybutynin 15 mg twice daily being changed to Detrol per patient report.  - Changing from Oxybutynin to Toviaz.   - Will need annual RBUS and BUN/Cr--> 12/2020  -Labs: CMP for renal function   -Imaging: RBUS for evaluation of hydronephrosis/hydroureter given neurogenic bladder  - Follow up Dr. Driscoll (gynecology) who is managing low pelvic pain.      Neurogenic Bowel:   1. Neurogenic Bowel secondary to #1 in \"rehab/neuro\" section: Constipation. D/C on senna and suppository.   - Continue Colace 2 pills twice a day.   - Takes Lactulose PRN q2 days \"takes 2 little sips per day\".  - Med Management: Start 1 dose Miralax daily.     GI:  1. GERD  - Med Management: Rx for Pantoprazole 20mg daily.      Mood/Sleep:   1. Secondary to adjustment disorder resulting from #1 in \"neuro/rehab section\": Underlying depression/anxiety.  PHQ 9 24 today 6/22, 21 10/22. Denies SI/SH  - Bupropion  mg twice daily  - venlafaxine XR to 75 mg daily  - Established with [facility]; specifically to patient's previous psychologist    Follow up: 1 month -reevaluate spasticity meds.    Please note that this dictation was created using voice recognition software. I have made every " reasonable attempt to correct obvious errors but there may be errors of grammar and content that I may have overlooked prior to finalization of this note.    Dr. Julianna Guillory DO, MS  Department of Physical Medicine & Rehabilitation  Neuro Rehabilitation Clinic  [facility]

## 2020-11-02 ENCOUNTER — APPOINTMENT (OUTPATIENT)
Dept: RADIOLOGY | Facility: MEDICAL CENTER | Age: 55
End: 2020-11-02
Attending: NEUROLOGICAL SURGERY
Payer: MEDICAID

## 2020-11-10 ENCOUNTER — HOSPITAL ENCOUNTER (OUTPATIENT)
Dept: RADIOLOGY | Facility: MEDICAL CENTER | Age: 55
End: 2020-11-10
Attending: PHYSICAL MEDICINE & REHABILITATION
Payer: MEDICAID

## 2020-11-10 ENCOUNTER — HOSPITAL ENCOUNTER (OUTPATIENT)
Dept: RADIOLOGY | Facility: MEDICAL CENTER | Age: 55
End: 2020-11-10
Attending: NEUROLOGICAL SURGERY
Payer: MEDICAID

## 2020-11-10 DIAGNOSIS — G82.50 TETRAPLEGIA (HCC): ICD-10-CM

## 2020-11-10 DIAGNOSIS — M47.812 CERVICAL SPONDYLOSIS WITHOUT MYELOPATHY: ICD-10-CM

## 2020-11-10 DIAGNOSIS — N31.9 NEUROGENIC BLADDER: ICD-10-CM

## 2020-11-10 PROCEDURE — 72125 CT NECK SPINE W/O DYE: CPT

## 2020-11-10 PROCEDURE — 76775 US EXAM ABDO BACK WALL LIM: CPT

## 2020-11-30 ENCOUNTER — HOSPITAL ENCOUNTER (OUTPATIENT)
Dept: LAB | Facility: MEDICAL CENTER | Age: 55
End: 2020-11-30
Attending: PHYSICAL MEDICINE & REHABILITATION
Payer: MEDICAID

## 2020-11-30 DIAGNOSIS — S14.109A INJURY OF CERVICAL SPINAL CORD, INITIAL ENCOUNTER (HCC): ICD-10-CM

## 2020-11-30 DIAGNOSIS — R25.2 SPASTICITY: ICD-10-CM

## 2020-11-30 DIAGNOSIS — G82.50 TETRAPLEGIA (HCC): ICD-10-CM

## 2020-11-30 LAB
ALBUMIN SERPL BCP-MCNC: 4.4 G/DL (ref 3.2–4.9)
ALBUMIN/GLOB SERPL: 1.7 G/DL
ALP SERPL-CCNC: 89 U/L (ref 30–99)
ALT SERPL-CCNC: 22 U/L (ref 2–50)
ANION GAP SERPL CALC-SCNC: 7 MMOL/L (ref 7–16)
AST SERPL-CCNC: 18 U/L (ref 12–45)
BILIRUB SERPL-MCNC: 0.4 MG/DL (ref 0.1–1.5)
BUN SERPL-MCNC: 24 MG/DL (ref 8–22)
CALCIUM SERPL-MCNC: 9.7 MG/DL (ref 8.5–10.5)
CHLORIDE SERPL-SCNC: 105 MMOL/L (ref 96–112)
CO2 SERPL-SCNC: 28 MMOL/L (ref 20–33)
CREAT SERPL-MCNC: 0.63 MG/DL (ref 0.5–1.4)
GLOBULIN SER CALC-MCNC: 2.6 G/DL (ref 1.9–3.5)
GLUCOSE SERPL-MCNC: 83 MG/DL (ref 65–99)
POTASSIUM SERPL-SCNC: 4.1 MMOL/L (ref 3.6–5.5)
PROT SERPL-MCNC: 7 G/DL (ref 6–8.2)
SODIUM SERPL-SCNC: 140 MMOL/L (ref 135–145)

## 2020-11-30 PROCEDURE — 36415 COLL VENOUS BLD VENIPUNCTURE: CPT

## 2020-11-30 PROCEDURE — 80053 COMPREHEN METABOLIC PANEL: CPT

## 2020-11-30 RX ORDER — BACLOFEN 10 MG/1
TABLET ORAL
Qty: 270 TAB | Refills: 2 | Status: SHIPPED | OUTPATIENT
Start: 2020-11-30 | End: 2021-01-28 | Stop reason: SDUPTHER

## 2020-12-08 ENCOUNTER — OFFICE VISIT (OUTPATIENT)
Dept: PHYSICAL MEDICINE AND REHAB | Facility: REHABILITATION | Age: 55
End: 2020-12-08
Payer: MEDICAID

## 2020-12-08 VITALS
WEIGHT: 145 LBS | TEMPERATURE: 97.5 F | HEART RATE: 72 BPM | HEIGHT: 66 IN | DIASTOLIC BLOOD PRESSURE: 62 MMHG | BODY MASS INDEX: 23.3 KG/M2 | OXYGEN SATURATION: 94 % | SYSTOLIC BLOOD PRESSURE: 102 MMHG

## 2020-12-08 DIAGNOSIS — N31.9 NEUROGENIC BLADDER: ICD-10-CM

## 2020-12-08 DIAGNOSIS — S14.109A INJURY OF CERVICAL SPINAL CORD, INITIAL ENCOUNTER (HCC): ICD-10-CM

## 2020-12-08 DIAGNOSIS — Z74.09 IMPAIRED MOBILITY AND ACTIVITIES OF DAILY LIVING: ICD-10-CM

## 2020-12-08 DIAGNOSIS — R25.2 SPASTICITY: ICD-10-CM

## 2020-12-08 DIAGNOSIS — M79.2 NEUROPATHIC PAIN: ICD-10-CM

## 2020-12-08 DIAGNOSIS — G82.50 TETRAPLEGIA (HCC): Primary | ICD-10-CM

## 2020-12-08 DIAGNOSIS — K59.2 NEUROGENIC BOWEL: ICD-10-CM

## 2020-12-08 DIAGNOSIS — Z78.9 IMPAIRED MOBILITY AND ACTIVITIES OF DAILY LIVING: ICD-10-CM

## 2020-12-08 PROCEDURE — 99214 OFFICE O/P EST MOD 30 MIN: CPT | Performed by: PHYSICAL MEDICINE & REHABILITATION

## 2020-12-08 RX ORDER — MIRABEGRON 25 MG/1
TABLET, FILM COATED, EXTENDED RELEASE ORAL DAILY
COMMUNITY
End: 2021-08-05

## 2020-12-08 ASSESSMENT — PATIENT HEALTH QUESTIONNAIRE - PHQ9: CLINICAL INTERPRETATION OF PHQ2 SCORE: 0

## 2020-12-08 ASSESSMENT — ENCOUNTER SYMPTOMS
WEAKNESS: 1
FALLS: 0
SORE THROAT: 0
CHILLS: 0
FEVER: 0
DIARRHEA: 1
CONSTIPATION: 0
MEMORY LOSS: 0
SHORTNESS OF BREATH: 0
PALPITATIONS: 0
BRUISES/BLEEDS EASILY: 0
COUGH: 0

## 2020-12-08 ASSESSMENT — FIBROSIS 4 INDEX: FIB4 SCORE: .6396021490668313028

## 2020-12-08 NOTE — PROGRESS NOTES
[facility]  PM&R Neuro Rehabilitation Clinic  [facility] [place] [address]  Ph: [phone]    FOLLOW UP PATIENT EVALUATION    Patient Name: Marilyn Salinas   Patient : 1965  Patient Age: 54 y.o.   PCP: Edwar Platt M.D.    Examining Physician: Dr. Julianna Guillory, DO    SUBJECTIVE:   Patient Identification: Marilyn Salinas is a 54 y.o. RHD female with PMH significant for chronic back and neck pain and rehabilitation history significant for premorbid weakness and paresthesias, had GLF, imaging showing Disc herniation at C4-5 and C3- C4, with T2 hyperintensity of the cord and s/p ACDF C3-5 on 19 with Dr. Linton  and is presenting to PM&R clinic for a FOLLOW UP outpatient evaluation with the following chief complaint/s:    PM&R Background Information:  Original Date of Injury: 19 GLF and worsening weakness, pain.   Pertinent Procedure History: Disc herniation at C4-5 and C3- C4, with T2 hyperintensity of the cord at this level. she underwent ACDF C3-5 on 19 with Dr. Linton  Dates of Admission/Discharge to ARU: 19-20    Chief Complaint: Spinal cord injury    Accompanied by Today: Self  Interval History:    - Taking Tizanidine 2mg TID. Pt thinks might be helping. She states she has been on Baclofen so long she is not sure if she notices a difference.   - States having memory issues. Feels like she forgets her thought mid sentence and loses concept of common sense.   - States she has some swelling in her abdomen and legs. States that her legs look weird. Starts mid abdomen and goes down just above knees bilaterally. Patient states feels like swollen, like fluid retention. No pitting edema.    - Bowel: Has added Miralax to bowel regimen. Takes every other day and now has diarrhea. Taking a full capful. No longer taking Colace or lactulose.   - Received walker, but doesn't have seat. Will get walker with seat next month.  - Seeing Neurology today, does not have EMG/NCS  results.   - Bladder: Incontinence at night. Urgency during day.     Review of Systems:  Review of Systems   Constitutional: Negative for chills and fever.   HENT: Negative for congestion and sore throat.    Respiratory: Negative for cough and shortness of breath.    Cardiovascular: Negative for palpitations and leg swelling.   Gastrointestinal: Positive for diarrhea. Negative for constipation.   Genitourinary: Positive for urgency.        Nocturnal urgency   Musculoskeletal: Negative for falls and joint pain.   Neurological: Positive for weakness.        Spasticity.    Endo/Heme/Allergies: Does not bruise/bleed easily.   Psychiatric/Behavioral: Negative for memory loss.      All other pertinent positive review of systems are noted above in HPI.     Past Medical History:  Past Medical History:   Diagnosis Date   • Anxiety    • Arthritis     spine, feet    • Asthma    • Cataract    • Depression    • High cholesterol       Past Surgical History:   Procedure Laterality Date   • CERVICAL DISK AND FUSION ANTERIOR N/A 12/20/2019    Procedure: DISCECTOMY, SPINE, CERVICAL, ANTERIOR APPROACH, WITH FUSION - C3-5;  Surgeon: Connor Linton M.D.;  Location: [facility];  Service: Neurosurgery   • CORPECTOMY N/A 12/20/2019    Procedure: CORPECTOMY;  Surgeon: Connor Linton M.D.;  Location: [facility];  Service: Neurosurgery   • VAGINAL HYSTERECTOMY SCOPE TOTAL N/A 10/3/2017    Procedure: VAGINAL HYSTERECTOMY SCOPE TOTAL;  Surgeon: Hudson Driscoll M.D.;  Location: SURGERY SAME DAY [facility];  Service: Gynecology   • SALPINGECTOMY Bilateral 10/3/2017    Procedure: SALPINGECTOMY;  Surgeon: Hudson Driscoll M.D.;  Location: SURGERY SAME DAY [facility];  Service: Gynecology   • OOPHORECTOMY Bilateral 10/3/2017    Procedure: OOPHORECTOMY;  Surgeon: Hudson Driscoll M.D.;  Location: SURGERY SAME DAY [facility];  Service: Gynecology   • ANTERIOR AND POSTERIOR REPAIR Bilateral 10/3/2017    Procedure:  ANTERIOR AND POSTERIOR REPAIR;  Surgeon: Hudson Driscoll M.D.;  Location: SURGERY SAME DAY [facility];  Service: Gynecology   • ENTEROCELE REPAIR N/A 10/3/2017    Procedure: ENTEROCELE REPAIR, PERINEOPLASTY;  Surgeon: Hudson Driscoll M.D.;  Location: SURGERY SAME DAY [facility];  Service: Gynecology   • BLADDER SLING FEMALE N/A 10/3/2017    Procedure: BLADDER SLING FEMALE TOT, CYSTOSCOPY;  Surgeon: Hudson Driscoll M.D.;  Location: SURGERY SAME DAY [facility];  Service: Gynecology   • VAGINAL SUSPENSION N/A 10/3/2017    Procedure: VAGINAL SUSPENSION SACROSPINOUS VAULT POSSIBLE;  Surgeon: Hudson Driscoll M.D.;  Location: SURGERY SAME DAY [facility];  Service: Gynecology   • OTHER Left 2005    hammertoe x2   • EYE SURGERY  1972    for lazy eye   • LUMPECTOMY          Current Outpatient Medications:   •  Mirabegron ER (MYRBETRIQ) 25 MG TABLET SR 24 HR, Take  by mouth every day., Disp: , Rfl:   •  baclofen (LIORESAL) 10 MG Tab, TAKE 3 TABLETS BY MOUTH THREE TIMES A DAY, Disp: 270 Tab, Rfl: 2  •  meloxicam (MOBIC) 15 MG tablet, TAKE 1 TABLET BY MOUTH DAILY, Disp: 30 Tab, Rfl: 3  •  ALLERGY RELIEF 10 MG Tab, , Disp: , Rfl:   •  TOVIAZ 4 MG TABLET SR 24 HR, , Disp: , Rfl:   •  aripiprazole (ABILIFY) 20 MG tablet, , Disp: , Rfl:   •  polyethylene glycol 3350 (MIRALAX) 17 GM/SCOOP Powder, Take 17 g by mouth every day., Disp: 507 g, Rfl: 3  •  gabapentin (NEURONTIN) 300 MG Cap, TAKE 3 CAPSULES BY MOUTH THREE TIMES DAILY, Disp: 270 Cap, Rfl: 3  •  albuterol 108 (90 Base) MCG/ACT Aero Soln inhalation aerosol, Inhale 2 Puffs by mouth every 6 hours as needed for Shortness of Breath., Disp: 8.5 g, Rfl: 6  •  loratadine (CLARITIN REDITABS) 10 MG dissolvable tablet, Take 1 Tab by mouth every day., Disp: 30 Tab, Rfl: 11  •  fluticasone (FLONASE) 50 MCG/ACT nasal spray, Spray 1 Spray in nose every day., Disp: 1 Bottle, Rfl: 6  •  buPROPion SR (WELLBUTRIN-SR) 150 MG TABLET SR 12 HR sustained-release tablet, Take 1 Tab by  mouth 2 Times a Day., Disp: 60 Tab, Rfl: 3  •  venlafaxine XR (EFFEXOR XR) 75 MG CAPSULE SR 24 HR, Take 1 Cap by mouth every evening., Disp: 30 Cap, Rfl: 3  •  zolpidem (AMBIEN) 10 MG Tab, Take 10 mg by mouth at bedtime as needed., Disp: , Rfl:   •  ibuprofen (MOTRIN) 800 MG Tab, , Disp: , Rfl:   •  HYDROcodone-acetaminophen (NORCO) 5-325 MG Tab per tablet, , Disp: , Rfl:   •  chlorhexidine (PERIDEX) 0.12 % Solution, , Disp: , Rfl:   •  buPROPion (WELLBUTRIN XL) 150 MG XL tablet, , Disp: , Rfl:   •  amoxicillin (AMOXIL) 500 MG Cap, , Disp: , Rfl:   •  pantoprazole (PROTONIX) 20 MG tablet, Take 1 Tab by mouth every day., Disp: 30 Tab, Rfl: 2  •  tizanidine (ZANAFLEX) 2 MG capsule, Take 1 Cap by mouth 3 times a day. (Patient not taking: Reported on 12/8/2020), Disp: 90 Cap, Rfl: 0  •  docusate sodium (COLACE) 100 MG Cap, Take 2 Caps by mouth 2 times a day. (Patient not taking: Reported on 12/8/2020), Disp: 120 Cap, Rfl: 2  •  oxybutynin (DITROPAN XL) 15 MG CR tablet, TAKE 1 TABLET BY MOUTH TWO TIMES DAILY (Patient not taking: Reported on 12/8/2020), Disp: 60 Tab, Rfl: 1  •  lactulose 10 GM/15ML Solution, Take 15 mL by mouth 2 times a day. (Patient not taking: Reported on 12/8/2020), Disp: 1 Bottle, Rfl: 11  •  vitamin D 2000 UNIT Tab, Take 1 Tab by mouth every day. (Patient not taking: Reported on 12/8/2020), Disp: 60 Tab, Rfl: 3  Allergies   Allergen Reactions   • Codeine Unspecified     Unknown reaction          Past Social History:  Social History     Socioeconomic History   • Marital status: Single     Spouse name: Not on file   • Number of children: Not on file   • Years of education: Not on file   • Highest education level: Not on file   Occupational History   • Not on file   Social Needs   • Financial resource strain: Not on file   • Food insecurity     Worry: Never true     Inability: Never true   • Transportation needs     Medical: No     Non-medical: No   Tobacco Use   • Smoking status: Former Smoker      Packs/day: 0.25     Years: 26.00     Pack years: 6.50     Types: Cigarettes     Quit date: 3/1/2020     Years since quittin.7   • Smokeless tobacco: Never Used   Substance and Sexual Activity   • Alcohol use: No   • Drug use: Yes     Types: Marijuana     Comment: marijuana daily   • Sexual activity: Not Currently   Lifestyle   • Physical activity     Days per week: Not on file     Minutes per session: Not on file   • Stress: Not on file   Relationships   • Social connections     Talks on phone: Not on file     Gets together: Not on file     Attends [employer] service: Not on file     Active member of club or organization: Not on file     Attends meetings of clubs or organizations: Not on file     Relationship status: Not on file   • Intimate partner violence     Fear of current or ex partner: Not on file     Emotionally abused: Not on file     Physically abused: Not on file     Forced sexual activity: Not on file   Other Topics Concern   •  Service No   • Blood Transfusions No   • Caffeine Concern No   • Occupational Exposure No   • Hobby Hazards No   • Sleep Concern Yes   • Stress Concern Yes   • Weight Concern Yes   • Special Diet No   • Back Care Yes   • Exercise No   • Bike Helmet Yes   • Seat Belt Yes   • Self-Exams Yes   Social History Narrative   • Not on file        Family History:  Family History   Problem Relation Age of Onset   • Cancer Mother    • Alcohol/Drug Mother    • Cancer Brother    • Diabetes Maternal Aunt        Depression and Opioid Screening  PHQ-9:  Depression Screen (PHQ-2/PHQ-9) 2020 10/22/2020 2020   PHQ-2 Total Score - - -   PHQ-2 Total Score 6 6 0   PHQ-9 Total Score 24 21 -     Interpretation of PHQ-9 Total Score   Score Severity   1-4 No Depression   5-9 Mild Depression   10-14 Moderate Depression   15-19 Moderately Severe Depression   20-27 Severe Depression     Opioid Risk Score: No value filed.  Interpretation of Opioid Risk Score   Score 0-3 = Low risk of  abuse. Do UDS at least once per year.  Score 4-7 = Moderate risk of abuse. Do UDS 1-4 times per year.  Score 8+ = High risk of abuse. Refer to specialist.      OBJECTIVE:   Vital Signs:  Vitals:    12/08/20 0810   BP: 102/62   Pulse: 72   Temp: 36.4 °C (97.5 °F)   SpO2: 94%        Pertinent Labs:  Lab Results   Component Value Date/Time    SODIUM 140 11/30/2020 12:26 PM    POTASSIUM 4.1 11/30/2020 12:26 PM    CHLORIDE 105 11/30/2020 12:26 PM    CO2 28 11/30/2020 12:26 PM    GLUCOSE 83 11/30/2020 12:26 PM    BUN 24 (H) 11/30/2020 12:26 PM    CREATININE 0.63 11/30/2020 12:26 PM       Lab Results   Component Value Date/Time    HBA1C 5.6 07/27/2017 09:39 AM       Lab Results   Component Value Date/Time    WBC 7.0 12/28/2019 05:12 AM    RBC 4.07 (L) 12/28/2019 05:12 AM    HEMOGLOBIN 12.4 12/28/2019 05:12 AM    HEMATOCRIT 37.9 12/28/2019 05:12 AM    MCV 93.1 12/28/2019 05:12 AM    MCH 30.5 12/28/2019 05:12 AM    MCHC 32.7 (L) 12/28/2019 05:12 AM    MPV 9.6 12/28/2019 05:12 AM    NEUTSPOLYS 29.40 (L) 12/28/2019 05:12 AM    LYMPHOCYTES 56.70 (H) 12/28/2019 05:12 AM    MONOCYTES 9.20 12/28/2019 05:12 AM    EOSINOPHILS 3.40 12/28/2019 05:12 AM    BASOPHILS 1.00 12/28/2019 05:12 AM       Lab Results   Component Value Date/Time    ASTSGOT 18 11/30/2020 12:26 PM    ALTSGPT 22 11/30/2020 12:26 PM        Physical Exam:   GEN: No apparent distress  HEENT: Head normocephalic, atraumatic.  Sclera nonicteric bilaterally, no ocular discharge appreciated bilaterally. Mask donned. Wears sunglasses.   CV: Extremities warm and well-perfused, no peripheral edema appreciated bilaterally. Patient notes swelling of legs, abdomen, appears to be adipose on my exam.   PULMONARY: Breathing nonlabored on room air, no respiratory accessory muscle use.  Not requiring supplemental oxygen.  ABD: Soft, nontender.  SKIN: No appreciable skin breakdown on exposed areas of skin.  PSYCH: Depressed and irritated mood.  NEURO: Awake alert.  Conversational.   "Logical thought content.   Does have 2-3/4 tone grossly BLE.   No clonus appreciated at BL ankles.    Imaging:   RBUS 11/2020 Normal renal ultrasound.    ASSESSMENT/PLAN: Marilyn Salinas  is a 54 y.o. female with rehabilitation history significant for premorbid weakness and paresthesias, had GLF, imaging showing Disc herniation at C4-5 and C3- C4, with T2 hyperintensity of the cord and s/p ACDF C3-5 on 12/20/19 with Dr. Linton, here for SCI follow up. The following plan was discussed with the patient who is in agreement.     Visit Diagnoses     ICD-10-CM   1. Tetraplegia ([facility])  G82.50   2. Spasticity  R25.2   3. Impaired mobility and activities of daily living  Z74.09    Z78.9   4. Injury of cervical spinal cord, initial encounter ([facility])  S14.109A   5. Neurogenic bowel  K59.2   6. Neuropathic pain  M79.2   7. Neurogenic bladder  N31.9        Rehab/Neuro:   1. C2 AISD: Nontraumatic incomplete spinal cord injury, status post fall at home with worsening weakness, found to have severe cervical stenosis.  Status post C3-C5 ACDF with Dr. Linton on 12/20/19  - Therapy: Continue PT  - MRI pending per neurosurgery.  -DME: Getting walker with seat 1/2020     2. Muscular dystrophy: ? CP per recent neurology evaluation.  Reportedly diagnosed in '60's per aunt. Normal CPK. Was seeing [facility] - was supposed to have EMG/NCS. Medicaid wouldn't cover EMG/NCS at that time.  Scheduled for EMG/NCS 10/23/2020.  EEG reportedly negative.  Patient has since changed her neurologist and is now seeing Dr. Hartman. Had EMG/NCS results are pending.   - Consultants: Follow up Dr. Hartman  - Request Records: Dr. Hartman     Spasticity:   1. Secondary to #1 in \"rehab/neuro\" section:  Marked spasticity of the bilateral lower extremities on exam. Taking 30mg TID.   -Med management: Significant side effects with constipation with baclofen. Continue baclofen 30 mg 3 times daily for now.  -Med management: Continue tizanidine 2 mg 3 times daily. " "Consider increase next visit.   -Labs Reviewed: LFTs WNL at AST/ALT 18/22 11/30.    Neuropathic Pain:   1. Secondary to #1 in \"rehab/neuro\" section:  Present but stable.  - Med Management: Continue Gabapentin 900mg TID     Neurogenic Bladder:   1. Neurogenic Bladder secondary to #1 in \"rehab/neuro\" section: Most recent BUN/Cr reviewed from 18/0.72 12/2019.  Oxybutynin 15 mg twice daily being changed to Detrol per patient report. RBUS 11/2020 WNL.   - Status: Volitional but has incontinences. Mostly at night because she is asleep.   - Med Management: Continue Myrbetriq and Toviaz. Increase Myrbetriq to 50mg daily. Will take 2 tabs and I will refill.   -Labs Reviewed: BUN/creatinine 24/0.63  -Imaging: RBUS for evaluation of hydronephrosis/hydroureter given neurogenic bladder  - Follow up Dr. Driscoll (gynecology) who is managing low pelvic pain.      Neurogenic Bowel:   1. Neurogenic Bowel secondary to #1 in \"rehab/neuro\" section: Constipation. D/C on senna and suppository.   - Med Management: Take 1/2 capful QOD. Had been on full dose and too much diarrhea.       Follow up: 3 months    Please note that this dictation was created using voice recognition software. I have made every reasonable attempt to correct obvious errors but there may be errors of grammar and content that I may have overlooked prior to finalization of this note.    Dr. Julianna Guillory DO, MS  Department of Physical Medicine & Rehabilitation  Neuro Rehabilitation Clinic  [facility]    "

## 2020-12-13 ENCOUNTER — HOSPITAL ENCOUNTER (OUTPATIENT)
Dept: RADIOLOGY | Facility: MEDICAL CENTER | Age: 55
End: 2020-12-13
Attending: NEUROLOGICAL SURGERY
Payer: MEDICAID

## 2020-12-13 DIAGNOSIS — M54.16 LUMBAR RADICULOPATHY: ICD-10-CM

## 2020-12-13 DIAGNOSIS — M47.12 OTHER SPONDYLOSIS WITH MYELOPATHY, CERVICAL REGION: ICD-10-CM

## 2020-12-13 PROCEDURE — 72141 MRI NECK SPINE W/O DYE: CPT

## 2020-12-13 PROCEDURE — 72148 MRI LUMBAR SPINE W/O DYE: CPT

## 2020-12-21 ENCOUNTER — OFFICE VISIT (OUTPATIENT)
Dept: MEDICAL GROUP | Facility: MEDICAL CENTER | Age: 55
End: 2020-12-21
Attending: FAMILY MEDICINE
Payer: MEDICAID

## 2020-12-21 VITALS
RESPIRATION RATE: 16 BRPM | BODY MASS INDEX: 21.69 KG/M2 | TEMPERATURE: 98.8 F | SYSTOLIC BLOOD PRESSURE: 100 MMHG | DIASTOLIC BLOOD PRESSURE: 62 MMHG | OXYGEN SATURATION: 89 % | HEART RATE: 94 BPM | WEIGHT: 135 LBS | HEIGHT: 66 IN

## 2020-12-21 DIAGNOSIS — L98.491 SKIN ULCER, LIMITED TO BREAKDOWN OF SKIN (HCC): ICD-10-CM

## 2020-12-21 DIAGNOSIS — R60.0 BILATERAL LEG EDEMA: ICD-10-CM

## 2020-12-21 DIAGNOSIS — J45.20 MILD INTERMITTENT ASTHMA WITHOUT COMPLICATION: ICD-10-CM

## 2020-12-21 DIAGNOSIS — B35.1 ONYCHOMYCOSIS: ICD-10-CM

## 2020-12-21 DIAGNOSIS — G80.8 OTHER CEREBRAL PALSY (HCC): ICD-10-CM

## 2020-12-21 PROCEDURE — 99213 OFFICE O/P EST LOW 20 MIN: CPT | Performed by: FAMILY MEDICINE

## 2020-12-21 PROCEDURE — 99214 OFFICE O/P EST MOD 30 MIN: CPT | Performed by: FAMILY MEDICINE

## 2020-12-21 RX ORDER — FUROSEMIDE 20 MG/1
20 TABLET ORAL DAILY
Qty: 30 TAB | Refills: 6 | Status: SHIPPED | OUTPATIENT
Start: 2020-12-21 | End: 2021-08-27

## 2020-12-21 RX ORDER — FLUTICASONE PROPIONATE 50 MCG
1 SPRAY, SUSPENSION (ML) NASAL DAILY
Qty: 16 G | Refills: 3 | Status: SHIPPED | OUTPATIENT
Start: 2020-12-21 | End: 2022-03-17

## 2020-12-21 RX ORDER — POTASSIUM CHLORIDE 20 MEQ/1
20 TABLET, EXTENDED RELEASE ORAL DAILY
Qty: 30 TAB | Refills: 6 | Status: SHIPPED | OUTPATIENT
Start: 2020-12-21 | End: 2022-03-02

## 2020-12-21 ASSESSMENT — FIBROSIS 4 INDEX: FIB4 SCORE: .6396021490668313028

## 2020-12-22 NOTE — PROGRESS NOTES
"Subjective:      Marilyn Salinas is a 55 y.o. female who presents with Follow-Up            HPI 1.  Skin ulcer-patient is concerned over persistent painful small skin ulcer on the tip of her left great toe.  She reports that this developed after a callus was trimmed by her podiatrist, Dr. Ingram.  She reports that he is not going to be taking Medicaid beginning in January.  She also has severe extensive toenail deformity affecting all of the toes on her left foot with tenderness at the nails themselves.  She has been using neomycin over the ulcer which she feels has been present for about 1 month.  2.  Leg edema-patient notes a swelling of her legs bilaterally and at times her anterior belly over the past 1 month.  She reports no change in urinary volume.  She already is dealing with urge incontinence, and does better on a combination of Toviaz and Myrbetriq however the second medication is not being approved by her Medicaid.  Patient reports that at least several times per week she will have sudden urge incontinence involving a large volumes of urine on the way to the bathroom.  She denies any dysuria.  Recent labs show normal creatinine in October  3.  Cerebral palsy-patient reports she is now being seen at [facility].  Her neurologist feels strongly that she has cerebral palsy rather than some other chronic central nervous system condition.  This is accounting for weakness, incomplete bladder sensation, and scattered numbness.    ROS negative for dyspnea, gross hematuria, positive for current constipation on reduced dose of MiraLAX       Objective:     /62 (BP Location: Left arm, Patient Position: Sitting, BP Cuff Size: Adult)   Pulse 94   Temp 37.1 °C (98.8 °F) (Temporal)   Resp 16   Ht 1.676 m (5' 6\")   Wt 61.2 kg (135 lb)   LMP 01/11/2017   SpO2 89%   BMI 21.79 kg/m²      Physical Exam  Gen.- alert, cooperative, in no acute distress  Neck- midline trachea, thyroid not enlarged or " tender,supple, no cervical adenopathy  Chest-clear to auscultation and percussion with normal breath sounds. No retractions. Chest wall nontender  Cardiac- regular rhythm and rate. No murmur, thrill, or heave  Abdomen- normal bowel sounds, soft without guarding. Liver and spleen not enlarged, no palpable masses or tenderness.  Lower extremities-trace to 1+ edema of the upper and lower legs bilaterally.  There is no pitting edema or redness.  There is a 6 mm skin ulcer on the medial distal tip of the left great toe.  Base is pink without discharge          Assessment/Plan:        1. Mild intermittent asthma without complication    - fluticasone (FLONASE) 50 MCG/ACT nasal spray; Administer 1 Spray into affected nostril(S) every day.  Dispense: 16 g; Refill: 3    2. Skin ulcer, limited to breakdown of skin (HCC)    - mupirocin (BACTROBAN) 2 % Ointment; Apply 1 Application topically 2 times a day.  Dispense: 22 g; Refill: 0    3. Onychomycosis    - REFERRAL TO PODIATRY    4. Bilateral leg edema    - furosemide (LASIX) 20 MG Tab; Take 1 Tab by mouth every day.  Dispense: 30 Tab; Refill: 6  - potassium chloride SA (KDUR) 20 MEQ Tab CR; Take 1 Tab by mouth every day.  Dispense: 30 Tab; Refill: 6    5. Other cerebral palsy (HCC)    Plan: 1.  Cautious trial of Lasix 20 mg-patient is informed that this could worsen her incontinence issues to do large volume of urine each morning  2.  Due to past history of hypokalemia patient is asked to take potassium chloride supplement 20 mEq daily if she does take the Lasix  3.  New podiatry referral  4.  Trial of Bactroban ointment to be applied once to twice daily to ulcer on the tip of the left toe  5.  Revisit 1 month

## 2020-12-28 DIAGNOSIS — G82.50 TETRAPLEGIA (HCC): ICD-10-CM

## 2020-12-28 DIAGNOSIS — R25.2 SPASTICITY: ICD-10-CM

## 2020-12-28 RX ORDER — TIZANIDINE HYDROCHLORIDE 2 MG/1
CAPSULE, GELATIN COATED ORAL
Qty: 90 CAP | Refills: 0 | Status: SHIPPED | OUTPATIENT
Start: 2020-12-28 | End: 2021-03-05

## 2020-12-28 NOTE — TELEPHONE ENCOUNTER
I spoke with Marilyn and she said she would like to have refill on tizanidine and that she is taking it as needed.  Confirmed [facility] in [place]

## 2021-01-04 ENCOUNTER — HOSPITAL ENCOUNTER (OUTPATIENT)
Dept: LAB | Facility: MEDICAL CENTER | Age: 56
End: 2021-01-04
Attending: NURSE PRACTITIONER
Payer: MEDICAID

## 2021-01-04 LAB — VIT B12 SERPL-MCNC: 495 PG/ML (ref 211–911)

## 2021-01-04 PROCEDURE — 82607 VITAMIN B-12: CPT

## 2021-01-04 PROCEDURE — 82525 ASSAY OF COPPER: CPT

## 2021-01-04 PROCEDURE — 36415 COLL VENOUS BLD VENIPUNCTURE: CPT

## 2021-01-28 DIAGNOSIS — S14.109A INJURY OF CERVICAL SPINAL CORD, INITIAL ENCOUNTER (HCC): ICD-10-CM

## 2021-01-28 DIAGNOSIS — R25.2 SPASTICITY: ICD-10-CM

## 2021-01-28 RX ORDER — BACLOFEN 10 MG/1
TABLET ORAL
Qty: 270 TAB | Refills: 2 | Status: SHIPPED | OUTPATIENT
Start: 2021-01-28 | End: 2021-06-21

## 2021-02-09 ENCOUNTER — OFFICE VISIT (OUTPATIENT)
Dept: MEDICAL GROUP | Facility: MEDICAL CENTER | Age: 56
End: 2021-02-09
Attending: FAMILY MEDICINE
Payer: MEDICAID

## 2021-02-09 VITALS
TEMPERATURE: 97.2 F | HEART RATE: 88 BPM | RESPIRATION RATE: 16 BRPM | SYSTOLIC BLOOD PRESSURE: 118 MMHG | HEIGHT: 66 IN | WEIGHT: 134 LBS | OXYGEN SATURATION: 94 % | BODY MASS INDEX: 21.53 KG/M2 | DIASTOLIC BLOOD PRESSURE: 64 MMHG

## 2021-02-09 DIAGNOSIS — L08.9 FOOT INFECTION: ICD-10-CM

## 2021-02-09 DIAGNOSIS — M51.36 DDD (DEGENERATIVE DISC DISEASE), LUMBAR: ICD-10-CM

## 2021-02-09 DIAGNOSIS — R60.0 BILATERAL LEG EDEMA: ICD-10-CM

## 2021-02-09 PROCEDURE — 99213 OFFICE O/P EST LOW 20 MIN: CPT | Performed by: FAMILY MEDICINE

## 2021-02-09 RX ORDER — DIVALPROEX SODIUM 125 MG/1
TABLET, DELAYED RELEASE ORAL
COMMUNITY
Start: 2021-01-12 | End: 2021-12-30

## 2021-02-09 RX ORDER — MELOXICAM 15 MG/1
TABLET ORAL
Qty: 30 TAB | Refills: 6 | Status: SHIPPED | OUTPATIENT
Start: 2021-02-09 | End: 2021-09-20

## 2021-02-09 RX ORDER — CICLOPIROX 80 MG/ML
SOLUTION TOPICAL
COMMUNITY
Start: 2021-01-04 | End: 2021-12-30

## 2021-02-09 RX ORDER — CEPHALEXIN 500 MG/1
500 CAPSULE ORAL 4 TIMES DAILY
Qty: 28 CAP | Refills: 1 | Status: SHIPPED | OUTPATIENT
Start: 2021-02-09 | End: 2021-08-27

## 2021-02-09 ASSESSMENT — FIBROSIS 4 INDEX: FIB4 SCORE: .6396021490668313028

## 2021-02-09 NOTE — PROGRESS NOTES
"Subjective:      Marilyn Salinas is a 55 y.o. female who presents with Follow-Up            HPI 1.  Foot infection-patient notes tenderness and redness along the base of her great toe and medial aspect of her left foot.  This has been present for 1 or 2 weeks.  She denies any fever, or local trauma, or any skin drainage.  Patient has been using ciclopirox topical on her distorted toenails  2.  Lower extremity edema-patient reports that she will experience diuresis for 5 or 6 hours when she takes her 20 mg morning dose of Lasix.  She feels that her thighs are still swollen but ankles have not been swollen recently.  She denies any dysuria  Patient's MTM ride picked her up late so we had a shortened visit today.  ROS positive for occasional urge incontinence first thing in the morning.  Negative for recent syncope, dyspnea       Objective:     /64 (BP Location: Left arm, Patient Position: Sitting, BP Cuff Size: Adult)   Pulse 88   Temp 36.2 °C (97.2 °F) (Temporal)   Resp 16   Ht 1.676 m (5' 5.98\")   Wt 60.8 kg (134 lb)   LMP 01/11/2017   SpO2 94%   BMI 21.64 kg/m²      Physical Exam  Gen.- alert, cooperative, in no acute distress  Neck- midline trachea, thyroid not enlarged or tender,supple, no cervical adenopathy  Chest-clear to auscultation and percussion with normal breath sounds. No retractions. Chest wall nontender  Cardiac- regular rhythm and rate. No murmur, thrill, or heave  Left foot-marked nail thickening and deformity noted on all 5 nails.  Dry light-colored callus/crusting noted at the tip of the third toe   Lower extremities-negative for ankle edema.  No pitting thigh swelling noted either       Assessment/Plan:        1. Foot infection    - cephALEXin (KEFLEX) 500 MG Cap; Take 1 Cap by mouth 4 times a day.  Dispense: 28 Cap; Refill: 1    2. DDD (degenerative disc disease), lumbar    - meloxicam (MOBIC) 15 MG tablet; TAKE 1 TABLET BY MOUTH DAILY  Dispense: 30 Tab; Refill: 6    3. " Bilateral leg edema    Plan: 1.  Continue current 20 mg dose of Lasix as legs do not look significantly swollen on exam  2.  Trial of cephalexin 500 mg 4 times daily for 7 days, 1 refill for possible foot infection  3.  Patient has upcoming podiatry referral appointment at [facility], with Dr. Andrea locke in [place] next week to address her feet, severe onychomycosis

## 2021-02-12 ENCOUNTER — HOSPITAL ENCOUNTER (OUTPATIENT)
Dept: LAB | Facility: MEDICAL CENTER | Age: 56
End: 2021-02-12
Attending: NURSE PRACTITIONER
Payer: MEDICAID

## 2021-02-12 PROCEDURE — 82525 ASSAY OF COPPER: CPT

## 2021-02-12 PROCEDURE — 36415 COLL VENOUS BLD VENIPUNCTURE: CPT

## 2021-02-16 LAB — COPPER SERPL-MCNC: 154 UG/DL (ref 80–155)

## 2021-02-18 ENCOUNTER — PATIENT MESSAGE (OUTPATIENT)
Dept: MEDICAL GROUP | Facility: MEDICAL CENTER | Age: 56
End: 2021-02-18

## 2021-02-24 ENCOUNTER — PATIENT MESSAGE (OUTPATIENT)
Dept: MEDICAL GROUP | Facility: MEDICAL CENTER | Age: 56
End: 2021-02-24

## 2021-02-26 NOTE — TELEPHONE ENCOUNTER
"From: Marilyn Salinas  To: Physician Edwar Platt  Sent: 2/24/2021 11:46 PM PST  Subject: Non-Urgent Medical Question    You said that if the Cephalexion didn't help in 7 days, it would tell you if it is a bacterial infection or another type of infection.?.. My toe hurts a lot.  The appointment at the fracture clinic is in a couple of weeks.     Thank you, Dr. Latif      ----- Message -----   From:Physician Edwar Platt   Sent:2/22/2021 12:59 PM PST   To:Marilyn Salinas   Subject:RE: Non-Urgent Medical Question    Echo, when we saw you on 2/9/2021 we asked you to take an antibiotic called cephalexin which I presume is what you are taking and has not made much difference. I think you were also supposed to see a foot specialist since then. Did you see them? What did they say about your feet? Not sure what \"the other medicine is\"? Dr. Whitten      ----- Message -----   From:Marilyn Salinas   Sent:2/18/2021 4:01 PM PST   To:Physician Edwar Platt   Subject:Non-Urgent Medical Question    Hello Dr. Latif,     This is a follow-up on my left big toe. I have 6 pills left. I put neosporin and a bandaid on every morning. It looks about the same.   Do you want me to try the other medicine?  I have a new phone number: [phone].    Marilyn Salinas  "

## 2021-03-02 DIAGNOSIS — R25.2 SPASTICITY: ICD-10-CM

## 2021-03-02 DIAGNOSIS — G82.50 TETRAPLEGIA (HCC): ICD-10-CM

## 2021-03-02 DIAGNOSIS — K21.9 GASTROESOPHAGEAL REFLUX DISEASE, UNSPECIFIED WHETHER ESOPHAGITIS PRESENT: ICD-10-CM

## 2021-03-05 RX ORDER — PANTOPRAZOLE SODIUM 20 MG/1
TABLET, DELAYED RELEASE ORAL
Qty: 30 TABLET | Refills: 2 | Status: SHIPPED | OUTPATIENT
Start: 2021-03-05 | End: 2021-06-17 | Stop reason: SDUPTHER

## 2021-03-05 RX ORDER — TIZANIDINE HYDROCHLORIDE 2 MG/1
CAPSULE, GELATIN COATED ORAL
Qty: 90 CAPSULE | Refills: 2 | Status: SHIPPED | OUTPATIENT
Start: 2021-03-05 | End: 2021-08-27

## 2021-03-09 ENCOUNTER — OFFICE VISIT (OUTPATIENT)
Dept: PHYSICAL MEDICINE AND REHAB | Facility: REHABILITATION | Age: 56
End: 2021-03-09
Payer: MEDICAID

## 2021-03-09 VITALS
HEIGHT: 66 IN | TEMPERATURE: 97.6 F | DIASTOLIC BLOOD PRESSURE: 74 MMHG | RESPIRATION RATE: 18 BRPM | SYSTOLIC BLOOD PRESSURE: 116 MMHG | HEART RATE: 86 BPM | BODY MASS INDEX: 21.53 KG/M2 | OXYGEN SATURATION: 93 % | WEIGHT: 134 LBS

## 2021-03-09 DIAGNOSIS — M79.2 NEUROPATHIC PAIN: ICD-10-CM

## 2021-03-09 DIAGNOSIS — R32 URINARY INCONTINENCE, UNSPECIFIED TYPE: ICD-10-CM

## 2021-03-09 DIAGNOSIS — N31.9 NEUROGENIC BLADDER: ICD-10-CM

## 2021-03-09 DIAGNOSIS — Z74.09 IMPAIRED MOBILITY AND ACTIVITIES OF DAILY LIVING: ICD-10-CM

## 2021-03-09 DIAGNOSIS — G82.50 TETRAPLEGIA (HCC): Primary | ICD-10-CM

## 2021-03-09 DIAGNOSIS — Z78.9 IMPAIRED MOBILITY AND ACTIVITIES OF DAILY LIVING: ICD-10-CM

## 2021-03-09 DIAGNOSIS — R25.2 SPASTICITY: ICD-10-CM

## 2021-03-09 PROCEDURE — 99214 OFFICE O/P EST MOD 30 MIN: CPT | Performed by: PHYSICAL MEDICINE & REHABILITATION

## 2021-03-09 RX ORDER — GABAPENTIN 300 MG/1
600 CAPSULE ORAL 3 TIMES DAILY
Qty: 270 CAPSULE | Refills: 5 | Status: SHIPPED | OUTPATIENT
Start: 2021-03-09 | End: 2021-05-13

## 2021-03-09 ASSESSMENT — ENCOUNTER SYMPTOMS
BRUISES/BLEEDS EASILY: 0
DIARRHEA: 1
WEAKNESS: 1
PALPITATIONS: 0
MEMORY LOSS: 0
CONSTIPATION: 0
FEVER: 0
CHILLS: 0
FALLS: 0
SORE THROAT: 0
SHORTNESS OF BREATH: 0
COUGH: 0

## 2021-03-09 ASSESSMENT — FIBROSIS 4 INDEX: FIB4 SCORE: .6396021490668313028

## 2021-03-09 NOTE — PROGRESS NOTES
[facility]  PM&R Neuro Rehabilitation [facility]  [employer] [place] [address]  Ph: [phone]    FOLLOW UP PATIENT EVALUATION    Patient Name: Marilyn Salinas   Patient : 1965  Patient Age: 54 y.o.   PCP: Edwar Platt M.D.    Examining Physician: Dr. Julianna Guillory, DO    SUBJECTIVE:   Patient Identification: Marilyn Salinas is a 54 y.o. RHD female with PMH significant for chronic back and neck pain and rehabilitation history significant for premorbid weakness and paresthesias, had GLF, imaging showing Disc herniation at C4-5 and C3- C4, with T2 hyperintensity of the cord and s/p ACDF C3-5 on 19 with Dr. Linton  and is presenting to PM&R clinic for a FOLLOW UP outpatient evaluation with the following chief complaint/s:    PM&R Background Information:  Original Date of Injury: 19 GLF and worsening weakness, pain.   Pertinent Procedure History: Disc herniation at C4-5 and C3- C4, with T2 hyperintensity of the cord at this level. she underwent ACDF C3-5 on 19 with Dr. Linton  Dates of Admission/Discharge to ARU: 19-20    Chief Complaint: Spinal cord injury    Accompanied by Today: Self  Interval History:   -Labs reviewed: CMP normal 20  -Records reviewed: D/C [facility]  - Dreads getting a shower because it is difficult. Is capable of doing it herself, but doesn't like doing it. It is a lot work.   - Has walker with a seat which has increased her quality of life.   - Had EMG/NCS through [place]. Told has partial CP and paraplegia.   - Has wound on foot. Saw [place] Fracture yesterday and was referred to wound clinic.   - Continues to have swelling/dented legs in thigh region. On Lasix, but not helping.   - Bowel: Constipation present, taking Miralax every other day. Had BM yesterday.   - Does not need refills on Baclofen and Tizanidine.   - Taking Toviaz and Myrbetriq. Still has urgency, but improved on both aforementioned medications.   - Had TESI for low  back pain. 1/4 shots completed through [facility].    Review of Systems:  Review of Systems   Constitutional: Negative for chills and fever.   HENT: Negative for congestion and sore throat.    Respiratory: Negative for cough and shortness of breath.    Cardiovascular: Negative for palpitations and leg swelling.   Gastrointestinal: Positive for diarrhea ( Occasional). Negative for constipation.   Genitourinary: Positive for urgency. Negative for dysuria and frequency.   Musculoskeletal: Negative for falls and joint pain.   Neurological: Positive for weakness.        Positive spasticity.   Endo/Heme/Allergies: Does not bruise/bleed easily.   Psychiatric/Behavioral: Negative for memory loss.      All other pertinent positive review of systems are noted above in HPI.     Past Medical History:  Past Medical History:   Diagnosis Date   • Anxiety    • Arthritis     spine, feet    • Asthma    • Cataract    • Depression    • High cholesterol       Past Surgical History:   Procedure Laterality Date   • CERVICAL DISK AND FUSION ANTERIOR N/A 12/20/2019    Procedure: DISCECTOMY, SPINE, CERVICAL, ANTERIOR APPROACH, WITH FUSION - C3-5;  Surgeon: Connor Linton M.D.;  Location: SURGERY [facility];  Service: Neurosurgery   • CORPECTOMY N/A 12/20/2019    Procedure: CORPECTOMY;  Surgeon: Connor Linton M.D.;  Location: [facility];  Service: Neurosurgery   • VAGINAL HYSTERECTOMY SCOPE TOTAL N/A 10/3/2017    Procedure: VAGINAL HYSTERECTOMY SCOPE TOTAL;  Surgeon: Hudson Driscoll M.D.;  Location: SURGERY SAME DAY [facility];  Service: Gynecology   • SALPINGECTOMY Bilateral 10/3/2017    Procedure: SALPINGECTOMY;  Surgeon: Hudson Driscoll M.D.;  Location: SURGERY SAME DAY [facility];  Service: Gynecology   • OOPHORECTOMY Bilateral 10/3/2017    Procedure: OOPHORECTOMY;  Surgeon: Hudson Driscoll M.D.;  Location: SURGERY SAME DAY [facility];  Service: Gynecology   • ANTERIOR AND POSTERIOR REPAIR Bilateral  10/3/2017    Procedure: ANTERIOR AND POSTERIOR REPAIR;  Surgeon: Hudson Driscoll M.D.;  Location: SURGERY SAME DAY [facility];  Service: Gynecology   • ENTEROCELE REPAIR N/A 10/3/2017    Procedure: ENTEROCELE REPAIR, PERINEOPLASTY;  Surgeon: Hudson Driscoll M.D.;  Location: SURGERY SAME DAY [facility];  Service: Gynecology   • BLADDER SLING FEMALE N/A 10/3/2017    Procedure: BLADDER SLING FEMALE TOT, CYSTOSCOPY;  Surgeon: Hudson Driscoll M.D.;  Location: SURGERY SAME DAY [facility];  Service: Gynecology   • VAGINAL SUSPENSION N/A 10/3/2017    Procedure: VAGINAL SUSPENSION SACROSPINOUS VAULT POSSIBLE;  Surgeon: Hudson Driscoll M.D.;  Location: SURGERY SAME DAY [facility];  Service: Gynecology   • OTHER Left 2005    hammertoe x2   • EYE SURGERY  1972    for lazy eye   • LUMPECTOMY          Current Outpatient Medications:   •  gabapentin (NEURONTIN) 300 MG Cap, Take 2 Capsules by mouth 3 times a day., Disp: 270 capsule, Rfl: 5  •  meloxicam (MOBIC) 15 MG tablet, TAKE 1 TABLET BY MOUTH DAILY, Disp: 30 Tab, Rfl: 6  •  baclofen (LIORESAL) 10 MG Tab, TAKE 3 TABLETS BY MOUTH THREE TIMES A DAY, Disp: 270 Tab, Rfl: 2  •  mupirocin (BACTROBAN) 2 % Ointment, Apply 1 Application topically 2 times a day., Disp: 22 g, Rfl: 0  •  fluticasone (FLONASE) 50 MCG/ACT nasal spray, Administer 1 Spray into affected nostril(S) every day., Disp: 16 g, Rfl: 3  •  furosemide (LASIX) 20 MG Tab, Take 1 Tab by mouth every day., Disp: 30 Tab, Rfl: 6  •  Mirabegron ER (MYRBETRIQ) 25 MG TABLET SR 24 HR, Take  by mouth every day., Disp: , Rfl:   •  ALLERGY RELIEF 10 MG Tab, , Disp: , Rfl:   •  TOVIAZ 4 MG TABLET SR 24 HR, , Disp: , Rfl:   •  amoxicillin (AMOXIL) 500 MG Cap, , Disp: , Rfl:   •  albuterol 108 (90 Base) MCG/ACT Aero Soln inhalation aerosol, Inhale 2 Puffs by mouth every 6 hours as needed for Shortness of Breath., Disp: 8.5 g, Rfl: 6  •  loratadine (CLARITIN REDITABS) 10 MG dissolvable tablet, Take 1 Tab by mouth every day.,  Disp: 30 Tab, Rfl: 11  •  vitamin D 2000 UNIT Tab, Take 1 Tab by mouth every day., Disp: 60 Tab, Rfl: 3  •  venlafaxine XR (EFFEXOR XR) 75 MG CAPSULE SR 24 HR, Take 1 Cap by mouth every evening., Disp: 30 Cap, Rfl: 3  •  zolpidem (AMBIEN) 10 MG Tab, Take 10 mg by mouth at bedtime as needed., Disp: , Rfl:   •  tizanidine (ZANAFLEX) 2 MG capsule, TAKE ONE CAPSULE BY MOUTH THREE TIMES A DAY (Patient not taking: Reported on 3/9/2021), Disp: 90 capsule, Rfl: 2  •  pantoprazole (PROTONIX) 20 MG tablet, TAKE ONE TABLET BY MOUTH DAILY (Patient not taking: Reported on 3/9/2021), Disp: 30 tablet, Rfl: 2  •  cephALEXin (KEFLEX) 500 MG Cap, Take 1 Cap by mouth 4 times a day. (Patient not taking: Reported on 3/9/2021), Disp: 28 Cap, Rfl: 1  •  ciclopirox (PENLAC) 8 % solution, , Disp: , Rfl:   •  divalproex (DEPAKOTE) 125 MG EC tablet, , Disp: , Rfl:   •  potassium chloride SA (KDUR) 20 MEQ Tab CR, Take 1 Tab by mouth every day. (Patient not taking: Reported on 3/9/2021), Disp: 30 Tab, Rfl: 6  •  ibuprofen (MOTRIN) 800 MG Tab, , Disp: , Rfl:   •  HYDROcodone-acetaminophen (NORCO) 5-325 MG Tab per tablet, , Disp: , Rfl:   •  chlorhexidine (PERIDEX) 0.12 % Solution, , Disp: , Rfl:   •  buPROPion (WELLBUTRIN XL) 150 MG XL tablet, , Disp: , Rfl:   •  aripiprazole (ABILIFY) 20 MG tablet, , Disp: , Rfl:   •  polyethylene glycol 3350 (MIRALAX) 17 GM/SCOOP Powder, Take 17 g by mouth every day. (Patient not taking: Reported on 3/9/2021), Disp: 507 g, Rfl: 3  •  docusate sodium (COLACE) 100 MG Cap, Take 2 Caps by mouth 2 times a day. (Patient not taking: Reported on 12/8/2020), Disp: 120 Cap, Rfl: 2  •  oxybutynin (DITROPAN XL) 15 MG CR tablet, TAKE 1 TABLET BY MOUTH TWO TIMES DAILY (Patient not taking: Reported on 12/8/2020), Disp: 60 Tab, Rfl: 1  •  lactulose 10 GM/15ML Solution, Take 15 mL by mouth 2 times a day. (Patient not taking: Reported on 12/8/2020), Disp: 1 Bottle, Rfl: 11  •  buPROPion SR (WELLBUTRIN-SR) 150 MG TABLET SR 12 HR  sustained-release tablet, Take 1 Tab by mouth 2 Times a Day. (Patient not taking: Reported on 3/9/2021), Disp: 60 Tab, Rfl: 3  Allergies   Allergen Reactions   • Codeine Unspecified     Unknown reaction          Past Social History:  Social History     Socioeconomic History   • Marital status: Single     Spouse name: Not on file   • Number of children: Not on file   • Years of education: Not on file   • Highest education level: Not on file   Occupational History   • Not on file   Tobacco Use   • Smoking status: Former Smoker     Packs/day: 0.25     Years: 26.00     Pack years: 6.50     Types: Cigarettes     Quit date: 3/1/2020     Years since quittin.0   • Smokeless tobacco: Never Used   Substance and Sexual Activity   • Alcohol use: No   • Drug use: Yes     Types: Marijuana     Comment: marijuana daily   • Sexual activity: Not Currently   Other Topics Concern   •  Service No   • Blood Transfusions No   • Caffeine Concern No   • Occupational Exposure No   • Hobby Hazards No   • Sleep Concern Yes   • Stress Concern Yes   • Weight Concern Yes   • Special Diet No   • Back Care Yes   • Exercise No   • Bike Helmet Yes   • Seat Belt Yes   • Self-Exams Yes   Social History Narrative   • Not on file     Social Determinants of Health     Financial Resource Strain:    • Difficulty of Paying Living Expenses:    Food Insecurity:    • Worried About Running Out of Food in the Last Year:    • Ran Out of Food in the Last Year:    Transportation Needs:    • Lack of Transportation (Medical):    • Lack of Transportation (Non-Medical):    Physical Activity:    • Days of Exercise per Week:    • Minutes of Exercise per Session:    Stress:    • Feeling of Stress :    Social Connections:    • Frequency of Communication with Friends and Family:    • Frequency of Social Gatherings with Friends and Family:    • Attends Confucianism Services:    • Active Member of Clubs or Organizations:    • Attends Club or Organization Meetings:     • Marital Status:    Intimate Partner Violence:    • Fear of Current or Ex-Partner:    • Emotionally Abused:    • Physically Abused:    • Sexually Abused:         Family History:  Family History   Problem Relation Age of Onset   • Cancer Mother    • Alcohol/Drug Mother    • Cancer Brother    • Diabetes Maternal Aunt        Depression and Opioid Screening  PHQ-9:  Depression Screen (PHQ-2/PHQ-9) 6/22/2020 10/22/2020 12/8/2020   PHQ-2 Total Score - - -   PHQ-2 Total Score 6 6 0   PHQ-9 Total Score 24 21 -     Interpretation of PHQ-9 Total Score   Score Severity   1-4 No Depression   5-9 Mild Depression   10-14 Moderate Depression   15-19 Moderately Severe Depression   20-27 Severe Depression     Opioid Risk Score: No value filed.  Interpretation of Opioid Risk Score   Score 0-3 = Low risk of abuse. Do UDS at least once per year.  Score 4-7 = Moderate risk of abuse. Do UDS 1-4 times per year.  Score 8+ = High risk of abuse. Refer to specialist.      OBJECTIVE:   Vital Signs:  Vitals:    03/09/21 1032   BP: 116/74   Pulse: 86   Resp: 18   Temp: 36.4 °C (97.6 °F)   SpO2: 93%        Pertinent Labs:  Lab Results   Component Value Date/Time    SODIUM 140 11/30/2020 12:26 PM    POTASSIUM 4.1 11/30/2020 12:26 PM    CHLORIDE 105 11/30/2020 12:26 PM    CO2 28 11/30/2020 12:26 PM    GLUCOSE 83 11/30/2020 12:26 PM    BUN 24 (H) 11/30/2020 12:26 PM    CREATININE 0.63 11/30/2020 12:26 PM       Lab Results   Component Value Date/Time    HBA1C 5.6 07/27/2017 09:39 AM       Lab Results   Component Value Date/Time    WBC 7.0 12/28/2019 05:12 AM    RBC 4.07 (L) 12/28/2019 05:12 AM    HEMOGLOBIN 12.4 12/28/2019 05:12 AM    HEMATOCRIT 37.9 12/28/2019 05:12 AM    MCV 93.1 12/28/2019 05:12 AM    MCH 30.5 12/28/2019 05:12 AM    MCHC 32.7 (L) 12/28/2019 05:12 AM    MPV 9.6 12/28/2019 05:12 AM    NEUTSPOLYS 29.40 (L) 12/28/2019 05:12 AM    LYMPHOCYTES 56.70 (H) 12/28/2019 05:12 AM    MONOCYTES 9.20 12/28/2019 05:12 AM    EOSINOPHILS 3.40  12/28/2019 05:12 AM    BASOPHILS 1.00 12/28/2019 05:12 AM       Lab Results   Component Value Date/Time    ASTSGOT 18 11/30/2020 12:26 PM    ALTSGPT 22 11/30/2020 12:26 PM        Physical Exam:   GEN: No apparent distress  HEENT: Head normocephalic, atraumatic.  Sclera nonicteric bilaterally, no ocular discharge appreciated bilaterally.  Mask donned.  CV: Extremities warm and well-perfused, no peripheral edema appreciated bilaterally.  PULMONARY: Breathing nonlabored on room air, no respiratory accessory muscle use.  Not requiring supplemental oxygen.  ABD: Soft, nontender.  SKIN: No appreciable skin breakdown on exposed areas of skin.  NEURO: Awake alert.  Conversational.  Logical thought content.  Ambulatory with walker.  No orthotics.  Spastic paraplegic gait.  PSYCH: Mood and affect within normal limits.    Imaging:   MRI C Spine 12/13/20  1.  Previous anterior cervical interbody fusion from C3 through C5.  2.  Borderline central canal stenosis from the C3 level through the C6 level.  3.  Mild to moderate multilevel neural foraminal narrowing.  4.  Mild cervical spondylotic change at the C6-7 level.    MRI L Spine 12/13/20  1.  Moderate discal degenerative changes throughout the lumbar spine with mild to moderate marginal osteophytosis. Further there is moderate endplate degenerative changes at the L5-S1 level.  2.  Mild multilevel lumbar spondylotic change.  3.  Mild to moderate central canal stenosis at the L4-5 level with mild central canal stenosis at the  L3-4 and L2-3 levels secondary to facet arthropathy.  4.  Mild to moderate multilevel disc protrusions at the T12-L1, L1-2, and L5-S1 levels.  5.  Moderate multilevel neural foraminal narrowing with disc bulging into the inferior aspects of multiple neural foramina.    ASSESSMENT/PLAN: Marilyn Salinas  is a 54 y.o. female with rehabilitation history significant for premorbid weakness and paresthesias, had GLF, imaging showing Disc herniation at C4-5 and  C3- C4, with T2 hyperintensity of the cord and s/p ACDF C3-5 on 12/20/19 with Dr. Linton, here for SCI follow up. The following plan was discussed with the patient who is in agreement.     Visit Diagnoses     ICD-10-CM   1. Tetraplegia (HCC)  G82.50   2. Neurogenic bladder  N31.9   3. Urinary incontinence, unspecified type  R32   4. Neuropathic pain  M79.2   5. Spasticity  R25.2   6. Impaired mobility and activities of daily living  Z74.09    Z78.9        Rehab/Neuro:   1. C2 AISD: Nontraumatic incomplete spinal cord injury, status post fall at home with worsening weakness, found to have severe cervical stenosis.  Status post C3-C5 ACDF with Dr. Linton on 12/20/19  - MRI per neurosurgery - getting lumbar epidural steroid injections with  neurosurgery.  -DME: Received walker with seat 1/2020  -Referral: Physical therapy for continued strengthening and gait training ([facility])     2. Muscular dystrophy: ? CP per recent neurology evaluation.  Reportedly diagnosed in '60's per aunt. Normal CPK. Was seeing Nikunj tanner in 2018 - was supposed to have EMG/NCS. Medicaid wouldn't cover EMG/NCS at that time.  Scheduled for EMG/NCS 10/23/2020.  EEG reportedly negative.  Patient has since changed her neurologist and is now seeing Dr. Hartman. Had EMG/NCS results -results not available but patient reports she was told she has paraplegia and partial CP.   -Followed by neurology     Spasticity: Bilateral lower extremities worse than upper extremities.  -Med management: Continue baclofen 30 mg 3 times daily.  Patient reports this contributes to her constipation.  -Med management: Continue tizanidine 2 mg 3 times daily.  May consider increase at next visit if spasticity still uncontrolled.  -Labs Reviewed: LFTs WNL at AST/ALT 18/22 11/30.  Repeat LFTs to ensure there is no transaminitis on tizanidine.    Neuropathic Pain:   - Med Management: Prescription sent for gabapentin 900mg TID     Neurogenic Bladder: RBUS 11/2020 WNL.   -Med  management: Continue Myrbetriq and Toviaz.  -Labs Reviewed: Repeat CMP pending  -Referral: Urology for UDS as patient still having incontinence is.  - Follow up Dr. Driscoll (gynecology) who is managing low pelvic pain.      Neurogenic Bowel: Taking half a cap of MiraLAX every other day.  -Counseling: Discussed with patient that bowel training program is very individualized and she will ultimately have to self taper/titrate medications to find her balance.  Patient reports that she does have a balance at this time.      Follow up: 4 months    Please note that this dictation was created using voice recognition software. I have made every reasonable attempt to correct obvious errors but there may be errors of grammar and content that I may have overlooked prior to finalization of this note.    Dr. Julianna Guillory DO, MS  Department of Physical Medicine & Rehabilitation  Neuro Rehabilitation Clinic  [facility]

## 2021-03-17 ENCOUNTER — PATIENT MESSAGE (OUTPATIENT)
Dept: MEDICAL GROUP | Facility: MEDICAL CENTER | Age: 56
End: 2021-03-17

## 2021-03-18 NOTE — TELEPHONE ENCOUNTER
From: Marilyn Salinas  To: Physician Edwar Platt  Sent: 3/17/2021 6:16 PM PDT  Subject: Non-Urgent Medical Question    Dr. Latif I went to Fracture clinic and Dr. Antunez advised me to call the wound clinic where he works and schedule an appointment with him there. I am calling again tomorrow morning to try and schedule. He advised to use iodine in the meantime. It's working. Do I need to schedule another appointment with you? I had to cancel my appointment with you today. Thank you for your time. Marilyn      ----- Message -----   From:Physician Edwar Platt   Sent:2/25/2021 5:59 PM PST   To:Marilyn Salinas   Subject:RE: Non-Urgent Medical Question    Marilyn, the medicine called cephalexin is the antibiotic if there is a bacterial infection in your foot, taking that for 7 days should have caused significant improvement. If it did not then bacterial infection is unlikely. The other medicine furosemide is just for generalized water retention and swelling. At this point either our office or the foot specialist needs to examine your foot again because I am not sure where things are at at this point. Dr. Whitten      ----- Message -----   From:Marilyn Salinas   Sent:2/24/2021 11:46 PM PST   To:Physician Edwar Platt   Subject:Non-Urgent Medical Question    You said that if the Cephalexion didn't help in 7 days, it would tell you if it is a bacterial infection or another type of infection.?.. My toe hurts a lot.  The appointment at the fracture clinic is in a couple of weeks.     Thank you, Dr. Latif      ----- Message -----   From:Physician Edwar Platt   Sent:2/22/2021 12:59 PM PST   To:Marilyn Salinas   Subject:RE: Non-Urgent Medical Question    Echo, when we saw you on 2/9/2021 we asked you to take an antibiotic called cephalexin which I presume is what you are taking and has not made much difference. I think you were also supposed to see a foot specialist since then. Did you see them?  "What did they say about your feet? Not sure what \"the other medicine is\"? Dr. Whitten      ----- Message -----    From:Marilyn Salinas   Sent:2/18/2021 4:01 PM PST   To:Physician Edwar Platt   Subject:Non-Urgent Medical Question    Hello Dr. Latif,     This is a follow-up on my left big toe. I have 6 pills left. I put neosporin and a bandaid on every morning. It looks about the same.   Do you want me to try the other medicine?  I have a new phone number: [phone].    Marilyn Salinas  "

## 2021-05-03 ENCOUNTER — PATIENT MESSAGE (OUTPATIENT)
Dept: MEDICAL GROUP | Facility: MEDICAL CENTER | Age: 56
End: 2021-05-03

## 2021-05-03 DIAGNOSIS — R60.0 BILATERAL LEG EDEMA: ICD-10-CM

## 2021-05-04 RX ORDER — FUROSEMIDE 40 MG/1
40 TABLET ORAL DAILY
Qty: 30 TABLET | Refills: 2 | Status: SHIPPED | OUTPATIENT
Start: 2021-05-04 | End: 2021-08-19

## 2021-05-04 RX ORDER — POTASSIUM CHLORIDE 20 MEQ/1
20 TABLET, EXTENDED RELEASE ORAL 2 TIMES DAILY
Qty: 30 TABLET | Refills: 2 | Status: SHIPPED | OUTPATIENT
Start: 2021-05-04 | End: 2021-08-27

## 2021-05-09 ENCOUNTER — PATIENT MESSAGE (OUTPATIENT)
Dept: MEDICAL GROUP | Facility: MEDICAL CENTER | Age: 56
End: 2021-05-09

## 2021-05-09 DIAGNOSIS — E55.9 VITAMIN D DEFICIENCY: ICD-10-CM

## 2021-05-13 DIAGNOSIS — G82.50 TETRAPLEGIA (HCC): ICD-10-CM

## 2021-05-13 DIAGNOSIS — M79.2 NEUROPATHIC PAIN: ICD-10-CM

## 2021-05-13 RX ORDER — GABAPENTIN 300 MG/1
CAPSULE ORAL
Qty: 270 CAPSULE | Refills: 2 | Status: SHIPPED | OUTPATIENT
Start: 2021-05-13 | End: 2021-10-07 | Stop reason: SDUPTHER

## 2021-05-21 ENCOUNTER — HOSPITAL ENCOUNTER (OUTPATIENT)
Dept: RADIOLOGY | Facility: MEDICAL CENTER | Age: 56
End: 2021-05-21
Attending: SPECIALIST
Payer: MEDICAID

## 2021-05-21 DIAGNOSIS — Z12.31 ENCOUNTER FOR MAMMOGRAM TO ESTABLISH BASELINE MAMMOGRAM: ICD-10-CM

## 2021-05-21 PROCEDURE — 77063 BREAST TOMOSYNTHESIS BI: CPT

## 2021-06-02 ENCOUNTER — PATIENT MESSAGE (OUTPATIENT)
Dept: MEDICAL GROUP | Facility: MEDICAL CENTER | Age: 56
End: 2021-06-02

## 2021-06-03 NOTE — TELEPHONE ENCOUNTER
From: Marilyn Salinas  To: Physician Edwar Platt  Sent: 6/2/2021 10:25 AM PDT  Subject: Non-Urgent Medical Question    Dr. Latif, There are no appointments until June 21st . My left big toe has doubled in size. I am going to wound clinic once a week. Taking the water pills, No change, in fact, it's worse. Into my legs and feet. See you on the 21st. Marilyn      ----- Message -----   From:Physician Edwar Platt   Sent:5/10/2021 7:24 AM PDT   To:Marilyn Salinas   Subject:RE: Non-Urgent Medical Question    Marilyn, you are exactly right at potassium pills are hard to get down. I am not aware of any more consumer friendly form such as a gelcap. You can check with your pharmacist and see if they know of anything, and a course needs to be covered by Medicaid FFS as well. We will order the vitamin D.      ----- Message -----   From:Marilyn Salinas   Sent:5/9/2021 9:44 PM PDT   To:Physician Edwar Platt   Subject:Non-Urgent Medical Question    Dr. Latif,   Ok, I will. Great.   When you reperscribe the potassium, could they, please,be gel caps or something? They are huge and hard to swallow.   Also, vitamin d (gel pills). ??  Thank you, Marilyn Salinas      ----- Message -----   From:Physician Edwar Platt   Sent:5/4/2021 11:14 AM PDT   To:Marilyn Salinas   Subject:RE: Non-Urgent Medical Question    Marilyn, I will go ahead and increase your dose of furosemide to 40 mg once a day and we will add a 20 mEq potassium pill once a day to block lowering of potassium that can come with the higher doses of furosemide/Lasix. I would like to see you in person in about 2 to 3 weeks to get to recheck your legs. Dr. Whitten      ----- Message -----   From:Marilyn Salinas   Sent:5/3/2021 5:23 PM PDT   To:Physician Edwar J Lc   Subject:Non-Urgent Medical Question    Dear Dr. Latif,  I have been going to the wound clinic once a week.  My water retention is getting worse. Have been taking the water  pills.  What do you suggest?  Thank you,  Marilyn Salinas

## 2021-06-17 ENCOUNTER — OFFICE VISIT (OUTPATIENT)
Dept: MEDICAL GROUP | Facility: MEDICAL CENTER | Age: 56
End: 2021-06-17
Attending: FAMILY MEDICINE
Payer: MEDICAID

## 2021-06-17 VITALS
WEIGHT: 151 LBS | TEMPERATURE: 97.5 F | OXYGEN SATURATION: 93 % | HEIGHT: 66 IN | HEART RATE: 76 BPM | RESPIRATION RATE: 16 BRPM | SYSTOLIC BLOOD PRESSURE: 96 MMHG | BODY MASS INDEX: 24.27 KG/M2 | DIASTOLIC BLOOD PRESSURE: 52 MMHG

## 2021-06-17 DIAGNOSIS — K21.9 GASTROESOPHAGEAL REFLUX DISEASE, UNSPECIFIED WHETHER ESOPHAGITIS PRESENT: ICD-10-CM

## 2021-06-17 DIAGNOSIS — R10.10 PAIN OF UPPER ABDOMEN: ICD-10-CM

## 2021-06-17 DIAGNOSIS — R60.0 LOWER EXTREMITY EDEMA: ICD-10-CM

## 2021-06-17 PROCEDURE — 99213 OFFICE O/P EST LOW 20 MIN: CPT | Performed by: FAMILY MEDICINE

## 2021-06-17 RX ORDER — PANTOPRAZOLE SODIUM 20 MG/1
TABLET, DELAYED RELEASE ORAL
COMMUNITY
Start: 2021-03-14 | End: 2021-08-27

## 2021-06-17 RX ORDER — TIZANIDINE HYDROCHLORIDE 2 MG/1
CAPSULE, GELATIN COATED ORAL
COMMUNITY
Start: 2021-03-05 | End: 2021-10-04 | Stop reason: SDUPTHER

## 2021-06-17 RX ORDER — FESOTERODINE FUMARATE 4 MG/1
TABLET, FILM COATED, EXTENDED RELEASE ORAL
COMMUNITY
Start: 2021-03-04 | End: 2022-10-18 | Stop reason: SDUPTHER

## 2021-06-17 RX ORDER — POLYETHYLENE GLYCOL 3350 17 G/17G
POWDER, FOR SOLUTION ORAL
COMMUNITY
Start: 2021-01-27 | End: 2021-08-27

## 2021-06-17 RX ORDER — GABAPENTIN 300 MG/1
3 CAPSULE ORAL
COMMUNITY
Start: 2021-03-02 | End: 2021-08-27

## 2021-06-17 RX ORDER — PANTOPRAZOLE SODIUM 20 MG/1
TABLET, DELAYED RELEASE ORAL
Qty: 30 TABLET | Refills: 6 | Status: SHIPPED | OUTPATIENT
Start: 2021-06-17 | End: 2021-11-22 | Stop reason: SDUPTHER

## 2021-06-17 ASSESSMENT — FIBROSIS 4 INDEX: FIB4 SCORE: .6396021490668313028

## 2021-06-17 NOTE — PROGRESS NOTES
"Subjective:      Marilyn Salinas is a 55 y.o. female who presents with Water Retention            HPI 1.  Lower extremity edema-patient is concerned over intermittent ankle swelling and persistent thickening in her lateral upper thighs wondering if it is water retention.  She does take 40 mg of Lasix on many days to combat the ankle edema.  She will typically take that in the morning.  Today she reports she has not taken it for several days.  She also notes distention of her abdomen as well.  2.  Early satiety-patient reports that frequently she will have to stop eating very early into an attempted needle feeling that she is overeating and that she is full.  At times she will notice distention and bloating of her abdomen as well.  This is been present over the past 2 months.  She is not actually having occasional difficulty swallowing water and it will on occasion regurgitate back up to her mouth.  She reports that she has been having regular bowel movements using daily prunes    ROS negative for bloody stoolsneg for bloody stools, fever, cough       Objective:     BP (!) 96/52   Pulse 76   Temp 36.4 °C (97.5 °F) (Temporal)   Resp 16   Ht 1.676 m (5' 5.98\")   Wt 68.5 kg (151 lb)   LMP 01/11/2017   SpO2 93%   BMI 24.38 kg/m²      Physical Exam  Gen.- alert, cooperative, in no acute distress  Neck- midline trachea, thyroid not enlarged or tender,supple, no cervical adenopathy  Chest-clear to auscultation and percussion with normal breath sounds. No retractions. Chest wall nontender  Cardiac- regular rhythm and rate. No murmur, thrill, or heave   Abdomen-normal contour,mild tenderness epigastric and L Mid abd. No palpable masses  Lower extremities- neg ankle edema, thighs have some lateral fat deposits, no pitting edema            Assessment/Plan:        1. Pain of upper abdomen    - US-ABDOMEN COMPLETE SURVEY; Future    2. Lower extremity edema    Plan: 1. LCheck abd US, consider GI referral  2. Continue " daily Lasix prn ankle edema, no dose change from 40mg  3. Recheck 1 mo

## 2021-06-19 DIAGNOSIS — S14.109A INJURY OF CERVICAL SPINAL CORD, INITIAL ENCOUNTER (HCC): ICD-10-CM

## 2021-06-19 DIAGNOSIS — R25.2 SPASTICITY: ICD-10-CM

## 2021-06-21 RX ORDER — BACLOFEN 10 MG/1
TABLET ORAL
Qty: 270 TABLET | Refills: 2 | Status: SHIPPED | OUTPATIENT
Start: 2021-06-21 | End: 2021-10-21

## 2021-06-22 DIAGNOSIS — J45.20 MILD INTERMITTENT ASTHMA WITHOUT COMPLICATION: ICD-10-CM

## 2021-06-22 RX ORDER — LORATADINE 10 MG/1
10 TABLET, ORALLY DISINTEGRATING ORAL DAILY
Qty: 30 TABLET | Refills: 6 | Status: SHIPPED | OUTPATIENT
Start: 2021-06-22 | End: 2021-07-23 | Stop reason: SDUPTHER

## 2021-06-29 ENCOUNTER — TELEPHONE (OUTPATIENT)
Dept: MEDICAL GROUP | Facility: MEDICAL CENTER | Age: 56
End: 2021-06-29

## 2021-07-14 ENCOUNTER — HOSPITAL ENCOUNTER (OUTPATIENT)
Dept: RADIOLOGY | Facility: MEDICAL CENTER | Age: 56
End: 2021-07-14
Attending: NURSE PRACTITIONER
Payer: MEDICAID

## 2021-07-14 ENCOUNTER — HOSPITAL ENCOUNTER (OUTPATIENT)
Dept: RADIOLOGY | Facility: MEDICAL CENTER | Age: 56
End: 2021-07-14
Attending: FAMILY MEDICINE
Payer: MEDICAID

## 2021-07-14 DIAGNOSIS — M54.12 RADICULOPATHY, CERVICAL: ICD-10-CM

## 2021-07-14 DIAGNOSIS — R10.10 PAIN OF UPPER ABDOMEN: ICD-10-CM

## 2021-07-14 PROCEDURE — 72141 MRI NECK SPINE W/O DYE: CPT

## 2021-07-14 PROCEDURE — 76700 US EXAM ABDOM COMPLETE: CPT

## 2021-07-15 ENCOUNTER — TELEPHONE (OUTPATIENT)
Dept: MEDICAL GROUP | Facility: MEDICAL CENTER | Age: 56
End: 2021-07-15

## 2021-07-15 DIAGNOSIS — R68.81 EARLY SATIETY: ICD-10-CM

## 2021-07-16 ENCOUNTER — TELEPHONE (OUTPATIENT)
Dept: MEDICAL GROUP | Facility: MEDICAL CENTER | Age: 56
End: 2021-07-16

## 2021-07-16 NOTE — TELEPHONE ENCOUNTER
----- Message from Edwar Platt M.D. sent at 7/15/2021  4:54 PM PDT -----  Moderate-sized solitary gallstone is seen although this would not normally cause early satiety in my experience.  Gallbladder could be removed, but I am not sure that would be helpful at this point.  We will set up a GI referral for second opinion.

## 2021-07-16 NOTE — TELEPHONE ENCOUNTER
Patient notified of US results and GI referral . I let her know that they should be giving her a call in the next week to get her in for the second opinion

## 2021-07-23 DIAGNOSIS — J45.20 MILD INTERMITTENT ASTHMA WITHOUT COMPLICATION: ICD-10-CM

## 2021-07-24 ENCOUNTER — HOSPITAL ENCOUNTER (OUTPATIENT)
Dept: LAB | Facility: MEDICAL CENTER | Age: 56
End: 2021-07-24
Attending: PHYSICAL MEDICINE & REHABILITATION
Payer: MEDICAID

## 2021-07-24 DIAGNOSIS — G82.50 TETRAPLEGIA (HCC): ICD-10-CM

## 2021-07-24 DIAGNOSIS — R25.2 SPASTICITY: ICD-10-CM

## 2021-07-24 LAB
ALBUMIN SERPL BCP-MCNC: 4.3 G/DL (ref 3.2–4.9)
ALBUMIN/GLOB SERPL: 1.8 G/DL
ALP SERPL-CCNC: 91 U/L (ref 30–99)
ALT SERPL-CCNC: 19 U/L (ref 2–50)
ANION GAP SERPL CALC-SCNC: 11 MMOL/L (ref 7–16)
AST SERPL-CCNC: 17 U/L (ref 12–45)
BILIRUB SERPL-MCNC: 0.4 MG/DL (ref 0.1–1.5)
BUN SERPL-MCNC: 13 MG/DL (ref 8–22)
CALCIUM SERPL-MCNC: 8.9 MG/DL (ref 8.5–10.5)
CHLORIDE SERPL-SCNC: 105 MMOL/L (ref 96–112)
CO2 SERPL-SCNC: 26 MMOL/L (ref 20–33)
CREAT SERPL-MCNC: 0.67 MG/DL (ref 0.5–1.4)
GLOBULIN SER CALC-MCNC: 2.4 G/DL (ref 1.9–3.5)
GLUCOSE SERPL-MCNC: 81 MG/DL (ref 65–99)
POTASSIUM SERPL-SCNC: 4.3 MMOL/L (ref 3.6–5.5)
PROT SERPL-MCNC: 6.7 G/DL (ref 6–8.2)
SODIUM SERPL-SCNC: 142 MMOL/L (ref 135–145)

## 2021-07-24 PROCEDURE — 80053 COMPREHEN METABOLIC PANEL: CPT

## 2021-07-24 PROCEDURE — 36415 COLL VENOUS BLD VENIPUNCTURE: CPT

## 2021-07-26 RX ORDER — LORATADINE 10 MG/1
10 TABLET, ORALLY DISINTEGRATING ORAL DAILY
Qty: 30 TABLET | Refills: 6 | Status: SHIPPED | OUTPATIENT
Start: 2021-07-26 | End: 2021-08-09 | Stop reason: SDUPTHER

## 2021-07-29 ENCOUNTER — OFFICE VISIT (OUTPATIENT)
Dept: MEDICAL GROUP | Facility: MEDICAL CENTER | Age: 56
End: 2021-07-29
Attending: FAMILY MEDICINE
Payer: MEDICAID

## 2021-07-29 VITALS
HEIGHT: 66 IN | OXYGEN SATURATION: 94 % | RESPIRATION RATE: 16 BRPM | WEIGHT: 151 LBS | TEMPERATURE: 96.8 F | HEART RATE: 80 BPM | BODY MASS INDEX: 24.27 KG/M2 | SYSTOLIC BLOOD PRESSURE: 110 MMHG | DIASTOLIC BLOOD PRESSURE: 66 MMHG

## 2021-07-29 DIAGNOSIS — R68.81 EARLY SATIETY: ICD-10-CM

## 2021-07-29 DIAGNOSIS — M21.372 LEFT FOOT DROP: ICD-10-CM

## 2021-07-29 DIAGNOSIS — R13.10 DYSPHAGIA, UNSPECIFIED TYPE: ICD-10-CM

## 2021-07-29 DIAGNOSIS — B35.1 ONYCHOMYCOSIS: ICD-10-CM

## 2021-07-29 PROCEDURE — 99214 OFFICE O/P EST MOD 30 MIN: CPT | Performed by: FAMILY MEDICINE

## 2021-07-29 PROCEDURE — 99213 OFFICE O/P EST LOW 20 MIN: CPT | Performed by: FAMILY MEDICINE

## 2021-07-29 ASSESSMENT — FIBROSIS 4 INDEX: FIB4 SCORE: 0.66

## 2021-07-29 NOTE — PROGRESS NOTES
Subjective:      Marilyn Salinas is a 56 y.o. female who presents with Abdominal Pain            HPI 1.  Onychomycosis-patient has severe karina horn deformity of all 5 toenails on her left foot.  She is attending wound clinic for a ulcer on the distal portion of her left great toe.  She was seen in the past by Dr. Herrera and they had a stormy last visit about a year ago.  2.  Left foot drop-patient has chronic weakness with a drop of her left foot at the ankle.  She is walking with a walker but tending to drag the tip of her left foot with occasional tripping and falling.  3.  Early satiety-patient reports that she still continues to feel early satiety.  Recent ultrasound was notable for a 5 mm gallstone with no gallbladder wall thickening.  She also reports about 2 times per week she will have difficulty swallowing and has to wash the food down with additional fluid.    ROS negative for current chest pain, orthopnea, palpitations  Social history-single, disabled  Current medication-  Prior to Admission medications    Medication Sig Start Date End Date Taking? Authorizing Provider   loratadine (CLARITIN REDITABS) 10 MG dissolvable tablet Take 1 tablet by mouth every day. 7/26/21   Edwar Platt M.D.   baclofen (LIORESAL) 10 MG Tab TAKE 3 TABLETS BY MOUTH THREE TIMES DAILY 6/21/21   Julianna Guillory D.O.   pantoprazole (PROTONIX) 20 MG tablet TAKE ONE TABLET BY MOUTH DAILY 6/17/21   Edwar Platt M.D.   fesoterodine fumarate (TOVIAZ) 4 MG TABLET SR 24 HR  3/4/21   Physician Outpatient   gabapentin (NEURONTIN) 300 MG Cap 3 Capsules. 3/2/21   Physician Outpatient   pantoprazole (PROTONIX) 20 MG tablet  3/14/21   Physician Outpatient   polyethylene glycol 3350 (MIRALAX) 17 GM/SCOOP Powder  1/27/21   Physician Outpatient   tizanidine (ZANAFLEX) 2 MG capsule  3/5/21   Physician Outpatient   gabapentin (NEURONTIN) 300 MG Cap TAKE 3 CAPSULES BY MOUTH THREE TIMES DAILY 5/13/21   Julianna Guillory D.O.    Cholecalciferol 2000 UNIT Tab Take 1 tablet by mouth every day. 5/10/21   Edwar Platt M.D.   furosemide (LASIX) 40 MG Tab Take 1 tablet by mouth every day. 5/4/21   Edwar Platt M.D.   potassium chloride SA (KDUR) 20 MEQ Tab CR Take 1 tablet by mouth 2 times a day. 5/4/21   Edwar Platt M.D.   tizanidine (ZANAFLEX) 2 MG capsule TAKE ONE CAPSULE BY MOUTH THREE TIMES A DAY  Patient not taking: Reported on 3/9/2021 3/5/21   Julianna Guillory D.O.   cephALEXin (KEFLEX) 500 MG Cap Take 1 Cap by mouth 4 times a day.  Patient not taking: Reported on 3/9/2021 2/9/21   Edwar Platt M.D.   meloxicam (MOBIC) 15 MG tablet TAKE 1 TABLET BY MOUTH DAILY 2/9/21   Edwar Platt M.D.   ciclopirox (PENLAC) 8 % solution  1/4/21   Physician Outpatient   divalproex (DEPAKOTE) 125 MG EC tablet  1/12/21   Physician Outpatient   mupirocin (BACTROBAN) 2 % Ointment Apply 1 Application topically 2 times a day. 12/21/20   Edwar Platt M.D.   fluticasone (FLONASE) 50 MCG/ACT nasal spray Administer 1 Spray into affected nostril(S) every day. 12/21/20   Edwar Platt M.D.   furosemide (LASIX) 20 MG Tab Take 1 Tab by mouth every day. 12/21/20   Edwar Platt M.D.   potassium chloride SA (KDUR) 20 MEQ Tab CR Take 1 Tab by mouth every day.  Patient not taking: Reported on 3/9/2021 12/21/20   Edwar Platt M.D.   Mirabegron ER (MYRBETRIQ) 25 MG TABLET SR 24 HR Take  by mouth every day.    Physician Outpatient   ALLERGY RELIEF 10 MG Tab  9/3/20   Physician Outpatient   ibuprofen (MOTRIN) 800 MG Tab  9/18/20   Physician Outpatient   HYDROcodone-acetaminophen (NORCO) 5-325 MG Tab per tablet  9/18/20   Physician Outpatient   TOVIAZ 4 MG TABLET SR 24 HR  10/20/20   Physician Outpatient   chlorhexidine (PERIDEX) 0.12 % Solution  9/18/20   Physician Outpatient   buPROPion (WELLBUTRIN XL) 150 MG XL tablet  10/12/20   Physician Outpatient   aripiprazole (ABILIFY) 20 MG tablet  8/28/20   Physician Outpatient  "  amoxicillin (AMOXIL) 500 MG Cap  9/18/20   Physician Outpatient   polyethylene glycol 3350 (MIRALAX) 17 GM/SCOOP Powder Take 17 g by mouth every day.  Patient not taking: Reported on 3/9/2021 10/22/20   Julianna Guillory D.O.   docusate sodium (COLACE) 100 MG Cap Take 2 Caps by mouth 2 times a day.  Patient not taking: Reported on 12/8/2020 10/7/20   Julianna Guillory D.O.   oxybutynin (DITROPAN XL) 15 MG CR tablet TAKE 1 TABLET BY MOUTH TWO TIMES DAILY  Patient not taking: Reported on 12/8/2020 9/18/20   Ann Morrissey A.P.R.NNona   albuterol 108 (90 Base) MCG/ACT Aero Soln inhalation aerosol Inhale 2 Puffs by mouth every 6 hours as needed for Shortness of Breath. 5/26/20   Edwar Platt M.D.   lactulose 10 GM/15ML Solution Take 15 mL by mouth 2 times a day.  Patient not taking: Reported on 12/8/2020 2/20/20   Edwar Platt M.D.   buPROPion SR (WELLBUTRIN-SR) 150 MG TABLET SR 12 HR sustained-release tablet Take 1 Tab by mouth 2 Times a Day.  Patient not taking: Reported on 3/9/2021 1/7/20   Edinson Gregory D.O.   venlafaxine XR (EFFEXOR XR) 75 MG CAPSULE SR 24 HR Take 1 Cap by mouth every evening. 1/7/20   Edinson Gregory D.O.   zolpidem (AMBIEN) 10 MG Tab Take 10 mg by mouth at bedtime as needed. 3/15/18   Physician Outpatient          Objective:     /66   Pulse 80   Temp 36 °C (96.8 °F) (Temporal)   Resp 16   Ht 1.676 m (5' 5.98\")   Wt 68.5 kg (151 lb)   LMP 01/11/2017   SpO2 94%   BMI 24.38 kg/m²      Physical Exam  Gen.- alert, cooperative, in no acute distress  Neck- midline trachea, thyroid not enlarged or tender,supple, no cervical adenopathy  Chest-clear to auscultation and percussion with normal breath sounds. No retractions. Chest wall nontender  Cardiac- regular rhythm and rate. No murmur, thrill, or heave  Lower extremities-negative for pretibial edema.  There are some subcutaneous fatty deposits symmetrically in the lateral upper thighs area but no discrete mass.  Patient " has marked left ankle weakness and almost trips over the toes of her left foot as she is walking behind her walker.  Left foot is notable for extreme karina horn deformity of all 5 nails extending 9 to 12 mm in various directions.   Abdomen- normal bowel sounds, soft without guarding. Liver and spleen not enlarged, no palpable masses or tenderness.              Assessment/Plan:        1. Onychomycosis      2. Early satiety      3. Dysphagia, unspecified type      4. Left foot drop    Plan: 1.  GI referral for evaluation of early satiety, dysphagia  2.  New referral sent to podiatry, Dr. Herrera  3.  Trial of a AFO brace left ankle to improve gait-[facility] order completed

## 2021-08-05 ENCOUNTER — TELEMEDICINE (OUTPATIENT)
Dept: PHYSICAL MEDICINE AND REHAB | Facility: REHABILITATION | Age: 56
End: 2021-08-05
Payer: MEDICAID

## 2021-08-05 DIAGNOSIS — R25.2 SPASTICITY: ICD-10-CM

## 2021-08-05 DIAGNOSIS — R32 URINARY INCONTINENCE, UNSPECIFIED TYPE: ICD-10-CM

## 2021-08-05 DIAGNOSIS — Z74.09 IMPAIRED MOBILITY AND ACTIVITIES OF DAILY LIVING: ICD-10-CM

## 2021-08-05 DIAGNOSIS — N31.9 NEUROGENIC BLADDER: ICD-10-CM

## 2021-08-05 DIAGNOSIS — Z78.9 IMPAIRED MOBILITY AND ACTIVITIES OF DAILY LIVING: ICD-10-CM

## 2021-08-05 DIAGNOSIS — S14.109D INJURY OF CERVICAL SPINAL CORD, SUBSEQUENT ENCOUNTER (HCC): Primary | ICD-10-CM

## 2021-08-05 PROBLEM — S14.109A CERVICAL SPINAL CORD INJURY (HCC): Status: ACTIVE | Noted: 2021-08-05

## 2021-08-05 PROCEDURE — 99214 OFFICE O/P EST MOD 30 MIN: CPT | Performed by: PHYSICAL MEDICINE & REHABILITATION

## 2021-08-05 RX ORDER — MIRABEGRON 50 MG/1
50 TABLET, FILM COATED, EXTENDED RELEASE ORAL DAILY
Qty: 30 TABLET | Refills: 5 | Status: SHIPPED | OUTPATIENT
Start: 2021-08-05 | End: 2021-10-21 | Stop reason: SDUPTHER

## 2021-08-05 NOTE — PROGRESS NOTES
[facility], [place]  [facility]  [address] [place] [id number]  Ph: [phone]    FOLLOW UP PATIENT EVALUATION    This evaluation was conducted via Zoom using secure and encrypted videoconferencing technology. The patient was in a private location in the [facility].    The patient's identity was confirmed and verbal consent was obtained for this virtual visit.  Patient Name: Marilyn Salinas   Patient : 1965  Patient Age: 56 y.o.   PCP: Edwar Platt M.D.    Examining Physician: Dr. Julianna Guillory, DO    SUBJECTIVE:   Patient Identification: Marilyn Salinas is a 56 y.o. RHD female with PMH significant for chronic back and neck pain and rehabilitation history significant for premorbid weakness and paresthesias, had GLF, imaging showing Disc herniation at C4-5 and C3- C4, with T2 hyperintensity of the cord and s/p ACDF C3-5 on 19 with Dr. Linton  and is presenting to PM&R clinic for a FOLLOW UP outpatient evaluation with the following chief complaint/s:    PM&R Background Information:  Original Date of Injury: 19 GLF and worsening weakness, pain.   Pertinent Procedure History: Disc herniation at C4-5 and C3- C4, with T2 hyperintensity of the cord at this level. she underwent ACDF C3-5 on 19 with Dr. Linton  Dates of Admission/Discharge to ARU: 19-20    Chief Complaint: Spinal cord injury    Accompanied by Today: Self  Interval History:   - Still has the swelling and denting in her legs which is problematic.   - Feels bloated and has poor appetite.   - CMP reviewed and LFTs normal.   - Fell in Spiritism the other day.   - Has had all of her teeth pulled and needs implants. Jaw wont hold dentures.   - Will start PT again.   - Tizanidine is helping but does not want to increase.   - Exercising around the house doing house chores.   - Bowel is okay, prunes at seem to be helping.     Review of Systems:  All other pertinent positive review of systems are  noted above in HPI.     Past Medical History:  Past Medical History:   Diagnosis Date   • Anxiety    • Arthritis     spine, feet    • Asthma    • Cataract    • Depression    • High cholesterol       Past Surgical History:   Procedure Laterality Date   • CERVICAL DISK AND FUSION ANTERIOR N/A 12/20/2019    Procedure: DISCECTOMY, SPINE, CERVICAL, ANTERIOR APPROACH, WITH FUSION - C3-5;  Surgeon: Connor Linton M.D.;  Location: SURGERY [facility];  Service: Neurosurgery   • CORPECTOMY N/A 12/20/2019    Procedure: CORPECTOMY;  Surgeon: Connor Linton M.D.;  Location: SURGERY [facility];  Service: Neurosurgery   • VAGINAL HYSTERECTOMY SCOPE TOTAL N/A 10/3/2017    Procedure: VAGINAL HYSTERECTOMY SCOPE TOTAL;  Surgeon: Hudson Driscoll M.D.;  Location: SURGERY SAME DAY [facility];  Service: Gynecology   • SALPINGECTOMY Bilateral 10/3/2017    Procedure: SALPINGECTOMY;  Surgeon: Hudson Driscoll M.D.;  Location: SURGERY SAME DAY [facility];  Service: Gynecology   • OOPHORECTOMY Bilateral 10/3/2017    Procedure: OOPHORECTOMY;  Surgeon: Hudson Driscoll M.D.;  Location: SURGERY SAME DAY [facility];  Service: Gynecology   • ANTERIOR AND POSTERIOR REPAIR Bilateral 10/3/2017    Procedure: ANTERIOR AND POSTERIOR REPAIR;  Surgeon: Hudson Driscoll M.D.;  Location: SURGERY SAME DAY [facility];  Service: Gynecology   • ENTEROCELE REPAIR N/A 10/3/2017    Procedure: ENTEROCELE REPAIR, PERINEOPLASTY;  Surgeon: Hudson Driscoll M.D.;  Location: SURGERY SAME DAY [facility];  Service: Gynecology   • BLADDER SLING FEMALE N/A 10/3/2017    Procedure: BLADDER SLING FEMALE TOT, CYSTOSCOPY;  Surgeon: Hudson Driscoll M.D.;  Location: SURGERY SAME DAY [facility];  Service: Gynecology   • VAGINAL SUSPENSION N/A 10/3/2017    Procedure: VAGINAL SUSPENSION SACROSPINOUS VAULT POSSIBLE;  Surgeon: Hudson Driscoll M.D.;  Location: SURGERY SAME DAY [facility];  Service: Gynecology   • OTHER Left 2005    hammertoe x2   • EYE  SURGERY  1972    for lazy eye   • LUMPECTOMY          Current Outpatient Medications:   •  Mirabegron ER (MYRBETRIQ) 50 MG TABLET SR 24 HR, Take 50 mg by mouth every day., Disp: 30 tablet, Rfl: 5  •  loratadine (CLARITIN REDITABS) 10 MG dissolvable tablet, Take 1 tablet by mouth every day., Disp: 30 tablet, Rfl: 6  •  baclofen (LIORESAL) 10 MG Tab, TAKE 3 TABLETS BY MOUTH THREE TIMES DAILY, Disp: 270 tablet, Rfl: 2  •  pantoprazole (PROTONIX) 20 MG tablet, TAKE ONE TABLET BY MOUTH DAILY, Disp: 30 tablet, Rfl: 6  •  fesoterodine fumarate (TOVIAZ) 4 MG TABLET SR 24 HR, , Disp: , Rfl:   •  gabapentin (NEURONTIN) 300 MG Cap, 3 Capsules., Disp: , Rfl:   •  pantoprazole (PROTONIX) 20 MG tablet, , Disp: , Rfl:   •  polyethylene glycol 3350 (MIRALAX) 17 GM/SCOOP Powder, , Disp: , Rfl:   •  tizanidine (ZANAFLEX) 2 MG capsule, , Disp: , Rfl:   •  gabapentin (NEURONTIN) 300 MG Cap, TAKE 3 CAPSULES BY MOUTH THREE TIMES DAILY, Disp: 270 capsule, Rfl: 2  •  Cholecalciferol 2000 UNIT Tab, Take 1 tablet by mouth every day., Disp: 60 tablet, Rfl: 6  •  furosemide (LASIX) 40 MG Tab, Take 1 tablet by mouth every day., Disp: 30 tablet, Rfl: 2  •  potassium chloride SA (KDUR) 20 MEQ Tab CR, Take 1 tablet by mouth 2 times a day., Disp: 30 tablet, Rfl: 2  •  tizanidine (ZANAFLEX) 2 MG capsule, TAKE ONE CAPSULE BY MOUTH THREE TIMES A DAY (Patient not taking: Reported on 3/9/2021), Disp: 90 capsule, Rfl: 2  •  cephALEXin (KEFLEX) 500 MG Cap, Take 1 Cap by mouth 4 times a day. (Patient not taking: Reported on 3/9/2021), Disp: 28 Cap, Rfl: 1  •  meloxicam (MOBIC) 15 MG tablet, TAKE 1 TABLET BY MOUTH DAILY, Disp: 30 Tab, Rfl: 6  •  ciclopirox (PENLAC) 8 % solution, , Disp: , Rfl:   •  divalproex (DEPAKOTE) 125 MG EC tablet, , Disp: , Rfl:   •  mupirocin (BACTROBAN) 2 % Ointment, Apply 1 Application topically 2 times a day., Disp: 22 g, Rfl: 0  •  fluticasone (FLONASE) 50 MCG/ACT nasal spray, Administer 1 Spray into affected nostril(S) every  day., Disp: 16 g, Rfl: 3  •  furosemide (LASIX) 20 MG Tab, Take 1 Tab by mouth every day., Disp: 30 Tab, Rfl: 6  •  potassium chloride SA (KDUR) 20 MEQ Tab CR, Take 1 Tab by mouth every day. (Patient not taking: Reported on 3/9/2021), Disp: 30 Tab, Rfl: 6  •  ALLERGY RELIEF 10 MG Tab, , Disp: , Rfl:   •  ibuprofen (MOTRIN) 800 MG Tab, , Disp: , Rfl:   •  HYDROcodone-acetaminophen (NORCO) 5-325 MG Tab per tablet, , Disp: , Rfl:   •  TOVIAZ 4 MG TABLET SR 24 HR, , Disp: , Rfl:   •  chlorhexidine (PERIDEX) 0.12 % Solution, , Disp: , Rfl:   •  buPROPion (WELLBUTRIN XL) 150 MG XL tablet, , Disp: , Rfl:   •  aripiprazole (ABILIFY) 20 MG tablet, , Disp: , Rfl:   •  amoxicillin (AMOXIL) 500 MG Cap, , Disp: , Rfl:   •  polyethylene glycol 3350 (MIRALAX) 17 GM/SCOOP Powder, Take 17 g by mouth every day. (Patient not taking: Reported on 3/9/2021), Disp: 507 g, Rfl: 3  •  docusate sodium (COLACE) 100 MG Cap, Take 2 Caps by mouth 2 times a day. (Patient not taking: Reported on 12/8/2020), Disp: 120 Cap, Rfl: 2  •  oxybutynin (DITROPAN XL) 15 MG CR tablet, TAKE 1 TABLET BY MOUTH TWO TIMES DAILY (Patient not taking: Reported on 12/8/2020), Disp: 60 Tab, Rfl: 1  •  albuterol 108 (90 Base) MCG/ACT Aero Soln inhalation aerosol, Inhale 2 Puffs by mouth every 6 hours as needed for Shortness of Breath., Disp: 8.5 g, Rfl: 6  •  lactulose 10 GM/15ML Solution, Take 15 mL by mouth 2 times a day. (Patient not taking: Reported on 12/8/2020), Disp: 1 Bottle, Rfl: 11  •  buPROPion SR (WELLBUTRIN-SR) 150 MG TABLET SR 12 HR sustained-release tablet, Take 1 Tab by mouth 2 Times a Day. (Patient not taking: Reported on 3/9/2021), Disp: 60 Tab, Rfl: 3  •  venlafaxine XR (EFFEXOR XR) 75 MG CAPSULE SR 24 HR, Take 1 Cap by mouth every evening., Disp: 30 Cap, Rfl: 3  •  zolpidem (AMBIEN) 10 MG Tab, Take 10 mg by mouth at bedtime as needed., Disp: , Rfl:   Allergies   Allergen Reactions   • Codeine Unspecified     Unknown reaction          Past Social  History:  Social History     Socioeconomic History   • Marital status: Single     Spouse name: Not on file   • Number of children: Not on file   • Years of education: Not on file   • Highest education level: Not on file   Occupational History   • Not on file   Tobacco Use   • Smoking status: Former Smoker     Packs/day: 0.25     Years: 26.00     Pack years: 6.50     Types: Cigarettes     Quit date: 3/1/2020     Years since quittin.4   • Smokeless tobacco: Never Used   Vaping Use   • Vaping Use: Never used   Substance and Sexual Activity   • Alcohol use: No   • Drug use: Yes     Types: Marijuana     Comment: marijuana daily   • Sexual activity: Not Currently   Other Topics Concern   •  Service No   • Blood Transfusions No   • Caffeine Concern No   • Occupational Exposure No   • Hobby Hazards No   • Sleep Concern Yes   • Stress Concern Yes   • Weight Concern Yes   • Special Diet No   • Back Care Yes   • Exercise No   • Bike Helmet Yes   • Seat Belt Yes   • Self-Exams Yes   Social History Narrative   • Not on file     Social Determinants of Health     Financial Resource Strain:    • Difficulty of Paying Living Expenses:    Food Insecurity:    • Worried About Running Out of Food in the Last Year:    • Ran Out of Food in the Last Year:    Transportation Needs:    • Lack of Transportation (Medical):    • Lack of Transportation (Non-Medical):    Physical Activity:    • Days of Exercise per Week:    • Minutes of Exercise per Session:    Stress:    • Feeling of Stress :    Social Connections:    • Frequency of Communication with Friends and Family:    • Frequency of Social Gatherings with Friends and Family:    • Attends Congregation Services:    • Active Member of Clubs or Organizations:    • Attends Club or Organization Meetings:    • Marital Status:    Intimate Partner Violence:    • Fear of Current or Ex-Partner:    • Emotionally Abused:    • Physically Abused:    • Sexually Abused:         Family  History:  Family History   Problem Relation Age of Onset   • Cancer Mother    • Alcohol/Drug Mother    • Cancer Brother    • Diabetes Maternal Aunt        Depression and Opioid Screening  PHQ-9:  Depression Screen (PHQ-2/PHQ-9) 6/22/2020 10/22/2020 12/8/2020   PHQ-2 Total Score - - -   PHQ-2 Total Score 6 6 0   PHQ-9 Total Score 24 21 -     Interpretation of PHQ-9 Total Score   Score Severity   1-4 No Depression   5-9 Mild Depression   10-14 Moderate Depression   15-19 Moderately Severe Depression   20-27 Severe Depression     Opioid Risk Score: No value filed.  Interpretation of Opioid Risk Score   Score 0-3 = Low risk of abuse. Do UDS at least once per year.  Score 4-7 = Moderate risk of abuse. Do UDS 1-4 times per year.  Score 8+ = High risk of abuse. Refer to specialist.      OBJECTIVE:   Vital Signs:  There were no vitals filed for this visit.     Pertinent Labs:  Lab Results   Component Value Date/Time    SODIUM 142 07/24/2021 11:38 AM    POTASSIUM 4.3 07/24/2021 11:38 AM    CHLORIDE 105 07/24/2021 11:38 AM    CO2 26 07/24/2021 11:38 AM    GLUCOSE 81 07/24/2021 11:38 AM    BUN 13 07/24/2021 11:38 AM    CREATININE 0.67 07/24/2021 11:38 AM       Lab Results   Component Value Date/Time    HBA1C 5.6 07/27/2017 09:39 AM       Lab Results   Component Value Date/Time    WBC 7.0 12/28/2019 05:12 AM    RBC 4.07 (L) 12/28/2019 05:12 AM    HEMOGLOBIN 12.4 12/28/2019 05:12 AM    HEMATOCRIT 37.9 12/28/2019 05:12 AM    MCV 93.1 12/28/2019 05:12 AM    MCH 30.5 12/28/2019 05:12 AM    MCHC 32.7 (L) 12/28/2019 05:12 AM    MPV 9.6 12/28/2019 05:12 AM    NEUTSPOLYS 29.40 (L) 12/28/2019 05:12 AM    LYMPHOCYTES 56.70 (H) 12/28/2019 05:12 AM    MONOCYTES 9.20 12/28/2019 05:12 AM    EOSINOPHILS 3.40 12/28/2019 05:12 AM    BASOPHILS 1.00 12/28/2019 05:12 AM       Lab Results   Component Value Date/Time    ASTSGOT 17 07/24/2021 11:38 AM    ALTSGPT 19 07/24/2021 11:38 AM        Physical Exam:   GEN: No apparent distress  HEENT: Head  normocephalic, atraumatic.  Sclera nonicteric bilaterally, no ocular discharge appreciated bilaterally.  PULMONARY: Breathing nonlabored on room air, no respiratory accessory muscle use.  Not requiring supplemental oxygen.  SKIN: No appreciable skin breakdown on exposed areas of skin.  PSYCH: Mood and affect within normal limits.  NEURO: Awake alert.  Conversational.  Logical thought content.      ASSESSMENT/PLAN: Marilyn Salinas  is a 56 y.o. female with rehabilitation history significant for premorbid weakness and paresthesias, had GLF, imaging showing Disc herniation at C4-5 and C3- C4, with T2 hyperintensity of the cord and s/p ACDF C3-5 on 12/20/19 with Dr. Linton, here for SCI follow up. The following plan was discussed with the patient who is in agreement.     Visit Diagnoses     ICD-10-CM   1. Injury of cervical spinal cord, subsequent encounter ([facility])  S14.109D   2. Neurogenic bladder  N31.9   3. Urinary incontinence, unspecified type  R32   4. Spasticity  R25.2   5. Impaired mobility and activities of daily living  Z74.09    Z78.9      Rehab/Neuro:   1. C2 AISD: Nontraumatic incomplete spinal cord injury, status post fall at home with worsening weakness, found to have severe cervical stenosis.  Status post C3-C5 ACDF with Dr. Linton on 12/20/19  -Therapy: Discharged from PT and has renewed for more therapy. Aug 13 - Oct 11 (for her neck)  -Function: Largely stable but patient does feel as though she is weaker lately.  Has had one fall.  Maintains strength through doing house chores.  -No longer getting epidural steroid injections, too painful.     2. Muscular dystrophy: ? CP per recent neurology evaluation.  Reportedly diagnosed in '60's per aunt. Normal CPK. Was seeing Nikunj back in 2018 - was supposed to have EMG/NCS. Medicaid wouldn't cover EMG/NCS at that time.  Scheduled for EMG/NCS 10/23/2020.  EEG reportedly negative.  Patient has since changed her neurologist and is now seeing Dr. Hartman. Had  EMG/NCS results -results not available but patient reports she was told she has paraplegia and partial CP.   - Continue f/u with Neurology.      Spasticity: Bilateral lower extremities worse than upper extremities.  -Med management: Continue baclofen 30 mg 3 times daily.    -Med management: Continue tizanidine 2 mg 3 times daily.   -Labs Reviewed: LFTs within normal limits at AST/ALT 17/19 7/24/21    Neuropathic Pain:   - Med Management: Continue 900 mg TID.      Neurogenic Bladder:   -Med management: Continue Myrbetriq and Toviaz.  -Med management: Increase Myrbetriq to 50 mg daily.  -Labs Reviewed: CMP showing normal BUN/Cr 7/24/21  -Established with Urology; wanted to do Botox but insurance does not cover.     Neurogenic Bowel: Eating 1 prune nightly.  -Counseled on use of bowel medications and altering it based on patient's bowel movements.      Follow up: Mid October after therapy has ended.      Please note that this dictation was created using voice recognition software. I have made every reasonable attempt to correct obvious errors but there may be errors of grammar and content that I may have overlooked prior to finalization of this note.    Dr. Julianna Guillory DO, MS  Department of Physical Medicine & Rehabilitation  Neuro Rehabilitation Clinic  [facility]

## 2021-08-17 ENCOUNTER — TELEMEDICINE (OUTPATIENT)
Dept: MEDICAL GROUP | Facility: MEDICAL CENTER | Age: 56
End: 2021-08-17
Attending: FAMILY MEDICINE
Payer: MEDICAID

## 2021-08-17 VITALS — BODY MASS INDEX: 24.27 KG/M2 | HEIGHT: 66 IN | WEIGHT: 151 LBS

## 2021-08-17 DIAGNOSIS — M21.372 LEFT FOOT DROP: ICD-10-CM

## 2021-08-17 DIAGNOSIS — J30.2 SEASONAL ALLERGIES: ICD-10-CM

## 2021-08-17 DIAGNOSIS — B35.1 ONYCHOMYCOSIS: ICD-10-CM

## 2021-08-17 DIAGNOSIS — R13.10 DYSPHAGIA, UNSPECIFIED TYPE: ICD-10-CM

## 2021-08-17 PROCEDURE — 99213 OFFICE O/P EST LOW 20 MIN: CPT | Performed by: FAMILY MEDICINE

## 2021-08-17 ASSESSMENT — FIBROSIS 4 INDEX: FIB4 SCORE: 0.66

## 2021-08-17 NOTE — PROGRESS NOTES
Telemedicine Video Visit: Established Patient   This Remote Face to Face encounter was conducted via Zoom. Given the importance of social distancing and other strategies recommended to reduce the risk of COVID-19 transmission, I am providing medical care to this patient via audio/video visit in place of an in person visit at the request of the patient. Verbal consent to telehealth, risks, benefits, and consequences were discussed. Patient retains the right to withdraw at any time. All existing confidentiality protections apply. The patient has access to all transmitted medical information. No dissemination of any patient images or information to other entities without further written consent.  Subjective:     Chief Complaint   Patient presents with   • Bladder Problem       Marilyn Salinas is a 56 y.o. female presenting for evaluation and management of:    1.  Left foot drop-patient reports she has been fitted at [facility] for the left AFO brace.  But there is concern the brace may be relatively heavy and cause her to be asymmetrically out of balance.  We will try to check with [facility] regarding their estimate for the weight of the brace.  I have assured her we are not asking for any type of boot that she would walk in.  2.  Onychomycosis-only podiatry referral available is in [place] which is too far for patient to navigate even by air at this time.  3.  Dysphagia/abdominal pain-patient does have a GI consult has requested that the appointment is not until 10/22/2021.  She reports weight is currently  unchanged  At 151 pounds.  4.  Seasonal allergies-patient reports that she had to suddenly stop taking her Claritin which she has been on over the long-term due to lack of refill authorization from her health plan.  She reports over the past several days she has noticed tinnitus and unsteadiness without the medication.  ROS   Denies any recent fevers or chills. No nausea or vomiting. No chest pains or  shortness of breath.     Allergies   Allergen Reactions   • Codeine Unspecified     Unknown reaction         Current medicines (including changes today)  Current Outpatient Medications   Medication Sig Dispense Refill   • loratadine (CLARITIN REDITABS) 10 MG dissolvable tablet Take 1 tablet by mouth every day. 30 tablet 0   • Mirabegron ER (MYRBETRIQ) 50 MG TABLET SR 24 HR Take 50 mg by mouth every day. 30 tablet 5   • baclofen (LIORESAL) 10 MG Tab TAKE 3 TABLETS BY MOUTH THREE TIMES DAILY 270 tablet 2   • pantoprazole (PROTONIX) 20 MG tablet TAKE ONE TABLET BY MOUTH DAILY 30 tablet 6   • fesoterodine fumarate (TOVIAZ) 4 MG TABLET SR 24 HR      • gabapentin (NEURONTIN) 300 MG Cap 3 Capsules.     • pantoprazole (PROTONIX) 20 MG tablet      • polyethylene glycol 3350 (MIRALAX) 17 GM/SCOOP Powder      • tizanidine (ZANAFLEX) 2 MG capsule      • gabapentin (NEURONTIN) 300 MG Cap TAKE 3 CAPSULES BY MOUTH THREE TIMES DAILY 270 capsule 2   • Cholecalciferol 2000 UNIT Tab Take 1 tablet by mouth every day. 60 tablet 6   • furosemide (LASIX) 40 MG Tab Take 1 tablet by mouth every day. 30 tablet 2   • potassium chloride SA (KDUR) 20 MEQ Tab CR Take 1 tablet by mouth 2 times a day. 30 tablet 2   • tizanidine (ZANAFLEX) 2 MG capsule TAKE ONE CAPSULE BY MOUTH THREE TIMES A DAY (Patient not taking: Reported on 3/9/2021) 90 capsule 2   • cephALEXin (KEFLEX) 500 MG Cap Take 1 Cap by mouth 4 times a day. (Patient not taking: Reported on 3/9/2021) 28 Cap 1   • meloxicam (MOBIC) 15 MG tablet TAKE 1 TABLET BY MOUTH DAILY 30 Tab 6   • ciclopirox (PENLAC) 8 % solution      • divalproex (DEPAKOTE) 125 MG EC tablet      • mupirocin (BACTROBAN) 2 % Ointment Apply 1 Application topically 2 times a day. 22 g 0   • fluticasone (FLONASE) 50 MCG/ACT nasal spray Administer 1 Spray into affected nostril(S) every day. 16 g 3   • furosemide (LASIX) 20 MG Tab Take 1 Tab by mouth every day. 30 Tab 6   • potassium chloride SA (KDUR) 20 MEQ Tab CR Take 1  Tab by mouth every day. (Patient not taking: Reported on 3/9/2021) 30 Tab 6   • ALLERGY RELIEF 10 MG Tab      • ibuprofen (MOTRIN) 800 MG Tab      • HYDROcodone-acetaminophen (NORCO) 5-325 MG Tab per tablet      • TOVIAZ 4 MG TABLET SR 24 HR      • chlorhexidine (PERIDEX) 0.12 % Solution      • buPROPion (WELLBUTRIN XL) 150 MG XL tablet      • aripiprazole (ABILIFY) 20 MG tablet      • amoxicillin (AMOXIL) 500 MG Cap      • polyethylene glycol 3350 (MIRALAX) 17 GM/SCOOP Powder Take 17 g by mouth every day. (Patient not taking: Reported on 3/9/2021) 507 g 3   • docusate sodium (COLACE) 100 MG Cap Take 2 Caps by mouth 2 times a day. (Patient not taking: Reported on 12/8/2020) 120 Cap 2   • oxybutynin (DITROPAN XL) 15 MG CR tablet TAKE 1 TABLET BY MOUTH TWO TIMES DAILY (Patient not taking: Reported on 12/8/2020) 60 Tab 1   • albuterol 108 (90 Base) MCG/ACT Aero Soln inhalation aerosol Inhale 2 Puffs by mouth every 6 hours as needed for Shortness of Breath. 8.5 g 6   • lactulose 10 GM/15ML Solution Take 15 mL by mouth 2 times a day. (Patient not taking: Reported on 12/8/2020) 1 Bottle 11   • buPROPion SR (WELLBUTRIN-SR) 150 MG TABLET SR 12 HR sustained-release tablet Take 1 Tab by mouth 2 Times a Day. (Patient not taking: Reported on 3/9/2021) 60 Tab 3   • venlafaxine XR (EFFEXOR XR) 75 MG CAPSULE SR 24 HR Take 1 Cap by mouth every evening. 30 Cap 3   • zolpidem (AMBIEN) 10 MG Tab Take 10 mg by mouth at bedtime as needed.       No current facility-administered medications for this visit.       Patient Active Problem List    Diagnosis Date Noted   • Cervical spinal cord injury (HCC) 08/05/2021   • Other cerebral palsy (HCC) 12/21/2020   • Bilateral leg edema 12/21/2020   • Onychomycosis 12/21/2020   • 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   • Status post cervical spinal fusion 06/19/2020   • Neurogenic bowel 01/07/2020   • Neurogenic bladder 01/07/2020   • Neuropathic pain 01/07/2020   • Quadriplegia,  "C1-C4 incomplete (HCC) 01/07/2020   • Postoperative pain 01/07/2020   • Idiopathic hypotension 12/26/2019   • Lumbar radiculopathy 12/19/2019   • Cervical spine pain 12/19/2019   • Foraminal stenosis of cervical region 04/12/2019   • Post concussive syndrome 02/22/2019   • Epistaxis 02/22/2019   • Vertigo 02/22/2019   • Risk for falls 12/31/2018   • Cough 10/30/2018   • Corns and callus 08/21/2018   • Pelvic pain 10/03/2017   • Vitamin D deficiency 07/31/2017   • Hyperlipidemia, unspecified 07/31/2017   • Assistance with transportation 07/31/2017   • Mild intermittent asthma 06/19/2017   • Tobacco use 06/19/2017   • Anxiety and depression 06/19/2017   • Acid indigestion 06/19/2017   • DDD (degenerative disc disease), lumbar 06/19/2017   • Dolly-menopause 06/19/2017   • Chest congestion 06/19/2017   • Pain in both feet 06/19/2017   • Poor vision 06/19/2017       Family History   Problem Relation Age of Onset   • Cancer Mother    • Alcohol/Drug Mother    • Cancer Brother    • Diabetes Maternal Aunt        She  has a past medical history of Anxiety, Arthritis, Asthma, Cataract, Depression, and High cholesterol.  She  has a past surgical history that includes eye surgery (1972); lumpectomy; other (Left, 2005); vaginal hysterectomy scope total (N/A, 10/3/2017); salpingectomy (Bilateral, 10/3/2017); oophorectomy (Bilateral, 10/3/2017); anterior and posterior repair (Bilateral, 10/3/2017); enterocele repair (N/A, 10/3/2017); bladder sling female (N/A, 10/3/2017); vaginal suspension (N/A, 10/3/2017); cervical disk and fusion anterior (N/A, 12/20/2019); and corpectomy (N/A, 12/20/2019).       Objective:   Vitals obtained by patient:  Ht 1.676 m (5' 5.98\")   Wt 68.5 kg (151 lb)   LMP 01/11/2017   BMI 24.38 kg/m²     Physical Exam:  Constitutional: Alert, no distress, well-groomed.  Skin: No rashes in visible areas.  Eye: Round. Conjunctiva clear, lids normal. No icterus.   ENMT: Lips pink without lesions, good dentition, " moist mucous membranes. Phonation normal.  Neck: No masses, no thyromegaly. Moves freely without pain.  CV: Pulse as reported by patient  Respiratory: Unlabored respiratory effort, no cough or audible wheeze  Psych: Alert and oriented x3, normal affect and mood.       Assessment and Plan:   The following treatment plan was discussed:     1. Dysphagia, unspecified type    2. Onychomycosis    3. Left foot drop    4. Seasonal allergies        Follow-up: 1.  We will check to see whether Zyrtec would be covered through her Medicaid FFS since they are not covering Claritin  2.  Patient may need to purchase Claritin privately if Zyrtec is not covered  3.  Check on the description and weight of her AFO brace  4.  Patient is asked to schedule a visit with me about 1 week after she sees GI in October    Face to Face Video Visit:   I spent 15 minutes with patient/guardian and I conducted this visit with audio and video present.  Edawr Platt M.D.

## 2021-08-19 DIAGNOSIS — R60.0 BILATERAL LEG EDEMA: ICD-10-CM

## 2021-08-19 RX ORDER — FUROSEMIDE 40 MG/1
40 TABLET ORAL DAILY
Qty: 30 TABLET | Refills: 2 | Status: SHIPPED | OUTPATIENT
Start: 2021-08-19 | End: 2021-08-27

## 2021-08-27 ENCOUNTER — OFFICE VISIT (OUTPATIENT)
Dept: URGENT CARE | Facility: PHYSICIAN GROUP | Age: 56
End: 2021-08-27
Payer: MEDICAID

## 2021-08-27 VITALS
DIASTOLIC BLOOD PRESSURE: 80 MMHG | HEIGHT: 62 IN | RESPIRATION RATE: 14 BRPM | BODY MASS INDEX: 27.6 KG/M2 | OXYGEN SATURATION: 95 % | WEIGHT: 150 LBS | SYSTOLIC BLOOD PRESSURE: 144 MMHG | HEART RATE: 83 BPM | TEMPERATURE: 98 F

## 2021-08-27 DIAGNOSIS — Z48.02 VISIT FOR SUTURE REMOVAL: ICD-10-CM

## 2021-08-27 PROCEDURE — 99212 OFFICE O/P EST SF 10 MIN: CPT | Performed by: PHYSICIAN ASSISTANT

## 2021-08-27 ASSESSMENT — FIBROSIS 4 INDEX: FIB4 SCORE: 0.66

## 2021-08-27 NOTE — PROGRESS NOTES
Chief Complaint   Patient presents with   • Suture / Staple Removal     pt states she has 8 stitches to be removed, forehead       HISTORY OF PRESENT ILLNESS: Patient is a 56 y.o. female who presents today for the following:    Patient is here for suture removal.  She had 8 sutures placed in her forehead 8 days ago at an outside facility.  She denies any issues.    Patient Active Problem List    Diagnosis Date Noted   • Cervical spinal cord injury ([facility]) 08/05/2021   • Other cerebral palsy ([facility]) 12/21/2020   • Bilateral leg edema 12/21/2020   • Onychomycosis 12/21/2020   • Cerebral paresis with homolateral ataxia ([facility]) 06/19/2020   • Status post cervical spinal fusion 06/19/2020   • Neurogenic bowel 01/07/2020   • Neurogenic bladder 01/07/2020   • Neuropathic pain 01/07/2020   • Quadriplegia, C1-C4 incomplete ([facility]) 01/07/2020   • Postoperative pain 01/07/2020   • Idiopathic hypotension 12/26/2019   • Lumbar radiculopathy 12/19/2019   • Cervical spine pain 12/19/2019   • Foraminal stenosis of cervical region 04/12/2019   • Post concussive syndrome 02/22/2019   • Epistaxis 02/22/2019   • Vertigo 02/22/2019   • Risk for falls 12/31/2018   • Cough 10/30/2018   • Corns and callus 08/21/2018   • Pelvic pain 10/03/2017   • Vitamin D deficiency 07/31/2017   • Hyperlipidemia, unspecified 07/31/2017   • Assistance with transportation 07/31/2017   • Mild intermittent asthma 06/19/2017   • Tobacco use 06/19/2017   • Anxiety and depression 06/19/2017   • Acid indigestion 06/19/2017   • DDD (degenerative disc disease), lumbar 06/19/2017   • Dolly-menopause 06/19/2017   • Chest congestion 06/19/2017   • Pain in both feet 06/19/2017   • Poor vision 06/19/2017       Allergies:Codeine    Current Outpatient Medications Ordered in Epic   Medication Sig Dispense Refill   • loratadine (CLARITIN REDITABS) 10 MG dissolvable tablet Take 1 tablet by mouth every day. 30 tablet 0   • Mirabegron ER (MYRBETRIQ) 50 MG TABLET SR 24 HR Take 50 mg by  mouth every day. 30 tablet 5   • baclofen (LIORESAL) 10 MG Tab TAKE 3 TABLETS BY MOUTH THREE TIMES DAILY 270 tablet 2   • pantoprazole (PROTONIX) 20 MG tablet TAKE ONE TABLET BY MOUTH DAILY 30 tablet 6   • fesoterodine fumarate (TOVIAZ) 4 MG TABLET SR 24 HR      • tizanidine (ZANAFLEX) 2 MG capsule      • gabapentin (NEURONTIN) 300 MG Cap TAKE 3 CAPSULES BY MOUTH THREE TIMES DAILY 270 capsule 2   • Cholecalciferol 2000 UNIT Tab Take 1 tablet by mouth every day. 60 tablet 6   • meloxicam (MOBIC) 15 MG tablet TAKE 1 TABLET BY MOUTH DAILY 30 Tab 6   • ciclopirox (PENLAC) 8 % solution      • divalproex (DEPAKOTE) 125 MG EC tablet      • mupirocin (BACTROBAN) 2 % Ointment Apply 1 Application topically 2 times a day. 22 g 0   • fluticasone (FLONASE) 50 MCG/ACT nasal spray Administer 1 Spray into affected nostril(S) every day. 16 g 3   • potassium chloride SA (KDUR) 20 MEQ Tab CR Take 1 Tab by mouth every day. 30 Tab 6   • ALLERGY RELIEF 10 MG Tab      • TOVIAZ 4 MG TABLET SR 24 HR      • chlorhexidine (PERIDEX) 0.12 % Solution      • buPROPion (WELLBUTRIN XL) 150 MG XL tablet      • aripiprazole (ABILIFY) 20 MG tablet      • albuterol 108 (90 Base) MCG/ACT Aero Soln inhalation aerosol Inhale 2 Puffs by mouth every 6 hours as needed for Shortness of Breath. 8.5 g 6   • buPROPion SR (WELLBUTRIN-SR) 150 MG TABLET SR 12 HR sustained-release tablet Take 1 Tab by mouth 2 Times a Day. 60 Tab 3   • venlafaxine XR (EFFEXOR XR) 75 MG CAPSULE SR 24 HR Take 1 Cap by mouth every evening. 30 Cap 3   • zolpidem (AMBIEN) 10 MG Tab Take 10 mg by mouth at bedtime as needed.       No current Epic-ordered facility-administered medications on file.       Past Medical History:   Diagnosis Date   • Anxiety    • Arthritis     spine, feet    • Asthma    • Cataract    • Depression    • High cholesterol        Social History     Tobacco Use   • Smoking status: Former Smoker     Packs/day: 0.25     Years: 26.00     Pack years: 6.50     Types:  "Cigarettes     Quit date: 3/1/2020     Years since quittin.4   • Smokeless tobacco: Never Used   Vaping Use   • Vaping Use: Never used   Substance Use Topics   • Alcohol use: No   • Drug use: Yes     Types: Marijuana     Comment: marijuana daily       Family Status   Relation Name Status   • Mo  Alive   • Bro  (Not Specified)   • MAunt  (Not Specified)   • Fa  Alive     Family History   Problem Relation Age of Onset   • Cancer Mother    • Alcohol/Drug Mother    • Cancer Brother    • Diabetes Maternal Aunt        Review of Systems:  Constitutional ROS: No unexpected change in weight, No weakness, No fatigue  Pulmonary ROS: No chronic cough, sputum, or hemoptysis, No dyspnea on exertion, No wheezing  Cardiovascular ROS: No diaphoresis, No edema, No palpitations  Hematologic/Lymphatic ROS: No chills, No night sweats, No weight loss  Skin/Integumentary ROS: sutures in forehead      Exam:  /80   Pulse 83   Temp 36.7 °C (98 °F)   Resp 14   Ht 1.575 m (5' 2\")   Wt 68 kg (150 lb)   SpO2 95%   General: Well developed, well nourished. No distress.    HENT: Head is grossly normal.  Pulmonary: Unlabored respiratory effort.   Neurologic: Grossly nonfocal. No facial asymmetry noted.  Skin: 8 sutures removed from the forehead at the hairline without incident.  Psych: Normal mood. Alert and oriented to person, place and time.    Assessment/Plan:  Discussed wound care at home.  Discussed appropriate over-the-counter symptomatic medication, and when to return to clinic. Follow up for worsening or persistent symptoms.  1. Visit for suture removal         "

## 2021-08-30 ENCOUNTER — OFFICE VISIT (OUTPATIENT)
Dept: URGENT CARE | Facility: PHYSICIAN GROUP | Age: 56
End: 2021-08-30
Payer: MEDICAID

## 2021-08-30 VITALS
HEIGHT: 66 IN | SYSTOLIC BLOOD PRESSURE: 130 MMHG | OXYGEN SATURATION: 95 % | RESPIRATION RATE: 18 BRPM | TEMPERATURE: 98.1 F | BODY MASS INDEX: 24.91 KG/M2 | DIASTOLIC BLOOD PRESSURE: 80 MMHG | HEART RATE: 88 BPM | WEIGHT: 155 LBS

## 2021-08-30 DIAGNOSIS — R31.9 HEMATURIA, UNSPECIFIED TYPE: ICD-10-CM

## 2021-08-30 DIAGNOSIS — R10.9 FLANK PAIN: ICD-10-CM

## 2021-08-30 DIAGNOSIS — K62.5 RECTAL BLEEDING: ICD-10-CM

## 2021-08-30 LAB
APPEARANCE UR: CLEAR
BILIRUB UR STRIP-MCNC: NEGATIVE MG/DL
COLOR UR AUTO: YELLOW
GLUCOSE UR STRIP.AUTO-MCNC: NEGATIVE MG/DL
KETONES UR STRIP.AUTO-MCNC: NEGATIVE MG/DL
LEUKOCYTE ESTERASE UR QL STRIP.AUTO: NEGATIVE
NITRITE UR QL STRIP.AUTO: NEGATIVE
PH UR STRIP.AUTO: 6 [PH] (ref 5–8)
PROT UR QL STRIP: NEGATIVE MG/DL
RBC UR QL AUTO: ABNORMAL
SP GR UR STRIP.AUTO: 1.03
UROBILINOGEN UR STRIP-MCNC: 0.2 MG/DL

## 2021-08-30 PROCEDURE — 81002 URINALYSIS NONAUTO W/O SCOPE: CPT | Performed by: PHYSICIAN ASSISTANT

## 2021-08-30 PROCEDURE — 99214 OFFICE O/P EST MOD 30 MIN: CPT | Mod: 25 | Performed by: PHYSICIAN ASSISTANT

## 2021-08-30 ASSESSMENT — FIBROSIS 4 INDEX: FIB4 SCORE: 0.66

## 2021-08-30 NOTE — PROGRESS NOTES
Chief Complaint   Patient presents with   • Vaginal Bleeding     fell about  2 weeks ago   • Low Back Pain       HISTORY OF PRESENT ILLNESS: Patient is a 56 y.o. female who presents today for the following:    Reports vaginal bleeding; on TP when wiping  Started today; used a pad this morning, has small drops  Left flank pain and LBP started this morning  4/10; burning sensation  Denies any urinary symptoms   Fell 2 weeks ago against her walker; abdomen struck walker    Patient Active Problem List    Diagnosis Date Noted   • Cervical spinal cord injury ([facility]) 08/05/2021   • Other cerebral palsy ([facility]) 12/21/2020   • Bilateral leg edema 12/21/2020   • Onychomycosis 12/21/2020   • Cerebral paresis with homolateral ataxia ([facility]) 06/19/2020   • Status post cervical spinal fusion 06/19/2020   • Neurogenic bowel 01/07/2020   • Neurogenic bladder 01/07/2020   • Neuropathic pain 01/07/2020   • Quadriplegia, C1-C4 incomplete ([facility]) 01/07/2020   • Postoperative pain 01/07/2020   • Idiopathic hypotension 12/26/2019   • Lumbar radiculopathy 12/19/2019   • Cervical spine pain 12/19/2019   • Foraminal stenosis of cervical region 04/12/2019   • Post concussive syndrome 02/22/2019   • Epistaxis 02/22/2019   • Vertigo 02/22/2019   • Risk for falls 12/31/2018   • Cough 10/30/2018   • Corns and callus 08/21/2018   • Pelvic pain 10/03/2017   • Vitamin D deficiency 07/31/2017   • Hyperlipidemia, unspecified 07/31/2017   • Assistance with transportation 07/31/2017   • Mild intermittent asthma 06/19/2017   • Tobacco use 06/19/2017   • Anxiety and depression 06/19/2017   • Acid indigestion 06/19/2017   • DDD (degenerative disc disease), lumbar 06/19/2017   • Dolly-menopause 06/19/2017   • Chest congestion 06/19/2017   • Pain in both feet 06/19/2017   • Poor vision 06/19/2017       Allergies:Codeine    Current Outpatient Medications Ordered in Epic   Medication Sig Dispense Refill   • Mirabegron ER (MYRBETRIQ) 50 MG TABLET SR 24 HR Take 50 mg by  mouth every day. 30 tablet 5   • baclofen (LIORESAL) 10 MG Tab TAKE 3 TABLETS BY MOUTH THREE TIMES DAILY 270 tablet 2   • pantoprazole (PROTONIX) 20 MG tablet TAKE ONE TABLET BY MOUTH DAILY 30 tablet 6   • fesoterodine fumarate (TOVIAZ) 4 MG TABLET SR 24 HR      • tizanidine (ZANAFLEX) 2 MG capsule      • gabapentin (NEURONTIN) 300 MG Cap TAKE 3 CAPSULES BY MOUTH THREE TIMES DAILY 270 capsule 2   • Cholecalciferol 2000 UNIT Tab Take 1 tablet by mouth every day. 60 tablet 6   • meloxicam (MOBIC) 15 MG tablet TAKE 1 TABLET BY MOUTH DAILY 30 Tab 6   • ciclopirox (PENLAC) 8 % solution      • divalproex (DEPAKOTE) 125 MG EC tablet      • mupirocin (BACTROBAN) 2 % Ointment Apply 1 Application topically 2 times a day. 22 g 0   • fluticasone (FLONASE) 50 MCG/ACT nasal spray Administer 1 Spray into affected nostril(S) every day. 16 g 3   • potassium chloride SA (KDUR) 20 MEQ Tab CR Take 1 Tab by mouth every day. 30 Tab 6   • ALLERGY RELIEF 10 MG Tab      • TOVIAZ 4 MG TABLET SR 24 HR      • chlorhexidine (PERIDEX) 0.12 % Solution      • buPROPion (WELLBUTRIN XL) 150 MG XL tablet      • aripiprazole (ABILIFY) 20 MG tablet      • albuterol 108 (90 Base) MCG/ACT Aero Soln inhalation aerosol Inhale 2 Puffs by mouth every 6 hours as needed for Shortness of Breath. 8.5 g 6   • venlafaxine XR (EFFEXOR XR) 75 MG CAPSULE SR 24 HR Take 1 Cap by mouth every evening. 30 Cap 3   • zolpidem (AMBIEN) 10 MG Tab Take 10 mg by mouth at bedtime as needed.       No current Epic-ordered facility-administered medications on file.       Past Medical History:   Diagnosis Date   • Anxiety    • Arthritis     spine, feet    • Asthma    • Cataract    • Depression    • High cholesterol        Social History     Tobacco Use   • Smoking status: Former Smoker     Packs/day: 0.25     Years: 26.00     Pack years: 6.50     Types: Cigarettes     Quit date: 3/1/2020     Years since quittin.4   • Smokeless tobacco: Never Used   Vaping Use   • Vaping Use:  "Never used   Substance Use Topics   • Alcohol use: No   • Drug use: Yes     Types: Marijuana     Comment: marijuana daily       Family Status   Relation Name Status   • Mo  Alive   • Bro  (Not Specified)   • MAunt  (Not Specified)   • Fa  Alive     Family History   Problem Relation Age of Onset   • Cancer Mother    • Alcohol/Drug Mother    • Cancer Brother    • Diabetes Maternal Aunt        Review of Systems:    Constitutional ROS: No unexpected change in weight, No weakness, No fatigue  Pulmonary ROS: No chronic cough, sputum, or hemoptysis, No dyspnea on exertion, No wheezing  Cardiovascular ROS: No diaphoresis, No edema, No palpitations  Gastrointestinal ROS: No change in bowel habits, No significant change in appetite, No nausea, vomiting, diarrhea, or constipation  Hematologic/Lymphatic ROS: No chills, No night sweats, No weight loss  Skin/Integumentary ROS: No edema, No evidence of rash, No itching    Exam:  /80   Pulse 88   Temp 36.7 °C (98.1 °F) (Temporal)   Resp 18   Ht 1.676 m (5' 6\")   Wt 70.3 kg (155 lb)   SpO2 95%   General: Well developed, well nourished. No distress.  Pulmonary: Unlabored respiratory effort.   Back: No CVA tenderness noted.  No localized tenderness noted on the back.  No bruising or skin changes noted in the area of pain.  Abdomen: Soft, nondistended, nontender to palpation.  : Very small linear area of irritation just superior to the introitus without active bleeding.  No vaginal bleeding noted.  Speculum exam deferred.  Dried blood noted perianally without any bleeding noted from the rectum.  YUNIER deferred.  Neurologic: Grossly nonfocal. No facial asymmetry noted.  Skin: Warm, dry, good turgor. No rashes in visible areas.   Psych: Normal mood. Alert and oriented x3. Judgment and insight is normal.    Results for orders placed or performed in visit on 08/30/21   POCT Urinalysis   Result Value Ref Range    POC Color yellow Negative    POC Appearance clear Negative    POC " Leukocyte Esterase negative Negative    POC Nitrites negative Negative    POC Urobiligen 0.2 Negative (0.2) mg/dL    POC Protein negative Negative mg/dL    POC Urine PH 6.0 5.0 - 8.0    POC Blood small Negative    POC Specific Gravity 1.030 <1.005 - >1.030    POC Ketones negative Negative mg/dL    POC Bilirubin negative Negative mg/dL    POC Glucose negative Negative mg/dL       Assessment/Plan:  Unsure etiology of the blood.  Patient is status post total hysterectomy.  Patient does suffer from chronic constipation but has not noticed any blood in her stool.  FIT test will be ordered and patient will call for a follow-up appointment with her primary care provider.  1. Flank pain  POCT Urinalysis   2. Rectal bleeding  OCCULT BLOOD FECES IMMUNOASSAY    ?  Unsure if it is rectal bleeding versus  bleeding. FIT test ordered.   3. Hematuria, unspecified type

## 2021-09-20 DIAGNOSIS — M51.36 DDD (DEGENERATIVE DISC DISEASE), LUMBAR: ICD-10-CM

## 2021-09-20 RX ORDER — MELOXICAM 15 MG/1
TABLET ORAL
Qty: 30 TABLET | Refills: 6 | Status: SHIPPED | OUTPATIENT
Start: 2021-09-20 | End: 2022-05-01

## 2021-10-04 RX ORDER — TIZANIDINE HYDROCHLORIDE 2 MG/1
2 CAPSULE, GELATIN COATED ORAL 3 TIMES DAILY
Qty: 90 CAPSULE | Refills: 0 | Status: SHIPPED | OUTPATIENT
Start: 2021-10-04 | End: 2021-10-21 | Stop reason: SDUPTHER

## 2021-10-04 NOTE — TELEPHONE ENCOUNTER
Patient called to request refill on tizanidine (ZANAFLEX) 2 MG capsule tizanidine be sent to [facility] on [address]

## 2021-10-07 DIAGNOSIS — M79.2 NEUROPATHIC PAIN: ICD-10-CM

## 2021-10-07 DIAGNOSIS — G82.50 TETRAPLEGIA (HCC): ICD-10-CM

## 2021-10-07 RX ORDER — GABAPENTIN 300 MG/1
CAPSULE ORAL
Qty: 270 CAPSULE | Refills: 2 | Status: SHIPPED | OUTPATIENT
Start: 2021-10-07 | End: 2021-10-21

## 2021-10-07 NOTE — TELEPHONE ENCOUNTER
Patient called to request a refill on   gabapentin (NEURONTIN) 300 MG Cap [id number] - [place], [place] - [address] [facility] [address]

## 2021-10-21 ENCOUNTER — PATIENT MESSAGE (OUTPATIENT)
Dept: MEDICAL GROUP | Facility: MEDICAL CENTER | Age: 56
End: 2021-10-21

## 2021-10-21 ENCOUNTER — OFFICE VISIT (OUTPATIENT)
Dept: PHYSICAL MEDICINE AND REHAB | Facility: REHABILITATION | Age: 56
End: 2021-10-21
Payer: MEDICAID

## 2021-10-21 VITALS
WEIGHT: 150 LBS | RESPIRATION RATE: 17 BRPM | DIASTOLIC BLOOD PRESSURE: 82 MMHG | BODY MASS INDEX: 24.11 KG/M2 | SYSTOLIC BLOOD PRESSURE: 128 MMHG | HEART RATE: 97 BPM | TEMPERATURE: 98.2 F | OXYGEN SATURATION: 94 % | HEIGHT: 66 IN

## 2021-10-21 DIAGNOSIS — S91.109A OPEN WOUND OF TOE, INITIAL ENCOUNTER: ICD-10-CM

## 2021-10-21 DIAGNOSIS — M79.2 NEUROPATHIC PAIN: ICD-10-CM

## 2021-10-21 DIAGNOSIS — Z74.09 IMPAIRED MOBILITY AND ENDURANCE: ICD-10-CM

## 2021-10-21 DIAGNOSIS — R32 URINARY INCONTINENCE, UNSPECIFIED TYPE: ICD-10-CM

## 2021-10-21 DIAGNOSIS — Z74.09 IMPAIRED MOBILITY AND ACTIVITIES OF DAILY LIVING: ICD-10-CM

## 2021-10-21 DIAGNOSIS — M21.371 BILATERAL FOOT-DROP: ICD-10-CM

## 2021-10-21 DIAGNOSIS — Z78.9 IMPAIRED MOBILITY AND ACTIVITIES OF DAILY LIVING: ICD-10-CM

## 2021-10-21 DIAGNOSIS — Z86.69 HISTORY OF MUSCULAR DYSTROPHY: ICD-10-CM

## 2021-10-21 DIAGNOSIS — N31.9 NEUROGENIC BLADDER: ICD-10-CM

## 2021-10-21 DIAGNOSIS — G82.50 TETRAPLEGIA (HCC): ICD-10-CM

## 2021-10-21 DIAGNOSIS — R25.2 SPASTICITY: ICD-10-CM

## 2021-10-21 DIAGNOSIS — S14.109A INJURY OF CERVICAL SPINAL CORD, INITIAL ENCOUNTER (HCC): ICD-10-CM

## 2021-10-21 DIAGNOSIS — B35.1 ONYCHOMYCOSIS: ICD-10-CM

## 2021-10-21 DIAGNOSIS — M21.372 BILATERAL FOOT-DROP: ICD-10-CM

## 2021-10-21 DIAGNOSIS — S14.109D INJURY OF CERVICAL SPINAL CORD, SUBSEQUENT ENCOUNTER (HCC): Primary | ICD-10-CM

## 2021-10-21 PROCEDURE — 99215 OFFICE O/P EST HI 40 MIN: CPT | Performed by: PHYSICAL MEDICINE & REHABILITATION

## 2021-10-21 RX ORDER — GABAPENTIN 300 MG/1
CAPSULE ORAL
Qty: 270 CAPSULE | Refills: 5 | Status: SHIPPED | OUTPATIENT
Start: 2021-10-21 | End: 2022-06-13

## 2021-10-21 RX ORDER — MIRABEGRON 50 MG/1
50 TABLET, FILM COATED, EXTENDED RELEASE ORAL DAILY
Qty: 90 TABLET | Refills: 1 | Status: SHIPPED | OUTPATIENT
Start: 2021-10-21 | End: 2022-09-01 | Stop reason: SDUPTHER

## 2021-10-21 RX ORDER — TIZANIDINE HYDROCHLORIDE 2 MG/1
2 CAPSULE, GELATIN COATED ORAL 3 TIMES DAILY
Qty: 270 CAPSULE | Refills: 1 | Status: SHIPPED | OUTPATIENT
Start: 2021-10-21 | End: 2022-06-13

## 2021-10-21 RX ORDER — BACLOFEN 10 MG/1
TABLET ORAL
Qty: 270 TABLET | Refills: 5 | Status: SHIPPED | OUTPATIENT
Start: 2021-10-21 | End: 2022-06-13

## 2021-10-21 ASSESSMENT — FIBROSIS 4 INDEX: FIB4 SCORE: 0.66

## 2021-10-21 NOTE — PROGRESS NOTES
[facility]  PM&R Neuro Rehabilitation Clinic  [facility] [place] [address]  Ph: [phone]    FOLLOW UP PATIENT EVALUATION      Patient Name: Marilyn Salinas   Patient : 1965  Patient Age: 56 y.o.   PCP: Edwar Platt M.D.    Examining Physician: Dr. Julianna Guillory, DO    SUBJECTIVE:   Patient Identification: Marilyn Salinas is a 56 y.o. RHD female with PMH significant for chronic back and neck pain and rehabilitation history significant for premorbid weakness and paresthesias, had GLF, imaging showing Disc herniation at C4-5 and C3- C4, with T2 hyperintensity of the cord and s/p ACDF C3-5 on 19 with Dr. Linton  and is presenting to PM&R clinic for a FOLLOW UP outpatient evaluation with the following chief complaint/s:    PM&R Background Information:  Original Date of Injury: 19 GLF and worsening weakness, pain.   Pertinent Procedure History: Disc herniation at C4-5 and C3- C4, with T2 hyperintensity of the cord at this level. she underwent ACDF C3-5 on 19 with Dr. Linton  Dates of Admission/Discharge to ARU: 19-20    Chief Complaint: SCI follow up    Accompanied by Today: Self  Interval History:   - Has moved to [facility], [place].   - Has been having skin issues with her feet as well as wound development on first big toe.   - Is having significant pain.   - Has had many falls. Received stitches to forehead and also bent walker.   - Cannot afford Botox due to insurance.   - Drags both feet due to PF spasticity.   - Cannot pick feet up when walks. Toes drag on floor.   - Needs Rx for show inserts to [place].     Review of Systems:  All other pertinent positive review of systems are noted above in HPI.     Past Medical History:  Past Medical History:   Diagnosis Date   • Anxiety    • Arthritis     spine, feet    • Asthma    • Cataract    • Depression    • High cholesterol       Past Surgical History:   Procedure Laterality Date   • CERVICAL DISK AND FUSION  ANTERIOR N/A 12/20/2019    Procedure: DISCECTOMY, SPINE, CERVICAL, ANTERIOR APPROACH, WITH FUSION - C3-5;  Surgeon: Connor Linton M.D.;  Location: SURGERY [facility];  Service: Neurosurgery   • CORPECTOMY N/A 12/20/2019    Procedure: CORPECTOMY;  Surgeon: Connor Linton M.D.;  Location: SURGERY [facility];  Service: Neurosurgery   • VAGINAL HYSTERECTOMY SCOPE TOTAL N/A 10/3/2017    Procedure: VAGINAL HYSTERECTOMY SCOPE TOTAL;  Surgeon: Hudson Driscoll M.D.;  Location: SURGERY SAME DAY [facility];  Service: Gynecology   • SALPINGECTOMY Bilateral 10/3/2017    Procedure: SALPINGECTOMY;  Surgeon: Hudson Driscoll M.D.;  Location: SURGERY SAME DAY [facility];  Service: Gynecology   • OOPHORECTOMY Bilateral 10/3/2017    Procedure: OOPHORECTOMY;  Surgeon: Hudson Driscoll M.D.;  Location: SURGERY SAME DAY [facility];  Service: Gynecology   • ANTERIOR AND POSTERIOR REPAIR Bilateral 10/3/2017    Procedure: ANTERIOR AND POSTERIOR REPAIR;  Surgeon: Hudson Driscoll M.D.;  Location: SURGERY SAME DAY [facility];  Service: Gynecology   • ENTEROCELE REPAIR N/A 10/3/2017    Procedure: ENTEROCELE REPAIR, PERINEOPLASTY;  Surgeon: Hudson Driscoll M.D.;  Location: SURGERY SAME DAY [facility];  Service: Gynecology   • BLADDER SLING FEMALE N/A 10/3/2017    Procedure: BLADDER SLING FEMALE TOT, CYSTOSCOPY;  Surgeon: Hudson Driscoll M.D.;  Location: SURGERY SAME DAY [facility];  Service: Gynecology   • VAGINAL SUSPENSION N/A 10/3/2017    Procedure: VAGINAL SUSPENSION SACROSPINOUS VAULT POSSIBLE;  Surgeon: Hudson Driscoll M.D.;  Location: SURGERY SAME DAY [facility];  Service: Gynecology   • OTHER Left 2005    hammertoe x2   • EYE SURGERY  1972    for lazy eye   • LUMPECTOMY          Current Outpatient Medications:   •  tizanidine (ZANAFLEX) 2 MG capsule, Take 1 Capsule by mouth 3 times a day., Disp: 270 Capsule, Rfl: 1  •  Mirabegron ER (MYRBETRIQ) 50 MG TABLET SR 24 HR, Take 50 mg by mouth every day., Disp:  90 Tablet, Rfl: 1  •  gabapentin (NEURONTIN) 300 MG Cap, TAKE 3 CAPSULES BY MOUTH THREE TIMES DAILY, Disp: 270 Capsule, Rfl: 5  •  baclofen (LIORESAL) 10 MG Tab, TAKE 3 TABLETS BY MOUTH THREE TIMES DAILY, Disp: 270 Tablet, Rfl: 5  •  meloxicam (MOBIC) 15 MG tablet, TAKE 1 TABLET BY MOUTH DAILY, Disp: 30 Tablet, Rfl: 6  •  pantoprazole (PROTONIX) 20 MG tablet, TAKE ONE TABLET BY MOUTH DAILY, Disp: 30 tablet, Rfl: 6  •  fesoterodine fumarate (TOVIAZ) 4 MG TABLET SR 24 HR, , Disp: , Rfl:   •  Cholecalciferol 2000 UNIT Tab, Take 1 tablet by mouth every day., Disp: 60 tablet, Rfl: 6  •  ciclopirox (PENLAC) 8 % solution, , Disp: , Rfl:   •  divalproex (DEPAKOTE) 125 MG EC tablet, , Disp: , Rfl:   •  mupirocin (BACTROBAN) 2 % Ointment, Apply 1 Application topically 2 times a day., Disp: 22 g, Rfl: 0  •  fluticasone (FLONASE) 50 MCG/ACT nasal spray, Administer 1 Spray into affected nostril(S) every day., Disp: 16 g, Rfl: 3  •  potassium chloride SA (KDUR) 20 MEQ Tab CR, Take 1 Tab by mouth every day., Disp: 30 Tab, Rfl: 6  •  ALLERGY RELIEF 10 MG Tab, , Disp: , Rfl:   •  TOVIAZ 4 MG TABLET SR 24 HR, , Disp: , Rfl:   •  chlorhexidine (PERIDEX) 0.12 % Solution, , Disp: , Rfl:   •  buPROPion (WELLBUTRIN XL) 150 MG XL tablet, , Disp: , Rfl:   •  aripiprazole (ABILIFY) 20 MG tablet, , Disp: , Rfl:   •  albuterol 108 (90 Base) MCG/ACT Aero Soln inhalation aerosol, Inhale 2 Puffs by mouth every 6 hours as needed for Shortness of Breath., Disp: 8.5 g, Rfl: 6  •  venlafaxine XR (EFFEXOR XR) 75 MG CAPSULE SR 24 HR, Take 1 Cap by mouth every evening., Disp: 30 Cap, Rfl: 3  •  zolpidem (AMBIEN) 10 MG Tab, Take 10 mg by mouth at bedtime as needed., Disp: , Rfl:   Allergies   Allergen Reactions   • Codeine Unspecified     Unknown reaction          Past Social History:  Social History     Socioeconomic History   • Marital status: Single     Spouse name: Not on file   • Number of children: Not on file   • Years of education: Not on file    • Highest education level: Not on file   Occupational History   • Not on file   Tobacco Use   • Smoking status: Former Smoker     Packs/day: 0.25     Years: 26.00     Pack years: 6.50     Types: Cigarettes     Quit date: 3/1/2020     Years since quittin.6   • Smokeless tobacco: Never Used   Vaping Use   • Vaping Use: Never used   Substance and Sexual Activity   • Alcohol use: No   • Drug use: Yes     Types: Marijuana     Comment: marijuana daily   • Sexual activity: Not Currently   Other Topics Concern   •  Service No   • Blood Transfusions No   • Caffeine Concern No   • Occupational Exposure No   • Hobby Hazards No   • Sleep Concern Yes   • Stress Concern Yes   • Weight Concern Yes   • Special Diet No   • Back Care Yes   • Exercise No   • Bike Helmet Yes   • Seat Belt Yes   • Self-Exams Yes   Social History Narrative   • Not on file     Social Determinants of Health     Financial Resource Strain:    • Difficulty of Paying Living Expenses:    Food Insecurity:    • Worried About Running Out of Food in the Last Year:    • Ran Out of Food in the Last Year:    Transportation Needs:    • Lack of Transportation (Medical):    • Lack of Transportation (Non-Medical):    Physical Activity:    • Days of Exercise per Week:    • Minutes of Exercise per Session:    Stress:    • Feeling of Stress :    Social Connections:    • Frequency of Communication with Friends and Family:    • Frequency of Social Gatherings with Friends and Family:    • Attends Mosque Services:    • Active Member of Clubs or Organizations:    • Attends Club or Organization Meetings:    • Marital Status:    Intimate Partner Violence:    • Fear of Current or Ex-Partner:    • Emotionally Abused:    • Physically Abused:    • Sexually Abused:         Family History:  Family History   Problem Relation Age of Onset   • Cancer Mother    • Alcohol/Drug Mother    • Cancer Brother    • Diabetes Maternal Aunt        Depression and Opioid  Screening  PHQ-9:  Depression Screen (PHQ-2/PHQ-9) 6/22/2020 10/22/2020 12/8/2020   PHQ-2 Total Score - - -   PHQ-2 Total Score 6 6 0   PHQ-9 Total Score 24 21 -     Interpretation of PHQ-9 Total Score   Score Severity   1-4 No Depression   5-9 Mild Depression   10-14 Moderate Depression   15-19 Moderately Severe Depression   20-27 Severe Depression     Opioid Risk Score: No value filed.  Interpretation of Opioid Risk Score   Score 0-3 = Low risk of abuse. Do UDS at least once per year.  Score 4-7 = Moderate risk of abuse. Do UDS 1-4 times per year.  Score 8+ = High risk of abuse. Refer to specialist.      OBJECTIVE:   Vital Signs:  Vitals:    10/21/21 1033   BP: 128/82   Pulse: 97   Resp: 17   Temp: 36.8 °C (98.2 °F)   SpO2: 94%        Pertinent Labs:  Lab Results   Component Value Date/Time    SODIUM 142 07/24/2021 11:38 AM    POTASSIUM 4.3 07/24/2021 11:38 AM    CHLORIDE 105 07/24/2021 11:38 AM    CO2 26 07/24/2021 11:38 AM    GLUCOSE 81 07/24/2021 11:38 AM    BUN 13 07/24/2021 11:38 AM    CREATININE 0.67 07/24/2021 11:38 AM       Lab Results   Component Value Date/Time    HBA1C 5.6 07/27/2017 09:39 AM       Lab Results   Component Value Date/Time    WBC 7.0 12/28/2019 05:12 AM    RBC 4.07 (L) 12/28/2019 05:12 AM    HEMOGLOBIN 12.4 12/28/2019 05:12 AM    HEMATOCRIT 37.9 12/28/2019 05:12 AM    MCV 93.1 12/28/2019 05:12 AM    MCH 30.5 12/28/2019 05:12 AM    MCHC 32.7 (L) 12/28/2019 05:12 AM    MPV 9.6 12/28/2019 05:12 AM    NEUTSPOLYS 29.40 (L) 12/28/2019 05:12 AM    LYMPHOCYTES 56.70 (H) 12/28/2019 05:12 AM    MONOCYTES 9.20 12/28/2019 05:12 AM    EOSINOPHILS 3.40 12/28/2019 05:12 AM    BASOPHILS 1.00 12/28/2019 05:12 AM       Lab Results   Component Value Date/Time    ASTSGOT 17 07/24/2021 11:38 AM    ALTSGPT 19 07/24/2021 11:38 AM        Physical Exam:   GEN: No apparent distress  HEENT: Head normocephalic, atraumatic.  Sclera nonicteric bilaterally, no ocular discharge appreciated bilaterally.  CV: Extremities  warm and well-perfused, no peripheral edema appreciated bilaterally.  PULMONARY: Breathing nonlabored on room air, no respiratory accessory muscle use.  Not requiring supplemental oxygen.  SKIN: Skin breakdown with potential DTI left first toe.  Onychomycosis bilaterally toenails.    PSYCH: Mood and affect within normal limits.  NEURO: Awake alert.  Conversational.  Logical thought content.  Ambulatory with walker.  Spastic paraplegic gait.    ASSESSMENT/PLAN: Marilyn Salinas  is a 56 y.o. female with rehabilitation history significant for premorbid weakness and paresthesias, had GLF, imaging showing Disc herniation at C4-5 and C3- C4, with T2 hyperintensity of the cord and s/p ACDF C3-5 on 12/20/19 with Dr. Linton, here for SCI follow up. The following plan was discussed with the patient who is in agreement.     Visit Diagnoses     ICD-10-CM   1. Injury of cervical spinal cord, subsequent encounter ([facility])  S14.109D   2. Open wound of toe, initial encounter  S91.109A   3. Onychomycosis  B35.1   4. History of muscular dystrophy  Z86.69   5. Impaired mobility and endurance  Z74.09   6. Impaired mobility and activities of daily living  Z74.09    Z78.9   7. Neurogenic bladder  N31.9   8. Urinary incontinence, unspecified type  R32   9. Neuropathic pain  M79.2   10. Tetraplegia ([facility])  G82.50   11. Injury of cervical spinal cord, initial encounter ([facility])  S14.109A   12. Spasticity  R25.2   13. Bilateral foot-drop  M21.371    M21.372        Rehab/Neuro:   1. C2 AISD: Nontraumatic incomplete spinal cord injury, status post fall at home with worsening weakness, found to have severe cervical stenosis.  Status post C3-C5 ACDF with Dr. Linton on 12/20/19. No longer getting epidural steroid injections, too painful.  -Therapy: Discharged from PT and has renewed for more therapy. Aug 13 - Oct 11 (for her neck)  -Function: 8/2021 Largely stable but patient does feel as though she is weaker lately.  Has had one fall.  Maintains  strength through doing house chores. 10/2021 patient continues to have significant spastic paraplegic gait.  Would benefit from Botox to bilateral gastroc complex, however, insurance would not cover.  This would be cost prohibitive for her.  Significant onychomycosis on bilateral toenails as well as concern for DTI and erythema of first great toe.  -Patient may benefit from bilateral AFOs though I am not sure Medicaid fee-for-service would cover this cost.  Will put in referral and prescription regardless.  -Face-to-face for power mobility device: Patient is here to discuss her mobility needs.  She has neurogenic weakness and spasticity of the bilateral lower extremities putting her at high fall risk.  Patient has the physical and cognitive capability to maneuver a power mobility device and this would help her complete her activities of daily living as well as mobility related activities of daily living independently.  Her current locomotive needs cannot be met with cane, walker.  -Referral: Physical therapy for power mobility device evaluation and continued therapy for neck and gait.     2. Muscular dystrophy: ? CP per recent neurology evaluation.  Reportedly diagnosed in '60's per aunt. Normal CPK. Was seeing Nikunj tanner in 2018 - was supposed to have EMG/NCS. Medicaid wouldn't cover EMG/NCS at that time.  Scheduled for EMG/NCS 10/23/2020.  EEG reportedly negative.  Patient has since changed her neurologist and is now seeing Dr. Hartman. Had EMG/NCS results -results not available but patient reports she was told she has paraplegia and partial CP.   -Follows with neurology.     Spasticity: Bilateral lower extremities worse than upper extremities.  Botox cost prohibitive.  -Med management: New prescription for baclofen 30 mg 3 times daily.    -Med management: New prescription for tizanidine 2 mg 3 times daily.  Patient sometimes forgets the middle day dose.  -Counseled on potential for baclofen pump placement however  I am not sure Medicaid fee-for-service would cover this.  Patient not interested in invasive option at this time.    Neuropathic Pain:   -Med Management: New prescription for gabapentin 900 mg TID.      Neurogenic Bladder:   -Med management: Continue Myrbetriq and Toviaz.  -Med management: New prescription for Myrbetriq to 50 mg daily.  -Established with urology, would benefit from bladder Botox which insurance does not cover.    Skin: Very worried about her left great toe.  It is very erythematous and while she does have a large callus at the distal end of her big toe there is unclear depth of skin breakdown underneath.  Patient has tried to get in with podiatry however nobody in the area is taking new Medicaid patients and she was referred to [place] which, due to her disabilities, can clearly not make happen.  -Trying to work on getting RTC access.  -Urgent referral to wound care for evaluation of DTI and erythema of first great toe on the left.      Follow up: 3 month follow up general evaluation.    Total time spent was 40 minutes.  Included in this time is the time spent preparing for the visit including record review, my exam and evaluation, counseling and education regarding that which is aforementioned in the assessment and plan. Time was spent ordering the appropriate labs, tests, procedures, referrals, medications. Discussion involved the patient.     Please note that this dictation was created using voice recognition software. I have made every reasonable attempt to correct obvious errors but there may be errors of grammar and content that I may have overlooked prior to finalization of this note.    Dr. Julianna Guillory DO, MS  Department of Physical Medicine & Rehabilitation  Neuro Rehabilitation Clinic  [facility]

## 2021-11-05 ENCOUNTER — NON-PROVIDER VISIT (OUTPATIENT)
Dept: WOUND CARE | Facility: MEDICAL CENTER | Age: 56
End: 2021-11-05
Attending: PHYSICAL MEDICINE & REHABILITATION
Payer: MEDICAID

## 2021-11-06 ENCOUNTER — HOSPITAL ENCOUNTER (OUTPATIENT)
Facility: MEDICAL CENTER | Age: 56
End: 2021-11-06
Attending: PHYSICIAN ASSISTANT
Payer: MEDICAID

## 2021-11-06 ENCOUNTER — OFFICE VISIT (OUTPATIENT)
Dept: URGENT CARE | Facility: CLINIC | Age: 56
End: 2021-11-06
Payer: MEDICAID

## 2021-11-06 VITALS
DIASTOLIC BLOOD PRESSURE: 62 MMHG | WEIGHT: 155 LBS | OXYGEN SATURATION: 94 % | RESPIRATION RATE: 16 BRPM | BODY MASS INDEX: 24.91 KG/M2 | HEIGHT: 66 IN | HEART RATE: 86 BPM | SYSTOLIC BLOOD PRESSURE: 98 MMHG | TEMPERATURE: 97.7 F

## 2021-11-06 DIAGNOSIS — Z20.822 EXPOSURE TO COVID-19 VIRUS: ICD-10-CM

## 2021-11-06 PROCEDURE — 99212 OFFICE O/P EST SF 10 MIN: CPT | Mod: CS | Performed by: PHYSICIAN ASSISTANT

## 2021-11-06 PROCEDURE — U0005 INFEC AGEN DETEC AMPLI PROBE: HCPCS

## 2021-11-06 PROCEDURE — U0003 INFECTIOUS AGENT DETECTION BY NUCLEIC ACID (DNA OR RNA); SEVERE ACUTE RESPIRATORY SYNDROME CORONAVIRUS 2 (SARS-COV-2) (CORONAVIRUS DISEASE [COVID-19]), AMPLIFIED PROBE TECHNIQUE, MAKING USE OF HIGH THROUGHPUT TECHNOLOGIES AS DESCRIBED BY CMS-2020-01-R: HCPCS

## 2021-11-06 ASSESSMENT — ENCOUNTER SYMPTOMS
PALPITATIONS: 0
HEADACHES: 0
NAUSEA: 0
ABDOMINAL PAIN: 0
COUGH: 0
VOMITING: 0
DIZZINESS: 0
FEVER: 0
SINUS PAIN: 0
SHORTNESS OF BREATH: 0
CHILLS: 0
EYE PAIN: 0
DIARRHEA: 0
MYALGIAS: 0
SORE THROAT: 0
BLURRED VISION: 0

## 2021-11-06 ASSESSMENT — FIBROSIS 4 INDEX: FIB4 SCORE: 0.66

## 2021-11-07 DIAGNOSIS — Z20.822 EXPOSURE TO COVID-19 VIRUS: ICD-10-CM

## 2021-11-07 LAB — COVID ORDER STATUS COVID19: NORMAL

## 2021-11-07 NOTE — PROGRESS NOTES
Subjective     Marilyn Salinas is a 56 y.o. female who presents with Coronavirus Screening (NO SYMPTOMS, EXPOSURE X6 DAYS )    HPI:  Marilyn Salinas is a 56 y.o. female who presents today for coronavirus screening.  Patient had dinner at a friend house on 10/31.  The following day patient's friend tested positive for COVID-19 virus.  Patient is not having any symptoms but would like to be tested.      Review of Systems   Constitutional: Negative for chills, fever and malaise/fatigue.   HENT: Negative for congestion, ear pain, sinus pain and sore throat.    Eyes: Negative for blurred vision and pain.   Respiratory: Negative for cough and shortness of breath.    Cardiovascular: Negative for chest pain and palpitations.   Gastrointestinal: Negative for abdominal pain, diarrhea, nausea and vomiting.   Musculoskeletal: Negative for myalgias.   Skin: Negative for rash.   Neurological: Negative for dizziness and headaches.         PMH:  has a past medical history of Anxiety, Arthritis, Asthma, Cataract, Depression, and High cholesterol.  MEDS:   Current Outpatient Medications:   •  tizanidine (ZANAFLEX) 2 MG capsule, Take 1 Capsule by mouth 3 times a day., Disp: 270 Capsule, Rfl: 1  •  Mirabegron ER (MYRBETRIQ) 50 MG TABLET SR 24 HR, Take 50 mg by mouth every day., Disp: 90 Tablet, Rfl: 1  •  gabapentin (NEURONTIN) 300 MG Cap, TAKE 3 CAPSULES BY MOUTH THREE TIMES DAILY, Disp: 270 Capsule, Rfl: 5  •  baclofen (LIORESAL) 10 MG Tab, TAKE 3 TABLETS BY MOUTH THREE TIMES DAILY, Disp: 270 Tablet, Rfl: 5  •  meloxicam (MOBIC) 15 MG tablet, TAKE 1 TABLET BY MOUTH DAILY, Disp: 30 Tablet, Rfl: 6  •  pantoprazole (PROTONIX) 20 MG tablet, TAKE ONE TABLET BY MOUTH DAILY, Disp: 30 tablet, Rfl: 6  •  fluticasone (FLONASE) 50 MCG/ACT nasal spray, Administer 1 Spray into affected nostril(S) every day., Disp: 16 g, Rfl: 3  •  potassium chloride SA (KDUR) 20 MEQ Tab CR, Take 1 Tab by mouth every day., Disp: 30 Tab, Rfl: 6  •  TOVIAZ 4  MG TABLET SR 24 HR, , Disp: , Rfl:   •  albuterol 108 (90 Base) MCG/ACT Aero Soln inhalation aerosol, Inhale 2 Puffs by mouth every 6 hours as needed for Shortness of Breath., Disp: 8.5 g, Rfl: 6  •  venlafaxine XR (EFFEXOR XR) 75 MG CAPSULE SR 24 HR, Take 1 Cap by mouth every evening., Disp: 30 Cap, Rfl: 3  •  fesoterodine fumarate (TOVIAZ) 4 MG TABLET SR 24 HR, , Disp: , Rfl:   •  Cholecalciferol 2000 UNIT Tab, Take 1 tablet by mouth every day., Disp: 60 tablet, Rfl: 6  •  ciclopirox (PENLAC) 8 % solution, , Disp: , Rfl:   •  divalproex (DEPAKOTE) 125 MG EC tablet, , Disp: , Rfl:   •  mupirocin (BACTROBAN) 2 % Ointment, Apply 1 Application topically 2 times a day., Disp: 22 g, Rfl: 0  •  ALLERGY RELIEF 10 MG Tab, , Disp: , Rfl:   •  chlorhexidine (PERIDEX) 0.12 % Solution, , Disp: , Rfl:   •  buPROPion (WELLBUTRIN XL) 150 MG XL tablet, , Disp: , Rfl:   •  aripiprazole (ABILIFY) 20 MG tablet, , Disp: , Rfl:   •  zolpidem (AMBIEN) 10 MG Tab, Take 10 mg by mouth at bedtime as needed., Disp: , Rfl:   ALLERGIES:   Allergies   Allergen Reactions   • Codeine Unspecified     Unknown reaction       SURGHX:   Past Surgical History:   Procedure Laterality Date   • CERVICAL DISK AND FUSION ANTERIOR N/A 12/20/2019    Procedure: DISCECTOMY, SPINE, CERVICAL, ANTERIOR APPROACH, WITH FUSION - C3-5;  Surgeon: Connor Linton M.D.;  Location: SURGERY [facility];  Service: Neurosurgery   • CORPECTOMY N/A 12/20/2019    Procedure: CORPECTOMY;  Surgeon: Connor Linton M.D.;  Location: SURGERY [facility];  Service: Neurosurgery   • VAGINAL HYSTERECTOMY SCOPE TOTAL N/A 10/3/2017    Procedure: VAGINAL HYSTERECTOMY SCOPE TOTAL;  Surgeon: Hudson Driscoll M.D.;  Location: SURGERY SAME DAY [facility];  Service: Gynecology   • SALPINGECTOMY Bilateral 10/3/2017    Procedure: SALPINGECTOMY;  Surgeon: Hudson Driscoll M.D.;  Location: SURGERY SAME DAY [facility];  Service: Gynecology   • OOPHORECTOMY Bilateral 10/3/2017     "Procedure: OOPHORECTOMY;  Surgeon: Hudson Driscoll M.D.;  Location: SURGERY SAME DAY [facility];  Service: Gynecology   • ANTERIOR AND POSTERIOR REPAIR Bilateral 10/3/2017    Procedure: ANTERIOR AND POSTERIOR REPAIR;  Surgeon: Hudson Driscoll M.D.;  Location: SURGERY SAME DAY [facility];  Service: Gynecology   • ENTEROCELE REPAIR N/A 10/3/2017    Procedure: ENTEROCELE REPAIR, PERINEOPLASTY;  Surgeon: Hudson Driscoll M.D.;  Location: SURGERY SAME DAY [facility];  Service: Gynecology   • BLADDER SLING FEMALE N/A 10/3/2017    Procedure: BLADDER SLING FEMALE TOT, CYSTOSCOPY;  Surgeon: Hudson Driscoll M.D.;  Location: SURGERY SAME DAY [facility];  Service: Gynecology   • VAGINAL SUSPENSION N/A 10/3/2017    Procedure: VAGINAL SUSPENSION SACROSPINOUS VAULT POSSIBLE;  Surgeon: Hudson Driscoll M.D.;  Location: SURGERY SAME DAY [facility];  Service: Gynecology   • OTHER Left 2005    hammertoe x2   • EYE SURGERY  1972    for lazy eye   • LUMPECTOMY       SOCHX:  reports that she quit smoking about 20 months ago. Her smoking use included cigarettes. She has a 6.50 pack-year smoking history. She has never used smokeless tobacco. She reports current drug use. Drug: Marijuana. She reports that she does not drink alcohol.  FH: Family history was reviewed, no pertinent findings to report      Objective     BP (!) 98/62 (BP Location: Right arm, Patient Position: Sitting, BP Cuff Size: Adult long)   Pulse 86   Temp 36.5 °C (97.7 °F) (Temporal)   Resp 16   Ht 1.676 m (5' 6\")   Wt 70.3 kg (155 lb)   LMP 01/11/2017   SpO2 94%   BMI 25.02 kg/m²      Physical Exam  Constitutional:       General: She is not in acute distress.     Appearance: She is not diaphoretic.   HENT:      Head: Normocephalic and atraumatic.      Right Ear: External ear normal.      Left Ear: External ear normal.   Eyes:      Conjunctiva/sclera: Conjunctivae normal.      Pupils: Pupils are equal, round, and reactive to light.   Pulmonary:      " Effort: Pulmonary effort is normal. No respiratory distress.   Musculoskeletal:      Cervical back: Normal range of motion.   Skin:     Findings: No rash.   Neurological:      Mental Status: She is alert and oriented to person, place, and time.   Psychiatric:         Mood and Affect: Mood and affect normal.         Cognition and Memory: Memory normal.         Judgment: Judgment normal.         Assessment & Plan     1. Exposure to COVID-19 virus  - COVID/SARS CoV-2 PCR; Future  *Patient had a nasal swab to test for COVID-19 virus.  Patient was advised to stay home and self isolate/self quarantine while awaiting the results.  Return/ER precautions discussed.             Differential Diagnosis, natural history, and supportive care discussed. Return to the Urgent Care or follow up with your PCP if symptoms fail to resolve, or for any new or worsening symptoms. Emergency room precautions discussed. Patient and/or family appears understanding of information.

## 2021-11-08 LAB
SARS-COV-2 RNA RESP QL NAA+PROBE: NOTDETECTED
SPECIMEN SOURCE: NORMAL

## 2021-11-11 ENCOUNTER — NON-PROVIDER VISIT (OUTPATIENT)
Dept: WOUND CARE | Facility: MEDICAL CENTER | Age: 56
End: 2021-11-11
Attending: PHYSICAL MEDICINE & REHABILITATION
Payer: MEDICAID

## 2021-11-11 VITALS
TEMPERATURE: 98.6 F | HEART RATE: 90 BPM | OXYGEN SATURATION: 92 % | DIASTOLIC BLOOD PRESSURE: 83 MMHG | SYSTOLIC BLOOD PRESSURE: 124 MMHG | RESPIRATION RATE: 19 BRPM

## 2021-11-11 PROCEDURE — 97597 DBRDMT OPN WND 1ST 20 CM/<: CPT

## 2021-11-11 PROCEDURE — 99211 OFF/OP EST MAY X REQ PHY/QHP: CPT

## 2021-11-11 NOTE — PATIENT INSTRUCTIONS
-Keep your wound dressing clean, dry, and intact.    -Change your dressing if it becomes soiled, soaked, or falls off.    -Should you experience any significant changes in your wound(s), such as infection (redness, swelling, localized heat, increased pain, fever > 101 F, chills) or have any questions regarding your home care instructions, please contact the wound center at [phone]. If after hours, contact your primary care physician or go to the hospital emergency room.

## 2021-11-11 NOTE — CERTIFICATION
"Non Provider Encounter- Full Thickness wound    HISTORY OF PRESENT ILLNESS  Wound History:    START OF CARE IN CLINIC: 11/11/2021    REFERRING PROVIDER: Julianna Guillory DO   WOUND- Full Thickness Wound   LOCATION: Left distal medial hallux, right plantar lateral foot    HISTORY: Patient is a 56 year old female with history tetraplegia, neuropathy, MS, CP, frequent falls. She states that the callus to her right foot has been present for several months and she attributes it to her walking on the side of her foot. Her left hallux wound she states has been present for a couple of months (Sept 2021?) and she believes it started because of her toenails putting pressure on \"everything in my shoe\" and caused the wound. She was having it treated at [facility], but moved to [facility] recently and wanted to be seen by [facility]. She lives with a friend who is able to assist with dressing changes.     Pertinent Labs and Diagnostics:    Labs:     A1c:   Lab Results   Component Value Date/Time    HBA1C 5.6 07/27/2017 09:39 AM      IMAGING: None recent    VASCULAR STUDIES: NA - DP, PT pulses bilateral are 2+ and brisk.     LAST  WOUND CULTURE:  DATE : NA           FALL RISK ASSESSMENT: Patient is a HIGH fall risk   65 years or older     xFall within the last 2 years - pt reports that she falls frequently.   xUses ambulatory devices  Loss of protective sensation in feet   Use of prostethic/orthotic    Presence of lower extremity/foot/toe amputation   xTaking medication that increases risk (per facility policy)    Interventions Recommended (if any of the above are selected):   Use of Assistive Device: Uses walker for ambulation   Supervision with ambulation: Caregiver   Assistance with ambulation: Caregiver   Home safety education: Educational material provided      Patient allergies and medications reviewed via Epic.     Wound Assessment:   Wound 11/11/21 Left medial distal hallux (Active)   Wound Image    11/11/21 0930 "   Site Assessment Red 11/11/21 0930   Periwound Assessment Callused 11/11/21 0930   Margins Unattached edges 11/11/21 0930   Drainage Amount None 11/11/21 0930   Treatments Cleansed;CSWD - Conservative Sharp Wound Debridement;Topical Lidocaine;Site care 11/11/21 0930   Wound Cleansing Puracyn [place] 11/11/21 0930   Periwound Protectant Skin Protectant Wipes to Periwound;Barrier Paste 11/11/21 0930   Dressing Cleansing/Solutions Not Applicable 11/11/21 0930   Dressing Options Hydrofiber Silver;Nonadhesive Foam;Hypafix Tape;Tubigrip 11/11/21 0930   Dressing Changed New 11/11/21 0930   Dressing Status Clean;Dry;Intact 11/11/21 0930   Dressing Change/Treatment Frequency Every 72 hrs, and As Needed 11/11/21 0930   Non-staged Wound Description Full thickness 11/11/21 0930   Wound Length (cm) 1.5 cm 11/11/21 0930   Wound Width (cm) 0.5 cm 11/11/21 0930   Wound Surface Area (cm^2) 0.75 cm^2 11/11/21 0930   Post-Procedure Length (cm) 0.9 cm 11/11/21 0930   Post-Procedure Width (cm) 0.4 cm 11/11/21 0930   Post-Procedure Depth (cm) 0.2 cm 11/11/21 0930   Post-Procedure Surface Area (cm^2) 0.36 cm^2 11/11/21 0930   Post-Procedure Volume (cm^3) 0.072 cm^3 11/11/21 0930   Tunneling (cm) 0 cm 11/11/21 0930   Undermining (cm) 0 cm 11/11/21 0930   Wound Odor Mild 11/11/21 0930   Pulses Left;2+;DP;PT 11/11/21 0930   Exposed Structures None 11/11/21 0930       Wound 11/11/21 Right plantar lateral foot (Active)   Wound Image    11/11/21 0930   Site Assessment Red 11/11/21 0930   Periwound Assessment Callused 11/11/21 0930   Margins Attached edges 11/11/21 0930   Drainage Amount None 11/11/21 0930   Treatments Cleansed;CSWD - Conservative Sharp Wound Debridement;Topical Lidocaine;Site care 11/11/21 0930   Wound Cleansing Puracyn [place] 11/11/21 0930   Periwound Protectant Skin Protectant Wipes to Periwound 11/11/21 0930   Dressing Cleansing/Solutions Not Applicable 11/11/21 0930   Dressing Options Hydrofiber Silver;Nonadhesive  Foam;Hypafix Tape;Tubigrip 11/11/21 0930   Dressing Changed New 11/11/21 0930   Dressing Status Clean;Dry;Intact 11/11/21 0930   Dressing Change/Treatment Frequency Every 72 hrs, and As Needed 11/11/21 0930   Non-staged Wound Description Full thickness 11/11/21 0930   Post-Procedure Length (cm) 0.9 cm 11/11/21 0930   Post-Procedure Width (cm) 0.4 cm 11/11/21 0930   Post-Procedure Depth (cm) 0.2 cm 11/11/21 0930   Post-Procedure Surface Area (cm^2) 0.36 cm^2 11/11/21 0930   Post-Procedure Volume (cm^3) 0.072 cm^3 11/11/21 0930   Tunneling (cm) 0 cm 11/11/21 0930   Undermining (cm) 0 cm 11/11/21 0930   Wound Odor Mild 11/11/21 0930   Pulses Right;2+;DP;PT 11/11/21 0930   Exposed Structures None 11/11/21 0930        Pre-debridement Photo              Procedures:    -2% viscous lidocaine applied topically to wound bed for approximately 5 minutes prior to debridement  -Scalpel used to debride wound beds and periwound callus. Entire surface of wounds, 0.72 cm2 debrided.  Periwound callus, ~2 cm2, debrided to skin level  -Refer to flowsheet for wound care details.     Post-debridement Photo              PATIENT EDUCATION  -Advised to go to ER for any increased redness, swelling, drainage or odor, or if patient develops fever, chills, nausea or vomiting.  -Importance of adequate nutrition for wound healing  -Increase protein intake (unless contraindicated by renal status)

## 2021-11-19 ENCOUNTER — NON-PROVIDER VISIT (OUTPATIENT)
Dept: WOUND CARE | Facility: MEDICAL CENTER | Age: 56
End: 2021-11-19
Attending: PHYSICAL MEDICINE & REHABILITATION
Payer: MEDICAID

## 2021-11-19 DIAGNOSIS — T14.8XXA OPEN WOUND: ICD-10-CM

## 2021-11-19 PROCEDURE — 97597 DBRDMT OPN WND 1ST 20 CM/<: CPT

## 2021-11-19 NOTE — PATIENT INSTRUCTIONS
-Keep dressings clean and dry. Change dressings once between wound clinic visits, and if the dressings become saturated, soiled, or fall off.    -Avoid prolonged standing or sitting without elevating your legs.    -Never walk around the house barefoot. Always wear a rubber soled slipper when walking around the house.    -Should you experience any significant changes in your wound(s), such as signs of infection (increasing redness, swelling, localized heat, increased pain, fever > 101 F, chills) or have any questions regarding your home care instructions, please contact the wound center at [phone]. If after hours, contact your primary care physician or go to the hospital emergency room.     -If you are 5 or more minutes late for an appointment, we reserve the right to cancel and reschedule that appointment. Additionally, if you are habitually late or not showing (3 late cancellations and/or no shows), we reserve the right to cancel your remaining appointments and it will be your responsibility to obtain a new referral if services are still needed.

## 2021-11-19 NOTE — PROCEDURES
2% lidocaine applied to wound beds prior to debridement for ~10minutes. Left hallux painful to touch. CSWD with curette to remove ~ 1cm2 nonviable tissue from wound bed and periwound callus. Debridement was minimal because of complaint of pain to left hallux.

## 2021-11-19 NOTE — NON-PROVIDER
Consulted Anup Foster APRN and xray ordered of Left hallux as patient crying from pain and left hallux with erythema and probing close to bone. Scheduled on LPS rounds for 12/3/2021. Marilyn states she is getting AFOs made by , has shoe inserts but states they are a few years old. Ambulates with walker and drags left toes accros floor when walks, able to  right foot with some clearance.

## 2021-11-22 ENCOUNTER — OFFICE VISIT (OUTPATIENT)
Dept: MEDICAL GROUP | Facility: MEDICAL CENTER | Age: 56
End: 2021-11-22
Attending: FAMILY MEDICINE
Payer: MEDICAID

## 2021-11-22 VITALS
HEART RATE: 92 BPM | RESPIRATION RATE: 16 BRPM | OXYGEN SATURATION: 95 % | BODY MASS INDEX: 24.89 KG/M2 | SYSTOLIC BLOOD PRESSURE: 106 MMHG | DIASTOLIC BLOOD PRESSURE: 60 MMHG | TEMPERATURE: 98.1 F | WEIGHT: 154.9 LBS | HEIGHT: 66 IN

## 2021-11-22 DIAGNOSIS — K80.20 CALCULUS OF GALLBLADDER WITHOUT CHOLECYSTITIS WITHOUT OBSTRUCTION: ICD-10-CM

## 2021-11-22 DIAGNOSIS — K21.9 GASTROESOPHAGEAL REFLUX DISEASE, UNSPECIFIED WHETHER ESOPHAGITIS PRESENT: ICD-10-CM

## 2021-11-22 DIAGNOSIS — L98.492 SKIN ULCER WITH FAT LAYER EXPOSED (HCC): ICD-10-CM

## 2021-11-22 DIAGNOSIS — E55.9 VITAMIN D DEFICIENCY: ICD-10-CM

## 2021-11-22 DIAGNOSIS — J30.1 NON-SEASONAL ALLERGIC RHINITIS DUE TO POLLEN: ICD-10-CM

## 2021-11-22 DIAGNOSIS — L98.491 SKIN ULCER, LIMITED TO BREAKDOWN OF SKIN (HCC): ICD-10-CM

## 2021-11-22 PROCEDURE — 99213 OFFICE O/P EST LOW 20 MIN: CPT | Performed by: FAMILY MEDICINE

## 2021-11-22 PROCEDURE — 99214 OFFICE O/P EST MOD 30 MIN: CPT | Performed by: FAMILY MEDICINE

## 2021-11-22 RX ORDER — CEPHALEXIN 500 MG/1
500 CAPSULE ORAL 3 TIMES DAILY
Qty: 30 CAPSULE | Refills: 0 | Status: SHIPPED | OUTPATIENT
Start: 2021-11-22 | End: 2021-12-30

## 2021-11-22 RX ORDER — FLUTICASONE PROPIONATE 50 MCG
1 SPRAY, SUSPENSION (ML) NASAL DAILY
Qty: 16 G | Refills: 6 | Status: SHIPPED | OUTPATIENT
Start: 2021-11-22 | End: 2022-11-07 | Stop reason: SDUPTHER

## 2021-11-22 RX ORDER — HYDROCODONE BITARTRATE AND ACETAMINOPHEN 5; 325 MG/1; MG/1
1 TABLET ORAL EVERY 6 HOURS PRN
Qty: 10 TABLET | Refills: 0 | Status: SHIPPED | OUTPATIENT
Start: 2021-11-22 | End: 2021-12-20

## 2021-11-22 RX ORDER — PANTOPRAZOLE SODIUM 20 MG/1
TABLET, DELAYED RELEASE ORAL
Qty: 30 TABLET | Refills: 6 | Status: SHIPPED | OUTPATIENT
Start: 2021-11-22 | End: 2022-08-15 | Stop reason: SDUPTHER

## 2021-11-22 ASSESSMENT — FIBROSIS 4 INDEX: FIB4 SCORE: 0.66

## 2021-11-22 NOTE — PROGRESS NOTES
Subjective     Marilyn Salinas is a 56 y.o. female who presents with Toe Pain            HPI 1.  Left great toe ulcer-patient has gotten into wound clinic, first visit last week.  She is very grateful that they trimmed her overgrown nails.  They also are doing deep debridement on a severe ulcer on her left great toe.  She reports that the pain is intense and she is requesting low-dose narcotic to take right before she goes in for debridement.  2.  Abdominal pain-patient reports that she was seen by GI.  Due to her dysphagia they are scheduling a upper endoscopy in early December.  They also strongly recommended she consult with surgery for cholecystectomy due to her right upper quadrant pain.  3.  Olecranon bursitis-patient fell about 1 month ago (3 falls in the past 3 months) striking her left elbow.  She reports still some thickening warmth and tenderness on her left elbow.  She is avoiding putting any direct pressure on the left elbow.    ROS negative for recent emesis, black stools, bloody stools, dysuria  Social history-patient has moved in with a friend and her family, disabled  Current medication-  Prior to Admission medications    Medication Sig Start Date End Date Taking? Authorizing Provider   cephALEXin (KEFLEX) 500 MG Cap Take 1 Capsule by mouth 3 times a day. 11/22/21  Yes Edwar Platt M.D.   pantoprazole (PROTONIX) 20 MG tablet TAKE ONE TABLET BY MOUTH DAILY 11/22/21  Yes Edwar Platt M.D.   Cholecalciferol 2000 UNIT Tab Take 1 Tablet by mouth every day. 11/22/21  Yes Edwar Platt M.D.   fluticasone (FLONASE) 50 MCG/ACT nasal spray Administer 1 Spray into affected nostril(S) every day. 11/22/21  Yes Edwar Platt M.D.   HYDROcodone-acetaminophen (NORCO) 5-325 MG Tab per tablet Take 1 Tablet by mouth every 6 hours as needed for up to 28 days. 11/22/21 12/20/21 Yes Edwar Platt M.D.   tizanidine (ZANAFLEX) 2 MG capsule Take 1 Capsule by mouth 3 times a day. 10/21/21    "Julianna Guillory D.O.   Mirabegron ER (MYRBETRIQ) 50 MG TABLET SR 24 HR Take 50 mg by mouth every day. 10/21/21   Julianna Guillory D.O.   gabapentin (NEURONTIN) 300 MG Cap TAKE 3 CAPSULES BY MOUTH THREE TIMES DAILY 10/21/21   Julianna Guillory D.O.   baclofen (LIORESAL) 10 MG Tab TAKE 3 TABLETS BY MOUTH THREE TIMES DAILY 10/21/21   Julianna Guillory D.O.   meloxicam (MOBIC) 15 MG tablet TAKE 1 TABLET BY MOUTH DAILY 9/20/21   Edwar Platt M.D.   fesoterodine fumarate (TOVIAZ) 4 MG TABLET SR 24 HR  3/4/21   Physician Outpatient   ciclopirox (PENLAC) 8 % solution Indications: Fungal Toenail Disease 1/4/21   Physician Outpatient   divalproex (DEPAKOTE) 125 MG EC tablet  1/12/21   Physician Outpatient   mupirocin (BACTROBAN) 2 % Ointment Apply 1 Application topically 2 times a day. 12/21/20   Edwar Platt M.D.   fluticasone (FLONASE) 50 MCG/ACT nasal spray Administer 1 Spray into affected nostril(S) every day. 12/21/20   Edwar Platt M.D.   potassium chloride SA (KDUR) 20 MEQ Tab CR Take 1 Tab by mouth every day. 12/21/20   Edwar Platt M.D.   ALLERGY RELIEF 10 MG Tab  9/3/20   Physician Outpatient   TOVIAZ 4 MG TABLET SR 24 HR Indications: Overactive Bladder 10/20/20   Physician Outpatient   albuterol 108 (90 Base) MCG/ACT Aero Soln inhalation aerosol Inhale 2 Puffs by mouth every 6 hours as needed for Shortness of Breath. 5/26/20   Edwar Platt M.D.              Objective     /60   Pulse 92   Temp 36.7 °C (98.1 °F) (Temporal)   Resp 16   Ht 1.676 m (5' 5.98\")   Wt 70.3 kg (154 lb 14.4 oz)   LMP 01/11/2017   SpO2 95%   BMI 25.01 kg/m²      Physical Exam   Gen.- alert, cooperative, in no acute distress  Neck- midline trachea, thyroid not enlarged or tender,supple, no cervical adenopathy  Chest-clear to auscultation and percussion with normal breath sounds. No retractions. Chest wall nontender  Cardiac- regular rhythm and rate. No murmur, thrill, or heave  Left upper " extremity-very slight effusion and thickening in the left olecranon bursa with no redness.  Full range of motion at the left elbow joint.  Skin-healed recent laceration scar left upper forehead hairline area.                      Assessment & Plan        1. Gastroesophageal reflux disease, unspecified whether esophagitis present    - pantoprazole (PROTONIX) 20 MG tablet; TAKE ONE TABLET BY MOUTH DAILY  Dispense: 30 Tablet; Refill: 6    2. Vitamin D deficiency    - Cholecalciferol 2000 UNIT Tab; Take 1 Tablet by mouth every day.  Dispense: 60 Tablet; Refill: 6    3. Skin ulcer, limited to breakdown of skin (HCC)    - cephALEXin (KEFLEX) 500 MG Cap; Take 1 Capsule by mouth 3 times a day.  Dispense: 30 Capsule; Refill: 0    4. Non-seasonal allergic rhinitis due to pollen    - fluticasone (FLONASE) 50 MCG/ACT nasal spray; Administer 1 Spray into affected nostril(S) every day.  Dispense: 16 g; Refill: 6    5. Skin ulcer with fat layer exposed (HCC)    - Referral to General Surgery  - HYDROcodone-acetaminophen (NORCO) 5-325 MG Tab per tablet; Take 1 Tablet by mouth every 6 hours as needed for up to 28 days.  Dispense: 10 Tablet; Refill: 0  - Controlled Substance Treatment Agreement    6. Calculus of gallbladder without cholecystitis without obstruction      Plan: 1.  Rx Keflex 500 mg 3 times daily x10 days (requested by wound clinic)  2.  General surgery referral for consideration of laparoscopic cholecystectomy  3.  Revisit with me in 6 weeks  4.  Rx Norco 5 mg #10

## 2021-11-30 ENCOUNTER — TELEMEDICINE (OUTPATIENT)
Dept: PHYSICAL MEDICINE AND REHAB | Facility: REHABILITATION | Age: 56
End: 2021-11-30
Payer: MEDICAID

## 2021-11-30 DIAGNOSIS — R32 URINARY INCONTINENCE, UNSPECIFIED TYPE: ICD-10-CM

## 2021-11-30 DIAGNOSIS — S14.109D INJURY OF CERVICAL SPINAL CORD, SUBSEQUENT ENCOUNTER (HCC): Primary | ICD-10-CM

## 2021-11-30 DIAGNOSIS — Z74.09 IMPAIRED MOBILITY AND ACTIVITIES OF DAILY LIVING: ICD-10-CM

## 2021-11-30 DIAGNOSIS — Z86.69 HISTORY OF MUSCULAR DYSTROPHY: ICD-10-CM

## 2021-11-30 DIAGNOSIS — Z78.9 IMPAIRED MOBILITY AND ACTIVITIES OF DAILY LIVING: ICD-10-CM

## 2021-11-30 DIAGNOSIS — Z74.09 IMPAIRED MOBILITY AND ENDURANCE: ICD-10-CM

## 2021-11-30 DIAGNOSIS — N31.9 NEUROGENIC BLADDER: ICD-10-CM

## 2021-11-30 PROCEDURE — 99214 OFFICE O/P EST MOD 30 MIN: CPT | Performed by: PHYSICAL MEDICINE & REHABILITATION

## 2021-11-30 NOTE — Clinical Note
Send Rx and todays note to DME supplier that will specifically provide the shower chair we are requesting. May need to call and check about stocking it prior to sending rx.

## 2021-11-30 NOTE — PROGRESS NOTES
[facility], [place], operated by [facility]  PM&R Neuro Rehabilitation Clinic  [address] [place] [id number]  Ph: [phone]    FOLLOW UP PATIENT EVALUATION    This evaluation was conducted via Zoom using secure and encrypted videoconferencing technology. The patient was in a private location in the [facility].  The patient's identity was confirmed and verbal consent was obtained for this virtual visit.    Patient Name: Marilyn Salinas   Patient : 1965  Patient Age: 56 y.o.   PCP: Edwar Platt M.D.    Examining Physician: Dr. Julianna Guillory, DO    SUBJECTIVE:   Patient Identification: Marilyn Salinas is a 56 y.o. RHD female with PMH significant for chronic back and neck pain and rehabilitation history significant for premorbid weakness and paresthesias, had GLF, imaging showing Disc herniation at C4-5 and C3- C4, with T2 hyperintensity of the cord and s/p ACDF C3-5 on 19 with Dr. Linton  and is presenting to PM&R clinic for a FOLLOW UP outpatient evaluation with the following chief complaint/s:    PM&R Background Information:  Original Date of Injury: 19 GLF and worsening weakness, pain.   Pertinent Procedure History: Disc herniation at C4-5 and C3- C4, with T2 hyperintensity of the cord at this level. she underwent ACDF C3-5 on 19 with Dr. Linton  Dates of Admission/Discharge to ARU: 19-20    Chief Complaint: Medication Follow Up    Interval History:   - Patient moved and has really high tubs. She can barely get in. Harder to get out. Fell getting in and out.   - Moved to a house with 4 bedrooms.   - Does not have any equipment to assist with getting in her in and out of the shower.   - Feet slip within the bathtub. Has a handle with suction cup which seems to be fairly strong.   - Has been having urinary incontinence at night. Takes Myrbetriq and Toviaz. Going through 1-2 pairs of underwear at night. Always has to go at least 30 minutes after taking Toviaz.   - Is waiting for   inserts.   - Will be seeing foot and ankle doctors every Friday.   - Going to look at beds so that she can get out of bed faster.   - Fell and bent walker, still works.      Review of Systems:  All other pertinent positive review of systems are noted above in HPI.     Past Medical History:  Past Medical History:   Diagnosis Date   • Anxiety    • Arthritis     spine, feet    • Asthma    • Cataract    • Depression    • High cholesterol       Past Surgical History:   Procedure Laterality Date   • CERVICAL DISK AND FUSION ANTERIOR N/A 12/20/2019    Procedure: DISCECTOMY, SPINE, CERVICAL, ANTERIOR APPROACH, WITH FUSION - C3-5;  Surgeon: Connor Linton M.D.;  Location: SURGERY [facility];  Service: Neurosurgery   • CORPECTOMY N/A 12/20/2019    Procedure: CORPECTOMY;  Surgeon: Connor Linton M.D.;  Location: [facility];  Service: Neurosurgery   • VAGINAL HYSTERECTOMY SCOPE TOTAL N/A 10/3/2017    Procedure: VAGINAL HYSTERECTOMY SCOPE TOTAL;  Surgeon: Hudson Driscoll M.D.;  Location: SURGERY SAME DAY [facility];  Service: Gynecology   • SALPINGECTOMY Bilateral 10/3/2017    Procedure: SALPINGECTOMY;  Surgeon: Hudson Driscoll M.D.;  Location: SURGERY SAME DAY [facility];  Service: Gynecology   • OOPHORECTOMY Bilateral 10/3/2017    Procedure: OOPHORECTOMY;  Surgeon: Hudson Driscoll M.D.;  Location: SURGERY SAME DAY [facility];  Service: Gynecology   • ANTERIOR AND POSTERIOR REPAIR Bilateral 10/3/2017    Procedure: ANTERIOR AND POSTERIOR REPAIR;  Surgeon: Hudson Driscoll M.D.;  Location: SURGERY SAME DAY [facility];  Service: Gynecology   • ENTEROCELE REPAIR N/A 10/3/2017    Procedure: ENTEROCELE REPAIR, PERINEOPLASTY;  Surgeon: Hudson Driscoll M.D.;  Location: SURGERY SAME DAY [facility];  Service: Gynecology   • BLADDER SLING FEMALE N/A 10/3/2017    Procedure: BLADDER SLING FEMALE TOT, CYSTOSCOPY;  Surgeon: Hudson Driscoll M.D.;  Location: SURGERY SAME DAY [facility];  Service:  Gynecology   • VAGINAL SUSPENSION N/A 10/3/2017    Procedure: VAGINAL SUSPENSION SACROSPINOUS VAULT POSSIBLE;  Surgeon: Hudson Driscoll M.D.;  Location: SURGERY SAME DAY [facility];  Service: Gynecology   • OTHER Left 2005    hammertoe x2   • EYE SURGERY  1972    for lazy eye   • LUMPECTOMY          Current Outpatient Medications:   •  cephALEXin (KEFLEX) 500 MG Cap, Take 1 Capsule by mouth 3 times a day., Disp: 30 Capsule, Rfl: 0  •  pantoprazole (PROTONIX) 20 MG tablet, TAKE ONE TABLET BY MOUTH DAILY, Disp: 30 Tablet, Rfl: 6  •  Cholecalciferol 2000 UNIT Tab, Take 1 Tablet by mouth every day., Disp: 60 Tablet, Rfl: 6  •  fluticasone (FLONASE) 50 MCG/ACT nasal spray, Administer 1 Spray into affected nostril(S) every day., Disp: 16 g, Rfl: 6  •  HYDROcodone-acetaminophen (NORCO) 5-325 MG Tab per tablet, Take 1 Tablet by mouth every 6 hours as needed for up to 28 days., Disp: 10 Tablet, Rfl: 0  •  tizanidine (ZANAFLEX) 2 MG capsule, Take 1 Capsule by mouth 3 times a day., Disp: 270 Capsule, Rfl: 1  •  Mirabegron ER (MYRBETRIQ) 50 MG TABLET SR 24 HR, Take 50 mg by mouth every day., Disp: 90 Tablet, Rfl: 1  •  gabapentin (NEURONTIN) 300 MG Cap, TAKE 3 CAPSULES BY MOUTH THREE TIMES DAILY, Disp: 270 Capsule, Rfl: 5  •  baclofen (LIORESAL) 10 MG Tab, TAKE 3 TABLETS BY MOUTH THREE TIMES DAILY, Disp: 270 Tablet, Rfl: 5  •  meloxicam (MOBIC) 15 MG tablet, TAKE 1 TABLET BY MOUTH DAILY, Disp: 30 Tablet, Rfl: 6  •  fesoterodine fumarate (TOVIAZ) 4 MG TABLET SR 24 HR, , Disp: , Rfl:   •  ciclopirox (PENLAC) 8 % solution, Indications: Fungal Toenail Disease, Disp: , Rfl:   •  divalproex (DEPAKOTE) 125 MG EC tablet, , Disp: , Rfl:   •  mupirocin (BACTROBAN) 2 % Ointment, Apply 1 Application topically 2 times a day., Disp: 22 g, Rfl: 0  •  fluticasone (FLONASE) 50 MCG/ACT nasal spray, Administer 1 Spray into affected nostril(S) every day., Disp: 16 g, Rfl: 3  •  potassium chloride SA (KDUR) 20 MEQ Tab CR, Take 1 Tab by mouth  every day., Disp: 30 Tab, Rfl: 6  •  ALLERGY RELIEF 10 MG Tab, , Disp: , Rfl:   •  TOVIAZ 4 MG TABLET SR 24 HR, Indications: Overactive Bladder, Disp: , Rfl:   •  albuterol 108 (90 Base) MCG/ACT Aero Soln inhalation aerosol, Inhale 2 Puffs by mouth every 6 hours as needed for Shortness of Breath., Disp: 8.5 g, Rfl: 6  Allergies   Allergen Reactions   • Codeine Unspecified     Unknown reaction          Past Social History:  Social History     Socioeconomic History   • Marital status: Single     Spouse name: Not on file   • Number of children: Not on file   • Years of education: Not on file   • Highest education level: Not on file   Occupational History   • Not on file   Tobacco Use   • Smoking status: Current Every Day Smoker     Packs/day: 0.25     Years: 26.00     Pack years: 6.50     Types: Cigarettes     Last attempt to quit: 3/1/2020     Years since quittin.7   • Smokeless tobacco: Never Used   Vaping Use   • Vaping Use: Never used   Substance and Sexual Activity   • Alcohol use: No   • Drug use: Yes     Types: Marijuana     Comment: marijuana daily   • Sexual activity: Not Currently   Other Topics Concern   •  Service No   • Blood Transfusions No   • Caffeine Concern No   • Occupational Exposure No   • Hobby Hazards No   • Sleep Concern Yes   • Stress Concern Yes   • Weight Concern Yes   • Special Diet No   • Back Care Yes   • Exercise No   • Bike Helmet Yes   • Seat Belt Yes   • Self-Exams Yes   Social History Narrative   • Not on file     Social Determinants of Health     Financial Resource Strain:    • Difficulty of Paying Living Expenses: Not on file   Food Insecurity:    • Worried About Running Out of Food in the Last Year: Not on file   • Ran Out of Food in the Last Year: Not on file   Transportation Needs:    • Lack of Transportation (Medical): Not on file   • Lack of Transportation (Non-Medical): Not on file   Physical Activity:    • Days of Exercise per Week: Not on file   • Minutes of  Exercise per Session: Not on file   Stress:    • Feeling of Stress : Not on file   Social Connections:    • Frequency of Communication with Friends and Family: Not on file   • Frequency of Social Gatherings with Friends and Family: Not on file   • Attends Rastafari Services: Not on file   • Active Member of Clubs or Organizations: Not on file   • Attends Club or Organization Meetings: Not on file   • Marital Status: Not on file   Intimate Partner Violence:    • Fear of Current or Ex-Partner: Not on file   • Emotionally Abused: Not on file   • Physically Abused: Not on file   • Sexually Abused: Not on file   Housing Stability:    • Unable to Pay for Housing in the Last Year: Not on file   • Number of Places Lived in the Last Year: Not on file   • Unstable Housing in the Last Year: Not on file        Family History:  Family History   Problem Relation Age of Onset   • Cancer Mother    • Alcohol/Drug Mother    • Cancer Brother    • Diabetes Maternal Aunt        Depression and Opioid Screening  PHQ-9:  Depression Screen (PHQ-2/PHQ-9) 6/22/2020 10/22/2020 12/8/2020   PHQ-2 Total Score - - -   PHQ-2 Total Score 6 6 0   PHQ-9 Total Score 24 21 -     Interpretation of PHQ-9 Total Score   Score Severity   1-4 No Depression   5-9 Mild Depression   10-14 Moderate Depression   15-19 Moderately Severe Depression   20-27 Severe Depression     Opioid Risk Score: No value filed.  Interpretation of Opioid Risk Score   Score 0-3 = Low risk of abuse. Do UDS at least once per year.  Score 4-7 = Moderate risk of abuse. Do UDS 1-4 times per year.  Score 8+ = High risk of abuse. Refer to specialist.      OBJECTIVE:   Vital Signs:  There were no vitals filed for this visit.     Pertinent Labs:  Lab Results   Component Value Date/Time    SODIUM 142 07/24/2021 11:38 AM    POTASSIUM 4.3 07/24/2021 11:38 AM    CHLORIDE 105 07/24/2021 11:38 AM    CO2 26 07/24/2021 11:38 AM    GLUCOSE 81 07/24/2021 11:38 AM    BUN 13 07/24/2021 11:38 AM     CREATININE 0.67 07/24/2021 11:38 AM       Lab Results   Component Value Date/Time    HBA1C 5.6 07/27/2017 09:39 AM       Lab Results   Component Value Date/Time    WBC 7.0 12/28/2019 05:12 AM    RBC 4.07 (L) 12/28/2019 05:12 AM    HEMOGLOBIN 12.4 12/28/2019 05:12 AM    HEMATOCRIT 37.9 12/28/2019 05:12 AM    MCV 93.1 12/28/2019 05:12 AM    MCH 30.5 12/28/2019 05:12 AM    MCHC 32.7 (L) 12/28/2019 05:12 AM    MPV 9.6 12/28/2019 05:12 AM    NEUTSPOLYS 29.40 (L) 12/28/2019 05:12 AM    LYMPHOCYTES 56.70 (H) 12/28/2019 05:12 AM    MONOCYTES 9.20 12/28/2019 05:12 AM    EOSINOPHILS 3.40 12/28/2019 05:12 AM    BASOPHILS 1.00 12/28/2019 05:12 AM       Lab Results   Component Value Date/Time    ASTSGOT 17 07/24/2021 11:38 AM    ALTSGPT 19 07/24/2021 11:38 AM        Physical Exam:   GEN: No apparent distress  HEENT: Head normocephalic, atraumatic.  Sclera nonicteric bilaterally, no ocular discharge appreciated bilaterally.  PULMONARY: Breathing nonlabored on room air, no respiratory accessory muscle use.  Not requiring supplemental oxygen.  SKIN: Pictures from wound care 11/19/2021 left hallux    PSYCH: Mood and affect within normal limits.  NEURO: Awake alert.  Conversational.  Logical thought content.    ASSESSMENT/PLAN: Marilyn Salinas  is a 56 y.o. female with rehabilitation history significant for premorbid weakness and paresthesias, had GLF, imaging showing Disc herniation at C4-5 and C3- C4, with T2 hyperintensity of the cord and s/p ACDF C3-5 on 12/20/19 with Dr. Linton, here for SCI follow up. The following plan was discussed with the patient who is in agreement.     Visit Diagnoses     ICD-10-CM   1. Injury of cervical spinal cord, subsequent encounter ([facility])  S14.109D   2. Neurogenic bladder  N31.9   3. Urinary incontinence, unspecified type  R32   4. History of muscular dystrophy  Z86.69   5. Impaired mobility and endurance  Z74.09   6. Impaired mobility and activities of daily living  Z74.09    Z78.9         Rehab/Neuro:   1. C2 AISD: Nontraumatic incomplete spinal cord injury, status post fall at home with worsening weakness, found to have severe cervical stenosis.  Status post C3-C5 ACDF with Dr. Linton on 12/20/19. No longer getting epidural steroid injections, too painful.  -Therapy: Continue physical therapy  -Function: 8/2021 Largely stable but patient does feel as though she is weaker lately.  Has had one fall.  Maintains strength through doing house chores. 10/2021 patient continues to have significant spastic paraplegic gait.  Would benefit from Botox to bilateral gastroc complex, however, insurance would not cover.  This would be cost prohibitive for her.  Significant onychomycosis on bilateral toenails as well as concern for DTI and erythema of first great toe.  -Face-to-face evaluation: Patient was prescribed heavy-duty sliding transfer bench shower chair with cut out seat and adjustable legs.  No other assistive device for showering would be sufficient for patient given her severe spastic diplegia and difficulty with transferring.  The sliding chair feature would allow her to get over the high tub side independently which is currently impossible due to her severe lower extremity spasticity and impaired mobility and ADLs resulting.     2. Muscular dystrophy: ? CP per recent neurology evaluation.  Reportedly diagnosed in '60's per aunt. Normal CPK. Was seeing Nikunj back in 2018 - was supposed to have EMG/NCS. Medicaid wouldn't cover EMG/NCS at that time.  Scheduled for EMG/NCS 10/23/2020.  EEG reportedly negative.  Patient has since changed her neurologist and is now seeing Dr. Hartman. Had EMG/NCS results -results not available but patient reports she was told she has paraplegia and partial CP.   -Follows with neurology.     Spasticity: Bilateral lower extremities worse than upper extremities.  Botox cost prohibitive.  -Med management: Continue baclofen 30 mg 3 times daily.  -Med management: Continue  tizanidine 2 mg 3 times daily.  -Have provided counseling on baclofen pump.  Too invasive for patient at this time.    Neuropathic Pain:   -Med Management: Continue gabapentin 900 mg 3 times daily.     Neurogenic Bladder: Urinary urgency and frequency.  Patient has constant urinary incontinence, urinary frequency, urinary urgency due to neurogenic bladder in setting of spinal cord injury.  -Med management: Continue Myrbetriq and Toviaz.  -Referral: Urogynecology.  -Prescription for adult pull-ups due to neurogenic bladder, urinary incontinence unspecified.    Skin: Very worried about her left great toe.  It is very erythematous and while she does have a large callus at the distal end of her big toe there is unclear depth of skin breakdown underneath.  Patient has tried to get in with podiatry however nobody in the area is taking new Medicaid patients and she was referred to [facility] which, due to her disabilities, can clearly not make happen.  -Established with wound care through [facility].    Follow up: As scheduled.    Total time spent was 25 minutes.  Included in this time is the time spent preparing for the visit including record review, my exam and evaluation, counseling and education regarding that which is aforementioned in the assessment and plan. Time was spent ordering the appropriate labs, tests, procedures, referrals, medications. Discussion involved the patient.    Please note that this dictation was created using voice recognition software. I have made every reasonable attempt to correct obvious errors but there may be errors of grammar and content that I may have overlooked prior to finalization of this note.    Dr. Julianna Guillory DO, MS  Department of Physical Medicine & Rehabilitation  Neuro Rehabilitation Clinic  [facility]

## 2021-12-01 ENCOUNTER — NON-PROVIDER VISIT (OUTPATIENT)
Dept: WOUND CARE | Facility: MEDICAL CENTER | Age: 56
End: 2021-12-01
Attending: PHYSICAL MEDICINE & REHABILITATION
Payer: MEDICAID

## 2021-12-01 DIAGNOSIS — B35.1 ONYCHOMYCOSIS: Primary | ICD-10-CM

## 2021-12-01 DIAGNOSIS — M79.2 NEUROPATHIC PAIN: ICD-10-CM

## 2021-12-01 DIAGNOSIS — M79.671 FOOT PAIN, RIGHT: ICD-10-CM

## 2021-12-01 PROCEDURE — 97597 DBRDMT OPN WND 1ST 20 CM/<: CPT

## 2021-12-01 PROCEDURE — 11721 DEBRIDE NAIL 6 OR MORE: CPT | Mod: XU

## 2021-12-01 NOTE — PROGRESS NOTES
Patient seen briefly during her wound care appointment with nursing.  She is anxious, tearful, almost hysterical.  Complains of severe pain to both feet.  She has not yet obtained x-ray as previously ordered.  Patient is scheduled to be seen in LPS rounds on Friday, need to get x-rays done prior.    Upon further questioning however, it appears that her pain is chronic, has been going on for many years.  Referral to pain management specialist

## 2021-12-01 NOTE — PROCEDURES
After 10 mins dwell time of topical viscous lidocaine 2%, wound L medial hallux was selectively debrided with a scalpel  to remove magi wound callus and non viable tissue from wound bed. Area debrided <20cm2. Pt lexie well.   Pt's feet were washed with soapy water then dried. Debris removed from between toes with gauze. Cuticle and nailbed margins were established and debris removed from around and beneath toenails with a curette. Dystrophic, onychomycotic nails were trimmed with clippers, then smoothed and finished with dremel. Tea tree oil applied to nailbeds and cuticles. Feet pippa were moisturized except for between toes. No nicks or cuts noted.

## 2021-12-01 NOTE — NON-PROVIDER
I asked Anup Cristian RENALDO to see pt today as she is crying and screaming in pain at the slightest touch of her R 5th MTH where she has a thick blood-stained callus. I attempted to debride the callus with a scalpel but this is causing pt too much pain, she is not able to keep still, suddenly screams out and jerks her foot away making callus debridement unsafe, so debridement was aborted. Pt is very distraught, sobbing and yelling that she does not want to lose her foot. She is also very distraught at still having the wound on her L medial hallux and is perseverating on the fear of losing her toe. APRN ordered xray of R foot (pt already has orders for L foot xray), and referred pt to pain management. I instr pt to come in for her appointment this Friday for ortho rounds, and instr her to get her foot xrays done prior to then. She says she will return this afternoon to [address] [place] [address] and get the xrays done. She did allow me to trim and dremel her toe nails today, as they were sharp and dystrophic.

## 2021-12-01 NOTE — PATIENT INSTRUCTIONS
After Visit Summary Wound Care Instructions    Nutrition - Patient instructed increased protein diet unless contraindicated in renal failure (meat, eggs, fish, yogurt, cottage cheese, beans), use of multivitamin with minerals and Arginaid supplementation (check if ok with Primary Care Provider first).    Infection -  instructed signs and symptoms of infection, increased redness and swelling, localized heat over wound and surrounding area/fever/chills/nausea and vomiting, when to call doctor or go to Emergency Room.     Dressing Changes - Instructed patient rationale for wound care products. Instructed to keep dressings clean and dry, shower on clinic days right before coming in. Change your dressing if it becomes soiled, soaked, or falls off.      Dressing change procedure   Remove old dressing.  Cleanse the wound with saline and gauze.  Dry surrounding skin with gauze, then apply skin prep wipe, allow to dry, then apply zinc oxide barrier paste.  Cut a small piece of Aquacel silver dressing, and pack loosely into wound to fill the dead space and the undermining areas.   Cut a larger piece of Aquacel silver and place this on top.   Cover with foam dressing and secure with the hypafix tape if needed.   Change 1-2 x week and as needed at home, come to wound clinic 1 x week.   Change your dressing if it becomes soiled, soaked, or falls off.    Instructed patient about fall prevention.     Instr pt to get her xrays done before coming to rounds on Friday. She understands.      Questions - should you have any questions regarding your home care instructions, please contact the wound center at [phone]. If after hours, contact your primary care physician or go to the hospital emergency room.

## 2021-12-02 ENCOUNTER — HOSPITAL ENCOUNTER (OUTPATIENT)
Dept: RADIOLOGY | Facility: MEDICAL CENTER | Age: 56
End: 2021-12-02
Attending: NURSE PRACTITIONER
Payer: MEDICAID

## 2021-12-02 DIAGNOSIS — T14.8XXA OPEN WOUND: ICD-10-CM

## 2021-12-02 DIAGNOSIS — M79.671 FOOT PAIN, RIGHT: ICD-10-CM

## 2021-12-02 PROCEDURE — 73660 X-RAY EXAM OF TOE(S): CPT | Mod: LT

## 2021-12-02 PROCEDURE — 73630 X-RAY EXAM OF FOOT: CPT | Mod: RT

## 2021-12-03 ENCOUNTER — OFFICE VISIT (OUTPATIENT)
Dept: WOUND CARE | Facility: MEDICAL CENTER | Age: 56
End: 2021-12-03
Attending: PHYSICAL MEDICINE & REHABILITATION
Payer: MEDICAID

## 2021-12-03 DIAGNOSIS — M79.2 NEUROPATHIC PAIN: ICD-10-CM

## 2021-12-03 DIAGNOSIS — M79.671 FOOT PAIN, RIGHT: ICD-10-CM

## 2021-12-03 DIAGNOSIS — L84 CALLUS OF FOOT: ICD-10-CM

## 2021-12-03 PROCEDURE — 99214 OFFICE O/P EST MOD 30 MIN: CPT

## 2021-12-03 PROCEDURE — 99213 OFFICE O/P EST LOW 20 MIN: CPT | Performed by: NURSE PRACTITIONER

## 2021-12-03 NOTE — PROGRESS NOTES
"LIMB PRESERVATION SERVICE ROUNDS    Referral to LPS Rounds from: Wound clinic     WOUND HISTORY: Patient is a 56 year old female with history tetraplegia, neuropathy, MS, CP, frequent falls. She states that the callus to her right foot has been present for several months and she attributes it to her walking on the side of her foot. Her left hallux wound she states has been present for a couple of months (2021?) and she believes it started because of her toenails putting pressure on \"everything in my shoe\" and caused the wound. She was having it treated at [facility], but moved to [facility] recently and wanted to be seen by [facility].    She reports severe pain from her calluses and to the tip of her left hallux.  She has had hammertoe corrections in the past by podiatry, and feels these made her pain worse.      LPS Rounds Interval notes:  12/3/2021: Patient states she is feeling okay today, considerably calmer than she was in the clinic a few days ago.  Completed her x-rays as requested.  Patient presents today wearing tennis shoes with inserts that she states are over 4 years old.  She currently has orthotic shoes and inserts, and what sounds like AFOs being made for her by  orthotics.  She is doubtful however that the AFOs will work, thinks it will make her pain worse.     RESULTS:       Labs:     A1c:   Lab Results   Component Value Date/Time    HBA1C 5.6 2017 09:39 AM          IMAGIN2021 three-view x-ray right foot  IMPRESSION:     No radiographic evidence of acute osteomyelitis     Worsening hallux valgus deformity with mild first metatarsal-phalangeal arthritis    2021-2 view x-ray left toes    IMPRESSION:     First distal phalanx terminal tuft acute osteomyelitis with overlying skin ulceration        VASCULAR STUDIES: No recent studies found in epic    LAST  WOUND CULTURE:  DATE : None found in epic                            OBJECTIVE FINDINGS:      Pulses: " Palpable  Bilateral foot drop and equinovarus deformity noted  Thick calluses to lateral medial hallux and fifth MTH           Wound(s):                                      PROCEDURE   .         Wound completed by RN, refer to note and flow sheet        PLAN:         Wound Care:  Wound care: Silver Hydrofiber to manage exudate and bioburden, foam cover dressing, Hypafix tape  Imaging/Labs:  No further imaging or labs  Offloading: Patient has shoes, inserts, and AFOs being made for her by [employer]  Antibiotics:  None  Surgery:  No plans for surgery at this time        LPS Follow up:       20minutes was spent on preparation for visit, examination, counseling and coordination of care regarding the above.        Please note that this dictation was created using voice recognition software. I have  worked with technical experts from [facility] to optimize the interface.  I have made every reasonable attempt to correct obvious errors, but there may be errors of grammar and possibly content that I did not discover before finalizing the note.

## 2021-12-03 NOTE — PROGRESS NOTES
Pt seen during ortho rounds with LPS team for left distal hallux wound and right lateral foot callus.  Measurements(cm): see below. Following physician assessment, RN evaluated patient's wound. See flow sheet for wound care details.    Wound 11/11/21 Left medial distal hallux (Active)   Wound Image   12/03/21 1058   Site Assessment Pink;Yellow 12/03/21 1058   Periwound Assessment Callused 12/03/21 1058   Margins Unattached edges 12/03/21 1058   Closure Secondary intention 12/01/21 1100   Drainage Amount Small 12/03/21 1058   Drainage Description Serosanguineous 12/03/21 1058   Treatments Cleansed;Site care 12/03/21 1058   Wound Cleansing Normal Saline Irrigation 12/03/21 1058   Periwound Protectant Skin Protectant Wipes to Periwound 12/03/21 1058   Dressing Cleansing/Solutions Not Applicable 12/03/21 1058   Dressing Options Hydrofiber Silver;Nonadhesive Foam;Hypafix Tape 12/03/21 1058   Dressing Changed Changed 12/01/21 1100   Dressing Status Clean;Intact;Dry 12/01/21 1100   Dressing Change/Treatment Frequency Every 72 hrs, and As Needed 12/01/21 1100   Non-staged Wound Description Full thickness 12/03/21 1058   Wound Length (cm) 0.3 cm 12/03/21 1058   Wound Width (cm) 0.3 cm 12/03/21 1058   Wound Depth (cm) 0.3 cm 12/03/21 1058   Wound Surface Area (cm^2) 0.09 cm^2 12/03/21 1058   Wound Volume (cm^3) 0.027 cm^3 12/03/21 1058   Post-Procedure Length (cm) 0.3 cm 12/03/21 1058   Post-Procedure Width (cm) 0.3 cm 12/03/21 1058   Post-Procedure Depth (cm) 0.3 cm 12/03/21 1058   Post-Procedure Surface Area (cm^2) 0.09 cm^2 12/03/21 1058   Post-Procedure Volume (cm^3) 0.027 cm^3 12/03/21 1058   Wound Healing % 81 12/03/21 1058   Wound Bed Granulation (%) 0 % 12/01/21 1100   Wound Bed Slough (%) 100 % 12/01/21 1100   Wound Bed Granulation (%) - Post-Procedure 100 % 12/01/21 1100   Tunneling (cm) 0 cm 12/03/21 1058   Undermining (cm) 0 cm 12/03/21 1058   Wound Odor None 12/03/21 1058   Pulses Left;2+;DP;PT 11/19/21 4984    Exposed Structures None 12/03/21 1058       Wound 11/11/21 Right plantar lateral foot (Active)   Wound Image   12/03/21 1058   Site Assessment Other (Comment) 12/03/21 1058   Periwound Assessment Callused 12/03/21 1058   Margins Attached edges 12/01/21 1100   Drainage Amount None 12/03/21 1058   Treatments Cleansed;Topical Lidocaine;CSWD - Conservative Sharp Wound Debridement 11/19/21 1345   Wound Cleansing Puracyn [place] 11/19/21 1345   Periwound Protectant Skin Protectant Wipes to Periwound 12/03/21 1058   Dressing Cleansing/Solutions Not Applicable 11/19/21 1345   Dressing Options Nonadhesive Foam;Hypafix Tape 12/03/21 1058   Dressing Changed Changed 11/19/21 1345   Dressing Status Clean;Dry;Intact 11/19/21 1345   Dressing Change/Treatment Frequency Every 72 hrs, and As Needed 12/01/21 1100   Non-staged Wound Description Full thickness 11/19/21 1345   Post-Procedure Length (cm) 1 cm 11/19/21 1345   Post-Procedure Width (cm) 0.4 cm 11/19/21 1345   Post-Procedure Depth (cm) 0.1 cm 11/19/21 1345   Post-Procedure Surface Area (cm^2) 0.4 cm^2 11/19/21 1345   Post-Procedure Volume (cm^3) 0.04 cm^3 11/19/21 1345   Tunneling (cm) 0 cm 12/03/21 1058   Undermining (cm) 0 cm 12/03/21 1058   Wound Odor None 12/03/21 1058   Pulses Right;2+;DP;PT 11/19/21 1345   Exposed Structures None 12/03/21 1058

## 2021-12-06 DIAGNOSIS — S14.109D INJURY OF CERVICAL SPINAL CORD, SUBSEQUENT ENCOUNTER (HCC): ICD-10-CM

## 2021-12-06 DIAGNOSIS — Z86.69 HISTORY OF MUSCULAR DYSTROPHY: ICD-10-CM

## 2021-12-06 DIAGNOSIS — Z74.09 IMPAIRED MOBILITY AND ENDURANCE: ICD-10-CM

## 2021-12-10 ENCOUNTER — OFFICE VISIT (OUTPATIENT)
Dept: WOUND CARE | Facility: MEDICAL CENTER | Age: 56
End: 2021-12-10
Attending: PHYSICAL MEDICINE & REHABILITATION
Payer: MEDICAID

## 2021-12-10 VITALS
RESPIRATION RATE: 17 BRPM | DIASTOLIC BLOOD PRESSURE: 81 MMHG | OXYGEN SATURATION: 94 % | SYSTOLIC BLOOD PRESSURE: 118 MMHG | TEMPERATURE: 97.8 F | HEART RATE: 86 BPM

## 2021-12-10 DIAGNOSIS — L97.522 NEUROPATHIC ULCER OF TOE OF LEFT FOOT WITH FAT LAYER EXPOSED (HCC): ICD-10-CM

## 2021-12-10 DIAGNOSIS — L84 CALLUS OF FOOT: ICD-10-CM

## 2021-12-10 DIAGNOSIS — M86.9 OSTEOMYELITIS OF TOE (HCC): ICD-10-CM

## 2021-12-10 DIAGNOSIS — M79.2 NEUROPATHIC PAIN: ICD-10-CM

## 2021-12-10 PROCEDURE — 99213 OFFICE O/P EST LOW 20 MIN: CPT | Mod: 25 | Performed by: NURSE PRACTITIONER

## 2021-12-10 PROCEDURE — 11055 PARING/CUTG B9 HYPRKER LES 1: CPT

## 2021-12-10 PROCEDURE — 11055 PARING/CUTG B9 HYPRKER LES 1: CPT | Performed by: NURSE PRACTITIONER

## 2021-12-10 PROCEDURE — 99213 OFFICE O/P EST LOW 20 MIN: CPT

## 2021-12-10 ASSESSMENT — ENCOUNTER SYMPTOMS
FEVER: 0
SHORTNESS OF BREATH: 0
COUGH: 0
CHILLS: 0

## 2021-12-10 NOTE — PATIENT INSTRUCTIONS
Should you experience any significant changes in your wound(s) such as infection (redness, swelling, localized heat, increased pain, fever >101 F, chills) or have any questions regarding your home care instructions, please contact the wound center [phone]. If after hours, contact your primary care physician or go the hospital emergency room.  Keep dressing clean and dry and cover while bathing. Only change dressing if over saturated, soiled or its falling off.

## 2021-12-10 NOTE — PROGRESS NOTES
"Provider Encounter- Neuropathic Foot Ulcer      HISTORY OF PRESENT ILLNESS  Wound History:    START OF CARE IN CLINIC: 11/11/2021               REFERRING PROVIDER: Julianna Guillory DO              WOUND- Full Thickness Wound              LOCATION: Left distal medial hallux, right plantar lateral foot               HISTORY:  Patient is a 56 year old female with history tetraplegia, neuropathy, MS, CP, frequent falls. She states that the callus to her right foot has been present for several months and she attributes it to her walking on the side of her foot. Her left hallux wound she states has been present for a couple of months (Sept 2021?) and she believes it started because of her toenails putting pressure on \"everything in my shoe\" and caused the wound. She was having it treated at [facility], but moved to [facility] recently and wanted to be seen by [facility].               She reports severe pain from her calluses and to the tip of her left hallux.  She has had hammertoe corrections in the past by podiatry, and feels these made her pain worse.    Pertinent Medical History: MS, CP, C1-C4 incomplete quadriplegia, anxiety and depression           TOBACCO USE:   Current everyday smoker      Patient's problem list, allergies, and current medications reviewed and updated in Epic    Interval History:  12/10/2021 : Clinic visit with RENALDO Garcia, GEMA-BC, CWJUDITHN, CFJOSIAH.  Patient was seen in LPS rounds last week.  At that time, I had mistakenly told patient that there is no OM seen on her x-ray, did not notice this until after she left.  I notified Dr. Mathias via Voalte soon afterwards, he advised to wait to see how wound progresses.  Patient presents today with worsening of her toe ulcer.  I informed her of OM, and probable need for amputation of distal toe.  She became hysterical immediately, crying loudly, wailing, inconsolable.  I offered to arrange for surgery with Dr. Mathias next week, or to have her " see surgeon in LPS rounds next Friday.  She opted to wait till next Friday, stated she needed time to think about it.      REVIEW OF SYSTEMS:   Review of Systems   Constitutional: Negative for chills and fever.   Respiratory: Negative for cough and shortness of breath.    Cardiovascular: Negative for chest pain.       PHYSICAL EXAMINATION:   /81 (BP Location: Left arm, Patient Position: Sitting)   Pulse 86   Temp 36.6 °C (97.8 °F) (Temporal)   Resp 17   LMP 01/11/2017   SpO2 94%     Physical Exam  Constitutional:       Appearance: Normal appearance.   HENT:      Head: Normocephalic.   Eyes:      Pupils: Pupils are equal, round, and reactive to light.   Cardiovascular:      Pulses: Normal pulses.   Skin:     General: Skin is warm.      Comments: Thick callus around full-thickness wound to distal/medial left hallux  Refer to wound flowsheet and photos   Neurological:      Mental Status: She is alert.         WOUND ASSESSMENT  Wound 11/11/21 Left medial distal hallux (Active)   Wound Image   12/10/21 1100   Site Assessment Pale;[place] 12/10/21 1100   Periwound Assessment Callused 12/10/21 1100   Margins Unattached edges 12/10/21 1100   Closure Secondary intention 12/01/21 1100   Drainage Amount Small 12/10/21 1100   Drainage Description Serosanguineous 12/10/21 1100   Treatments Cleansed;Site care 12/10/21 1100   Wound Cleansing Normal Saline Irrigation 12/10/21 1100   Periwound Protectant Skin Protectant Wipes to Periwound 12/10/21 1100   Dressing Cleansing/Solutions Not Applicable 12/10/21 1100   Dressing Options Hydrofiber Silver;Nonadhesive Foam;Hypafix Tape 12/10/21 1100   Dressing Changed Changed 12/01/21 1100   Dressing Status Clean;Intact;Dry 12/01/21 1100   Dressing Change/Treatment Frequency Every 72 hrs, and As Needed 12/01/21 1100   Non-staged Wound Description Full thickness 12/10/21 1100   Wound Length (cm) 0.8 cm 12/10/21 1100   Wound Width (cm) 0.4 cm 12/10/21 1100   Wound Depth (cm) 0.4 cm  12/10/21 1100   Wound Surface Area (cm^2) 0.32 cm^2 12/10/21 1100   Wound Volume (cm^3) 0.128 cm^3 12/10/21 1100   Post-Procedure Length (cm) 0.3 cm 12/03/21 1058   Post-Procedure Width (cm) 0.3 cm 12/03/21 1058   Post-Procedure Depth (cm) 0.3 cm 12/03/21 1058   Post-Procedure Surface Area (cm^2) 0.09 cm^2 12/03/21 1058   Post-Procedure Volume (cm^3) 0.027 cm^3 12/03/21 1058   Wound Healing % 11 12/10/21 1100   Wound Bed Granulation (%) 0 % 12/01/21 1100   Wound Bed Slough (%) 100 % 12/01/21 1100   Wound Bed Granulation (%) - Post-Procedure 100 % 12/01/21 1100   Tunneling (cm) 0 cm 12/10/21 1100   Undermining (cm) 0.2 cm 12/10/21 1100   Undermining of Wound, 1st Location From 11 o'clock;To 6 o'clock 12/10/21 1100   Wound Odor None 12/10/21 1100   Pulses Left;2+;DP;PT 11/19/21 1345   Exposed Structures None 12/10/21 1100   Number of days: 29       Wound 11/11/21 Right plantar lateral foot (Active)   Wound Image    12/10/21 1100   Site Assessment Other (Comment) 12/10/21 1100   Periwound Assessment Callused 12/10/21 1100   Margins Attached edges 12/01/21 1100   Drainage Amount None 12/10/21 1100   Treatments Cleansed;Topical Lidocaine;CSWD - Conservative Sharp Wound Debridement 11/19/21 1345   Wound Cleansing Puracyn [place] 11/19/21 1345   Periwound Protectant Skin Moisturizer 12/10/21 1100   Dressing Cleansing/Solutions Not Applicable 12/10/21 1100   Dressing Options Nonadhesive Foam;Hypafix Tape 12/10/21 1100   Dressing Changed Changed 11/19/21 1345   Dressing Status Clean;Dry;Intact 11/19/21 1345   Dressing Change/Treatment Frequency Every 72 hrs, and As Needed 12/01/21 1100   Non-staged Wound Description Full thickness 11/19/21 1345   Post-Procedure Length (cm) 1 cm 11/19/21 1345   Post-Procedure Width (cm) 0.4 cm 11/19/21 1345   Post-Procedure Depth (cm) 0.1 cm 11/19/21 1345   Post-Procedure Surface Area (cm^2) 0.4 cm^2 11/19/21 1345   Post-Procedure Volume (cm^3) 0.04 cm^3 11/19/21 1345   Tunneling (cm) 0 cm  12/03/21 1058   Undermining (cm) 0 cm 12/03/21 1058   Wound Odor None 12/10/21 1100   Pulses Right;2+;DP;PT 11/19/21 1345   Exposed Structures None 12/10/21 1100   Number of days: 29          PROCEDURE:   -2% viscous lidocaine applied topically to wound bed for approximately 5 minutes prior to debridement  -Scalpel used to debride periwound callus to skin level.  Total area of callus.  Proximal and 1.5 cm².  -Due to patient's emotional state, I did not debride any further.  -Wound care completed by wound RN, refer to flowsheet  -Patient tolerated the procedure well, without c/o pain or discomfort.     Pertinent Labs and Diagnostics:        IMAGING: Foot/Toes Imaging, Past Year DX-FOOT-COMPLETE 3+ RIGHT    Result Date: 12/2/2021  Narrative 12/2/2021 1:41 PM HISTORY/REASON FOR EXAM:  Atraumatic Pain/Swelling/Deformity; exquisite tenderness to R 5th MTH. TECHNIQUE/EXAM DESCRIPTION AND NUMBER OF VIEWS: 3 nonweightbearing views of the RIGHT foot. COMPARISON:  5/23/2018 FINDINGS: Bandage is present at the fifth metatarsal-phalangeal level with no underlying bony destruction seen here or elsewhere No acute displaced fracture is noted. There is no subluxation. Hallux valgus deformity has worsened and there is mild first metatarsal-phalangeal joint space narrowing with spurring     Impression No radiographic evidence of acute osteomyelitis Worsening hallux valgus deformity with mild first metatarsal-phalangeal arthritis    Foot/Toes Imaging, Past Year DX-TOE(S) 2+ LEFT    Result Date: 12/2/2021  Narrative 12/2/2021 1:41 PM HISTORY/REASON FOR EXAM:  Atraumatic Pain/Swelling/Deformity. TECHNIQUE/EXAM DESCRIPTION AND NUMBER OF VIEWS:  3 views of the LEFT toes. COMPARISON: 5/23/2018 FINDINGS: There is distal first toe soft tissue ulceration with underlying new terminal tuft bony destruction First proximal phalanx cerclage wires are again seen with no new lucency in the vicinity. Patient is status post first metatarsal chevron  osteotomy, bunionectomy There is moderate first metatarsal-phalangeal marginal spurring and mild joint space narrowing Some deformity of the third metatarsal neck is again seen compatible with a healed fracture. Healed second proximal phalanx fracture at the PIP is also again seen calcaneal cuboid osteophyte formation and joint effusion There is osteopenia     Impression First distal phalanx terminal tuft acute osteomyelitis with overlying skin ulceration      VASCULAR STUDIES: No results found.     LAST  W[facility]: [facility]      ASSESSMENT AND PLAN:   1. 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  Wound area has increased in/assessment.  Thick periwound callus  -Callus debrided in clinic today.  No debridement of wound due to patient's emotional state after being told of osteomyelitis to distal phalanx, and need for amputation of distal tuft.      2. Osteomyelitis of toe (HCC)    12/10/2021: X-ray done on 12/2+ for OM of distal tuft of left hallux.  Unfortunately, this was not noted during rounds on 12/3 patient was misinformed.  I informed her of results today, and need for amputation of distal tuft.  Offered to arrange for surgery next week with Dr. Mathias, patient declined, would prefer to come to rounds again next week  -Patient to be seen LPS rounds next week    3. Callus of foot    12/10/2021: Thick callus around ulcer to left hallux  -Callus debrided to skin level              PATIENT EDUCATION:  - Implications of loss of protective sensation (LOPS) discussed with patient- including increased risk for amputation.  - Advised to check feet at least daily, moisturize feet, and to always wear protective foot wear.   -  Importance of offloading foot to assist with wound healing  - Advised pt not to trim nails or calluses, seek foot/nail care from podiatrist or certified foot/nail nurse  - Importance  of adequate nutrition for wound healing    My total time spent caring for the patient on the day of the encounter was 20 minutes.   This does not include time spent on separately billable procedures/tests.      Please note that this note may have been created using voice recognition software. I have worked with technical experts from [facility] to optimize the interface.  I have made every reasonable attempt to correct obvious errors, but there may be errors of grammar and possibly content that I did not discover before finalizing the note.

## 2021-12-10 NOTE — PROGRESS NOTES
While writing note after LPS rounds, I noticed patient's x-ray of left toes actually did indicate OM.  I had misinformed surgeon and patient.  Notified Dr. Mathias via Voalte.  He advised to watch progress of wound, and reconsider surgical options if no progress.

## 2021-12-15 DIAGNOSIS — Z86.69 HISTORY OF MUSCULAR DYSTROPHY: ICD-10-CM

## 2021-12-15 DIAGNOSIS — G82.50 TETRAPLEGIA (HCC): ICD-10-CM

## 2021-12-17 ENCOUNTER — OFFICE VISIT (OUTPATIENT)
Dept: WOUND CARE | Facility: MEDICAL CENTER | Age: 56
End: 2021-12-17
Attending: PHYSICAL MEDICINE & REHABILITATION
Payer: MEDICAID

## 2021-12-17 DIAGNOSIS — L97.522 NEUROPATHIC ULCER OF TOE OF LEFT FOOT WITH FAT LAYER EXPOSED (HCC): ICD-10-CM

## 2021-12-17 DIAGNOSIS — M86.9 OSTEOMYELITIS OF TOE (HCC): ICD-10-CM

## 2021-12-17 PROCEDURE — 99213 OFFICE O/P EST LOW 20 MIN: CPT

## 2021-12-17 PROCEDURE — 99213 OFFICE O/P EST LOW 20 MIN: CPT | Performed by: NURSE PRACTITIONER

## 2021-12-17 NOTE — WOUND TEAM
Pt seen during ortho rounds with LPS team for left medial distal hallux wound.  Measurements(cm): 0.5 x 0.5 x 0.3. Following physician assessment RN evaluated patient's wound and dressed with silver hydrofiber and covered with non-adhesive foam and secured with hypafix tape. Patient will be having surgery with Dr. Goff after Nirmal, see APRN note for details. Patient to continue to follow up in wound clinic once weekly at this time.          Wound 11/11/21 Left medial distal hallux (Active)   Wound Image      Site Assessment Pale;Pink    Periwound Assessment Callused    Margins Unattached edges    Closure Secondary intention    Drainage Amount Small    Drainage Description Serosanguineous    Treatments Cleansed;Site care    Wound Cleansing Puracyn Spray    Periwound Protectant Barrier Paste    Dressing Cleansing/Solutions Not Applicable    Dressing Options Hydrofiber Silver;Nonadhesive Foam;Hypafix Tape    Dressing Changed Changed    Dressing Status Clean;Intact;Dry    Dressing Change/Treatment Frequency Every 72 hrs, and As Needed    Non-staged Wound Description Full thickness    Wound Length (cm) 0.5 cm    Wound Width (cm) 0.5 cm    Wound Depth (cm) 0.3 cm    Wound Surface Area (cm^2) 0.25 cm^2    Wound Volume (cm^3) 0.075 cm^3    Pulses Left;2+;DP;PT    Exposed Structures None

## 2021-12-17 NOTE — PATIENT INSTRUCTIONS
-Change your dressing every 72 hours and immediately if it becomes soiled, soaked, or falls off.    -Should you experience any significant changes in your wound(s), such as infection (redness, swelling, localized heat, increased pain, fever > 101 F, chills) or have any questions regarding your home care instructions, please contact the wound center at [phone]. If after hours, contact your primary care physician or go to the hospital emergency room.

## 2021-12-17 NOTE — PROGRESS NOTES
"LIMB PRESERVATION SERVICE ROUNDS    Referral to [facility] Rounds from: Wound clinic     WOUND HISTORY: Patient is a 56 year old female with history tetraplegia, neuropathy, MS, CP, frequent falls. She states that the callus to her right foot has been present for several months and she attributes it to her walking on the side of her foot. Her left hallux wound she states has been present for a couple of months (Sept 2021?) and she believes it started because of her toenails putting pressure on \"everything in my shoe\" and caused the wound. She was having it treated at [facility], but moved to [facility] recently and wanted to be seen by [facility].    She reports severe pain from her calluses and to the tip of her left hallux.  She has had hammertoe corrections in the past by podiatry, and feels these made her pain worse.      LPS Rounds Interval notes:  12/3/2021: Patient states she is feeling okay today, considerably calmer than she was in the clinic a few days ago.  Completed her x-rays as requested.  Patient presents today wearing tennis shoes with inserts that she states are over 4 years old.  She currently has orthotic shoes and inserts, and what sounds like AFOs being made for her by  orthotics.  She is doubtful however that the AFOs will work, thinks it will make her pain worse.    12/17/2021: Patient presents to clinic today to discuss surgical options for her left great toe.  X-ray results reviewed with patient in clinic today surgical options discussed.  Dr. Goff suggested amputation of distal tuft, followed by second surgery to straighten remaining toe.  As an alternative, he suggested amputation of the entire toe, thus foregoing need for second surgery.  Patient extremely emotional during today's visit, was adamant that she did not want her entire toe amputated. Orthotics also recommended as integral part of healing and prevention of recurrence.  Morales from [facility] spoke with her, " patient will follow up with him.   NICKY schedule patient for surgery, amputation of distal phalanx.  Patient would like to wait till after the holidays.     RESULTS:       Labs:     A1c:   Lab Results   Component Value Date/Time    HBA1C 5.6 2017 09:39 AM          IMAGIN2021 three-view x-ray right foot  IMPRESSION:     No radiographic evidence of acute osteomyelitis     Worsening hallux valgus deformity with mild first metatarsal-phalangeal arthritis    2021-2 view x-ray left toes    IMPRESSION:     First distal phalanx terminal tuft acute osteomyelitis with overlying skin ulceration        VASCULAR STUDIES: No recent studies found in epic    LAST  WOUND CULTURE:  DATE : None found in epic                            OBJECTIVE FINDINGS:      Pulses: Palpable  Bilateral foot drop and equinovarus deformity noted  Thick calluses to lateral medial hallux and fifth MTH           Wound(s):                                          PROCEDURE       Wound completed by RN, refer to note and flow sheet        PLAN:         Wound Care:  Wound care: Silver Hydrofiber to manage exudate and bioburden, foam cover dressing, Hypafix tape  Imaging/Labs:  No further imaging or labs  Offloading: Patient has shoes, inserts, and AFOs being [facility] for [facility]  Antibiotics:  None  Surgery:  NICKY to schedule patient for amputation of distal phalanx.  Once healed, may need second surgery to straighten residual toe.  Patient would like to wait till after holidays        LPS Follow up:       20minutes was spent on preparation for visit, examination, counseling and coordination of care regarding the above.        Please note that this dictation was created using voice recognition software. I have  worked with technical experts from [id number] to optimize the interface.  I have made every reasonable attempt to correct obvious errors, but there may be errors of grammar and possibly content that I did not discover  before finalizing the note.

## 2021-12-23 ENCOUNTER — PRE-ADMISSION TESTING (OUTPATIENT)
Dept: ADMISSIONS | Facility: MEDICAL CENTER | Age: 56
End: 2021-12-23
Attending: ORTHOPAEDIC SURGERY
Payer: MEDICAID

## 2021-12-23 DIAGNOSIS — Z01.812 PRE-OPERATIVE LABORATORY EXAMINATION: ICD-10-CM

## 2021-12-23 PROCEDURE — U0003 INFECTIOUS AGENT DETECTION BY NUCLEIC ACID (DNA OR RNA); SEVERE ACUTE RESPIRATORY SYNDROME CORONAVIRUS 2 (SARS-COV-2) (CORONAVIRUS DISEASE [COVID-19]), AMPLIFIED PROBE TECHNIQUE, MAKING USE OF HIGH THROUGHPUT TECHNOLOGIES AS DESCRIBED BY CMS-2020-01-R: HCPCS

## 2021-12-23 PROCEDURE — U0005 INFEC AGEN DETEC AMPLI PROBE: HCPCS

## 2021-12-23 PROCEDURE — C9803 HOPD COVID-19 SPEC COLLECT: HCPCS

## 2021-12-23 ASSESSMENT — FIBROSIS 4 INDEX: FIB4 SCORE: 0.66

## 2021-12-25 LAB
SARS-COV-2 RNA RESP QL NAA+PROBE: NOTDETECTED
SPECIMEN SOURCE: NORMAL

## 2021-12-27 ENCOUNTER — HOSPITAL ENCOUNTER (OUTPATIENT)
Facility: MEDICAL CENTER | Age: 56
End: 2021-12-27
Attending: ORTHOPAEDIC SURGERY | Admitting: ORTHOPAEDIC SURGERY
Payer: MEDICAID

## 2021-12-27 ENCOUNTER — ANESTHESIA EVENT (OUTPATIENT)
Dept: SURGERY | Facility: MEDICAL CENTER | Age: 56
End: 2021-12-27
Payer: MEDICAID

## 2021-12-27 ENCOUNTER — ANESTHESIA (OUTPATIENT)
Dept: SURGERY | Facility: MEDICAL CENTER | Age: 56
End: 2021-12-27
Payer: MEDICAID

## 2021-12-27 VITALS
WEIGHT: 162.26 LBS | RESPIRATION RATE: 18 BRPM | HEART RATE: 90 BPM | BODY MASS INDEX: 26.08 KG/M2 | DIASTOLIC BLOOD PRESSURE: 76 MMHG | SYSTOLIC BLOOD PRESSURE: 130 MMHG | OXYGEN SATURATION: 92 % | TEMPERATURE: 98.6 F | HEIGHT: 66 IN

## 2021-12-27 PROCEDURE — 700101 HCHG RX REV CODE 250: Performed by: ORTHOPAEDIC SURGERY

## 2021-12-27 PROCEDURE — 160036 HCHG PACU - EA ADDL 30 MINS PHASE I: Performed by: ORTHOPAEDIC SURGERY

## 2021-12-27 PROCEDURE — 28005 TREAT FOOT BONE LESION: CPT | Mod: ASROC,LT | Performed by: SPECIALIST/TECHNOLOGIST, OTHER

## 2021-12-27 PROCEDURE — A6223 GAUZE >16<=48 NO W/SAL W/O B: HCPCS | Performed by: ORTHOPAEDIC SURGERY

## 2021-12-27 PROCEDURE — A9270 NON-COVERED ITEM OR SERVICE: HCPCS | Performed by: ANESTHESIOLOGY

## 2021-12-27 PROCEDURE — 500881 HCHG PACK, EXTREMITY: Performed by: ORTHOPAEDIC SURGERY

## 2021-12-27 PROCEDURE — 20680 REMOVAL OF IMPLANT DEEP: CPT | Mod: ASROC | Performed by: SPECIALIST/TECHNOLOGIST, OTHER

## 2021-12-27 PROCEDURE — 160025 RECOVERY II MINUTES (STATS): Performed by: ORTHOPAEDIC SURGERY

## 2021-12-27 PROCEDURE — 28005 TREAT FOOT BONE LESION: CPT | Mod: LT | Performed by: ORTHOPAEDIC SURGERY

## 2021-12-27 PROCEDURE — 20680 REMOVAL OF IMPLANT DEEP: CPT | Performed by: ORTHOPAEDIC SURGERY

## 2021-12-27 PROCEDURE — 700111 HCHG RX REV CODE 636 W/ 250 OVERRIDE (IP): Performed by: ANESTHESIOLOGY

## 2021-12-27 PROCEDURE — 160035 HCHG PACU - 1ST 60 MINS PHASE I: Performed by: ORTHOPAEDIC SURGERY

## 2021-12-27 PROCEDURE — 700105 HCHG RX REV CODE 258: Performed by: ANESTHESIOLOGY

## 2021-12-27 PROCEDURE — 160048 HCHG OR STATISTICAL LEVEL 1-5: Performed by: ORTHOPAEDIC SURGERY

## 2021-12-27 PROCEDURE — 700105 HCHG RX REV CODE 258: Performed by: ORTHOPAEDIC SURGERY

## 2021-12-27 PROCEDURE — 160027 HCHG SURGERY MINUTES - 1ST 30 MINS LEVEL 2: Performed by: ORTHOPAEDIC SURGERY

## 2021-12-27 PROCEDURE — 160046 HCHG PACU - 1ST 60 MINS PHASE II: Performed by: ORTHOPAEDIC SURGERY

## 2021-12-27 PROCEDURE — 160009 HCHG ANES TIME/MIN: Performed by: ORTHOPAEDIC SURGERY

## 2021-12-27 PROCEDURE — 501838 HCHG SUTURE GENERAL: Performed by: ORTHOPAEDIC SURGERY

## 2021-12-27 PROCEDURE — 160002 HCHG RECOVERY MINUTES (STAT): Performed by: ORTHOPAEDIC SURGERY

## 2021-12-27 PROCEDURE — 700102 HCHG RX REV CODE 250 W/ 637 OVERRIDE(OP): Performed by: ANESTHESIOLOGY

## 2021-12-27 PROCEDURE — 700101 HCHG RX REV CODE 250: Performed by: ANESTHESIOLOGY

## 2021-12-27 PROCEDURE — 160038 HCHG SURGERY MINUTES - EA ADDL 1 MIN LEVEL 2: Performed by: ORTHOPAEDIC SURGERY

## 2021-12-27 RX ORDER — ONDANSETRON 2 MG/ML
INJECTION INTRAMUSCULAR; INTRAVENOUS PRN
Status: DISCONTINUED | OUTPATIENT
Start: 2021-12-27 | End: 2021-12-27 | Stop reason: SURG

## 2021-12-27 RX ORDER — ONDANSETRON 2 MG/ML
4 INJECTION INTRAMUSCULAR; INTRAVENOUS
Status: COMPLETED | OUTPATIENT
Start: 2021-12-27 | End: 2021-12-27

## 2021-12-27 RX ORDER — LIDOCAINE HYDROCHLORIDE 10 MG/ML
INJECTION, SOLUTION INFILTRATION; PERINEURAL
Status: DISCONTINUED | OUTPATIENT
Start: 2021-12-27 | End: 2021-12-27 | Stop reason: HOSPADM

## 2021-12-27 RX ORDER — PHENYLEPHRINE HCL IN 0.9% NACL 0.5 MG/5ML
SYRINGE (ML) INTRAVENOUS PRN
Status: DISCONTINUED | OUTPATIENT
Start: 2021-12-27 | End: 2021-12-27 | Stop reason: SURG

## 2021-12-27 RX ORDER — BUPIVACAINE HYDROCHLORIDE 5 MG/ML
INJECTION, SOLUTION EPIDURAL; INTRACAUDAL
Status: DISCONTINUED | OUTPATIENT
Start: 2021-12-27 | End: 2021-12-27 | Stop reason: HOSPADM

## 2021-12-27 RX ORDER — HYDROMORPHONE HYDROCHLORIDE 1 MG/ML
0.4 INJECTION, SOLUTION INTRAMUSCULAR; INTRAVENOUS; SUBCUTANEOUS
Status: DISCONTINUED | OUTPATIENT
Start: 2021-12-27 | End: 2021-12-27 | Stop reason: HOSPADM

## 2021-12-27 RX ORDER — ROCURONIUM BROMIDE 10 MG/ML
INJECTION, SOLUTION INTRAVENOUS PRN
Status: DISCONTINUED | OUTPATIENT
Start: 2021-12-27 | End: 2021-12-27 | Stop reason: SURG

## 2021-12-27 RX ORDER — CEFAZOLIN SODIUM 1 G/3ML
INJECTION, POWDER, FOR SOLUTION INTRAMUSCULAR; INTRAVENOUS PRN
Status: DISCONTINUED | OUTPATIENT
Start: 2021-12-27 | End: 2021-12-27 | Stop reason: SURG

## 2021-12-27 RX ORDER — SODIUM CHLORIDE, SODIUM LACTATE, POTASSIUM CHLORIDE, CALCIUM CHLORIDE 600; 310; 30; 20 MG/100ML; MG/100ML; MG/100ML; MG/100ML
INJECTION, SOLUTION INTRAVENOUS CONTINUOUS
Status: ACTIVE | OUTPATIENT
Start: 2021-12-27 | End: 2021-12-27

## 2021-12-27 RX ORDER — LABETALOL HYDROCHLORIDE 5 MG/ML
5 INJECTION, SOLUTION INTRAVENOUS
Status: DISCONTINUED | OUTPATIENT
Start: 2021-12-27 | End: 2021-12-27 | Stop reason: HOSPADM

## 2021-12-27 RX ORDER — METOPROLOL TARTRATE 1 MG/ML
1 INJECTION, SOLUTION INTRAVENOUS
Status: DISCONTINUED | OUTPATIENT
Start: 2021-12-27 | End: 2021-12-27 | Stop reason: HOSPADM

## 2021-12-27 RX ORDER — HYDRALAZINE HYDROCHLORIDE 20 MG/ML
5 INJECTION INTRAMUSCULAR; INTRAVENOUS
Status: DISCONTINUED | OUTPATIENT
Start: 2021-12-27 | End: 2021-12-27 | Stop reason: HOSPADM

## 2021-12-27 RX ORDER — HYDROMORPHONE HYDROCHLORIDE 1 MG/ML
0.2 INJECTION, SOLUTION INTRAMUSCULAR; INTRAVENOUS; SUBCUTANEOUS
Status: DISCONTINUED | OUTPATIENT
Start: 2021-12-27 | End: 2021-12-27 | Stop reason: HOSPADM

## 2021-12-27 RX ORDER — DEXMEDETOMIDINE HYDROCHLORIDE 100 UG/ML
INJECTION, SOLUTION INTRAVENOUS PRN
Status: DISCONTINUED | OUTPATIENT
Start: 2021-12-27 | End: 2021-12-27 | Stop reason: SURG

## 2021-12-27 RX ORDER — HYDROMORPHONE HYDROCHLORIDE 1 MG/ML
0.1 INJECTION, SOLUTION INTRAMUSCULAR; INTRAVENOUS; SUBCUTANEOUS
Status: DISCONTINUED | OUTPATIENT
Start: 2021-12-27 | End: 2021-12-27 | Stop reason: HOSPADM

## 2021-12-27 RX ORDER — DEXAMETHASONE SODIUM PHOSPHATE 4 MG/ML
INJECTION, SOLUTION INTRA-ARTICULAR; INTRALESIONAL; INTRAMUSCULAR; INTRAVENOUS; SOFT TISSUE PRN
Status: DISCONTINUED | OUTPATIENT
Start: 2021-12-27 | End: 2021-12-27 | Stop reason: SURG

## 2021-12-27 RX ORDER — LIDOCAINE HYDROCHLORIDE 20 MG/ML
INJECTION, SOLUTION EPIDURAL; INFILTRATION; INTRACAUDAL; PERINEURAL PRN
Status: DISCONTINUED | OUTPATIENT
Start: 2021-12-27 | End: 2021-12-27 | Stop reason: SURG

## 2021-12-27 RX ORDER — SODIUM CHLORIDE, SODIUM LACTATE, POTASSIUM CHLORIDE, CALCIUM CHLORIDE 600; 310; 30; 20 MG/100ML; MG/100ML; MG/100ML; MG/100ML
INJECTION, SOLUTION INTRAVENOUS
Status: DISCONTINUED | OUTPATIENT
Start: 2021-12-27 | End: 2021-12-27 | Stop reason: SURG

## 2021-12-27 RX ORDER — CLINDAMYCIN PHOSPHATE 900 MG/50ML
900 INJECTION, SOLUTION INTRAVENOUS ONCE
Status: DISCONTINUED | OUTPATIENT
Start: 2021-12-27 | End: 2021-12-27 | Stop reason: HOSPADM

## 2021-12-27 RX ORDER — OXYCODONE HCL 5 MG/5 ML
10 SOLUTION, ORAL ORAL
Status: COMPLETED | OUTPATIENT
Start: 2021-12-27 | End: 2021-12-27

## 2021-12-27 RX ORDER — HALOPERIDOL 5 MG/ML
1 INJECTION INTRAMUSCULAR
Status: DISCONTINUED | OUTPATIENT
Start: 2021-12-27 | End: 2021-12-27 | Stop reason: HOSPADM

## 2021-12-27 RX ORDER — OXYCODONE HCL 5 MG/5 ML
5 SOLUTION, ORAL ORAL
Status: COMPLETED | OUTPATIENT
Start: 2021-12-27 | End: 2021-12-27

## 2021-12-27 RX ORDER — SODIUM CHLORIDE, SODIUM LACTATE, POTASSIUM CHLORIDE, CALCIUM CHLORIDE 600; 310; 30; 20 MG/100ML; MG/100ML; MG/100ML; MG/100ML
INJECTION, SOLUTION INTRAVENOUS CONTINUOUS
Status: DISCONTINUED | OUTPATIENT
Start: 2021-12-27 | End: 2021-12-27 | Stop reason: HOSPADM

## 2021-12-27 RX ORDER — MAGNESIUM HYDROXIDE 1200 MG/15ML
LIQUID ORAL
Status: COMPLETED | OUTPATIENT
Start: 2021-12-27 | End: 2021-12-27

## 2021-12-27 RX ADMIN — FENTANYL CITRATE 100 MCG: 50 INJECTION, SOLUTION INTRAMUSCULAR; INTRAVENOUS at 12:07

## 2021-12-27 RX ADMIN — CEFAZOLIN 2 G: 330 INJECTION, POWDER, FOR SOLUTION INTRAMUSCULAR; INTRAVENOUS at 12:07

## 2021-12-27 RX ADMIN — SUGAMMADEX 200 MG: 100 INJECTION, SOLUTION INTRAVENOUS at 12:29

## 2021-12-27 RX ADMIN — SODIUM CHLORIDE, POTASSIUM CHLORIDE, SODIUM LACTATE AND CALCIUM CHLORIDE: 600; 310; 30; 20 INJECTION, SOLUTION INTRAVENOUS at 11:01

## 2021-12-27 RX ADMIN — DEXMEDETOMIDINE 8 MCG: 200 INJECTION, SOLUTION INTRAVENOUS at 12:24

## 2021-12-27 RX ADMIN — ROCURONIUM BROMIDE 20 MG: 10 INJECTION, SOLUTION INTRAVENOUS at 12:10

## 2021-12-27 RX ADMIN — SODIUM CHLORIDE, POTASSIUM CHLORIDE, SODIUM LACTATE AND CALCIUM CHLORIDE: 600; 310; 30; 20 INJECTION, SOLUTION INTRAVENOUS at 12:02

## 2021-12-27 RX ADMIN — DEXMEDETOMIDINE 8 MCG: 200 INJECTION, SOLUTION INTRAVENOUS at 12:27

## 2021-12-27 RX ADMIN — LIDOCAINE HYDROCHLORIDE 0.5 ML: 10 INJECTION, SOLUTION EPIDURAL; INFILTRATION; INTRACAUDAL; PERINEURAL at 10:48

## 2021-12-27 RX ADMIN — DEXMEDETOMIDINE 8 MCG: 200 INJECTION, SOLUTION INTRAVENOUS at 12:29

## 2021-12-27 RX ADMIN — DEXMEDETOMIDINE 8 MCG: 200 INJECTION, SOLUTION INTRAVENOUS at 12:30

## 2021-12-27 RX ADMIN — Medication 100 MCG: at 12:20

## 2021-12-27 RX ADMIN — DEXMEDETOMIDINE 8 MCG: 200 INJECTION, SOLUTION INTRAVENOUS at 12:25

## 2021-12-27 RX ADMIN — ONDANSETRON 4 MG: 2 INJECTION INTRAMUSCULAR; INTRAVENOUS at 13:09

## 2021-12-27 RX ADMIN — EPHEDRINE SULFATE 10 MG: 50 INJECTION, SOLUTION INTRAVENOUS at 12:18

## 2021-12-27 RX ADMIN — ONDANSETRON 4 MG: 2 INJECTION INTRAMUSCULAR; INTRAVENOUS at 12:23

## 2021-12-27 RX ADMIN — DEXMEDETOMIDINE 8 MCG: 200 INJECTION, SOLUTION INTRAVENOUS at 12:28

## 2021-12-27 RX ADMIN — DEXAMETHASONE SODIUM PHOSPHATE 8 MG: 4 INJECTION, SOLUTION INTRA-ARTICULAR; INTRALESIONAL; INTRAMUSCULAR; INTRAVENOUS; SOFT TISSUE at 12:07

## 2021-12-27 RX ADMIN — DEXMEDETOMIDINE 8 MCG: 200 INJECTION, SOLUTION INTRAVENOUS at 12:26

## 2021-12-27 RX ADMIN — EPHEDRINE SULFATE 10 MG: 50 INJECTION, SOLUTION INTRAVENOUS at 12:13

## 2021-12-27 RX ADMIN — OXYCODONE HYDROCHLORIDE 10 MG: 5 SOLUTION ORAL at 13:14

## 2021-12-27 RX ADMIN — PROPOFOL 160 MG: 10 INJECTION, EMULSION INTRAVENOUS at 12:07

## 2021-12-27 RX ADMIN — LIDOCAINE HYDROCHLORIDE 70 MG: 20 INJECTION, SOLUTION EPIDURAL; INFILTRATION; INTRACAUDAL at 12:07

## 2021-12-27 ASSESSMENT — PAIN DESCRIPTION - PAIN TYPE
TYPE: SURGICAL PAIN

## 2021-12-27 ASSESSMENT — PAIN SCALES - GENERAL: PAIN_LEVEL: 0

## 2021-12-27 NOTE — DISCHARGE INSTRUCTIONS
ACTIVITY: Rest and take it easy for the first 24 hours.  A responsible adult is recommended to remain with you during that time.  It is normal to feel sleepy.  We encourage you to not do anything that requires balance, judgment or coordination.    MILD FLU-LIKE SYMPTOMS ARE NORMAL. YOU MAY EXPERIENCE GENERALIZED MUSCLE ACHES, THROAT IRRITATION, HEADACHE AND/OR SOME NAUSEA.    FOR 24 HOURS DO NOT:  Drive, operate machinery or run household appliances.  Drink beer or alcoholic beverages.   Make important decisions or sign legal documents.    SPECIAL INSTRUCTIONS:   You may bear weight as tolerated while in Post op shoe  Elevate/ice while at rest.  Follow up [place] 2 weeks    DIET: To avoid nausea, slowly advance diet as tolerated, avoiding spicy or greasy foods for the first day.  Add more substantial food to your diet according to your physician's instructions. INCREASE FLUIDS AND FIBER TO AVOID CONSTIPATION.    SURGICAL DRESSING/BATHING: Keep dressing clean and dry    FOLLOW-UP APPOINTMENT:  A follow-up appointment should be arranged with your doctor in 1-2 Weeks; call to schedule.    You should CALL YOUR PHYSICIAN if you develop:  Fever greater than 101 degrees F.  Pain not relieved by medication, or persistent nausea or vomiting.  Excessive bleeding (blood soaking through dressing) or unexpected drainage from the wound.  Extreme redness or swelling around the incision site, drainage of pus or foul smelling drainage.  Inability to urinate or empty your bladder within 8 hours.  Problems with breathing or chest pain.    You should call 911 if you develop problems with breathing or chest pain.  If you are unable to contact your doctor or surgical center, you should go to the nearest emergency room or urgent care center.      Physician's telephone #: [phone] Dr. Goff    If any questions arise, call your doctor.  If your doctor is not available, please feel free to call the Surgical Center at [phone].      A registered nurse may call you a few days after your surgery to see how you are doing after your procedure.    MEDICATIONS: Resume taking daily medication.  Take prescribed pain medication with food.  If no medication is prescribed, you may take non-aspirin pain medication if needed.  PAIN MEDICATION CAN BE VERY CONSTIPATING.  Take a stool softener or laxative such as senokot, pericolace, or milk of magnesia if needed.    Prescription given for Norco, Vistaril.  Last pain medication given at 1:14 PM.    If your physician has prescribed pain medication that includes Acetaminophen (Tylenol), do not take additional Acetaminophen (Tylenol) while taking the prescribed medication.    Depression / Suicide Risk    As you are discharged from this [facility] facility, it is important to learn how to keep safe from harming yourself.    Recognize the warning signs:  · Abrupt changes in personality, positive or negative- including increase in energy   · Giving away possessions  · Change in eating patterns- significant weight changes-  positive or negative  · Change in sleeping patterns- unable to sleep or sleeping all the time   · Unwillingness or inability to communicate  · Depression  · Unusual sadness, discouragement and loneliness  · Talk of wanting to die  · Neglect of personal appearance   · Rebelliousness- reckless behavior  · Withdrawal from people/activities they love  · Confusion- inability to concentrate     If you or a loved one observes any of these behaviors or has concerns about self-harm, here's what you can do:  · Talk about it- your feelings and reasons for harming yourself  · Remove any means that you might use to hurt yourself (examples: pills, rope, extension cords, firearm)  · Get professional help from the community (Mental Health, Substance Abuse, psychological counseling)  · Do not be alone:Call your Safe Contact- someone whom you trust who will be there for you.  · Call your local CRISIS HOTLINE  544-9052 or 067-639-6275  · Call your local Children's Mobile Crisis Response Team [place] (929) 161-0450 or www.[employer]Beintoo  · Call the toll free National Suicide Prevention Hotlines   · National Suicide Prevention Lifeline 454-696-WPIN (7822)  · National [employer] 2000 Line Network 800-SUICIDE (004-3506)

## 2021-12-27 NOTE — ANESTHESIA POSTPROCEDURE EVALUATION
Patient: Marilyn Salinas    Procedure Summary     Date: 12/27/21 Room / Location: [place] [address] / SURGERY [facility]    Anesthesia Start: 1202 Anesthesia Stop: 1236    Procedure: GREAT TOE PARTIAL AMPUTATION (Left Toe) Diagnosis:       Ulcer of left foot, with fat layer exposed (HCC)      Osteitis of ankle and foot (HCC)      (Left foot osteomyelitis)    Surgeons: Emiliano Goff M.D. Responsible Provider: Edwar Jama D.O.    Anesthesia Type: general ASA Status: 3          Final Anesthesia Type: general  Last vitals  BP   Blood Pressure: 105/61    Temp   36.2 °C (97.2 °F)    Pulse   72   Resp   16    SpO2   94 %      Anesthesia Post Evaluation    Patient location during evaluation: PACU  Patient participation: complete - patient participated  Level of consciousness: awake and alert  Pain score: 0    Airway patency: patent  Anesthetic complications: no  Cardiovascular status: hemodynamically stable  Respiratory status: acceptable  Hydration status: euvolemic    PONV: none          No complications documented.     Nurse Pain Score: 0 (NPRS)

## 2021-12-27 NOTE — OR NURSING
1236 Pt arrived from OR via [facility]. Report given by anesthesia and RN. Sleeping, PERRLA, opa, 8L mask, even non labored breathing, lungs clear, airway patent. SR. Skin pink warm dry. LLE toe brisk cap refill, pink warm, dressing cdi, elevated, cold pack. PIV patent.     1250 arousable. Opa removed. Spontaneous even non labored breathing.     1300 LLE wiggles toes, denies numbness tingling. 1    1309, 1314 c/o pain and nausea, treated per mar    1318 awake, clear speech, able to move x4 extremity. TCDB.    1328 given purwick, 400cc yellow clear    1330 Denies pain, nausea, sob, chest pain.     1339 tolerated sips of water. LLE elevated, cold pack. Dressing cdi, wiggles toes, pink warm, brisk cap refill, denies numbness and tingling.     1350 sleeping, even non labored breathing, face relaxed, skin pink warm dry.     1411 awake, even non labored breathing, denies pain and nausea. Pt smiles. Glasses and dentures in chart    1417 given IS, 1500cc    1500 continues to use IS. Awake and alert.     1530 report given to Loren RN rm 30

## 2021-12-27 NOTE — ANESTHESIA TIME REPORT
Anesthesia Start and Stop Event Times     Date Time Event    12/27/2021 1200 Ready for Procedure     1202 Anesthesia Start     1236 Anesthesia Stop        Responsible Staff  12/27/21    Name Role Begin End    Edwar Jama D.O. Anesth 1202 1236        Preop Diagnosis (Free Text):  Pre-op Diagnosis     Left foot osteomyelitis        Preop Diagnosis (Codes):    Premium Reason  Non-Premium    Comments:

## 2021-12-27 NOTE — OP REPORT
12/27/21       PREOPERATIVE DIAGNOSES:  1. Left great toe osteomyelitis     POSTOPERATIVE DIAGNOSES:  1. Same    PROCEDURE PERFORMED:  1. Left great toe amputation for debridement with underlying osteomyelitis  2. Left removal deep implant     SURGEON:  Emiliano Goff MD     FIRST ASSISTANT: Eugenia Lee CFA     ANESTHESIA:  General endotracheal with local     ESTIMATED BLOOD LOSS:  None.     COMPLICATIONS:  None.    FINDINGS:  1. See preoperative diagnosis     POSTOPERATIVE PLAN:  1. Weightbearing start a postop shoe  2. Follow-up in 2 weeks     INDICATIONS:  The patient is a pleasant 56 y.o. female who has had   problems with the left great toe.  Options were discussed   including operative and nonoperative.  The patients elected to undergo operative   intervention.  The above procedure was discussed.  All questions were   answered.  Risks of surgery explained, which included but not limited to   explained wound problems, infection, nerve injury, vascular injury, need for   further surgery.  The patient understands that they could have persistent risk of    pain and need for further surgery.  The patients understands and accepts these risks   and agreed to proceed.  Site was marked by myself prior to receiving   psychotropic medicines.     PROCEDURE IN DETAIL:  The patient was brought to the operating room and    underwent general endotracheal anesthetic without complications.    Left lower   extremity was prepped and draped in standard fashion in supine position with   all appropriate padding.  Positive site verification confirmed the appropriate   extremity as well as above procedure and confirmation that the patient received   preoperative antibiotics.  The leg was elevated and tourniquet was inflated to 250 mmHg.    Elliptical incision was made mid substance of the proximal phalanx of the left great toe appears retroactive down to bone.  Soft tissue was elevated.  For debridement of the osteomyelitis a  microsagittal saw was used to remove the distal half of the phalanx.  Is noted that time she had deep implant.  Was removed with a needle .  Deep implant was from a previous reconstructive foot surgery.  Once the socket was completed the toe was then amputated using full-thickness flap.  Additional soft tissue debridement was necessary for a tension-free closure to get to good viable tissue.  The tourniquet was released and hemostasis was obtained.  Microsize always used to smooth the margins of the amputated toe and the wound was closed with interrupted nylon suture.    Sterile dressings were applied.  Tourniquet was released.   Patient was transferred to the recovery room in good condition.    Utilization of Eug[facility] [place] was necessary for patient positioning needing holding retracting wound closure and dressing placement.  The assistant was present throughout the entire procedure.

## 2021-12-27 NOTE — LETTER
December 21, 2021    Patient Name: Marilyn Salinas  Surgeon Name: Emiliano Goff M.D.  Surgery Facility: [facility] ([facility])  Surgery Date: 12/27/2021    The time of your surgery is not final and may change up to and until the day of your surgery. You will be contacted 24-48 hours prior to your surgery date with your check-in and surgery time.    If you will not be at one of the below numbers please call/text the surgery scheduler at [phone]  Preferred Phone: [phone]    BEFORE YOUR SURGERY  Pre Registration and/or Lab Work must be done within and no earlier than 28 days prior to your surgery date. Please call [facility] at [phone] for an appointment as soon as possible.     COVID testing needs to be done 4-7 days prior to surgery, failure to do so can result in surgery being cancelled.    Pre op Appointment:    Instructions: Bring a list of all medications you are taking including the dosing and frequency.    - Your Post-Op Packet and necessary DME will be available for pick-up the day prior to surgery. Please let your provider's MA know at [phone] if you want to pick-up your prescriptions prior to surgery, hand written narcotics must be filled in [place]. If you do not  the prescription prior to surgery you will receive it at surgery.    Please refrain from smoking any substance after midnight prior to surgery. Smoking may interfere with the anesthetic and frequently produces nausea during the recovery period.    Continue taking all lifesaving medications. Including the morning of your surgery with small sip of water.    Please read the MEDICATION INSTRUCTIONS below completely.    DAY OF YOUR SURGERY  Nothing to eat or drink after midnight     Please arrive at the hospital/surgery center at the check-in time provided.     An adult will need to bring you and take you home after your surgery.     AFTER YOUR SURGERY  Post op  Appointment:    - Your POST-OP Wound Care appointment will be at [facility], [address] [facility] [address] [place] [address]. Please call [phone] to make arrangements.    TIME OFF WORK  FMLA or Disability forms can be faxed directly to: [phone] or you may drop them off at [address] [place] [address]. Our office charges a $35.00 fee per form. Forms will be completed within 10 business days of drop off and payment received. For the status of your forms you may contact our disability office directly [phone].    MEDICATION INSTRUCTIONS  The following medications should be stopped a minimum of 10 days prior to surgery:  All over the counter, Supplements & Herbal medications    Anorectics: Phentermine (Adipex-P, Lomaira and Suprenza), Phentermine-topiramate (Qsymia), Bupropion-naltrexone (Contrave)    Opiod Partial Agonists/Opioid Antagonists: Buprenorphine (Subocone, Belbuca, Butrans, Probuphine Implant, Sublocade), Naltrexone (ReVia, Vivitrol), Naloxone    Amphetamines: Dextroamphetamine/Amphetamine (Adderall, Mydayis), Methylphenidate Hydrochloride (Concerta, Metadate, Methylin, Ritalin)    The following medications should be stopped 5 days prior to surgery:  Blood Thinners: Any Aspirin, Aspirin products, anti-inflammatories such as ibuprofen and any blood thinners such as Coumadin and Plavix. Please consult your prescribing physician if you are on life saving blood thinners, in regards to when to stop medications prior to surgery.     The following medications should be stopped a minimum of 3 days prior to surgery:  PDE-5 inhibitors: Sildenafil (Viagra), Tadalafil (Cialis), Vardenafil (Levitra), Avanafil (Stendra)    MAO Inhibitors: Rasagiline (Azilect), Selegiline (Eldepryl, Emsam, Selapar), Isocarboxazid (Marplan), Phenelzine (Nardil)

## 2021-12-27 NOTE — ANESTHESIA PREPROCEDURE EVALUATION
Case: [id number] Date/Time: 12/27/21 1130    Procedure: GREAT TOE PARTIAL AMPUTATION (Left )    Diagnosis:       Ulcer of left foot, with fat layer exposed (HCC) [L97.522]      Osteitis of ankle and foot (HCC) [M86.9]    Pre-op diagnosis: Ulcer of left foot, with fat layer exposed (HCC) [L97.522], Osteitis of ankle and foot (HCC) [M86.9]    Location: [place] [address] / SURGERY [facility]    Surgeons: Emiliano Goff M.D.          Relevant Problems   PULMONARY   (positive) Mild intermittent asthma      Other   (positive) Idiopathic hypotension   (positive) Lumbar radiculopathy   (positive) Status post cervical spinal fusion   (positive) Tobacco use       Physical Exam    Airway   Mallampati: II  TM distance: >3 FB  Neck ROM: full       Cardiovascular - normal exam  Rhythm: regular  Rate: normal  (-) murmur     Dental   (+) lower dentures, upper dentures           Pulmonary - normal exam  Breath sounds clear to auscultation     Abdominal    Neurological - normal exam                 Anesthesia Plan    ASA 3   ASA physical status 3 criteria: COPD    Plan - general       Airway plan will be LMA          Induction: intravenous    Postoperative Plan: Postoperative administration of opioids is intended.    Pertinent diagnostic labs and testing reviewed    Informed Consent:    Anesthetic plan and risks discussed with patient.    Use of blood products discussed with: patient whom consented to blood products.

## 2021-12-27 NOTE — ANESTHESIA PROCEDURE NOTES
Airway    Date/Time: 12/27/2021 12:07 PM  Performed by: Edwar Jama D.O.  Authorized by: Edwar Jama D.O.     Location:  OR  Urgency:  Elective  Indications for Airway Management:  Anesthesia      Spontaneous Ventilation: absent    Sedation Level:  Deep  Preoxygenated: Yes    Mask Difficulty Assessment:  0 - not attempted  Final Airway Type:  Supraglottic airway  Final Supraglottic Airway:  Standard LMA    SGA Size:  4  Number of Attempts at Approach:  1

## 2021-12-27 NOTE — H&P
Surgery Orthopedic History & Physical Note    Date  12/27/2021    Primary Care Physician  Edwar Platt M.D.      Pre-Op Diagnosis Codes:     * Ulcer of left foot, with fat layer exposed ([facility]) [L97.522]     * Osteitis of ankle and foot ([facility]) [M86.9]    HPI  This is a 56 y.o. female who presented with left great toe osteomyelitis    Past Medical History:   Diagnosis Date   • Anesthesia 12/23/2021    agitated coming out of anesthesia   • Anxiety    • Arthritis     spine, feet    • Asthma    • Breath shortness    • Cataract 12/23/2022    no surgery yet   • Dental disorder     upper and lower denture   • Depression    • Heart burn 12/23/2022    GERD   • High cholesterol    • Urinary bladder disorder    • Urinary incontinence        Past Surgical History:   Procedure Laterality Date   • CERVICAL DISK AND FUSION ANTERIOR N/A 12/20/2019    Procedure: DISCECTOMY, SPINE, CERVICAL, ANTERIOR APPROACH, WITH FUSION - C3-5;  Surgeon: Cnonor Linton M.D.;  Location: [facility];  Service: Neurosurgery   • CORPECTOMY N/A 12/20/2019    Procedure: CORPECTOMY;  Surgeon: Connor Linton M.D.;  Location: [facility];  Service: Neurosurgery   • VAGINAL HYSTERECTOMY SCOPE TOTAL N/A 10/3/2017    Procedure: VAGINAL HYSTERECTOMY SCOPE TOTAL;  Surgeon: Hudson Driscoll M.D.;  Location: SURGERY SAME DAY [facility];  Service: Gynecology   • SALPINGECTOMY Bilateral 10/3/2017    Procedure: SALPINGECTOMY;  Surgeon: Hudson Driscoll M.D.;  Location: SURGERY SAME DAY [facility];  Service: Gynecology   • OOPHORECTOMY Bilateral 10/3/2017    Procedure: OOPHORECTOMY;  Surgeon: Hudson Driscoll M.D.;  Location: SURGERY SAME DAY [facility];  Service: Gynecology   • ANTERIOR AND POSTERIOR REPAIR Bilateral 10/3/2017    Procedure: ANTERIOR AND POSTERIOR REPAIR;  Surgeon: Hudson Driscoll M.D.;  Location: SURGERY SAME DAY [facility];  Service: Gynecology   • ENTEROCELE REPAIR N/A 10/3/2017    Procedure: ENTEROCELE  REPAIR, PERINEOPLASTY;  Surgeon: Hudson Driscoll M.D.;  Location: SURGERY SAME DAY [facility];  Service: Gynecology   • BLADDER SLING FEMALE N/A 10/3/2017    Procedure: BLADDER SLING FEMALE TOT, CYSTOSCOPY;  Surgeon: Hudson Driscoll M.D.;  Location: SURGERY SAME DAY [facility];  Service: Gynecology   • VAGINAL SUSPENSION N/A 10/3/2017    Procedure: VAGINAL SUSPENSION SACROSPINOUS VAULT POSSIBLE;  Surgeon: Hudson Driscoll M.D.;  Location: SURGERY SAME DAY [facility];  Service: Gynecology   • OTHER Left     hammertoe x2   • EYE SURGERY      for lazy eye   • LUMPECTOMY         Current Facility-Administered Medications   Medication Dose Route Frequency Provider Last Rate Last Admin   • clindamycin (CLEOCIN) IVPB premix 900 mg  900 mg Intravenous Once Emiliano Goff M.D.       • lidocaine (XYLOCAINE) 1 % injection 0.5 mL  0.5 mL Intradermal Once PRN Emiliano Goff M.D.   0.5 mL at 21 1048   • lactated ringers infusion   Intravenous Continuous Emiliano Goff M.D. 10 mL/hr at 21 1101 New Bag at 21 1101       Social History     Socioeconomic History   • Marital status: Single     Spouse name: Not on file   • Number of children: Not on file   • Years of education: Not on file   • Highest education level: Not on file   Occupational History   • Not on file   Tobacco Use   • Smoking status: Current Every Day Smoker     Packs/day: 0.25     Years: 26.00     Pack years: 6.50     Types: Cigarettes     Last attempt to quit: 3/1/2020     Years since quittin.8   • Smokeless tobacco: Never Used   Vaping Use   • Vaping Use: Never used   Substance and Sexual Activity   • Alcohol use: No   • Drug use: Yes     Types: Marijuana, Inhaled     Comment: marijuana daily (smoked and edibles) last time 2021   • Sexual activity: Not Currently   Other Topics Concern   •  Service No   • Blood Transfusions No   • Caffeine Concern No   • Occupational Exposure No   • Hobby Hazards No   • Sleep  Concern Yes   • Stress Concern Yes   • Weight Concern Yes   • Special Diet No   • Back Care Yes   • Exercise No   • Bike Helmet Yes   • Seat Belt Yes   • Self-Exams Yes   Social History Narrative   • Not on file     Social Determinants of Health     Financial Resource Strain:    • Difficulty of Paying Living Expenses: Not on file   Food Insecurity:    • Worried About Running Out of Food in the Last Year: Not on file   • Ran Out of Food in the Last Year: Not on file   Transportation Needs:    • Lack of Transportation (Medical): Not on file   • Lack of Transportation (Non-Medical): Not on file   Physical Activity:    • Days of Exercise per Week: Not on file   • Minutes of Exercise per Session: Not on file   Stress:    • Feeling of Stress : Not on file   Social Connections:    • Frequency of Communication with Friends and Family: Not on file   • Frequency of Social Gatherings with Friends and Family: Not on file   • Attends Christian Services: Not on file   • Active Member of Clubs or Organizations: Not on file   • Attends Club or Organization Meetings: Not on file   • Marital Status: Not on file   Intimate Partner Violence:    • Fear of Current or Ex-Partner: Not on file   • Emotionally Abused: Not on file   • Physically Abused: Not on file   • Sexually Abused: Not on file   Housing Stability:    • Unable to Pay for Housing in the Last Year: Not on file   • Number of Places Lived in the Last Year: Not on file   • Unstable Housing in the Last Year: Not on file       Family History   Problem Relation Age of Onset   • Cancer Mother    • Alcohol/Drug Mother    • Cancer Brother    • Diabetes Maternal Aunt        Allergies  Codeine    Review of Systems  Negative    Physical Exam  Left great toe ulcer  Vital Signs  Blood Pressure: 127/77   Temperature: 35.9 °C (96.6 °F)   Pulse: 85   Respiration: 18   Pulse Oximetry: 90 %       Labs:                    Radiology:  No orders to display         Assessment/Plan:  Pre-Op  Diagnosis Codes:     * Ulcer of left foot, with fat layer exposed ([facility]) [L97.522]     * Osteitis of ankle and foot ([facility]) [M86.9]  Procedure(s):  GREAT TOE PARTIAL AMPUTATION

## 2021-12-28 ENCOUNTER — APPOINTMENT (OUTPATIENT)
Dept: PHYSICAL THERAPY | Facility: REHABILITATION | Age: 56
End: 2021-12-28
Payer: MEDICAID

## 2021-12-28 NOTE — OR NURSING
Discharge instructions reviewed with the patient and her friend. Friend signed the document as responsible adult. Upon wheeling the patient to her friend's car, the signed discharge paperwork copy was accidentally given to the patient. All questions answered.

## 2021-12-28 NOTE — OR NURSING
Pt's VSS; denies N/V; states pain is at tolerable level. Dressing CDI to L foot, post op shoe applied. D/c orders received. IV dc'd. Pt changed into clothing with assistance. Pt up and ambulated to BR, voided adequately. Discharge instructions given as well as pain management handout; pt and family verbalized understanding and questions answered. Patient states ready to d/c home. No prescriptions given. Pt dc'd in w/c with friend in stable condition.

## 2021-12-30 ENCOUNTER — NON-PROVIDER VISIT (OUTPATIENT)
Dept: WOUND CARE | Facility: MEDICAL CENTER | Age: 56
End: 2021-12-30
Attending: ORTHOPAEDIC SURGERY
Payer: MEDICAID

## 2021-12-30 ENCOUNTER — APPOINTMENT (OUTPATIENT)
Dept: WOUND CARE | Facility: MEDICAL CENTER | Age: 56
End: 2021-12-30
Attending: PHYSICAL MEDICINE & REHABILITATION
Payer: MEDICAID

## 2021-12-30 PROCEDURE — 99211 OFF/OP EST MAY X REQ PHY/QHP: CPT

## 2021-12-30 RX ORDER — POTASSIUM CHLORIDE 20MEQ/15ML
LIQUID (ML) ORAL
COMMUNITY
End: 2022-03-02

## 2021-12-30 RX ORDER — FESOTERODINE FUMARATE 4 MG/1
TABLET, FILM COATED, EXTENDED RELEASE ORAL
COMMUNITY
End: 2022-06-13

## 2021-12-30 NOTE — CERTIFICATION
Advanced Wound Care   [facility]   [address]   [address]   [place] [address]   [phone] Fax: [phone]    Discharge Note (No admit due to well approximated sutures)     Referring Physician: Emiliano Goff MD  Wound Etiology: Neuropathic   Wound location: Left hallux amp site  ICD-10: M79.672  Date of Discharge: 12/30/2021    Assessment:  Discharge patient at this time secondary to amputation site is well approximated. No wound dehiscence and no drainage.     Patient had toe amputation on 12/27/21 with Dr. Goff. There is no open wound upon assessment in clinic today. Sutures are well approximated. Patient is to follow up in 2 weeks with the [facility] for assessment and likely suture removal.           Thank you for the referral and the opportunity to treat your patient.

## 2022-01-04 ENCOUNTER — OFFICE VISIT (OUTPATIENT)
Dept: MEDICAL GROUP | Facility: MEDICAL CENTER | Age: 57
End: 2022-01-04
Attending: FAMILY MEDICINE
Payer: MEDICAID

## 2022-01-04 VITALS
DIASTOLIC BLOOD PRESSURE: 60 MMHG | RESPIRATION RATE: 16 BRPM | HEIGHT: 66 IN | SYSTOLIC BLOOD PRESSURE: 104 MMHG | BODY MASS INDEX: 25.23 KG/M2 | TEMPERATURE: 97 F | HEART RATE: 84 BPM | OXYGEN SATURATION: 94 % | WEIGHT: 157 LBS

## 2022-01-04 DIAGNOSIS — F32.0 CURRENT MILD EPISODE OF MAJOR DEPRESSIVE DISORDER, UNSPECIFIED WHETHER RECURRENT (HCC): ICD-10-CM

## 2022-01-04 DIAGNOSIS — M79.609 POSTOPERATIVE PAIN OF EXTREMITY: ICD-10-CM

## 2022-01-04 DIAGNOSIS — G89.18 POSTOPERATIVE PAIN OF EXTREMITY: ICD-10-CM

## 2022-01-04 DIAGNOSIS — K80.20 CALCULUS OF GALLBLADDER WITHOUT CHOLECYSTITIS WITHOUT OBSTRUCTION: ICD-10-CM

## 2022-01-04 DIAGNOSIS — S98.112A AMPUTATION OF LEFT GREAT TOE (HCC): ICD-10-CM

## 2022-01-04 PROCEDURE — 99214 OFFICE O/P EST MOD 30 MIN: CPT | Performed by: FAMILY MEDICINE

## 2022-01-04 PROCEDURE — 99213 OFFICE O/P EST LOW 20 MIN: CPT | Performed by: FAMILY MEDICINE

## 2022-01-04 RX ORDER — VENLAFAXINE HYDROCHLORIDE 75 MG/1
75 CAPSULE, EXTENDED RELEASE ORAL DAILY
Qty: 30 CAPSULE | Refills: 3 | Status: SHIPPED | OUTPATIENT
Start: 2022-01-04 | End: 2022-03-21

## 2022-01-04 RX ORDER — HYDROXYZINE 50 MG/1
50 TABLET, FILM COATED ORAL 3 TIMES DAILY PRN
Qty: 60 TABLET | Refills: 1 | Status: SHIPPED | OUTPATIENT
Start: 2022-01-04 | End: 2022-03-02

## 2022-01-04 RX ORDER — HYDROCODONE BITARTRATE AND ACETAMINOPHEN 5; 325 MG/1; MG/1
1 TABLET ORAL EVERY 6 HOURS PRN
Qty: 28 TABLET | Refills: 0 | Status: SHIPPED | OUTPATIENT
Start: 2022-01-04 | End: 2022-01-11

## 2022-01-04 NOTE — PROGRESS NOTES
"Subjective     Marilyn Salinas is a 56 y.o. female who presents with Follow-Up            HPI 1.  Status post partial amputation left great toe-patient reports that just 9 days ago she had the distal portion of her left great toe amputated due to osteonecrosis.  Surgery was performed by Dr. Goff.  She apparently has to go to wound care at [facility] which is still being scheduled.  She took her last Norco 5/325 tablet today from a 20 count pain pill prescription that was provided for her by Dr. Goff at the time of surgery.  She reports that she has noticed difficulty maintaining her balance standing on her left foot due to the partial loss of the left great toe.  She has had about 5 separate falls several of which have rather banged her residual left great toe causing accentuated pain.  2.  Depression-patient requests refill of her Effexor 75 mg XR prescription that was originally ordered by her gynecologist.  She finds that this has been helpful in improving her overall mood.  She denies any suicidal ideation or confusion.  3.  Cholelithiasis-patient had an ultrasound of her abdomen back in July that showed either 0.5 cm or 2.5 cm gallstone (report is confusing).  She has been referred and scheduled for a cholecystectomy electively in about 2 and half weeks.  Patient is concerned with her difficulty with mobility and balance right now that she not have additional surgery quite so quickly.  She is not reporting any abdominal pain even with eating fatty foods.  ROS negative for current fever, confusion, positive for bilateral thigh weakness           Objective     /60   Pulse 84   Temp 36.1 °C (97 °F) (Temporal)   Resp 16   Ht 1.676 m (5' 5.98\")   Wt 71.2 kg (157 lb)   LMP 01/11/2017   SpO2 94%   BMI 25.35 kg/m²      Physical Exam      Gen.- alert, cooperative, in no acute distress  Neck- midline trachea, thyroid not enlarged or tender,supple, no cervical adenopathy  Chest-clear to " auscultation and percussion with normal breath sounds. No retractions. Chest wall nontender  Cardiac- regular rhythm and rate. No murmur, thrill, or heave  Abdomen- normal bowel sounds, soft without guarding. Liver and spleen not enlarged, no palpable masses or tenderness.                   Assessment & Plan        1. Postoperative pain of extremity    - hydrOXYzine HCl (ATARAX) 50 MG Tab; Take 1 Tablet by mouth 3 times a day as needed for Itching.  Dispense: 60 Tablet; Refill: 1  - HYDROcodone-acetaminophen (NORCO) 5-325 MG Tab per tablet; Take 1 Tablet by mouth every 6 hours as needed for up to 7 days.  Dispense: 28 Tablet; Refill: 0    2. Amputation of left great toe (HCC)    - HYDROcodone-acetaminophen (NORCO) 5-325 MG Tab per tablet; Take 1 Tablet by mouth every 6 hours as needed for up to 7 days.  Dispense: 28 Tablet; Refill: 0    3. Current mild episode of major depressive disorder, unspecified whether recurrent (HCC)    - venlafaxine XR (EFFEXOR XR) 75 MG CAPSULE SR 24 HR; Take 1 Capsule by mouth every day.  Dispense: 30 Capsule; Refill: 3  4.  Cholelithiasis  Plan: 1.  Discussed with patient that I think she could safely postpone her elective cholecystectomy for 2 to 4 weeks  2.  Patient will begin PT through referral from Dr. Goff to assist with ambulation  3.  Renew [place] 5/325, #28-patient may take up to 3/day hopefully decreasing steadily from there  4.  Follow-up with me as needed

## 2022-01-11 ENCOUNTER — PHYSICAL THERAPY (OUTPATIENT)
Dept: PHYSICAL THERAPY | Facility: REHABILITATION | Age: 57
End: 2022-01-11
Attending: PHYSICAL MEDICINE & REHABILITATION
Payer: MEDICAID

## 2022-01-11 DIAGNOSIS — Z86.69 HISTORY OF MUSCULAR DYSTROPHY: ICD-10-CM

## 2022-01-11 DIAGNOSIS — S14.109D INJURY OF CERVICAL SPINAL CORD, SUBSEQUENT ENCOUNTER (HCC): ICD-10-CM

## 2022-01-11 DIAGNOSIS — Z74.09 IMPAIRED MOBILITY AND ENDURANCE: ICD-10-CM

## 2022-01-11 PROCEDURE — 97162 PT EVAL MOD COMPLEX 30 MIN: CPT

## 2022-01-11 SDOH — ECONOMIC STABILITY: GENERAL: QUALITY OF LIFE: FAIR

## 2022-01-11 ASSESSMENT — BALANCE ASSESSMENTS
BALANCE - STANDING DYNAMIC: POOR +
SITTING TO STANDING: 3
LONG VERSION TOTAL SCORE (MAX 56): 28
PLACE ALTERNATE FOOT ON STEP OR STOOL WHILE STANDING UNSUPPORTED: 2
TRANSFERS: 3
BALANCE - SITTING STATIC: NORMAL
STANDING UNSUPPORTED ONE FOOT IN FRONT: 2
STANDING UNSUPPORTED WITH FEET TOGETHER: 2
LONG VERSION TOTAL SCORE (MAX 56): 28
BALANCE - SITTING DYNAMIC: NORMAL
BALANCE - STANDING STATIC: FAIR -
STANDING UNSUPPORTED: 2
STANDING ON ONE LEG: 0
TURN 360 DEGREES: 0
SITTING UNSUPPORTED: 4
STANDING UNSUPPORTED WITH EYES CLOSED: 3
LOOK OVER LEFT AND RIGHT SHOULDERS WHILE STANDING: 2
PICK UP OBJECT FROM THE FLOOR FROM A STANDING POSITION: 0
STANDING TO SITTING: 3
REACHING FORWARD WITH OUTSTRETCHED ARM WHILE STANDING: 2

## 2022-01-11 ASSESSMENT — ACTIVITIES OF DAILY LIVING (ADL)
AMBULATION_WITHOUT_ASSISTIVE_DEVICE: UNABLE
AMBULATION_WITH_ASSISTIVE_DEVICE: STAND BY ASSIST
POOR_BALANCE: 1

## 2022-01-11 ASSESSMENT — ENCOUNTER SYMPTOMS
ALLEVIATING FACTORS: SITTING DOWN
PAIN SCALE AT LOWEST: 5
PAIN SCALE AT HIGHEST: 10
ALLEVIATING FACTORS: REST
EXACERBATED BY: RAPID POSITION CHANGES
PAIN TIMING: WHEN ACTIVE
QUALITY: ACHING
EXACERBATED BY: LIFTING
ALLEVIATING FACTORS: RELAXATION
PAIN LOCATION: NECK
PAIN SCALE: 6
QUALITY: SHOOTING
QUALITY: SHARP
EXACERBATED BY: ACTIVITY
PAIN TIMING: ALL DAY

## 2022-01-11 NOTE — OP THERAPY EVALUATION
"  Outpatient Physical Therapy  INITIAL NEUROLOGICAL EVALUATION    [facility] Therapy [address].  [address]  [place] [address]  Phone:  [phone]  Fax:  [phone]    Date of Evaluation: 01/11/2022    Patient: Marilyn Salinas  YOB: 1965  MRN: [id number]     Referring Provider: Julianna Guillory D.O.  [address] [facility]  [address]  [place] [address]   Referring Diagnosis Injury of cervical spinal cord, subsequent encounter (HCC) [S14.109D];History of muscular dystrophy [Z86.69];Impaired mobility and endurance [Z74.09];Impaired mobility and activities of daily living [Z74.09, Z78.9]     Time Calculation    Start time: 1115  Stop time: 1158 Time Calculation (min): 43 minutes             Chief Complaint: Difficulty Walking and Loss Of Balance    Visit Diagnoses     ICD-10-CM   1. History of muscular dystrophy  Z86.69   2. Impaired mobility and endurance  Z74.09   3. Injury of cervical spinal cord, subsequent encounter (HCC)  S14.109D       Subjective:   History of Present Illness:     Date of onset:  10/21/2021    Mechanism of injury:  Per pt's PMI, \"PROCEDURE PERFORMED 12/27/21:  1. Left great toe amputation for debridement with underlying osteomyelitis  2. Left removal deep implant     HPI Marilyn Salinas is a 56-year-old woman who is now 10 days status post amputation of left great toe for underlying osteomyelitis.  She reports pain to the amputation site.  She recently received an narcotic prescription from her primary care physician 2 days ago.  The narcotics are helping.  She denies any recent fevers or chills.    Marilyn Salinas is a 56 y.o. RHD female with PMH significant for chronic back and neck pain and rehabilitation history significant for premorbid weakness and paresthesias, had GLF, imaging showing Disc herniation at C4-5 and C3- C4, with T2 hyperintensity of the cord and s/p ACDF C3-5 on 12/20/19\"    The pt presents to physical therapy initial evaluation " today reporting that she has been falling more regularly since the toe amputation, most recent fall on Saturday where she landed on the R side of her face. The pt states that her balance is already poor and is worse since the foot surgeries. The pt has difficulty showering, waiting for a tub bench, pt has a tall tub. Pt also reports pain in her neck. Reports progressive weakness in her legs. Tries to do exercises for her legs. Pt unable to perform a floor transfer. Pt able to do toilet transfer, difficulty getting in and out of bed. Reports that she falls at least weekly. Notices progressive worsening of her gait mechanics with fatigue.   Quality of life:  Fair  Headaches:  tension headaches  Sleep disturbance:  Not disrupted  Pain:     Current pain ratin    At best pain ratin    At worst pain rating:  10    Location:  Neck     Quality:  Aching, shooting and sharp    Pain timing:  All day and when active    Relieving factors:  Relaxation, rest and sitting down    Aggravating factors:  Activity, lifting and rapid position changes    Progression:  Worsening  Social Support:     Lives in:  One-story house (2 steps to get out to sidewalk)    Lives with: Roommates.  Treatments:     Previous treatment:  Physical therapy    Current treatment:  Home exercise program  Patient Goals:     Patient goals for therapy:  Decreased pain, improved balance, increased strength and independence with ADLs/IADLs    Other patient goals:  Improve walking      Past Medical History:   Diagnosis Date   • Anesthesia 2021    agitated coming out of anesthesia   • Anxiety    • Arthritis     spine, feet    • Asthma    • Breath shortness    • Cataract 2022    no surgery yet   • Dental disorder     upper and lower denture   • Depression    • Heart burn 2022    GERD   • High cholesterol    • Urinary bladder disorder    • Urinary incontinence      Past Surgical History:   Procedure Laterality Date   • PB AMPUTATION TOE,MT-P  JT Left 12/27/2021    Procedure: GREAT TOE PARTIAL AMPUTATION;  Surgeon: Emiliano Goff M.D.;  Location: SURGERY [facility];  Service: Orthopedics   • CERVICAL DISK AND FUSION ANTERIOR N/A 12/20/2019    Procedure: DISCECTOMY, SPINE, CERVICAL, ANTERIOR APPROACH, WITH FUSION - C3-5;  Surgeon: Connor Linton M.D.;  Location: SURGERY [facility];  Service: Neurosurgery   • CORPECTOMY N/A 12/20/2019    Procedure: CORPECTOMY;  Surgeon: Connor Linton M.D.;  Location: SURGERY [facility];  Service: Neurosurgery   • VAGINAL HYSTERECTOMY SCOPE TOTAL N/A 10/3/2017    Procedure: VAGINAL HYSTERECTOMY SCOPE TOTAL;  Surgeon: Hudson Driscoll M.D.;  Location: SURGERY [facility];  Service: Gynecology   • SALPINGECTOMY Bilateral 10/3/2017    Procedure: SALPINGECTOMY;  Surgeon: Hudson Driscoll M.D.;  Location: SURGERY [facility];  Service: Gynecology   • OOPHORECTOMY Bilateral 10/3/2017    Procedure: OOPHORECTOMY;  Surgeon: Hudson Driscoll M.D.;  Location: SURGERY [facility];  Service: Gynecology   • ANTERIOR AND POSTERIOR REPAIR Bilateral 10/3/2017    Procedure: ANTERIOR AND POSTERIOR REPAIR;  Surgeon: Hudson Driscoll M.D.;  Location: SURGERY [facility];  Service: Gynecology   • ENTEROCELE REPAIR N/A 10/3/2017    Procedure: ENTEROCELE REPAIR, PERINEOPLASTY;  Surgeon: Hudson Driscoll M.D.;  Location: SURGERY [facility];  Service: Gynecology   • BLADDER SLING FEMALE N/A 10/3/2017    Procedure: BLADDER SLING FEMALE TOT, CYSTOSCOPY;  Surgeon: Hudson Driscoll M.D.;  Location: SURGERY [facility];  Service: Gynecology   • VAGINAL SUSPENSION N/A 10/3/2017    Procedure: VAGINAL SUSPENSION SACROSPINOUS VAULT POSSIBLE;  Surgeon: Hudson Driscoll M.D.;  Location: SURGERY [facility];  Service: Gynecology   • OTHER Left 2005    hammertoe x2   • EYE SURGERY  1972    for lazy eye   • LUMPECTOMY       Social History     Tobacco Use   • Smoking status:  Current Every Day Smoker     Packs/day: 0.25     Years: 26.00     Pack years: 6.50     Types: Cigarettes     Last attempt to quit: 3/1/2020     Years since quittin.8   • Smokeless tobacco: Never Used   Substance Use Topics   • Alcohol use: No     Family and Occupational History     Socioeconomic History   • Marital status: Single     Spouse name: Not on file   • Number of children: Not on file   • Years of education: Not on file   • Highest education level: Not on file   Occupational History   • Not on file       Objective:   Active Range of Motion:   Upper extremity (left):     All left upper extremity active range of motion: All within functional limits  Upper extremity (right):     All right upper extremity active range of motion: All within functional limits  Active range of motion comments: C/spine ROM:   Flexion 50  Ext: 5 deg, posterior trunk lean to attempt to achieve extension  SB L 10 R 15  Rot L 35 R 40    Significant forward head posture, thoracic kyphosis      Strength:   Lower extremity (left):     Hip flexion: 4    Hip abduction: 3+ (Limited by adductor tone bilaterally)    Hip adduction: 4    Knee flexion: 4-    Knee extension: 4    Ankle dorsiflexion: 3  Lower extremity (right):     Hip flexion: 4    Hip abduction: 3+    Hip adduction: 4+    Knee flexion: 4    Knee extension: 4    Ankle dorsiflexion: 4-    Tone, Sensation and Coordination:   Tone:     Left lower extremity muscle tone: Spastic    Right lower extremity muscle tone: Spastic    Sensation   Lower extremity (left):     Light touch: Intact  Lower extremity (right):     Light touch: Intact    Postural Control (Balance)     Sitting (static): Normal    Sitting (dynamic): Normal    Standing (static): Fair -    Standing (dynamic): Poor +    Ambulation: Level Surfaces   Ambulation with assistive device: stand by assist  Ambulation without assistive device: unable    Observational Gait   Gait: crouched pattern     Walking speed below  functional limits.    Stride width: cross over.    Left foot contact pattern: forefoot  Right foot contact pattern: forefoot    Quality of Movement During Gait     Hip   Hip (Left): Positive internal rotation.   Hip (Right): Positive internal rotation.     Knee   Knee (Left): Positive increased flexion during stance.   Knee (Right): Positive increased flexion during stance.     Ankle   Ankle (Left): Positive supinated.   Ankle (Right): Positive supinated.     Additional Quality of Movement During Gait Details  Pt wearing protective boot on the L foot secondary to recent toe amputation    Balance/Gait Comments   5xSTS: unable to perform without UE, pt able to perform 1 repetition with UE assist  TU.37 sec with rollator  10MWT: 25.32 sec with rollator  Gait speed: 0.39 m/s with rollator        Therapeutic Exercises (CPT 48684):       Therapeutic Exercise Summary: Pt educated to continue with HEP prescribed by her former PT. Pt educated to follow up with her foot surgeon regarding exercise or activity precautions while participating in physical therapy intervention.       Time-based treatments/modalities:           Assessment, Response and Plan:   Impairments: abnormal gait, abnormal muscle firing, abnormal muscle tone, activity intolerance, impaired functional mobility, impaired balance, impaired physical strength, lacks appropriate home exercise program, limited ADL's, limited mobility, pain with function and safety issue    Assessment details:  Marilyn Salinas is a 56 y.o. female who presents to skilled physical therapist initial evaluation with current complaints of impairments in gait, balance, frequent falling, and neck pain. The pt presents with objective impairments in c/spine ROM, standing and sitting posture, functional LE strength, static and dynamic balance, dynamic mobility, gait speed and endurance, and the pt is a fall risk based on her performance on the Rojas, TUG, 10MWT, and her fall history. The  impairments found during today's evaluation can be addressed by PT intervention, but her prognosis and time to achieve goals may be impacted by her PMH as well as recent L foot surgery. However, the pt appears motivated to participate in PT intervention and it is anticipated that she will benefit from skilled therapy services to address functional limitations and impairments detailed above, to address her current high fall risk, and to maximize her independence and quality of life.   Barriers to therapy:  Comorbidities  Prognosis: good    Goals:   Short Term Goals:   1. Pt will demonstrate TUG time to <20 seconds with LRAD to improve dynamic mobility  2. Pt will be able to perform a sit to stand transfer without UE assist  3. Pt will be able to complete the 6MWT  4. Pt will be able to perform c/spine extension AROM to at least 15 degrees to improve tolerance to reaching and looking overhead  Short term goal time span:  2-4 weeks      Long Term Goals:    1. Pt will demonstrate Rojas Balance Scale score to at least 35/56 to improve static balance  2. Pt will demonstrate self-selected gait speed to at least 0.5 m/s to improve community ambulation  3. Pt will be able to complete 5xSTS without UE assist to improve functional LE strength and transfer tolerance  4. Pt will be independent with HEP  Long term goal time span:  6-8 weeks    Plan:   Therapy options:  Physical therapy treatment to continue  Planned therapy interventions:  Neuromuscular Re-education (CPT 99756), Gait Training (CPT 66150) and Therapeutic Exercise (CPT 46958)  Frequency:  2x week  Duration in weeks:  5  Duration in visits:  10  Discussed with:  Patient  Plan details:  Pt educated on their current objective clinical presentation, anticipated PT POC, and importance of adherence to prescribed HEP in order to maximize PT benefit.        Functional Assessment Used  Rojas Balance Total Score (0-56): 28  Neck Disability Total: 35       Referring provider  co-signature:  I have reviewed this plan of care and my co-signature certifies the need for services.    Certification Period: 01/11/2022 to  02/22/22    Physician Signature: ________________________________ Date: ______________

## 2022-01-12 ENCOUNTER — HOSPITAL ENCOUNTER (EMERGENCY)
Facility: MEDICAL CENTER | Age: 57
End: 2022-01-12
Payer: MEDICAID

## 2022-01-12 ENCOUNTER — PATIENT MESSAGE (OUTPATIENT)
Dept: MEDICAL GROUP | Facility: MEDICAL CENTER | Age: 57
End: 2022-01-12

## 2022-01-12 VITALS
HEART RATE: 86 BPM | HEIGHT: 66 IN | DIASTOLIC BLOOD PRESSURE: 83 MMHG | OXYGEN SATURATION: 92 % | SYSTOLIC BLOOD PRESSURE: 119 MMHG | RESPIRATION RATE: 16 BRPM | TEMPERATURE: 96.7 F | WEIGHT: 150 LBS | BODY MASS INDEX: 24.11 KG/M2

## 2022-01-12 PROCEDURE — 302449 STATCHG TRIAGE ONLY (STATISTIC)

## 2022-01-12 NOTE — ED NOTES
Pt to triage desk via WC stating I will just call my doctor and have them order the test, explained the triage process to the pt, she still wishes to leave, LWBS paper work singed will take out of system.

## 2022-01-12 NOTE — ED TRIAGE NOTES
"Chief Complaint   Patient presents with   • T-5000 GLF     On Saturday her legs gave out and fell, hitting her head. bruising to RT side of face and RT side of neck. reports she has been having frequent falls recently after having a part of her LT great toe amputated.    • Head Injury     -LOC. denies thinners/ASA.    • Headache   • Wound Check     LT great toe from amputation 12/27, increased redness, concern for infection.      Pt to triage for above. No distress noted.   Has f/u appt tomorrow with surgeon from toe surgery.   Denies fevers.     /83   Pulse 86   Temp 35.9 °C (96.7 °F) (Temporal)   Resp 16   Ht 1.676 m (5' 6\")   Wt 68 kg (150 lb)   LMP 01/11/2017   SpO2 92%   BMI 24.21 kg/m²     "

## 2022-01-13 ENCOUNTER — APPOINTMENT (OUTPATIENT)
Dept: PHYSICAL THERAPY | Facility: REHABILITATION | Age: 57
End: 2022-01-13
Attending: PHYSICAL MEDICINE & REHABILITATION
Payer: MEDICAID

## 2022-01-19 ENCOUNTER — APPOINTMENT (OUTPATIENT)
Dept: PHYSICAL THERAPY | Facility: REHABILITATION | Age: 57
End: 2022-01-19
Attending: PHYSICAL MEDICINE & REHABILITATION
Payer: MEDICAID

## 2022-01-19 NOTE — OP THERAPY DAILY TREATMENT
"  Outpatient Physical Therapy  DAILY TREATMENT     [facility] Physical Therapy [address].  [address]  [place] [address]  Phone:  [phone]  Fax:  [phone]    Date: 01/19/2022    Patient: Marilyn Salinas  YOB: 1965  MRN: [id number]     Time Calculation                   Chief Complaint: No chief complaint on file.    Visit #: 2    SUBJECTIVE:  ***    OBJECTIVE:  Current objective measures: ***          Therapeutic Exercises (CPT 72951):       Therapeutic Exercise Summary: Pt educated to continue with HEP prescribed by her former PT. Pt educated to follow up with her foot surgeon regarding exercise or activity precautions while participating in physical therapy intervention.       Time-based treatments/modalities:           Pain rating (1-10) before treatment:  {PAIN NUMBERS_1-10:33883}  Pain rating (1-10) after treatment:  {PAIN NUMBERS_1-10:00966}    ASSESSMENT:   Response to treatment: ***    PLAN/RECOMMENDATIONS:   Plan for treatment: {AMB OP THERAPY - THERAPY PLAN:[id number]::\"therapy treatment to continue next visit\"}.  Planned interventions for next visit: {PT PLANNED THERAPY INTERVENTIONS:[id number]::\"continue with current treatment\"}.       "

## 2022-01-20 ENCOUNTER — TELEPHONE (OUTPATIENT)
Dept: MEDICAL GROUP | Facility: MEDICAL CENTER | Age: 57
End: 2022-01-20

## 2022-01-20 DIAGNOSIS — U07.1 COVID-19: ICD-10-CM

## 2022-01-20 RX ORDER — METHYLPREDNISOLONE 4 MG/1
TABLET ORAL
Qty: 21 TABLET | Refills: 0 | Status: SHIPPED | OUTPATIENT
Start: 2022-01-20 | End: 2022-03-02

## 2022-01-22 DIAGNOSIS — U07.1 COVID-19: ICD-10-CM

## 2022-01-24 ENCOUNTER — TELEPHONE (OUTPATIENT)
Dept: PHYSICAL MEDICINE AND REHAB | Facility: REHABILITATION | Age: 57
End: 2022-01-24

## 2022-01-24 RX ORDER — HYDROXYCHLOROQUINE SULFATE 200 MG/1
TABLET, FILM COATED ORAL
Qty: 10 TABLET | Refills: 0 | Status: SHIPPED | OUTPATIENT
Start: 2022-01-24 | End: 2022-03-02

## 2022-01-26 ENCOUNTER — TELEPHONE (OUTPATIENT)
Dept: PHYSICAL THERAPY | Facility: REHABILITATION | Age: 57
End: 2022-01-26

## 2022-01-26 DIAGNOSIS — U07.1 COVID-19: ICD-10-CM

## 2022-01-26 RX ORDER — HYDROXYCHLOROQUINE SULFATE 200 MG/1
TABLET, FILM COATED ORAL
Qty: 10 TABLET | Refills: 0 | OUTPATIENT
Start: 2022-01-26

## 2022-01-26 NOTE — OP THERAPY DISCHARGE SUMMARY
Outpatient Physical Therapy  DISCHARGE SUMMARY NOTE      [facility] Therapy [address].  [address]  [place] [address]  Phone:  [phone]  Fax:  [phone]    Date of Visit: 01/26/2022    Patient: Marilyn Salinas  YOB: 1965  MRN: [id number]     Referring Provider: No referring provider defined for this encounter.   Referring Diagnosis No admission diagnoses are documented for this encounter.     Your patient is being discharged from Physical Therapy with the following comments:   · Patient cancelled or missed more than 2 scheduled appointments/non-compliant    Clayton Dunbar PT    Date: 1/26/2022

## 2022-01-27 ENCOUNTER — TELEMEDICINE (OUTPATIENT)
Dept: PHYSICAL MEDICINE AND REHAB | Facility: REHABILITATION | Age: 57
End: 2022-01-27
Payer: MEDICAID

## 2022-01-27 DIAGNOSIS — G89.29 CHRONIC BILATERAL BACK PAIN, UNSPECIFIED BACK LOCATION: ICD-10-CM

## 2022-01-27 DIAGNOSIS — G82.50 TETRAPLEGIA (HCC): Primary | ICD-10-CM

## 2022-01-27 DIAGNOSIS — S98.112A AMPUTATION OF LEFT GREAT TOE (HCC): ICD-10-CM

## 2022-01-27 DIAGNOSIS — Z86.69 HISTORY OF MUSCULAR DYSTROPHY: ICD-10-CM

## 2022-01-27 DIAGNOSIS — N31.9 NEUROGENIC BLADDER: ICD-10-CM

## 2022-01-27 DIAGNOSIS — M54.9 CHRONIC BILATERAL BACK PAIN, UNSPECIFIED BACK LOCATION: ICD-10-CM

## 2022-01-27 DIAGNOSIS — Z74.09 IMPAIRED MOBILITY AND ENDURANCE: ICD-10-CM

## 2022-01-27 PROCEDURE — 99214 OFFICE O/P EST MOD 30 MIN: CPT | Mod: 95 | Performed by: PHYSICAL MEDICINE & REHABILITATION

## 2022-01-27 NOTE — PROGRESS NOTES
[facility]  PM&R Neuro Rehabilitation Clinic  [address] [place] [id number]  Ph: [phone]    FOLLOW UP PATIENT EVALUATION    This evaluation was conducted via Zoom using secure and encrypted videoconferencing technology. The patient was in a private location in the [facility].  The patient's identity was confirmed and verbal consent was obtained for this virtual visit.    Patient Name: Marilyn Salinas   Patient : 1965  Patient Age: 56 y.o.   PCP: Edwar Platt M.D.    Examining Physician: Dr. Julianna Guillory, DO    SUBJECTIVE:   Patient Identification: Marilyn Salinas is a 56 y.o. RHD female with PMH significant for chronic back and neck pain and rehabilitation history significant for premorbid weakness and paresthesias, had GLF, imaging showing Disc herniation at C4-5 and C3- C4, with T2 hyperintensity of the cord and s/p ACDF C3-5 on 19 with Dr. Linton  and is presenting to PM&R clinic for a FOLLOW UP outpatient evaluation with the following chief complaint/s:    PM&R Background Information:  Original Date of Injury: 19 GLF and worsening weakness, pain.   Pertinent Procedure History: Disc herniation at C4-5 and C3- C4, with T2 hyperintensity of the cord at this level. she underwent ACDF C3-5 on 19 with Dr. Linton  Dates of Admission/Discharge to ARU: 19-20    Chief Complaint: Medication Follow Up    Interval History:   - Recently had COVID and has now recovered.   - Records Reviewed: Had partial L foot amputation 21 Dr. Goff. Has sutures in still.   - Has heard of [facility] and a specific therapist performs maneuvers which help with ailments.   - May get GB out at some point.   - Awaiting endoscopy to.   - Wondering if she should see someone for back pain.   - Haven't received shower chair yet, but hasn't had phone for the past month.     Review of Systems:  All other pertinent positive review of systems are noted above in HPI.     Past Medical  History:  Past Medical History:   Diagnosis Date   • Anesthesia 12/23/2021    agitated coming out of anesthesia   • Anxiety    • Arthritis     spine, feet    • Asthma    • Breath shortness    • Cataract 12/23/2022    no surgery yet   • Dental disorder     upper and lower denture   • Depression    • Heart burn 12/23/2022    GERD   • High cholesterol    • Urinary bladder disorder    • Urinary incontinence       Past Surgical History:   Procedure Laterality Date   • PB AMPUTATION TOE,MT-P JT Left 12/27/2021    Procedure: GREAT TOE PARTIAL AMPUTATION;  Surgeon: Emiliano Goff M.D.;  Location: [facility];  Service: Orthopedics   • CERVICAL DISK AND FUSION ANTERIOR N/A 12/20/2019    Procedure: DISCECTOMY, SPINE, CERVICAL, ANTERIOR APPROACH, WITH FUSION - C3-5;  Surgeon: Connor Linton M.D.;  Location: [facility];  Service: Neurosurgery   • CORPECTOMY N/A 12/20/2019    Procedure: CORPECTOMY;  Surgeon: Connor Linton M.D.;  Location: [facility];  Service: Neurosurgery   • VAGINAL HYSTERECTOMY SCOPE TOTAL N/A 10/3/2017    Procedure: VAGINAL HYSTERECTOMY SCOPE TOTAL;  Surgeon: Hudson Driscoll M.D.;  Location: SURGERY SAME DAY [facility];  Service: Gynecology   • SALPINGECTOMY Bilateral 10/3/2017    Procedure: SALPINGECTOMY;  Surgeon: Hudson Driscoll M.D.;  Location: SURGERY SAME DAY [facility];  Service: Gynecology   • OOPHORECTOMY Bilateral 10/3/2017    Procedure: OOPHORECTOMY;  Surgeon: Hudson Driscoll M.D.;  Location: SURGERY SAME DAY [facility];  Service: Gynecology   • ANTERIOR AND POSTERIOR REPAIR Bilateral 10/3/2017    Procedure: ANTERIOR AND POSTERIOR REPAIR;  Surgeon: Hudson Driscoll M.D.;  Location: SURGERY SAME DAY [facility];  Service: Gynecology   • ENTEROCELE REPAIR N/A 10/3/2017    Procedure: ENTEROCELE REPAIR, PERINEOPLASTY;  Surgeon: Hudson Driscoll M.D.;  Location: SURGERY SAME DAY [facility];  Service: Gynecology   • BLADDER SLING FEMALE N/A 10/3/2017     Procedure: BLADDER SLING FEMALE TOT, CYSTOSCOPY;  Surgeon: Hudson Driscoll M.D.;  Location: SURGERY SAME DAY [facility] [place] [facility];  Service: Gynecology   • VAGINAL SUSPENSION N/A 10/3/2017    Procedure: VAGINAL SUSPENSION SACROSPINOUS VAULT POSSIBLE;  Surgeon: Hudson Driscoll M.D.;  Location: SURGERY SAME DAY [facility] [place] [facility];  Service: Gynecology   • OTHER Left 2005    hammertoe x2   • EYE SURGERY  1972    for lazy eye   • LUMPECTOMY          Current Outpatient Medications:   •  hydroxychloroquine (PLAQUENIL) 200 MG Tab, TAKE 1 TABLET BY MOUTH TWICE DAILY, Disp: 10 Tablet, Rfl: 0  •  methylPREDNISolone (MEDROL DOSEPAK) 4 MG Tablet Therapy Pack, 6 by mouth daily 1, 5 by mouth day 2, 4 by mouth day 3, 3 by mouth daily 4, 2 by mouth day 5, 1 by mouth day 6, Disp: 21 Tablet, Rfl: 0  •  ciclopirox (PENLAC) 8 % solution, Apply to nail once daily, Disp: 6.6 mL, Rfl: 0  •  sulfamethoxazole-trimethoprim (BACTRIM DS) 800-160 MG tablet, Take 1 Tablet by mouth 2 times a day., Disp: 20 Tablet, Rfl: 0  •  hydrOXYzine HCl (ATARAX) 50 MG Tab, Take 1 Tablet by mouth 3 times a day as needed for Itching., Disp: 60 Tablet, Rfl: 1  •  venlafaxine XR (EFFEXOR XR) 75 MG CAPSULE SR 24 HR, Take 1 Capsule by mouth every day., Disp: 30 Capsule, Rfl: 3  •  potassium chloride (KAYCIEL) 20 MEQ/15ML (10%) Solution, 15 ml with food, Disp: , Rfl:   •  fesoterodine fumarate (TOVIAZ) 4 MG TABLET SR 24 HR, 1 tablet, Disp: , Rfl:   •  gabapentin (NEURONTIN) 300 MG Cap, Take 1 po qhs, Disp: 7 Capsule, Rfl: 0  •  hydrOXYzine pamoate (VISTARIL) 50 MG Cap, Take 1 Capsule by mouth 3 times a day as needed for Itching (nausea)., Disp: 20 Capsule, Rfl: 1  •  buPROPion (WELLBUTRIN XL) 150 MG XL tablet, TAKE 3 TABLETS BY MOUTH DAILY, Disp: , Rfl:   •  pantoprazole (PROTONIX) 20 MG tablet, TAKE ONE TABLET BY MOUTH DAILY, Disp: 30 Tablet, Rfl: 6  •  fluticasone (FLONASE) 50 MCG/ACT nasal spray, Administer 1 Spray into affected nostril(S) every day., Disp: 16 g, Rfl:  6  •  tizanidine (ZANAFLEX) 2 MG capsule, Take 1 Capsule by mouth 3 times a day., Disp: 270 Capsule, Rfl: 1  •  Mirabegron ER (MYRBETRIQ) 50 MG TABLET SR 24 HR, Take 50 mg by mouth every day., Disp: 90 Tablet, Rfl: 1  •  gabapentin (NEURONTIN) 300 MG Cap, TAKE 3 CAPSULES BY MOUTH THREE TIMES DAILY, Disp: 270 Capsule, Rfl: 5  •  baclofen (LIORESAL) 10 MG Tab, TAKE 3 TABLETS BY MOUTH THREE TIMES DAILY, Disp: 270 Tablet, Rfl: 5  •  meloxicam (MOBIC) 15 MG tablet, TAKE 1 TABLET BY MOUTH DAILY, Disp: 30 Tablet, Rfl: 6  •  fesoterodine fumarate (TOVIAZ) 4 MG TABLET SR 24 HR, , Disp: , Rfl:   •  fluticasone (FLONASE) 50 MCG/ACT nasal spray, Administer 1 Spray into affected nostril(S) every day., Disp: 16 g, Rfl: 3  •  potassium chloride SA (KDUR) 20 MEQ Tab CR, Take 1 Tab by mouth every day., Disp: 30 Tab, Rfl: 6  •  ALLERGY RELIEF 10 MG Tab, , Disp: , Rfl:   •  TOVIAZ 4 MG TABLET SR 24 HR, Indications: Overactive Bladder, Disp: , Rfl:   •  albuterol 108 (90 Base) MCG/ACT Aero Soln inhalation aerosol, Inhale 2 Puffs by mouth every 6 hours as needed for Shortness of Breath., Disp: 8.5 g, Rfl: 6  Allergies   Allergen Reactions   • Codeine Itching and Unspecified     Rash, itching, mood disorder- becomes agitated        Past Social History:  Social History     Socioeconomic History   • Marital status: Single     Spouse name: Not on file   • Number of children: Not on file   • Years of education: Not on file   • Highest education level: Not on file   Occupational History   • Not on file   Tobacco Use   • Smoking status: Current Every Day Smoker     Packs/day: 0.25     Years: 26.00     Pack years: 6.50     Types: Cigarettes     Last attempt to quit: 3/1/2020     Years since quittin.9   • Smokeless tobacco: Never Used   Vaping Use   • Vaping Use: Never used   Substance and Sexual Activity   • Alcohol use: No   • Drug use: Yes     Types: Marijuana, Inhaled     Comment: marijuana daily (smoked and edibles) last time 2021    • Sexual activity: Not Currently   Other Topics Concern   •  Service No   • Blood Transfusions No   • Caffeine Concern No   • Occupational Exposure No   • Hobby Hazards No   • Sleep Concern Yes   • Stress Concern Yes   • Weight Concern Yes   • Special Diet No   • Back Care Yes   • Exercise No   • Bike Helmet Yes   • Seat Belt Yes   • Self-Exams Yes   Social History Narrative   • Not on file     Social Determinants of Health     Financial Resource Strain:    • Difficulty of Paying Living Expenses: Not on file   Food Insecurity:    • Worried About Running Out of Food in the Last Year: Not on file   • Ran Out of Food in the Last Year: Not on file   Transportation Needs:    • Lack of Transportation (Medical): Not on file   • Lack of Transportation (Non-Medical): Not on file   Physical Activity:    • Days of Exercise per Week: Not on file   • Minutes of Exercise per Session: Not on file   Stress:    • Feeling of Stress : Not on file   Social Connections:    • Frequency of Communication with Friends and Family: Not on file   • Frequency of Social Gatherings with Friends and Family: Not on file   • Attends Zoroastrianism Services: Not on file   • Active Member of Clubs or Organizations: Not on file   • Attends Club or Organization Meetings: Not on file   • Marital Status: Not on file   Intimate Partner Violence:    • Fear of Current or Ex-Partner: Not on file   • Emotionally Abused: Not on file   • Physically Abused: Not on file   • Sexually Abused: Not on file   Housing Stability:    • Unable to Pay for Housing in the Last Year: Not on file   • Number of Places Lived in the Last Year: Not on file   • Unstable Housing in the Last Year: Not on file        Family History:  Family History   Problem Relation Age of Onset   • Cancer Mother    • Alcohol/Drug Mother    • Cancer Brother    • Diabetes Maternal Aunt        Depression and Opioid Screening  PHQ-9:  Depression Screen (PHQ-2/PHQ-9) 6/22/2020 10/22/2020 12/8/2020    PHQ-2 Total Score - - -   PHQ-2 Total Score 6 6 0   PHQ-9 Total Score 24 21 -     Interpretation of PHQ-9 Total Score   Score Severity   1-4 No Depression   5-9 Mild Depression   10-14 Moderate Depression   15-19 Moderately Severe Depression   20-27 Severe Depression     Opioid Risk Score: No value filed.  Interpretation of Opioid Risk Score   Score 0-3 = Low risk of abuse. Do UDS at least once per year.  Score 4-7 = Moderate risk of abuse. Do UDS 1-4 times per year.  Score 8+ = High risk of abuse. Refer to specialist.      OBJECTIVE:   Vital Signs:  There were no vitals filed for this visit.     Pertinent Labs:  Lab Results   Component Value Date/Time    SODIUM 142 07/24/2021 11:38 AM    POTASSIUM 4.3 07/24/2021 11:38 AM    CHLORIDE 105 07/24/2021 11:38 AM    CO2 26 07/24/2021 11:38 AM    GLUCOSE 81 07/24/2021 11:38 AM    BUN 13 07/24/2021 11:38 AM    CREATININE 0.67 07/24/2021 11:38 AM       Lab Results   Component Value Date/Time    HBA1C 5.6 07/27/2017 09:39 AM       Lab Results   Component Value Date/Time    WBC 7.0 12/28/2019 05:12 AM    RBC 4.07 (L) 12/28/2019 05:12 AM    HEMOGLOBIN 12.4 12/28/2019 05:12 AM    HEMATOCRIT 37.9 12/28/2019 05:12 AM    MCV 93.1 12/28/2019 05:12 AM    MCH 30.5 12/28/2019 05:12 AM    MCHC 32.7 (L) 12/28/2019 05:12 AM    MPV 9.6 12/28/2019 05:12 AM    NEUTSPOLYS 29.40 (L) 12/28/2019 05:12 AM    LYMPHOCYTES 56.70 (H) 12/28/2019 05:12 AM    MONOCYTES 9.20 12/28/2019 05:12 AM    EOSINOPHILS 3.40 12/28/2019 05:12 AM    BASOPHILS 1.00 12/28/2019 05:12 AM       Lab Results   Component Value Date/Time    ASTSGOT 17 07/24/2021 11:38 AM    ALTSGPT 19 07/24/2021 11:38 AM        Physical Exam:   GEN: No apparent distress  HEENT: Head normocephalic, atraumatic.  Sclera nonicteric bilaterally, no ocular discharge appreciated bilaterally.  PULMONARY: Breathing nonlabored on room air, no respiratory accessory muscle use.  Not requiring supplemental oxygen.  SKIN:  Pictures from post op  12/30/21    PSYCH: Mood and affect within normal limits.  NEURO: Awake alert.  Conversational.  Logical thought content.      ASSESSMENT/PLAN: Marilyn Salinas  is a 56 y.o. female with rehabilitation history significant for premorbid weakness and paresthesias, had GLF, imaging showing Disc herniation at C4-5 and C3- C4, with T2 hyperintensity of the cord and s/p ACDF C3-5 on 12/20/19 with Dr. Linton, here for SCI follow up. The following plan was discussed with the patient who is in agreement.     Visit Diagnoses     ICD-10-CM   1. Tetraplegia ([facility])  G82.50   2. History of muscular dystrophy  Z86.69   3. Chronic bilateral back pain, unspecified back location  M54.9    G89.29   4. Impaired mobility and endurance  Z74.09   5. Neurogenic bladder  N31.9   6. Amputation of left great toe ([facility])  S98.112A        Rehab/Neuro:   1. C2 AISD: Nontraumatic incomplete spinal cord injury, status post fall at home with worsening weakness, found to have severe cervical stenosis.  Status post C3-C5 ACDF with Dr. Linton on 12/20/19. No longer getting epidural steroid injections, too painful.  2. Nociceptive/musculoskeletal chronic back pain  -Therapy: Continue physical therapy  -Function: 8/2021 Largely stable but patient does feel as though she is weaker lately.  Has had one fall.  Maintains strength through doing house chores. 10/2021 patient continues to have significant spastic paraplegic gait.  Would benefit from Botox to bilateral gastroc complex, however, insurance would not cover.  This would be cost prohibitive for her.  Significant onychomycosis on bilateral toenails as well as concern for DTI and erythema of first great toe. 1/27/2022 interim great left toe amputation.  Physically stable.  -Awaiting to hear regarding shower chair.  Patient has not had her phone for the past month so will listen to the messages regarding whether or not she has been contacted about this.  -Referral: Interventional pain and spine for chronic  back pain.  -Patient has prescription for shoe inserts which she will be reaching out to orthotist to receive.  -Referral: [facility] physical therapy with specific therapist per patient request at recommendation of a friend.     2. Muscular dystrophy: ? CP per recent neurology evaluation.  Reportedly diagnosed in '60's per aunt. Normal CPK. Was seeing Nikunj tanner in 2018 - was supposed to have EMG/NCS. Medicaid wouldn't cover EMG/NCS at that time.  Scheduled for EMG/NCS 10/23/2020.  EEG reportedly negative.  Patient has since changed her neurologist and is now seeing Dr. Hartman. Had EMG/NCS results -results not available but patient reports she was told she has paraplegia and partial CP.   -Follows with neurology.  -Stable     Spasticity: Bilateral lower extremities worse than upper extremities.  Botox cost prohibitive.  -Med management: Continue Baclofen 30mg TID.  Will need refill next visit.  -Med management: Continue Tizanidine 2mg TID.  Will need refill next visit.    Neuropathic Pain:   -Med Management: Continue gabapentin 900 mg 3 times daily.  Will need refill next visit.     Neurogenic Bladder: Urinary urgency and frequency.  Patient has constant urinary incontinence, urinary frequency, urinary urgency due to neurogenic bladder in setting of spinal cord injury.  -Status: Managing incontinence with adult briefs.  -Med management: Continue Myrbetriq and Toviaz.  -Referral: Urogynecology appointment 1/31/2022    Skin: Very worried about her left great toe.  It is very erythematous and while she does have a large callus at the distal end of her big toe there is unclear depth of skin breakdown underneath.  Patient has tried to get in with podiatry however nobody in the area is taking new Medicaid patients and she was referred to [facility] which, due to her disabilities, can clearly not make happen. 1/27/2022 s/p partial left great toe amputation 12/27/21.   -Established with foot surgeon    Follow up: 3 months  medication refill    Total time spent was 30 minutes.  Included in this time is the time spent preparing for the visit including record review, my exam and evaluation, counseling and education regarding that which is aforementioned in the assessment and plan. Time was spent ordering the appropriate labs, tests, procedures, referrals, medications. Discussion involved the patient.     Please note that this dictation was created using voice recognition software. I have made every reasonable attempt to correct obvious errors but there may be errors of grammar and content that I may have overlooked prior to finalization of this note.    Dr. Julianna Guillory DO, MS  Department of Physical Medicine & Rehabilitation  Neuro Rehabilitation Clinic  [facility]

## 2022-02-07 ENCOUNTER — APPOINTMENT (OUTPATIENT)
Dept: PHYSICAL THERAPY | Facility: REHABILITATION | Age: 57
End: 2022-02-07
Attending: PHYSICAL MEDICINE & REHABILITATION
Payer: MEDICAID

## 2022-02-08 ENCOUNTER — HOSPITAL ENCOUNTER (OUTPATIENT)
Facility: MEDICAL CENTER | Age: 57
End: 2022-02-08
Attending: COLON & RECTAL SURGERY | Admitting: COLON & RECTAL SURGERY
Payer: MEDICAID

## 2022-02-10 ENCOUNTER — APPOINTMENT (OUTPATIENT)
Dept: PHYSICAL THERAPY | Facility: REHABILITATION | Age: 57
End: 2022-02-10
Attending: PHYSICAL MEDICINE & REHABILITATION
Payer: MEDICAID

## 2022-02-22 ENCOUNTER — OFFICE VISIT (OUTPATIENT)
Dept: PHYSICAL MEDICINE AND REHAB | Facility: MEDICAL CENTER | Age: 57
End: 2022-02-22
Payer: MEDICAID

## 2022-02-22 ENCOUNTER — HOSPITAL ENCOUNTER (OUTPATIENT)
Facility: REHABILITATION | Age: 57
End: 2022-02-22
Attending: PHYSICAL MEDICINE & REHABILITATION | Admitting: PHYSICAL MEDICINE & REHABILITATION
Payer: MEDICAID

## 2022-02-22 VITALS
OXYGEN SATURATION: 90 % | DIASTOLIC BLOOD PRESSURE: 72 MMHG | SYSTOLIC BLOOD PRESSURE: 112 MMHG | HEIGHT: 66 IN | HEART RATE: 80 BPM | TEMPERATURE: 96.8 F | BODY MASS INDEX: 26.4 KG/M2 | WEIGHT: 164.24 LBS

## 2022-02-22 DIAGNOSIS — M79.2 NEUROPATHIC PAIN: ICD-10-CM

## 2022-02-22 DIAGNOSIS — N31.9 NEUROGENIC BLADDER: ICD-10-CM

## 2022-02-22 DIAGNOSIS — M54.12 CERVICAL RADICULOPATHY: ICD-10-CM

## 2022-02-22 DIAGNOSIS — M21.372 BILATERAL FOOT-DROP: ICD-10-CM

## 2022-02-22 DIAGNOSIS — M21.371 BILATERAL FOOT-DROP: ICD-10-CM

## 2022-02-22 DIAGNOSIS — Z98.890 HISTORY OF CERVICAL SPINAL SURGERY: ICD-10-CM

## 2022-02-22 DIAGNOSIS — M47.812 CERVICAL SPONDYLOSIS: ICD-10-CM

## 2022-02-22 DIAGNOSIS — G89.29 CHRONIC NECK PAIN: ICD-10-CM

## 2022-02-22 DIAGNOSIS — M54.2 CHRONIC NECK PAIN: ICD-10-CM

## 2022-02-22 DIAGNOSIS — G82.50 TETRAPLEGIA (HCC): ICD-10-CM

## 2022-02-22 PROCEDURE — 99215 OFFICE O/P EST HI 40 MIN: CPT | Performed by: PHYSICAL MEDICINE & REHABILITATION

## 2022-02-22 ASSESSMENT — PATIENT HEALTH QUESTIONNAIRE - PHQ9
5. POOR APPETITE OR OVEREATING: 3 - NEARLY EVERY DAY
SUM OF ALL RESPONSES TO PHQ QUESTIONS 1-9: 14
CLINICAL INTERPRETATION OF PHQ2 SCORE: 5

## 2022-02-22 ASSESSMENT — PAIN SCALES - GENERAL: PAINLEVEL: 9=SEVERE PAIN

## 2022-02-22 NOTE — PATIENT INSTRUCTIONS
Your procedure will be at the [facility] special procedure suite.    [facility] [place] [address]       PRE-PROCEDURE INSTRUCTIONS  You may take your regular medications except:   · No Anti-inflammatories 5 days prior to your procedure. Anti-inflammatories include medicines such as  ibuprofen (Motrin, Advil), Excedrin, Naproxen (Aleve, Anaprox, Naprelan, Naprosyn), Celecoxib (Celebrex), Diclofenac (Voltaren-XR tab), and Meloxicam (Mobic).   · You can take the remainder of your pain medications as prescribed.   · If you are having a diagnostic procedure such as a medial branch block, do not use her pain meds on the day of the procedure  · No Glucophage or Metformin 24 hours before your procedure. You may resume next day after your procedure.  · Call the physiatry office if you are taking or prescribed anti-biotics within five days of procedure.  · Please ask provider if you are taking any new diabetes medication.  · CONTINUE TAKING BLOOD PRESSURE MEDICATIONS AS PRESCRIBED.  · Pain medications will not be prescribed on the procedure day. Procedural pain medication may be used by your provider   · Call your doctor's office performing the procedure if you have a fever, chills, rash or new illness prior to your procedure    Anticoagulation/antiplatelet medications  No Blood thinning medications such as Coumadin, Xarelto, aspirin or Plavix 5 days prior to procedure unless your doctor said to continue these medications. Call your doctor if a new medication is prescribed in this class.     Restrictions for eating before procedure:   · If you are getting procedural sedation, then do not eat to for 8 hours prior to procedure appointment time. Do not drink fluids for four hours prior to your procedure time.   · If you are not having procedural sedation, then Skip the meal prior to your procedure. If you have a morning procedure then skip breakfast. If you have an afternoon procedure then skip lunch.   · You  may drink clear liquids up to 2 hours prior to your procedure  · You must have a  the day of procedure to accompany you home.      POST PROCEDURE INSTRUCTIONS   · No heavy lifting, strenuous bending or strenuous exercise for 3 days after your procedure.  · No hot tubs, baths, swimming for 3 days after your procedure  · You can remove the bandage the day after the procedure.  · IF YOU RECEIVED A STEROID INJECTION. PLEASE NOTE THAT THERE MAY BE A DELAY FOR THE INJECTION TO START WORKING, THE DELAY MAY BE UP TO TWO WEEKS. IF YOU HAVE DIABETES, PLEASE NOTE THAT YOUR SUGAR LEVELS MAY BE ELEVATED FOR 1-2 DAYS AFTER A STEROID INJECTION.  THE STEROID MAY CAUSE TEMPORARY SYMPTOMS WHICH USUALLY RESOLVE ON THEIR OWN WITHIN 1 TO 2 DAYS INCLUDING FACIAL FLUSHING OR A FEELING OF WARMTH ON THE FACE, TEMPORARY INCREASES IN BLOOD SUGAR, INSOMNIA, INCREASED HUNGER  · IF YOU EXPERIENCE PROLONGED WEAKNESS LONGER THAN ONE DAY, BOWEL OR BLADDER INCONTINENCE THEN PLEASE CALL THE PHYSIATRY OFFICE.  · Your leg may feel heavy, weak and numb for up to 1-2 days. Be very careful walking.   ·  You may resume normal activities 3 days after procedure.

## 2022-02-22 NOTE — PROGRESS NOTES
New patient note (the patient is new to me but was previously seen by Dr. Guillory who is in our group.  This will be billed as a follow-up visit)    Interventional spine and Pain  Physiatry (physical medicine and  Rehabilitation)     Date of service: See epic    Chief complaint:   Chief Complaint   Patient presents with   • New Patient     Back pain        Referring provider: Julianna Guillory D.O.     HISTORY    HPI: Marilyn Sailnas 56 y.o.  who presents today with Diagnoses of Cervical radiculopathy, Cervical spondylosis, Chronic neck pain, History of cervical spinal surgery,  ACDF C3-5 on 12/20/19 with Dr. Linton, Bilateral foot-drop, Neurogenic bladder, Neuropathic pain, and Tetraplegia (HCC) were pertinent to this visit.    HPI    Patient is complaining mostly of chronic bilateral neck pain with associated muscle spasms which can radiate to the bilateral shoulders. This can be aching and shooting in quality.  7-10/10 in intensity.     The patient is been physical therapy in the past    Medications tried include Zanaflex, baclofen, NSAIDs including meloxicam, Tylenol, Effexor    Medical records review:  I reviewed the note from the referring provider Julianna Guillory D.O. including the note dated 1/27/2022. C2 AISD nontraumatic incomplete spinal cord injury status post fall at home with worsening weakness found to have severe stenosis status post C3-5 ACDF by Dr. Linton in 2019.  The patient was previously getting epidural steroid injections but she had not recently because these were too painful.  There is also concern for muscular dystrophy however the patient was seen by neurology and does follow-up with them.  On medications for spasticity including baclofen and Zanaflex.          ROS:   Red Flags ROS:   Fever, Chills, Sweats: Denies  Involuntary Weight Loss: Denies   positive for neurogenic bowel and bladder followed by Dr. Julianna Guillory    All other systems reviewed and negative.       PMHx:   Past  Medical History:   Diagnosis Date   • Anesthesia 12/23/2021    agitated coming out of anesthesia   • Anxiety    • Arthritis     spine, feet    • Asthma    • Breath shortness    • Cataract 12/23/2022    no surgery yet   • Dental disorder     upper and lower denture   • Depression    • Heart burn 12/23/2022    GERD   • High cholesterol    • Urinary bladder disorder    • Urinary incontinence          Current Outpatient Medications on File Prior to Visit   Medication Sig Dispense Refill   • venlafaxine XR (EFFEXOR XR) 75 MG CAPSULE SR 24 HR Take 1 Capsule by mouth every day. 30 Capsule 3   • fesoterodine fumarate (TOVIAZ) 4 MG TABLET SR 24 HR 1 tablet     • gabapentin (NEURONTIN) 300 MG Cap Take 1 po qhs 7 Capsule 0   • buPROPion (WELLBUTRIN XL) 150 MG XL tablet TAKE 3 TABLETS BY MOUTH DAILY     • pantoprazole (PROTONIX) 20 MG tablet TAKE ONE TABLET BY MOUTH DAILY 30 Tablet 6   • fluticasone (FLONASE) 50 MCG/ACT nasal spray Administer 1 Spray into affected nostril(S) every day. 16 g 6   • tizanidine (ZANAFLEX) 2 MG capsule Take 1 Capsule by mouth 3 times a day. 270 Capsule 1   • Mirabegron ER (MYRBETRIQ) 50 MG TABLET SR 24 HR Take 50 mg by mouth every day. 90 Tablet 1   • gabapentin (NEURONTIN) 300 MG Cap TAKE 3 CAPSULES BY MOUTH THREE TIMES DAILY 270 Capsule 5   • baclofen (LIORESAL) 10 MG Tab TAKE 3 TABLETS BY MOUTH THREE TIMES DAILY 270 Tablet 5   • meloxicam (MOBIC) 15 MG tablet TAKE 1 TABLET BY MOUTH DAILY 30 Tablet 6   • fesoterodine fumarate (TOVIAZ) 4 MG TABLET SR 24 HR      • fluticasone (FLONASE) 50 MCG/ACT nasal spray Administer 1 Spray into affected nostril(S) every day. 16 g 3   • ALLERGY RELIEF 10 MG Tab      • TOVIAZ 4 MG TABLET SR 24 HR Indications: Overactive Bladder     • albuterol 108 (90 Base) MCG/ACT Aero Soln inhalation aerosol Inhale 2 Puffs by mouth every 6 hours as needed for Shortness of Breath. 8.5 g 6   • hydroxychloroquine (PLAQUENIL) 200 MG Tab TAKE 1 TABLET BY MOUTH TWICE DAILY (Patient  not taking: Reported on 2/22/2022) 10 Tablet 0   • methylPREDNISolone (MEDROL DOSEPAK) 4 MG Tablet Therapy Pack 6 by mouth daily 1, 5 by mouth day 2, 4 by mouth day 3, 3 by mouth daily 4, 2 by mouth day 5, 1 by mouth day 6 (Patient not taking: Reported on 2/22/2022) 21 Tablet 0   • ciclopirox (PENLAC) 8 % solution Apply to nail once daily (Patient not taking: Reported on 2/22/2022) 6.6 mL 0   • sulfamethoxazole-trimethoprim (BACTRIM DS) 800-160 MG tablet Take 1 Tablet by mouth 2 times a day. (Patient not taking: Reported on 2/22/2022) 20 Tablet 0   • hydrOXYzine HCl (ATARAX) 50 MG Tab Take 1 Tablet by mouth 3 times a day as needed for Itching. (Patient not taking: Reported on 2/22/2022) 60 Tablet 1   • potassium chloride (KAYCIEL) 20 MEQ/15ML (10%) Solution 15 ml with food (Patient not taking: Reported on 2/22/2022)     • hydrOXYzine pamoate (VISTARIL) 50 MG Cap Take 1 Capsule by mouth 3 times a day as needed for Itching (nausea). (Patient not taking: Reported on 2/22/2022) 20 Capsule 1   • potassium chloride SA (KDUR) 20 MEQ Tab CR Take 1 Tab by mouth every day. 30 Tab 6     No current facility-administered medications on file prior to visit.        PSHx:   Past Surgical History:   Procedure Laterality Date   • PB AMPUTATION TOE,MT-P JT Left 12/27/2021    Procedure: GREAT TOE PARTIAL AMPUTATION;  Surgeon: Emiliano Goff M.D.;  Location: [facility];  Service: Orthopedics   • CERVICAL DISK AND FUSION ANTERIOR N/A 12/20/2019    Procedure: DISCECTOMY, SPINE, CERVICAL, ANTERIOR APPROACH, WITH FUSION - C3-5;  Surgeon: Connor Linton M.D.;  Location: [facility];  Service: Neurosurgery   • CORPECTOMY N/A 12/20/2019    Procedure: CORPECTOMY;  Surgeon: Connor Linton M.D.;  Location: [facility];  Service: Neurosurgery   • VAGINAL HYSTERECTOMY SCOPE TOTAL N/A 10/3/2017    Procedure: VAGINAL HYSTERECTOMY SCOPE TOTAL;  Surgeon: Husdon Driscoll M.D.;  Location: SURGERY SAME DAY [facility]  ORS;  Service: Gynecology   • SALPINGECTOMY Bilateral 10/3/2017    Procedure: SALPINGECTOMY;  Surgeon: Hudson Driscoll M.D.;  Location: SURGERY SAME DAY [facility] ORS;  Service: Gynecology   • OOPHORECTOMY Bilateral 10/3/2017    Procedure: OOPHORECTOMY;  Surgeon: Hudson Driscoll M.D.;  Location: SURGERY SAME DAY [facility] ORS;  Service: Gynecology   • ANTERIOR AND POSTERIOR REPAIR Bilateral 10/3/2017    Procedure: ANTERIOR AND POSTERIOR REPAIR;  Surgeon: Hudson Driscoll M.D.;  Location: SURGERY SAME DAY [facility] ORS;  Service: Gynecology   • ENTEROCELE REPAIR N/A 10/3/2017    Procedure: ENTEROCELE REPAIR, PERINEOPLASTY;  Surgeon: Hudson Driscoll M.D.;  Location: SURGERY SAME DAY [facility] ORS;  Service: Gynecology   • BLADDER SLING FEMALE N/A 10/3/2017    Procedure: BLADDER SLING FEMALE TOT, CYSTOSCOPY;  Surgeon: Hudson Driscoll M.D.;  Location: SURGERY SAME DAY [facility] ORS;  Service: Gynecology   • VAGINAL SUSPENSION N/A 10/3/2017    Procedure: VAGINAL SUSPENSION SACROSPINOUS VAULT POSSIBLE;  Surgeon: Hudson Driscoll M.D.;  Location: SURGERY SAME DAY [facility] ORS;  Service: Gynecology   • OTHER Left 2005    hammertoe x2   • EYE SURGERY  1972    for lazy eye   • LUMPECTOMY         Family history   Family History   Problem Relation Age of Onset   • Cancer Mother    • Alcohol/Drug Mother    • Cancer Brother    • Diabetes Maternal Aunt          Medications: reviewed on epic.   Outpatient Medications Marked as Taking for the 2/22/22 encounter (Office Visit) with Karl Madden M.D.   Medication Sig Dispense Refill   • venlafaxine XR (EFFEXOR XR) 75 MG CAPSULE SR 24 HR Take 1 Capsule by mouth every day. 30 Capsule 3   • fesoterodine fumarate (TOVIAZ) 4 MG TABLET SR 24 HR 1 tablet     • gabapentin (NEURONTIN) 300 MG Cap Take 1 po qhs 7 Capsule 0   • buPROPion (WELLBUTRIN XL) 150 MG XL tablet TAKE 3 TABLETS BY MOUTH DAILY     • pantoprazole (PROTONIX) 20 MG tablet TAKE ONE TABLET BY MOUTH DAILY 30 Tablet 6   •  fluticasone (FLONASE) 50 MCG/ACT nasal spray Administer 1 Spray into affected nostril(S) every day. 16 g 6   • tizanidine (ZANAFLEX) 2 MG capsule Take 1 Capsule by mouth 3 times a day. 270 Capsule 1   • Mirabegron ER (MYRBETRIQ) 50 MG TABLET SR 24 HR Take 50 mg by mouth every day. 90 Tablet 1   • gabapentin (NEURONTIN) 300 MG Cap TAKE 3 CAPSULES BY MOUTH THREE TIMES DAILY 270 Capsule 5   • baclofen (LIORESAL) 10 MG Tab TAKE 3 TABLETS BY MOUTH THREE TIMES DAILY 270 Tablet 5   • meloxicam (MOBIC) 15 MG tablet TAKE 1 TABLET BY MOUTH DAILY 30 Tablet 6   • fesoterodine fumarate (TOVIAZ) 4 MG TABLET SR 24 HR      • fluticasone (FLONASE) 50 MCG/ACT nasal spray Administer 1 Spray into affected nostril(S) every day. 16 g 3   • ALLERGY RELIEF 10 MG Tab      • TOVIAZ 4 MG TABLET SR 24 HR Indications: Overactive Bladder     • albuterol 108 (90 Base) MCG/ACT Aero Soln inhalation aerosol Inhale 2 Puffs by mouth every 6 hours as needed for Shortness of Breath. 8.5 g 6        Allergies:   Allergies   Allergen Reactions   • Codeine Itching and Unspecified     Rash, itching, mood disorder- becomes agitated       Social Hx:   Social History     Socioeconomic History   • Marital status: Single     Spouse name: Not on file   • Number of children: Not on file   • Years of education: Not on file   • Highest education level: Not on file   Occupational History   • Not on file   Tobacco Use   • Smoking status: Former Smoker     Packs/day: 0.25     Years: 26.00     Pack years: 6.50     Types: Cigarettes     Quit date: 2022     Years since quittin.1   • Smokeless tobacco: Never Used   Vaping Use   • Vaping Use: Never used   Substance and Sexual Activity   • Alcohol use: No   • Drug use: Yes     Types: Marijuana, Inhaled     Comment: marijuana daily (smoked and edibles) last time 2021   • Sexual activity: Not Currently   Other Topics Concern   •  Service No   • Blood Transfusions No   • Caffeine Concern No   • Occupational  "Exposure No   • Hobby Hazards No   • Sleep Concern Yes   • Stress Concern Yes   • Weight Concern Yes   • Special Diet No   • Back Care Yes   • Exercise No   • Bike Helmet Yes   • Seat Belt Yes   • Self-Exams Yes   Social History Narrative   • Not on file     Social Determinants of Health     Financial Resource Strain: Not on file   Food Insecurity: Not on file   Transportation Needs: Not on file   Physical Activity: Not on file   Stress: Not on file   Social Connections: Not on file   Intimate Partner Violence: Not on file   Housing Stability: Not on file         EXAMINATION     Physical Exam:   Vitals: /72 (BP Location: Right arm, Patient Position: Sitting, BP Cuff Size: Adult)   Pulse 80   Temp 36 °C (96.8 °F) (Temporal)   Ht 1.676 m (5' 6\")   Wt 74.5 kg (164 lb 3.9 oz)   SpO2 90%     Constitutional:   Body Habitus: Body mass index is 26.51 kg/m².  Cooperation: Fully cooperates with exam  Appearance: Well-groomed, well-nourished, not disheveled     Eyes: No scleral icterus to suggest severe liver disease, no proptosis to suggest severe hyperthyroid    ENT -no obvious auditory deficits, no obvious tongue lesions, tongue midline, no facial droop     Skin -no rashes or lesions noted     Respiratory-  breathing comfortable on room air, no audible wheezing    Cardiovascular- capillary refills less than 2 seconds.     Psychiatric- alert and oriented ×3. Normal affect.     Musculoskeletal and Neuro -       Cervical spine   Inspection: No deformities of the skin over the cervical spine. No rashes or lesions.    decreased  active range of motion in all directions, with  pain      Spurling’s sign: positive bilaterally    No signs of muscular atrophy in bilateral upper extremities     No tenderness to palpation of the cervical spine.  Positive for tenderness to palpation of trigger points mostly in the trapezius muscles which reproduce patient's pain.    Key points for the international standards for neurological " classification of spinal cord injury (ISNCSCI) to light touch.     Dermatome R L   C4 1 1   C5 1 1   C6 1 1   C7 1 1   C8 1 1   T1 1 1   T2 1 1                                     Motor Exam Upper Extremities   ? Myotome R L   Shoulder flexion C5 5 5   Elbow flexion C5 5 5   Wrist extension C6 5 5   Elbow extension C7 5 5   Finger flexion C8 5 5   Finger abduction T1 5 5     MEDICAL DECISION MAKING    Medical records review: see under HPI section.     DATA    Labs:   Lab Results   Component Value Date/Time    SODIUM 142 07/24/2021 11:38 AM    POTASSIUM 4.3 07/24/2021 11:38 AM    CHLORIDE 105 07/24/2021 11:38 AM    CO2 26 07/24/2021 11:38 AM    ANION 11.0 07/24/2021 11:38 AM    GLUCOSE 81 07/24/2021 11:38 AM    BUN 13 07/24/2021 11:38 AM    CREATININE 0.67 07/24/2021 11:38 AM    CALCIUM 8.9 07/24/2021 11:38 AM    ASTSGOT 17 07/24/2021 11:38 AM    ALTSGPT 19 07/24/2021 11:38 AM    TBILIRUBIN 0.4 07/24/2021 11:38 AM    ALBUMIN 4.3 07/24/2021 11:38 AM    TOTPROTEIN 6.7 07/24/2021 11:38 AM    GLOBULIN 2.4 07/24/2021 11:38 AM    AGRATIO 1.8 07/24/2021 11:38 AM   ]    No results found for: PROTHROMBTM, INR     Lab Results   Component Value Date/Time    WBC 7.0 12/28/2019 05:12 AM    RBC 4.07 (L) 12/28/2019 05:12 AM    HEMOGLOBIN 12.4 12/28/2019 05:12 AM    HEMATOCRIT 37.9 12/28/2019 05:12 AM    MCV 93.1 12/28/2019 05:12 AM    MCH 30.5 12/28/2019 05:12 AM    MCHC 32.7 (L) 12/28/2019 05:12 AM    MPV 9.6 12/28/2019 05:12 AM    NEUTSPOLYS 29.40 (L) 12/28/2019 05:12 AM    LYMPHOCYTES 56.70 (H) 12/28/2019 05:12 AM    MONOCYTES 9.20 12/28/2019 05:12 AM    EOSINOPHILS 3.40 12/28/2019 05:12 AM    BASOPHILS 1.00 12/28/2019 05:12 AM        Lab Results   Component Value Date/Time    HBA1C 5.6 07/27/2017 09:39 AM        Imaging:   I personally reviewed following images, these are my reads          IMAGING radiology reads. I reviewed the following radiology reads       Results for orders placed during the hospital encounter of  09/08/20    MR-BRAIN-W/O    Impression  1.  Mild cerebral atrophy in part age-related.             Results for orders placed during the hospital encounter of 07/14/21    MR-CERVICAL SPINE-W/O    Impression  1.  Again seen surgical change extending from C3 through C5.    2.  Multilevel degenerative disc disease, uncinate and facet degeneration. There is mild central canal narrowing at C5-6 and C6-7.    3.  Varying degrees of neural foraminal narrowing at levels as specifically described above.      Results for orders placed during the hospital encounter of 12/13/20    MR-LUMBAR SPINE-W/O    Impression  1.  Moderate discal degenerative changes throughout the lumbar spine with mild to moderate marginal osteophytosis. Further there is moderate endplate degenerative changes at the L5-S1 level.    2.  Mild multilevel lumbar spondylotic change.    3.  Mild to moderate central canal stenosis at the L4-5 level with mild central canal stenosis at the  L3-4 and L2-3 levels secondary to facet arthropathy.    4.  Mild to moderate multilevel disc protrusions at the T12-L1, L1-2, and L5-S1 levels.    5.  Moderate multilevel neural foraminal narrowing with disc bulging into the inferior aspects of multiple neural foramina.        Results for orders placed during the hospital encounter of 12/13/20    MR-LUMBAR SPINE-W/O    Impression  1.  Moderate discal degenerative changes throughout the lumbar spine with mild to moderate marginal osteophytosis. Further there is moderate endplate degenerative changes at the L5-S1 level.    2.  Mild multilevel lumbar spondylotic change.    3.  Mild to moderate central canal stenosis at the L4-5 level with mild central canal stenosis at the  L3-4 and L2-3 levels secondary to facet arthropathy.    4.  Mild to moderate multilevel disc protrusions at the T12-L1, L1-2, and L5-S1 levels.    5.  Moderate multilevel neural foraminal narrowing with disc bulging into the inferior aspects of multiple neural  foramina.                                       Results for orders placed in visit on 02/07/20    DX-CERVICAL SPINE-2 OR 3 VIEWS    Impression  1.  No acute fracture or dislocation is appreciated.    2.  Anterior cervical fusion is noted at the C3-C5 levels of the cervical spine.    3.  Degenerative disc disease and facet arthropathy.   Results for orders placed during the hospital encounter of 07/24/17    DX-CERVICAL SPINE-4+ VIEWS    Impression  Moderate degenerative changes. No instability.    Results for orders placed during the hospital encounter of 01/07/04    DX-CHEST-2 VIEWS    Impression  IMPRESSION:    NORMAL CHEST. THERE IS NO SIGNIFICANT CHANGE SINCE JANUARY 4, 2004.                  READING DR:        CHRISTIAN MARTINEZ MD  READING DATE:      Jan 7 2004 10:55AM  REPORT STATUS:     FINAL REPORT    TRANSCRIPTION CODE:         LJP  TRANSCRIPTION DATE:         Jan 7 2004  1:05PM    THIS DOCUMENT HAS BEEN ELECTRONICALLY SIGNED BY:  INEZ WEBER MD - Jan 7 2004  9:06PM        Results for orders placed in visit on 05/23/18    DX-FOOT-2- RIGHT    Impression  1.  There is mild degenerative change of the right 1st MTP joint with hallux valgus deformity.   Results for orders placed during the hospital encounter of 12/02/21    DX-FOOT-COMPLETE 3+ RIGHT    Impression  No radiographic evidence of acute osteomyelitis    Worsening hallux valgus deformity with mild first metatarsal-phalangeal arthritis               Results for orders placed in visit on 03/23/18    DX-LUMBAR SPINE-2 OR 3 VIEWS    Impression  Multilevel degenerative changes, most prominent at L5/S1.    Facet arthropathy.      Results for orders placed during the hospital encounter of 12/14/18    [id number] (WITH 1-VIEW CXR) LEFT    Impression  1.  LEFT eighth rib fracture  2.  Probable calcific tendinosis of the LEFT rotator cuff             Results for orders placed during the hospital encounter of 12/02/21    DX-TOE(S) 2+  LEFT    Impression  First distal phalanx terminal tuft acute osteomyelitis with overlying skin ulceration          Diagnosis   Visit Diagnoses     ICD-10-CM   1. Cervical radiculopathy  M54.12   2. Cervical spondylosis  M47.812   3. Chronic neck pain  M54.2    G89.29   4. History of cervical spinal surgery,  ACDF C3-5 on 12/20/19 with Dr. Linton  Z98.890   5. Bilateral foot-drop  M21.371    M21.372   6. Neurogenic bladder  N31.9   7. Neuropathic pain  M79.2   8. Tetraplegia ([facility])  G82.50           ASSESSMENT AND PLAN:  Marilyn Salinas 56 y.o. female      Marilyn was seen today for new patient.    Diagnoses and all orders for this visit:    Cervical radiculopathy  -     Referral to Physical Medicine Rehab    Cervical spondylosis    Chronic neck pain    History of cervical spinal surgery,  ACDF C3-5 on 12/20/19 with Dr. Linton    Bilateral foot-drop, chronic, followed by Dr. Guillory    Neurogenic bladder followed by Dr. Guillory    Neuropathic pain followed by Dr. Guillory    Tetraplegia ([facility]), followed by Dr. Guillory      Continue home exercise program from physical therapy.    Diagnostic workup: Procedure below for diagnostic and therapeutic purposes    Medications:   I agree with baclofen and Zanaflex.  Stop Mobic 5 days prior to the procedure below.    Interventional program:    She has likely both a facet and radiculopathy component of her pain.  We will start with an epidural steroid injection and see if there is still residual axial pain.  She had severe pain with epidural steroid injections in the past.  She also had procedural anxiety.  We will plan for this procedure with sedation to hopefully alleviate the intraprocedural pain.    I have ordered a C7-T1 interlaminar epidural steroid injection    The risks benefits and alternatives to this procedure were discussed and the patient wishes to proceed with the procedure. Risks include but are not limited to damage to surrounding structures, infection, bleeding, worsening of  pain which can be permanent, weakness which can be permanent. Benefits include pain relief, improved function. Alternatives includes not doing the procedure.            Follow-up: After the above diagnostic studies      Total time spent on the day of the encounter: 42 minutes.  This includes counseling, care coordination, medical records review.          Please note that this dictation was created using voice recognition software. I have made every reasonable attempt to correct obvious errors but there may be errors of grammar and content that I may have overlooked prior to finalization of this note.      Karl Madden MD  Physical Medicine and Rehabilitation  Interventional Spine and Sports Physiatry  [facility]           CC Julianna Guillory D.O.   CC Edwar Platt M.D.

## 2022-02-28 ENCOUNTER — TELEPHONE (OUTPATIENT)
Dept: PHYSICAL MEDICINE AND REHAB | Facility: REHABILITATION | Age: 57
End: 2022-02-28
Payer: MEDICAID

## 2022-02-28 ENCOUNTER — APPOINTMENT (OUTPATIENT)
Dept: PHYSICAL THERAPY | Facility: REHABILITATION | Age: 57
End: 2022-02-28
Attending: PHYSICAL MEDICINE & REHABILITATION
Payer: MEDICAID

## 2022-02-28 DIAGNOSIS — G82.50 TETRAPLEGIA (HCC): ICD-10-CM

## 2022-02-28 DIAGNOSIS — Z98.890 HISTORY OF CERVICAL SPINAL SURGERY: ICD-10-CM

## 2022-02-28 NOTE — TELEPHONE ENCOUNTER
Patient called for Dr. Julianna Guillory and needs a new referral for PT/ scooter evaluation. Her appointment scheduled for today was cancelled and her prior referral .

## 2022-03-01 ENCOUNTER — OFFICE VISIT (OUTPATIENT)
Dept: MEDICAL GROUP | Facility: MEDICAL CENTER | Age: 57
End: 2022-03-01
Attending: FAMILY MEDICINE
Payer: MEDICAID

## 2022-03-01 VITALS
WEIGHT: 163 LBS | OXYGEN SATURATION: 94 % | RESPIRATION RATE: 16 BRPM | TEMPERATURE: 96.8 F | HEIGHT: 66 IN | SYSTOLIC BLOOD PRESSURE: 116 MMHG | DIASTOLIC BLOOD PRESSURE: 72 MMHG | HEART RATE: 96 BPM | BODY MASS INDEX: 26.2 KG/M2

## 2022-03-01 DIAGNOSIS — G80.8 OTHER CEREBRAL PALSY (HCC): ICD-10-CM

## 2022-03-01 DIAGNOSIS — Z98.1 STATUS POST CERVICAL SPINAL FUSION: ICD-10-CM

## 2022-03-01 DIAGNOSIS — K80.20 CALCULUS OF GALLBLADDER WITHOUT CHOLECYSTITIS WITHOUT OBSTRUCTION: ICD-10-CM

## 2022-03-01 DIAGNOSIS — G82.52 QUADRIPLEGIA, C1-C4 INCOMPLETE (HCC): ICD-10-CM

## 2022-03-01 DIAGNOSIS — J45.20 MILD INTERMITTENT ASTHMA WITHOUT COMPLICATION: ICD-10-CM

## 2022-03-01 PROCEDURE — 99214 OFFICE O/P EST MOD 30 MIN: CPT | Performed by: FAMILY MEDICINE

## 2022-03-01 PROCEDURE — 99213 OFFICE O/P EST LOW 20 MIN: CPT | Performed by: FAMILY MEDICINE

## 2022-03-01 RX ORDER — TIOTROPIUM BROMIDE AND OLODATEROL 3.124; 2.736 UG/1; UG/1
2 SPRAY, METERED RESPIRATORY (INHALATION) DAILY
Qty: 1 EACH | Refills: 11 | Status: SHIPPED | OUTPATIENT
Start: 2022-03-01 | End: 2023-03-17 | Stop reason: SDUPTHER

## 2022-03-01 RX ORDER — ALBUTEROL SULFATE 90 UG/1
2 AEROSOL, METERED RESPIRATORY (INHALATION) EVERY 6 HOURS PRN
Qty: 8.5 G | Refills: 6 | Status: SHIPPED | OUTPATIENT
Start: 2022-03-01 | End: 2022-04-05 | Stop reason: SDUPTHER

## 2022-03-01 NOTE — PROGRESS NOTES
"Subjective     Marilyn Salinas is a 56 y.o. female who presents with Follow-Up            HPI 1.  Asthma-patient notes daily wheezing.  She does have albuterol inhaler.  She has not used any other inhalers.  She did discontinue her last 1 cigarette/day on 1/17/2022.  (She has been eating increased amounts of candy bar since she quit her smoking habit).  Not reporting any hemoptysis or recent fever  2.  Cholelithiasis-patient had ultrasound of her abdomen in July 2021.  The conclusion said there was a 2.5 cm stone however the body of the document only commented on a 0.5 cm stone we will clarify that.  There has been a surgical consultation with Dr. Barrett regarding having a cholecystectomy.  Patient is not currently reporting any right upper quadrant pain.  3.  Cerebral palsy-patient has tetraplegia, and is status post cervical neck fusion.  She has been receiving physical therapy in the middle of last year to her neck which would decrease neck pain and seem to improve her overall gait involving even both lower extremities.  She is using a 4 wheeled walker with a seat.  She requests help with increasing her endurance  ROS negative for chest pain, palpitations, diarrhea           Objective     /72   Pulse 96   Temp 36 °C (96.8 °F) (Temporal)   Resp 16   Ht 1.676 m (5' 5.98\")   Wt 73.9 kg (163 lb)   LMP 01/11/2017   SpO2 94%   BMI 26.32 kg/m²      Physical Exam  Gen.- alert, cooperative, in no acute distress  Neck- midline trachea, thyroid not enlarged or tender,supple, no cervical adenopathy  Chest-1+ wheezes right posterior lung area.. No retractions. Chest wall nontender  Cardiac- regular rhythm and rate. No murmur, thrill, or heave                        Assessment & Plan        1. Mild intermittent asthma without complication    - albuterol 108 (90 Base) MCG/ACT Aero Soln inhalation aerosol; Inhale 2 Puffs every 6 hours as needed for Shortness of Breath.  Dispense: 8.5 g; Refill: 6  - Tiotropium " Bromide-Olodaterol (STIOLTO RESPIMAT) 2.5-2.5 MCG/ACT Aero Soln; Inhale 2 Puffs every day.  Dispense: 1 Each; Refill: 11    2. Calculus of gallbladder without cholecystitis without obstruction    - Referral to Physical Therapy    3. Other cerebral palsy (HCC)    - Referral to Physical Therapy    4. Status post cervical spinal fusion    - Referral to Physical Therapy    5. Quadriplegia, C1-C4 incomplete (HCC)    - Referral to Physical Therapy    Plan: 1.  Trial of Stiolto 2 puffs daily  2.  Will review ultrasound report with [facility] radiology  3.  Renew albuterol  4.  Patient strongly cautioned to limit sweets to 1 small serving per day  5.  Revisit with me in 1 month  6.  Physical therapy referral for neck pain, extremity weakness, decreased endurance

## 2022-03-02 ENCOUNTER — TELEPHONE (OUTPATIENT)
Dept: PHYSICAL MEDICINE AND REHAB | Facility: MEDICAL CENTER | Age: 57
End: 2022-03-02
Payer: MEDICAID

## 2022-03-07 ENCOUNTER — GYNECOLOGY VISIT (OUTPATIENT)
Dept: OBGYN | Facility: CLINIC | Age: 57
End: 2022-03-07
Payer: MEDICAID

## 2022-03-07 VITALS
DIASTOLIC BLOOD PRESSURE: 90 MMHG | SYSTOLIC BLOOD PRESSURE: 154 MMHG | HEART RATE: 99 BPM | BODY MASS INDEX: 25.84 KG/M2 | WEIGHT: 160 LBS

## 2022-03-07 DIAGNOSIS — G80.8 OTHER CEREBRAL PALSY (HCC): ICD-10-CM

## 2022-03-07 DIAGNOSIS — N31.9 NEUROGENIC BLADDER: Primary | ICD-10-CM

## 2022-03-07 DIAGNOSIS — R15.9 FULL INCONTINENCE OF FECES: ICD-10-CM

## 2022-03-07 DIAGNOSIS — R35.1 NOCTURIA: ICD-10-CM

## 2022-03-07 DIAGNOSIS — N39.46 MIXED INCONTINENCE: ICD-10-CM

## 2022-03-07 DIAGNOSIS — S14.109D INJURY OF CERVICAL SPINAL CORD, SUBSEQUENT ENCOUNTER (HCC): ICD-10-CM

## 2022-03-07 LAB
APPEARANCE UR: CLEAR
BILIRUB UR STRIP-MCNC: NORMAL MG/DL
COLOR UR AUTO: YELLOW
GLUCOSE UR STRIP.AUTO-MCNC: NEGATIVE MG/DL
KETONES UR STRIP.AUTO-MCNC: NEGATIVE MG/DL
LEUKOCYTE ESTERASE UR QL STRIP.AUTO: NEGATIVE
NITRITE UR QL STRIP.AUTO: NEGATIVE
PH UR STRIP.AUTO: 7 [PH] (ref 5–8)
PROT UR QL STRIP: NEGATIVE MG/DL
RBC UR QL AUTO: NEGATIVE
SP GR UR STRIP.AUTO: 1.02
UROBILINOGEN UR STRIP-MCNC: NORMAL MG/DL

## 2022-03-07 PROCEDURE — 81002 URINALYSIS NONAUTO W/O SCOPE: CPT | Performed by: STUDENT IN AN ORGANIZED HEALTH CARE EDUCATION/TRAINING PROGRAM

## 2022-03-07 PROCEDURE — 99204 OFFICE O/P NEW MOD 45 MIN: CPT | Mod: 25 | Performed by: STUDENT IN AN ORGANIZED HEALTH CARE EDUCATION/TRAINING PROGRAM

## 2022-03-07 NOTE — PROGRESS NOTES
Urogynecology and Pelvic Reconstructive Surgery Consultation Visit    Marilyn Salinas MRN:[id number] :1965    Referred by: Julianna Guillory DO    Reason for Visit:   Chief Complaint   Patient presents with   • New Patient     Consult          Subjective     History of Presenting Illness:    Ms.Dina Cleve Salinas is a 56 y.o. year old P2 with h/o nontraumatic incomplete spinal cord injury, paraplegia, partial CP, who was referred by her Physiatrist Dr. Guillory for the evaluation and management of neurogenic bladder with incontinence    She has constant incontinence, mostly with urgency but sometimes without warning. She also has leakage with cough/laugh/exercise, but less than with urgency. DANY somewhat improved after 2017 sling (see below). Symptoms most bothersome at night when she has to use at least 2 diapers. Due to mobility issues (uses a walker) she has trouble getting to the bathroom.     She has occasional bowel incontinence, both small and larger episodes. These are note predictable or regular in frequency. She is scheduled for a cholecystectomy with Dr. Barrett later this month, but is worried about possible diarrhea.     Prior Pelvic surgery:   10/3/2017: Laparoscopic-assisted vaginal hysterectomy, bilateral    salpingo-oophorectomy, anterior and posterior vaginal repair, enterocele    repair, perineoplasty, sacrospinous vault suspension, transobturator tape mid urethral sling procedure, cystoscopy (Dr. Driscoll) Op report reviewed     Prior treatment:   Mirabegron - current  Fesoterodine - current     Fluid intake:   Mostly water  Coffee to help with constipation    Pelvic floor symptom review:     Bladder:   Voids per day: 5-7 Voids per night: 1-2      Urinary incontinence episodes per day: 2-3 per day, 1-2 at nithg   Urge leakage:  On Movement to Bathroom and Full Bladder urge worse than stress   Stress leakage: With Cough, With Laugh and With Exercise   Continuous / insensible urine loss:  sometimes   Nocturnal enuresis: No    Leakage volume: Moderate   Number of pads/day: 1 diaper at night    Bladder emptying: Complete   Voiding symptoms: None   UTI in last 12 months: No        Prolapse:     Prolapse symptoms: None        Bowel:    Constipation: Yes   Bowel movements per day: 1    Straining to empty bowels: Yes   Splinting to evacuate: No    Painful evacuation: No    Difficulty emptying rectum: No    Incontinence to stool: occasionally   Incontinence to gas: No     Blood in stool: No    Hemorrhoids: No        Sexual function:    Sexually active: No            Past medical and surgical history    Past medical history:  Past Medical History:   Diagnosis Date   • Anesthesia 12/23/2021    agitated coming out of anesthesia   • Anxiety    • Arthritis     spine, feet    • Asthma    • Breath shortness    • Cataract 12/23/2022    no surgery yet   • Dental disorder     upper and lower denture   • Depression    • Heart burn 12/23/2022    GERD   • High cholesterol    • Urinary bladder disorder    • Urinary incontinence      Past surgical history:  Past Surgical History:   Procedure Laterality Date   • PB AMPUTATION TOE,MT-P JT Left 12/27/2021    Procedure: GREAT TOE PARTIAL AMPUTATION;  Surgeon: Emiliano Goff M.D.;  Location: [facility];  Service: Orthopedics   • CERVICAL DISK AND FUSION ANTERIOR N/A 12/20/2019    Procedure: DISCECTOMY, SPINE, CERVICAL, ANTERIOR APPROACH, WITH FUSION - C3-5;  Surgeon: Connor Linton M.D.;  Location: [facility];  Service: Neurosurgery   • CORPECTOMY N/A 12/20/2019    Procedure: CORPECTOMY;  Surgeon: Connor Linton M.D.;  Location: [facility];  Service: Neurosurgery   • VAGINAL HYSTERECTOMY SCOPE TOTAL N/A 10/3/2017    Procedure: VAGINAL HYSTERECTOMY SCOPE TOTAL;  Surgeon: Hudson Driscoll M.D.;  Location: SURGERY SAME DAY [facility];  Service: Gynecology   • SALPINGECTOMY Bilateral 10/3/2017    Procedure: SALPINGECTOMY;  Surgeon: Hudson  IZZY Driscoll M.D.;  Location: SURGERY SAME DAY [facility];  Service: Gynecology   • OOPHORECTOMY Bilateral 10/3/2017    Procedure: OOPHORECTOMY;  Surgeon: Hudson Driscoll M.D.;  Location: SURGERY SAME DAY [facility];  Service: Gynecology   • ANTERIOR AND POSTERIOR REPAIR Bilateral 10/3/2017    Procedure: ANTERIOR AND POSTERIOR REPAIR;  Surgeon: Hudson Driscoll M.D.;  Location: SURGERY SAME DAY [facility];  Service: Gynecology   • ENTEROCELE REPAIR N/A 10/3/2017    Procedure: ENTEROCELE REPAIR, PERINEOPLASTY;  Surgeon: Hudson Driscoll M.D.;  Location: SURGERY SAME DAY [facility];  Service: Gynecology   • BLADDER SLING FEMALE N/A 10/3/2017    Procedure: BLADDER SLING FEMALE TOT, CYSTOSCOPY;  Surgeon: Hudson Driscoll M.D.;  Location: SURGERY SAME DAY [facility];  Service: Gynecology   • VAGINAL SUSPENSION N/A 10/3/2017    Procedure: VAGINAL SUSPENSION SACROSPINOUS VAULT POSSIBLE;  Surgeon: Hudson Driscoll M.D.;  Location: SURGERY SAME DAY [facility];  Service: Gynecology   • OTHER Left 2005    hammertoe x2   • EYE SURGERY  1972    for lazy eye   • LUMPECTOMY       Medications:has a current medication list which includes the following prescription(s): albuterol, stiolto respimat, venlafaxine xr, toviaz, gabapentin, bupropion, pantoprazole, fluticasone, tizanidine, myrbetriq, gabapentin, baclofen, meloxicam, toviaz, fluticasone, allergy relief, and toviaz.  Allergies:Codeine  Family history:  Family History   Problem Relation Age of Onset   • Cancer Mother    • Alcohol/Drug Mother    • Cancer Brother    • Diabetes Maternal Aunt      Social history: reports that she quit smoking about 2 months ago. Her smoking use included cigarettes. She has a 6.50 pack-year smoking history. She has never used smokeless tobacco. She reports current drug use. Drugs: Marijuana and Inhaled. She reports that she does not drink alcohol.    Review of systems: A full review of systems was performed, and negative with the exception  of want is noted above in the HPI.        Objective        /90 (BP Location: Left arm, Patient Position: Sitting, BP Cuff Size: Adult)   Pulse 99   Wt 72.6 kg (160 lb)   LMP 01/11/2017   BMI 25.84 kg/m²     Physical Exam  Vitals reviewed. Exam conducted with a chaperone present (MA - see notes.).   Constitutional:       Appearance: Normal appearance.   HENT:      Head: Normocephalic.      Mouth/Throat:      Mouth: Mucous membranes are moist.   Cardiovascular:      Rate and Rhythm: Normal rate.   Pulmonary:      Effort: Pulmonary effort is normal.   Abdominal:      Palpations: Abdomen is soft. There is no mass.      Tenderness: There is no abdominal tenderness.   Skin:     General: Skin is warm and dry.   Neurological:      Mental Status: She is alert.      Motor: Weakness present.      Gait: Gait abnormal.      Comments: Uses walker   Psychiatric:         Mood and Affect: Mood normal.         Genitourinary: Deferred     Procedure Performed: none    Diagnostic test and records review:    Urine dipstick: negative    Labs: n/a    Radiology:     Renal US 2020:  The right kidney measures 9.51 cm.  The left kidney measures 8.27 cm.  There is no hydronephrosis.  There are no abnormal calcifications.  The bladder demonstrates no focal wall abnormality.    Documentation reviewed: Prior EMR Records           Assessment & Plan     Ms.Dina Cleve Salinas is a 56 y.o. year old P2 with neurogenic bladder defined as mixed, urge predominant urinary incontinence with nocturia. We discussed my recommendations for further diagnosis and treatment at length today.     1. Neurogenic bladder  2. Mixed incontinence  3. Injury of cervical spinal cord, subsequent encounter (HCC)  4. Other cerebral palsy ([facility])  5. Nocturia  She has significant urge predominant mixed urinary incontinence only minimally responsive to behavioral and combined anticholinergic/beta-agonist therapy. . She was educated on the pathophysiology of bladder  urgency, and that her symptoms are likely due to overactivity of the bladder muscle and nerves. The pathogenesis of DANY is related to weakness in the pelvic structures includes genetic tendency, aging, menopause and childbirth injuries. In her particular case, her multiple neurological issues are likely central to her symptomatology, and warrant further workup to rule out any harmful pathology such as retention, compliance issues, DSD.  I discussed options for management which include both nonsurgical and surgical options. I suspect that her urgency is the driving factor for her incontinence and she may benefit from bladder chemodenervation with botox, however, I need to first understand her bladder physiology before proceeding with treatment.   - Scheduled for multi-channel urodynamic testing to explore pathophysiology and guide treatment planning.   - Counseled on pelvic floor PT, but given all her other physical therapy and appointments, she would rather defer.   - She may be a candidate for vaginal estrogen replacement in the future to augment urge symptoms.     6. Fecal incontinence  This is bothersome but infrequent. She is worried about how upcoming cholecystectomy effect on diarrhea. I counseled that if this does happen there are medical therapies that can help. Will defer further FI treatment until after she recovers from surgery.                Giselle Caceres MD, FACOG    Female Pelvic Medicine and Reconstructive Surgery  Department of Obstetrics and Gynecology  [facility]    CC: Dr. Guillory    This medical record contains text that has been entered with the assistance of computer voice recognition and dictation software.  Therefore, it may contain unintended errors in text, spelling, punctuation, or grammar    Medical decision making (MDM)  45 minutes total time spent on the date of the encounter:    5 min reviewing records  15 min with the  history and physical exam   15 min  spent in direct patient education and counseling   5 min spent in the coordination of care  5 min spent in electronic medical record documentation in the patient's chart.

## 2022-03-07 NOTE — NON-PROVIDER
PT here today for consult   PT States urinary incontinence; Neurogenic bladder   Hysterectomy? Yes 2017  Good #: [phone] (home)   PVR : N/A  Pharmacy Verified

## 2022-03-08 DIAGNOSIS — G82.50 TETRAPLEGIA (HCC): ICD-10-CM

## 2022-03-08 DIAGNOSIS — M21.372 BILATERAL FOOT-DROP: ICD-10-CM

## 2022-03-08 DIAGNOSIS — M21.371 BILATERAL FOOT-DROP: ICD-10-CM

## 2022-03-08 DIAGNOSIS — Z98.890 HISTORY OF CERVICAL SPINAL SURGERY: ICD-10-CM

## 2022-03-10 ENCOUNTER — APPOINTMENT (OUTPATIENT)
Dept: PHYSICAL THERAPY | Facility: REHABILITATION | Age: 57
End: 2022-03-10
Attending: FAMILY MEDICINE
Payer: MEDICAID

## 2022-03-11 ENCOUNTER — APPOINTMENT (OUTPATIENT)
Dept: ADMISSIONS | Facility: MEDICAL CENTER | Age: 57
End: 2022-03-11
Payer: MEDICAID

## 2022-03-14 ENCOUNTER — TELEPHONE (OUTPATIENT)
Dept: PHYSICAL MEDICINE AND REHAB | Facility: REHABILITATION | Age: 57
End: 2022-03-14
Payer: MEDICAID

## 2022-03-14 NOTE — TELEPHONE ENCOUNTER
Patient called for Dr. Guillory and states she has still not received the shower chair from [employer]. She is wondering if the doctor can put in another order for a shower chair to be sent again to [facility]. Please advise.

## 2022-03-17 ENCOUNTER — PRE-ADMISSION TESTING (OUTPATIENT)
Dept: ADMISSIONS | Facility: MEDICAL CENTER | Age: 57
End: 2022-03-17
Attending: COLON & RECTAL SURGERY
Payer: MEDICAID

## 2022-03-17 DIAGNOSIS — Z01.810 PRE-OPERATIVE CARDIOVASCULAR EXAMINATION: ICD-10-CM

## 2022-03-17 DIAGNOSIS — Z01.812 PRE-OPERATIVE LABORATORY EXAMINATION: ICD-10-CM

## 2022-03-17 LAB
ANION GAP SERPL CALC-SCNC: 13 MMOL/L (ref 7–16)
BUN SERPL-MCNC: 22 MG/DL (ref 8–22)
CALCIUM SERPL-MCNC: 9.4 MG/DL (ref 8.5–10.5)
CHLORIDE SERPL-SCNC: 103 MMOL/L (ref 96–112)
CO2 SERPL-SCNC: 22 MMOL/L (ref 20–33)
CREAT SERPL-MCNC: 0.76 MG/DL (ref 0.5–1.4)
EKG IMPRESSION: NORMAL
ERYTHROCYTE [DISTWIDTH] IN BLOOD BY AUTOMATED COUNT: 47.8 FL (ref 35.9–50)
GFR SERPLBLD CREATININE-BSD FMLA CKD-EPI: 92 ML/MIN/1.73 M 2
GLUCOSE SERPL-MCNC: 95 MG/DL (ref 65–99)
HCT VFR BLD AUTO: 42.3 % (ref 37–47)
HGB BLD-MCNC: 14.5 G/DL (ref 12–16)
MCH RBC QN AUTO: 30.8 PG (ref 27–33)
MCHC RBC AUTO-ENTMCNC: 34.3 G/DL (ref 33.6–35)
MCV RBC AUTO: 89.8 FL (ref 81.4–97.8)
PLATELET # BLD AUTO: 248 K/UL (ref 164–446)
PMV BLD AUTO: 10.9 FL (ref 9–12.9)
POTASSIUM SERPL-SCNC: 4.6 MMOL/L (ref 3.6–5.5)
RBC # BLD AUTO: 4.71 M/UL (ref 4.2–5.4)
SARS-COV-2 RNA RESP QL NAA+PROBE: NOTDETECTED
SODIUM SERPL-SCNC: 138 MMOL/L (ref 135–145)
SPECIMEN SOURCE: NORMAL
WBC # BLD AUTO: 6.7 K/UL (ref 4.8–10.8)

## 2022-03-17 PROCEDURE — 80048 BASIC METABOLIC PNL TOTAL CA: CPT

## 2022-03-17 PROCEDURE — 93010 ELECTROCARDIOGRAM REPORT: CPT | Performed by: INTERNAL MEDICINE

## 2022-03-17 PROCEDURE — U0005 INFEC AGEN DETEC AMPLI PROBE: HCPCS

## 2022-03-17 PROCEDURE — 93005 ELECTROCARDIOGRAM TRACING: CPT

## 2022-03-17 PROCEDURE — 36415 COLL VENOUS BLD VENIPUNCTURE: CPT

## 2022-03-17 PROCEDURE — 85027 COMPLETE CBC AUTOMATED: CPT

## 2022-03-17 PROCEDURE — U0003 INFECTIOUS AGENT DETECTION BY NUCLEIC ACID (DNA OR RNA); SEVERE ACUTE RESPIRATORY SYNDROME CORONAVIRUS 2 (SARS-COV-2) (CORONAVIRUS DISEASE [COVID-19]), AMPLIFIED PROBE TECHNIQUE, MAKING USE OF HIGH THROUGHPUT TECHNOLOGIES AS DESCRIBED BY CMS-2020-01-R: HCPCS

## 2022-03-17 PROCEDURE — C9803 HOPD COVID-19 SPEC COLLECT: HCPCS

## 2022-03-17 RX ORDER — DIPHENHYDRAMINE HCL 25 MG
25 TABLET ORAL DAILY
COMMUNITY
End: 2022-06-13

## 2022-03-21 ENCOUNTER — PHYSICAL THERAPY (OUTPATIENT)
Dept: PHYSICAL THERAPY | Facility: REHABILITATION | Age: 57
End: 2022-03-21
Attending: FAMILY MEDICINE
Payer: MEDICAID

## 2022-03-21 ENCOUNTER — OFFICE VISIT (OUTPATIENT)
Dept: PHYSICAL MEDICINE AND REHAB | Facility: REHABILITATION | Age: 57
End: 2022-03-21
Payer: MEDICAID

## 2022-03-21 VITALS
HEART RATE: 91 BPM | HEIGHT: 66 IN | DIASTOLIC BLOOD PRESSURE: 68 MMHG | TEMPERATURE: 96.3 F | SYSTOLIC BLOOD PRESSURE: 100 MMHG | WEIGHT: 160 LBS | BODY MASS INDEX: 25.71 KG/M2 | OXYGEN SATURATION: 94 % | RESPIRATION RATE: 17 BRPM

## 2022-03-21 DIAGNOSIS — M21.371 BILATERAL FOOT-DROP: ICD-10-CM

## 2022-03-21 DIAGNOSIS — N31.9 NEUROGENIC BLADDER: ICD-10-CM

## 2022-03-21 DIAGNOSIS — Z98.890 HISTORY OF CERVICAL SPINAL SURGERY: ICD-10-CM

## 2022-03-21 DIAGNOSIS — Z74.09 IMPAIRED MOBILITY AND ENDURANCE: ICD-10-CM

## 2022-03-21 DIAGNOSIS — G82.50 QUADRIPLEGIA, UNSPECIFIED (HCC): ICD-10-CM

## 2022-03-21 DIAGNOSIS — G82.50 TETRAPLEGIA (HCC): Primary | ICD-10-CM

## 2022-03-21 DIAGNOSIS — Z99.89 WALKER AS AMBULATION AID: ICD-10-CM

## 2022-03-21 DIAGNOSIS — R45.89 DEPRESSED MOOD: ICD-10-CM

## 2022-03-21 DIAGNOSIS — M21.372 BILATERAL FOOT-DROP: ICD-10-CM

## 2022-03-21 PROCEDURE — 97163 PT EVAL HIGH COMPLEX 45 MIN: CPT

## 2022-03-21 PROCEDURE — 99214 OFFICE O/P EST MOD 30 MIN: CPT | Performed by: PHYSICAL MEDICINE & REHABILITATION

## 2022-03-21 RX ORDER — VENLAFAXINE HYDROCHLORIDE 150 MG/1
150 CAPSULE, EXTENDED RELEASE ORAL DAILY
Qty: 90 CAPSULE | Refills: 0 | Status: SHIPPED | OUTPATIENT
Start: 2022-03-21 | End: 2022-09-07 | Stop reason: SDUPTHER

## 2022-03-21 SDOH — ECONOMIC STABILITY: GENERAL: QUALITY OF LIFE: GOOD

## 2022-03-21 ASSESSMENT — ACTIVITIES OF DAILY LIVING (ADL): POOR_BALANCE: 1

## 2022-03-21 ASSESSMENT — ENCOUNTER SYMPTOMS: PAIN SCALE: 6

## 2022-03-21 ASSESSMENT — FIBROSIS 4 INDEX: FIB4 SCORE: 0.86

## 2022-03-21 NOTE — OP THERAPY EVALUATION
"  Outpatient Physical Therapy  INITIAL NEUROLOGICAL EVALUATION    [facility]  [address]  [address]  [facility] [address]  Phone:  [phone]  Fax:  [phone]    Date of Evaluation: 03/21/2022    Patient: Marilyn Salinas  YOB: 1965  MRN: [id number]     Referring Provider: Edwar Platt M.D.  [address]  [place] [address]   Referring Diagnosis Calculus of gallbladder without cholecystitis without obstruction [K80.20];Other cerebral palsy (HCC) [G80.8];Status post cervical spinal fusion [Z98.1];Quadriplegia, C1-C4 incomplete (HCC) [G82.52]     Time Calculation                       Chief Complaint: Difficulty Walking and Loss Of Balance    Visit Diagnoses     ICD-10-CM   1. History of cervical spinal surgery  Z98.890   2. Quadriplegia, unspecified (HCC)  G82.50   3. Bilateral foot-drop  M21.371    M21.372       Subjective:   History of Present Illness:     Mechanism of injury:  Pt states that she wants to foucs on her neck. She has stiffness and soreness for her neck. She had massage therapy in [place]. She falls all the time. She falls 2-3 times month. She tips side ways to the L. She falls when she is really tired. She has used a walker since Jan 2019. She can transfer independently.     Aggs:   Fatigued and legs give out after 2 min of walking. She almost falls after that time.   Legs drag w/ fatigue  Putting shoes and socks on, limited in flexibility       1/27/2022  \"Rehab/Neuro:   1. C2 AISD: Nontraumatic incomplete spinal cord injury, status post fall at home with worsening weakness, found to have severe cervical stenosis.  Status post C3-C5 ACDF with Dr. Linton on 12/20/19. No longer getting epidural steroid injections, too painful.  2. Nociceptive/musculoskeletal chronic back pain  -Therapy: Continue physical therapy  -Function: 8/2021 Largely stable but patient does feel as though she is weaker lately.  Has had one fall.  Maintains strength " "through doing house chores. 10/2021 patient continues to have significant spastic paraplegic gait.  Would benefit from Botox to bilateral gastroc complex, however, insurance would not cover.  This would be cost prohibitive for her.  Significant onychomycosis on bilateral toenails as well as concern for DTI and erythema of first great toe. 2022 interim great left toe amputation.  Physically stable.  -Awaiting to hear regarding shower chair.  Patient has not had her phone for the past month so will listen to the messages regarding whether or not she has been contacted about this.  -Referral: Interventional pain and spine for chronic back pain.  -Patient has prescription for shoe inserts which she will be reaching out to orthotist to receive.  -Referral: Align physical therapy with specific therapist per patient request at recommendation of a friend.     2. Muscular dystrophy: ? CP per recent neurology evaluation.  Reportedly diagnosed in '60's per aunt. Normal CPK. Was seeing Nikunj tanner in 2018 - was supposed to have EMG/NCS. Medicaid wouldn't cover EMG/NCS at that time.  Scheduled for EMG/NCS 10/23/2020.  EEG reportedly negative.  Patient has since changed her neurologist and is now seeing Dr. Hartman. Had EMG/NCS results -results not available but patient reports she was told she has paraplegia and partial CP.   -Follows with neurology.  -Stable\"  Quality of life:  Good  Pain:     Current pain ratin (neck pn, radiates to shoulder)  Social Support:     Lives in:  One-story house    Lives with: roommates, help her when she needs it.  Patient Goals:     Other patient goals:  Walk farther, help with pain in legs, not fall      Past Medical History:   Diagnosis Date   • Anesthesia 2021    agitated coming out of anesthesia   • Anesthesia 2022    Patient states becomes upset and sad after anesthesia.   • Anxiety    • Arthritis     spine, feet    • Asthma    • Breath shortness    • Cataract 2022 "    no surgery yet   • Cerebral palsy (HCC)    • Cervical spinal cord injury (HCC) 12/19/2019   • COVID-19 01/17/2022 1/17/2022 stopped 1-   • DDD (degenerative disc disease), lumbar     Per Problem List   • Dental disorder     upper and lower denture   • Depression    • Full dentures 03/17/2022   • Heart burn 12/23/2022    GERD   • High cholesterol    • History of falling    • Marijuana user    • Risk for falls    • Stroke (HCC) 03/17/2022    Pt. states, MINI STROKE AGE 19.   • Tetraplegia (HCC)    • TIA (transient ischemic attack) 03/17/2022    AGE 19. Patient states D/T drug use.    • Urinary bladder disorder    • Urinary incontinence    • Vertigo     Per Problem List     Past Surgical History:   Procedure Laterality Date   • DEVYN BY LAPAROSCOPY  3/23/2022    Procedure: CHOLECYSTECTOMY, LAPAROSCOPIC;  Surgeon: Mayur Barrett M.D.;  Location: [facility];  Service: General   • PB AMPUTATION TOE,MT-P JT Left 12/27/2021    Procedure: GREAT TOE PARTIAL AMPUTATION;  Surgeon: Emiliano Goff M.D.;  Location: [facility];  Service: Orthopedics   • CERVICAL DISK AND FUSION ANTERIOR N/A 12/20/2019    Procedure: DISCECTOMY, SPINE, CERVICAL, ANTERIOR APPROACH, WITH FUSION - C3-5;  Surgeon: Connor Linton M.D.;  Location: [facility];  Service: Neurosurgery   • CORPECTOMY N/A 12/20/2019    Procedure: CORPECTOMY;  Surgeon: Connor Linton M.D.;  Location: [facility];  Service: Neurosurgery   • BLADDER SLING FEMALE N/A 10/3/2017    Procedure: BLADDER SLING FEMALE TOT, CYSTOSCOPY;  Surgeon: Hudson Driscoll M.D.;  Location: SURGERY SAME DAY [facility];  Service: Gynecology   • VAGINAL HYSTERECTOMY SCOPE TOTAL N/A 10/3/2017    Procedure: VAGINAL HYSTERECTOMY SCOPE TOTAL;  Surgeon: Hudson Driscoll M.D.;  Location: SURGERY SAME DAY [facility];  Service: Gynecology   • SALPINGECTOMY Bilateral 10/3/2017    Procedure: SALPINGECTOMY;  Surgeon: Hudson Driscoll M.D.;  Location:  SURGERY SAME DAY [facility];  Service: Gynecology   • OOPHORECTOMY Bilateral 10/3/2017    Procedure: OOPHORECTOMY;  Surgeon: Hudson Driscoll M.D.;  Location: SURGERY SAME DAY [facility];  Service: Gynecology   • ANTERIOR AND POSTERIOR REPAIR Bilateral 10/3/2017    Procedure: ANTERIOR AND POSTERIOR REPAIR;  Surgeon: Hudson Driscoll M.D.;  Location: SURGERY SAME DAY [facility];  Service: Gynecology   • ENTEROCELE REPAIR N/A 10/3/2017    Procedure: ENTEROCELE REPAIR, PERINEOPLASTY;  Surgeon: Hudson Driscoll M.D.;  Location: SURGERY SAME DAY [facility];  Service: Gynecology   • VAGINAL SUSPENSION N/A 10/3/2017    Procedure: VAGINAL SUSPENSION SACROSPINOUS VAULT POSSIBLE;  Surgeon: Hudson Driscoll M.D.;  Location: SURGERY SAME DAY [facility];  Service: Gynecology   • OTHER Left 2005    hammertoe x2   • EYE SURGERY      for lazy eye   • LUMPECTOMY       Social History     Tobacco Use   • Smoking status: Former Smoker     Packs/day: 0.25     Years: 26.00     Pack years: 6.50     Types: Cigarettes     Quit date: 2022     Years since quittin.2   • Smokeless tobacco: Never Used   Substance Use Topics   • Alcohol use: No     Family and Occupational History     Socioeconomic History   • Marital status: Single     Spouse name: Not on file   • Number of children: Not on file   • Years of education: Not on file   • Highest education level: Not on file   Occupational History   • Not on file       Objective:   Active Range of Motion:   Lower extremity (left):     All left lower extremity active range of motion: All within functional limits  Lower extremity (right):     All right lower extremity active range of motion: All within functional limits      Strength:   Lower extremity (left):     Hip flexion: 3+    Hip external rotation: 4    Hip internal rotation: 4-  Lower extremity (right):     Hip flexion: 3+    Hip external rotation: 4-    Hip internal rotation: 4    Tone, Sensation and Coordination:   Tone:  "    Left lower extremity muscle tone: Spastic    Right lower extremity muscle tone: Spastic    Sensation   Lower extremity (left):     Light touch: Intact  Lower extremity (right):     Light touch: Intact    Balance/Gait Comments   5x sit to stand: 22\" w/ bilat UE assist    6 MWT: 122' w/ rollator    TU\" w/ rollator    Foot assessment:   L foot: calluses along distal toes, no red spots  R foot: calluses on great toe and base of 1st MT, red spot over head of 5th metatarsal and lat head of 1st metatarsal    Transfers:  Supine<->sit: SBA    Don/doff shoes/socks: unable to doff/don socks w/o assistance, required minor assist donning shoes, independent doffing shoes        Therapeutic Exercises (CPT 64588):     2. Sit to stand, 1xfatigue, 3x/day    19. POC: 22      Therapeutic Exercise Summary: Access Code: [email or web address]  URL: [email or web address]  Date: 2022  Prepared by: No Gu    Exercises  Sit to Stand - 3 x daily - 7 x weekly - 10 reps        Time-based treatments/modalities:           Assessment, Response and Plan:   Impairments: abnormal ADL function, abnormal gait, abnormal muscle tone, abnormal or restricted ROM, activity intolerance, fine motor function, impaired functional mobility, impaired balance, impaired physical strength, lacks appropriate home exercise program, limited ADL's, limited mobility, pain with function and safety issue    Assessment details:  Ms. Salinas is a 55 y/o female who presents to PT w/ LE power coordination deficits, gait deviations, and hx of falls. She presents w/ deficits in dec'd hip flexibility, poor endurance, gait deviations, and LE weakness that are contributing to 2-3 falls per month. Because of these listed impairments patient is a high fall risk and would benefit from assistive device for shower to prevent falls. She will benefit from a heavy duty sliding transfer bench shower chair w/ cut out and adjustable legs. This device will help her " "manage her hygiene, improve transfer ability, prevent future infection, allow her to assess foot health, and prevent fall in shower. She was educated in daily foot checks and foot health d/t new pair of shoes and hx of amputations. Several red spots were noted on R foot and pt was advised to monitor daily and contact PCP as needed. Pt was provided HEP handout. Pt states she is receiving PT for her lumbar spine currently and was educated that she cannot come to both facilities in same day, they need to be different days. Pt is anticipated to progress well w/ physical therapy at this time and appropriate to cont PT.  Barriers to therapy:  Age, communication, comprehension, comorbidities, psychosocial and poorly tolerated treatments  Prognosis: fair    Goals:   Short Term Goals:   Pt will demo good compliance to HEP  Pt will demo 5x sit to stand w/o UE assist in 14\"  Pt will ambulate 250' w/ 6MWT w/ AD  Short term goal time span:  4-6 weeks      Long Term Goals:    Pt will demo TUG in <13\" w/ AD  Pt will ambulate 600' w/ 6MWT w/ AD  Pt will report no falls in last 4 wks  Pt will be able to don/doff shoes w/o limitations   Long term goal time span:  2-4 months    Plan:   Therapy options:  Physical therapy treatment to continue  Planned therapy interventions:  Neuromuscular Re-education (CPT 90482), Therapeutic Exercise (CPT 17789) and Gait Training (CPT 64363)  Frequency:  2x week  Duration in weeks:  12  Duration in visits:  16  Discussed with:  Patient  Plan details:  Endurance training, LE flexibility to improve function w/ don/doffing shoes, gait training, balance training      Functional Assessment Used          Referring provider co-signature:  I have reviewed this plan of care and my co-signature certifies the need for services.    Certification Period: 03/21/2022 to  06/07/22    Physician Signature: ________________________________ Date: ______________        "

## 2022-03-21 NOTE — PROGRESS NOTES
[facility]  PM[facility]  [facility] [place] [address]  Ph: [phone]    FOLLOW UP PATIENT EVALUATION    Patient Name: Marilyn Salinas   Patient : 1965  Patient Age: 56 y.o.   PCP: Edwar Platt M.D.    SUBJECTIVE:   Patient Identification: Marilyn Salinas is a 56 y.o. RHD female with PMH significant for chronic back and neck pain and rehabilitation history significant for premorbid weakness and paresthesias, had GLF, imaging showing Disc herniation at C4-5 and C3- C4, with T2 hyperintensity of the cord and s/p ACDF C3-5 on 19 with Dr. Linton  and is presenting to PM&R clinic for a FOLLOW UP outpatient evaluation with the following chief complaint/s:    PM&R Background Information:  Original Date of Injury: 19 GLF and worsening weakness, pain.   Pertinent Procedure History: Disc herniation at C4-5 and C3- C4, with T2 hyperintensity of the cord at this level. she underwent ACDF C3-5 on 19 with Dr. Linton  Dates of Admission/Discharge to ARU: 19-20    Chief Complaint: Power scooter evaluation.    Interval History:   - Has eval for power scooter next month.   - Still waiting for shower chair.   - Going to [facility] tomorrow. Thinks for legs.    - Also going to [facility] for back.   - Has new shoes from [employer], is still dragging her toes sometimes but they help.   - Walker is in good repair. No issues now. It is bent, but works.   - No longer smoking.   - Having more depression and sees psychiatrist. Recommended to increase Effexor.   - Will have cholecystectomy.   - Seeing PM&R interventionalist for back pain.   - Did see Dr. Caceres and will hopefully get bladder botox.     Review of Systems:  All other pertinent positive review of systems are noted above in HPI.     Past Medical History:  Past Medical History:   Diagnosis Date   • TIA (transient ischemic attack) 2022    AGE 19. Patient states D/T drug use.    • Anesthesia 2022     Patient states becomes upset and sad after anesthesia.   • Full dentures 03/17/2022   • Stroke (HCC) 03/17/2022    Pt. states, MINI STROKE AGE 19.   • COVID-19 01/17/2022 1/17/2022 stopped 1-   • Anesthesia 12/23/2021    agitated coming out of anesthesia   • Cervical spinal cord injury (HCC) 12/19/2019   • Anxiety    • Arthritis     spine, feet    • Asthma    • Breath shortness    • Cataract 12/23/2022    no surgery yet   • DDD (degenerative disc disease), lumbar     Per Problem List   • Dental disorder     upper and lower denture   • Depression    • Heart burn 12/23/2022    GERD   • High cholesterol    • History of falling    • Marijuana user    • Risk for falls    • Urinary bladder disorder    • Urinary incontinence    • Vertigo     Per Problem List      Past Surgical History:   Procedure Laterality Date   • PB AMPUTATION TOE,MT-P JT Left 12/27/2021    Procedure: GREAT TOE PARTIAL AMPUTATION;  Surgeon: Emiliano Goff M.D.;  Location: [facility];  Service: Orthopedics   • CERVICAL DISK AND FUSION ANTERIOR N/A 12/20/2019    Procedure: DISCECTOMY, SPINE, CERVICAL, ANTERIOR APPROACH, WITH FUSION - C3-5;  Surgeon: Connor Linton M.D.;  Location: [facility];  Service: Neurosurgery   • CORPECTOMY N/A 12/20/2019    Procedure: CORPECTOMY;  Surgeon: Connor Linton M.D.;  Location: [facility];  Service: Neurosurgery   • BLADDER SLING FEMALE N/A 10/3/2017    Procedure: BLADDER SLING FEMALE TOT, CYSTOSCOPY;  Surgeon: Hudson Driscoll M.D.;  Location: SURGERY SAME DAY [facility];  Service: Gynecology   • VAGINAL HYSTERECTOMY SCOPE TOTAL N/A 10/3/2017    Procedure: VAGINAL HYSTERECTOMY SCOPE TOTAL;  Surgeon: Hudson Driscoll M.D.;  Location: SURGERY SAME DAY [facility];  Service: Gynecology   • SALPINGECTOMY Bilateral 10/3/2017    Procedure: SALPINGECTOMY;  Surgeon: Hudson Driscoll M.D.;  Location: SURGERY SAME DAY [facility];  Service: Gynecology   • OOPHORECTOMY  Bilateral 10/3/2017    Procedure: OOPHORECTOMY;  Surgeon: Hudson Driscoll M.D.;  Location: SURGERY SAME DAY [facility];  Service: Gynecology   • ANTERIOR AND POSTERIOR REPAIR Bilateral 10/3/2017    Procedure: ANTERIOR AND POSTERIOR REPAIR;  Surgeon: Hudson Driscoll M.D.;  Location: SURGERY SAME DAY [facility];  Service: Gynecology   • ENTEROCELE REPAIR N/A 10/3/2017    Procedure: ENTEROCELE REPAIR, PERINEOPLASTY;  Surgeon: Hudson Driscoll M.D.;  Location: SURGERY SAME DAY [facility];  Service: Gynecology   • VAGINAL SUSPENSION N/A 10/3/2017    Procedure: VAGINAL SUSPENSION SACROSPINOUS VAULT POSSIBLE;  Surgeon: Hudson Driscoll M.D.;  Location: SURGERY SAME DAY [facility];  Service: Gynecology   • OTHER Left 2005    hammertoe x2   • EYE SURGERY  1972    for lazy eye   • LUMPECTOMY          Current Outpatient Medications:   •  venlafaxine (EFFEXOR-XR) 150 MG extended-release capsule, Take 1 Capsule by mouth every day., Disp: 90 Capsule, Rfl: 0  •  Nicotine Polacrilex (THRIVE MT), every 72 hours., Disp: , Rfl:   •  albuterol 108 (90 Base) MCG/ACT Aero Soln inhalation aerosol, Inhale 2 Puffs every 6 hours as needed for Shortness of Breath., Disp: 8.5 g, Rfl: 6  •  Tiotropium Bromide-Olodaterol (STIOLTO RESPIMAT) 2.5-2.5 MCG/ACT Aero Soln, Inhale 2 Puffs every day., Disp: 1 Each, Rfl: 11  •  gabapentin (NEURONTIN) 300 MG Cap, Take 1 po qhs (Patient taking differently: 2 times a day. Take 1 po qhs), Disp: 7 Capsule, Rfl: 0  •  buPROPion (WELLBUTRIN XL) 150 MG XL tablet, TAKE 3 TABLETS BY MOUTH DAILY, Disp: , Rfl:   •  pantoprazole (PROTONIX) 20 MG tablet, TAKE ONE TABLET BY MOUTH DAILY, Disp: 30 Tablet, Rfl: 6  •  fluticasone (FLONASE) 50 MCG/ACT nasal spray, Administer 1 Spray into affected nostril(S) every day., Disp: 16 g, Rfl: 6  •  tizanidine (ZANAFLEX) 2 MG capsule, Take 1 Capsule by mouth 3 times a day. (Patient taking differently: Take 2 mg by mouth 2 times a day.), Disp: 270 Capsule, Rfl: 1  •   Mirabegron ER (MYRBETRIQ) 50 MG TABLET SR 24 HR, Take 50 mg by mouth every day., Disp: 90 Tablet, Rfl: 1  •  baclofen (LIORESAL) 10 MG Tab, TAKE 3 TABLETS BY MOUTH THREE TIMES DAILY (Patient taking differently: 2 times a day. TAKE 3 TABLETS BY MOUTH THREE TIMES DAILY), Disp: 270 Tablet, Rfl: 5  •  meloxicam (MOBIC) 15 MG tablet, TAKE 1 TABLET BY MOUTH DAILY, Disp: 30 Tablet, Rfl: 6  •  fesoterodine fumarate (TOVIAZ) 4 MG TABLET SR 24 HR, , Disp: , Rfl:   •  TOVIAZ 4 MG TABLET SR 24 HR, every evening. Indications: Overactive Bladder, Disp: , Rfl:   •  diphenhydrAMINE (BENADRYL) 25 MG Tab, Take 25 mg by mouth every day. (Patient not taking: Reported on 3/21/2022), Disp: , Rfl:   •  fesoterodine fumarate (TOVIAZ) 4 MG TABLET SR 24 HR, 1 tablet (Patient not taking: Reported on 3/17/2022), Disp: , Rfl:   •  gabapentin (NEURONTIN) 300 MG Cap, TAKE 3 CAPSULES BY MOUTH THREE TIMES DAILY, Disp: 270 Capsule, Rfl: 5  Allergies   Allergen Reactions   • Other Environmental Hives     TUMBLEWEED   • Codeine Itching and Unspecified     Rash, itching, mood disorder- becomes agitated        Past Social History:  Social History     Socioeconomic History   • Marital status: Single     Spouse name: Not on file   • Number of children: Not on file   • Years of education: Not on file   • Highest education level: Not on file   Occupational History   • Not on file   Tobacco Use   • Smoking status: Former Smoker     Packs/day: 0.25     Years: 26.00     Pack years: 6.50     Types: Cigarettes     Quit date: 2022     Years since quittin.2   • Smokeless tobacco: Never Used   Vaping Use   • Vaping Use: Never used   Substance and Sexual Activity   • Alcohol use: No   • Drug use: Yes     Types: Marijuana, Inhaled     Comment: marijuana daily (smoked and edibles) last time 2021   • Sexual activity: Not Currently   Other Topics Concern   •  Service No   • Blood Transfusions No   • Caffeine Concern No   • Occupational Exposure No    • Hobby Hazards No   • Sleep Concern Yes   • Stress Concern Yes   • Weight Concern Yes   • Special Diet No   • Back Care Yes   • Exercise No   • Bike Helmet Yes   • Seat Belt Yes   • Self-Exams Yes   Social History Narrative   • Not on file     Social Determinants of Health     Financial Resource Strain: Not on file   Food Insecurity: Not on file   Transportation Needs: Not on file   Physical Activity: Not on file   Stress: Not on file   Social Connections: Not on file   Intimate Partner Violence: Not on file   Housing Stability: Not on file        Family History:  Family History   Problem Relation Age of Onset   • Cancer Mother    • Alcohol/Drug Mother    • Cancer Brother    • Diabetes Maternal Aunt        Depression and Opioid Screening  PHQ-9:  Depression Screen (PHQ-2/PHQ-9) 10/22/2020 12/8/2020 2/22/2022   PHQ-2 Total Score - - -   PHQ-2 Total Score 6 0 5   PHQ-9 Total Score 21 - 14     Interpretation of PHQ-9 Total Score   Score Severity   1-4 No Depression   5-9 Mild Depression   10-14 Moderate Depression   15-19 Moderately Severe Depression   20-27 Severe Depression     Opioid Risk Score: No value filed.  Interpretation of Opioid Risk Score   Score 0-3 = Low risk of abuse. Do UDS at least once per year.  Score 4-7 = Moderate risk of abuse. Do UDS 1-4 times per year.  Score 8+ = High risk of abuse. Refer to specialist.      OBJECTIVE:   Vital Signs:  Vitals:    03/21/22 0935   BP: 100/68   Pulse: 91   Resp: 17   Temp: (!) 35.7 °C (96.3 °F)   SpO2: 94%        Pertinent Labs:  Lab Results   Component Value Date/Time    SODIUM 138 03/17/2022 10:49 AM    POTASSIUM 4.6 03/17/2022 10:49 AM    CHLORIDE 103 03/17/2022 10:49 AM    CO2 22 03/17/2022 10:49 AM    GLUCOSE 95 03/17/2022 10:49 AM    BUN 22 03/17/2022 10:49 AM    CREATININE 0.76 03/17/2022 10:49 AM       Lab Results   Component Value Date/Time    HBA1C 5.6 07/27/2017 09:39 AM       Lab Results   Component Value Date/Time    WBC 6.7 03/17/2022 10:49 AM     RBC 4.71 03/17/2022 10:49 AM    HEMOGLOBIN 14.5 03/17/2022 10:49 AM    HEMATOCRIT 42.3 03/17/2022 10:49 AM    MCV 89.8 03/17/2022 10:49 AM    MCH 30.8 03/17/2022 10:49 AM    MCHC 34.3 03/17/2022 10:49 AM    MPV 10.9 03/17/2022 10:49 AM    NEUTSPOLYS 29.40 (L) 12/28/2019 05:12 AM    LYMPHOCYTES 56.70 (H) 12/28/2019 05:12 AM    MONOCYTES 9.20 12/28/2019 05:12 AM    EOSINOPHILS 3.40 12/28/2019 05:12 AM    BASOPHILS 1.00 12/28/2019 05:12 AM       Lab Results   Component Value Date/Time    ASTSGOT 17 07/24/2021 11:38 AM    ALTSGPT 19 07/24/2021 11:38 AM        Physical Exam:   GEN: No apparent distress  HEENT: Head normocephalic, atraumatic.  Sclera nonicteric bilaterally, no ocular discharge appreciated bilaterally.  CV: Extremities warm and well-perfused, no peripheral edema appreciated bilaterally.  PULMONARY: Breathing nonlabored on room air, no respiratory accessory muscle use.  Not requiring supplemental oxygen.  ABD: Soft, nontender.  Slightly bloated.  SKIN: No appreciable skin breakdown on exposed areas of skin.  PSYCH: Mood and affect within normal limits.  NEURO: Awake alert.  Conversational.  Logical thought content.  Answers questions appropriately.  Significant spasticity bilateral lower extremities especially in quadriceps, hamstrings, plantar flexors bilaterally.  Ambulates with walker, scissoring spastic gait.  Does have plantar flexion.      ASSESSMENT/PLAN: Marilyn Salinas  is a 56 y.o. female with rehabilitation history significant for premorbid weakness and paresthesias, had GLF, imaging showing Disc herniation at C4-5 and C3- C4, with T2 hyperintensity of the cord and s/p ACDF C3-5 on 12/20/19 with Dr. Linton, here for SCI follow up. The following plan was discussed with the patient who is in agreement.     Visit Diagnoses     ICD-10-CM   1. Tetraplegia (HCC)  G82.50   2. Depressed mood  R45.89   3. Neurogenic bladder followed by Dr. Guillory  N31.9   4. Impaired mobility and endurance  Z74.09   5.  Walker as ambulation aid  Z99.89        Rehab/Neuro:   1. C2 AISD: Nontraumatic incomplete spinal cord injury, status post fall at home with worsening weakness, found to have severe cervical stenosis.  Status post C3-C5 ACDF with Dr. Linton on 12/20/19. No longer getting epidural steroid injections, too painful.  2. Nociceptive/musculoskeletal chronic back pain: Established with interventional pain management.  -Therapy: Established with physical therapy at both [facility] and [facility].  -Function: 8/2021 Largely stable but patient does feel as though she is weaker lately.  Has had one fall.  Maintains strength through doing house chores. 10/2021 patient continues to have significant spastic paraplegic gait.  Would benefit from Botox to bilateral gastroc complex, however, insurance would not cover.  This would be cost prohibitive for her.  Significant onychomycosis on bilateral toenails as well as concern for DTI and erythema of first great toe. 1/27/2022 interim great left toe amputation.  Physically stable. 3/21/2022 physically stable.  -Face-to-face evaluation: Patient is prescribed heavy-duty sliding transfer bench shower chair with cut out seat and adjustable legs.  No other assistive device for showering would be sufficient for patient given her severe spastic diplegia and difficulty with transferring.  The sliding chair feature would allow her to get over the high tub side independently which is currently impossible due to her severe lower extremity spasticity and impaired mobility and ADLs resulting.  Length of need: 99 years  -Face-to-face for power mobility device: Patient is here to discuss her mobility needs.  She has neurogenic weakness and spasticity of the bilateral lower extremities putting her at high fall risk.  Patient has the physical and cognitive capability to maneuver a power mobility device and this would help her complete her activities of daily living as well as mobility related  activities of daily living independently.  Her current locomotive needs cannot be met with cane, walker.  Length of need: 99 years.     2. Muscular dystrophy: ? CP per recent neurology evaluation.  Reportedly diagnosed in '60's per aunt. Normal CPK. Was seeing Nikunj back in 2018 - was supposed to have EMG/NCS. Medicaid wouldn't cover EMG/NCS at that time.  Scheduled for EMG/NCS 10/23/2020.  EEG reportedly negative.  Patient has since changed her neurologist and is now seeing Dr. Hartman. Had EMG/NCS results -results not available but patient reports she was told she has paraplegia and partial CP.   -Status: Stable     Spasticity: Bilateral lower extremities worse than upper extremities.  Botox cost prohibitive.-  Counseled again on Botox today, remains cost prohibitive.  -Med management: Continue baclofen 30 mg twice daily.  -Med management: Continue tizanidine 2 mg twice daily.  Should check LFTs.    Neuropathic Pain:   -Med Management: Continue gabapentin 900 mg 3 times daily.     Neurogenic Bladder: Urinary urgency and frequency.  Patient has constant urinary incontinence, urinary frequency, urinary urgency due to neurogenic bladder in setting of spinal cord injury.  -Status: Managing incontinence with adult briefs.  -Med management: Continue Myrbetriq and Toviaz.  -Established with urogynecology, considering Botox.    Depression:  -Med management: Increase venlafaxine to 150 mg ER.    Follow up: 3 months reevaluation.    Please note that this dictation was created using voice recognition software. I have made every reasonable attempt to correct obvious errors but there may be errors of grammar and content that I may have overlooked prior to finalization of this note.    Dr. Julianna Guillory DO, MS  Department of Physical Medicine & Rehabilitation  Neuro Rehabilitation Clinic  [facility]

## 2022-03-22 ENCOUNTER — OFFICE VISIT (OUTPATIENT)
Dept: OBGYN | Facility: CLINIC | Age: 57
End: 2022-03-22
Payer: MEDICAID

## 2022-03-22 VITALS — WEIGHT: 158 LBS | BODY MASS INDEX: 25.5 KG/M2

## 2022-03-22 DIAGNOSIS — N39.46 MIXED INCONTINENCE: ICD-10-CM

## 2022-03-22 DIAGNOSIS — N32.81 DETRUSOR OVERACTIVITY: ICD-10-CM

## 2022-03-22 DIAGNOSIS — N31.9 NEUROGENIC BLADDER: Primary | ICD-10-CM

## 2022-03-22 DIAGNOSIS — N39.41 URGE URINARY INCONTINENCE: ICD-10-CM

## 2022-03-22 LAB
APPEARANCE UR: CLEAR
BILIRUB UR STRIP-MCNC: NORMAL MG/DL
COLOR UR AUTO: YELLOW
GLUCOSE UR STRIP.AUTO-MCNC: NEGATIVE MG/DL
KETONES UR STRIP.AUTO-MCNC: NEGATIVE MG/DL
LEUKOCYTE ESTERASE UR QL STRIP.AUTO: NEGATIVE
NITRITE UR QL STRIP.AUTO: NEGATIVE
PH UR STRIP.AUTO: 7 [PH] (ref 5–8)
PROT UR QL STRIP: NEGATIVE MG/DL
RBC UR QL AUTO: NEGATIVE
SP GR UR STRIP.AUTO: 1.01
UROBILINOGEN UR STRIP-MCNC: NORMAL MG/DL

## 2022-03-22 PROCEDURE — 51741 ELECTRO-UROFLOWMETRY FIRST: CPT | Mod: 51 | Performed by: STUDENT IN AN ORGANIZED HEALTH CARE EDUCATION/TRAINING PROGRAM

## 2022-03-22 PROCEDURE — 51797 INTRAABDOMINAL PRESSURE TEST: CPT | Performed by: STUDENT IN AN ORGANIZED HEALTH CARE EDUCATION/TRAINING PROGRAM

## 2022-03-22 PROCEDURE — 51729 CYSTOMETROGRAM W/VP&UP: CPT | Performed by: STUDENT IN AN ORGANIZED HEALTH CARE EDUCATION/TRAINING PROGRAM

## 2022-03-22 PROCEDURE — 99213 OFFICE O/P EST LOW 20 MIN: CPT | Mod: 25 | Performed by: STUDENT IN AN ORGANIZED HEALTH CARE EDUCATION/TRAINING PROGRAM

## 2022-03-22 PROCEDURE — 51784 ANAL/URINARY MUSCLE STUDY: CPT | Mod: 51 | Performed by: STUDENT IN AN ORGANIZED HEALTH CARE EDUCATION/TRAINING PROGRAM

## 2022-03-22 PROCEDURE — 81002 URINALYSIS NONAUTO W/O SCOPE: CPT | Performed by: STUDENT IN AN ORGANIZED HEALTH CARE EDUCATION/TRAINING PROGRAM

## 2022-03-22 ASSESSMENT — FIBROSIS 4 INDEX: FIB4 SCORE: 0.86

## 2022-03-22 NOTE — PROGRESS NOTES
Urogynecology and Pelvic Reconstructive Surgery Follow Up    Marilyn Salinas MRN:[id number] :1965    Referred by: Julianna Guillory DO    Reason for Visit:   No chief complaint on file.        Subjective     History of Presenting Illness:    Ms.Dina Cleve Salinas is a 56 y.o. year old P2 with h/o nontraumatic incomplete spinal cord injury, paraplegia, partial CP, who presents for follow up of constant urinary incontinence (mixed) and occasional bowel incontinence, pelvic examination, and post-UDS follow up.     She is still taking 2 oral medications with suboptimal control of her leakage. She is undergoing a laparoscopic cholecystectomy with Dr. Barrett tomorrow.         Prior HPI: She was referred by her Physiatrist Dr. Guillory for the evaluation and management of neurogenic bladder with incontinence    She has constant incontinence, mostly with urgency but sometimes without warning. She also has leakage with cough/laugh/exercise, but less than with urgency. DANY somewhat improved after 2017 sling (see below). Symptoms most bothersome at night when she has to use at least 2 diapers. Due to mobility issues (uses a walker) she has trouble getting to the bathroom.     She has occasional bowel incontinence, both small and larger episodes. These are note predictable or regular in frequency. She is scheduled for a cholecystectomy with Dr. Barrett later this month, but is worried about possible diarrhea.     Prior Pelvic surgery:   10/3/2017: Laparoscopic-assisted vaginal hysterectomy, bilateral    salpingo-oophorectomy, anterior and posterior vaginal repair, enterocele    repair, perineoplasty, sacrospinous vault suspension, transobturator tape mid urethral sling procedure, cystoscopy (Dr. Driscoll) Op report reviewed     Prior treatment:   Mirabegron - current  Fesoterodine - current     Fluid intake:   Mostly water  Coffee to help with constipation    Pelvic floor symptom review:     Bladder:   Voids per day: 5-7 Voids per  night: 1-2      Urinary incontinence episodes per day: 2-3 per day, 1-2 at nithg   Urge leakage:  On Movement to Bathroom and Full Bladder urge worse than stress   Stress leakage: With Cough, With Laugh and With Exercise   Continuous / insensible urine loss: sometimes   Nocturnal enuresis: No    Leakage volume: Moderate   Number of pads/day: 1 diaper at night    Bladder emptying: Complete   Voiding symptoms: None   UTI in last 12 months: No        Prolapse:     Prolapse symptoms: None        Bowel:    Constipation: Yes   Bowel movements per day: 1    Straining to empty bowels: Yes   Splinting to evacuate: No    Painful evacuation: No    Difficulty emptying rectum: No    Incontinence to stool: occasionally   Incontinence to gas: No     Blood in stool: No    Hemorrhoids: No        Sexual function:    Sexually active: No            Past medical and surgical history    Past medical history:  Past Medical History:   Diagnosis Date   • TIA (transient ischemic attack) 03/17/2022    AGE 19. Patient states D/T drug use.    • Anesthesia 03/17/2022    Patient states becomes upset and sad after anesthesia.   • Full dentures 03/17/2022   • Stroke ([facility]) 03/17/2022    Pt. states, MINI STROKE AGE 19.   • COVID-19 01/17/2022 1/17/2022 stopped 1-   • Anesthesia 12/23/2021    agitated coming out of anesthesia   • Cervical spinal cord injury (HCC) 12/19/2019   • Anxiety    • Arthritis     spine, feet    • Asthma    • Breath shortness    • Cataract 12/23/2022    no surgery yet   • DDD (degenerative disc disease), lumbar     Per Problem List   • Dental disorder     upper and lower denture   • Depression    • Heart burn 12/23/2022    GERD   • High cholesterol    • History of falling    • Marijuana user    • Risk for falls    • Urinary bladder disorder    • Urinary incontinence    • Vertigo     Per Problem List     Past surgical history:  Past Surgical History:   Procedure Laterality Date   • PB AMPUTATION TOE,MT-P JT Left  12/27/2021    Procedure: GREAT TOE PARTIAL AMPUTATION;  Surgeon: Emiliano Goff M.D.;  Location: [facility];  Service: Orthopedics   • CERVICAL DISK AND FUSION ANTERIOR N/A 12/20/2019    Procedure: DISCECTOMY, SPINE, CERVICAL, ANTERIOR APPROACH, WITH FUSION - C3-5;  Surgeon: Connor Linton M.D.;  Location: [facility];  Service: Neurosurgery   • CORPECTOMY N/A 12/20/2019    Procedure: CORPECTOMY;  Surgeon: Connor Linton M.D.;  Location: [facility];  Service: Neurosurgery   • BLADDER SLING FEMALE N/A 10/3/2017    Procedure: BLADDER SLING FEMALE TOT, CYSTOSCOPY;  Surgeon: Hudson Driscoll M.D.;  Location: SURGERY SAME DAY [facility];  Service: Gynecology   • VAGINAL HYSTERECTOMY SCOPE TOTAL N/A 10/3/2017    Procedure: VAGINAL HYSTERECTOMY SCOPE TOTAL;  Surgeon: Hudson Driscoll M.D.;  Location: SURGERY SAME DAY [facility];  Service: Gynecology   • SALPINGECTOMY Bilateral 10/3/2017    Procedure: SALPINGECTOMY;  Surgeon: Hudson Driscoll M.D.;  Location: SURGERY SAME DAY [facility];  Service: Gynecology   • OOPHORECTOMY Bilateral 10/3/2017    Procedure: OOPHORECTOMY;  Surgeon: Hudson Driscoll M.D.;  Location: SURGERY SAME DAY [facility];  Service: Gynecology   • ANTERIOR AND POSTERIOR REPAIR Bilateral 10/3/2017    Procedure: ANTERIOR AND POSTERIOR REPAIR;  Surgeon: Hudson Driscoll M.D.;  Location: SURGERY SAME DAY [facility];  Service: Gynecology   • ENTEROCELE REPAIR N/A 10/3/2017    Procedure: ENTEROCELE REPAIR, PERINEOPLASTY;  Surgeon: Hudson Driscoll M.D.;  Location: SURGERY SAME DAY [facility];  Service: Gynecology   • VAGINAL SUSPENSION N/A 10/3/2017    Procedure: VAGINAL SUSPENSION SACROSPINOUS VAULT POSSIBLE;  Surgeon: Hudson Driscoll M.D.;  Location: SURGERY SAME DAY [facility];  Service: Gynecology   • OTHER Left 2005    hammertoe x2   • EYE SURGERY  1972    for lazy eye   • LUMPECTOMY       Medications:has a current medication list which includes the  following prescription(s): venlafaxine, diphenhydramine, nicotine polacrilex, albuterol, stiolto respimat, toviaz, gabapentin, bupropion, pantoprazole, fluticasone, tizanidine, myrbetriq, gabapentin, baclofen, meloxicam, toviaz, and toviaz.  Allergies:Other environmental and Codeine  Family history:  Family History   Problem Relation Age of Onset   • Cancer Mother    • Alcohol/Drug Mother    • Cancer Brother    • Diabetes Maternal Aunt      Social history: reports that she quit smoking about 2 months ago. Her smoking use included cigarettes. She has a 6.50 pack-year smoking history. She has never used smokeless tobacco. She reports current drug use. Drugs: Marijuana and Inhaled. She reports that she does not drink alcohol.    Review of systems: A full review of systems was performed, and negative with the exception of want is noted above in the HPI.        Objective        LMP 01/11/2017     Physical Exam  Vitals reviewed. Exam conducted with a chaperone present (MA - see notes.).   Constitutional:       Appearance: Normal appearance.   HENT:      Head: Normocephalic.      Mouth/Throat:      Mouth: Mucous membranes are moist.   Cardiovascular:      Rate and Rhythm: Normal rate.   Pulmonary:      Effort: Pulmonary effort is normal.   Abdominal:      Palpations: Abdomen is soft. There is no mass.      Tenderness: There is no abdominal tenderness.   Skin:     General: Skin is warm and dry.   Neurological:      Mental Status: She is alert.      Motor: Weakness present.      Gait: Gait abnormal.      Comments: Uses walker   Psychiatric:         Mood and Affect: Mood normal.       Genitourinary (today): Narrowed introitus with moderate to severe vaginal atrophy.  Some tenderness on placement of UDS catheters.  No prolapse.       Procedure Performed: Multichannel urodynamics -see report :   · Filling phase: Sustained uninhibited DO starting at 127mL, normal compliance, able to fill up to 334 after DO suppression. Stress  leak with CLPP 108cm H2O at 149mL, MUCP 69   · Voiding phase: Complete emptying with detrusor contraction and some Valsalva effort.  Patient was told previously that she should Valsalva to empty her bladder    Diagnostic test and records review:    Urine dipstick: negative    Labs: n/a    Radiology:     Renal US 2020:  The right kidney measures 9.51 cm.  The left kidney measures 8.27 cm.  There is no hydronephrosis.  There are no abnormal calcifications.  The bladder demonstrates no focal wall abnormality.    Documentation reviewed: Prior EMR Records           Assessment & Plan     Ms.Dina Cleve Salinas is a 56 y.o. year old P2 with neurogenic bladder defined as mixed, urge predominant urinary incontinence with nocturia. We discussed my recommendations for further diagnosis and treatment at length today.     1. Neurogenic bladder  2. Mixed incontinence  3. Injury of cervical spinal cord, subsequent encounter (HCC)  4. Other cerebral palsy (HCC)  5. Nocturia  She has significant urge predominant mixed urinary incontinence only minimally responsive to behavioral and combined anticholinergic/beta-agonist therapy. .  - UDS showed significant sustained detrusor overactivity at half of her bladder capacity, as well stress urinary incontinence with normal range leak point pressures and mid urethral closure pressure.  She is able to empty her bladder completely with detrusor contraction.  She did the predominance of her urgency symptoms as well as detrusor overactivity on her urodynamics today, I recommend starting with urge treatment, and then if still persistent stress leakage is bothersome we can pursue stress urinary incontinence treatment.  I advised her that I cannot perform these procedures together as it has a high risk of putting into urinary retention.   - Prior renal ultrasound without hydronephrosis   - I reviewed all third line options with her for urge urinary incontinence, and she is the best candidate for  bladder Botox, especially given her neurogenic diagnosis.  She was counseled there is the possibility of urinary retention after this procedure, although this is a smaller risk and is transient.  She has mobility if needed to learn how to catheterize. She opts to book for this procedure: Cystourethroscopy, bladder chemodenervation with onabotulinumtoxinA, and all indicated procedures.  This be performed in office.  She is aware she must arrive 30 minutes before her appointment in order to backfill the bladder with lidocaine to make this a comfortable procedure.  She does not need a ride home.  I will also use the opportunity of cystourethroscopy to evaluate if there is any mesh involvement in the bladder, which may change her future therapeutic options.  Insurance preauthorization order was sent  - She may be a candidate for vaginal estrogen replacement in the future to augment urge symptoms.     6. Fecal incontinence  This is bothersome but infrequent. She is worried about how upcoming cholecystectomy effect on diarrhea. I counseled that if this does happen there are medical therapies that can help. Will defer further FI treatment until after she recovers from surgery.                Giselle Caceres MD, FACOG    Female Pelvic Medicine and Reconstructive Surgery  Department of Obstetrics and Gynecology  [facility]    This medical record contains text that has been entered with the assistance of computer voice recognition and dictation software.  Therefore, it may contain unintended errors in text, spelling, punctuation, or grammar

## 2022-03-22 NOTE — OR NURSING
COVID-19 Pre-surgery screening:      Left message for Pt to call us if experiencing any Covid symptoms, and advised of mask/visitor policies.

## 2022-03-22 NOTE — PROCEDURES
Procedure note: Complex urodynamic testing    Procedure performed:    -     71566 Complex Uroflowmetry  - 24837 Complex CMG w/ voiding pressure study AND urethral pressure  - 36940 EMG studies anal or urethral sphincter   - 29728 Intraabdominal catheter   - 90247 Insertion of non-indwelling catheter    Indication: Ms. Salinas is a 56 year old with neurogenic bladder defined by mixed incontinence, nocturia, incontinence without sensation. Her symptoms include:    Bladder symptoms:      Voids per day: 5-7       Voids per night: 1-2                 Urinary incontinence episodes per day: 2-3 per day, 1-2 at nithg              Urge leakage:  On Movement to Bathroom and Full Bladder urge worse than stress              Stress leakage: With Cough, With Laugh and With Exercise              Continuous / insensible urine loss: sometimes              Nocturnal enuresis: No               Leakage volume: Moderate              Number of pads/day: 1 diaper at night               Bladder emptying: Complete              Voiding symptoms: None              UTI in last 12 months: No    POP-Q: no prolapse    She presents for complex urodynamic testing today to fully elucidate her bladder function and symptom pathophysiology, to guide treatment for leakage    Verbal consent was obtained after review of risk and benefit.     Chaperone: Julianna Copeland         Procedure: The patient was taken to the urodynamic suite and placed in the urodynamic chair. She underwent sterile prep with betadine prior to catheterization. There was a negative urinalysis. Air-charged catheters were placed in the urethra/bladder and vagina. Urodynamics were performed using routine techniques.  There were no complications.       Urodynamic findings:     Preliminary Uroflometry     o Flow pattern: some artifact, but continuous appearing  o Maximum flow: 152 mL/sec  o Average flow: 2.8 mL/sec  o Voided volume: 279 mL  o Post-void residual: 4 mL  o Flow time: 59  sec    Filling cystometrogram    o First sensation: 92 mL  o First desire: not recorded mL  o Strong Desire: 127 mL (with DO) then 254 mL  o Urodynamic capacity: 334 mL   o Stress leakage: yes  o Uninhibited detrusor contractions present: yes  o Leakage with DO: no  o Leak point pressures  - At 149mL volume: LEAK at 108 cm H2O  o Compliance: sustained DO at pressure 15cm H2O, but no steady rise in pressure that would signify decreased compliance    Urethral pressure profile    o Maximum urethral closing pressure (MUCP): 69 cm H2O  o Morphology: normal    Pressure voiding study    o The patient's voiding mechanism was accomplished by detrusor contraction with some valsalva  o Max flow: 49 mL/sec  o Average flow: 14 mL/sec  o Pdet at peak flow: 28 cm H2O  o Post-void residual: 1  o Pelvic floor EMG silenced during voiding: yes      Pelvic floor EMG: Normal    Assessment:     She has completed urodynamic testing, which was uncomplicated.     - Filling phase: Sustained uninhibited DO starting at 127mL, normal compliance, able to fill up to 334 after DO suppression. Stress leak with CLPP 108cm H2O at 149mL, MUCP 69   - Voiding phase: Complete emptying with detrusor contraction and some Valsalva effort.  Patient was told previously that she should Valsalva to empty her bladder    Plan:    - Counseled on both the stress and urge components of her bladder test, as well as her normal emptying.   - Please see office visit for full surgical counseling   - Counseled on normal post-UDS symptoms including burning and possible hematuria. If this persists after 2 days she should call or send SKINNYprice message.     Giselle Caceres MD, FACOG    Female Pelvic Medicine and Reconstructive Surgery  Department of Obstetrics and Gynecology  [facility]

## 2022-03-23 ENCOUNTER — ANESTHESIA (OUTPATIENT)
Dept: SURGERY | Facility: MEDICAL CENTER | Age: 57
End: 2022-03-23
Payer: MEDICAID

## 2022-03-23 ENCOUNTER — HOSPITAL ENCOUNTER (OUTPATIENT)
Facility: MEDICAL CENTER | Age: 57
End: 2022-03-25
Attending: COLON & RECTAL SURGERY | Admitting: COLON & RECTAL SURGERY
Payer: MEDICAID

## 2022-03-23 ENCOUNTER — ANESTHESIA EVENT (OUTPATIENT)
Dept: SURGERY | Facility: MEDICAL CENTER | Age: 57
End: 2022-03-23
Payer: MEDICAID

## 2022-03-23 DIAGNOSIS — R09.02 POSTOPERATIVE HYPOXIA: ICD-10-CM

## 2022-03-23 DIAGNOSIS — Z98.890 POSTOPERATIVE HYPOXIA: ICD-10-CM

## 2022-03-23 DIAGNOSIS — G89.18 POST-OPERATIVE PAIN: ICD-10-CM

## 2022-03-23 LAB — PATHOLOGY CONSULT NOTE: NORMAL

## 2022-03-23 PROCEDURE — 501570 HCHG TROCAR, SEPARATOR: Performed by: COLON & RECTAL SURGERY

## 2022-03-23 PROCEDURE — 501571 HCHG TROCAR, SEPARATOR 12X100: Performed by: COLON & RECTAL SURGERY

## 2022-03-23 PROCEDURE — 160029 HCHG SURGERY MINUTES - 1ST 30 MINS LEVEL 4: Performed by: COLON & RECTAL SURGERY

## 2022-03-23 PROCEDURE — 160009 HCHG ANES TIME/MIN: Performed by: COLON & RECTAL SURGERY

## 2022-03-23 PROCEDURE — 96375 TX/PRO/DX INJ NEW DRUG ADDON: CPT | Mod: XU

## 2022-03-23 PROCEDURE — 88304 TISSUE EXAM BY PATHOLOGIST: CPT

## 2022-03-23 PROCEDURE — 501399 HCHG SPECIMAN BAG, ENDO CATC: Performed by: COLON & RECTAL SURGERY

## 2022-03-23 PROCEDURE — 700111 HCHG RX REV CODE 636 W/ 250 OVERRIDE (IP): Performed by: STUDENT IN AN ORGANIZED HEALTH CARE EDUCATION/TRAINING PROGRAM

## 2022-03-23 PROCEDURE — 700101 HCHG RX REV CODE 250: Performed by: STUDENT IN AN ORGANIZED HEALTH CARE EDUCATION/TRAINING PROGRAM

## 2022-03-23 PROCEDURE — 160048 HCHG OR STATISTICAL LEVEL 1-5: Performed by: COLON & RECTAL SURGERY

## 2022-03-23 PROCEDURE — 96374 THER/PROPH/DIAG INJ IV PUSH: CPT | Mod: XU

## 2022-03-23 PROCEDURE — 700102 HCHG RX REV CODE 250 W/ 637 OVERRIDE(OP): Performed by: STUDENT IN AN ORGANIZED HEALTH CARE EDUCATION/TRAINING PROGRAM

## 2022-03-23 PROCEDURE — G0378 HOSPITAL OBSERVATION PER HR: HCPCS

## 2022-03-23 PROCEDURE — 700102 HCHG RX REV CODE 250 W/ 637 OVERRIDE(OP): Performed by: COLON & RECTAL SURGERY

## 2022-03-23 PROCEDURE — A9270 NON-COVERED ITEM OR SERVICE: HCPCS | Performed by: STUDENT IN AN ORGANIZED HEALTH CARE EDUCATION/TRAINING PROGRAM

## 2022-03-23 PROCEDURE — 160002 HCHG RECOVERY MINUTES (STAT): Performed by: COLON & RECTAL SURGERY

## 2022-03-23 PROCEDURE — A9270 NON-COVERED ITEM OR SERVICE: HCPCS | Performed by: COLON & RECTAL SURGERY

## 2022-03-23 PROCEDURE — 160041 HCHG SURGERY MINUTES - EA ADDL 1 MIN LEVEL 4: Performed by: COLON & RECTAL SURGERY

## 2022-03-23 PROCEDURE — 160035 HCHG PACU - 1ST 60 MINS PHASE I: Performed by: COLON & RECTAL SURGERY

## 2022-03-23 PROCEDURE — 501497 HCHG SURGICLIP: Performed by: COLON & RECTAL SURGERY

## 2022-03-23 PROCEDURE — 160036 HCHG PACU - EA ADDL 30 MINS PHASE I: Performed by: COLON & RECTAL SURGERY

## 2022-03-23 PROCEDURE — 502571 HCHG PACK, LAP CHOLE: Performed by: COLON & RECTAL SURGERY

## 2022-03-23 PROCEDURE — 501838 HCHG SUTURE GENERAL: Performed by: COLON & RECTAL SURGERY

## 2022-03-23 PROCEDURE — 700105 HCHG RX REV CODE 258: Performed by: COLON & RECTAL SURGERY

## 2022-03-23 PROCEDURE — 700101 HCHG RX REV CODE 250: Performed by: COLON & RECTAL SURGERY

## 2022-03-23 RX ORDER — OXYCODONE HCL 5 MG/5 ML
5 SOLUTION, ORAL ORAL
Status: DISCONTINUED | OUTPATIENT
Start: 2022-03-23 | End: 2022-03-25 | Stop reason: HOSPADM

## 2022-03-23 RX ORDER — BUPIVACAINE HYDROCHLORIDE AND EPINEPHRINE 5; 5 MG/ML; UG/ML
INJECTION, SOLUTION EPIDURAL; INTRACAUDAL; PERINEURAL
Status: DISCONTINUED | OUTPATIENT
Start: 2022-03-23 | End: 2022-03-23 | Stop reason: HOSPADM

## 2022-03-23 RX ORDER — ONDANSETRON 2 MG/ML
INJECTION INTRAMUSCULAR; INTRAVENOUS PRN
Status: DISCONTINUED | OUTPATIENT
Start: 2022-03-23 | End: 2022-03-23 | Stop reason: SURG

## 2022-03-23 RX ORDER — DIPHENHYDRAMINE HCL 25 MG
25 TABLET ORAL EVERY 6 HOURS PRN
Status: DISCONTINUED | OUTPATIENT
Start: 2022-03-23 | End: 2022-03-25 | Stop reason: HOSPADM

## 2022-03-23 RX ORDER — HALOPERIDOL 5 MG/ML
1 INJECTION INTRAMUSCULAR
Status: DISCONTINUED | OUTPATIENT
Start: 2022-03-23 | End: 2022-03-23 | Stop reason: HOSPADM

## 2022-03-23 RX ORDER — DIPHENHYDRAMINE HYDROCHLORIDE 50 MG/ML
12.5 INJECTION INTRAMUSCULAR; INTRAVENOUS
Status: DISCONTINUED | OUTPATIENT
Start: 2022-03-23 | End: 2022-03-23 | Stop reason: HOSPADM

## 2022-03-23 RX ORDER — HYDRALAZINE HYDROCHLORIDE 20 MG/ML
5 INJECTION INTRAMUSCULAR; INTRAVENOUS
Status: DISCONTINUED | OUTPATIENT
Start: 2022-03-23 | End: 2022-03-23 | Stop reason: HOSPADM

## 2022-03-23 RX ORDER — ALBUTEROL SULFATE 90 UG/1
2 AEROSOL, METERED RESPIRATORY (INHALATION) EVERY 6 HOURS PRN
Status: DISCONTINUED | OUTPATIENT
Start: 2022-03-23 | End: 2022-03-25 | Stop reason: HOSPADM

## 2022-03-23 RX ORDER — ONDANSETRON 4 MG/1
4 TABLET, ORALLY DISINTEGRATING ORAL EVERY 6 HOURS PRN
Qty: 10 TABLET | Refills: 0 | Status: SHIPPED | OUTPATIENT
Start: 2022-03-23 | End: 2022-09-15

## 2022-03-23 RX ORDER — MIDAZOLAM HYDROCHLORIDE 1 MG/ML
1 INJECTION INTRAMUSCULAR; INTRAVENOUS
Status: DISCONTINUED | OUTPATIENT
Start: 2022-03-23 | End: 2022-03-23 | Stop reason: HOSPADM

## 2022-03-23 RX ORDER — METOPROLOL TARTRATE 1 MG/ML
1 INJECTION, SOLUTION INTRAVENOUS
Status: DISCONTINUED | OUTPATIENT
Start: 2022-03-23 | End: 2022-03-23 | Stop reason: HOSPADM

## 2022-03-23 RX ORDER — PROMETHAZINE HYDROCHLORIDE 25 MG/1
25 SUPPOSITORY RECTAL EVERY 4 HOURS PRN
Status: DISCONTINUED | OUTPATIENT
Start: 2022-03-23 | End: 2022-03-25 | Stop reason: HOSPADM

## 2022-03-23 RX ORDER — HYDROMORPHONE HYDROCHLORIDE 1 MG/ML
0.4 INJECTION, SOLUTION INTRAMUSCULAR; INTRAVENOUS; SUBCUTANEOUS
Status: DISCONTINUED | OUTPATIENT
Start: 2022-03-23 | End: 2022-03-23 | Stop reason: HOSPADM

## 2022-03-23 RX ORDER — MEPERIDINE HYDROCHLORIDE 25 MG/ML
12.5 INJECTION INTRAMUSCULAR; INTRAVENOUS; SUBCUTANEOUS
Status: DISCONTINUED | OUTPATIENT
Start: 2022-03-23 | End: 2022-03-23 | Stop reason: HOSPADM

## 2022-03-23 RX ORDER — LABETALOL HYDROCHLORIDE 5 MG/ML
5 INJECTION, SOLUTION INTRAVENOUS
Status: DISCONTINUED | OUTPATIENT
Start: 2022-03-23 | End: 2022-03-23 | Stop reason: HOSPADM

## 2022-03-23 RX ORDER — IPRATROPIUM BROMIDE AND ALBUTEROL SULFATE 2.5; .5 MG/3ML; MG/3ML
3 SOLUTION RESPIRATORY (INHALATION)
Status: DISCONTINUED | OUTPATIENT
Start: 2022-03-23 | End: 2022-03-23 | Stop reason: HOSPADM

## 2022-03-23 RX ORDER — OXYCODONE HCL 5 MG/5 ML
10 SOLUTION, ORAL ORAL
Status: DISCONTINUED | OUTPATIENT
Start: 2022-03-23 | End: 2022-03-25 | Stop reason: HOSPADM

## 2022-03-23 RX ORDER — BUPROPION HYDROCHLORIDE 150 MG/1
150 TABLET ORAL 3 TIMES DAILY
Status: DISCONTINUED | OUTPATIENT
Start: 2022-03-23 | End: 2022-03-23

## 2022-03-23 RX ORDER — BUPROPION HYDROCHLORIDE 100 MG/1
200 TABLET, EXTENDED RELEASE ORAL 2 TIMES DAILY
Status: DISCONTINUED | OUTPATIENT
Start: 2022-03-23 | End: 2022-03-25 | Stop reason: HOSPADM

## 2022-03-23 RX ORDER — OXYCODONE HCL 5 MG/5 ML
10 SOLUTION, ORAL ORAL
Status: COMPLETED | OUTPATIENT
Start: 2022-03-23 | End: 2022-03-23

## 2022-03-23 RX ORDER — ACETAMINOPHEN 500 MG
1000 TABLET ORAL EVERY 6 HOURS
Status: DISCONTINUED | OUTPATIENT
Start: 2022-03-24 | End: 2022-03-25 | Stop reason: HOSPADM

## 2022-03-23 RX ORDER — ACETAMINOPHEN 10 MG/ML
1000 INJECTION, SOLUTION INTRAVENOUS EVERY 6 HOURS
Status: COMPLETED | OUTPATIENT
Start: 2022-03-23 | End: 2022-03-24

## 2022-03-23 RX ORDER — HYDROCODONE BITARTRATE AND ACETAMINOPHEN 7.5; 325 MG/1; MG/1
1 TABLET ORAL EVERY 6 HOURS PRN
Qty: 20 TABLET | Refills: 0 | Status: SHIPPED | OUTPATIENT
Start: 2022-03-23 | End: 2022-03-28

## 2022-03-23 RX ORDER — ONDANSETRON 2 MG/ML
4 INJECTION INTRAMUSCULAR; INTRAVENOUS EVERY 4 HOURS PRN
Status: DISCONTINUED | OUTPATIENT
Start: 2022-03-23 | End: 2022-03-25 | Stop reason: HOSPADM

## 2022-03-23 RX ORDER — CALCIUM CARBONATE 500 MG/1
500 TABLET, CHEWABLE ORAL
Status: DISCONTINUED | OUTPATIENT
Start: 2022-03-23 | End: 2022-03-23

## 2022-03-23 RX ORDER — SODIUM CHLORIDE AND POTASSIUM CHLORIDE 150; 900 MG/100ML; MG/100ML
INJECTION, SOLUTION INTRAVENOUS CONTINUOUS
Status: DISCONTINUED | OUTPATIENT
Start: 2022-03-23 | End: 2022-03-25 | Stop reason: HOSPADM

## 2022-03-23 RX ORDER — HYDROMORPHONE HYDROCHLORIDE 1 MG/ML
0.1 INJECTION, SOLUTION INTRAMUSCULAR; INTRAVENOUS; SUBCUTANEOUS
Status: DISCONTINUED | OUTPATIENT
Start: 2022-03-23 | End: 2022-03-23 | Stop reason: HOSPADM

## 2022-03-23 RX ORDER — ONDANSETRON 2 MG/ML
4 INJECTION INTRAMUSCULAR; INTRAVENOUS
Status: DISCONTINUED | OUTPATIENT
Start: 2022-03-23 | End: 2022-03-23 | Stop reason: HOSPADM

## 2022-03-23 RX ORDER — DEXAMETHASONE SODIUM PHOSPHATE 4 MG/ML
INJECTION, SOLUTION INTRA-ARTICULAR; INTRALESIONAL; INTRAMUSCULAR; INTRAVENOUS; SOFT TISSUE PRN
Status: DISCONTINUED | OUTPATIENT
Start: 2022-03-23 | End: 2022-03-23 | Stop reason: SURG

## 2022-03-23 RX ORDER — SODIUM CHLORIDE, SODIUM LACTATE, POTASSIUM CHLORIDE, CALCIUM CHLORIDE 600; 310; 30; 20 MG/100ML; MG/100ML; MG/100ML; MG/100ML
INJECTION, SOLUTION INTRAVENOUS CONTINUOUS
Status: ACTIVE | OUTPATIENT
Start: 2022-03-23 | End: 2022-03-23

## 2022-03-23 RX ORDER — DIPHENHYDRAMINE HYDROCHLORIDE 50 MG/ML
12.5 INJECTION INTRAMUSCULAR; INTRAVENOUS EVERY 6 HOURS PRN
Status: DISCONTINUED | OUTPATIENT
Start: 2022-03-23 | End: 2022-03-25 | Stop reason: HOSPADM

## 2022-03-23 RX ORDER — GABAPENTIN 300 MG/1
300 CAPSULE ORAL 3 TIMES DAILY
Status: DISCONTINUED | OUTPATIENT
Start: 2022-03-23 | End: 2022-03-25 | Stop reason: HOSPADM

## 2022-03-23 RX ORDER — OXYCODONE HCL 5 MG/5 ML
5 SOLUTION, ORAL ORAL
Status: COMPLETED | OUTPATIENT
Start: 2022-03-23 | End: 2022-03-23

## 2022-03-23 RX ORDER — ACETAMINOPHEN 500 MG
1000 TABLET ORAL EVERY 6 HOURS PRN
Status: DISCONTINUED | OUTPATIENT
Start: 2022-03-28 | End: 2022-03-25 | Stop reason: HOSPADM

## 2022-03-23 RX ORDER — SODIUM CHLORIDE, SODIUM LACTATE, POTASSIUM CHLORIDE, AND CALCIUM CHLORIDE .6; .31; .03; .02 G/100ML; G/100ML; G/100ML; G/100ML
500 INJECTION, SOLUTION INTRAVENOUS
Status: DISCONTINUED | OUTPATIENT
Start: 2022-03-23 | End: 2022-03-25 | Stop reason: HOSPADM

## 2022-03-23 RX ORDER — HALOPERIDOL 5 MG/ML
1 INJECTION INTRAMUSCULAR EVERY 6 HOURS PRN
Status: DISCONTINUED | OUTPATIENT
Start: 2022-03-23 | End: 2022-03-23

## 2022-03-23 RX ORDER — GABAPENTIN 100 MG/1
100 CAPSULE ORAL 3 TIMES DAILY
Status: DISCONTINUED | OUTPATIENT
Start: 2022-03-23 | End: 2022-03-23

## 2022-03-23 RX ORDER — MIDAZOLAM HYDROCHLORIDE 1 MG/ML
INJECTION INTRAMUSCULAR; INTRAVENOUS PRN
Status: DISCONTINUED | OUTPATIENT
Start: 2022-03-23 | End: 2022-03-23 | Stop reason: SURG

## 2022-03-23 RX ORDER — HYDROMORPHONE HYDROCHLORIDE 1 MG/ML
0.2 INJECTION, SOLUTION INTRAMUSCULAR; INTRAVENOUS; SUBCUTANEOUS
Status: DISCONTINUED | OUTPATIENT
Start: 2022-03-23 | End: 2022-03-23 | Stop reason: HOSPADM

## 2022-03-23 RX ORDER — DIPHENHYDRAMINE HYDROCHLORIDE 50 MG/ML
25 INJECTION INTRAMUSCULAR; INTRAVENOUS EVERY 6 HOURS PRN
Status: DISCONTINUED | OUTPATIENT
Start: 2022-03-23 | End: 2022-03-25 | Stop reason: HOSPADM

## 2022-03-23 RX ORDER — CEFAZOLIN SODIUM 1 G/3ML
INJECTION, POWDER, FOR SOLUTION INTRAMUSCULAR; INTRAVENOUS PRN
Status: DISCONTINUED | OUTPATIENT
Start: 2022-03-23 | End: 2022-03-23 | Stop reason: SURG

## 2022-03-23 RX ORDER — HYDROMORPHONE HYDROCHLORIDE 1 MG/ML
0.5 INJECTION, SOLUTION INTRAMUSCULAR; INTRAVENOUS; SUBCUTANEOUS
Status: DISCONTINUED | OUTPATIENT
Start: 2022-03-23 | End: 2022-03-25 | Stop reason: HOSPADM

## 2022-03-23 RX ADMIN — MIDAZOLAM HYDROCHLORIDE 2 MG: 1 INJECTION, SOLUTION INTRAMUSCULAR; INTRAVENOUS at 10:33

## 2022-03-23 RX ADMIN — FENTANYL CITRATE 50 MCG: 50 INJECTION, SOLUTION INTRAMUSCULAR; INTRAVENOUS at 11:34

## 2022-03-23 RX ADMIN — LIDOCAINE HYDROCHLORIDE 0.5 ML: 10 INJECTION, SOLUTION EPIDURAL; INFILTRATION; INTRACAUDAL at 10:16

## 2022-03-23 RX ADMIN — POTASSIUM CHLORIDE AND SODIUM CHLORIDE: 900; 150 INJECTION, SOLUTION INTRAVENOUS at 17:15

## 2022-03-23 RX ADMIN — DEXAMETHASONE SODIUM PHOSPHATE 4 MG: 4 INJECTION, SOLUTION INTRA-ARTICULAR; INTRALESIONAL; INTRAMUSCULAR; INTRAVENOUS; SOFT TISSUE at 10:37

## 2022-03-23 RX ADMIN — OXYCODONE HYDROCHLORIDE 10 MG: 5 SOLUTION ORAL at 21:20

## 2022-03-23 RX ADMIN — ACETAMINOPHEN 1000 MG: 10 INJECTION, SOLUTION INTRAVENOUS at 17:25

## 2022-03-23 RX ADMIN — ROCURONIUM BROMIDE 50 MG: 10 INJECTION, SOLUTION INTRAVENOUS at 10:35

## 2022-03-23 RX ADMIN — FAMOTIDINE 20 MG: 10 INJECTION, SOLUTION INTRAVENOUS at 17:20

## 2022-03-23 RX ADMIN — OXYCODONE HYDROCHLORIDE 10 MG: 5 SOLUTION ORAL at 11:33

## 2022-03-23 RX ADMIN — FENTANYL CITRATE 100 MCG: 50 INJECTION, SOLUTION INTRAMUSCULAR; INTRAVENOUS at 10:35

## 2022-03-23 RX ADMIN — UMECLIDINIUM BROMIDE AND VILANTEROL TRIFENATATE 1 PUFF: 62.5; 25 POWDER RESPIRATORY (INHALATION) at 17:34

## 2022-03-23 RX ADMIN — ONDANSETRON 4 MG: 2 INJECTION INTRAMUSCULAR; INTRAVENOUS at 10:46

## 2022-03-23 RX ADMIN — HALOPERIDOL LACTATE 1 MG: 5 INJECTION, SOLUTION INTRAMUSCULAR at 11:41

## 2022-03-23 RX ADMIN — HYDROMORPHONE HYDROCHLORIDE 0.4 MG: 1 INJECTION, SOLUTION INTRAMUSCULAR; INTRAVENOUS; SUBCUTANEOUS at 14:17

## 2022-03-23 RX ADMIN — IPRATROPIUM BROMIDE AND ALBUTEROL SULFATE 3 ML: 2.5; .5 SOLUTION RESPIRATORY (INHALATION) at 12:04

## 2022-03-23 RX ADMIN — GABAPENTIN 300 MG: 300 CAPSULE ORAL at 17:28

## 2022-03-23 RX ADMIN — PROPOFOL 50 MG: 10 INJECTION, EMULSION INTRAVENOUS at 10:50

## 2022-03-23 RX ADMIN — BUPROPION HYDROCHLORIDE 200 MG: 100 TABLET, FILM COATED, EXTENDED RELEASE ORAL at 17:28

## 2022-03-23 RX ADMIN — FENTANYL CITRATE 50 MCG: 50 INJECTION, SOLUTION INTRAMUSCULAR; INTRAVENOUS at 11:45

## 2022-03-23 RX ADMIN — SODIUM CHLORIDE, POTASSIUM CHLORIDE, SODIUM LACTATE AND CALCIUM CHLORIDE: 600; 310; 30; 20 INJECTION, SOLUTION INTRAVENOUS at 10:16

## 2022-03-23 RX ADMIN — CEFAZOLIN 2 G: 330 INJECTION, POWDER, FOR SOLUTION INTRAMUSCULAR; INTRAVENOUS at 10:37

## 2022-03-23 RX ADMIN — PROPOFOL 110 MG: 10 INJECTION, EMULSION INTRAVENOUS at 10:37

## 2022-03-23 ASSESSMENT — PAIN DESCRIPTION - PAIN TYPE
TYPE: SURGICAL PAIN

## 2022-03-23 ASSESSMENT — LIFESTYLE VARIABLES
TOTAL SCORE: 0
HAVE YOU EVER FELT YOU SHOULD CUT DOWN ON YOUR DRINKING: NO
TOTAL SCORE: 0
ON A TYPICAL DAY WHEN YOU DRINK ALCOHOL HOW MANY DRINKS DO YOU HAVE: 0
HOW MANY TIMES IN THE PAST YEAR HAVE YOU HAD 5 OR MORE DRINKS IN A DAY: 0
AVERAGE NUMBER OF DAYS PER WEEK YOU HAVE A DRINK CONTAINING ALCOHOL: 0
ALCOHOL_USE: NO
CONSUMPTION TOTAL: NEGATIVE
EVER HAD A DRINK FIRST THING IN THE MORNING TO STEADY YOUR NERVES TO GET RID OF A HANGOVER: NO
EVER FELT BAD OR GUILTY ABOUT YOUR DRINKING: NO
TOTAL SCORE: 0
HAVE PEOPLE ANNOYED YOU BY CRITICIZING YOUR DRINKING: NO

## 2022-03-23 ASSESSMENT — PATIENT HEALTH QUESTIONNAIRE - PHQ9
2. FEELING DOWN, DEPRESSED, IRRITABLE, OR HOPELESS: NOT AT ALL
SUM OF ALL RESPONSES TO PHQ9 QUESTIONS 1 AND 2: 0
1. LITTLE INTEREST OR PLEASURE IN DOING THINGS: NOT AT ALL

## 2022-03-23 ASSESSMENT — FIBROSIS 4 INDEX: FIB4 SCORE: 0.86

## 2022-03-23 ASSESSMENT — PAIN SCALES - GENERAL: PAIN_LEVEL: 6

## 2022-03-23 NOTE — ANESTHESIA TIME REPORT
Anesthesia Start and Stop Event Times     Date Time Event    3/23/2022 1032 Ready for Procedure     1032 Anesthesia Start     1114 Anesthesia Stop        Responsible Staff  03/23/22    Name Role Begin End    Kaleb Abbott M.D. Anesth 1032 1114        Preop Diagnosis (Free Text):  Pre-op Diagnosis     CALCULUS OF GALLBLADDER        Preop Diagnosis (Codes):    Premium Reason  Non-Premium    Comments:

## 2022-03-23 NOTE — ANESTHESIA PROCEDURE NOTES
Airway    Date/Time: 3/23/2022 10:37 AM  Performed by: Kaleb Abbott M.D.  Authorized by: Kaleb Abbott M.D.     Location:  OR  Urgency:  Elective  Indications for Airway Management:  Anesthesia      Spontaneous Ventilation: absent    Sedation Level:  Deep  Preoxygenated: Yes    Patient Position:  Sniffing  Final Airway Type:  Endotracheal airway  Final Endotracheal Airway:  ETT  Cuffed: Yes    Technique Used for Successful ETT Placement:  Direct laryngoscopy    Insertion Site:  Oral  Blade Type:  [place]  Laryngoscope Blade/Videolaryngoscope Blade Size:  2  ETT Size (mm):  7.5  Measured from:  Teeth  ETT to Teeth (cm):  21  Placement Verified by: auscultation and capnometry    Cormack-Lehane Classification:  Grade I - full view of glottis  Number of Attempts at Approach:  1  Ventilation Between Attempts:  None  Number of Other Approaches Attempted:  0

## 2022-03-23 NOTE — PROGRESS NOTES
4 Eyes Skin Assessment Completed by GAVIOTA Hines and GAVIOTA Altman.    Head WDL  Ears WDL  Nose WDL  Mouth WDL  Neck WDL  Breast/Chest WDL  Shoulder Blades WDL  Spine WDL  (R) Arm/Elbow/Hand WDL  (L) Arm/Elbow/Hand WDL  Abdomen Incision  Groin WDL  Scrotum/Coccyx/Buttocks WDL  (R) Leg WDL  (L) Leg WDL  (R) Heel/Foot/Toe Scab  (L) Heel/Foot/Toe Scab          Devices In Places Pulse Ox      Interventions In Place Gray Ear Foams    Possible Skin Injury Yes    Pictures Uploaded Into Epic Yes  Wound Consult Placed Yes  RN Wound Prevention Protocol Ordered Yes

## 2022-03-23 NOTE — ANESTHESIA POSTPROCEDURE EVALUATION
Patient: Marilyn Salinas    Procedure Summary     Date: 03/23/22 Room / Location: [place] [address] / SURGERY [facility]    Anesthesia Start: 1032 Anesthesia Stop: 1114    Procedure: CHOLECYSTECTOMY, LAPAROSCOPIC (Abdomen) Diagnosis: (CALCULUS OF GALLBLADDER)    Surgeons: Mayur Barrett M.D. Responsible Provider: Kaleb Abbott M.D.    Anesthesia Type: general ASA Status: 3          Final Anesthesia Type: general  Last vitals  BP   Blood Pressure: 138/87    Temp   36.7 °C (98 °F)    Pulse   92   Resp   (!) 26    SpO2   91 %      Anesthesia Post Evaluation    Patient location during evaluation: PACU  Patient participation: complete - patient participated  Level of consciousness: awake and alert  Pain score: 6    Airway patency: patent  Anesthetic complications: no  Cardiovascular status: hemodynamically stable  Respiratory status: acceptable  Hydration status: euvolemic    PONV: none          No complications documented.     Nurse Pain Score: 6 (NPRS)

## 2022-03-23 NOTE — OR NURSING
Awake, alert and tolerating PO fluids.  Medicated for pain/nausea. Pt still requiring 3L NC with intermittent desaturations.  Incentive spirometry teaching done with return demonstration.  Expiratory wheezes auscultated.  Nebulizer Tx in process.  ON monitors with alarms audible.

## 2022-03-23 NOTE — OP REPORT
NAME:  Marilyn Salinas  MRN:  [id number]  :  1965      DATE OF OPERATION: 3/23/2022    PREOPERATIVE DIAGNOSIS: Biliary colic    POSTOPERATIVE DIAGNOSIS: Cholecystitis    OPERATION PERFORMED: Laparoscopic cholecystectomy    SURGEON: Mayur Barrett MD    ASSISTANT:  Marcelle Beaulieu PA-C, PA-C    ANESTHESIOLOGIST:  Anesthesiologist: Kaleb Abbott M.D.    ANESTHESIA: General endotracheal anesthesia.     FINDINGS: The gallbladder showed signs of cholecystitis.     SPECIMEN: Gallbladder.    ESTIMATED BLOOD LOSS: <10 cc.     INDICATIONS: The patient is a 56 y.o. female with a history of symptomatic biliary colic. She is taken to the operating room today for laparoscopic cholecystectomy.     PROCEDURE: Following informed consent, the patient was properly identified, taken to the operating room, and placed in the supine position where general endotracheal anesthesia was administered. Intravenous antibiotics were administered by the anesthesiologist in the correct time interval. Sequential compression devices were employed. The abdomen was prepped and draped into a sterile field.     A bladeless optical entry trocar was carefully inserted into the abdomen and a pneumoperitoneum was established in the usual fashion.  A 5 mm separator port was introduced. A 5 mm lens/camera was passed into the peritoneal cavity.  An 11 mm port was placed in the epigastric midline under direct vision. Two 5 mm right subcostal ports were placed under direct vision.     Careful examination of the gallbladder and liver were performed.  Adhesions to the gallbladder serosa were lysed. 60cc of bile were aspirated from the gallbladder. The gallbladder was then grasped and elevated upward toward the right shoulder.  Dissection was carried out in hepatocystic triangle definitively identifying the cystic duct and cystic   artery. These structures were multiply clipped proximally, once distally and   divided. The gallbladder was dissected free from  the undersurface of the   liver using electrocautery and placed within an EndoCatch bag. It was delivered intact from the abdominal cavity through the epigastric port site. The gallbladder fossa was irrigated. All excess irrigant was evacuated from the abdominal cavity. Hemostasis was meticulous.     The ports were removed and the abdomen desufflated. The enlarged epigastric port site fascia was approximated with a 0 Vicryl suture. The port site skin incisions were closed with interrupted 4-0 Vicryl subcuticular sutures.  Steri-Strips and Benzoin were applied beneath sterile Band-Aids.     The patient tolerated the procedure well and there were no apparent complications. All sponge, needle, and instrument counts were correct on 2 separate occasions. She was awakened, extubated, and transferred to the recovery room in satisfactory condition.       ____________________________________   Mayur Barrett MD  DD: 3/23/2022  2:21 PM    CC:  [facility];

## 2022-03-23 NOTE — ANESTHESIA PREPROCEDURE EVALUATION
Case: [id number] Date/Time: 03/23/22 1100    Procedure: CHOLECYSTECTOMY, LAPAROSCOPIC    Pre-op diagnosis: CALCULUS OF GALLBLADDER    Location: [facility] OR 10 / SURGERY [facility]    Surgeons: Mayur Barrett M.D.        Spastic quad  Relevant Problems   PULMONARY   (positive) Mild intermittent asthma       Physical Exam    Airway   Mallampati: II  TM distance: >3 FB  Neck ROM: full       Cardiovascular - normal exam  Rhythm: regular  Rate: normal  (-) murmur     Dental - normal exam           Pulmonary - normal exam  Breath sounds clear to auscultation     Abdominal    Neurological - normal exam                 Anesthesia Plan    ASA 3   ASA physical status 3 criteria: other (comment)    Plan - general       Airway plan will be ETT          Induction: intravenous    Postoperative Plan: Postoperative administration of opioids is intended.    Pertinent diagnostic labs and testing reviewed    Informed Consent:    Anesthetic plan and risks discussed with patient.    Use of blood products discussed with: patient whom consented to blood products.

## 2022-03-23 NOTE — OR NURSING
Transported to floor via on gurivis with RN.  On 3L O2 with tank >50% full and in burkett.  O2 tubing connected to patient.  2 person max assist to bathroom on arrival to floor.  Pt urinated without difficulty.  Assisted to hospital bed with CNA.  Walker and 1 bag of belongings with patient.

## 2022-03-24 ENCOUNTER — PATIENT OUTREACH (OUTPATIENT)
Dept: HEALTH INFORMATION MANAGEMENT | Facility: OTHER | Age: 57
End: 2022-03-24
Payer: MEDICAID

## 2022-03-24 LAB
ALBUMIN SERPL BCP-MCNC: 4 G/DL (ref 3.2–4.9)
ALBUMIN/GLOB SERPL: 1.8 G/DL
ALP SERPL-CCNC: 120 U/L (ref 30–99)
ALT SERPL-CCNC: 200 U/L (ref 2–50)
ANION GAP SERPL CALC-SCNC: 10 MMOL/L (ref 7–16)
AST SERPL-CCNC: 154 U/L (ref 12–45)
BILIRUB SERPL-MCNC: 0.6 MG/DL (ref 0.1–1.5)
BUN SERPL-MCNC: 13 MG/DL (ref 8–22)
CALCIUM SERPL-MCNC: 9 MG/DL (ref 8.5–10.5)
CHLORIDE SERPL-SCNC: 103 MMOL/L (ref 96–112)
CO2 SERPL-SCNC: 26 MMOL/L (ref 20–33)
CREAT SERPL-MCNC: 0.59 MG/DL (ref 0.5–1.4)
ERYTHROCYTE [DISTWIDTH] IN BLOOD BY AUTOMATED COUNT: 49.4 FL (ref 35.9–50)
GFR SERPLBLD CREATININE-BSD FMLA CKD-EPI: 106 ML/MIN/1.73 M 2
GLOBULIN SER CALC-MCNC: 2.2 G/DL (ref 1.9–3.5)
GLUCOSE SERPL-MCNC: 122 MG/DL (ref 65–99)
HCT VFR BLD AUTO: 40.9 % (ref 37–47)
HGB BLD-MCNC: 13.9 G/DL (ref 12–16)
MCH RBC QN AUTO: 31 PG (ref 27–33)
MCHC RBC AUTO-ENTMCNC: 34 G/DL (ref 33.6–35)
MCV RBC AUTO: 91.3 FL (ref 81.4–97.8)
PLATELET # BLD AUTO: 229 K/UL (ref 164–446)
PMV BLD AUTO: 10.4 FL (ref 9–12.9)
POTASSIUM SERPL-SCNC: 4 MMOL/L (ref 3.6–5.5)
PROT SERPL-MCNC: 6.2 G/DL (ref 6–8.2)
RBC # BLD AUTO: 4.48 M/UL (ref 4.2–5.4)
SODIUM SERPL-SCNC: 139 MMOL/L (ref 135–145)
WBC # BLD AUTO: 10.9 K/UL (ref 4.8–10.8)

## 2022-03-24 PROCEDURE — 700102 HCHG RX REV CODE 250 W/ 637 OVERRIDE(OP): Performed by: COLON & RECTAL SURGERY

## 2022-03-24 PROCEDURE — 700111 HCHG RX REV CODE 636 W/ 250 OVERRIDE (IP): Performed by: STUDENT IN AN ORGANIZED HEALTH CARE EDUCATION/TRAINING PROGRAM

## 2022-03-24 PROCEDURE — 700101 HCHG RX REV CODE 250: Performed by: STUDENT IN AN ORGANIZED HEALTH CARE EDUCATION/TRAINING PROGRAM

## 2022-03-24 PROCEDURE — G0378 HOSPITAL OBSERVATION PER HR: HCPCS

## 2022-03-24 PROCEDURE — 700102 HCHG RX REV CODE 250 W/ 637 OVERRIDE(OP): Performed by: STUDENT IN AN ORGANIZED HEALTH CARE EDUCATION/TRAINING PROGRAM

## 2022-03-24 PROCEDURE — A9270 NON-COVERED ITEM OR SERVICE: HCPCS | Performed by: COLON & RECTAL SURGERY

## 2022-03-24 PROCEDURE — A9270 NON-COVERED ITEM OR SERVICE: HCPCS | Performed by: STUDENT IN AN ORGANIZED HEALTH CARE EDUCATION/TRAINING PROGRAM

## 2022-03-24 PROCEDURE — 96376 TX/PRO/DX INJ SAME DRUG ADON: CPT

## 2022-03-24 PROCEDURE — 80053 COMPREHEN METABOLIC PANEL: CPT

## 2022-03-24 PROCEDURE — 85027 COMPLETE CBC AUTOMATED: CPT

## 2022-03-24 RX ORDER — TIZANIDINE 4 MG/1
2 TABLET ORAL 2 TIMES DAILY
Status: DISCONTINUED | OUTPATIENT
Start: 2022-03-24 | End: 2022-03-25 | Stop reason: HOSPADM

## 2022-03-24 RX ORDER — BACLOFEN 10 MG/1
10 TABLET ORAL 2 TIMES DAILY
Status: DISCONTINUED | OUTPATIENT
Start: 2022-03-24 | End: 2022-03-25 | Stop reason: HOSPADM

## 2022-03-24 RX ADMIN — GABAPENTIN 300 MG: 300 CAPSULE ORAL at 17:38

## 2022-03-24 RX ADMIN — BUPROPION HYDROCHLORIDE 200 MG: 100 TABLET, FILM COATED, EXTENDED RELEASE ORAL at 05:32

## 2022-03-24 RX ADMIN — BACLOFEN 10 MG: 10 TABLET ORAL at 21:00

## 2022-03-24 RX ADMIN — ACETAMINOPHEN 1000 MG: 500 TABLET ORAL at 11:09

## 2022-03-24 RX ADMIN — ACETAMINOPHEN 1000 MG: 500 TABLET ORAL at 05:30

## 2022-03-24 RX ADMIN — ACETAMINOPHEN 1000 MG: 10 INJECTION, SOLUTION INTRAVENOUS at 00:33

## 2022-03-24 RX ADMIN — UMECLIDINIUM BROMIDE AND VILANTEROL TRIFENATATE 1 PUFF: 62.5; 25 POWDER RESPIRATORY (INHALATION) at 11:28

## 2022-03-24 RX ADMIN — GABAPENTIN 300 MG: 300 CAPSULE ORAL at 05:31

## 2022-03-24 RX ADMIN — POTASSIUM CHLORIDE AND SODIUM CHLORIDE: 900; 150 INJECTION, SOLUTION INTRAVENOUS at 04:17

## 2022-03-24 RX ADMIN — BUPROPION HYDROCHLORIDE 200 MG: 100 TABLET, FILM COATED, EXTENDED RELEASE ORAL at 17:39

## 2022-03-24 RX ADMIN — OXYCODONE HYDROCHLORIDE 10 MG: 5 SOLUTION ORAL at 11:57

## 2022-03-24 RX ADMIN — TIZANIDINE 2 MG: 4 TABLET ORAL at 21:01

## 2022-03-24 RX ADMIN — ACETAMINOPHEN 1000 MG: 500 TABLET ORAL at 17:38

## 2022-03-24 RX ADMIN — FAMOTIDINE 20 MG: 10 INJECTION, SOLUTION INTRAVENOUS at 05:32

## 2022-03-24 RX ADMIN — GABAPENTIN 300 MG: 300 CAPSULE ORAL at 11:09

## 2022-03-24 RX ADMIN — FAMOTIDINE 20 MG: 10 INJECTION, SOLUTION INTRAVENOUS at 17:39

## 2022-03-24 ASSESSMENT — ENCOUNTER SYMPTOMS
PALPITATIONS: 0
CHILLS: 0
SHORTNESS OF BREATH: 0
DIARRHEA: 0
NAUSEA: 0
VOMITING: 0
FEVER: 0
HEARTBURN: 0
COUGH: 0
ABDOMINAL PAIN: 1

## 2022-03-24 ASSESSMENT — PAIN DESCRIPTION - PAIN TYPE
TYPE: SURGICAL PAIN
TYPE: SURGICAL PAIN

## 2022-03-24 NOTE — CARE PLAN
The patient is Stable - Low risk of patient condition declining or worsening    Shift Goals  Clinical Goals: Pain management, Rest  Patient Goals: Rest  Family Goals: N/A Family not present    Progress made toward(s) clinical / shift goals:      Problem: Pain - Standard  Goal: Alleviation of pain or a reduction in pain to the patient’s comfort goal  Outcome: Progressing     Problem: Fall Risk  Goal: Patient will remain free from falls  Outcome: Progressing     Problem: Knowledge Deficit - Standard  Goal: Patient and family/care givers will demonstrate understanding of plan of care, disease process/condition, diagnostic tests and medications  Outcome: Progressing

## 2022-03-24 NOTE — PROGRESS NOTES
Assessment completed.  Pt A&Ox4.  Pt complains of 8/10 abdominal pain, message sent to pharmacy to refill Omnicell. 1 of 4 dressing is saturated with serosanguinous fluid, dressing changed. Ice pack provided to patient.  POC discussed: awaiting oxygen delivery; communication board updated.    Bed in locked, lowest position.  Call light and belongings within reach.  Needs met.

## 2022-03-24 NOTE — PROGRESS NOTES
Surgical Progress Note    Author: Marcelle Beaulieu P.A.-C. Date & Time created: 3/24/2022   10:10 AM     Interval Events:  56 year old female sp laparoscopic cholecystectomy admitted for postoperative hypoxia still requiring 3L of O2.   O2 saturation drops to 85% RA with amulation.    History of tobacco use.    Otherwise abdominal pain is incisional, tolerating liquids at this time.   Pt is ambulating and voiding.     Review of Systems   Constitutional: Negative for chills and fever.   Respiratory: Negative for cough and shortness of breath.    Cardiovascular: Negative for chest pain and palpitations.   Gastrointestinal: Positive for abdominal pain (incisional). Negative for diarrhea, heartburn, nausea and vomiting.   Genitourinary: Negative for dysuria.     Hemodynamics:  Temp (24hrs), Av.6 °C (97.9 °F), Min:35.9 °C (96.7 °F), Max:37.1 °C (98.7 °F)  Temperature: 37.1 °C (98.7 °F)  Pulse  Av  Min: 74  Max: 93   Blood Pressure: 114/74     Respiratory:    Respiration: 20, Pulse Oximetry: 93 %     Work Of Breathing / Effort: Within Normal Limits  RUL Breath Sounds: Diminished, RML Breath Sounds: Diminished, RLL Breath Sounds: Diminished, PREM Breath Sounds: Diminished, LLL Breath Sounds: Diminished  Neuro:  GCS       Fluids:    Intake/Output Summary (Last 24 hours) at 3/24/2022 1010  Last data filed at 3/24/2022 0830  Gross per 24 hour   Intake 1252.67 ml   Output 20 ml   Net 1232.67 ml        Current Diet Order   Procedures   • Diet Order Diet: Regular     Physical Exam  Constitutional:       Appearance: Normal appearance. She is obese.   HENT:      Head: Normocephalic and atraumatic.      Nose: Nose normal.      Mouth/Throat:      Mouth: Mucous membranes are moist.   Eyes:      Conjunctiva/sclera: Conjunctivae normal.   Cardiovascular:      Rate and Rhythm: Normal rate and regular rhythm.   Pulmonary:      Effort: Pulmonary effort is normal. No respiratory distress.   Abdominal:      General: There is  distension.      Palpations: Abdomen is soft.      Tenderness: There is abdominal tenderness. There is no guarding or rebound.   Musculoskeletal:         General: Normal range of motion.      Cervical back: Normal range of motion.   Skin:     General: Skin is warm and dry.      Coloration: Skin is not jaundiced.      Findings: No erythema or rash.   Neurological:      Mental Status: She is alert and oriented to person, place, and time.   Psychiatric:         Mood and Affect: Mood normal.       Labs:  Recent Results (from the past 24 hour(s))   Histology Request    Collection Time: 03/23/22 12:04 PM   Result Value Ref Range    Pathology Request Sent to Histo    CBC without Differential (blood)    Collection Time: 03/24/22  3:05 AM   Result Value Ref Range    WBC 10.9 (H) 4.8 - 10.8 K/uL    RBC 4.48 4.20 - 5.40 M/uL    Hemoglobin 13.9 12.0 - 16.0 g/dL    Hematocrit 40.9 37.0 - 47.0 %    MCV 91.3 81.4 - 97.8 fL    MCH 31.0 27.0 - 33.0 pg    MCHC 34.0 33.6 - 35.0 g/dL    RDW 49.4 35.9 - 50.0 fL    Platelet Count 229 164 - 446 K/uL    MPV 10.4 9.0 - 12.9 fL   Comp Metabolic Panel (CMP)    Collection Time: 03/24/22  3:05 AM   Result Value Ref Range    Sodium 139 135 - 145 mmol/L    Potassium 4.0 3.6 - 5.5 mmol/L    Chloride 103 96 - 112 mmol/L    Co2 26 20 - 33 mmol/L    Anion Gap 10.0 7.0 - 16.0    Glucose 122 (H) 65 - 99 mg/dL    Bun 13 8 - 22 mg/dL    Creatinine 0.59 0.50 - 1.40 mg/dL    Calcium 9.0 8.5 - 10.5 mg/dL    AST(SGOT) 154 (H) 12 - 45 U/L    ALT(SGPT) 200 (H) 2 - 50 U/L    Alkaline Phosphatase 120 (H) 30 - 99 U/L    Total Bilirubin 0.6 0.1 - 1.5 mg/dL    Albumin 4.0 3.2 - 4.9 g/dL    Total Protein 6.2 6.0 - 8.2 g/dL    Globulin 2.2 1.9 - 3.5 g/dL    A-G Ratio 1.8 g/dL   ESTIMATED GFR    Collection Time: 03/24/22  3:05 AM   Result Value Ref Range    GFR (CKD-EPI) 106 >60 mL/min/1.73 m 2     Medical Decision Making, by Problem:  Active Hospital Problems    Diagnosis    • Postoperative hypoxia [R09.02, Z98.890]       Plan:  Pt is alert and oriented, NAD. Breathing unlabored although requiring O2. Tolerating PO.  Incisions ok. VS stable. Labs reviewed.   Encouraged ambulation and incentive spirometry.  Needs O2 evaluation.   Pt also seen and examined by Dr. Barrett.    Quality Measures:  Quality-Core Measures   Reviewed items::  Labs reviewed  Louis catheter::  No Louis  DVT prophylaxis pharmacological::  Enoxaparin (Lovenox)  DVT prophylaxis - mechanical:  SCDs  Ulcer Prophylaxis::  Yes      Discussed patient condition with RN, Patient and Dr. Barrett

## 2022-03-24 NOTE — PROGRESS NOTES
Attempted to wean O2; Pt drop to 85% on RA. LARISA Danielson notified. Per PA pt to stay overnight. Pt updated.

## 2022-03-24 NOTE — CARE PLAN
Problem: Pain - Standard  Goal: Alleviation of pain or a reduction in pain to the patient’s comfort goal  Outcome: Progressing     Problem: Fall Risk  Goal: Patient will remain free from falls  Outcome: Progressing     Problem: Knowledge Deficit - Standard  Goal: Patient and family/care givers will demonstrate understanding of plan of care, disease process/condition, diagnostic tests and medications  Outcome: Progressing   The patient is Stable - Low risk of patient condition declining or worsening    Shift Goals  Clinical Goals: Pain Management, Wean O2, Rest, Patient Safety  Patient Goals: Rest  Family Goals: No family present    Progress made toward(s) clinical / shift goals:  Pt updated on POC, all questions answered. Call light and personal belongings within reach. Pt resting in bed. Pt using call light appropriately.     Patient is not progressing towards the following goals:

## 2022-03-24 NOTE — CARE PLAN
The patient is Stable - Low risk of patient condition declining or worsening    Shift Goals  Clinical Goals: pain management  Patient Goals: comfort  Family Goals: N/A Family not present      Problem: Pain - Standard  Goal: Alleviation of pain or a reduction in pain to the patient’s comfort goal  Outcome: Progressing     Problem: Fall Risk  Goal: Patient will remain free from falls  Outcome: Progressing     Problem: Knowledge Deficit - Standard  Goal: Patient and family/care givers will demonstrate understanding of plan of care, disease process/condition, diagnostic tests and medications  Outcome: Progressing

## 2022-03-24 NOTE — FACE TO FACE
"Face to Face Note  -  Durable Medical Equipment    Marcelle Beaulieu P.A.-C. - NPI: [id number]  I certify that this patient is under my care and that they had a durable medical equipment(DME)face to face encounter by myself that meets the physician DME face-to-face encounter requirements with this patient on:    Date of encounter:   Patient:                    MRN:                       YOB: 2021  Marilyn Salinas  [id number]  1965     The encounter with the patient was in whole, or in part, for the following medical condition, which is the primary reason for durable medical equipment:  Asthma and Post-Op Surgery    I certify that, based on my findings, the following durable medical equipment is medically necessary:  Oxygen.    HOME O2 Saturation Measurements:(Values must be present for Home Oxygen orders)  Room air sat at rest: 92  Room air sat with amb: 85  With liters of O2: 2, O2 sat at rest with O2: 95  With Liters of O2: 2, O2 sat with amb with O2 : 93  Is the patient mobile?: No    My Clinical findings support the need for the above equipment due to:  Hypoxia    Supporting Symptoms: The patient requires supplemental oxygen, as the following interventions have been tried with limited or no improvement: \"Incentive spirometry    If patient feels more short of breath, they can go up to 6 liters per minute and contact healthcare provider.  "

## 2022-03-25 VITALS
WEIGHT: 158.73 LBS | RESPIRATION RATE: 18 BRPM | TEMPERATURE: 97.2 F | BODY MASS INDEX: 25.51 KG/M2 | HEIGHT: 66 IN | HEART RATE: 84 BPM | SYSTOLIC BLOOD PRESSURE: 125 MMHG | OXYGEN SATURATION: 94 % | DIASTOLIC BLOOD PRESSURE: 70 MMHG

## 2022-03-25 LAB
ALBUMIN SERPL BCP-MCNC: 4.4 G/DL (ref 3.2–4.9)
ALBUMIN/GLOB SERPL: 1.6 G/DL
ALP SERPL-CCNC: 127 U/L (ref 30–99)
ALT SERPL-CCNC: 166 U/L (ref 2–50)
ANION GAP SERPL CALC-SCNC: 16 MMOL/L (ref 7–16)
AST SERPL-CCNC: 101 U/L (ref 12–45)
BILIRUB SERPL-MCNC: 0.8 MG/DL (ref 0.1–1.5)
BUN SERPL-MCNC: 9 MG/DL (ref 8–22)
CALCIUM SERPL-MCNC: 9.6 MG/DL (ref 8.5–10.5)
CHLORIDE SERPL-SCNC: 100 MMOL/L (ref 96–112)
CO2 SERPL-SCNC: 23 MMOL/L (ref 20–33)
CREAT SERPL-MCNC: 0.57 MG/DL (ref 0.5–1.4)
ERYTHROCYTE [DISTWIDTH] IN BLOOD BY AUTOMATED COUNT: 47.2 FL (ref 35.9–50)
GFR SERPLBLD CREATININE-BSD FMLA CKD-EPI: 107 ML/MIN/1.73 M 2
GLOBULIN SER CALC-MCNC: 2.7 G/DL (ref 1.9–3.5)
GLUCOSE SERPL-MCNC: 97 MG/DL (ref 65–99)
HCT VFR BLD AUTO: 42.7 % (ref 37–47)
HGB BLD-MCNC: 14.8 G/DL (ref 12–16)
MCH RBC QN AUTO: 31.1 PG (ref 27–33)
MCHC RBC AUTO-ENTMCNC: 34.7 G/DL (ref 33.6–35)
MCV RBC AUTO: 89.7 FL (ref 81.4–97.8)
PLATELET # BLD AUTO: 251 K/UL (ref 164–446)
PMV BLD AUTO: 10.5 FL (ref 9–12.9)
POTASSIUM SERPL-SCNC: 3.8 MMOL/L (ref 3.6–5.5)
PROT SERPL-MCNC: 7.1 G/DL (ref 6–8.2)
RBC # BLD AUTO: 4.76 M/UL (ref 4.2–5.4)
SODIUM SERPL-SCNC: 139 MMOL/L (ref 135–145)
WBC # BLD AUTO: 9.5 K/UL (ref 4.8–10.8)

## 2022-03-25 PROCEDURE — 700102 HCHG RX REV CODE 250 W/ 637 OVERRIDE(OP): Performed by: COLON & RECTAL SURGERY

## 2022-03-25 PROCEDURE — G0378 HOSPITAL OBSERVATION PER HR: HCPCS

## 2022-03-25 PROCEDURE — 94640 AIRWAY INHALATION TREATMENT: CPT

## 2022-03-25 PROCEDURE — 80053 COMPREHEN METABOLIC PANEL: CPT

## 2022-03-25 PROCEDURE — 96376 TX/PRO/DX INJ SAME DRUG ADON: CPT

## 2022-03-25 PROCEDURE — A9270 NON-COVERED ITEM OR SERVICE: HCPCS | Performed by: COLON & RECTAL SURGERY

## 2022-03-25 PROCEDURE — 85027 COMPLETE CBC AUTOMATED: CPT

## 2022-03-25 PROCEDURE — 700111 HCHG RX REV CODE 636 W/ 250 OVERRIDE (IP): Performed by: STUDENT IN AN ORGANIZED HEALTH CARE EDUCATION/TRAINING PROGRAM

## 2022-03-25 PROCEDURE — A9270 NON-COVERED ITEM OR SERVICE: HCPCS | Performed by: STUDENT IN AN ORGANIZED HEALTH CARE EDUCATION/TRAINING PROGRAM

## 2022-03-25 PROCEDURE — 700102 HCHG RX REV CODE 250 W/ 637 OVERRIDE(OP): Performed by: STUDENT IN AN ORGANIZED HEALTH CARE EDUCATION/TRAINING PROGRAM

## 2022-03-25 RX ADMIN — ACETAMINOPHEN 1000 MG: 500 TABLET ORAL at 05:53

## 2022-03-25 RX ADMIN — BACLOFEN 10 MG: 10 TABLET ORAL at 05:54

## 2022-03-25 RX ADMIN — TIZANIDINE 2 MG: 4 TABLET ORAL at 05:54

## 2022-03-25 RX ADMIN — UMECLIDINIUM BROMIDE AND VILANTEROL TRIFENATATE 1 PUFF: 62.5; 25 POWDER RESPIRATORY (INHALATION) at 09:09

## 2022-03-25 RX ADMIN — ACETAMINOPHEN 1000 MG: 500 TABLET ORAL at 00:40

## 2022-03-25 RX ADMIN — BUPROPION HYDROCHLORIDE 200 MG: 100 TABLET, FILM COATED, EXTENDED RELEASE ORAL at 05:54

## 2022-03-25 RX ADMIN — FAMOTIDINE 20 MG: 10 INJECTION, SOLUTION INTRAVENOUS at 05:53

## 2022-03-25 RX ADMIN — GABAPENTIN 300 MG: 300 CAPSULE ORAL at 05:53

## 2022-03-25 RX ADMIN — OXYCODONE HYDROCHLORIDE 10 MG: 5 SOLUTION ORAL at 00:40

## 2022-03-25 ASSESSMENT — ENCOUNTER SYMPTOMS
CHILLS: 0
DIARRHEA: 0
HEARTBURN: 0
PALPITATIONS: 0
FEVER: 0
NAUSEA: 0
ABDOMINAL PAIN: 1
VOMITING: 0
COUGH: 0
SHORTNESS OF BREATH: 0

## 2022-03-25 ASSESSMENT — LIFESTYLE VARIABLES: REASON UNABLE TO ASSESS: N

## 2022-03-25 NOTE — DISCHARGE PLANNING
Call from [employer] asking about oxygen delivery. [facility] reviewed, face to face and order entered at 1108 today. CM spoke with Winifred TURNER CM and she was unaware of order.     CM spoke with pt for Choice and she will go with anyone that accepts Medicaid. Called Sangeeta at [facility] and referral was sent electronically. Await determination.  Pt states she could private pay but [facility] stated they can't take self pay from a Medicaid pt. Choice uploaded to media tab.

## 2022-03-25 NOTE — PROGRESS NOTES
Pt in bed awake,a&ox4,unsteady ambulation,walked to bathroom with walker,pt's oxygen saturation on RA 93%,incision site looks c/d/i,with dry gauze,plan for d/c explained to pt,no c/o n/v,tolerates food,passing gas,voided.

## 2022-03-25 NOTE — DISCHARGE PLANNING
Alida from [facility] called to say Medicaid won't pay for post-op oxygen. Informed her that pt has asthma and it's on the Face to Face. She requested this be re-faxed (done [phone]).    She will send it off tonight but will not have a determination until tomorrow morning. [facility] notified along with need for DC Summary.

## 2022-03-25 NOTE — DISCHARGE SUMMARY
Discharge Summary    CHIEF COMPLAINT ON ADMISSION  No chief complaint on file.      Reason for Admission  CALCULUS OF GALLBLADDER     Admission Date  3/23/2022    CODE STATUS  Full Code    HPI & HOSPITAL COURSE  This is a 56 y.o. female who underwent Laparoscopic Cholecystectomy and was admitted to the hospital for postoperative respiratory failure and hypoxia. On POD 1, she continued to required Oxygen via nasal cannula to maintain good oxygen saturations despite deep breathing, coughing, incentive spirometry, and ambulation. On POD2, she was able to wean off Oxygen and was able to maintain good oxygen saturations in the 90s at rest and with exertion. She has been able to tolerating a diet without nausea/vomiting. Her incisional abdominal pain has been controlled on medications provided. She has been ambulating and voiding. VS and Labs have been reviewed, monitored, and stable throughout her hospital stay. She will be discharged today in good condition tolerating diet, ambulating, Oxygen weaned to room air, and incisional pain controlled. She should follow up in our office in 1-2 weeks. Prescriptions for pain medication and antinausea medication sent electronically to her pharmacy.   No notes on file    Therefore, she is discharged in good and stable condition to home with close outpatient follow-up.    The patient met 2-midnight criteria for an inpatient stay at the time of discharge.    Discharge Date  3/25/2022    FOLLOW UP ITEMS POST DISCHARGE  Follow up in Dr. Barrett's office in 1-2 weeks    DISCHARGE DIAGNOSES  Principal Problem:    Postoperative hypoxia POA: Yes  Resolved Problems:    * No resolved hospital problems. *      FOLLOW UP  Future Appointments   Date Time Provider Department Center   4/5/2022 10:00 AM Edwar Platt M.D. [facility]   4/13/2022  3:30 PM No Gu, PT [address] [place] [address]   4/19/2022 11:00 AM NAUN Sanchez [address] [place] [address]   4/28/2022  1:15 PM Aki  Emmanuel, PT [facility]   6/21/2022 11:00 AM Julianna Guillory D.O. [facility] None     Edwar Platt M.D.  [address] [address]  [phone]    Call  As needed      MEDICATIONS ON DISCHARGE     Medication List      START taking these medications      Instructions   * HYDROcodone-acetaminophen 7.5-325 MG tab  Commonly known as: Norco   Take 1 Tablet by mouth every 6 hours as needed for up to 5 days.  Dose: 1 Tablet     * HYDROcodone-acetaminophen 2.5-108 mg/5mL 7.5-325 MG/15ML solution  Commonly known as: HYCET   Take 10 mL by mouth every four hours as needed for Severe Pain for up to 3 days.  Dose: 10 mL     ondansetron 4 MG Tbdp  Commonly known as: ZOFRAN ODT   Take 1 Tablet by mouth every 6 hours as needed for Nausea.  Dose: 4 mg         * This list has 2 medication(s) that are the same as other medications prescribed for you. Read the directions carefully, and ask your doctor or other care provider to review them with you.            CHANGE how you take these medications      Instructions   baclofen 10 MG Tabs  What changed: when to take this  Commonly known as: LIORESAL   TAKE 3 TABLETS BY MOUTH THREE TIMES DAILY     * gabapentin 300 MG Caps  What changed: Another medication with the same name was changed. Make sure you understand how and when to take each.  Commonly known as: NEURONTIN   TAKE 3 CAPSULES BY MOUTH THREE TIMES DAILY     * gabapentin 300 MG Caps  What changed: when to take this  Commonly known as: NEURONTIN   Take 1 po qhs     tizanidine 2 MG capsule  What changed: when to take this  Commonly known as: ZANAFLEX   Take 1 Capsule by mouth 3 times a day.  Dose: 2 mg         * This list has 2 medication(s) that are the same as other medications prescribed for you. Read the directions carefully, and ask your doctor or other care provider to review them with you.            CONTINUE taking these medications      Instructions   albuterol 108 (90 Base) MCG/ACT Aers inhalation  aerosol   Inhale 2 Puffs every 6 hours as needed for Shortness of Breath.  Dose: 2 Puff     buPROPion 150 MG XL tablet  Commonly known as: WELLBUTRIN XL   TAKE 3 TABLETS BY MOUTH DAILY     diphenhydrAMINE 25 MG Tabs  Commonly known as: BENADRYL   Take 25 mg by mouth every day.  Dose: 25 mg     fluticasone 50 MCG/ACT nasal spray  Commonly known as: FLONASE   Administer 1 Spray into affected nostril(S) every day.  Dose: 1 Spray     meloxicam 15 MG tablet  Commonly known as: MOBIC   TAKE 1 TABLET BY MOUTH DAILY     Myrbetriq 50 MG Tb24  Generic drug: Mirabegron ER   Take 50 mg by mouth every day.  Dose: 50 mg     pantoprazole 20 MG tablet  Commonly known as: PROTONIX   TAKE ONE TABLET BY MOUTH DAILY     Stiolto Respimat 2.5-2.5 MCG/ACT Aers  Generic drug: Tiotropium Bromide-Olodaterol   Inhale 2 Puffs every day.  Dose: 2 Puff     THRIVE MT   every 72 hours.     * Toviaz 4 MG Tb24  Generic drug: fesoterodine fumarate   1 tablet     * Toviaz 4 MG Tb24  Generic drug: fesoterodine fumarate   every evening. Indications: Overactive Bladder     * Toviaz 4 MG Tb24  Generic drug: fesoterodine fumarate      venlafaxine 150 MG extended-release capsule  Commonly known as: EFFEXOR-XR   Take 1 Capsule by mouth every day.  Dose: 150 mg         * This list has 3 medication(s) that are the same as other medications prescribed for you. Read the directions carefully, and ask your doctor or other care provider to review them with you.                Allergies  Allergies   Allergen Reactions   • Other Environmental Hives     TUMBLEWEED   • Codeine Itching and Unspecified     Rash, itching, mood disorder- becomes agitated  Other reaction(s): itchy, grumpy       DIET  Orders Placed This Encounter   Procedures   • Diet Order Diet: Regular     Standing Status:   Standing     Number of Occurrences:   1     Order Specific Question:   Diet:     Answer:   Regular [1]       ACTIVITY  Light duty.  20-lb lifting  restriction    CONSULTATIONS  none    PROCEDURES  Laparoscopic cholecystectomy    LABORATORY  Lab Results   Component Value Date    SODIUM 139 03/25/2022    POTASSIUM 3.8 03/25/2022    CHLORIDE 100 03/25/2022    CO2 23 03/25/2022    GLUCOSE 97 03/25/2022    BUN 9 03/25/2022    CREATININE 0.57 03/25/2022        Lab Results   Component Value Date    WBC 9.5 03/25/2022    HEMOGLOBIN 14.8 03/25/2022    HEMATOCRIT 42.7 03/25/2022    PLATELETCT 251 03/25/2022        Total time of the discharge process exceeds 31 minutes.

## 2022-03-25 NOTE — DISCHARGE INSTRUCTIONS
D/C instructions:   1. DIET: Upon discharge from the hospital you may resume your normal preoperative diet. Depending on how you are feeling and whether you have nausea or not, you may wish to stay with a bland diet for the first few days. However, you can advance this as quickly as you feel ready.   2. ACTIVITIES: After discharge from the hospital, you may resume full routine activities. However, there should be no heavy lifting (greater than 15 pounds) and no strenuous activities until after your follow-up visit. Otherwise, routine activities of daily living are acceptable.   3. DRIVING: You may drive whenever you are off pain medications and are able to perform the activities needed to drive, i.e. turning, bending, twisting, etc.   4. BATHING: You may get the wound wet 2 days after surgery. You may shower, but do not submerge in a bath for at least a week. Dressings may come off after 48 hours. You have steri-strips under your dressings. These steri-strips should stay in place. They will fall off over 5-7 days.   5. BOWEL FUNCTION: Constipation is common after an operation, especially with pain medications. The combination of pain medication and decreased activity level can cause constipation in otherwise normal patients. If you feel this is occurring, take a laxative (Miralax, Milk of Magnesia, Ex-Lax, Senokot, etc.) until the problem has resolved.   6. PAIN MEDICATION: You will be given a prescription for pain medication at discharge. Please take these as directed. It is important to remember not to take medications on an empty stomach as this may cause nausea.   7.CALL IF YOU HAVE: (1) Fevers to more than 101.0 F, (2) Unusual chest or leg pain, (3) Drainage or fluid from incision that may be foul smelling, increased tenderness or soreness at the wound or the wound edges are no longer together, redness or swelling at the incision site. Please do not hesitate to call with any other questions.   8. APPOINTMENT:  Contact our office at [phone] for a follow-up appointment in 1-2 weeks following your procedure. If you have any additional questions, please do not hesitate to call the office and speak to either myself or the physician on call. Office address: [facility]. [address] [place] [address] [address] [place] [address]    Discharge Instructions    Discharged to home by car with relative. Discharged via wheelchair, hospital escort: Yes.  Special equipment needed: Walker    Be sure to schedule a follow-up appointment with your primary care doctor or any specialists as instructed.     Discharge Plan:   Diet Plan: Discussed  Activity Level: Discussed  Confirmed Follow up Appointment: Appointment Scheduled  Confirmed Symptoms Management: Discussed  Medication Reconciliation Updated: Yes  Influenza Vaccine Indication: Patient Refuses    I understand that a diet low in cholesterol, fat, and sodium is recommended for good health. Unless I have been given specific instructions below for another diet, I accept this instruction as my diet prescription.   Other diet: Heart healthy    Special Instructions: None    · Is patient discharged on Warfarin / Coumadin?   No     Depression / Suicide Risk    As you are discharged from this [facility] facility, it is important to learn how to keep safe from harming yourself.    Recognize the warning signs:  · Abrupt changes in personality, positive or negative- including increase in energy   · Giving away possessions  · Change in eating patterns- significant weight changes-  positive or negative  · Change in sleeping patterns- unable to sleep or sleeping all the time   · Unwillingness or inability to communicate  · Depression  · Unusual sadness, discouragement and loneliness  · Talk of wanting to die  · Neglect of personal appearance   · Rebelliousness- reckless behavior  · Withdrawal from people/activities they love  · Confusion- inability to concentrate     If you or a loved one  observes any of these behaviors or has concerns about self-harm, here's what you can do:  · Talk about it- your feelings and reasons for harming yourself  · Remove any means that you might use to hurt yourself (examples: pills, rope, extension cords, firearm)  · Get professional help from the community (Mental Health, Substance Abuse, psychological counseling)  · Do not be alone:Call your Safe Contact- someone whom you trust who will be there for you.  · Call your local CRISIS HOTLINE 113-2603 or 333-104-3672  · Call your local Children's Mobile Crisis Response Team [place] (321) 103-7594 or www.Yakimbi  · Call the toll free National Suicide Prevention Hotlines   · National Suicide Prevention Lifeline 497-803-YXTX (1656)  · National Hope Line Network 800-SUICIDE (421-8649)

## 2022-03-25 NOTE — CARE PLAN
The patient is Stable - Low risk of patient condition declining or worsening    Shift Goals  Clinical Goals: Pain Control  Patient Goals: Rest  Family Goals: N/A Family Not Present    Progress made toward(s) clinical / shift goals:      Problem: Pain - Standard  Goal: Alleviation of pain or a reduction in pain to the patient’s comfort goal  Outcome: Progressing     Problem: Fall Risk  Goal: Patient will remain free from falls  Outcome: Progressing     Problem: Knowledge Deficit - Standard  Goal: Patient and family/care givers will demonstrate understanding of plan of care, disease process/condition, diagnostic tests and medications  Outcome: Progressing

## 2022-03-25 NOTE — PROGRESS NOTES
Surgical Progress Note    Author: Patience Astudillo P.A.-C. Date & Time created: 3/25/2022   8:28 AM     Interval Events:  POD#2 Laparoscopic cholecystectomy. She is doing well this morning, sitting on the edge of the bed. She now has good oxygen saturations on room air while at rest and through the night while sleeping. RN will ambulate patient this morning to assess for O2 needs with activity. She is tolerating diet without nausea/vomiting, although she does admit to some difficulty without her dentures as she was not anticipating admission to the hospital. She admits to typical incisional abdominal pain, controlled with medication. Pt is ambulating and voiding.     Review of Systems   Constitutional: Negative for chills and fever.   Respiratory: Negative for cough and shortness of breath.    Cardiovascular: Negative for chest pain and palpitations.   Gastrointestinal: Positive for abdominal pain (incisional). Negative for diarrhea, heartburn, nausea and vomiting.   Genitourinary: Negative for dysuria.     Hemodynamics:  Temp (24hrs), Av.5 °C (97.7 °F), Min:36.2 °C (97.2 °F), Max:36.9 °C (98.4 °F)  Temperature: 36.2 °C (97.2 °F)  Pulse  Av.6  Min: 74  Max: 96   Blood Pressure: 125/70     Respiratory:    Respiration: 18, Pulse Oximetry: 94 %        RUL Breath Sounds: Diminished, RML Breath Sounds: Diminished, RLL Breath Sounds: Diminished, PREM Breath Sounds: Diminished, LLL Breath Sounds: Diminished  Neuro:  GCS       Fluids:    Intake/Output Summary (Last 24 hours) at 3/25/2022 0828  Last data filed at 3/24/2022 1300  Gross per 24 hour   Intake 600 ml   Output --   Net 600 ml        Current Diet Order   Procedures   • Diet Order Diet: Regular     Physical Exam  HENT:      Head: Normocephalic and atraumatic.      Mouth/Throat:      Pharynx: Oropharynx is clear.   Eyes:      Conjunctiva/sclera: Conjunctivae normal.   Cardiovascular:      Rate and Rhythm: Normal rate and regular rhythm.   Pulmonary:      Effort:  Pulmonary effort is normal.   Abdominal:      General: There is distension (mild).      Palpations: Abdomen is soft. There is no mass.      Tenderness: There is abdominal tenderness (incisional). There is no guarding or rebound.   Musculoskeletal:         General: Normal range of motion.      Cervical back: Normal range of motion.   Skin:     General: Skin is warm and dry.      Findings: No erythema or rash.      Comments: Incisions with minimal serosanguinous ooze   Neurological:      Mental Status: She is alert and oriented to person, place, and time.   Psychiatric:         Mood and Affect: Mood normal.       Labs:  Recent Results (from the past 24 hour(s))   CBC without Differential (blood)    Collection Time: 03/25/22  4:08 AM   Result Value Ref Range    WBC 9.5 4.8 - 10.8 K/uL    RBC 4.76 4.20 - 5.40 M/uL    Hemoglobin 14.8 12.0 - 16.0 g/dL    Hematocrit 42.7 37.0 - 47.0 %    MCV 89.7 81.4 - 97.8 fL    MCH 31.1 27.0 - 33.0 pg    MCHC 34.7 33.6 - 35.0 g/dL    RDW 47.2 35.9 - 50.0 fL    Platelet Count 251 164 - 446 K/uL    MPV 10.5 9.0 - 12.9 fL   Comp Metabolic Panel (CMP)    Collection Time: 03/25/22  4:08 AM   Result Value Ref Range    Sodium 139 135 - 145 mmol/L    Potassium 3.8 3.6 - 5.5 mmol/L    Chloride 100 96 - 112 mmol/L    Co2 23 20 - 33 mmol/L    Anion Gap 16.0 7.0 - 16.0    Glucose 97 65 - 99 mg/dL    Bun 9 8 - 22 mg/dL    Creatinine 0.57 0.50 - 1.40 mg/dL    Calcium 9.6 8.5 - 10.5 mg/dL    AST(SGOT) 101 (H) 12 - 45 U/L    ALT(SGPT) 166 (H) 2 - 50 U/L    Alkaline Phosphatase 127 (H) 30 - 99 U/L    Total Bilirubin 0.8 0.1 - 1.5 mg/dL    Albumin 4.4 3.2 - 4.9 g/dL    Total Protein 7.1 6.0 - 8.2 g/dL    Globulin 2.7 1.9 - 3.5 g/dL    A-G Ratio 1.6 g/dL   ESTIMATED GFR    Collection Time: 03/25/22  4:08 AM   Result Value Ref Range    GFR (CKD-EPI) 107 >60 mL/min/1.73 m 2     Medical Decision Making, by Problem:  Active Hospital Problems    Diagnosis    • Postoperative hypoxia [R09.02, Z98.890]       Plan:  Pt is alert and oriented, NAD. Breathing unlabored. Tolerating diet.  Incisions ok. VS stable. Labs reviewed. No leukocytosis. Elevated LFTs trending down, likely reactive. Encouraged ambulation and incentive spirometry. Pt now no longer in need of O2 while at rest. Pt may need home O2 with exertion, RN to reassess this morning. Pt okay for discharge today. Home O2 may be required with exertion. If O2 sats ok on RA with exertion, patient ok for discharge without home O2. She should follow up in the office in 1-2 weeks. Discussed with Dr. Barrett.      Quality Measures:  Quality-Core Measures   Reviewed items::  Labs reviewed  Louis catheter::  No Louis  DVT prophylaxis pharmacological::  Not indicated at this time, ambulatory  DVT prophylaxis - mechanical:  SCDs  Ulcer Prophylaxis::  Yes      Discussed patient condition with RN, Patient and Dr. Barrett

## 2022-03-25 NOTE — RESPIRATORY CARE
COPD EDUCATION by COPD CLINICAL EDUCATOR  3/25/2022 at 8:05 AM by Erin Rushing, RRT     Patient reviewed by COPD education team. Patient does not have a history or diagnosis of COPD and is a non-smoker.  Therefore, patient does not qualify for the COPD program.

## 2022-04-05 ENCOUNTER — OFFICE VISIT (OUTPATIENT)
Dept: MEDICAL GROUP | Facility: MEDICAL CENTER | Age: 57
End: 2022-04-05
Attending: FAMILY MEDICINE
Payer: MEDICAID

## 2022-04-05 VITALS
HEIGHT: 66 IN | RESPIRATION RATE: 18 BRPM | BODY MASS INDEX: 25.23 KG/M2 | DIASTOLIC BLOOD PRESSURE: 60 MMHG | HEART RATE: 80 BPM | OXYGEN SATURATION: 93 % | WEIGHT: 157 LBS | SYSTOLIC BLOOD PRESSURE: 100 MMHG | TEMPERATURE: 97.3 F

## 2022-04-05 DIAGNOSIS — J45.20 MILD INTERMITTENT ASTHMA WITHOUT COMPLICATION: ICD-10-CM

## 2022-04-05 PROCEDURE — 99213 OFFICE O/P EST LOW 20 MIN: CPT | Performed by: FAMILY MEDICINE

## 2022-04-05 RX ORDER — ALBUTEROL SULFATE 90 UG/1
2 AEROSOL, METERED RESPIRATORY (INHALATION) EVERY 6 HOURS PRN
Qty: 8.5 G | Refills: 11 | Status: SHIPPED | OUTPATIENT
Start: 2022-04-05 | End: 2023-08-08 | Stop reason: SDUPTHER

## 2022-04-05 ASSESSMENT — FIBROSIS 4 INDEX: FIB4 SCORE: 1.72

## 2022-04-05 NOTE — PROGRESS NOTES
"Subjective     Marilyn Salinas is a 56 y.o. female who presents with Follow-Up            HPI 1.  Intermittent asthma-patient reports that she has tolerated the addition of Stiolto without difficulty.  She reports that she still hears fairly frequent wheezing.  On the other hand she also reports that she only is using her albuterol rescue inhaler on about 3 separate occasions since our last visit.  She discontinued smoking about 2-1/2 months ago.    ROS negative for current chest pain, palpitations, hemoptysis, fever           Objective     /60   Pulse 80   Temp 36.3 °C (97.3 °F) (Temporal)   Resp 18   Ht 1.676 m (5' 6\")   Wt 71.2 kg (157 lb)   LMP 01/11/2017   SpO2 93%   BMI 25.34 kg/m²      Physical Exam      Gen.- alert, cooperative, in no acute distress  Neck- midline trachea, thyroid not enlarged or tender,supple, no cervical adenopathy  Chest-clear to auscultation and percussion with slight right posterior lung/chest. No retractions. Chest wall nontender  Cardiac- regular rhythm and rate. No murmur, thrill, or heave                     Assessment & Plan        1. Mild intermittent asthma without complication      Plan: 1.  Continue with Stiolto and as needed albuterol rescue inhaler  2.  Follow-up with me in 2 months or sooner if needed           "

## 2022-04-08 ENCOUNTER — PATIENT MESSAGE (OUTPATIENT)
Dept: MEDICAL GROUP | Facility: MEDICAL CENTER | Age: 57
End: 2022-04-08
Payer: MEDICAID

## 2022-04-13 ENCOUNTER — PHYSICAL THERAPY (OUTPATIENT)
Dept: PHYSICAL THERAPY | Facility: REHABILITATION | Age: 57
End: 2022-04-13
Attending: FAMILY MEDICINE
Payer: MEDICAID

## 2022-04-13 DIAGNOSIS — Z74.09 IMPAIRED MOBILITY AND ENDURANCE: ICD-10-CM

## 2022-04-13 DIAGNOSIS — G82.50 QUADRIPLEGIA, UNSPECIFIED (HCC): ICD-10-CM

## 2022-04-13 DIAGNOSIS — S14.109D INJURY OF CERVICAL SPINAL CORD, SUBSEQUENT ENCOUNTER (HCC): ICD-10-CM

## 2022-04-13 DIAGNOSIS — M21.371 BILATERAL FOOT-DROP: ICD-10-CM

## 2022-04-13 DIAGNOSIS — Z98.890 HISTORY OF CERVICAL SPINAL SURGERY: ICD-10-CM

## 2022-04-13 DIAGNOSIS — Z86.69 HISTORY OF MUSCULAR DYSTROPHY: ICD-10-CM

## 2022-04-13 DIAGNOSIS — M21.372 BILATERAL FOOT-DROP: ICD-10-CM

## 2022-04-13 PROCEDURE — 97110 THERAPEUTIC EXERCISES: CPT

## 2022-04-13 NOTE — OP THERAPY DAILY TREATMENT
"  Outpatient Physical Therapy  DAILY TREATMENT     [facility] Physical Therapy [address]  [address]  [place] [address]  Phone:  [phone]  Fax:  [phone]    Date: 2022    Patient: Marilyn Salinas  YOB: 1965  MRN: [id number]     Time Calculation    Start time: 1530  Stop time: 1610 Time Calculation (min): 40 minutes         Chief Complaint: No chief complaint on file.    Visit #: 2    SUBJECTIVE:  Pt states that she is very frustrated b/c she has an open wound on her toe now, she is seeing wound care. Balancing is hard b/c of that. The would is on the L. She can only do PT at one location so she was denied at her other location b/c they approved here so she would like to close this case so she can cont PT at the other location.      OBJECTIVE:  L 2nd toe wound, no s/s of infection at this time    5x sit to stand: 22\" w/ bilat UE assist     6 MWT: 122' w/ rollator     TU\" w/ rollator          Therapeutic Exercises (CPT 80937):     2. Sit to stand, x15, raised table    3. SKTC, 2x30\"    4. Fig 4 str, 2x30\"    5. SLR, 2x4    19. POC: 22      Therapeutic Exercise Summary: Access Code: [email or web address]  URL: [email or web address]  Date: 2022  Prepared by: No Gu    Exercises  Sit to Stand - 3 x daily - 7 x weekly - 10 reps        Time-based treatments/modalities:    Physical Therapy Timed Treatment Charges  Therapeutic exercise minutes (CPT 59732): 40 minutes    ASSESSMENT:     Response to treatment: Ms. Salinas has requested to discharge from physical therapy today in order to cont treatment for her lumbar spine at her other physical therapists as her endurance will only allow one open case. She was educated in benefits of cont strengthening to improve balance, stretching daily to inc flexibility, improving upright posture w/ gait and sit to stand to prevent hinge, and cont'd compliance to her updated HEP. Pt is discharged at this time in order for " her to cont PT elsewhere. She may benefit from additional services w/ [facility] in the future as needed and is instructed to contact PT w/ any questions or concerns. Pt was advised to cont to monitor foot would and cont wound care.     PLAN/RECOMMENDATIONS:   D/c case so she can cont PT for back at other physical therapy location

## 2022-04-13 NOTE — OP THERAPY DISCHARGE SUMMARY
Outpatient Physical Therapy  DISCHARGE SUMMARY NOTE      [facility] Physical Therapy [facility]  [address]  [address]  [facility] [address]  Phone:  [phone]  Fax:  [phone]    Date of Visit: 04/13/2022    Patient: Marilyn Salinas  YOB: 1965  MRN: [id number]     Referring Provider: Edwar Platt M.D.  [address] [facility]  [address]  [place] [address]   Referring Diagnosis Arthrodesis status [Z98.1];Quadriplegia, C1-C4 incomplete [G82.52];Other cerebral palsy [G80.8]       Your patient is being discharged from Physical Therapy with the following comments:   · Goals not met, cont'ing PT for lumbar spine at another physical therapy facility. Pt request to close this case.     Comments:  See daily note for summary     No Gu, PT    Date: 4/13/2022

## 2022-04-19 ENCOUNTER — OFFICE VISIT (OUTPATIENT)
Dept: OBGYN | Facility: CLINIC | Age: 57
End: 2022-04-19
Payer: MEDICAID

## 2022-04-19 VITALS
SYSTOLIC BLOOD PRESSURE: 115 MMHG | WEIGHT: 155 LBS | HEIGHT: 67 IN | BODY MASS INDEX: 24.33 KG/M2 | DIASTOLIC BLOOD PRESSURE: 80 MMHG

## 2022-04-19 DIAGNOSIS — N32.81 DETRUSOR OVERACTIVITY: ICD-10-CM

## 2022-04-19 DIAGNOSIS — N39.46 MIXED INCONTINENCE: Primary | ICD-10-CM

## 2022-04-19 DIAGNOSIS — N39.41 URGE URINARY INCONTINENCE: ICD-10-CM

## 2022-04-19 DIAGNOSIS — N31.9 NEUROGENIC BLADDER: ICD-10-CM

## 2022-04-19 DIAGNOSIS — R35.1 NOCTURIA: ICD-10-CM

## 2022-04-19 LAB
APPEARANCE UR: CLEAR
BILIRUB UR STRIP-MCNC: NORMAL MG/DL
COLOR UR AUTO: YELLOW
GLUCOSE UR STRIP.AUTO-MCNC: NEGATIVE MG/DL
KETONES UR STRIP.AUTO-MCNC: NEGATIVE MG/DL
LEUKOCYTE ESTERASE UR QL STRIP.AUTO: NEGATIVE
NITRITE UR QL STRIP.AUTO: NEGATIVE
PH UR STRIP.AUTO: 5.5 [PH] (ref 5–8)
PROT UR QL STRIP: NEGATIVE MG/DL
RBC UR QL AUTO: NEGATIVE
SP GR UR STRIP.AUTO: <=1.005
UROBILINOGEN UR STRIP-MCNC: NORMAL MG/DL

## 2022-04-19 PROCEDURE — 52287 CYSTOSCOPY CHEMODENERVATION: CPT | Performed by: STUDENT IN AN ORGANIZED HEALTH CARE EDUCATION/TRAINING PROGRAM

## 2022-04-19 PROCEDURE — 81002 URINALYSIS NONAUTO W/O SCOPE: CPT | Performed by: STUDENT IN AN ORGANIZED HEALTH CARE EDUCATION/TRAINING PROGRAM

## 2022-04-19 RX ORDER — SULFAMETHOXAZOLE AND TRIMETHOPRIM 800; 160 MG/1; MG/1
1 TABLET ORAL ONCE
Status: COMPLETED | OUTPATIENT
Start: 2022-04-19 | End: 2022-04-19

## 2022-04-19 RX ORDER — LIDOCAINE HYDROCHLORIDE 20 MG/ML
60 INJECTION, SOLUTION INFILTRATION; PERINEURAL ONCE
Status: COMPLETED | OUTPATIENT
Start: 2022-04-19 | End: 2022-04-19

## 2022-04-19 RX ORDER — PHENAZOPYRIDINE HYDROCHLORIDE 200 MG/1
200 TABLET, FILM COATED ORAL ONCE
Status: COMPLETED | OUTPATIENT
Start: 2022-04-19 | End: 2022-04-19

## 2022-04-19 RX ADMIN — SULFAMETHOXAZOLE AND TRIMETHOPRIM 1 TABLET: 800; 160 TABLET ORAL at 12:47

## 2022-04-19 RX ADMIN — PHENAZOPYRIDINE HYDROCHLORIDE 200 MG: 200 TABLET, FILM COATED ORAL at 12:47

## 2022-04-19 RX ADMIN — LIDOCAINE HYDROCHLORIDE 60 ML: 20 INJECTION, SOLUTION INFILTRATION; PERINEURAL at 12:48

## 2022-04-19 ASSESSMENT — FIBROSIS 4 INDEX: FIB4 SCORE: 1.72

## 2022-04-19 NOTE — PROCEDURES
Procedure note: Cystourethroscopy    Indication: Ms. Salinas is a 56 year old with neurogenic bladder and severe bladder urgency with urodynamic detrusor contractions at low volumes. After review of all therapeutic options she opts for trial of bladder chemodenervation with onabotulinumtoxin A. Written consent was obtained after review of risk and benefit.       Cystoscopy w/Botox    Date/Time: 4/19/2022 11:44 AM  Performed by: Giselle Caceres M.D.  Authorized by: Giselle Caceres M.D.     Procedure discussed: discussed risks, benefits and alternatives    Chaperone present: yes    Timeout: timeout called immediately prior to procedure    Prep: patient was prepped and draped in usual sterile fashion    Prep type: Betadine    Anesthesia: local anesthesia    Anesthesia comment: 60mL 2% lidocaine backfilled 30 minutes prior to the procedure, with lidojet in the urethra    Procedure Details     Cystoscope type: flexible    Cystoscopy route: transurethral      Cystoscopy location: native bladder      Irrigation used: saline      Position: dorsal lithotomy    Urethra     Urethra: normal      Vagina     Vagina: normal      Bladder     Bladder: normal      Bladder comment: No masses, lesions, stones, diverticula, sutures. Tyle 1 trabeculation.     Botox Injection Details     Changes since previous cystoscopy: no changes since previous cystoscopy      Indication: overactive bladder      Volume per injection comment: 2 mL    Total number of injections: 5    Total number of units: 100    Post-Procedure Details     Catheter placed: no      Appearance of urine after procedure: clear    Outcome comment: Patient experienced discomfort from the procedure, no complications.     Disposition: discharged home in satisfactory condition          Giselle Caceres MD

## 2022-04-25 NOTE — CARE PLAN
Problem: Safety  Goal: Will remain free from injury  Note:   Patient demonstrates good safety technique this shift.  Asks for assistance when needed and does not attempt self transfer.  Able to verbalize needs.  Will continue to monitor.      Problem: Venous Thromboembolism (VTW)/Deep Vein Thrombosis (DVT) Prevention:  Goal: Patient will participate in Venous Thrombosis (VTE)/Deep Vein Thrombosis (DVT)Prevention Measures  Flowsheets (Taken 12/28/2019 1403)  Pharmacologic Prophylaxis Used: LMWH: Enoxaparin(Lovenox)  Note:   Pt on lovenox daily for dvt prophylaxis.      No

## 2022-04-26 DIAGNOSIS — M51.36 DDD (DEGENERATIVE DISC DISEASE), LUMBAR: ICD-10-CM

## 2022-04-26 NOTE — TELEPHONE ENCOUNTER
Received request via: Pharmacy    Was the patient seen in the last year in this department? Yes    Does the patient have an active prescription (recently filled or refills available) for medication(s) requested? No     Last visit 4/5/22

## 2022-04-28 ENCOUNTER — APPOINTMENT (OUTPATIENT)
Dept: PHYSICAL THERAPY | Facility: MEDICAL CENTER | Age: 57
End: 2022-04-28
Attending: PHYSICAL MEDICINE & REHABILITATION
Payer: MEDICAID

## 2022-04-28 NOTE — OP THERAPY EVALUATION
"  Outpatient Physical Therapy  WHEELCHAIR EVALUATION    [facility]  [address]  [place] [address]  Phone:  [phone]  Fax:  [phone]    Date of Evaluation: 04/28/2022    Patient Name: Marilyn Salinas  YOB: 1965  MRN: [id number]   Height: 1.702 m (5' 7\")   Weight: 70.3 kg (155 lb)       Referring Provider: Julianna Guillory D.O.  [address] [facility] [facility]  [address]  [place] [address]   Referring Diagnosis History of cervical spinal surgery [Z98.890];Tetraplegia (HCC) [G82.50]     Time Calculation                     Pertinent medical history and general limitations: Pt has a PMH including the following:  Mild intermittent asthma    Tobacco use    Anxiety and depression    Acid indigestion    DDD (degenerative disc disease), lumbar    Dolly-menopause    Chest congestion    Pain in both feet    Poor vision    Vitamin D deficiency    Hyperlipidemia, unspecified    Assistance with transportation    Pelvic pain    Corns and callus    Cough    Risk for falls    Post concussive syndrome    Epistaxis    Vertigo    Foraminal stenosis of cervical region    Lumbar radiculopathy    Cervical spine pain    Idiopathic hypotension    Neurogenic bowel    Neurogenic bladder    Neuropathic pain    Quadriplegia, C1-C4 incomplete (HCC)    Postoperative pain    Cerebral paresis with homolateral ataxia (HCC)    Status post cervical spinal fusion    Other cerebral palsy (HCC)    Bilateral leg edema    Onychomycosis    Cervical spinal cord injury (HCC)    Postoperative hypoxia    And surgical hx including: L toe partial amputation (12/21), pelvic floor surgeries (10/17-see chart), cerical dis and anterior fusion C3-5 (12/19), eye surgery lazy ee (1972), gallbladder removal 3/23/22.      Current level of functional mobility / ADLs: Marilyn Salinas is a 56 year old female with PMH significant for chronic back and neck pain and rehabilitation history significant for " premorbid weakness and paresthesias, had GLF, imaging showing Disc herniation at C4-5 and C3- C4, with T2 hyperintensity of the cord and s/p ACDF C3-5 on 12/20/19 with Dr. Linton. She presents to therapy today for mobility assessment. She presents with rollator and is accompanied by ***.        Detailed ADL's    Toileting:     Toileting comments:   Pt has PMH for: Urinary urgency and frequency.  Patient has constant urinary incontinence, urinary frequency, urinary urgency due to neurogenic bladder in setting of spinal cord injury.  -Status: Managing incontinence with adult briefs.      Household Management:     Household Management Comments:  Completes house chores for exercise    Pain     Pain comments:  Neuropathic Pain:   -Med Management: Continue gabapentin 900 mg 3 times daily.      Assistive device history:    Assistive device history comments: heavy-duty sliding transfer bench shower chair with cut out seat and adjustable legs.  new shoes from [employer]    Fall history:    Recent fall: recent fall      Active Range of Motion:   Active range of motion comments: Active Range of Motion:   Upper extremity (left):   Observation: Significant forward head posture, thoracic kyphosis        All left upper extremity active range of motion: All within functional limits  Upper extremity (right):     All right upper extremity active range of motion: All within functional limits  Active range of motion comments: C/spine ROM:   Flexion 50  Ext: 5 deg, posterior trunk lean to attempt to achieve extension  SB L 10 R 15  Rot L 35 R 40      Strength:   Strength comments: Strength:   Lower extremity (left):     Hip flexion: 4    Hip abduction: 3+ (Limited by adductor tone bilaterally)    Hip adduction: 4    Knee flexion: 4-    Knee extension: 4    Ankle dorsiflexion: 3  Lower extremity (right):     Hip flexion: 4    Hip abduction: 3+    Hip adduction: 4+    Knee flexion: 4    Knee extension: 4    Ankle dorsiflexion:  4-      Modified Rito:   Tone comments: Tone:     Left lower extremity muscle tone: Spastic    Right lower extremity muscle tone: Spastic      Sensation   Sensation comments: Lower extremity (left):     Light touch: Intact  Lower extremity (right):     Light touch: Intact        Postural Control (Balance)     Additional balance comments: Sitting (static): Normal    Sitting (dynamic): Normal    Standing (static): Fair -    Standing (dynamic): Poor +      Ambulation     Additional ambulation details: Ambulation: Level Surfaces   Ambulation with assistive device: stand by assist  Ambulation without assistive device: unable     Observational Gait   Gait: crouched pattern     Walking speed below functional limits.    Stride width: cross over.    Left foot contact pattern: forefoot  Right foot contact pattern: forefoot     Quality of Movement During Gait      Hip   Hip (Left): Positive internal rotation.   Hip (Right): Positive internal rotation.      Knee   Knee (Left): Positive increased flexion during stance.   Knee (Right): Positive increased flexion during stance.      Ankle   Ankle (Left): Positive supinated.   Ankle (Right): Positive supinated.     Additional Quality of Movement During Gait Details  Pt wearing protective boot on the L foot secondary to recent toe amputation          Functional Assessment Used        Electronically signed by Aki Benitez PT on 4/28/2022    Referring provider co-signature:  I have reviewed this evaluation and recommendation(s) and my co-signature certifies my agreement with the contents.      Physician Signature: ________________________________ Date: ______________

## 2022-05-01 RX ORDER — MELOXICAM 15 MG/1
TABLET ORAL
Qty: 30 TABLET | Refills: 6 | Status: SHIPPED | OUTPATIENT
Start: 2022-05-01 | End: 2022-12-19 | Stop reason: SDUPTHER

## 2022-05-05 ENCOUNTER — GYNECOLOGY VISIT (OUTPATIENT)
Dept: OBGYN | Facility: CLINIC | Age: 57
End: 2022-05-05
Payer: MEDICAID

## 2022-05-05 ENCOUNTER — PHYSICAL THERAPY (OUTPATIENT)
Dept: PHYSICAL THERAPY | Facility: MEDICAL CENTER | Age: 57
End: 2022-05-05
Attending: PHYSICAL MEDICINE & REHABILITATION
Payer: MEDICAID

## 2022-05-05 VITALS
SYSTOLIC BLOOD PRESSURE: 117 MMHG | WEIGHT: 155 LBS | DIASTOLIC BLOOD PRESSURE: 84 MMHG | BODY MASS INDEX: 24.28 KG/M2 | HEART RATE: 90 BPM

## 2022-05-05 VITALS
HEART RATE: 90 BPM | BODY MASS INDEX: 24.28 KG/M2 | DIASTOLIC BLOOD PRESSURE: 73 MMHG | SYSTOLIC BLOOD PRESSURE: 116 MMHG | WEIGHT: 155 LBS

## 2022-05-05 DIAGNOSIS — N31.9 NEUROGENIC BLADDER: ICD-10-CM

## 2022-05-05 DIAGNOSIS — R35.1 NOCTURIA: ICD-10-CM

## 2022-05-05 DIAGNOSIS — R15.9 INCONTINENCE OF FECES, UNSPECIFIED FECAL INCONTINENCE TYPE: ICD-10-CM

## 2022-05-05 DIAGNOSIS — M21.372 BILATERAL FOOT-DROP: ICD-10-CM

## 2022-05-05 DIAGNOSIS — S14.109D INJURY OF CERVICAL SPINAL CORD, SUBSEQUENT ENCOUNTER (HCC): ICD-10-CM

## 2022-05-05 DIAGNOSIS — G82.50 QUADRIPLEGIA, UNSPECIFIED (HCC): ICD-10-CM

## 2022-05-05 DIAGNOSIS — Z74.09 IMPAIRED MOBILITY AND ENDURANCE: ICD-10-CM

## 2022-05-05 DIAGNOSIS — N39.46 URINARY INCONTINENCE, MIXED: ICD-10-CM

## 2022-05-05 DIAGNOSIS — N39.46 URINARY INCONTINENCE, MIXED: Primary | ICD-10-CM

## 2022-05-05 DIAGNOSIS — M21.371 BILATERAL FOOT-DROP: ICD-10-CM

## 2022-05-05 DIAGNOSIS — R39.9 UTI SYMPTOMS: ICD-10-CM

## 2022-05-05 PROCEDURE — 97542 WHEELCHAIR MNGMENT TRAINING: CPT

## 2022-05-05 PROCEDURE — 99213 OFFICE O/P EST LOW 20 MIN: CPT | Performed by: STUDENT IN AN ORGANIZED HEALTH CARE EDUCATION/TRAINING PROGRAM

## 2022-05-05 SDOH — ECONOMIC STABILITY: HOUSING INSECURITY: ELEVATOR PRESENT: 0

## 2022-05-05 ASSESSMENT — ACTIVITIES OF DAILY LIVING (ADL)
MAKEUP_CURRENT_FUNCTION: INDEPENDENT
PREPARING MEALS: MODERATE ASSIST
EATING_FINGER_FOODS_CURRENT_FUNCTION: INDEPENDENT
SHOPPING: MAXIMUM ASSIST
DRESSING_UB_CURRENT_FUNCTION: INDEPENDENT
BRUSHING_TEETH_DENTURES_CURRENT_FUNCTION: INDEPENDENT
LIGHT HOUSEKEEPING: MODERATE ASSIST
DRINKING_FROM_CUP_CURRENT_FUNCTION: INDEPENDENT
TOILETING_POSITION: SITTING
DRINKING_THROUGH_STRAWS_CURRENT_FUNCTION: INDEPENDENT
AMBULATION_WITH_ASSISTIVE_DEVICE: SUPERVISION
DRESSING_SHOES_CURRENT_FUNCTION: INDEPENDENT
HAIR_CARE_CURRENT_FUNCTION: INDEPENDENT
USING_UTENSILS_CURRENT_FUNCTION: INDEPENDENT
CUTTING_FOOD_CURRENT_FUNCTION: INDEPENDENT
CLEANSING: INDEPENDENT
AMBULATION_WITHOUT_ASSISTIVE_DEVICE: UNABLE
DRESSING_SOCKS_CURRENT_FUNCTION: INDEPENDENT
FOOD_TO_MOUTH_CURRENT_FUNCTION: INDEPENDENT
TOILETING: INDEPENDENT
CLOTHING_MANAGEMENT: INDEPENDENT
FEEDING: INDEPENDENT
DRESSING_CURRENT_FUNCTION: INDEPENDENT
LAUNDRY: MODERATE ASSIST
DRESSING_LB_CURRENT_FUNCTION: INDEPENDENT

## 2022-05-05 ASSESSMENT — ENCOUNTER SYMPTOMS
PAIN SCALE: 10
PAIN TIMING: IN THE EVENING
QUALITY: DISCOMFORT
PRECIPITATING FACTORS - STAIRS: 0
QUALITY: NEEDLE-LIKE
PAIN TIMING: CONSTANT
LOWER EXTREMITY EDEMA: 0

## 2022-05-05 ASSESSMENT — BALANCE ASSESSMENTS
BALANCE - SITTING STATIC: NORMAL
TURN 360 DEGREES: 0
BALANCE - SITTING DYNAMIC: GOOD
STANDING TO SITTING: 3
SITTING TO STANDING: 3
SITTING UNSUPPORTED: 4
STANDING ON ONE LEG: 0
STANDING UNSUPPORTED ONE FOOT IN FRONT: 0
REACHING FORWARD WITH OUTSTRETCHED ARM WHILE STANDING: 0
LOOK OVER LEFT AND RIGHT SHOULDERS WHILE STANDING: 2
STANDING UNSUPPORTED WITH FEET TOGETHER: 0
LONG VERSION TOTAL SCORE (MAX 56): 14
BALANCE - STANDING STATIC: POOR -
STANDING UNSUPPORTED WITH EYES CLOSED: 0
STANDING UNSUPPORTED: 0
WEIGHT SHIFT SITTING: NORMAL
PICK UP OBJECT FROM THE FLOOR FROM A STANDING POSITION: 1
TRANSFERS: 1
WEIGHT SHIFT STANDING: POOR
LONG VERSION TOTAL SCORE (MAX 56): 14
PLACE ALTERNATE FOOT ON STEP OR STOOL WHILE STANDING UNSUPPORTED: 0
BALANCE - STANDING DYNAMIC: TRACE

## 2022-05-05 ASSESSMENT — FIBROSIS 4 INDEX
FIB4 SCORE: 1.72
FIB4 SCORE: 1.72

## 2022-05-05 NOTE — PROGRESS NOTES
Urogynecology and Pelvic Reconstructive Surgery Follow Up    Marilyn Salinas MRN:[id number] :1965    Referred by: Julianna Guillory DO    Reason for Visit:   No chief complaint on file.        Subjective     History of Presenting Illness:    Ms.Dina Cleve Salinas is a 56 y.o. year old P2 with h/o nontraumatic incomplete spinal cord injury, paraplegia, partial CP, who presents for follow up of constant urinary incontinence (mixed) and occasional bowel incontinence. She underwent bladder botox treatment in April.     She reports fantastic result after her Botox procedure.  She no longer has to get up at night to urinate, and she has no urinary leakage since Botox.  She has mild symptoms of burning today, and is wonders if she may have a urinary tract infection.      Prior HPI: She was referred by her Physiatrist Dr. Guillory for the evaluation and management of neurogenic bladder with incontinence    She has constant incontinence, mostly with urgency but sometimes without warning. She also has leakage with cough/laugh/exercise, but less than with urgency. DANY somewhat improved after 2017 sling (see below). Symptoms most bothersome at night when she has to use at least 2 diapers. Due to mobility issues (uses a walker) she has trouble getting to the bathroom.     She has occasional bowel incontinence, both small and larger episodes. These are note predictable or regular in frequency. She is scheduled for a cholecystectomy with Dr. Barrett later this month, but is worried about possible diarrhea.     Prior Pelvic surgery:   10/3/2017: Laparoscopic-assisted vaginal hysterectomy, bilateral    salpingo-oophorectomy, anterior and posterior vaginal repair, enterocele    repair, perineoplasty, sacrospinous vault suspension, transobturator tape mid urethral sling procedure, cystoscopy (Dr. Driscoll) Op report reviewed     Prior treatment:   Botox - #1 100u 22  Mirabegron - stopped  Fesoterodine - stopped      Fluid intake:    Mostly water  Coffee to help with constipation    Pelvic floor symptom review:     Bladder:   Voids per day: 5-7 Voids per night: 1-2   --> none s/p botox   Urinary incontinence episodes per day: 2-3 per day, 1-2 at night --> none s/p botox   Urge leakage:  On Movement to Bathroom and Full Bladder urge worse than stress --> none s/p botox   Stress leakage: With Cough, With Laugh and With Exercise --> no change   Continuous / insensible urine loss: sometimes   Nocturnal enuresis: No    Leakage volume: Moderate   Number of pads/day: 1 diaper at night    Bladder emptying: Complete   Voiding symptoms: None   UTI in last 12 months: No        Prolapse:     Prolapse symptoms: None        Bowel:    Constipation: Yes   Bowel movements per day: 1    Straining to empty bowels: Yes   Splinting to evacuate: No    Painful evacuation: No    Difficulty emptying rectum: No    Incontinence to stool: occasionally   Incontinence to gas: No     Blood in stool: No    Hemorrhoids: No        Sexual function:    Sexually active: No            Past medical and surgical history    Past medical history:  Past Medical History:   Diagnosis Date   • TIA (transient ischemic attack) 03/17/2022    AGE 19. Patient states D/T drug use.    • Anesthesia 03/17/2022    Patient states becomes upset and sad after anesthesia.   • Full dentures 03/17/2022   • Stroke (HCC) 03/17/2022    Pt. states, MINI STROKE AGE 19.   • COVID-19 01/17/2022 1/17/2022 stopped 1-   • Anesthesia 12/23/2021    agitated coming out of anesthesia   • Cervical spinal cord injury (HCC) 12/19/2019   • Anxiety    • Arthritis     spine, feet    • Asthma    • Breath shortness    • Cataract 12/23/2022    no surgery yet   • Cerebral palsy (HCC)    • DDD (degenerative disc disease), lumbar     Per Problem List   • Dental disorder     upper and lower denture   • Depression    • Heart burn 12/23/2022    GERD   • High cholesterol    • History of falling    • Marijuana user    • Risk  for falls    • Tetraplegia (HCC)    • Urinary bladder disorder    • Urinary incontinence    • Vertigo     Per Problem List     Past surgical history:  Past Surgical History:   Procedure Laterality Date   • DEVYN BY LAPAROSCOPY  3/23/2022    Procedure: CHOLECYSTECTOMY, LAPAROSCOPIC;  Surgeon: Mayur Barrett M.D.;  Location: [facility];  Service: General   • PB AMPUTATION TOE,MT-P JT Left 12/27/2021    Procedure: GREAT TOE PARTIAL AMPUTATION;  Surgeon: Emiliano Goff M.D.;  Location: [facility];  Service: Orthopedics   • CERVICAL DISK AND FUSION ANTERIOR N/A 12/20/2019    Procedure: DISCECTOMY, SPINE, CERVICAL, ANTERIOR APPROACH, WITH FUSION - C3-5;  Surgeon: Connor Linton M.D.;  Location: [facility];  Service: Neurosurgery   • CORPECTOMY N/A 12/20/2019    Procedure: CORPECTOMY;  Surgeon: Connor Linton M.D.;  Location: [facility];  Service: Neurosurgery   • BLADDER SLING FEMALE N/A 10/3/2017    Procedure: BLADDER SLING FEMALE TOT, CYSTOSCOPY;  Surgeon: Hudson Driscoll M.D.;  Location: SURGERY SAME DAY [facility];  Service: Gynecology   • VAGINAL HYSTERECTOMY SCOPE TOTAL N/A 10/3/2017    Procedure: VAGINAL HYSTERECTOMY SCOPE TOTAL;  Surgeon: Hudson Driscoll M.D.;  Location: SURGERY SAME DAY [facility];  Service: Gynecology   • SALPINGECTOMY Bilateral 10/3/2017    Procedure: SALPINGECTOMY;  Surgeon: Hudson Driscoll M.D.;  Location: SURGERY SAME DAY [facility];  Service: Gynecology   • OOPHORECTOMY Bilateral 10/3/2017    Procedure: OOPHORECTOMY;  Surgeon: Hudson Driscoll M.D.;  Location: SURGERY SAME DAY [facility];  Service: Gynecology   • ANTERIOR AND POSTERIOR REPAIR Bilateral 10/3/2017    Procedure: ANTERIOR AND POSTERIOR REPAIR;  Surgeon: Hudson Driscoll M.D.;  Location: SURGERY SAME DAY [facility];  Service: Gynecology   • ENTEROCELE REPAIR N/A 10/3/2017    Procedure: ENTEROCELE REPAIR, PERINEOPLASTY;  Surgeon: Hudson Driscoll M.D.;  Location:  SURGERY SAME DAY [facility];  Service: Gynecology   • VAGINAL SUSPENSION N/A 10/3/2017    Procedure: VAGINAL SUSPENSION SACROSPINOUS VAULT POSSIBLE;  Surgeon: Hudson Driscoll M.D.;  Location: SURGERY SAME DAY [facility];  Service: Gynecology   • OTHER Left 2005    hammertoe x2   • EYE SURGERY  1972    for lazy eye   • LUMPECTOMY       Medications:has a current medication list which includes the following prescription(s): meloxicam, albuterol, ondansetron, venlafaxine, diphenhydramine, nicotine polacrilex, stiolto respimat, toviaz, gabapentin, bupropion, pantoprazole, fluticasone, tizanidine, myrbetriq, gabapentin, baclofen, toviaz, and toviaz.  Allergies:Other environmental and Codeine  Family history:  Family History   Problem Relation Age of Onset   • Cancer Mother    • Alcohol/Drug Mother    • Cancer Brother    • Diabetes Maternal Aunt      Social history: reports that she quit smoking about 4 months ago. Her smoking use included cigarettes. She has a 6.50 pack-year smoking history. She has never used smokeless tobacco. She reports current drug use. Drugs: Marijuana and Inhaled. She reports that she does not drink alcohol.    Review of systems: A full review of systems was performed, and negative with the exception of want is noted above in the HPI.        Objective        LMP 01/11/2017     Physical Exam  Vitals reviewed. Exam conducted with a chaperone present (MA - see notes.).   Constitutional:       Appearance: Normal appearance.   HENT:      Head: Normocephalic.      Mouth/Throat:      Mouth: Mucous membranes are moist.   Cardiovascular:      Rate and Rhythm: Normal rate.   Pulmonary:      Effort: Pulmonary effort is normal.   Abdominal:      Palpations: Abdomen is soft. There is no mass.      Tenderness: There is no abdominal tenderness.   Skin:     General: Skin is warm and dry.   Neurological:      Mental Status: She is alert.      Motor: Weakness present.      Gait: Gait abnormal.      Comments:  Uses walker   Psychiatric:         Mood and Affect: Mood normal.       Genitourinary (prior): Narrowed introitus with moderate to severe vaginal atrophy.  Some tenderness on placement of UDS catheters.  No prolapse.        Multichannel urodynamics -see report :   · Filling phase: Sustained uninhibited DO starting at 127mL, normal compliance, able to fill up to 334 after DO suppression. Stress leak with CLPP 108cm H2O at 149mL, MUCP 69   · Voiding phase: Complete emptying with detrusor contraction and some Valsalva effort.  Patient was told previously that she should Valsalva to empty her bladder    Diagnostic test and records review:    Urine dipstick: negative    Labs: n/a    Radiology:     Renal US 2020:  The right kidney measures 9.51 cm.  The left kidney measures 8.27 cm.  There is no hydronephrosis.  There are no abnormal calcifications.  The bladder demonstrates no focal wall abnormality.    Documentation reviewed: Prior EMR Records           Assessment & Plan     Ms.Dina Cleve Salinas is a 56 y.o. year old P2 with neurogenic bladder defined as mixed, urge predominant urinary incontinence with nocturia.     1. Neurogenic bladder  2. Mixed incontinence  3. Injury of cervical spinal cord, subsequent encounter ([facility])  4. Other cerebral palsy ([facility])  5. Nocturia   - s/p chemodenervation with botox treatment, 100u on 4/19/22 - significant improvement, currently dry. She can message me if/when symptoms return to schedule repeat therapy   - Prior renal ultrasound without hydronephrosis  - She may be a candidate for vaginal estrogen replacement in the future to augment urge symptoms.     6. Fecal incontinence  This is bothersome but infrequent. No change in stil leakage after cholecystectomy. She feels constinued constipe.   - Can follow up if diarrhea becomes an issues post-cholecystectomy.     7. UTI symptoms  Mild symptoms today, but due to being close to cystoscopic procedure, will send for UA/cuture               Giselle Caceres MD, FACOG    Female Pelvic Medicine and Reconstructive Surgery  Department of Obstetrics and Gynecology  [facility]    This medical record contains text that has been entered with the assistance of computer voice recognition and dictation software.  Therefore, it may contain unintended errors in text, spelling, punctuation, or grammar

## 2022-05-05 NOTE — NON-PROVIDER
PT here for Cysto + Botox  PT states she feels so much better since getting botox  Phone #: [phone] (home)   Pharmacy Verified.

## 2022-05-05 NOTE — OP THERAPY EVALUATION
"  Outpatient Physical Therapy  WHEELCHAIR EVALUATION    [facility]  [address]  [place] [address]  Phone:  [phone]  Fax:  [phone]    Date of Evaluation: 05/05/2022    Patient Name: Marilyn Salinas  YOB: 1965  MRN: [id number]   Height: 1.702 m (5' 7\")   Weight: 70.3 kg (155 lb)       Referring Provider: Julianna Guillory D.O.  [address]  [address]  [place] [address]   Referring Diagnosis History of cervical spinal surgery [Z98.890];Tetraplegia (HCC) [G82.50]     Time Calculation    Start time: 1315  Stop time: 1430 Time Calculation (min): 75 minutes           Pertinent medical history and general limitations: Ms. Salinas is a 56 year old female who presents for wheelchair evaluation with diagnosis of C2 TOM D SCI. Per evaluation with Dr. Guillory on 3/21/22, presents \"with PMH significant for chronic back and neck pain and rehabilitation history significant for premorbid weakness and paresthesias, had GLF (on 12/19/19), imaging showing Disc herniation at C4-5 and C3- C4, with T2 hyperintensity of the cord and s/p ACDF C3-5 on 12/20/19 with Dr. Linton.\"    Prior to this non-traumatic SCI, Ms. Salinas was able to ambulate without an assistive device. However, upon discharge from [facility], she was provided with a rollator walker, front wheeled walker, and standard manual wheelchair. She has been reliant on use of this rollator walker since this time.     On 12/27/21, Ms. Salinas underwent partial left foot amputation (great toe) by Dr. Goff due to frequent wounds on this toe from spasticity and positioning. PMHx also includes spastic CP, gallbladder removal on 4/2022, hiatial hernial and COVID in 1/2022.     Current level of functional mobility / ADLs: Ms. Salinas presents for power mobility evaluation with rollator walker. She is tearful due to distance that was required of her to ambulate from parking " lot to therapy location.     Ms. Mcdaniel currently resides in a single level, four-bedroom rental home with multiple families living in this dwelling. There is one step to enter and no ramp currently available. She is able to perform all bed mobility, transfers, dressing, and performs short distance ambulation with her rollator walker. Complains that with prolonged walking, her legs will give out and resulting in frequent falls. She does own a standard wheelchair but states that it does not fit through the doorways in her home, her upper extremities fatigue quickly with propulsion, and that her lower extremity spasticity prevents her from being effective with lower extremity propulsion. Currently she is unable to perform tub transfers due to lack of appropriate equipment resulting in safety issues. She is awaiting delivery of a tub shower transfer bench currently, which will allow her to perform all bathing with modified independence. She does not currently have a caregiver, but her roommates assist with tasks around the home including cleaning, laundry, and cooking as these tasks require a great deal of time to complete independently and her safety with completion of these tasks is a concern. Ms. Salinas does not drive and relies on roommates or Access for transportation.      At this time, she states that she is falling 1-2 times a month. Situations surrounding falls vary but can include attempting to sit in her rollator walker, legs becoming suddenly fatigued and giving out on her, and tripping on level surfaces for example. She states that she has had injuries in the past due to falls and would like to discuss repeated head injuries with her doctor due to increasing number of headaches. She also complains of pain in her neck, back, and bottoms of her feet. Continues to have pain surrounding left great toe amputation.     Detailed ADL's  Transfers/Mobility:     Bed/chair transfers: independent    Sit to stand:  independent       Sit to stand equipment used: Rollator walker     Sit to supine: independent    Toilet transfers: independent       Toilet transfer equipment used: Rollator walker, grab bars     Tub/shower transfers: unable       Tub/shower transfer equipment used: Lack of equipment at this time and safety concerns.     Bed mobility: independent    Toileting:     Toileting: independent    Hygiene: independent    Clothing management: independent    Toileting position: sitting    Continence comments: No issues with bowel or bladder incontinence     Bathing:     Bathing comments:   Currently performing modified bathing routine as it is unsafe to get into her tub shower without appropriate equipment.     Dressing:     Dressing: independent    Dressing upper body: independent    Dressing lower body: independent    Socks: independent    Shoes: independent    Lower body adaptive equipment used: none    Grooming:     Brushing teeth or denture care: independent    Hair care: independent    Make-up: independent    Feeding:     Feeding: independent    Food to mouth: independent    Using utensils: independent    Drinking from cup: independent    Drinking from straw: independent    Feeding/drinking adaptive equipment used: none    Food modifications for chewing/swallowing: none    Eating finger foods: independent    Cutting food: independent    Household Management:     Housekeeping: moderate assist    Laundry: moderate assist    Meal preparation: moderate assist    Shopping: maximum assist    Lower extremity edema and pressure ulcers     Negative for lower extremity edema    Pain     Current pain rating:  10    Location: Neck, back, bottom of feet     Pain quality: needle-like and discomfort    Pain timing: in the evening and constant    Progression: stable    Living situation     Lives with: roommate or housemate    Lives in: single level home    Dwelling has stairs: no    Dwelling has a ramp: no    Dwelling has an elevator:  no    Environment equipment will be used in: Home     Transportation options: Roommate and accessbile public transportation     Assistive device history:    Current assistive device(s) used: rollator walker, wheelchair and two-wheeled walker    Fall history:    Recent fall: recent fall    Last fall occurred: within the last month    Fall resulted in injury: no fall-related injury    Frequency of falls: monthly      Active Range of Motion:   Upper extremity (left):     All left upper extremity active range of motion: All within functional limits  Upper extremity (right):     All right upper extremity active range of motion: All within functional limits    Passive Range of Motion:   Lower extremity (left):     Hip flexion: Within functional limits    Hip extension: Below functional limits    Hip abduction: Below functional limits    Hip adduction: Within functional limits    Hip external rotation: Below functional limits    Hip internal rotation: Below functional limits    Knee flexion: Within functional limits    Knee extension: Within functional limits    Ankle dorsiflexion: Below functional limits    Ankle plantar flexion: Within functional limits  Lower extremity (right):     Hip flexion: Within functional limits    Hip extension: Below functional limits    Hip abduction: Below functional limits    Hip adduction: Within functional limits    Hip external rotation: Below functional limits    Hip internal rotation: Below functional limits    Knee flexion: Within functional limits    Knee extension: Within functional limits    Ankle dorsiflexion: Below functional limits    Ankle plantar flexion: Within functional limits  Passive range of motion comments: Lacking 70 degrees to neutral DF in bilateral ankles     Strength:   Upper extremity strength (left):     Shoulder flexion: 5    Shoulder extension:  5    Shoulder abduction: 4    Elbow flexion: 5    Elbow extension: 5    Wrist flexion: 5    Wrist extension: 5  Upper  extremity strength (right):    Shoulder flexion: 5    Shoulder extension: 5    Shoulder abduction: 4    Elbow flexion: 5    Elbow extension: 5    Wrist flexion: 5    Wrist extension: 5  Lower extremity (left):     Hip flexion: 4-    Hip extension: 2    Hip abduction: 3+    Hip adduction: 5    Knee flexion: 2    Knee extension: 5    Ankle dorsiflexion: 4-  Lower extremity (right):     Hip flexion: 4-    Hip extension: 2    Hip abduction: 3    Hip adduction: 5    Knee flexion: 2    Knee extension: 5    Ankle dorsiflexion: 4-    Tone:     Left upper extremity muscle tone: Normal    Right upper extremity muscle tone: Normal    Left lower extremity muscle tone: Spastic    Right lower extremity muscle tone: Spastic    Modified Rito:   Lower extremity (left):     Hip adductors: 2    Knee flexors: 1+    Knee extensors: 1+    Plantar flexors: 3  Lower extremity (right):     Hip adductors: 2    Knee flexors: 1+    Knee extensors: 1+    Plantar flexors: 3    Sensation   Upper extremity (left):     Light touch: Intact  Upper extremity (right):     Light touch: Intact  Lower extremity (left):     Light touch: Intact  Lower extremity (right):     Light touch: Intact  Sensation comments: Overall able to sense light touch bilaterally but reported varied sensitivity on both sides.     Coordination   Upper extremity (left):     Gross motor: Within functional limits    Rapid alternating movements: Impaired    Finger to finger: Impaired    Finger touch to nose: Impaired  Upper extremity (right):     Gross motor: Within functional limits    Rapid alternating movements: Impaired    Finger to finger: Impaired    Finger touch to nose: Impaired  Lower extremity (left):     Toe tapping: Impaired  Lower extremity (right):     Toe tapping: Impaired    Postural Control (Balance)     Sitting (static): Normal    Sitting (dynamic): Good    Standing (static): Poor -    Standing (dynamic): Trace    Weight shift (sitting): Normal    Weight shift  (standing): Poor    Ambulation     Ambulation with assistive device: supervision    Ambulation without assistive device: unable    Walking speed: below functional limits    Stride length: below functional limits    Stride width: cross over    Base of support: narrow    Additional ambulation details: Scissoring gait pattern with toe walking    6MWT: 302 feet with one seated rest break using rollator walker and supervision for safety. Indicating a gait speed of 0.3 m/sec.     Equipment recommendations   Type/Model     Recommendation: scooter    Justification:  Ms. Salinas is a 56 year old female with the diagnosis of spastic CP, C2 AISD SCI, and recent left great toe amputation. She requires the use of a power scooter due to the fact that she is a high fall risk and has sustained injuries as a result of frequent falling while walking. She is unable to utilize a standard manual wheelchair as this device does not fit through the doorways in her home, her upper extremities fatigue quickly with propulsion, her lower extremity spasticity prevents her from being effective with lower extremity propulsion, and she has severe neck pain. In addition to high risk for falling, she also presents with increased lower extremity spasticity, decreased lower extremity strength, impaired standing balance, and quick fatigue with walking. All of these deficits place patient in a position where she is unable to perform complex mobility related activities of daily living. A scooter will enable her to perform them without pain and decreased fall risk.  Without the use of a scooter, Ms. Salinas will be limited in mobility at home and in the community. The use of this equipment is considered a required lifetime medical need. It is considered medically necessary and will contribute to cost containment in the future. Ms. Salinas is willing and able to have family install a ramp for home access of this device.     Additional Features:     Justification: The Physical Therapist who performed this evaluation has no financial relationship with the vendor involved in the evaluation. Thank you in advance for the consideration of this patient's medical needs. If you have any questions regarding the equipment, please call me at [phone].        Functional Assessment Used  Rojas Balance Total Score (0-56): 14  Timed Up and Go (TUG) Test  Time, in seconds (up from chair, walk 3m and return to chair and sit): 33.19 seconds     Electronically signed by Ann Tavera PT, DPT on 5/5/2022    Referring provider co-signature:  I have reviewed this evaluation and recommendation(s) and my co-signature certifies my agreement with the contents.      Physician Signature: ________________________________ Date: ______________

## 2022-05-05 NOTE — NON-PROVIDER
PT here for Fv Cysto + Botox   Pt States she notices a great difference   Phone #: [phone] (home)   Pharmacy Verified.

## 2022-05-05 NOTE — PROGRESS NOTES
Urogynecology and Pelvic Reconstructive Surgery Follow Up    Marilyn Salinas MRN:[id number] :1965    Referred by: Julianna Guillory DO    Reason for Visit:   Chief Complaint   Patient presents with   • Other     Fv         Subjective     History of Presenting Illness:    Ms.Dina Cleve Salinas is a 56 y.o. year old P2 with h/o nontraumatic incomplete spinal cord injury, paraplegia, partial CP, who presents for follow up of constant urinary incontinence (mixed) and occasional bowel incontinence. She underwent bladder botox treatment in April.      She reports fantastic result after her Botox procedure.  She no longer has to get up at night to urinate, and she has no urinary leakage since Botox.  She has mild symptoms of burning today, and is wonders if she may have a urinary tract infection.        Prior HPI: She was referred by her Physiatrist Dr. Guillory for the evaluation and management of neurogenic bladder with incontinence     She has constant incontinence, mostly with urgency but sometimes without warning. She also has leakage with cough/laugh/exercise, but less than with urgency. DANY somewhat improved after 2017 sling (see below). Symptoms most bothersome at night when she has to use at least 2 diapers. Due to mobility issues (uses a walker) she has trouble getting to the bathroom.      She has occasional bowel incontinence, both small and larger episodes. These are note predictable or regular in frequency. She is scheduled for a cholecystectomy with Dr. Barrett later this month, but is worried about possible diarrhea.        Prior Pelvic surgery:   10/3/2017: Laparoscopic-assisted vaginal hysterectomy, bilateral    salpingo-oophorectomy, anterior and posterior vaginal repair, enterocele    repair, perineoplasty, sacrospinous vault suspension, transobturator tape mid urethral sling procedure, cystoscopy (Dr. Driscoll) Op report reviewed     Prior treatment:   Botox - #1 100u 22  Mirabegron -  stopped  Fesoterodine - stopped                      Fluid intake:   Mostly water  Coffee to help with constipation    Pelvic floor symptom review:       Bladder:              Voids per day: 5-7       Voids per night: 1-2   --> none s/p botox              Urinary incontinence episodes per day: 2-3 per day, 1-2 at night --> none s/p botox              Urge leakage:  On Movement to Bathroom and Full Bladder urge worse than stress --> none s/p botox              Stress leakage: With Cough, With Laugh and With Exercise --> no change              Continuous / insensible urine loss: sometimes              Nocturnal enuresis: No               Leakage volume: Moderate              Number of pads/day: 1 diaper at night --> none               Bladder emptying: Complete              Voiding symptoms: None              UTI in last 12 months: No                    Prolapse:                Prolapse symptoms: None                               Bowel:               Constipation: Yes              Bowel movements per day: 1               Straining to empty bowels: Yes              Splinting to evacuate: No               Painful evacuation: No               Difficulty emptying rectum: No               Incontinence to stool: occasionally              Incontinence to gas: No                Blood in stool: No               Hemorrhoids: No                             Sexual function:               Sexually active: No     Past medical and surgical history    Past medical history:  Past Medical History:   Diagnosis Date   • TIA (transient ischemic attack) 03/17/2022    AGE 19. Patient states D/T drug use.    • Anesthesia 03/17/2022    Patient states becomes upset and sad after anesthesia.   • Full dentures 03/17/2022   • Stroke (HCC) 03/17/2022    Pt. states, MINI STROKE AGE 19.   • COVID-19 01/17/2022 1/17/2022 stopped 1-   • Anesthesia 12/23/2021    agitated coming out of anesthesia   • Cervical spinal cord injury (HCC)  12/19/2019   • Anxiety    • Arthritis     spine, feet    • Asthma    • Breath shortness    • Cataract 12/23/2022    no surgery yet   • Cerebral palsy (HCC)    • DDD (degenerative disc disease), lumbar     Per Problem List   • Dental disorder     upper and lower denture   • Depression    • Heart burn 12/23/2022    GERD   • High cholesterol    • History of falling    • Marijuana user    • Risk for falls    • Tetraplegia (HCC)    • Urinary bladder disorder    • Urinary incontinence    • Vertigo     Per Problem List     Past surgical history:  Past Surgical History:   Procedure Laterality Date   • DEVYN BY LAPAROSCOPY  3/23/2022    Procedure: CHOLECYSTECTOMY, LAPAROSCOPIC;  Surgeon: Mayur Barrett M.D.;  Location: [facility];  Service: General   • PB AMPUTATION TOE,MT-P JT Left 12/27/2021    Procedure: GREAT TOE PARTIAL AMPUTATION;  Surgeon: Emiliano Goff M.D.;  Location: [facility];  Service: Orthopedics   • CERVICAL DISK AND FUSION ANTERIOR N/A 12/20/2019    Procedure: DISCECTOMY, SPINE, CERVICAL, ANTERIOR APPROACH, WITH FUSION - C3-5;  Surgeon: Connor Linton M.D.;  Location: [facility];  Service: Neurosurgery   • CORPECTOMY N/A 12/20/2019    Procedure: CORPECTOMY;  Surgeon: Connor Linton M.D.;  Location: [facility];  Service: Neurosurgery   • BLADDER SLING FEMALE N/A 10/3/2017    Procedure: BLADDER SLING FEMALE TOT, CYSTOSCOPY;  Surgeon: Hudson Driscoll M.D.;  Location: SURGERY SAME DAY [facility];  Service: Gynecology   • VAGINAL HYSTERECTOMY SCOPE TOTAL N/A 10/3/2017    Procedure: VAGINAL HYSTERECTOMY SCOPE TOTAL;  Surgeon: Hudson Driscoll M.D.;  Location: SURGERY SAME DAY [facility];  Service: Gynecology   • SALPINGECTOMY Bilateral 10/3/2017    Procedure: SALPINGECTOMY;  Surgeon: Hudson Driscoll M.D.;  Location: SURGERY SAME DAY [facility];  Service: Gynecology   • OOPHORECTOMY Bilateral 10/3/2017    Procedure: OOPHORECTOMY;  Surgeon: Hudson Driscoll  M.D.;  Location: SURGERY SAME DAY [facility];  Service: Gynecology   • ANTERIOR AND POSTERIOR REPAIR Bilateral 10/3/2017    Procedure: ANTERIOR AND POSTERIOR REPAIR;  Surgeon: Hudson Driscoll M.D.;  Location: SURGERY SAME DAY [facility];  Service: Gynecology   • ENTEROCELE REPAIR N/A 10/3/2017    Procedure: ENTEROCELE REPAIR, PERINEOPLASTY;  Surgeon: Hudson Driscoll M.D.;  Location: SURGERY SAME DAY [facility];  Service: Gynecology   • VAGINAL SUSPENSION N/A 10/3/2017    Procedure: VAGINAL SUSPENSION SACROSPINOUS VAULT POSSIBLE;  Surgeon: Hudson Driscoll M.D.;  Location: SURGERY SAME DAY [facility];  Service: Gynecology   • OTHER Left 2005    hammertoe x2   • EYE SURGERY  1972    for lazy eye   • LUMPECTOMY       Medications:has a current medication list which includes the following prescription(s): meloxicam, albuterol, ondansetron, venlafaxine, diphenhydramine, nicotine polacrilex, stiolto respimat, toviaz, gabapentin, bupropion, pantoprazole, fluticasone, tizanidine, myrbetriq, gabapentin, baclofen, toviaz, and toviaz.  Allergies:Other environmental and Codeine  Family history:  Family History   Problem Relation Age of Onset   • Cancer Mother    • Alcohol/Drug Mother    • Cancer Brother    • Diabetes Maternal Aunt      Social history: reports that she quit smoking about 4 months ago. Her smoking use included cigarettes. She has a 6.50 pack-year smoking history. She has never used smokeless tobacco. She reports current drug use. Drugs: Marijuana and Inhaled. She reports that she does not drink alcohol.    Review of systems: A full review of systems was performed, and negative with the exception of want is noted above in the HPI.        Objective        /84 (BP Location: Right arm, Patient Position: Sitting, BP Cuff Size: Adult)   Pulse 90   Wt 70.3 kg (155 lb)   LMP 01/11/2017   BMI 24.28 kg/m²     Physical Exam  Vitals reviewed. Exam conducted with a chaperone present (MA - see notes.).    Constitutional:       Appearance: Normal appearance.   HENT:      Head: Normocephalic.      Mouth/Throat:      Mouth: Mucous membranes are moist.   Cardiovascular:      Rate and Rhythm: Normal rate.   Pulmonary:      Effort: Pulmonary effort is normal.   Abdominal:      Palpations: Abdomen is soft. There is no mass.      Tenderness: There is no abdominal tenderness.   Skin:     General: Skin is warm and dry.   Neurological:      Mental Status: She is alert.      Motor: Weakness present.      Gait: Gait abnormal.      Comments: Uses walker   Psychiatric:         Mood and Affect: Mood normal.       Genitourinary (prior): Narrowed introitus with moderate to severe vaginal atrophy.  Some tenderness on placement of UDS catheters.  No prolapse.           Multichannel urodynamics -see report :   · Filling phase: Sustained uninhibited DO starting at 127mL, normal compliance, able to fill up to 334 after DO suppression. Stress leak with CLPP 108cm H2O at 149mL, MUCP 69   · Voiding phase: Complete emptying with detrusor contraction and some Valsalva effort.  Patient was told previously that she should Valsalva to empty her bladder  ·     Diagnostic test and records review:    Urine dipstick: negative    Labs: n/a    Radiology:     Renal US 2020:  The right kidney measures 9.51 cm.  The left kidney measures 8.27 cm.  There is no hydronephrosis.  There are no abnormal calcifications.  The bladder demonstrates no focal wall abnormality.    Documentation reviewed: Prior EMR Records           Assessment & Plan     Ms.Dina Cleve Salinas is a 56 y.o. year old P2 with neurogenic bladder defined as mixed, urge predominant urinary incontinence with nocturia.      1. Neurogenic bladder  2. Mixed incontinence  3. Injury of cervical spinal cord, subsequent encounter ([facility])  4. Other cerebral palsy ([facility])  5. Nocturia   - s/p chemodenervation with botox treatment, 100u on 4/19/22 - significant improvement, currently dry. She can message me  if/when symptoms return to schedule repeat therapy   - Prior renal ultrasound without hydronephrosis  - She may be a candidate for vaginal estrogen replacement in the future to augment urge symptoms.      6. Fecal incontinence  This is bothersome but infrequent. No change in stil leakage after cholecystectomy. She feels constinued constipe.   - Can follow up if diarrhea becomes an issues post-cholecystectomy.      7. UTI symptoms  Mild symptoms today, but due to being close to cystoscopic procedure, will send for UA/cuture                 Giselle Caceres MD, FACOG    Female Pelvic Medicine and Reconstructive Surgery  Department of Obstetrics and Gynecology  [facility]    This medical record contains text that has been entered with the assistance of computer voice recognition and dictation software.  Therefore, it may contain unintended errors in text, spelling, punctuation, or grammar

## 2022-05-08 ENCOUNTER — PATIENT MESSAGE (OUTPATIENT)
Dept: MEDICAL GROUP | Facility: MEDICAL CENTER | Age: 57
End: 2022-05-08

## 2022-05-10 ENCOUNTER — PATIENT MESSAGE (OUTPATIENT)
Dept: MEDICAL GROUP | Facility: MEDICAL CENTER | Age: 57
End: 2022-05-10

## 2022-05-10 DIAGNOSIS — J20.9 ACUTE BRONCHITIS, UNSPECIFIED ORGANISM: ICD-10-CM

## 2022-05-10 RX ORDER — AZITHROMYCIN 250 MG/1
TABLET, FILM COATED ORAL
Qty: 6 TABLET | Refills: 0 | Status: SHIPPED | OUTPATIENT
Start: 2022-05-10 | End: 2022-05-17

## 2022-05-16 ENCOUNTER — TELEPHONE (OUTPATIENT)
Dept: PHYSICAL MEDICINE AND REHAB | Facility: REHABILITATION | Age: 57
End: 2022-05-16

## 2022-05-16 DIAGNOSIS — G82.50 TETRAPLEGIA (HCC): ICD-10-CM

## 2022-05-16 NOTE — TELEPHONE ENCOUNTER
Patient called for Dr. Julianna Guillory and is requesting new referral for physical therapy to be sent to [facility]. Her insurance is requiring a new one be sent there.     Thank you.

## 2022-05-17 ENCOUNTER — OFFICE VISIT (OUTPATIENT)
Dept: MEDICAL GROUP | Facility: MEDICAL CENTER | Age: 57
End: 2022-05-17
Attending: FAMILY MEDICINE
Payer: MEDICAID

## 2022-05-17 VITALS
BODY MASS INDEX: 25.03 KG/M2 | WEIGHT: 159.5 LBS | TEMPERATURE: 97.9 F | SYSTOLIC BLOOD PRESSURE: 114 MMHG | RESPIRATION RATE: 16 BRPM | OXYGEN SATURATION: 94 % | DIASTOLIC BLOOD PRESSURE: 70 MMHG | HEIGHT: 67 IN | HEART RATE: 88 BPM

## 2022-05-17 DIAGNOSIS — R60.0 BILATERAL LEG EDEMA: ICD-10-CM

## 2022-05-17 DIAGNOSIS — J45.20 MILD INTERMITTENT ASTHMA WITHOUT COMPLICATION: ICD-10-CM

## 2022-05-17 PROCEDURE — 99214 OFFICE O/P EST MOD 30 MIN: CPT | Performed by: FAMILY MEDICINE

## 2022-05-17 PROCEDURE — 99213 OFFICE O/P EST LOW 20 MIN: CPT | Performed by: FAMILY MEDICINE

## 2022-05-17 RX ORDER — FUROSEMIDE 20 MG/1
20 TABLET ORAL DAILY
Qty: 30 TABLET | Refills: 1 | Status: SHIPPED | OUTPATIENT
Start: 2022-05-17 | End: 2022-05-17

## 2022-05-17 ASSESSMENT — FIBROSIS 4 INDEX: FIB4 SCORE: 1.72

## 2022-05-17 NOTE — PROGRESS NOTES
"Subjective     Marilynjose manuel Salinas is a 56 y.o. female who presents with Bloated            HPI 1.  Asthma-patient reports that breathing is partly improved with decreased wheezing particularly after finishing a recent course of antibiotic.  She is using Stiolto twice a day and feels that that definitely has improved her breathing.  She only is using albuterol on fairly rare occasions for rescue.  2.  Bilateral leg edema-patient reports that she has noticed more swelling in her ankles and shins particular towards the end of the day over the past 1 month.  Weight has gone up about 2 pounds.  She also notes fairly constant bloating feeling in her stomach which is leading to early satiety.  ROS negative for oliguria, dysuria, abdominal pain           Objective     /70   Pulse 88   Temp 36.6 °C (97.9 °F) (Temporal)   Resp 16   Ht 1.702 m (5' 7.01\")   Wt 72.3 kg (159 lb 8 oz)   LMP 01/11/2017   SpO2 94%   BMI 24.98 kg/m²      Physical Exam      Gen.- alert, cooperative, in no acute distress  Neck- midline trachea, thyroid not enlarged or tender,supple, no cervical adenopathy  Chest-clear to auscultation and percussion with normal breath sounds. No retractions. Chest wall nontender  Cardiac- regular rhythm and rate. No murmur, thrill, or heave  Abdomen-slightly distended contour without focal tenderness or palpable mass.  No rebound tenderness.  Lower extremities-examining at 11:30 AM show no pretibial edema skin discoloration.  Slightly tender to palpation diffusely along the tibial surface bilaterally        Laboratory in March shows normal BUN and creatinine levels           Assessment & Plan        1. Bilateral leg edema    - furosemide (LASIX) 20 MG Tab; Take 1 Tablet by mouth every day.  Dispense: 30 Tablet; Refill: 1    2. Mild intermittent asthma without complication      Plan: 1.  Cautious trial of 20 mg of furosemide every morning  2.  Continue other medications  3.  Revisit in 1 month           "

## 2022-05-24 ENCOUNTER — APPOINTMENT (OUTPATIENT)
Dept: RADIOLOGY | Facility: MEDICAL CENTER | Age: 57
End: 2022-05-24
Attending: STUDENT IN AN ORGANIZED HEALTH CARE EDUCATION/TRAINING PROGRAM
Payer: MEDICAID

## 2022-05-25 ENCOUNTER — HOSPITAL ENCOUNTER (OUTPATIENT)
Dept: RADIOLOGY | Facility: MEDICAL CENTER | Age: 57
End: 2022-05-25
Attending: STUDENT IN AN ORGANIZED HEALTH CARE EDUCATION/TRAINING PROGRAM
Payer: MEDICAID

## 2022-05-25 DIAGNOSIS — Z12.31 VISIT FOR SCREENING MAMMOGRAM: ICD-10-CM

## 2022-05-25 PROCEDURE — 77063 BREAST TOMOSYNTHESIS BI: CPT

## 2022-06-13 ENCOUNTER — TELEMEDICINE (OUTPATIENT)
Dept: PHYSICAL MEDICINE AND REHAB | Facility: REHABILITATION | Age: 57
End: 2022-06-13
Payer: MEDICAID

## 2022-06-13 DIAGNOSIS — M79.672 FOOT PAIN, LEFT: ICD-10-CM

## 2022-06-13 DIAGNOSIS — S14.109A INJURY OF CERVICAL SPINAL CORD, INITIAL ENCOUNTER (HCC): Primary | ICD-10-CM

## 2022-06-13 DIAGNOSIS — R25.2 SPASTICITY: ICD-10-CM

## 2022-06-13 PROCEDURE — 99214 OFFICE O/P EST MOD 30 MIN: CPT | Mod: 95 | Performed by: PHYSICAL MEDICINE & REHABILITATION

## 2022-06-13 RX ORDER — GABAPENTIN 300 MG/1
900 CAPSULE ORAL
Qty: 180 CAPSULE | Refills: 0
Start: 2022-06-13 | End: 2022-12-06 | Stop reason: SDUPTHER

## 2022-06-13 RX ORDER — TIZANIDINE HYDROCHLORIDE 2 MG/1
2 CAPSULE, GELATIN COATED ORAL
Qty: 60 CAPSULE | Refills: 0
Start: 2022-06-13 | End: 2022-09-07 | Stop reason: SDUPTHER

## 2022-06-13 RX ORDER — BACLOFEN 10 MG/1
30 TABLET ORAL 2 TIMES DAILY
Qty: 180 TABLET | Refills: 0
Start: 2022-06-13 | End: 2022-09-07 | Stop reason: SDUPTHER

## 2022-06-13 NOTE — PROGRESS NOTES
[facility], [place], operated by [facility]  [facility]  [address] [place] [address] [place] [address]  Ph: [phone]    FOLLOW UP PATIENT EVALUATION    This evaluation was conducted via Zoom using secure and encrypted videoconferencing technology. The patient was in a private location inside of their home in the [facility].    The patient's identity was confirmed and verbal consent was obtained for this virtual visit.      Patient Name: Marilyn Salinas   Patient : 1965  Patient Age: 56 y.o.   PCP: Edwar Platt M.D.    SUBJECTIVE:   Patient Identification: Marilyn Salinas is a 56 y.o. RHD female with PMH significant for chronic back and neck pain and rehabilitation history significant for premorbid weakness and paresthesias, had GLF, imaging showing Disc herniation at C4-5 and C3- C4, with T2 hyperintensity of the cord and s/p ACDF C3-5 on 19 with Dr. Linton  and is presenting to PM&R clinic for a FOLLOW UP outpatient evaluation with the following chief complaint/s:    PM&R Background Information:  Original Date of Injury: 19 GLF and worsening weakness, pain.   Pertinent Procedure History: Disc herniation at C4-5 and C3- C4, with T2 hyperintensity of the cord at this level. she underwent ACDF C3-5 on 19 with Dr. Linton  Dates of Admission/Discharge to ARU: 19-20    Chief Complaint:  Equipment follow up    Interval History:  - Finally received her shower chair from [employer].  - Power scooter will not happen anymore, they would only approve for in the home and will not fit.   - Will hopefully get PT through [facility] at some point. Has been waiting for quite some time.   - Wearing shoes from [employer], she can stand and has been doing well.   - Had gallbladder removed.   - Weaned off of the Wellbutrin and is taking Effexor 150mg + 75mg tablet. Is on 225mg total.   - Has received bladder Botox and only has to use small pads. Her QOL is increased significantly.   - Callouses are going away as  well.   - Needs a new walker since hers is bent.     Review of Systems:  All other pertinent positive review of systems are noted above in HPI.     Past Medical History:  Past Medical History:   Diagnosis Date   • TIA (transient ischemic attack) 03/17/2022    AGE 19. Patient states D/T drug use.    • Anesthesia 03/17/2022    Patient states becomes upset and sad after anesthesia.   • Full dentures 03/17/2022   • Stroke (HCC) 03/17/2022    Pt. states, MINI STROKE AGE 19.   • COVID-19 01/17/2022 1/17/2022 stopped 1-   • Anesthesia 12/23/2021    agitated coming out of anesthesia   • Cervical spinal cord injury (HCC) 12/19/2019   • Anxiety    • Arthritis     spine, feet    • Asthma    • Breath shortness    • Cataract 12/23/2022    no surgery yet   • Cerebral palsy (HCC)    • DDD (degenerative disc disease), lumbar     Per Problem List   • Dental disorder     upper and lower denture   • Depression    • Heart burn 12/23/2022    GERD   • High cholesterol    • History of falling    • Marijuana user    • Risk for falls    • Tetraplegia (HCC)    • Urinary bladder disorder    • Urinary incontinence    • Vertigo     Per Problem List      Past Surgical History:   Procedure Laterality Date   • DEVYN BY LAPAROSCOPY  3/23/2022    Procedure: CHOLECYSTECTOMY, LAPAROSCOPIC;  Surgeon: Mayur Barrett M.D.;  Location: [facility];  Service: General   • PB AMPUTATION TOE,MT-P JT Left 12/27/2021    Procedure: GREAT TOE PARTIAL AMPUTATION;  Surgeon: Emiliano Goff M.D.;  Location: [facility];  Service: Orthopedics   • CERVICAL DISK AND FUSION ANTERIOR N/A 12/20/2019    Procedure: DISCECTOMY, SPINE, CERVICAL, ANTERIOR APPROACH, WITH FUSION - C3-5;  Surgeon: Connor Linton M.D.;  Location: [facility];  Service: Neurosurgery   • CORPECTOMY N/A 12/20/2019    Procedure: CORPECTOMY;  Surgeon: Connor Linton M.D.;  Location: [facility];  Service: Neurosurgery   • BLADDER SLING FEMALE N/A  10/3/2017    Procedure: BLADDER SLING FEMALE TOT, CYSTOSCOPY;  Surgeon: Hudson Driscoll M.D.;  Location: SURGERY SAME DAY [facility];  Service: Gynecology   • VAGINAL HYSTERECTOMY SCOPE TOTAL N/A 10/3/2017    Procedure: VAGINAL HYSTERECTOMY SCOPE TOTAL;  Surgeon: Hudson Driscoll M.D.;  Location: SURGERY SAME DAY [facility];  Service: Gynecology   • SALPINGECTOMY Bilateral 10/3/2017    Procedure: SALPINGECTOMY;  Surgeon: Hudson Driscoll M.D.;  Location: SURGERY SAME DAY [facility];  Service: Gynecology   • OOPHORECTOMY Bilateral 10/3/2017    Procedure: OOPHORECTOMY;  Surgeon: Hudson Driscoll M.D.;  Location: SURGERY SAME DAY [facility];  Service: Gynecology   • ANTERIOR AND POSTERIOR REPAIR Bilateral 10/3/2017    Procedure: ANTERIOR AND POSTERIOR REPAIR;  Surgeon: Hudson Driscoll M.D.;  Location: SURGERY SAME DAY [facility];  Service: Gynecology   • ENTEROCELE REPAIR N/A 10/3/2017    Procedure: ENTEROCELE REPAIR, PERINEOPLASTY;  Surgeon: Hudson Driscoll M.D.;  Location: SURGERY SAME DAY [facility];  Service: Gynecology   • VAGINAL SUSPENSION N/A 10/3/2017    Procedure: VAGINAL SUSPENSION SACROSPINOUS VAULT POSSIBLE;  Surgeon: Hudson Driscoll M.D.;  Location: SURGERY SAME DAY [facility];  Service: Gynecology   • [facility]   • EYE SURGERY  1972    for lazy eye   • LUMPECTOMY          Current Outpatient Medications:   •  baclofen (LIORESAL) 10 MG Tab, Take 3 Tablets by mouth 2 times a day. TAKE 3 TABLETS BY MOUTH THREE TIMES DAILY, Disp: 180 Tablet, Rfl: 0  •  tizanidine (ZANAFLEX) 2 MG capsule, Take 1 Capsule by mouth 2 times a day., Disp: 60 Capsule, Rfl: 0  •  gabapentin (NEURONTIN) 300 MG Cap, Take 3 Capsules by mouth 2 times a day. Take 1 po qhs, Disp: 180 Capsule, Rfl: 0  •  furosemide (LASIX) 20 MG Tab, TAKE 1 TABLET BY MOUTH EVERY DAY, Disp: 90 Tablet, Rfl: 1  •  meloxicam (MOBIC) 15 MG tablet, TAKE 1 TABLET BY MOUTH DAILY, Disp: 30 Tablet, Rfl: 6  •  albuterol 108 (90  Base) MCG/ACT Aero Soln inhalation aerosol, Inhale 2 Puffs every 6 hours as needed for Shortness of Breath., Disp: 8.5 g, Rfl: 11  •  ondansetron (ZOFRAN ODT) 4 MG TABLET DISPERSIBLE, Take 1 Tablet by mouth every 6 hours as needed for Nausea., Disp: 10 Tablet, Rfl: 0  •  venlafaxine (EFFEXOR-XR) 150 MG extended-release capsule, Take 1 Capsule by mouth every day., Disp: 90 Capsule, Rfl: 0  •  Nicotine Polacrilex (THRIVE MT), every 72 hours., Disp: , Rfl:   •  Tiotropium Bromide-Olodaterol (STIOLTO RESPIMAT) 2.5-2.5 MCG/ACT Aero Soln, Inhale 2 Puffs every day., Disp: 1 Each, Rfl: 11  •  buPROPion (WELLBUTRIN XL) 150 MG XL tablet, TAKE 3 TABLETS BY MOUTH DAILY, Disp: , Rfl:   •  pantoprazole (PROTONIX) 20 MG tablet, TAKE ONE TABLET BY MOUTH DAILY, Disp: 30 Tablet, Rfl: 6  •  fluticasone (FLONASE) 50 MCG/ACT nasal spray, Administer 1 Spray into affected nostril(S) every day., Disp: 16 g, Rfl: 6  •  Mirabegron ER (MYRBETRIQ) 50 MG TABLET SR 24 HR, Take 50 mg by mouth every day., Disp: 90 Tablet, Rfl: 1  •  fesoterodine fumarate (TOVIAZ) 4 MG TABLET SR 24 HR, , Disp: , Rfl:   Allergies   Allergen Reactions   • Other Environmental Hives     TUMBLEWEED   • Codeine Itching, Unspecified and Rash     Rash, itching, mood disorder- becomes agitated  Other reaction(s): itchy, grumpy        Past Social History:  Social History     Socioeconomic History   • Marital status: Single     Spouse name: Not on file   • Number of children: Not on file   • Years of education: Not on file   • Highest education level: Not on file   Occupational History   • Not on file   Tobacco Use   • Smoking status: Former Smoker     Packs/day: 0.25     Years: 26.00     Pack years: 6.50     Types: Cigarettes     Quit date: 2022     Years since quittin.4   • Smokeless tobacco: Never Used   Vaping Use   • Vaping Use: Never used   Substance and Sexual Activity   • Alcohol use: No   • Drug use: Yes     Types: Marijuana, Inhaled     Comment: marijuana  daily (smoked and edibles) last time 12-   • Sexual activity: Not Currently   Other Topics Concern   •  Service No   • Blood Transfusions No   • Caffeine Concern No   • Occupational Exposure No   • Hobby Hazards No   • Sleep Concern Yes   • Stress Concern Yes   • Weight Concern Yes   • Special Diet No   • Back Care Yes   • Exercise No   • Bike Helmet Yes   • Seat Belt Yes   • Self-Exams Yes   Social History Narrative   • Not on file     Social Determinants of Health     Financial Resource Strain: Not on file   Food Insecurity: Not on file   Transportation Needs: Not on file   Physical Activity: Not on file   Stress: Not on file   Social Connections: Not on file   Intimate Partner Violence: Not on file   Housing Stability: Not on file        Family History:  Family History   Problem Relation Age of Onset   • Cancer Mother    • Alcohol/Drug Mother    • Cancer Brother    • Diabetes Maternal Aunt        Depression and Opioid Screening  PHQ-9:  Depression Screen (PHQ-2/PHQ-9) 12/8/2020 2/22/2022 3/23/2022   PHQ-2 Total Score - - 0   PHQ-2 Total Score 0 5 -   PHQ-9 Total Score - 14 -     Interpretation of PHQ-9 Total Score   Score Severity   1-4 No Depression   5-9 Mild Depression   10-14 Moderate Depression   15-19 Moderately Severe Depression   20-27 Severe Depression     Opioid Risk Score: No value filed.  Interpretation of Opioid Risk Score   Score 0-3 = Low risk of abuse. Do UDS at least once per year.  Score 4-7 = Moderate risk of abuse. Do UDS 1-4 times per year.  Score 8+ = High risk of abuse. Refer to specialist.      OBJECTIVE:   Vital Signs:  There were no vitals filed for this visit.     Pertinent Labs:  Lab Results   Component Value Date/Time    SODIUM 139 03/25/2022 04:08 AM    POTASSIUM 3.8 03/25/2022 04:08 AM    CHLORIDE 100 03/25/2022 04:08 AM    CO2 23 03/25/2022 04:08 AM    GLUCOSE 97 03/25/2022 04:08 AM    BUN 9 03/25/2022 04:08 AM    CREATININE 0.57 03/25/2022 04:08 AM       Lab Results    Component Value Date/Time    HBA1C 5.6 07/27/2017 09:39 AM       Lab Results   Component Value Date/Time    WBC 9.5 03/25/2022 04:08 AM    RBC 4.76 03/25/2022 04:08 AM    HEMOGLOBIN 14.8 03/25/2022 04:08 AM    HEMATOCRIT 42.7 03/25/2022 04:08 AM    MCV 89.7 03/25/2022 04:08 AM    MCH 31.1 03/25/2022 04:08 AM    MCHC 34.7 03/25/2022 04:08 AM    MPV 10.5 03/25/2022 04:08 AM    NEUTSPOLYS 29.40 (L) 12/28/2019 05:12 AM    LYMPHOCYTES 56.70 (H) 12/28/2019 05:12 AM    MONOCYTES 9.20 12/28/2019 05:12 AM    EOSINOPHILS 3.40 12/28/2019 05:12 AM    BASOPHILS 1.00 12/28/2019 05:12 AM       Lab Results   Component Value Date/Time    ASTSGOT 101 (H) 03/25/2022 04:08 AM    ALTSGPT 166 (H) 03/25/2022 04:08 AM        Physical Exam:   GEN: No apparent distress  HEENT: Head normocephalic, atraumatic.  Sclera nonicteric bilaterally, no ocular discharge appreciated bilaterally.  PULMONARY: Breathing nonlabored on room air, no respiratory accessory muscle use.  Not requiring supplemental oxygen.  SKIN: No appreciable skin breakdown on exposed areas of skin.  PSYCH: Mood and affect within normal limits.  NEURO: Awake alert.  Conversational.  Logical thought content.    ASSESSMENT/PLAN: Marilyn Salinas  is a 56 y.o. female with rehabilitation history significant for premorbid weakness and paresthesias, had GLF, imaging showing Disc herniation at C4-5 and C3- C4, with T2 hyperintensity of the cord and s/p ACDF C3-5 on 12/20/19 with Dr. Linton, here for SCI follow up. The following plan was discussed with the patient who is in agreement.     Visit Diagnoses     ICD-10-CM   1. Injury of cervical spinal cord, initial encounter ([facility])  S14.109A   2. Spasticity  R25.2   3. Foot pain, left  M79.672        Rehab/Neuro:   1. C2 AISD: Nontraumatic incomplete spinal cord injury, status post fall at home with worsening weakness, found to have severe cervical stenosis.  Status post C3-C5 ACDF with Dr. Linton on 12/20/19. No longer getting epidural  steroid injections, too painful.  2. Nociceptive/musculoskeletal chronic back pain: Established with interventional pain management.  -Therapy: Will be established with physical therapy through [facility].  -Function: 8/2021 Largely stable but patient does feel as though she is weaker lately.  Has had one fall.  Maintains strength through doing house chores. 10/2021 patient continues to have significant spastic paraplegic gait.  Would benefit from Botox to bilateral gastroc complex, however, insurance would not cover.  This would be cost prohibitive for her.  Significant onychomycosis on bilateral toenails as well as concern for DTI and erythema of first great toe. 1/27/2022 interim great left toe amputation.  Physically stable. 3/21/2022 physically stable. 6/13/2022 patient has received her shower chair, her feet calluses are reducing in size, she has less bladder leakage due to Botox, overall doing fairly good.  -Equipment: May consider referral for a new walker as hers is currently bent but still functional at this time.  Patient was not eligible for power scooter as they would only consider this for in-home ambulation and the scooter will not fit within her home.     2. Muscular dystrophy: ? CP per recent neurology evaluation.  Reportedly diagnosed in '60's per aunt. Normal CPK. Was seeing Nikunj back in 2018 - was supposed to have EMG/NCS. Medicaid wouldn't cover EMG/NCS at that time.  Scheduled for EMG/NCS 10/23/2020.  EEG reportedly negative.  Patient has since changed her neurologist and is now seeing Dr. Hartman. Had EMG/NCS results -results not available but patient reports she was told she has paraplegia and partial CP.   -Status: Stable     Spasticity: Bilateral lower extremities worse than upper extremities.  Botox cost prohibitive.  Counseled again on Botox today, remains cost prohibitive.  -Med management: Continue baclofen 30 mg twice daily.  -Med management: Continue tizanidine 2 mg twice daily.      Neuropathic Pain:   -Med Management: Continue gabapentin 900 mg 2 times daily.     Neurogenic Bladder:   -Status: Had been using adult briefs to manage incontinence but after bladder Botox has only needed to use small pads which she is very happy about.  States her quality of life has improved.  -Med management: Continue Myrbetriq and Toviaz.  -Established with urogynecology, continue Botox.    Depression:  -Status: Off of Wellbutrin altogether, now on venlafaxine 225 mg daily.  We increased her dose at last visit she states that this is working well for her.  -Med management: Continue venlafaxine 225 mg daily.    Follow up: 6 months.    Total time spent was 22 minutes.  Included in this time is the time spent preparing for the visit including record review, my exam and evaluation, counseling and education regarding that which is aforementioned in the assessment and plan.  Time was spent documenting into patient's electronic health record. Some of the time included occurred outside of charting time.  Discussion involved the patient.     Please note that this dictation was created using voice recognition software. I have made every reasonable attempt to correct obvious errors but there may be errors of grammar and content that I may have overlooked prior to finalization of this note.    Dr. Julianna Guillory DO, MS  Department of Physical Medicine & Rehabilitation  Neuro Rehabilitation Clinic  [facility]

## 2022-06-14 ENCOUNTER — OFFICE VISIT (OUTPATIENT)
Dept: MEDICAL GROUP | Facility: MEDICAL CENTER | Age: 57
End: 2022-06-14
Attending: FAMILY MEDICINE
Payer: MEDICAID

## 2022-06-14 VITALS
SYSTOLIC BLOOD PRESSURE: 92 MMHG | HEART RATE: 84 BPM | BODY MASS INDEX: 25.24 KG/M2 | HEIGHT: 67 IN | DIASTOLIC BLOOD PRESSURE: 60 MMHG | WEIGHT: 160.8 LBS | RESPIRATION RATE: 16 BRPM | OXYGEN SATURATION: 92 % | TEMPERATURE: 97.8 F

## 2022-06-14 DIAGNOSIS — R60.0 BILATERAL LEG EDEMA: ICD-10-CM

## 2022-06-14 DIAGNOSIS — R41.3 MEMORY DEFICIT: ICD-10-CM

## 2022-06-14 DIAGNOSIS — G82.52 QUADRIPLEGIA, C1-C4 INCOMPLETE (HCC): ICD-10-CM

## 2022-06-14 DIAGNOSIS — R51.9 RECURRENT HEADACHE: ICD-10-CM

## 2022-06-14 DIAGNOSIS — S30.0XXA LUMBAR CONTUSION, INITIAL ENCOUNTER: ICD-10-CM

## 2022-06-14 PROCEDURE — 99214 OFFICE O/P EST MOD 30 MIN: CPT | Performed by: FAMILY MEDICINE

## 2022-06-14 PROCEDURE — 99213 OFFICE O/P EST LOW 20 MIN: CPT | Performed by: FAMILY MEDICINE

## 2022-06-14 RX ORDER — POTASSIUM CHLORIDE 20 MEQ/1
20 TABLET, EXTENDED RELEASE ORAL DAILY
Qty: 30 TABLET | Refills: 6 | Status: SHIPPED | OUTPATIENT
Start: 2022-06-14 | End: 2022-09-15

## 2022-06-14 ASSESSMENT — FIBROSIS 4 INDEX: FIB4 SCORE: 1.72

## 2022-06-14 NOTE — PROGRESS NOTES
"Subjective     Marilyn Salinas is a 56 y.o. female who presents with No chief complaint on file.            HPI 1.  Leg swelling-patient reports swelling has not been appreciably improved with taking the 20 mg of furosemide.  She reports may be a mild diuresis associated with that dose.  Swelling is still noticed mainly in her thighs and upper calves.  2.  Acute lumbar contusion-patient got tripped up in a oxygen line and tripped earlier today.  She landed on the rear 6 inch hard plastic wheel of her walker on her right flank.  She notes right-sided paralumbar pain.  Has not noted any hematuria.  3.  Head pain/memory deficit-patient reports increased frequency of left-sided headaches along with thickening progressively over her central forehead and difficulty speaking.  Speech issues center around losing her way inside a sentence and not being able to organize her thoughts.  These changes have been developing over the past 8 to 9 months.  Patient reports that she does have moderately frequent falls which typically involve striking her head but no recent loss of consciousness.    ROS           Objective     BP (!) 92/60   Pulse 84   Temp 36.6 °C (97.8 °F) (Temporal)   Resp 16   Ht 1.702 m (5' 7.01\")   Wt 72.9 kg (160 lb 12.8 oz)   LMP 01/11/2017   SpO2 92%   BMI 25.18 kg/m²      Physical Exam      Gen.- alert, cooperative, in no acute distress.  Patient is using a 4 wheeled walker with a seat.  Neck- midline trachea, thyroid not enlarged or tender,supple, no cervical adenopathy.  1+ tenderness over the lower half of the neck posteriorly left lateral aspect  Chest-clear to auscultation and percussion with normal breath sounds. No retractions. Chest wall nontender  Cardiac- regular rhythm and rate. No murmur, thrill, or heave  Ears-normal TM and canal on the left side  Oropharynx-clear pink moist mucosa without swelling, redness or exudate. Tongue is midline.  Patient only has her lower plate in place " today.  Neurologic-intact speech without slurring or prolonged pauses.  Head-mild tenderness along the left side of the skull underneath the arm of her glasses on that side.  No redness or vesicles seen.                     Assessment & Plan        1. Lumbar contusion, initial encounter      2. Quadriplegia, C1-C4 incomplete (HCC)      3. Bilateral leg edema    4.  Left-sided head pain/memory deficit  Plan: 1.  Increase furosemide 20 mg to 2 tablets daily  2.  Begin potassium chloride 20 mEq as a gelcap rather than dry tablet  3.  Local heat to right flank as needed for pain-May continue baclofen, tizanidine, meloxicam  4.  Check plain MRI of the brain   5.  Revisit in 1 month

## 2022-06-20 ENCOUNTER — PATIENT MESSAGE (OUTPATIENT)
Dept: MEDICAL GROUP | Facility: MEDICAL CENTER | Age: 57
End: 2022-06-20

## 2022-06-20 DIAGNOSIS — R60.0 BILATERAL LEG EDEMA: ICD-10-CM

## 2022-06-20 RX ORDER — POTASSIUM CHLORIDE 750 MG/1
10 CAPSULE, EXTENDED RELEASE ORAL 2 TIMES DAILY
Qty: 60 CAPSULE | Refills: 6 | Status: SHIPPED | OUTPATIENT
Start: 2022-06-20 | End: 2022-09-15

## 2022-06-28 DIAGNOSIS — S14.109A INJURY OF CERVICAL SPINAL CORD, INITIAL ENCOUNTER (HCC): ICD-10-CM

## 2022-06-28 DIAGNOSIS — G82.50 TETRAPLEGIA (HCC): ICD-10-CM

## 2022-06-29 ENCOUNTER — APPOINTMENT (OUTPATIENT)
Dept: RADIOLOGY | Facility: MEDICAL CENTER | Age: 57
End: 2022-06-29
Attending: FAMILY MEDICINE
Payer: MEDICAID

## 2022-06-30 ENCOUNTER — HOSPITAL ENCOUNTER (OUTPATIENT)
Dept: RADIOLOGY | Facility: MEDICAL CENTER | Age: 57
End: 2022-06-30
Attending: FAMILY MEDICINE
Payer: MEDICAID

## 2022-06-30 DIAGNOSIS — R41.3 MEMORY DEFICIT: ICD-10-CM

## 2022-06-30 DIAGNOSIS — R51.9 RECURRENT HEADACHE: ICD-10-CM

## 2022-06-30 PROCEDURE — 70551 MRI BRAIN STEM W/O DYE: CPT

## 2022-07-21 ENCOUNTER — OFFICE VISIT (OUTPATIENT)
Dept: MEDICAL GROUP | Facility: MEDICAL CENTER | Age: 57
End: 2022-07-21
Attending: FAMILY MEDICINE
Payer: MEDICAID

## 2022-07-21 VITALS
HEART RATE: 84 BPM | HEIGHT: 67 IN | BODY MASS INDEX: 24.96 KG/M2 | RESPIRATION RATE: 16 BRPM | OXYGEN SATURATION: 91 % | SYSTOLIC BLOOD PRESSURE: 104 MMHG | WEIGHT: 159 LBS | DIASTOLIC BLOOD PRESSURE: 60 MMHG | TEMPERATURE: 97.5 F

## 2022-07-21 DIAGNOSIS — R14.0 ABDOMINAL BLOATING: ICD-10-CM

## 2022-07-21 DIAGNOSIS — Z12.11 SCREENING FOR MALIGNANT NEOPLASM OF COLON: ICD-10-CM

## 2022-07-21 PROCEDURE — 99213 OFFICE O/P EST LOW 20 MIN: CPT | Performed by: FAMILY MEDICINE

## 2022-07-21 ASSESSMENT — FIBROSIS 4 INDEX: FIB4 SCORE: 1.75

## 2022-07-21 NOTE — PROGRESS NOTES
"Subjective     Marilyn Salinas is a 57 y.o. female who presents with Altered Mental Status (Confusion and comprehension issues)            HPI 1.  Abdominal bloating-patient reports continued issues with abdominal bloating, and intermittent indigestion along with occasional burning in her epigastric area.  She reports that she will bring up emesis type liquid oftentimes associated with eating a meal perhaps 3 times per week.  It is not necessarily always acidic.  Patient notes variable defecation from at times loose stools to no stools for 2 to 3 days with some discomfort.  Defecation itself does not significantly relieve her bloating.  Patient had her cholecystectomy approximately April 2022.  She notes that she will struggle if she has anything fatty or greasy since the surgery.    ROS negative for black or bloody stools.  Negative for dysuria, fever           Objective     /60   Pulse 84   Temp 36.4 °C (97.5 °F) (Temporal)   Resp 16   Ht 1.702 m (5' 7.01\")   Wt 72.1 kg (159 lb)   LMP 01/11/2017   SpO2 91%   BMI 24.90 kg/m²      Physical Exam  Gen.- alert, cooperative, in no acute distress  Neck- midline trachea, thyroid not enlarged or tender,supple, no cervical adenopathy  Chest-clear to auscultation and percussion with normal breath sounds. No retractions. Chest wall nontender  Cardiac- regular rhythm and rate. No murmur, thrill, or heave  Abdomen- normal bowel sounds, soft without guarding. Liver and spleen not enlarged, no palpable masses or tenderness.   Upper extremities-moderate concavity noted in the distal lateral aspect of the left thigh                   Assessment & Plan        1. Screening for malignant neoplasm of colon    - OCCULT BLOOD FECES IMMUNOASSAY; Future    2. Abdominal bloating      Plan: 1.  Trial of Nexium 20 mg daily in place of pantoprazole due to continued GERD symptoms  2.  Patient is urged to consistently avoid fatty dietary items  3.  Discontinue furosemide " (patient reports she has been out for about 1 week anyway)   4.  Follow-up in 1 month

## 2022-07-22 ENCOUNTER — TELEPHONE (OUTPATIENT)
Dept: MEDICAL GROUP | Facility: MEDICAL CENTER | Age: 57
End: 2022-07-22

## 2022-07-22 NOTE — TELEPHONE ENCOUNTER
DOCUMENTATION OF PAR STATUS:    1. Name of Medication & Dose: Esomeprazole 20mg     2. Name of Prescription Coverage Company & phone #:  [employer]     3. Date Prior Auth Submitted: 7/22/2022    4. What information was given to obtain insurance decision? ICD 10 code    5. Prior Auth Status? Pending    6. Patient Notified: N\A

## 2022-08-02 ENCOUNTER — HOSPITAL ENCOUNTER (OUTPATIENT)
Facility: MEDICAL CENTER | Age: 57
End: 2022-08-02
Attending: FAMILY MEDICINE
Payer: MEDICAID

## 2022-08-02 PROCEDURE — 82274 ASSAY TEST FOR BLOOD FECAL: CPT

## 2022-08-10 ENCOUNTER — TELEPHONE (OUTPATIENT)
Dept: MEDICAL GROUP | Facility: MEDICAL CENTER | Age: 57
End: 2022-08-10

## 2022-08-10 ENCOUNTER — PATIENT MESSAGE (OUTPATIENT)
Dept: HEALTH INFORMATION MANAGEMENT | Facility: OTHER | Age: 57
End: 2022-08-10

## 2022-08-10 ENCOUNTER — PATIENT MESSAGE (OUTPATIENT)
Dept: MEDICAL GROUP | Facility: MEDICAL CENTER | Age: 57
End: 2022-08-10

## 2022-08-10 DIAGNOSIS — Z12.11 SCREENING FOR MALIGNANT NEOPLASM OF COLON: ICD-10-CM

## 2022-08-10 NOTE — TELEPHONE ENCOUNTER
DOCUMENTATION OF PAR STATUS:    1. Name of Medication & Dose: esomeprazole (NEXIUM 24HR) 20 MG capsule     2. Name of Prescription Coverage Company & phone #: Medicaid ffs    3. Date Prior Auth Submitted: 8/10/22    4. What information was given to obtain insurance decision? Chart notes, icd-10 code ,meds hx    5. Prior Auth Status? Pending    6. Patient Notified: yes    
Resident

## 2022-08-12 LAB — IMM ASSAY OCC BLD FITOB: NEGATIVE

## 2022-08-15 ENCOUNTER — OFFICE VISIT (OUTPATIENT)
Dept: MEDICAL GROUP | Facility: MEDICAL CENTER | Age: 57
End: 2022-08-15
Attending: FAMILY MEDICINE
Payer: MEDICAID

## 2022-08-15 VITALS
WEIGHT: 157.8 LBS | DIASTOLIC BLOOD PRESSURE: 62 MMHG | OXYGEN SATURATION: 91 % | HEIGHT: 66 IN | SYSTOLIC BLOOD PRESSURE: 86 MMHG | TEMPERATURE: 97.2 F | BODY MASS INDEX: 25.36 KG/M2 | HEART RATE: 89 BPM | RESPIRATION RATE: 17 BRPM

## 2022-08-15 DIAGNOSIS — K21.9 GASTROESOPHAGEAL REFLUX DISEASE, UNSPECIFIED WHETHER ESOPHAGITIS PRESENT: ICD-10-CM

## 2022-08-15 DIAGNOSIS — R14.0 ABDOMINAL BLOATING: ICD-10-CM

## 2022-08-15 DIAGNOSIS — R60.0 BILATERAL LEG EDEMA: ICD-10-CM

## 2022-08-15 PROCEDURE — 99214 OFFICE O/P EST MOD 30 MIN: CPT | Performed by: FAMILY MEDICINE

## 2022-08-15 PROCEDURE — 99212 OFFICE O/P EST SF 10 MIN: CPT | Performed by: FAMILY MEDICINE

## 2022-08-15 RX ORDER — PANTOPRAZOLE SODIUM 20 MG/1
TABLET, DELAYED RELEASE ORAL
Qty: 30 TABLET | Refills: 6 | Status: SHIPPED | OUTPATIENT
Start: 2022-08-15 | End: 2022-10-11

## 2022-08-15 ASSESSMENT — FIBROSIS 4 INDEX: FIB4 SCORE: 1.75

## 2022-08-16 NOTE — PROGRESS NOTES
"Subjective     Marilyn Salinas is a 57 y.o. female who presents with Bloated and Edema            HPI 1.  Persistent bloating-patient reports she continues with now 1-1/2 years of fluctuating but never absent bloating and bilateral upper abdominal pain.  She did undergo cholecystectomy in March.  Notes now she is intolerant of fatty foods or foods with a lot of pepper.  Labs prior to her cholecystectomy were notable for moderate elevation of liver enzymes.  2.  Chronic left foot pain-patient has chronic deformities of several toes on her left foot.  She had a hammertoe surgery back in about 2003, and then more recently a amputation of the tip of her left great toe in December 2021 with Dr. Goff.  She did get specific orthopedic shoes in March which have been moderately helpful but still notes chronic pain in her left foot.  3.  GERD-patient reports minimal control of epigastric pain and substernal burning utilizing pantoprazole 40 mg which she is currently taking.  She previously has failed Prilosec which was switched in 2020 to the pantoprazole.  She previously also had had ranitidine which has been taken off of the market.  Recent prior authorization request for Nexium is denied and being appealed.  Patient reports bowel movement every 1 to 3 days, no black or bloody stools or severe constipation or diarrhea.  ROS negative for actual emesis, dysuria, hematuria, fever           Objective     BP (!) 86/62 (BP Location: Left arm, Patient Position: Sitting, BP Cuff Size: Adult)   Pulse 89   Temp 36.2 °C (97.2 °F) (Skin)   Resp 17   Ht 1.676 m (5' 6\")   Wt 71.6 kg (157 lb 12.8 oz)   LMP 01/11/2017   SpO2 91%   BMI 25.47 kg/m²      Physical Exam  Gen.- alert, cooperative, in no acute distress  Neck- midline trachea, thyroid not enlarged or tender,supple, no cervical adenopathy  Chest-clear to auscultation and percussion with normal breath sounds. No retractions. Chest wall nontender  Cardiac- regular " rhythm and rate. No murmur, thrill, or heave  Abdomen-slightly distended contour.  1+ tender diffusely to palpation with no focal severe tenderness, rebound tenderness or palpable mass.  Bowel sounds are slightly decreased in activity  Left foot-patient shoe is removed (painful), medially deformed first second and third toes are noted.  The tip of the great (first) toe has been amputated in the past with a completely healed wound.  There is a scar over the dorsum of the second digit as well.  Distal foot and toes are diffusely tender to palpation.  No significant edema is noted.     Lower extremities-1+ suprapatellar swelling noted in the bursa.  Shins and ankles are not palpably swollen.  No localized redness or warmth.                Assessment & Plan        1. Bilateral leg edema      2. Gastroesophageal reflux disease, unspecified whether esophagitis present    - pantoprazole (PROTONIX) 20 MG tablet; TAKE ONE TABLET BY MOUTH DAILY  Dispense: 30 Tablet; Refill: 6    3. Abdominal bloating    - Comp Metabolic Panel; Future  - CBC WITH DIFFERENTIAL; Future    Plan: 1.  We will check with patient's insurance ([employer]) regarding what other PPIs they require before approving Nexium  2.  Update CMP and CBC regarding persistent abdominal pain and bloating  3.  Revisit 1 month  4.  Although benefit is minimal we will have patient continue on pantoprazole at present

## 2022-08-17 DIAGNOSIS — S14.109A INJURY OF CERVICAL SPINAL CORD, INITIAL ENCOUNTER (HCC): ICD-10-CM

## 2022-08-17 NOTE — PROGRESS NOTES
Urogynecology and Pelvic Reconstructive Surgery Follow Up    Marilyn Salinas MRN:[id number] :1965    Referred by: Julianna Guillory DO    Reason for Visit:   Chief Complaint   Patient presents with    Other     Virtual Fv     This evaluation was conducted via Zoom using secure and encrypted videoconferencing technology. The patient was in a private location in the [facility].    The patient's identity was confirmed and verbal consent was obtained for this virtual visit.      Subjective     History of Presenting Illness:    Ms.Dina Cleve Salinas is a 57 y.o. year old P2 with h/o nontraumatic incomplete spinal cord injury, paraplegia, partial CP, who presents for follow up of constant urinary incontinence (mixed) and occasional bowel incontinence. She underwent bladder botox treatment in 2022     She previously reported fantastic result after her Botox procedure.  She still reports that she is sleeping better at night, but when she goes to the bathroom, she has to sit and wait a good amount of time before the urine stream starts, and has to double or triple void. She also notes some tenderness when her bladder, but she denies any specific dysuria, hematuria, bladder pain.    I asked if her improvement in urinary symptoms outweighs the inconvenience of incomplete bladder emptying sensation, and she strongly agrees that the improvement of Botox outweighs any current side effects          Prior HPI: She was referred by her Physiatrist Dr. Guillory for the evaluation and management of neurogenic bladder with incontinence    She has constant incontinence, mostly with urgency but sometimes without warning. She also has leakage with cough/laugh/exercise, but less than with urgency. DANY somewhat improved after 2017 sling (see below). Symptoms most bothersome at night when she has to use at least 2 diapers. Due to mobility issues (uses a walker) she has trouble getting to the bathroom.     She has occasional bowel  incontinence, both small and larger episodes. These are note predictable or regular in frequency. She is scheduled for a cholecystectomy with Dr. Barrett later this month, but is worried about possible diarrhea.     Prior Pelvic surgery:   10/3/2017: Laparoscopic-assisted vaginal hysterectomy, bilateral    salpingo-oophorectomy, anterior and posterior vaginal repair, enterocele    repair, perineoplasty, sacrospinous vault suspension, transobturator tape mid urethral sling procedure, cystoscopy (Dr. Driscoll) Op report reviewed     Prior treatment:   Botox - #1 100u 4/19/22  Mirabegron - stopped  Fesoterodine - stopped      Fluid intake:   Mostly water  Coffee to help with constipation    Pelvic floor symptom review:     Bladder:   Voids per day: 5-7 Voids per night: 1-2   --> none s/p botox   Urinary incontinence episodes per day: 2-3 per day, 1-2 at night --> none s/p botox   Urge leakage:  On Movement to Bathroom and Full Bladder urge worse than stress --> none s/p botox   Stress leakage: With Cough, With Laugh and With Exercise --> no change   Continuous / insensible urine loss: sometimes   Nocturnal enuresis: No    Leakage volume: Moderate   Number of pads/day: 1 diaper at night    Bladder emptying: Complete   Voiding symptoms: None   UTI in last 12 months: No        Prolapse:     Prolapse symptoms: None        Bowel:    Constipation: Yes   Bowel movements per day: 1    Straining to empty bowels: Yes   Splinting to evacuate: No    Painful evacuation: No    Difficulty emptying rectum: No    Incontinence to stool: occasionally   Incontinence to gas: No     Blood in stool: No    Hemorrhoids: No        Sexual function:    Sexually active: No            Past medical and surgical history    Past medical history:  Past Medical History:   Diagnosis Date    TIA (transient ischemic attack) 03/17/2022    AGE 19. Patient states D/T drug use.     Anesthesia 03/17/2022    Patient states becomes upset and sad after anesthesia.     Full dentures 03/17/2022    Stroke (HCC) 03/17/2022    Pt. states, MINI STROKE AGE 19.    COVID-19 01/17/2022 1/17/2022 stopped 1-    Anesthesia 12/23/2021    agitated coming out of anesthesia    Cervical spinal cord injury (HCC) 12/19/2019    Anxiety     Arthritis     spine, feet     Asthma     Breath shortness     Cataract 12/23/2022    no surgery yet    Cerebral palsy (HCC)     DDD (degenerative disc disease), lumbar     Per Problem List    Dental disorder     upper and lower denture    Depression     Heart burn 12/23/2022    GERD    High cholesterol     History of falling     Marijuana user     Risk for falls     Tetraplegia (HCC)     Urinary bladder disorder     Urinary incontinence     Vertigo     Per Problem List     Past surgical history:  Past Surgical History:   Procedure Laterality Date    DEVYN BY LAPAROSCOPY  3/23/2022    Procedure: CHOLECYSTECTOMY, LAPAROSCOPIC;  Surgeon: Mayur Barrett M.D.;  Location: [facility];  Service: General    PB AMPUTATION TOE,MT-P JT Left 12/27/2021    Procedure: GREAT TOE PARTIAL AMPUTATION;  Surgeon: Emiliano Goff M.D.;  Location: [facility];  Service: Orthopedics    CERVICAL DISK AND FUSION ANTERIOR N/A 12/20/2019    Procedure: DISCECTOMY, SPINE, CERVICAL, ANTERIOR APPROACH, WITH FUSION - C3-5;  Surgeon: Connor Linton M.D.;  Location: [facility];  Service: Neurosurgery    CORPECTOMY N/A 12/20/2019    Procedure: CORPECTOMY;  Surgeon: Connor Linton M.D.;  Location: [facility];  Service: Neurosurgery    BLADDER SLING FEMALE N/A 10/3/2017    Procedure: BLADDER SLING FEMALE TOT, CYSTOSCOPY;  Surgeon: Hudson Driscoll M.D.;  Location: SURGERY SAME DAY [facility];  Service: Gynecology    VAGINAL HYSTERECTOMY SCOPE TOTAL N/A 10/3/2017    Procedure: VAGINAL HYSTERECTOMY SCOPE TOTAL;  Surgeon: Hudson Driscoll M.D.;  Location: SURGERY SAME DAY [facility];  Service: Gynecology    SALPINGECTOMY Bilateral 10/3/2017     Procedure: SALPINGECTOMY;  Surgeon: Hudson Driscoll M.D.;  Location: SURGERY SAME DAY [facility];  Service: Gynecology    OOPHORECTOMY Bilateral 10/3/2017    Procedure: OOPHORECTOMY;  Surgeon: Hudson Driscoll M.D.;  Location: SURGERY SAME DAY [facility];  Service: Gynecology    ANTERIOR AND POSTERIOR REPAIR Bilateral 10/3/2017    Procedure: ANTERIOR AND POSTERIOR REPAIR;  Surgeon: Hudson Driscoll M.D.;  Location: SURGERY SAME DAY [facility];  Service: Gynecology    ENTEROCELE REPAIR N/A 10/3/2017    Procedure: ENTEROCELE REPAIR, PERINEOPLASTY;  Surgeon: Hudson Driscoll M.D.;  Location: SURGERY SAME DAY [facility];  Service: Gynecology    VAGINAL SUSPENSION N/A 10/3/2017    Procedure: VAGINAL SUSPENSION SACROSPINOUS VAULT POSSIBLE;  Surgeon: Hudson Driscoll M.D.;  Location: SURGERY SAME DAY [facility];  Service: Gynecology    OTHER Left 2005    hammertoe x2    EYE SURGERY  1972    for lazy eye    LUMPECTOMY       Medications:has a current medication list which includes the following prescription(s): furosemide, pantoprazole, esomeprazole, potassium chloride, potassium chloride sa, baclofen, tizanidine, gabapentin, meloxicam, albuterol, ondansetron, venlafaxine, nicotine polacrilex, stiolto respimat, fluticasone, myrbetriq, and toviaz.  Allergies:Other environmental and Codeine  Family history:  Family History   Problem Relation Age of Onset    Cancer Mother     Alcohol/Drug Mother     Cancer Brother     Diabetes Maternal Aunt      Social history: reports that she quit smoking about 7 months ago. Her smoking use included cigarettes. She has a 6.50 pack-year smoking history. She has never used smokeless tobacco. She reports current drug use. Drugs: Marijuana and Inhaled. She reports that she does not drink alcohol.    Review of systems: A full review of systems was performed, and negative with the exception of want is noted above in the HPI.        Objective        LMP 01/11/2017     Physical  Exam  Vitals reviewed. Exam conducted with a chaperone present (MA - see notes.).   Constitutional:       Appearance: Normal appearance.   HENT:      Head: Normocephalic.      Mouth/Throat:      Mouth: Mucous membranes are moist.   Cardiovascular:      Rate and Rhythm: Normal rate.   Pulmonary:      Effort: Pulmonary effort is normal.   Abdominal:      Palpations: Abdomen is soft. There is no mass.      Tenderness: no abdominal tenderness   Skin:     General: Skin is warm and dry.   Neurological:      Mental Status: She is alert.      Motor: Weakness present.      Gait: Gait abnormal.      Comments: Uses walker   Psychiatric:         Mood and Affect: Mood normal.     Genitourinary (prior): Narrowed introitus with moderate to severe vaginal atrophy.  Some tenderness on placement of UDS catheters.  No prolapse.        Multichannel urodynamics -see report :   Filling phase: Sustained uninhibited DO starting at 127mL, normal compliance, able to fill up to 334 after DO suppression. Stress leak with CLPP 108cm H2O at 149mL, MUCP 69   Voiding phase: Complete emptying with detrusor contraction and some Valsalva effort.  Patient was told previously that she should Valsalva to empty her bladder    Diagnostic test and records review:    Urine dipstick: negative    Labs: n/a    Radiology:     Renal US 2020:  The right kidney measures 9.51 cm.  The left kidney measures 8.27 cm.  There is no hydronephrosis.  There are no abnormal calcifications.  The bladder demonstrates no focal wall abnormality.    Documentation reviewed: Prior EMR Records           Assessment & Plan     Ms.Dina Cleve Salinas is a 57 y.o. year old P2 with neurogenic bladder defined as mixed, urge predominant urinary incontinence with nocturia.     1. Neurogenic bladder  2. Mixed incontinence  3. Injury of cervical spinal cord, subsequent encounter (HCC)  4. Other cerebral palsy (HCC)  5. Nocturia  - Prior renal ultrasound without hydronephrosis  - She may be a  candidate for vaginal estrogen replacement in the future to augment urge symptoms.    - s/p chemodenervation with botox treatment, 100u on 4/19/22 - significant improvement, currently dry, but with some increased hesitancy/double voiding.  Her improvements in urinary incontinence outweigh the inconvenience of her slow emptying at this time.  Prior PVR was normal.  -I recommend submitting a urine culture to rule out any UTI, at her convenience.  Order placed.  - Otherwise, I recommend observation      6. Fecal incontinence  This is bothersome but infrequent. No change in stil leakage after cholecystectomy. She feels constinued constipe.   - Can follow up if diarrhea becomes an issues post-cholecystectomy.              Giselle Caceres MD, FACOG    Female Pelvic Medicine and Reconstructive Surgery  Department of Obstetrics and Gynecology  [facility]    This medical record contains text that has been entered with the assistance of computer voice recognition and dictation software.  Therefore, it may contain unintended errors in text, spelling, punctuation, or grammar

## 2022-08-18 ENCOUNTER — TELEMEDICINE (OUTPATIENT)
Dept: OBGYN | Facility: CLINIC | Age: 57
End: 2022-08-18
Payer: MEDICAID

## 2022-08-18 DIAGNOSIS — R39.9 URINARY SYMPTOM OR SIGN: ICD-10-CM

## 2022-08-18 DIAGNOSIS — N39.46 URINARY INCONTINENCE, MIXED: ICD-10-CM

## 2022-08-18 DIAGNOSIS — N31.9 NEUROGENIC BLADDER: ICD-10-CM

## 2022-08-18 PROCEDURE — 99213 OFFICE O/P EST LOW 20 MIN: CPT | Mod: 95 | Performed by: STUDENT IN AN ORGANIZED HEALTH CARE EDUCATION/TRAINING PROGRAM

## 2022-08-18 NOTE — PROGRESS NOTES
PT here for virtual fv  PT would like to discuss problems she is experiencing with her bladder function   Phone #: [phone] (home)   Pharmacy Verified.

## 2022-08-30 ENCOUNTER — HOSPITAL ENCOUNTER (OUTPATIENT)
Dept: LAB | Facility: MEDICAL CENTER | Age: 57
End: 2022-08-30
Attending: STUDENT IN AN ORGANIZED HEALTH CARE EDUCATION/TRAINING PROGRAM
Payer: MEDICAID

## 2022-08-30 ENCOUNTER — HOSPITAL ENCOUNTER (OUTPATIENT)
Dept: LAB | Facility: MEDICAL CENTER | Age: 57
End: 2022-08-30
Attending: FAMILY MEDICINE
Payer: MEDICAID

## 2022-08-30 DIAGNOSIS — R39.9 URINARY SYMPTOM OR SIGN: ICD-10-CM

## 2022-08-30 DIAGNOSIS — R14.0 ABDOMINAL BLOATING: ICD-10-CM

## 2022-08-30 LAB
ALBUMIN SERPL BCP-MCNC: 4.4 G/DL (ref 3.2–4.9)
ALBUMIN/GLOB SERPL: 1.8 G/DL
ALP SERPL-CCNC: 105 U/L (ref 30–99)
ALT SERPL-CCNC: 26 U/L (ref 2–50)
ANION GAP SERPL CALC-SCNC: 12 MMOL/L (ref 7–16)
AST SERPL-CCNC: 22 U/L (ref 12–45)
BASOPHILS # BLD AUTO: 1.3 % (ref 0–1.8)
BASOPHILS # BLD: 0.07 K/UL (ref 0–0.12)
BILIRUB SERPL-MCNC: 0.5 MG/DL (ref 0.1–1.5)
BUN SERPL-MCNC: 14 MG/DL (ref 8–22)
CALCIUM SERPL-MCNC: 9.3 MG/DL (ref 8.5–10.5)
CHLORIDE SERPL-SCNC: 102 MMOL/L (ref 96–112)
CO2 SERPL-SCNC: 26 MMOL/L (ref 20–33)
CREAT SERPL-MCNC: 0.77 MG/DL (ref 0.5–1.4)
EOSINOPHIL # BLD AUTO: 0.19 K/UL (ref 0–0.51)
EOSINOPHIL NFR BLD: 3.5 % (ref 0–6.9)
ERYTHROCYTE [DISTWIDTH] IN BLOOD BY AUTOMATED COUNT: 47.3 FL (ref 35.9–50)
FASTING STATUS PATIENT QL REPORTED: NORMAL
GFR SERPLBLD CREATININE-BSD FMLA CKD-EPI: 90 ML/MIN/1.73 M 2
GLOBULIN SER CALC-MCNC: 2.4 G/DL (ref 1.9–3.5)
GLUCOSE SERPL-MCNC: 55 MG/DL (ref 65–99)
HCT VFR BLD AUTO: 44.4 % (ref 37–47)
HGB BLD-MCNC: 15.1 G/DL (ref 12–16)
IMM GRANULOCYTES # BLD AUTO: 0.02 K/UL (ref 0–0.11)
IMM GRANULOCYTES NFR BLD AUTO: 0.4 % (ref 0–0.9)
LYMPHOCYTES # BLD AUTO: 2.12 K/UL (ref 1–4.8)
LYMPHOCYTES NFR BLD: 38.6 % (ref 22–41)
MCH RBC QN AUTO: 31.1 PG (ref 27–33)
MCHC RBC AUTO-ENTMCNC: 34 G/DL (ref 33.6–35)
MCV RBC AUTO: 91.5 FL (ref 81.4–97.8)
MONOCYTES # BLD AUTO: 0.38 K/UL (ref 0–0.85)
MONOCYTES NFR BLD AUTO: 6.9 % (ref 0–13.4)
NEUTROPHILS # BLD AUTO: 2.71 K/UL (ref 2–7.15)
NEUTROPHILS NFR BLD: 49.3 % (ref 44–72)
NRBC # BLD AUTO: 0 K/UL
NRBC BLD-RTO: 0 /100 WBC
PLATELET # BLD AUTO: 259 K/UL (ref 164–446)
PMV BLD AUTO: 11.3 FL (ref 9–12.9)
POTASSIUM SERPL-SCNC: 4.1 MMOL/L (ref 3.6–5.5)
PROT SERPL-MCNC: 6.8 G/DL (ref 6–8.2)
RBC # BLD AUTO: 4.85 M/UL (ref 4.2–5.4)
SODIUM SERPL-SCNC: 140 MMOL/L (ref 135–145)
WBC # BLD AUTO: 5.5 K/UL (ref 4.8–10.8)

## 2022-08-30 PROCEDURE — 87086 URINE CULTURE/COLONY COUNT: CPT

## 2022-08-30 PROCEDURE — 80053 COMPREHEN METABOLIC PANEL: CPT

## 2022-08-30 PROCEDURE — 36415 COLL VENOUS BLD VENIPUNCTURE: CPT

## 2022-08-30 PROCEDURE — 85025 COMPLETE CBC W/AUTO DIFF WBC: CPT

## 2022-09-01 ENCOUNTER — PATIENT MESSAGE (OUTPATIENT)
Dept: MEDICAL GROUP | Facility: MEDICAL CENTER | Age: 57
End: 2022-09-01

## 2022-09-01 DIAGNOSIS — N31.9 NEUROGENIC BLADDER: ICD-10-CM

## 2022-09-01 DIAGNOSIS — R32 URINARY INCONTINENCE, UNSPECIFIED TYPE: ICD-10-CM

## 2022-09-01 LAB
BACTERIA UR CULT: NORMAL
SIGNIFICANT IND 70042: NORMAL
SITE SITE: NORMAL
SOURCE SOURCE: NORMAL

## 2022-09-05 ENCOUNTER — PATIENT MESSAGE (OUTPATIENT)
Dept: MEDICAL GROUP | Facility: MEDICAL CENTER | Age: 57
End: 2022-09-05

## 2022-09-06 RX ORDER — MIRABEGRON 50 MG/1
50 TABLET, FILM COATED, EXTENDED RELEASE ORAL DAILY
Qty: 90 TABLET | Refills: 1 | Status: SHIPPED | OUTPATIENT
Start: 2022-09-06 | End: 2023-01-21 | Stop reason: SDUPTHER

## 2022-09-07 ENCOUNTER — PATIENT MESSAGE (OUTPATIENT)
Dept: MEDICAL GROUP | Facility: MEDICAL CENTER | Age: 57
End: 2022-09-07

## 2022-09-07 DIAGNOSIS — R45.89 DEPRESSED MOOD: ICD-10-CM

## 2022-09-07 DIAGNOSIS — S14.109A INJURY OF CERVICAL SPINAL CORD, INITIAL ENCOUNTER (HCC): ICD-10-CM

## 2022-09-07 DIAGNOSIS — R25.2 SPASTICITY: ICD-10-CM

## 2022-09-07 RX ORDER — BACLOFEN 10 MG/1
30 TABLET ORAL 2 TIMES DAILY
Qty: 180 TABLET | Refills: 0 | Status: SHIPPED | OUTPATIENT
Start: 2022-09-07 | End: 2022-10-18

## 2022-09-08 RX ORDER — VENLAFAXINE HYDROCHLORIDE 150 MG/1
150 CAPSULE, EXTENDED RELEASE ORAL DAILY
Qty: 90 CAPSULE | Refills: 0 | Status: SHIPPED | OUTPATIENT
Start: 2022-09-08 | End: 2022-10-11

## 2022-09-15 ENCOUNTER — OFFICE VISIT (OUTPATIENT)
Dept: MEDICAL GROUP | Facility: MEDICAL CENTER | Age: 57
End: 2022-09-15
Attending: FAMILY MEDICINE
Payer: MEDICAID

## 2022-09-15 VITALS
HEART RATE: 88 BPM | TEMPERATURE: 97.7 F | BODY MASS INDEX: 25.7 KG/M2 | HEIGHT: 66 IN | WEIGHT: 159.9 LBS | OXYGEN SATURATION: 94 % | SYSTOLIC BLOOD PRESSURE: 96 MMHG | DIASTOLIC BLOOD PRESSURE: 60 MMHG | RESPIRATION RATE: 16 BRPM

## 2022-09-15 DIAGNOSIS — R14.0 ABDOMINAL BLOATING: ICD-10-CM

## 2022-09-15 DIAGNOSIS — R60.0 BILATERAL LEG EDEMA: ICD-10-CM

## 2022-09-15 DIAGNOSIS — G82.52 QUADRIPLEGIA, C1-C4 INCOMPLETE (HCC): ICD-10-CM

## 2022-09-15 DIAGNOSIS — K30 ACID INDIGESTION: ICD-10-CM

## 2022-09-15 PROCEDURE — 99214 OFFICE O/P EST MOD 30 MIN: CPT | Performed by: FAMILY MEDICINE

## 2022-09-15 PROCEDURE — 99213 OFFICE O/P EST LOW 20 MIN: CPT | Performed by: FAMILY MEDICINE

## 2022-09-15 RX ORDER — TORSEMIDE 5 MG/1
5 TABLET ORAL DAILY
Qty: 30 TABLET | Refills: 3 | Status: SHIPPED | OUTPATIENT
Start: 2022-09-15 | End: 2022-10-11

## 2022-09-15 ASSESSMENT — FIBROSIS 4 INDEX: FIB4 SCORE: 0.93

## 2022-09-15 NOTE — PROGRESS NOTES
"Subjective     Marilyn Cleve Salinas is a 57 y.o. female who presents with Bloated            HPI 1.  Abdominal bloating-patient reports intense level of daily abdominal bloating although typically no associated emesis.  I does not seem to be triggered by any particular food.  She has seen GI consultants in the past.  2.  Lower extremity edema-patient complains about significant swelling in both legs reporting that they are moderately swollen again today.  She denies any oliguria or dysuria.  Patient reports prior trial of furosemide was ineffective at relieving her symptoms.    ROS negative for current rash, skin discoloration, diarrhea           Objective     BP (!) 96/60   Pulse 88   Temp 36.5 °C (97.7 °F)   Resp 16   Ht 1.676 m (5' 5.98\")   Wt 72.5 kg (159 lb 14.4 oz)   LMP 01/11/2017   SpO2 94%   BMI 25.82 kg/m²      Physical Exam  Gen.- alert, cooperative, in no acute distress  Neck- midline trachea, thyroid not enlarged or tender,supple, no cervical adenopathy  Chest-clear to auscultation and percussion with normal breath sounds. No retractions. Chest wall nontender  Cardiac- regular rhythm and rate. No murmur, thrill, or heave  Abdomen- normal bowel sounds, soft without guarding. Liver and spleen not enlarged, no palpable masses.  Patient has minimal distention with no tympany.  Lower extremities-minimal swelling with no pitting edema noted over left more than right leg.  No skin discoloration or focal numbness.                      Assessment & Plan        1. Quadriplegia, C1-C4 incomplete (HCC)      2. Acid indigestion      3. Bilateral leg edema-patient very disturbed by the intensity of her symptoms however in office observations have not documented significant swelling    4.  Abdominal bloating    Plan: 1.  GI referral to evaluate abdominal bloating/discomfort  2.  Trial of torsemide 5 mg every morning for leg edema (patient previously had really no effect with furosemide)  3.  To avoid " hypoglycemia patient is encouraged to have a small breakfast possibly a piece of toast or small amount of yogurt.  4.  Follow-up in 1 month, Dr. Hutchison  5.  Echocardiogram to evaluate for any component of CHF

## 2022-09-19 ENCOUNTER — APPOINTMENT (OUTPATIENT)
Dept: OBGYN | Facility: CLINIC | Age: 57
End: 2022-09-19

## 2022-09-19 NOTE — PROGRESS NOTES
Urogynecology and Pelvic Reconstructive Surgery Follow Up    Marilyn Salinas MRN:[id number] :1965    Referred by: Julianna Guillory DO    Reason for Visit:   No chief complaint on file.          Subjective     History of Presenting Illness:    Ms.Dina Cleve Salinas is a 57 y.o. year old P2 with h/o nontraumatic incomplete spinal cord injury, paraplegia, partial CP, who presents for follow up of constant urinary incontinence (mixed) and occasional bowel incontinence. She underwent bladder botox treatment in 2022     She previously reported fantastic result after her Botox procedure.  She still reports that she is sleeping better at night, but when she goes to the bathroom, she has to sit and wait a good amount of time before the urine stream starts, and has to double or triple void. She also notes some tenderness when her bladder, but she denies any specific dysuria, hematuria, bladder pain.    She submitted a urine culture since last visit which was negative for infection. ***    I asked if her improvement in urinary symptoms outweighs the inconvenience of incomplete bladder emptying sensation, and she strongly agrees that the improvement of Botox outweighs any current side effects          Prior HPI: She was referred by her Physiatrist Dr. Guillory for the evaluation and management of neurogenic bladder with incontinence    She has constant incontinence, mostly with urgency but sometimes without warning. She also has leakage with cough/laugh/exercise, but less than with urgency. DANY somewhat improved after 2017 sling (see below). Symptoms most bothersome at night when she has to use at least 2 diapers. Due to mobility issues (uses a walker) she has trouble getting to the bathroom.     She has occasional bowel incontinence, both small and larger episodes. These are note predictable or regular in frequency. She is scheduled for a cholecystectomy with Dr. Barrett later this month, but is worried about possible  diarrhea.     Prior Pelvic surgery:   10/3/2017: Laparoscopic-assisted vaginal hysterectomy, bilateral    salpingo-oophorectomy, anterior and posterior vaginal repair, enterocele    repair, perineoplasty, sacrospinous vault suspension, transobturator tape mid urethral sling procedure, cystoscopy (Dr. Driscoll) Op report reviewed     Prior treatment:   Botox - #1 100u 4/19/22  Mirabegron - stopped  Fesoterodine - stopped      Fluid intake:   Mostly water  Coffee to help with constipation    Pelvic floor symptom review:     Bladder:   Voids per day: 5-7 Voids per night: 1-2   --> none s/p botox   Urinary incontinence episodes per day: 2-3 per day, 1-2 at night --> none s/p botox   Urge leakage:  On Movement to Bathroom and Full Bladder urge worse than stress --> none s/p botox   Stress leakage: With Cough, With Laugh and With Exercise --> no change   Continuous / insensible urine loss: sometimes   Nocturnal enuresis: No    Leakage volume: Moderate   Number of pads/day: 1 diaper at night    Bladder emptying: Complete   Voiding symptoms: None   UTI in last 12 months: No        Prolapse:     Prolapse symptoms: None        Bowel:    Constipation: Yes   Bowel movements per day: 1    Straining to empty bowels: Yes   Splinting to evacuate: No    Painful evacuation: No    Difficulty emptying rectum: No    Incontinence to stool: occasionally   Incontinence to gas: No     Blood in stool: No    Hemorrhoids: No        Sexual function:    Sexually active: No            Past medical and surgical history    Past medical history:  Past Medical History:   Diagnosis Date    TIA (transient ischemic attack) 03/17/2022    AGE 19. Patient states D/T drug use.     Anesthesia 03/17/2022    Patient states becomes upset and sad after anesthesia.    Full dentures 03/17/2022    Stroke (HCC) 03/17/2022    Pt. states, MINI STROKE AGE 19.    COVID-19 01/17/2022 1/17/2022 stopped 1-    Anesthesia 12/23/2021    agitated coming out of  anesthesia    Cervical spinal cord injury (HCC) 12/19/2019    Anxiety     Arthritis     spine, feet     Asthma     Breath shortness     Cataract 12/23/2022    no surgery yet    Cerebral palsy (HCC)     DDD (degenerative disc disease), lumbar     Per Problem List    Dental disorder     upper and lower denture    Depression     Heart burn 12/23/2022    GERD    High cholesterol     History of falling     Marijuana user     Risk for falls     Tetraplegia (HCC)     Urinary bladder disorder     Urinary incontinence     Vertigo     Per Problem List     Past surgical history:  Past Surgical History:   Procedure Laterality Date    DEVYN BY LAPAROSCOPY  3/23/2022    Procedure: CHOLECYSTECTOMY, LAPAROSCOPIC;  Surgeon: Mayur Barrett M.D.;  Location: [facility];  Service: General    PB AMPUTATION TOE,MT-P JT Left 12/27/2021    Procedure: GREAT TOE PARTIAL AMPUTATION;  Surgeon: Emiliano Goff M.D.;  Location: [facility];  Service: Orthopedics    CERVICAL DISK AND FUSION ANTERIOR N/A 12/20/2019    Procedure: DISCECTOMY, SPINE, CERVICAL, ANTERIOR APPROACH, WITH FUSION - C3-5;  Surgeon: Connor Linton M.D.;  Location: [facility];  Service: Neurosurgery    CORPECTOMY N/A 12/20/2019    Procedure: CORPECTOMY;  Surgeon: Connor Linton M.D.;  Location: SURGERY [facility];  Service: Neurosurgery    BLADDER SLING FEMALE N/A 10/3/2017    Procedure: BLADDER SLING FEMALE TOT, CYSTOSCOPY;  Surgeon: Hudson Driscoll M.D.;  Location: SURGERY SAME DAY [facility];  Service: Gynecology    VAGINAL HYSTERECTOMY SCOPE TOTAL N/A 10/3/2017    Procedure: VAGINAL HYSTERECTOMY SCOPE TOTAL;  Surgeon: Hudson Driscoll M.D.;  Location: SURGERY SAME DAY [facility];  Service: Gynecology    SALPINGECTOMY Bilateral 10/3/2017    Procedure: SALPINGECTOMY;  Surgeon: Hudson Driscoll M.D.;  Location: SURGERY SAME DAY [facility];  Service: Gynecology    OOPHORECTOMY Bilateral 10/3/2017    Procedure: OOPHORECTOMY;   Surgeon: Hudson Driscoll M.D.;  Location: SURGERY SAME DAY [facility];  Service: Gynecology    ANTERIOR AND POSTERIOR REPAIR Bilateral 10/3/2017    Procedure: ANTERIOR AND POSTERIOR REPAIR;  Surgeon: Hudson Driscoll M.D.;  Location: SURGERY SAME DAY [facility];  Service: Gynecology    ENTEROCELE REPAIR N/A 10/3/2017    Procedure: ENTEROCELE REPAIR, PERINEOPLASTY;  Surgeon: Hudson Driscoll M.D.;  Location: SURGERY SAME DAY [facility];  Service: Gynecology    VAGINAL SUSPENSION N/A 10/3/2017    Procedure: VAGINAL SUSPENSION SACROSPINOUS VAULT POSSIBLE;  Surgeon: Hudson Driscoll M.D.;  Location: SURGERY SAME DAY [facility];  Service: Gynecology    OTHER Left 2005    hammertoe x2    EYE SURGERY  1972    for lazy eye    LUMPECTOMY       Medications:has a current medication list which includes the following prescription(s): venlafaxine, baclofen, myrbetriq, torsemide, tizanidine, pantoprazole, gabapentin, meloxicam, albuterol, stiolto respimat, fluticasone, and toviaz.  Allergies:Other environmental and Codeine  Family history:  Family History   Problem Relation Age of Onset    Cancer Mother     Alcohol/Drug Mother     Cancer Brother     Diabetes Maternal Aunt      Social history: reports that she quit smoking about 8 months ago. Her smoking use included cigarettes. She has a 6.50 pack-year smoking history. She has never used smokeless tobacco. She reports current drug use. Drugs: Marijuana and Inhaled. She reports that she does not drink alcohol.    Review of systems: A full review of systems was performed, and negative with the exception of want is noted above in the HPI.        Objective        LMP 01/11/2017     Physical Exam  Vitals reviewed. Exam conducted with a chaperone present (MA - see notes.).   Constitutional:       Appearance: Normal appearance.   HENT:      Head: Normocephalic.      Mouth/Throat:      Mouth: Mucous membranes are moist.   Cardiovascular:      Rate and Rhythm: Normal rate.    Pulmonary:      Effort: Pulmonary effort is normal.   Abdominal:      Palpations: Abdomen is soft. There is no mass.      Tenderness: There is no abdominal tenderness.   Skin:     General: Skin is warm and dry.   Neurological:      Mental Status: She is alert.      Motor: Weakness present.      Gait: Gait abnormal.      Comments: Uses walker   Psychiatric:         Mood and Affect: Mood normal.     Genitourinary (prior): Narrowed introitus with moderate to severe vaginal atrophy.  Some tenderness on placement of UDS catheters.  No prolapse.        Multichannel urodynamics -see report :   Filling phase: Sustained uninhibited DO starting at 127mL, normal compliance, able to fill up to 334 after DO suppression. Stress leak with CLPP 108cm H2O at 149mL, MUCP 69   Voiding phase: Complete emptying with detrusor contraction and some Valsalva effort.  Patient was told previously that she should Valsalva to empty her bladder    Diagnostic test and records review:    Urine dipstick: negative    Labs: n/a    Radiology:     Renal US 2020:  The right kidney measures 9.51 cm.  The left kidney measures 8.27 cm.  There is no hydronephrosis.  There are no abnormal calcifications.  The bladder demonstrates no focal wall abnormality.    Documentation reviewed: Prior EMR Records           Assessment & Plan     Ms.Dina Cleve Salinas is a 57 y.o. year old P2 with neurogenic bladder defined as mixed, urge predominant urinary incontinence with nocturia.     1. Neurogenic bladder  2. Mixed incontinence  3. Injury of cervical spinal cord, subsequent encounter ([facility])  4. Other cerebral palsy ([facility])  5. Nocturia  - Prior renal ultrasound without hydronephrosis  - She may be a candidate for vaginal estrogen replacement in the future to augment urge symptoms.    - s/p chemodenervation with botox treatment, 100u on 4/19/22 - significant improvement, currently dry, but with some increased hesitancy/double voiding.  Her improvements in urinary  incontinence outweigh the inconvenience of her slow emptying at this time.  Prior PVR was normal.  -I recommend submitting a urine culture to rule out any UTI, at her convenience.  Order placed.  - Otherwise, I recommend observation      6. Fecal incontinence  This is bothersome but infrequent. No change in stil leakage after cholecystectomy. She feels constinued constipe.   - Can follow up if diarrhea becomes an issues post-cholecystectomy.              Giselle Caceres MD, FACOG    Female Pelvic Medicine and Reconstructive Surgery  Department of Obstetrics and Gynecology  [facility]    This medical record contains text that has been entered with the assistance of computer voice recognition and dictation software.  Therefore, it may contain unintended errors in text, spelling, punctuation, or grammar

## 2022-09-20 ENCOUNTER — RESEARCH ENCOUNTER (OUTPATIENT)
Dept: RESEARCH | Facility: WORKSITE | Age: 57
End: 2022-09-20

## 2022-09-20 DIAGNOSIS — Z00.6 RESEARCH STUDY PATIENT: ICD-10-CM

## 2022-09-20 NOTE — RESEARCH NOTE
[employer] [place] [employer] enrollment and kit pickup;  Tracking Number: [id number]

## 2022-09-26 DIAGNOSIS — Z00.6 RESEARCH STUDY PATIENT: ICD-10-CM

## 2022-10-10 ENCOUNTER — NON-PROVIDER VISIT (OUTPATIENT)
Dept: WOUND CARE | Facility: MEDICAL CENTER | Age: 57
End: 2022-10-10
Attending: NURSE PRACTITIONER
Payer: MEDICAID

## 2022-10-10 PROCEDURE — 97597 DBRDMT OPN WND 1ST 20 CM/<: CPT

## 2022-10-10 PROCEDURE — 99211 OFF/OP EST MAY X REQ PHY/QHP: CPT

## 2022-10-10 NOTE — PATIENT INSTRUCTIONS
-Keep your wound dressing clean, dry, and intact.    -Change your dressing if it becomes soiled, soaked, or falls off.    -Should you experience any significant changes in your wound(s), such as infection (redness, swelling, localized heat, increased pain, fever > 101 F, chills) or have any questions regarding your home care instructions, please contact the wound center at [phone]. If after hours, contact your primary care physician or go to the hospital emergency room.

## 2022-10-10 NOTE — CERTIFICATION
Non Provider Encounter- Full Thickness wound    HISTORY OF PRESENT ILLNESS  Wound History:    START OF CARE IN CLINIC: 10/10/22    REFERRING PROVIDER: Nancy Nieves A.P.R.N.   WOUND- Full Thickness Wound   LOCATION: Left 3rd toe   HISTORY: The patient is a 57 year old female with history tetraplegia, neuropathy, MS, CP and frequent falls. She was seen in the [facility] clinic previously for a wound to her Left great toe that resulted in an amputation. She returns today for an open wound to her left 3rd toe. Per the patient she initially fell a few weeks ago at her sons home. The patient states she went to urgent care ([facility]) where she was prescribed cephalexin (finished full course) and was instructed to use silver dressings for her wound care. She then went to the [facility] for her nail care on the 09/30/22 and was referred to the [facility] clinic for further wound care.     Pertinent Labs and Diagnostics:    Labs:     A1c:   Lab Results   Component Value Date/Time    HBA1C 5.6 07/27/2017 09:39 AM      IMAGING: None in Epic.     VASCULAR STUDIES: None in [facility].     LAST  WOUND CULTURE:  DATE : None in [facility].            FALL RISK ASSESSMENT:   65 years or older     X Fall within the last 2 years   X Uses ambulatory devices  X Loss of protective sensation in feet   Use of prostethic/orthotic    X Presence of lower extremity/foot/toe amputation   X Taking medication that increases risk (per facility policy)    Interventions Recommended (if any of the above are selected):   Use of Assistive Device: 4 wheel walker   Supervision with ambulation: Caregiver   Assistance with ambulation: Caregiver   Home safety education: Educational material provided      Patient allergies and medications reviewed via Epic.     Wound Assessment:    Wound 10/10/22 Left 3rd toe distal wound (Active)   Wound Image    10/10/22 1500   Site Assessment Pink;Yellow;Painful 10/10/22 1500   Periwound Assessment Callused 10/10/22 1500   Margins Unattached  edges 10/10/22 1500   Drainage Amount Scant 10/10/22 1500   Drainage Description Serosanguineous;Tan 10/10/22 1500   Treatments Cleansed;Topical Lidocaine;CSWD - Conservative Sharp Wound Debridement;Site care 10/10/22 1500   Wound Cleansing Puracyn [place] 10/10/22 1500   Periwound Protectant Skin Protectant Wipes to Periwound;Barrier Paste 10/10/22 1500   Dressing Cleansing/Solutions Not Applicable 10/10/22 1500   Dressing Options Hydrofiber Silver;Silicone Adhesive Foam;Hypafix Tape 10/10/22 1500   Dressing Changed New 10/10/22 1500   Dressing Status Clean;Dry;Intact 10/10/22 1500   Non-staged Wound Description Full thickness 10/10/22 1500   Post-Procedure Length (cm) 0.1 cm 10/10/22 1500   Post-Procedure Width (cm) 0.2 cm 10/10/22 1500   Post-Procedure Depth (cm) 0.1 cm 10/10/22 1500   Post-Procedure Surface Area (cm^2) 0.02 cm^2 10/10/22 1500   Post-Procedure Volume (cm^3) 0.002 cm^3 10/10/22 1500   Tunneling (cm) 0 cm 10/10/22 1500   Undermining (cm) 0 cm 10/10/22 1500   Wound Odor None 10/10/22 1500   Pulses DP;PT;Doppler 10/10/22 1500   Exposed Structures None 10/10/22 1500       Wound 10/10/22 Left dorsal 3rd toe (Active)   Wound Image   10/10/22 1500   Site Assessment Pink;Red 10/10/22 1500   Periwound Assessment Dry;Intact;Red;Blanchable erythema;Painful 10/10/22 1500   Margins Attached edges 10/10/22 1500   Drainage Amount Scant 10/10/22 1500   Drainage Description Serosanguineous 10/10/22 1500   Treatments Cleansed;Topical Lidocaine;CSWD - Conservative Sharp Wound Debridement;Site care 10/10/22 1500   Wound Cleansing Puracyn [place] 10/10/22 1500   Periwound Protectant Skin Protectant Wipes to Periwound;Barrier Paste 10/10/22 1500   Dressing Cleansing/Solutions Not Applicable 10/10/22 1500   Dressing Options Hydrofera Blue Ready;Hypafix Tape 10/10/22 1500   Dressing Changed New 10/10/22 1500   Dressing Status Clean;Dry;Intact 10/10/22 1990   Non-staged Wound Description Partial thickness 10/10/22 3743    Post-Procedure Length (cm) 0.5 cm 10/10/22 1500   Post-Procedure Width (cm) 1.3 cm 10/10/22 1500   Post-Procedure Surface Area (cm^2) 0.65 cm^2 10/10/22 1500   Tunneling (cm) 0 cm 10/10/22 1500   Undermining (cm) 0 cm 10/10/22 1500   Wound Odor None 10/10/22 1500   Pulses DP;PT;Doppler 10/10/22 1500   Exposed Structures None 10/10/22 1500     Procedures:    2% viscous lidocaine applied topically to wound bed for approximately 15 minutes prior to debridement. CSWD with curette and scalpel to debride wound bed and periwound callus of non-viable tissue. Entire surface of wound, < 20 cm2 debrided. Refer to flow sheet for wound care details. Patient tolerated the procedure well.                PATIENT EDUCATION  -Advised to go to ER for any increased redness, swelling, drainage or odor, or if patient develops fever, chills, nausea or vomiting.  -Importance of adequate nutrition for wound healing  -Increase protein intake (unless contraindicated by renal status)

## 2022-10-11 ENCOUNTER — OFFICE VISIT (OUTPATIENT)
Dept: MEDICAL GROUP | Facility: MEDICAL CENTER | Age: 57
End: 2022-10-11
Attending: FAMILY MEDICINE
Payer: MEDICAID

## 2022-10-11 VITALS
DIASTOLIC BLOOD PRESSURE: 60 MMHG | SYSTOLIC BLOOD PRESSURE: 110 MMHG | HEART RATE: 88 BPM | OXYGEN SATURATION: 99 % | WEIGHT: 157.8 LBS | RESPIRATION RATE: 16 BRPM | BODY MASS INDEX: 25.36 KG/M2 | TEMPERATURE: 99 F | HEIGHT: 66 IN

## 2022-10-11 DIAGNOSIS — G80.8 OTHER CEREBRAL PALSY (HCC): ICD-10-CM

## 2022-10-11 DIAGNOSIS — R14.0 ABDOMINAL BLOATING: ICD-10-CM

## 2022-10-11 DIAGNOSIS — J45.20 MILD INTERMITTENT ASTHMA WITHOUT COMPLICATION: ICD-10-CM

## 2022-10-11 PROBLEM — R09.02 POSTOPERATIVE HYPOXIA: Status: RESOLVED | Noted: 2022-03-23 | Resolved: 2022-10-11

## 2022-10-11 PROBLEM — F17.201 TOBACCO ABUSE, IN REMISSION: Status: ACTIVE | Noted: 2017-06-19

## 2022-10-11 PROBLEM — R09.89 CHEST CONGESTION: Status: RESOLVED | Noted: 2017-06-19 | Resolved: 2022-10-11

## 2022-10-11 PROBLEM — I95.0 IDIOPATHIC HYPOTENSION: Status: RESOLVED | Noted: 2019-12-26 | Resolved: 2022-10-11

## 2022-10-11 PROBLEM — R05.9 COUGH: Status: RESOLVED | Noted: 2018-10-30 | Resolved: 2022-10-11

## 2022-10-11 PROBLEM — Z98.890 POSTOPERATIVE HYPOXIA: Status: RESOLVED | Noted: 2022-03-23 | Resolved: 2022-10-11

## 2022-10-11 PROBLEM — N95.1 PERI-MENOPAUSE: Status: RESOLVED | Noted: 2017-06-19 | Resolved: 2022-10-11

## 2022-10-11 PROBLEM — R04.0 EPISTAXIS: Status: RESOLVED | Noted: 2019-02-22 | Resolved: 2022-10-11

## 2022-10-11 PROBLEM — H54.7 POOR VISION: Status: RESOLVED | Noted: 2017-06-19 | Resolved: 2022-10-11

## 2022-10-11 PROBLEM — B35.1 ONYCHOMYCOSIS: Status: RESOLVED | Noted: 2020-12-21 | Resolved: 2022-10-11

## 2022-10-11 PROBLEM — G89.18 POSTOPERATIVE PAIN: Status: RESOLVED | Noted: 2020-01-07 | Resolved: 2022-10-11

## 2022-10-11 PROBLEM — F07.81 POSTCONCUSSION SYNDROME: Status: RESOLVED | Noted: 2019-02-22 | Resolved: 2022-10-11

## 2022-10-11 PROBLEM — Z74.8 ASSISTANCE WITH TRANSPORTATION: Status: RESOLVED | Noted: 2017-07-31 | Resolved: 2022-10-11

## 2022-10-11 PROCEDURE — 99215 OFFICE O/P EST HI 40 MIN: CPT | Performed by: FAMILY MEDICINE

## 2022-10-11 PROCEDURE — 99213 OFFICE O/P EST LOW 20 MIN: CPT | Performed by: FAMILY MEDICINE

## 2022-10-11 PROCEDURE — 99417 PROLNG OP E/M EACH 15 MIN: CPT | Performed by: FAMILY MEDICINE

## 2022-10-11 RX ORDER — VENLAFAXINE HYDROCHLORIDE 150 MG/1
TABLET, EXTENDED RELEASE ORAL
COMMUNITY
Start: 2022-09-04 | End: 2022-12-30 | Stop reason: SDUPTHER

## 2022-10-11 RX ORDER — ESOMEPRAZOLE MAGNESIUM 40 MG/1
40 CAPSULE, DELAYED RELEASE ORAL
Qty: 90 CAPSULE | Refills: 3 | Status: SHIPPED | OUTPATIENT
Start: 2022-10-11 | End: 2022-12-05

## 2022-10-11 RX ORDER — VENLAFAXINE HYDROCHLORIDE 75 MG/1
CAPSULE, EXTENDED RELEASE ORAL
COMMUNITY
Start: 2022-10-09 | End: 2022-12-30 | Stop reason: SDUPTHER

## 2022-10-11 ASSESSMENT — FIBROSIS 4 INDEX: FIB4 SCORE: 0.93

## 2022-10-11 NOTE — ASSESSMENT & PLAN NOTE
"Pt rpeorts she had an EGD done previously [facility] - found a hiatal hernia   Wants referral back to [facility]  Has had persistent abdominal bloating and feels like she needs a Cscope - this has never been done. FIT test neg this year.   Ongoing for 1.5 years  Feeling \"bloating of the abdomen to the ankles\"   Started on torsemide but pt never received this  After eating she has significant bloating and discomfort. The left abdomen is very uncomfortable and gets more bloated.  BMs - regular since her GB was removed. Every 1-2 days. Does not feel constipated. She can feel when she needs a BM  nexium helps significantly, but unfortunately has to pay for it  "

## 2022-10-11 NOTE — PROGRESS NOTES
"Subjective:     CC:    Chief Complaint   Patient presents with    Establish Care    Referral Needed     GI       HISTORY OF THE PRESENT ILLNESS: Patient is a 57 y.o. female. This pleasant patient is here today to establish care and discuss the following issues.   His/her prior PCP was Edwar Platt.    Specialists -   - Podiatry - Dr. Goff - [place]  - Counseling  - Wound care - left foot 3rd toe  - PM&R - Dr. Guillory  - Gynecology - Dr. Lou - bladder botox - neurogenic bowel      Abdominal bloating  Pt rpeorts she had an EGD done previously [facility] - found a hiatal hernia   Wants referral back to [facility]  Has had persistent abdominal bloating and feels like she needs a Cscope - this has never been done. FIT test neg this year.   Ongoing for 1.5 years  Feeling \"bloating of the abdomen to the ankles\"   Started on torsemide but pt never received this  After eating she has significant bloating and discomfort. The left abdomen is very uncomfortable and gets more bloated.  BMs - regular since her GB was removed. Every 1-2 days. Does not feel constipated. She can feel when she needs a BM  nexium helps significantly, but unfortunately has to pay for it    Mild intermittent asthma  Albuterol prn + stiolto  Not on inhaled steroids   Uses her albuterol 3 times per week    Other cerebral palsy (HCC)  Affects the left leg and arm - decreased sensation on the left side       Allergies: Other environmental and Codeine    Current Outpatient Medications Ordered in Epic   Medication Sig Dispense Refill    esomeprazole (NEXIUM) 40 MG delayed-release capsule Take 1 Capsule by mouth every morning before breakfast. 90 Capsule 3    baclofen (LIORESAL) 10 MG Tab Take 3 Tablets by mouth 2 times a day. TAKE 3 TABLETS BY MOUTH THREE TIMES DAILY 180 Tablet 0    Mirabegron ER (MYRBETRIQ) 50 MG TABLET SR 24 HR Take 50 mg by mouth every day. 90 Tablet 1    venlafaxine XR (EFFEXOR XR) 75 MG CAPSULE SR 24 HR       Venlafaxine HCl " 150 MG TABLET SR 24 HR TAKE 1 TABLET BY MOUTH EVERY DAY FOR DEPRESSION      tizanidine (ZANAFLEX) 2 MG capsule Take 1 Capsule by mouth 2 times a day. 180 Capsule 1    gabapentin (NEURONTIN) 300 MG Cap Take 3 Capsules by mouth 2 times a day. Take 1 po qhs 180 Capsule 0    meloxicam (MOBIC) 15 MG tablet TAKE 1 TABLET BY MOUTH DAILY 30 Tablet 6    albuterol 108 (90 Base) MCG/ACT Aero Soln inhalation aerosol Inhale 2 Puffs every 6 hours as needed for Shortness of Breath. 8.5 g 11    Tiotropium Bromide-Olodaterol (STIOLTO RESPIMAT) 2.5-2.5 MCG/ACT Aero Soln Inhale 2 Puffs every day. 1 Each 11    fluticasone (FLONASE) 50 MCG/ACT nasal spray Administer 1 Spray into affected nostril(S) every day. 16 g 6    fesoterodine fumarate (TOVIAZ) 4 MG TABLET SR 24 HR        No current Epic-ordered facility-administered medications on file.       Past Medical History:   Diagnosis Date    Anesthesia 12/23/2021    agitated coming out of anesthesia    Anesthesia 03/17/2022    Patient states becomes upset and sad after anesthesia.    Anxiety     Arthritis     spine, feet     Asthma     Breath shortness     Cataract 12/23/2022    no surgery yet    Cerebral palsy (HCC)     Cervical spinal cord injury (HCC) 12/19/2019    COVID-19 01/17/2022 1/17/2022 stopped 1-    DDD (degenerative disc disease), lumbar     Per Problem List    Dental disorder     upper and lower denture    Depression     Full dentures 03/17/2022    Heart burn 12/23/2022    GERD    High cholesterol     History of falling     Marijuana user     Risk for falls     Stroke (HCC) 03/17/2022    Pt. states, MINI STROKE AGE 19.    Tetraplegia (HCC)     TIA (transient ischemic attack) 03/17/2022    AGE 19. Patient states D/T drug use.     Urinary bladder disorder     Urinary incontinence     Vertigo     Per Problem List       Past Surgical History:   Procedure Laterality Date    DEVYN BY LAPAROSCOPY  3/23/2022    Procedure: CHOLECYSTECTOMY, LAPAROSCOPIC;  Surgeon: Mayur GOINS  NAUN Barrett;  Location: [facility];  Service: General    PB AMPUTATION TOE,MT-P JT Left 12/27/2021    Procedure: GREAT TOE PARTIAL AMPUTATION;  Surgeon: Emiliano Goff M.D.;  Location: [facility];  Service: Orthopedics    CERVICAL DISK AND FUSION ANTERIOR N/A 12/20/2019    Procedure: DISCECTOMY, SPINE, CERVICAL, ANTERIOR APPROACH, WITH FUSION - C3-5;  Surgeon: Connor Linton M.D.;  Location: [facility];  Service: Neurosurgery    CORPECTOMY N/A 12/20/2019    Procedure: CORPECTOMY;  Surgeon: Connor Linton M.D.;  Location: [facility];  Service: Neurosurgery    BLADDER SLING FEMALE N/A 10/3/2017    Procedure: BLADDER SLING FEMALE TOT, CYSTOSCOPY;  Surgeon: Hudson Driscoll M.D.;  Location: SUR[facility]ERY [facility];  Service: Gynecology    VAGINAL HYSTERECTOMY SCOPE TOTAL N/A 10/3/2017    Procedure: VAGINAL HYSTERECTOMY SCOPE TOTAL;  Surgeon: Hudson Driscoll M.D.;  Location: SUR[facility]ERY [facility];  Service: Gynecology    SALPINGECTOMY Bilateral 10/3/2017    Procedure: SALPINGECTOMY;  Surgeon: Hudson Driscoll M.D.;  Location: SURGERY [facility];  Service: Gynecology    OOPHORECTOMY Bilateral 10/3/2017    Procedure: OOPHORECTOMY;  Surgeon: Hudson Driscoll M.D.;  Location: SUR[facility]ERY [facility];  Service: Gynecology    ANTERIOR AND POSTERIOR REPAIR Bilateral 10/3/2017    Procedure: ANTERIOR AND POSTERIOR REPAIR;  Surgeon: Hudson Driscoll M.D.;  Location: SUR[facility]ERY [facility];  Service: Gynecology    ENTEROCELE REPAIR N/A 10/3/2017    Procedure: ENTEROCELE REPAIR, PERINEOPLASTY;  Surgeon: Hudson Driscoll M.D.;  Location: SUR[facility]ERY [facility];  Service: Gynecology    VAGINAL SUSPENSION N/A 10/3/2017    Procedure: VAGINAL SUSPENSION SACROSPINOUS VAULT POSSIBLE;  Surgeon: Hudson Driscoll M.D.;  Location: SUR[facility]ERY [facility];  Service: Gynecology    [facility]    [facility]  1972    for lazy  "eye    LUMPECTOMY         Social History     Tobacco Use    Smoking status: Former     Packs/day: 1.00     Years: 35.00     Pack years: 35.00     Types: Cigarettes     Quit date: 2022     Years since quittin.7    Smokeless tobacco: Never    Tobacco comments:     20 years of 1ppd, then 10 years weaning down   Vaping Use    Vaping Use: Every day    Substances: THC, CBD   Substance Use Topics    Alcohol use: No    Drug use: Yes     Types: Marijuana, Inhaled     Comment: marijuana daily (smoked and edibles) last time 2021       Social History     Social History Narrative    Not on file       Family History   Problem Relation Age of Onset    Cancer Mother         ovarian    Alcohol/Drug Mother     Cancer Brother         unknown, caused him to lose his leg when he was 3    Diabetes Maternal Aunt              Objective:     Exam: /60   Pulse 88   Temp 37.2 °C (99 °F)   Resp 16   Ht 1.676 m (5' 6\")   Wt 71.6 kg (157 lb 12.8 oz)   SpO2 99%  Body mass index is 25.47 kg/m².    General: Normal appearing. No distress.  Pulmonary: Clear to ausculation.  Normal effort. No rales, ronchi, or wheezing.  Cardiovascular: Regular rate and rhythm without murmur.   Abdomen: Soft, mildly tender diffusely, nondistended. Normal bowel sounds.  Gas noted in the right upper quadrant  Musculoskeletal: No pitting edema bilateral lower extremities.  Tenderness of bilateral quads  Psych: Normal mood and affect. Alert and oriented x3. Judgment and insight is normal.      Labs:  Last labs on 2022, CBC, CMP reviewed  Glucose was at 55  Last LDL done 2017, elevated 21  A1c with last in 2017, 5.6%    Assessment & Plan:   57 y.o. female with the following -    1. Abdominal bloating  - esomeprazole (NEXIUM) 40 MG delayed-release capsule; Take 1 Capsule by mouth every morning before breakfast.  Dispense: 90 Capsule; Refill: 3  - Referral to Gastroenterology  I am increasing this patient's Nexium to 40 mg.  We can try through " the cost plus pharmacy, which will be much more affordable for this patient  And placing her referral back [facility]  Patient is also assessing her abdominal bloating for extremity bloating.  Consider liver related issues, however she had an ultrasound done last year which showed in the liver.  She also has an echo ordered for her by Dr. Latif however this has not yet been completed or scheduled.    2. Mild intermittent asthma without complication  Well-controlled    3. Other cerebral palsy (HCC)  Ambulates with Rollator    Return in about 2 months (around 12/11/2022).    My total time spent caring for the patient on the day of the encounter was 70 minutes.   This does not include time spent on separately billable procedures/tests.      Please note that this dictation was created using voice recognition software. I have made every reasonable attempt to correct obvious errors, but I expect that there are errors of grammar and possibly content that I did not discover before finalizing the note.

## 2022-10-13 ENCOUNTER — OFFICE VISIT (OUTPATIENT)
Dept: WOUND CARE | Facility: MEDICAL CENTER | Age: 57
End: 2022-10-13
Attending: NURSE PRACTITIONER
Payer: MEDICAID

## 2022-10-13 VITALS
SYSTOLIC BLOOD PRESSURE: 120 MMHG | OXYGEN SATURATION: 93 % | DIASTOLIC BLOOD PRESSURE: 75 MMHG | RESPIRATION RATE: 16 BRPM | TEMPERATURE: 98.4 F | HEART RATE: 57 BPM

## 2022-10-13 DIAGNOSIS — Z91.81 RISK FOR FALLS: ICD-10-CM

## 2022-10-13 DIAGNOSIS — L97.521 NEUROPATHIC ULCER OF TOE OF LEFT FOOT, LIMITED TO BREAKDOWN OF SKIN (HCC): ICD-10-CM

## 2022-10-13 DIAGNOSIS — Z89.412 HISTORY OF AMPUTATION OF LEFT GREAT TOE (HCC): ICD-10-CM

## 2022-10-13 PROCEDURE — 11055 PARING/CUTG B9 HYPRKER LES 1: CPT | Performed by: NURSE PRACTITIONER

## 2022-10-13 PROCEDURE — 99212 OFFICE O/P EST SF 10 MIN: CPT | Mod: 25 | Performed by: NURSE PRACTITIONER

## 2022-10-13 PROCEDURE — 11055 PARING/CUTG B9 HYPRKER LES 1: CPT

## 2022-10-13 PROCEDURE — 99213 OFFICE O/P EST LOW 20 MIN: CPT

## 2022-10-13 ASSESSMENT — ENCOUNTER SYMPTOMS
CONSTIPATION: 0
CLAUDICATION: 0
COUGH: 0
CHILLS: 0
DIARRHEA: 0
SENSORY CHANGE: 0
VOMITING: 0
FALLS: 1
NAUSEA: 0
FEVER: 0
SHORTNESS OF BREATH: 0

## 2022-10-13 NOTE — PROGRESS NOTES
Provider Encounter- Neuropathic Foot Ulcer      HISTORY OF PRESENT ILLNESS  Wound History:    START OF CARE IN CLINIC: 10/10/2022    REFERRING PROVIDER:   Nancy Nieves A.P.R.N.     WOUND- Neuropathic ulcer of toe   LOCATION: Left third toe   HISTORY:  Patient is a 57 year old female with history tetraplegia, neuropathy, MS, CP and frequent falls. She was treated previously at [facility]  for a wound to her Left great toe that resulted in an amputation.  She has been referred back for treatment of an open wound to her left third toe.  She injured her toe when she fell at her son's house while walking barefoot.  Shortly after her injury, she went to an urgent care ([facility]) where she was prescribed cephalexin, which she completed, and was instructed to use silver dressings for her wound care. She then went to the [facility] for her nail care on the 09/30/22 and was referred to the [facility] clinic for further wound care.     Pertinent Medical History: Tetraplegia, neuropathy, MS, CP, frequent falls, amputation of left hallux           TOBACCO USE: Former smoker, quit in January 2022    ALCOHOL USE: No    Patient's problem list, allergies, and current medications reviewed and updated in Epic    Interval History:  10/13/2022 : Initial provider visit with RENALDO Garcia, GEMA-BC, TERESITAN, CFJOSIAH.  Patient is known to me from previous treatment in the clinic.  She presents today with a dry ulcer to the dorsal aspect of her left third toe, and a healed wound to her left distal third toe.  She has been anxious, reluctant to decrease frequency of clinic visits or to discharge altogether.  She states she has an appointment to get an offloading boot and ability orthotics.  However, given that her wound on the dorsal aspect of her toe, I do not feel the boot is necessary, recommend offloading shoe instead.  Patient insists that she needs a boot, because the shoe causes her to fall.  I offered that she may not need either, as her wound  is nearly resolved, recommended that she resume wearing her orthotic shoes with inserts.  Patient is still insistent that she needs offloading footwear.  She is very worried that she will lose another toe.      REVIEW OF SYSTEMS:   Review of Systems   Constitutional:  Negative for chills and fever.   Respiratory:  Negative for cough and shortness of breath.    Cardiovascular:  Negative for chest pain, claudication and leg swelling.   Gastrointestinal:  Negative for constipation, diarrhea, nausea and vomiting.   Musculoskeletal:  Positive for falls.        History of tetraplegia, CP and MS  States that she falls frequently   Neurological:  Negative for sensory change.     PHYSICAL EXAMINATION:   /75 (BP Location: Right arm, Patient Position: Sitting)   Pulse (!) 57   Temp 36.9 °C (98.4 °F) (Temporal)   Resp 16   LMP 01/11/2017   SpO2 93%     Physical Exam  Constitutional:       Appearance: She is normal weight.   HENT:      Head: Normocephalic.   Cardiovascular:      Comments: Pedal pulses are difficult to palpate  Neurological:      Mental Status: She is alert.       WOUND ASSESSMENT  Wound 11/11/21 Left hallux amp site (Active)   Number of days: 336       Wound 12/27/21 Incision Foot Left adaptic, 4x4s, soft roll, ace wrap (Active)   Number of days: 290       Wound 03/23/22 Incision Abdomen (Active)   Number of days: 204       Wound 10/10/22 Left dorsal 3rd toe (Active)   Wound Image   10/13/22 1400   Site Assessment Pink;Red;Yellow 10/13/22 1400   Periwound Assessment Dry;Intact;Red;Blanchable erythema 10/13/22 1400   Margins Attached edges 10/13/22 1400   Drainage Amount Scant 10/13/22 1400   Drainage Description Serosanguineous 10/13/22 1400   Treatments Cleansed;Site care;Topical Lidocaine 10/13/22 1400   Wound Cleansing Puracyn [place] 10/13/22 1400   Periwound Protectant Skin Protectant Wipes to Periwound 10/13/22 1400   Dressing Cleansing/Solutions Not Applicable 10/13/22 1400   Dressing Options  Hydrofiber Silver;Hypafix Tape 10/13/22 1400   Dressing Changed Changed 10/13/22 1400   Dressing Status Clean;Dry;Intact 10/10/22 1500   Dressing Change/Treatment Frequency Weekly, and As Needed 10/13/22 1400   Non-staged Wound Description Partial thickness 10/13/22 1400   Wound Length (cm) 0.5 cm 10/13/22 1400   Wound Width (cm) 1.1 cm 10/13/22 1400   Wound Surface Area (cm^2) 0.55 cm^2 10/13/22 1400   Post-Procedure Length (cm) 0.5 cm 10/10/22 1500   Post-Procedure Width (cm) 1.3 cm 10/10/22 1500   Post-Procedure Surface Area (cm^2) 0.65 cm^2 10/10/22 1500   Tunneling (cm) 0 cm 10/13/22 1400   Undermining (cm) 0 cm 10/13/22 1400   Wound Odor None 10/13/22 1400   Pulses DP;PT;Doppler 10/10/22 1500   Exposed Structures None 10/13/22 1400   Number of days: 3           PROCEDURE:   -2% viscous lidocaine applied topically to wound bed for approximately 5 minutes prior to assessment  -No need for excisional debridement today.  Wound is nearly resolved.   -I did use a scalpel to debride a small amount of callus from the distal and dorsal aspect of this toe, approximately 0.5 cm².  No nicks or cuts  -Wound care completed by wound RN, refer to flowsheet   -Patient tolerated the procedure well, without c/o pain or discomfort.       Pertinent Labs and Diagnostics:    Labs:     IMAGING: Foot/Toes Imaging, Past Year DX-FOOT-COMPLETE 3+ RIGHT    Result Date: 12/2/2021  Narrative 12/2/2021 1:41 PM HISTORY/REASON FOR EXAM:  Atraumatic Pain/Swelling/Deformity; exquisite tenderness to R 5th MTH. TECHNIQUE/EXAM DESCRIPTION AND NUMBER OF VIEWS: 3 nonweightbearing views of the RIGHT foot. COMPARISON:  5/23/2018 FINDINGS: Bandage is present at the fifth metatarsal-phalangeal level with no underlying bony destruction seen here or elsewhere No acute displaced fracture is noted. There is no subluxation. Hallux valgus deformity has worsened and there is mild first metatarsal-phalangeal joint space narrowing with spurring     Impression No  radiographic evidence of acute osteomyelitis Worsening hallux valgus deformity with mild first metatarsal-phalangeal arthritis    Foot/Toes Imaging, Past Year DX-TOE(S) 2+ LEFT    Result Date: 12/2/2021  Narrative 12/2/2021 1:41 PM HISTORY/REASON FOR EXAM:  Atraumatic Pain/Swelling/Deformity. TECHNIQUE/EXAM DESCRIPTION AND NUMBER OF VIEWS:  3 views of the LEFT toes. COMPARISON: 5/23/2018 FINDINGS: There is distal first toe soft tissue ulceration with underlying new terminal tuft bony destruction First proximal phalanx cerclage wires are again seen with no new lucency in the vicinity. Patient is status post first metatarsal chevron osteotomy, bunionectomy There is moderate first metatarsal-phalangeal marginal spurring and mild joint space narrowing Some deformity of the third metatarsal neck is again seen compatible with a healed fracture. Healed second proximal phalanx fracture at the PIP is also again seen calcaneal cuboid osteophyte formation and joint effusion There is osteopenia     Impression First distal phalanx terminal tuft acute osteomyelitis with overlying skin ulceration      VASCULAR STUDIES: No results found.    LAST  WOUND CULTURE:  DATE :   Lab Results   Component Value Date/Time    CULTRSULT Usual urogenital ynes ,000 cfu/mL 08/30/2022 10:37 AM         ASSESSMENT AND PLAN:   1. Neuropathic ulcer of toe of left foot, limited to breakdown of skin (HCC)  Comments: Patient injured her toe when she fell while walking barefoot    10/13/2022: Patient originally presented with a small wound to her distal toe and another to the dorsum of her toe.  She presents today with no open wound to her distal toe, and the dorsal wound covered with stable dry tan tissue.  -No need for debridement today, wound nearly resolved.  Patient did not want to be discharged from the clinic today, she is very worried she may lose another toe.  -Patient to return to clinic next week for reassessment, and debridement if  necessary  -Advised that patient resume wearing her orthotic shoe with insert, however she insist she needs an offloading boot.     Wound care: Silver Hydrofiber to manage exudate and bioburden, foam cover dressing, Hypafix tape     2. History of amputation of left great toe (HCC)  Comments: Left hallux amputated in December 2021 due to osteomyelitis.  She was prescribed orthotic shoes and inserts postoperatively    3. Risk for falls  Comments: Patient has history of tetraplegia, MS and CP.  Current injury is due to a recent fall.            PATIENT EDUCATION:  - Implications of loss of protective sensation (LOPS) discussed with patient- including increased risk for amputation.  - Advised to check feet at least daily, moisturize feet, and to always wear protective foot wear.   -  Importance of offloading foot to assist with wound healing  - Advised pt not to trim nails or calluses, seek foot/nail care from podiatrist or certified foot/nail nurse  - Importance of adequate nutrition for wound healing    My total time spent caring for the patient on the day of the encounter was 20 minutes.   This does not include time spent on separately billable procedures/tests.       Please note that this note may have been created using voice recognition software. I have worked with technical experts from [facility] to optimize the interface.  I have made every reasonable attempt to correct obvious errors, but there may be errors of grammar and possibly content that I did not discover before finalizing the note.    N

## 2022-10-13 NOTE — PATIENT INSTRUCTIONS
-Keep your wound dressing clean, dry, and intact.    -Change your dressing if it becomes soiled, soaked, or falls off.      -Should you experience any significant changes in your wound(s), such as infection (redness, swelling, localized heat, increased pain, fever > 101 F, chills) or have any questions regarding your home care instructions, please contact the wound center at [phone]. If after hours, contact your primary care physician or go to the hospital emergency room.

## 2022-10-16 NOTE — PROGRESS NOTES
Urogynecology and Pelvic Reconstructive Surgery Follow Up    Marilyn Salinas MRN:[id number] :1965    Referred by: Julainna Guillory DO    Reason for Visit:   No chief complaint on file.          Subjective     History of Presenting Illness:    Ms.Dina Cleve Salinas is a 57 y.o. year old P2 with h/o nontraumatic incomplete spinal cord injury, paraplegia, partial CP, who presents for follow up of constant urinary incontinence (mixed) and occasional bowel incontinence. She underwent bladder botox treatment in 2022     She previously reported fantastic result after her Botox procedure.  She still reports that she is sleeping better at night, but when she goes to the bathroom, she has to sit and wait a good amount of time before the urine stream starts, and has to double or triple void. She also notes some tenderness when her bladder, but she denies any specific dysuria, hematuria, bladder pain.    She submitted a urine culture since last visit which was negative for infection.     She still has some pain when she holds her bladder for too long.     I asked if her improvement in urinary symptoms outweighs the inconvenience of incomplete bladder emptying sensation, and she strongly agrees that the improvement of Botox outweighs any current side effects          Prior HPI: She was referred by her Physiatrist Dr. Guillory for the evaluation and management of neurogenic bladder with incontinence    She has constant incontinence, mostly with urgency but sometimes without warning. She also has leakage with cough/laugh/exercise, but less than with urgency. DANY somewhat improved after 2017 sling (see below). Symptoms most bothersome at night when she has to use at least 2 diapers. Due to mobility issues (uses a walker) she has trouble getting to the bathroom.     She has occasional bowel incontinence, both small and larger episodes. These are note predictable or regular in frequency. She is scheduled for a  cholecystectomy with Dr. Barrett later this month, but is worried about possible diarrhea.     Prior Pelvic surgery:   10/3/2017: Laparoscopic-assisted vaginal hysterectomy, bilateral    salpingo-oophorectomy, anterior and posterior vaginal repair, enterocele    repair, perineoplasty, sacrospinous vault suspension, transobturator tape mid urethral sling procedure, cystoscopy (Dr. Driscoll) Op report reviewed     Prior treatment:   Botox - #1 100u 4/19/22  Mirabegron - stopped  Fesoterodine - stopped      Fluid intake:   Mostly water  Coffee to help with constipation    Pelvic floor symptom review:     Bladder:   Voids per day: 5-7 Voids per night: 1-2   --> none s/p botox   Urinary incontinence episodes per day: 2-3 per day, 1-2 at night --> none s/p botox   Urge leakage:  On Movement to Bathroom and Full Bladder urge worse than stress --> none s/p botox   Stress leakage: With Cough, With Laugh and With Exercise --> no change   Continuous / insensible urine loss: sometimes   Nocturnal enuresis: No    Leakage volume: Moderate   Number of pads/day: 1 diaper at night    Bladder emptying: Complete   Voiding symptoms: None   UTI in last 12 months: No        Prolapse:     Prolapse symptoms: None        Bowel:    Constipation: Yes   Bowel movements per day: 1    Straining to empty bowels: Yes   Splinting to evacuate: No    Painful evacuation: No    Difficulty emptying rectum: No    Incontinence to stool: occasionally   Incontinence to gas: No     Blood in stool: No    Hemorrhoids: No        Sexual function:    Sexually active: No            Past medical and surgical history    Past medical history:  Past Medical History:   Diagnosis Date    TIA (transient ischemic attack) 03/17/2022    AGE 19. Patient states D/T drug use.     Anesthesia 03/17/2022    Patient states becomes upset and sad after anesthesia.    Full dentures 03/17/2022    Stroke (HCC) 03/17/2022    Pt. states, MINI STROKE AGE 19.    COVID-19 01/17/2022     1/17/2022 stopped 1-    Anesthesia 12/23/2021    agitated coming out of anesthesia    Cervical spinal cord injury (HCC) 12/19/2019    Anxiety     Arthritis     spine, feet     Asthma     Breath shortness     Cataract 12/23/2022    no surgery yet    Cerebral palsy (HCC)     DDD (degenerative disc disease), lumbar     Per Problem List    Dental disorder     upper and lower denture    Depression     Heart burn 12/23/2022    GERD    High cholesterol     History of falling     Marijuana user     Risk for falls     Tetraplegia (HCC)     Urinary bladder disorder     Urinary incontinence     Vertigo     Per Problem List     Past surgical history:  Past Surgical History:   Procedure Laterality Date    DEVYN BY LAPAROSCOPY  3/23/2022    Procedure: CHOLECYSTECTOMY, LAPAROSCOPIC;  Surgeon: Mayur Barrett M.D.;  Location: SURGERY [facility];  Service: General    PB AMPUTATION TOE,MT-P JT Left 12/27/2021    Procedure: GREAT TOE PARTIAL AMPUTATION;  Surgeon: Emiliano Goff M.D.;  Location: [facility];  Service: Orthopedics    CERVICAL DISK AND FUSION ANTERIOR N/A 12/20/2019    Procedure: DISCECTOMY, SPINE, CERVICAL, ANTERIOR APPROACH, WITH FUSION - C3-5;  Surgeon: Connor Linton M.D.;  Location: [facility];  Service: Neurosurgery    CORPECTOMY N/A 12/20/2019    Procedure: CORPECTOMY;  Surgeon: Connor Linton M.D.;  Location: [facility];  Service: Neurosurgery    BLADDER SLING FEMALE N/A 10/3/2017    Procedure: BLADDER SLING FEMALE TOT, CYSTOSCOPY;  Surgeon: Hudson Driscoll M.D.;  Location: SURGERY SAME DAY [facility];  Service: Gynecology    VAGINAL HYSTERECTOMY SCOPE TOTAL N/A 10/3/2017    Procedure: VAGINAL HYSTERECTOMY SCOPE TOTAL;  Surgeon: Hudson Driscoll M.D.;  Location: SURGERY SAME DAY [facility];  Service: Gynecology    SALPINGECTOMY Bilateral 10/3/2017    Procedure: SALPINGECTOMY;  Surgeon: Hudson Driscoll M.D.;  Location: SURGERY SAME DAY [facility];  Service:  Gynecology    OOPHORECTOMY Bilateral 10/3/2017    Procedure: OOPHORECTOMY;  Surgeon: Hudson Driscoll M.D.;  Location: SURGERY SAME DAY [facility];  Service: Gynecology    ANTERIOR AND POSTERIOR REPAIR Bilateral 10/3/2017    Procedure: ANTERIOR AND POSTERIOR REPAIR;  Surgeon: Hudson Driscoll M.D.;  Location: SURGERY SAME DAY [facility];  Service: Gynecology    ENTEROCELE REPAIR N/A 10/3/2017    Procedure: ENTEROCELE REPAIR, PERINEOPLASTY;  Surgeon: Hudson Driscoll M.D.;  Location: SURGERY SAME DAY [facility];  Service: Gynecology    VAGINAL SUSPENSION N/A 10/3/2017    Procedure: VAGINAL SUSPENSION SACROSPINOUS VAULT POSSIBLE;  Surgeon: Hudson Driscoll M.D.;  Location: SURGERY SAME DAY [facility];  Service: Gynecology    OTHER Left 2005    hammertoe x2    EYE SURGERY  1972    for lazy eye    LUMPECTOMY       Medications:has a current medication list which includes the following prescription(s): baclofen, myrbetriq, venlafaxine xr, venlafaxine hcl, esomeprazole, tizanidine, gabapentin, meloxicam, albuterol, stiolto respimat, fluticasone, and toviaz.  Allergies:Other environmental and Codeine  Family history:  Family History   Problem Relation Age of Onset    Cancer Mother         ovarian    Alcohol/Drug Mother     Cancer Brother         unknown, caused him to lose his leg when he was 3    Diabetes Maternal Aunt      Social history: reports that she quit smoking about 9 months ago. Her smoking use included cigarettes. She has a 35.00 pack-year smoking history. She has never used smokeless tobacco. She reports current drug use. Drugs: Marijuana and Inhaled. She reports that she does not drink alcohol.    Review of systems: A full review of systems was performed, and negative with the exception of want is noted above in the HPI.        Objective        LMP 01/11/2017     Physical Exam  Vitals reviewed. Exam conducted with a chaperone present (MA - see notes.).   Constitutional:       Appearance: Normal  appearance.   HENT:      Head: Normocephalic.      Mouth/Throat:      Mouth: Mucous membranes are moist.   Cardiovascular:      Rate and Rhythm: Normal rate.   Pulmonary:      Effort: Pulmonary effort is normal.   Abdominal:      Palpations: Abdomen is soft. There is no mass.      Tenderness: There is no abdominal tenderness.   Skin:     General: Skin is warm and dry.   Neurological:      Mental Status: She is alert.      Motor: Weakness present.      Gait: Gait abnormal.      Comments: Uses walker   Psychiatric:         Mood and Affect: Mood normal.     Genitourinary (prior): Narrowed introitus with moderate to severe vaginal atrophy.  Some tenderness on placement of UDS catheters.  No prolapse.        Multichannel urodynamics -see report :   Filling phase: Sustained uninhibited DO starting at 127mL, normal compliance, able to fill up to 334 after DO suppression. Stress leak with CLPP 108cm H2O at 149mL, MUCP 69   Voiding phase: Complete emptying with detrusor contraction and some Valsalva effort.  Patient was told previously that she should Valsalva to empty her bladder    Diagnostic test and records review:    Urine dipstick: negative    Labs: n/a    Radiology:     Renal US 2020:  The right kidney measures 9.51 cm.  The left kidney measures 8.27 cm.  There is no hydronephrosis.  There are no abnormal calcifications.  The bladder demonstrates no focal wall abnormality.    Documentation reviewed: Prior EMR Records           Assessment & Plan     Ms.Dina Cleve Salinas is a 57 y.o. year old P2 with neurogenic bladder defined as mixed, urge predominant urinary incontinence with nocturia.     1. Neurogenic bladder  2. Mixed incontinence  3. Injury of cervical spinal cord, subsequent encounter ([facility])  4. Other cerebral palsy ([facility])  5. Nocturia  - Prior renal ultrasound without hydronephrosis  - Recommend starting vaginal estrogen therapy today to augment her urgency symptoms and help with dysuria. Rx sent for initiation  course, and after 2 weeks, followed by twice weekly at night.   - s/p chemodenervation with botox treatment, 100u on 4/19/22 - significant improvement, currently dry, but with some increased hesitancy/double voiding.  Her improvements in urinary incontinence outweigh the inco  -May call when the effects of Botox were off for a repeat therapy.      6. Fecal incontinence  This is bothersome but infrequent. No change in stil leakage after cholecystectomy. She feels constinued constipe.   - Can follow up if diarrhea becomes an issues post-cholecystectomy.     7.  Atrophic vaginitis  Her exam confirms vaginal atrophy / genitorurinary syndrome of menopause. This is very common and due to low estrogen levels, which render the vaginal tissue thin, irritated, and open to colonization with gut ynes. This can lead to irritation, dryness, painful sex, urinary infections and urinary urgency. Discussed risks, benefits, and indications for vaginal estrogen therapy.  Vaginal estrogen has negligible absorption into the bloodstream and is not associated with increased risks for uterine or breast cancers. Prescription given for estrace vaginal cream to be placed inside vagina nightly for 2 weeks, then twice weekly thereafter. The effects of vaginal estrogen can take weeks to months.               Giselle Caceres MD, FACOG    Female Pelvic Medicine and Reconstructive Surgery  Department of Obstetrics and Gynecology  [facility]    This medical record contains text that has been entered with the assistance of computer voice recognition and dictation software.  Therefore, it may contain unintended errors in text, spelling, punctuation, or grammar

## 2022-10-17 ENCOUNTER — GYNECOLOGY VISIT (OUTPATIENT)
Dept: OBGYN | Facility: CLINIC | Age: 57
End: 2022-10-17
Payer: MEDICAID

## 2022-10-17 ENCOUNTER — APPOINTMENT (OUTPATIENT)
Dept: WOUND CARE | Facility: MEDICAL CENTER | Age: 57
End: 2022-10-17
Attending: NURSE PRACTITIONER
Payer: MEDICAID

## 2022-10-17 VITALS
DIASTOLIC BLOOD PRESSURE: 89 MMHG | SYSTOLIC BLOOD PRESSURE: 124 MMHG | WEIGHT: 160 LBS | HEART RATE: 84 BPM | BODY MASS INDEX: 25.82 KG/M2

## 2022-10-17 DIAGNOSIS — N32.81 OAB (OVERACTIVE BLADDER): ICD-10-CM

## 2022-10-17 DIAGNOSIS — R30.0 DYSURIA: ICD-10-CM

## 2022-10-17 DIAGNOSIS — N95.2 ATROPHIC VAGINITIS: ICD-10-CM

## 2022-10-17 DIAGNOSIS — N31.9 NEUROGENIC BLADDER: ICD-10-CM

## 2022-10-17 PROCEDURE — 99214 OFFICE O/P EST MOD 30 MIN: CPT | Performed by: STUDENT IN AN ORGANIZED HEALTH CARE EDUCATION/TRAINING PROGRAM

## 2022-10-17 RX ORDER — ESTRADIOL 0.1 MG/G
CREAM VAGINAL
Qty: 1 EACH | Refills: 3 | Status: SHIPPED | OUTPATIENT
Start: 2022-10-17 | End: 2023-10-18

## 2022-10-17 ASSESSMENT — FIBROSIS 4 INDEX: FIB4 SCORE: 0.93

## 2022-10-18 ENCOUNTER — PATIENT MESSAGE (OUTPATIENT)
Dept: PHYSICAL MEDICINE AND REHAB | Facility: MEDICAL CENTER | Age: 57
End: 2022-10-18

## 2022-10-18 ENCOUNTER — APPOINTMENT (OUTPATIENT)
Dept: WOUND CARE | Facility: MEDICAL CENTER | Age: 57
End: 2022-10-18
Attending: NURSE PRACTITIONER
Payer: MEDICAID

## 2022-10-18 DIAGNOSIS — S14.109A INJURY OF CERVICAL SPINAL CORD, INITIAL ENCOUNTER (HCC): ICD-10-CM

## 2022-10-18 DIAGNOSIS — N31.9 NEUROGENIC BLADDER: ICD-10-CM

## 2022-10-18 RX ORDER — FESOTERODINE FUMARATE 4 MG/1
4 TABLET, EXTENDED RELEASE ORAL DAILY
Qty: 90 TABLET | Refills: 3 | Status: SHIPPED | OUTPATIENT
Start: 2022-10-18 | End: 2023-05-04

## 2022-10-20 ENCOUNTER — APPOINTMENT (OUTPATIENT)
Dept: RADIOLOGY | Facility: MEDICAL CENTER | Age: 57
End: 2022-10-20
Attending: EMERGENCY MEDICINE
Payer: MEDICAID

## 2022-10-20 ENCOUNTER — HOSPITAL ENCOUNTER (EMERGENCY)
Facility: MEDICAL CENTER | Age: 57
End: 2022-10-21
Attending: EMERGENCY MEDICINE
Payer: MEDICAID

## 2022-10-20 DIAGNOSIS — K21.9 GERD WITHOUT ESOPHAGITIS: ICD-10-CM

## 2022-10-20 DIAGNOSIS — R19.7 NAUSEA VOMITING AND DIARRHEA: ICD-10-CM

## 2022-10-20 DIAGNOSIS — E86.0 DEHYDRATION: ICD-10-CM

## 2022-10-20 DIAGNOSIS — R11.2 NAUSEA VOMITING AND DIARRHEA: ICD-10-CM

## 2022-10-20 DIAGNOSIS — A08.4 VIRAL GASTROENTERITIS: ICD-10-CM

## 2022-10-20 LAB
ALBUMIN SERPL BCP-MCNC: 4.6 G/DL (ref 3.2–4.9)
ALBUMIN/GLOB SERPL: 1.8 G/DL
ALP SERPL-CCNC: 105 U/L (ref 30–99)
ALT SERPL-CCNC: 38 U/L (ref 2–50)
ANION GAP SERPL CALC-SCNC: 18 MMOL/L (ref 7–16)
AST SERPL-CCNC: 27 U/L (ref 12–45)
BASOPHILS # BLD AUTO: 0.5 % (ref 0–1.8)
BASOPHILS # BLD: 0.04 K/UL (ref 0–0.12)
BILIRUB SERPL-MCNC: 0.8 MG/DL (ref 0.1–1.5)
BUN SERPL-MCNC: 9 MG/DL (ref 8–22)
CALCIUM SERPL-MCNC: 9 MG/DL (ref 8.5–10.5)
CHLORIDE SERPL-SCNC: 102 MMOL/L (ref 96–112)
CO2 SERPL-SCNC: 17 MMOL/L (ref 20–33)
CREAT SERPL-MCNC: 0.57 MG/DL (ref 0.5–1.4)
EOSINOPHIL # BLD AUTO: 0.09 K/UL (ref 0–0.51)
EOSINOPHIL NFR BLD: 1.2 % (ref 0–6.9)
ERYTHROCYTE [DISTWIDTH] IN BLOOD BY AUTOMATED COUNT: 43.9 FL (ref 35.9–50)
GFR SERPLBLD CREATININE-BSD FMLA CKD-EPI: 106 ML/MIN/1.73 M 2
GLOBULIN SER CALC-MCNC: 2.6 G/DL (ref 1.9–3.5)
GLUCOSE SERPL-MCNC: 109 MG/DL (ref 65–99)
HCT VFR BLD AUTO: 42.9 % (ref 37–47)
HGB BLD-MCNC: 15.1 G/DL (ref 12–16)
IMM GRANULOCYTES # BLD AUTO: 0.03 K/UL (ref 0–0.11)
IMM GRANULOCYTES NFR BLD AUTO: 0.4 % (ref 0–0.9)
LIPASE SERPL-CCNC: 10 U/L (ref 11–82)
LYMPHOCYTES # BLD AUTO: 2.44 K/UL (ref 1–4.8)
LYMPHOCYTES NFR BLD: 31.4 % (ref 22–41)
MCH RBC QN AUTO: 30.9 PG (ref 27–33)
MCHC RBC AUTO-ENTMCNC: 35.2 G/DL (ref 33.6–35)
MCV RBC AUTO: 87.7 FL (ref 81.4–97.8)
MONOCYTES # BLD AUTO: 0.6 K/UL (ref 0–0.85)
MONOCYTES NFR BLD AUTO: 7.7 % (ref 0–13.4)
NEUTROPHILS # BLD AUTO: 4.57 K/UL (ref 2–7.15)
NEUTROPHILS NFR BLD: 58.8 % (ref 44–72)
NRBC # BLD AUTO: 0 K/UL
NRBC BLD-RTO: 0 /100 WBC
PLATELET # BLD AUTO: 267 K/UL (ref 164–446)
PMV BLD AUTO: 10.4 FL (ref 9–12.9)
POTASSIUM SERPL-SCNC: 3.3 MMOL/L (ref 3.6–5.5)
PROT SERPL-MCNC: 7.2 G/DL (ref 6–8.2)
RBC # BLD AUTO: 4.89 M/UL (ref 4.2–5.4)
SODIUM SERPL-SCNC: 137 MMOL/L (ref 135–145)
WBC # BLD AUTO: 7.8 K/UL (ref 4.8–10.8)

## 2022-10-20 PROCEDURE — 99285 EMERGENCY DEPT VISIT HI MDM: CPT

## 2022-10-20 PROCEDURE — 96376 TX/PRO/DX INJ SAME DRUG ADON: CPT

## 2022-10-20 PROCEDURE — 94760 N-INVAS EAR/PLS OXIMETRY 1: CPT

## 2022-10-20 PROCEDURE — 83690 ASSAY OF LIPASE: CPT

## 2022-10-20 PROCEDURE — 85025 COMPLETE CBC W/AUTO DIFF WBC: CPT

## 2022-10-20 PROCEDURE — A9270 NON-COVERED ITEM OR SERVICE: HCPCS | Performed by: EMERGENCY MEDICINE

## 2022-10-20 PROCEDURE — 700102 HCHG RX REV CODE 250 W/ 637 OVERRIDE(OP): Performed by: EMERGENCY MEDICINE

## 2022-10-20 PROCEDURE — 700105 HCHG RX REV CODE 258: Performed by: EMERGENCY MEDICINE

## 2022-10-20 PROCEDURE — 80053 COMPREHEN METABOLIC PANEL: CPT

## 2022-10-20 PROCEDURE — 96375 TX/PRO/DX INJ NEW DRUG ADDON: CPT

## 2022-10-20 PROCEDURE — 74177 CT ABD & PELVIS W/CONTRAST: CPT

## 2022-10-20 PROCEDURE — 700117 HCHG RX CONTRAST REV CODE 255: Performed by: EMERGENCY MEDICINE

## 2022-10-20 PROCEDURE — 96374 THER/PROPH/DIAG INJ IV PUSH: CPT | Mod: XU

## 2022-10-20 PROCEDURE — 700111 HCHG RX REV CODE 636 W/ 250 OVERRIDE (IP): Performed by: EMERGENCY MEDICINE

## 2022-10-20 PROCEDURE — 36415 COLL VENOUS BLD VENIPUNCTURE: CPT

## 2022-10-20 RX ORDER — MORPHINE SULFATE 4 MG/ML
6 INJECTION INTRAVENOUS ONCE
Status: COMPLETED | OUTPATIENT
Start: 2022-10-20 | End: 2022-10-20

## 2022-10-20 RX ORDER — MORPHINE SULFATE 4 MG/ML
4 INJECTION INTRAVENOUS ONCE
Status: COMPLETED | OUTPATIENT
Start: 2022-10-20 | End: 2022-10-20

## 2022-10-20 RX ORDER — BACLOFEN 10 MG/1
20 TABLET ORAL ONCE
Status: COMPLETED | OUTPATIENT
Start: 2022-10-20 | End: 2022-10-20

## 2022-10-20 RX ORDER — LORAZEPAM 2 MG/ML
1 INJECTION INTRAMUSCULAR ONCE
Status: COMPLETED | OUTPATIENT
Start: 2022-10-20 | End: 2022-10-20

## 2022-10-20 RX ORDER — SODIUM CHLORIDE 9 MG/ML
1000 INJECTION, SOLUTION INTRAVENOUS ONCE
Status: COMPLETED | OUTPATIENT
Start: 2022-10-20 | End: 2022-10-20

## 2022-10-20 RX ORDER — ONDANSETRON 2 MG/ML
4 INJECTION INTRAMUSCULAR; INTRAVENOUS ONCE
Status: COMPLETED | OUTPATIENT
Start: 2022-10-20 | End: 2022-10-20

## 2022-10-20 RX ADMIN — LORAZEPAM 1 MG: 2 INJECTION INTRAMUSCULAR; INTRAVENOUS at 21:32

## 2022-10-20 RX ADMIN — BACLOFEN 20 MG: 10 TABLET ORAL at 22:40

## 2022-10-20 RX ADMIN — ONDANSETRON HYDROCHLORIDE 4 MG: 2 SOLUTION INTRAMUSCULAR; INTRAVENOUS at 21:32

## 2022-10-20 RX ADMIN — MORPHINE SULFATE 6 MG: 4 INJECTION INTRAVENOUS at 21:32

## 2022-10-20 RX ADMIN — SODIUM CHLORIDE 1000 ML: 9 INJECTION, SOLUTION INTRAVENOUS at 21:32

## 2022-10-20 RX ADMIN — ONDANSETRON HYDROCHLORIDE 4 MG: 2 SOLUTION INTRAMUSCULAR; INTRAVENOUS at 23:37

## 2022-10-20 RX ADMIN — IOHEXOL 100 ML: 350 INJECTION, SOLUTION INTRAVENOUS at 22:30

## 2022-10-20 RX ADMIN — LORAZEPAM 1 MG: 2 INJECTION INTRAMUSCULAR; INTRAVENOUS at 23:37

## 2022-10-20 RX ADMIN — MORPHINE SULFATE 4 MG: 4 INJECTION INTRAVENOUS at 23:37

## 2022-10-20 RX ADMIN — FAMOTIDINE 20 MG: 10 INJECTION INTRAVENOUS at 21:32

## 2022-10-20 ASSESSMENT — FIBROSIS 4 INDEX: FIB4 SCORE: 0.93

## 2022-10-21 VITALS
OXYGEN SATURATION: 95 % | HEIGHT: 66 IN | RESPIRATION RATE: 16 BRPM | WEIGHT: 152.78 LBS | DIASTOLIC BLOOD PRESSURE: 60 MMHG | BODY MASS INDEX: 24.55 KG/M2 | SYSTOLIC BLOOD PRESSURE: 108 MMHG | HEART RATE: 83 BPM | TEMPERATURE: 97.7 F

## 2022-10-21 RX ORDER — ONDANSETRON 4 MG/1
4 TABLET, ORALLY DISINTEGRATING ORAL EVERY 6 HOURS PRN
Qty: 10 TABLET | Refills: 0 | Status: SHIPPED | OUTPATIENT
Start: 2022-10-21 | End: 2022-12-30

## 2022-10-21 RX ORDER — DICYCLOMINE HCL 20 MG
20 TABLET ORAL EVERY 6 HOURS PRN
Qty: 10 TABLET | Refills: 0 | Status: SHIPPED | OUTPATIENT
Start: 2022-10-21 | End: 2022-12-30 | Stop reason: SDUPTHER

## 2022-10-21 RX ORDER — OMEPRAZOLE 20 MG/1
20 CAPSULE, DELAYED RELEASE ORAL DAILY
Qty: 30 CAPSULE | Refills: 0 | Status: SHIPPED | OUTPATIENT
Start: 2022-10-21 | End: 2022-12-05 | Stop reason: SDUPTHER

## 2022-10-21 NOTE — ED PROVIDER NOTES
"ED Provider Note    ED Provider Note    Scribed for Leyla Nur MD by Leyla Nur M.D.. 10/20/2022, 8:58 PM.    Primary care provider: Indira Hutchison M.D.  Means of arrival: EMS  History obtained from: Pt  History limited by: None    CHIEF COMPLAINT  Chief Complaint   Patient presents with    Nausea/Vomiting/Diarrhea     x4 days    Abdominal Pain     Diffuse abdominal pain x4 days       HPI  Marilyn Salinas is a 57 y.o. female who presents to the Emergency Department for evaluation of abdominal discomfort with nausea, vomiting, and diarrhea.  She has been ill for the last 4 days.  Patient notes history of cerebral palsy, notes that she normally takes baclofen for this and has been unable to tolerate oral intake including medication for several days.  This is exacerbating her symptoms.  She notes the pain is diffuse in all 4 quadrants without radiation elsewhere, persistent nausea with several episodes of emesis.  No known fever.  Patient notes she does not chronically usually have such symptoms.  She notes sensation of anxiety and \"cannot feel a comfortable position\" likely exacerbated from not been able to have her baclofen and other medications.    REVIEW OF SYSTEMS  Pertinent positives include diffuse abdominal pain, nausea, vomiting, diarrhea. Pertinent negatives include no fever, no chest pain, no abdominal trauma.  All other systems reviewed and negative.    PAST MEDICAL HISTORY   has a past medical history of Anesthesia (12/23/2021), Anesthesia (03/17/2022), Anxiety, Arthritis, Asthma, Breath shortness, Cataract (12/23/2022), Cerebral palsy (HCC), Cervical spinal cord injury (HCC) (12/19/2019), COVID-19 (01/17/2022), DDD (degenerative disc disease), lumbar, Dental disorder, Depression, Full dentures (03/17/2022), Heart burn (12/23/2022), High cholesterol, History of falling, Marijuana user, Risk for falls, Stroke (HCC) (03/17/2022), Tetraplegia (HCC), TIA (transient ischemic attack) " (2022), Urinary bladder disorder, Urinary incontinence, and Vertigo.    SURGICAL HISTORY   has a past surgical history that includes eye surgery (); lumpectomy; bladder sling female (N/A, 10/3/2017); cervical disk and fusion anterior (N/A, 2019); corpectomy (N/A, 2019); amputation toe,mt-p jt (Left, 2021); other (Left, ); vaginal hysterectomy scope total (N/A, 10/3/2017); salpingectomy (Bilateral, 10/3/2017); oophorectomy (Bilateral, 10/3/2017); anterior and posterior repair (Bilateral, 10/3/2017); enterocele repair (N/A, 10/3/2017); vaginal suspension (N/A, 10/3/2017); and loco by laparoscopy (3/23/2022).    SOCIAL HISTORY  Social History     Tobacco Use    Smoking status: Former     Packs/day: 1.00     Years: 35.00     Pack years: 35.00     Types: Cigarettes     Quit date: 2022     Years since quittin.8    Smokeless tobacco: Never    Tobacco comments:     20 years of 1ppd, then 10 years weaning down   Vaping Use    Vaping Use: Some days    Substances: THC, CBD   Substance Use Topics    Alcohol use: No    Drug use: Yes     Types: Marijuana, Inhaled     Comment: marijuana daily (smoked and edibles)      Social History     Substance and Sexual Activity   Drug Use Yes    Types: Marijuana, Inhaled    Comment: marijuana daily (smoked and edibles)       FAMILY HISTORY  Family History   Problem Relation Age of Onset    Cancer Mother         ovarian    Alcohol/Drug Mother     Cancer Brother         unknown, caused him to lose his leg when he was 4    Diabetes Maternal Aunt        CURRENT MEDICATIONS  Home Medications       Reviewed by Ann Cortez R.N. (Registered Nurse) on 10/20/22 at 1915  Med List Status: Not Addressed     Medication Last Dose Status   albuterol 108 (90 Base) MCG/ACT Aero Soln inhalation aerosol  Active   baclofen (LIORESAL) 10 MG Tab  Active   esomeprazole (NEXIUM) 40 MG delayed-release capsule  Active   estradiol (ESTRACE VAGINAL) 0.1 MG/GM vaginal cream   "Active   fesoterodine fumarate (TOVIAZ) 4 MG TABLET SR 24 HR  Active   fluticasone (FLONASE) 50 MCG/ACT nasal spray  Active   gabapentin (NEURONTIN) 300 MG Cap  Active   meloxicam (MOBIC) 15 MG tablet  Active   Mirabegron ER (MYRBETRIQ) 50 MG TABLET SR 24 HR  Active   Tiotropium Bromide-Olodaterol (STIOLTO RESPIMAT) 2.5-2.5 MCG/ACT Aero Soln  Active   tizanidine (ZANAFLEX) 2 MG capsule  Active   Venlafaxine HCl 150 MG TABLET SR 24 HR  Active   venlafaxine XR (EFFEXOR XR) 75 MG CAPSULE SR 24 HR  Active                    ALLERGIES  Allergies   Allergen Reactions    Other Environmental Hives     TUMBLEWEED    Codeine Itching, Unspecified and Rash     Rash, itching, mood disorder- becomes agitated  Other reaction(s): itchy, grumpy       PHYSICAL EXAM  VITAL SIGNS: /60   Pulse 83   Temp 36.5 °C (97.7 °F)   Resp 16   Ht 1.676 m (5' 6\")   Wt 69.3 kg (152 lb 12.5 oz)   LMP 01/11/2017   SpO2 95%   BMI 24.66 kg/m²     General: Alert, mild acute distress, appears significantly uncomfortable.  Skin: Warm, dry, normal for ethnicity  Head: Normocephalic, atraumatic  Neck: Trachea midline, no tenderness  Eye: PERRL, normal conjunctiva without pallor  ENMT: Oral mucosa pink and dry  Cardiovascular: Regular rate and rhythm, No murmur, Normal peripheral perfusion  Respiratory: Lungs CTA, respirations are non-labored, breath sounds are equal  Gastrointestinal: Soft, mildly distended, tender primarily to the upper quadrants the epigastrium, less so to the lower quadrants.  Bowel sounds are hypoactive.  No guarding, no rebound, no rigidity.  Musculoskeletal: No swelling, no deformity  Neurological: Alert and oriented to person, place, time, and situation, anxious and generally tremulous but not frankly convulsive.  Lymphatics: No lymphadenopathy  Psychiatric: Cooperative, very anxious with mood congruent affect      DIAGNOSTIC STUDIES/PROCEDURES    LABS  Results for orders placed or performed during the hospital encounter " of 10/20/22   CBC with Differential   Result Value Ref Range    WBC 7.8 4.8 - 10.8 K/uL    RBC 4.89 4.20 - 5.40 M/uL    Hemoglobin 15.1 12.0 - 16.0 g/dL    Hematocrit 42.9 37.0 - 47.0 %    MCV 87.7 81.4 - 97.8 fL    MCH 30.9 27.0 - 33.0 pg    MCHC 35.2 (H) 33.6 - 35.0 g/dL    RDW 43.9 35.9 - 50.0 fL    Platelet Count 267 164 - 446 K/uL    MPV 10.4 9.0 - 12.9 fL    Neutrophils-Polys 58.80 44.00 - 72.00 %    Lymphocytes 31.40 22.00 - 41.00 %    Monocytes 7.70 0.00 - 13.40 %    Eosinophils 1.20 0.00 - 6.90 %    Basophils 0.50 0.00 - 1.80 %    Immature Granulocytes 0.40 0.00 - 0.90 %    Nucleated RBC 0.00 /100 WBC    Neutrophils (Absolute) 4.57 2.00 - 7.15 K/uL    Lymphs (Absolute) 2.44 1.00 - 4.80 K/uL    Monos (Absolute) 0.60 0.00 - 0.85 K/uL    Eos (Absolute) 0.09 0.00 - 0.51 K/uL    Baso (Absolute) 0.04 0.00 - 0.12 K/uL    Immature Granulocytes (abs) 0.03 0.00 - 0.11 K/uL    NRBC (Absolute) 0.00 K/uL   Complete Metabolic Panel   Result Value Ref Range    Sodium 137 135 - 145 mmol/L    Potassium 3.3 (L) 3.6 - 5.5 mmol/L    Chloride 102 96 - 112 mmol/L    Co2 17 (L) 20 - 33 mmol/L    Anion Gap 18.0 (H) 7.0 - 16.0    Glucose 109 (H) 65 - 99 mg/dL    Bun 9 8 - 22 mg/dL    Creatinine 0.57 0.50 - 1.40 mg/dL    Calcium 9.0 8.5 - 10.5 mg/dL    AST(SGOT) 27 12 - 45 U/L    ALT(SGPT) 38 2 - 50 U/L    Alkaline Phosphatase 105 (H) 30 - 99 U/L    Total Bilirubin 0.8 0.1 - 1.5 mg/dL    Albumin 4.6 3.2 - 4.9 g/dL    Total Protein 7.2 6.0 - 8.2 g/dL    Globulin 2.6 1.9 - 3.5 g/dL    A-G Ratio 1.8 g/dL   Lipase   Result Value Ref Range    Lipase 10 (L) 11 - 82 U/L   ESTIMATED GFR   Result Value Ref Range    GFR (CKD-EPI) 106 >60 mL/min/1.73 m 2      All labs reviewed by me.      RADIOLOGY  CT-ABDOMEN-PELVIS WITH   Final Result         1.  Fluid-filled distended small bowel loops in the lower abdomen, appearance suggests ileus or enteritis, radiographic follow-up to resolution recommended to exclude progression to obstructive changes  as clinically appropriate.   2.  Mild common bile duct dilatation, commonly associated with postcholecystectomy physiology, consider causes of biliary obstruction with additional workup as clinically appropriate.   3.  Indeterminate left adrenal nodule, follow-up adrenal protocol CT or MRI for further characterization as clinically appropriate   4.  Atherosclerosis   5.  Hepatomegaly   6.  Right pulmonary nodule, see nodule follow-up recommendations below.      Fleischner Society pulmonary nodule recommendations:      Low Risk: No routine follow-up      High Risk: Optional CT at 12 months      Comments: Nodules less than 6 mm do not require routine follow-up, but certain patients at high risk with suspicious nodule morphology, upper lobe location, or both may warrant 12-month follow-up.      Low Risk - Minimal or absent history of smoking and of other known risk factors.      High Risk - History of smoking or of other known risk factors.      Note: These recommendations do not apply to lung cancer screening, patients with immunosuppression, or patients with known primary cancer.      Fleischner Society 2017 Guidelines for Management of Incidentally Detected Pulmonary Nodules in Adults           The radiologist's interpretation of all radiological studies have been reviewed by me.    COURSE & MEDICAL DECISION MAKING  Pertinent Labs & Imaging studies reviewed. (See chart for details)    8:58 PM - Patient seen and examined at bedside. Patient will be treated with lorazepam 1 mg IV, morphine 6 mg IV, ondansetron 4 mg IV, 1 L of normal saline. Ordered metabolic work-up including lipase, CT imaging of the abdomen pelvis to evaluate her symptoms. The differential diagnoses include but are not limited to: Gastroenteritis, colitis, gastritis, pancreatitis    2230: Patient feeling somewhat better after initial medications.  Updated with work-up thus far, awaiting CT.    2319: Patient reassessed, feeling somewhat nauseous  "again after attempted p.o. challenge.  She notes pain is starting to come back.  I have ordered a second dose of her medications.  Updated patient with work-up results, clinically I suspect enteritis.  Incidental findings include single right pulmonary nodule, low risk, no indication for routine follow-up.    HYDRATION: Based on the patient's presentation of Acute Diarrhea, Acute Vomiting, and Dehydration the patient was given IV fluids. IV Hydration was used because oral hydration was not adequate alone. Upon recheck following hydration, the patient was doing better, tachycardia resolved.     Patient Vitals for the past 24 hrs:   BP Temp Temp src Pulse Resp SpO2 Height Weight   10/20/22 2329 117/76 -- -- 92 -- 94 % -- --   10/20/22 2301 130/66 -- -- 95 16 94 % -- --   10/20/22 2229 127/77 -- -- 98 16 93 % -- --   10/20/22 2129 122/65 -- -- 90 16 95 % -- --   10/20/22 2035 -- -- -- 95 16 97 % -- --   10/20/22 2033 138/77 -- -- -- -- -- -- --   10/20/22 1910 -- -- -- -- -- -- -- 69.3 kg (152 lb 12.5 oz)   10/20/22 1902 (!) 152/101 36.1 °C (97 °F) Temporal (!) 116 16 99 % 1.676 m (5' 6\") --      HYDRATION: Based on the patient's presentation of Acute Diarrhea, Acute Vomiting, Dehydration, and Tachycardia the patient was given IV fluids. IV Hydration was used because oral hydration was not adequate alone. Upon recheck following hydration, the patient was doing better, tachycardia resolved, current heart rate is 92.     Decision Making:  This is a 57 y.o. year old female who presents with bilateral abdominal pain with nausea vomiting and diarrhea.  She has been ill for 4 days, she takes multiple medications including baclofen noting history of cerebral palsy.  Patient unable to tolerate p.o. intake and is very anxious and obviously significantly uncomfortable, likely partially due to not been able to tolerate her usual medications.  She is doing better with lorazepam and appropriate analgesia and antiemetics.  She is " tachycardic and obviously volume depleted.  IV fluids initiated, given her significant discomfort CT is obtained and is most consistent with enteritis.  She is able to tolerate p.o. after 2 rounds of ondansetron, treated her with her home baclofen here in the ED as well.  No evidence of sepsis, no evidence of surgical abdomen.  Incidental findings noted on the CT discussed above.    The patient will return for new or worsening symptoms and is stable at the time of discharge.    Patient has had high blood pressure while in the emergency department, felt likely secondary to medical condition. Counseled patient to monitor blood pressure at home and follow up with primary care physician.      DISPOSITION:  Patient will be discharged home in stable condition.    FOLLOW UP:  Indira Hutchison M.D.  [address] [place] [address] [id number] [phone]    Schedule an appointment as soon as possible for a visit       OUTPATIENT MEDICATIONS:  New Prescriptions    DICYCLOMINE (BENTYL) 20 MG TAB    Take 1 Tablet by mouth every 6 hours as needed (Abdominal Pain).    OMEPRAZOLE (PRILOSEC) 20 MG DELAYED-RELEASE CAPSULE    Take 1 Capsule by mouth every day.    ONDANSETRON (ZOFRAN ODT) 4 MG TABLET DISPERSIBLE    Take 1 Tablet by mouth every 6 hours as needed for Nausea.          FINAL IMPRESSION  1. Viral gastroenteritis    2. Dehydration    3. Nausea vomiting and diarrhea    4. GERD without esophagitis          Leyla CLAYTON M.D. (Misty), am scribing for, and in the presence of, Leyla Nur MD.    Electronically signed by: Leyla Nur M.D. (Misty), 10/20/2022    Leyla CLAYTON MD personally performed the services described in this documentation, as scribed by Leyla Nur M.D. in my presence, and it is both accurate and complete    The note accurately reflects work and decisions made by me.  Leyla Nur M.D.  10/21/2022  1:01 AM

## 2022-10-21 NOTE — ED NOTES
Blood drawn from PIV and sent to lab, pt tolerated well. Pt also provided warm blanket for comfort.

## 2022-10-21 NOTE — ED TRIAGE NOTES
"Chief Complaint   Patient presents with    Nausea/Vomiting/Diarrhea     x4 days    Abdominal Pain     Diffuse abdominal pain x4 days     Pt brought in by EMS from home and to triage via personal walker for above complaint. Pt appears very unsteady on her feet and states \"I haven't had my baclofen for like a week so I'm really wobbly.\" Pt appears uncomfortable in triage and is intermittently tearful. PIV placed by EMS and pt also received 4mg Zofran and 400ml NS.     Pt is alert/oriented and follows commands. Pt speaking in full sentences and responds appropriately to questions. No acute distress noted in triage and respirations are even and unlabored.     Pt placed in lobby and educated on triage process. Pt encouraged to alert staff for any changes in condition.  "

## 2022-10-25 ENCOUNTER — OFFICE VISIT (OUTPATIENT)
Dept: WOUND CARE | Facility: MEDICAL CENTER | Age: 57
End: 2022-10-25
Attending: NURSE PRACTITIONER
Payer: MEDICAID

## 2022-10-25 VITALS
SYSTOLIC BLOOD PRESSURE: 124 MMHG | DIASTOLIC BLOOD PRESSURE: 76 MMHG | OXYGEN SATURATION: 91 % | HEART RATE: 78 BPM | RESPIRATION RATE: 18 BRPM | TEMPERATURE: 97.3 F

## 2022-10-25 DIAGNOSIS — Z91.81 RISK FOR FALLS: ICD-10-CM

## 2022-10-25 DIAGNOSIS — Z89.412 HISTORY OF AMPUTATION OF LEFT GREAT TOE (HCC): ICD-10-CM

## 2022-10-25 DIAGNOSIS — L97.521 NEUROPATHIC ULCER OF TOE OF LEFT FOOT, LIMITED TO BREAKDOWN OF SKIN (HCC): ICD-10-CM

## 2022-10-25 PROCEDURE — 11042 DBRDMT SUBQ TIS 1ST 20SQCM/<: CPT | Performed by: STUDENT IN AN ORGANIZED HEALTH CARE EDUCATION/TRAINING PROGRAM

## 2022-10-25 PROCEDURE — 11042 DBRDMT SUBQ TIS 1ST 20SQCM/<: CPT

## 2022-10-25 NOTE — PROGRESS NOTES
Provider Encounter- Neuropathic Foot Ulcer      HISTORY OF PRESENT ILLNESS  Wound History:    START OF CARE IN CLINIC: 10/10/2022    REFERRING PROVIDER:   Nancy Nieves A.P.R.N.     WOUND- Neuropathic ulcer of toe   LOCATION: Left third toe   HISTORY:  Patient is a 57 year old female with history tetraplegia, neuropathy, MS, CP and frequent falls. She was treated previously at [facility]  for a wound to her Left great toe that resulted in an amputation.  She has been referred back for treatment of an open wound to her left third toe.  She injured her toe when she fell at her son's house while walking barefoot.  Shortly after her injury, she went to an urgent care ([facility]) where she was prescribed cephalexin, which she completed, and was instructed to use silver dressings for her wound care. She then went to the [facility] for her nail care on the 09/30/22 and was referred to the [facility] clinic for further wound care.     Pertinent Medical History: Tetraplegia, neuropathy, MS, CP, frequent falls, amputation of left hallux           TOBACCO USE: Former smoker, quit in January 2022    ALCOHOL USE: No    Patient's problem list, allergies, and current medications reviewed and updated in Epic    Interval History:  10/13/2022 : Initial provider visit with RENALDO Garcia, GEMA-BC, TERESITAN, CFJOSIAH.  Patient is known to me from previous treatment in the clinic.  She presents today with a dry ulcer to the dorsal aspect of her left third toe, and a healed wound to her left distal third toe.  She has been anxious, reluctant to decrease frequency of clinic visits or to discharge altogether.  She states she has an appointment to get an offloading boot and ability orthotics.  However, given that her wound on the dorsal aspect of her toe, I do not feel the boot is necessary, recommend offloading shoe instead.  Patient insists that she needs a boot, because the shoe causes her to fall.  I offered that she may not need either, as her wound  is nearly resolved, recommended that she resume wearing her orthotic shoes with inserts.  Patient is still insistent that she needs offloading footwear.  She is very worried that she will lose another toe.    10/25/2022: Clinic visit with Damon Cortes MD. Patient reports doing ok. She denies any fevers or chills. She is wearing new diabetic shoes and insoles . Wound is small and superficial without evidence of infection. She reports aquacel was too dry and stuck on wound bed. Appears to have minimal drainage and will change to hydrofera blue ready.      REVIEW OF SYSTEMS:   ROS unchanged from prior wound clinic assessment on 10/13/2022.    PHYSICAL EXAMINATION:   /76 (BP Location: Left arm, Patient Position: Sitting)   Pulse 78   Temp 36.3 °C (97.3 °F) (Temporal)   Resp 18   LMP 01/11/2017   SpO2 91%     Physical Exam  Constitutional:       Appearance: She is normal weight.   HENT:      Head: Normocephalic.   Cardiovascular:      Comments: Pedal pulses are difficult to palpate  Pulmonary:      Effort: Pulmonary effort is normal. No respiratory distress.   Musculoskeletal:         General: Deformity present.      Right lower leg: No edema.      Left lower leg: No edema.      Comments: Hammer toes left lower extremity   Skin:     Comments: Left dorsal 3rd toe - Wound measuring slightly smaller, superficial wound without evidence of infection   Neurological:      Mental Status: She is alert.       WOUND ASSESSMENT  Wound 11/11/21 Left hallux amp site (Active)   Number of days: 348       Wound 12/27/21 Incision Foot Left adaptic, 4x4s, soft roll, ace wrap (Active)   Number of days: 302       Wound 03/23/22 Incision Abdomen (Active)   Number of days: 216       Wound 10/10/22 Left dorsal 3rd toe (Active)   Wound Image    10/25/22 0900   Site Assessment Red;Pink 10/25/22 0900   Periwound Assessment Dry;Intact;Red;Blanchable erythema 10/25/22 0900   Margins Attached edges 10/25/22 0900   Closure Secondary  intention 10/25/22 0900   Drainage Amount Scant 10/25/22 0900   Drainage Description Serosanguineous 10/25/22 0900   Treatments Cleansed;Topical Lidocaine;Provider debridement 10/25/22 0900   Wound Cleansing Puracyn [place] 10/25/22 0900   Periwound Protectant Skin Protectant Wipes to Periwound;Barrier Paste 10/25/22 0900   Dressing Cleansing/Solutions Not Applicable 10/25/22 0900   Dressing Options Hydrofera Blue Ready;Hypafix Tape 10/25/22 0900   Dressing Changed Changed 10/25/22 0900   Dressing Status Removed 10/25/22 0900   Dressing Change/Treatment Frequency Weekly, and As Needed 10/25/22 0900   Non-staged Wound Description Partial thickness 10/25/22 0900   Wound Length (cm) 0.3 cm 10/25/22 0900   Wound Width (cm) 0.6 cm 10/25/22 0900   Wound Depth (cm) 0.1 cm 10/25/22 0900   Wound Surface Area (cm^2) 0.18 cm^2 10/25/22 0900   Wound Volume (cm^3) 0.018 cm^3 10/25/22 0900   Post-Procedure Length (cm) 0 cm 10/25/22 0900   Post-Procedure Width (cm) 0.5 cm 10/25/22 0900   Post-Procedure Depth (cm) 0.2 cm 10/25/22 0900   Post-Procedure Surface Area (cm^2) 0 cm^2 10/25/22 0900   Post-Procedure Volume (cm^3) 0 cm^3 10/25/22 0900   Wound Bed Granulation (%) - Post-Procedure 100 % 10/25/22 0900   Tunneling (cm) 0 cm 10/25/22 0900   Undermining (cm) 0 cm 10/25/22 0900   Wound Odor None 10/25/22 0900   Pulses DP;PT;Doppler 10/10/22 1500   Exposed Structures None 10/25/22 0900   Number of days: 15           PROCEDURE:   -2% viscous lidocaine applied topically to wound bed for approximately 5 minutes prior to debridement  -Curette used to debride wound bed.  Excisional debridement was performed to remove devitalized tissue until healthy, bleeding tissue was visualized.   Entire surface of wound, 0.2 cm2 debrided.  Tissue debrided into the subcutaneous layer.    -Bleeding controlled with manual pressure.    -Wound care completed by wound RN, refer to flowsheet  -Patient tolerated the procedure well, without c/o pain or  discomfort.          Pertinent Labs and Diagnostics:    Labs:     IMAGING: Foot/Toes Imaging, Past Year DX-FOOT-COMPLETE 3+ RIGHT    Result Date: 12/2/2021  Narrative 12/2/2021 1:41 PM HISTORY/REASON FOR EXAM:  Atraumatic Pain/Swelling/Deformity; exquisite tenderness to R 5th MTH. TECHNIQUE/EXAM DESCRIPTION AND NUMBER OF VIEWS: 3 nonweightbearing views of the RIGHT foot. COMPARISON:  5/23/2018 FINDINGS: Bandage is present at the fifth metatarsal-phalangeal level with no underlying bony destruction seen here or elsewhere No acute displaced fracture is noted. There is no subluxation. Hallux valgus deformity has worsened and there is mild first metatarsal-phalangeal joint space narrowing with spurring     Impression No radiographic evidence of acute osteomyelitis Worsening hallux valgus deformity with mild first metatarsal-phalangeal arthritis    Foot/Toes Imaging, Past Year DX-TOE(S) 2+ LEFT    Result Date: 12/2/2021  Narrative 12/2/2021 1:41 PM HISTORY/REASON FOR EXAM:  Atraumatic Pain/Swelling/Deformity. TECHNIQUE/EXAM DESCRIPTION AND NUMBER OF VIEWS:  3 views of the LEFT toes. COMPARISON: 5/23/2018 FINDINGS: There is distal first toe soft tissue ulceration with underlying new terminal tuft bony destruction First proximal phalanx cerclage wires are again seen with no new lucency in the vicinity. Patient is status post first metatarsal chevron osteotomy, bunionectomy There is moderate first metatarsal-phalangeal marginal spurring and mild joint space narrowing Some deformity of the third metatarsal neck is again seen compatible with a healed fracture. Healed second proximal phalanx fracture at the PIP is also again seen calcaneal cuboid osteophyte formation and joint effusion There is osteopenia     Impression First distal phalanx terminal tuft acute osteomyelitis with overlying skin ulceration      VASCULAR STUDIES: No results found.    LAST  WOUND CULTURE:  DATE :   Lab Results   Component Value Date/Time     CULTRSULT Usual urogenital ynes ,000 cfu/mL 08/30/2022 10:37 AM         ASSESSMENT AND PLAN:   1. Neuropathic ulcer of toe of left foot, limited to breakdown of skin (HCC)  Comments: Patient injured her toe when she fell while walking barefoot    10/25/2022: Distal toe wound appears resolved. Dorsal toe wound remains open but measuring smaller. Wound remains superficial.  -Excisional debridement was performed in clinic, medically necessary to promote wound healing.  -Patient to return to clinic next week for reassessment, and debridement if necessary  -Patient has been wearing custom shoes an insoles, recommend continued use.    Wound care: Hydrofera Blue Ready, tape    2. History of amputation of left great toe (HCC)  Comments: Left hallux amputated in December 2021 due to osteomyelitis.  She was prescribed orthotic shoes and inserts postoperatively    3. Risk for falls  Comments: Patient has history of tetraplegia, MS and CP.  Current injury is due to a recent fall.    PATIENT EDUCATION:  - Implications of loss of protective sensation (LOPS) discussed with patient- including increased risk for amputation.  - Advised to check feet at least daily, moisturize feet, and to always wear protective foot wear.   -  Importance of offloading foot to assist with wound healing  - Advised pt not to trim nails or calluses, seek foot/nail care from podiatrist or certified foot/nail nurse  - Importance of adequate nutrition for wound healing        Please note that this note may have been created using voice recognition software. I have worked with technical experts from [employer] to optimize the interface.  I have made every reasonable attempt to correct obvious errors, but there may be errors of grammar and possibly content that I did not discover before finalizing the note.    N

## 2022-10-25 NOTE — PATIENT INSTRUCTIONS
-Keep your wound dressing clean, dry, and intact.    -Change your dressing if it becomes soiled, soaked, or falls off.    -Should you experience any significant changes in your wound(s), such as infection (redness, swelling, localized heat, increased pain, fever > 101 F, chills) or have any questions regarding your home care instructions, please contact the wound center at [phone]. If after hours, contact your primary care physician or go to the hospital emergency room.

## 2022-10-28 ENCOUNTER — NON-PROVIDER VISIT (OUTPATIENT)
Dept: WOUND CARE | Facility: MEDICAL CENTER | Age: 57
End: 2022-10-28
Attending: NURSE PRACTITIONER
Payer: MEDICAID

## 2022-10-28 PROCEDURE — 97602 WOUND(S) CARE NON-SELECTIVE: CPT

## 2022-10-28 NOTE — PATIENT INSTRUCTIONS
-Keep your wound dressing clean, dry, and intact.    -Change your dressing if it becomes soiled, soaked, or falls off.    -Should you experience any significant changes in your wound(s), such as infection (redness, swelling, localized heat, increased pain, fever > 101 F, chills) or have any questions regarding your home care instructions, please contact the wound center at [phone]. If after hours, contact your primary care physician or go to the hospital emergency room.

## 2022-10-28 NOTE — PROCEDURES
2% viscous lidocaine applied topically to wound bed for approximately 5 minutes prior to debridement. Non-Selective Debridement with puracyn spray and wash cloth to debride non-viable tissue from the wound bed and periwound areas. Refer to flow sheet for wound care details. Patient tolerated the procedure well.

## 2022-11-01 ENCOUNTER — OFFICE VISIT (OUTPATIENT)
Dept: MEDICAL GROUP | Facility: MEDICAL CENTER | Age: 57
End: 2022-11-01
Attending: FAMILY MEDICINE
Payer: MEDICAID

## 2022-11-01 VITALS
DIASTOLIC BLOOD PRESSURE: 64 MMHG | HEART RATE: 98 BPM | BODY MASS INDEX: 25.7 KG/M2 | TEMPERATURE: 97.3 F | RESPIRATION RATE: 18 BRPM | WEIGHT: 159.9 LBS | SYSTOLIC BLOOD PRESSURE: 96 MMHG | OXYGEN SATURATION: 93 % | HEIGHT: 66 IN

## 2022-11-01 DIAGNOSIS — R10.2 PELVIC PAIN: ICD-10-CM

## 2022-11-01 DIAGNOSIS — R14.0 ABDOMINAL BLOATING: ICD-10-CM

## 2022-11-01 PROCEDURE — 99214 OFFICE O/P EST MOD 30 MIN: CPT | Performed by: FAMILY MEDICINE

## 2022-11-01 PROCEDURE — 99213 OFFICE O/P EST LOW 20 MIN: CPT | Performed by: FAMILY MEDICINE

## 2022-11-01 RX ORDER — SIMETHICONE 80 MG
80 TABLET,CHEWABLE ORAL EVERY 6 HOURS PRN
Qty: 60 TABLET | Refills: 1 | Status: SHIPPED | OUTPATIENT
Start: 2022-11-01 | End: 2023-01-11 | Stop reason: SDUPTHER

## 2022-11-01 ASSESSMENT — FIBROSIS 4 INDEX: FIB4 SCORE: 0.92

## 2022-11-01 NOTE — ASSESSMENT & PLAN NOTE
Pt went to ER for bloating and diarrhea, diagnosed with viral enteritis.  CT scan showed multiple dilated fluiod filled loops, consistent with enteritis  She notes her stools now are ranging from solid to mushy, but no longer watery  Unfortunately even after diarrhea resolved, she is still having bloating and is quite miserable from that.   Bloating is a lot worse after she eats food, which is immediate.   She is still having BMs, passing gas. She does not feel better after she passes gas. She feels a lot better after she has a BM.   These symptoms are ongoing for the last 2 years and worse over the last 2 months  She has a GI appointment in 1 month.

## 2022-11-01 NOTE — ASSESSMENT & PLAN NOTE
Pt continuing to have LLQ abd pain  She had hysterectomy in 2017, path showing, fallopian tubes, ovaries, uterus

## 2022-11-02 ENCOUNTER — OFFICE VISIT (OUTPATIENT)
Dept: WOUND CARE | Facility: MEDICAL CENTER | Age: 57
End: 2022-11-02
Attending: NURSE PRACTITIONER
Payer: MEDICAID

## 2022-11-02 VITALS
DIASTOLIC BLOOD PRESSURE: 82 MMHG | TEMPERATURE: 98 F | SYSTOLIC BLOOD PRESSURE: 115 MMHG | HEART RATE: 89 BPM | RESPIRATION RATE: 20 BRPM | OXYGEN SATURATION: 90 %

## 2022-11-02 DIAGNOSIS — Z91.81 RISK FOR FALLS: ICD-10-CM

## 2022-11-02 DIAGNOSIS — B35.1 ONYCHOMYCOSIS: ICD-10-CM

## 2022-11-02 DIAGNOSIS — Z89.412 HISTORY OF AMPUTATION OF LEFT GREAT TOE (HCC): ICD-10-CM

## 2022-11-02 DIAGNOSIS — L97.522 NEUROPATHIC ULCER OF TOE OF LEFT FOOT WITH FAT LAYER EXPOSED (HCC): ICD-10-CM

## 2022-11-02 PROCEDURE — 99213 OFFICE O/P EST LOW 20 MIN: CPT

## 2022-11-02 PROCEDURE — 99213 OFFICE O/P EST LOW 20 MIN: CPT | Performed by: NURSE PRACTITIONER

## 2022-11-02 NOTE — PROGRESS NOTES
Subjective:     CC:   Chief Complaint   Patient presents with    Hospital Follow-up     Infection in intestines, bloating(GI appt in Dec)         HPI:     Abdominal bloating  Pt went to ER for bloating and diarrhea, diagnosed with viral enteritis.  CT scan showed multiple dilated fluiod filled loops, consistent with enteritis  She notes her stools now are ranging from solid to mushy, but no longer watery  Unfortunately even after diarrhea resolved, she is still having bloating and is quite miserable from that.   Bloating is a lot worse after she eats food, which is immediate.   She is still having BMs, passing gas. She does not feel better after she passes gas. She feels a lot better after she has a BM.   These symptoms are ongoing for the last 2 years and worse over the last 2 months  She has a GI appointment in 1 month.         Pelvic pain  Pt continuing to have LLQ abd pain  She had hysterectomy in 2017, path showing, fallopian tubes, ovaries, uterus     Past Medical History:   Diagnosis Date    Anesthesia 12/23/2021    agitated coming out of anesthesia    Anesthesia 03/17/2022    Patient states becomes upset and sad after anesthesia.    Anxiety     Arthritis     spine, feet     Asthma     Breath shortness     Cataract 12/23/2022    no surgery yet    Cerebral palsy (HCC)     Cervical spinal cord injury (HCC) 12/19/2019    COVID-19 01/17/2022 1/17/2022 stopped 1-    DDD (degenerative disc disease), lumbar     Per Problem List    Dental disorder     upper and lower denture    Depression     Full dentures 03/17/2022    Heart burn 12/23/2022    GERD    High cholesterol     History of falling     Marijuana user     Risk for falls     Stroke (HCC) 03/17/2022    Pt. states, MINI STROKE AGE 19.    Tetraplegia (HCC)     TIA (transient ischemic attack) 03/17/2022    AGE 19. Patient states D/T drug use.     Urinary bladder disorder     Urinary incontinence     Vertigo     Per Problem List       Social History      Tobacco Use    Smoking status: Former     Packs/day: 1.00     Years: 35.00     Pack years: 35.00     Types: Cigarettes     Quit date: 2022     Years since quittin.8    Smokeless tobacco: Never    Tobacco comments:     20 years of 1ppd, then 10 years weaning down   Vaping Use    Vaping Use: Some days    Substances: THC, CBD   Substance Use Topics    Alcohol use: No    Drug use: Yes     Types: Marijuana, Inhaled     Comment: marijuana daily (smoked and edibles)       Current Outpatient Medications Ordered in Epic   Medication Sig Dispense Refill    simethicone (MYLICON) 80 MG Chew Tab Chew 1 Tablet every 6 hours as needed (bloating). 60 Tablet 1    baclofen (LIORESAL) 10 MG Tab TAKE 3 TABLETS BY MOUTH TWICE DAILY 180 Tablet 2    ondansetron (ZOFRAN ODT) 4 MG TABLET DISPERSIBLE Take 1 Tablet by mouth every 6 hours as needed for Nausea. 10 Tablet 0    dicyclomine (BENTYL) 20 MG Tab Take 1 Tablet by mouth every 6 hours as needed (Abdominal Pain). 10 Tablet 0    omeprazole (PRILOSEC) 20 MG delayed-release capsule Take 1 Capsule by mouth every day. 30 Capsule 0    fesoterodine fumarate (TOVIAZ) 4 MG TABLET SR 24 HR Take 1 Tablet by mouth every day. 90 Tablet 3    estradiol (ESTRACE VAGINAL) 0.1 MG/GM vaginal cream Apply 1g cream inside vagina using applicator nightly for 2 weeks, then twice per week thereafter 1 Each 3    venlafaxine XR (EFFEXOR XR) 75 MG CAPSULE SR 24 HR       Venlafaxine HCl 150 MG TABLET SR 24 HR TAKE 1 TABLET BY MOUTH EVERY DAY FOR DEPRESSION      esomeprazole (NEXIUM) 40 MG delayed-release capsule Take 1 Capsule by mouth every morning before breakfast. 90 Capsule 3    tizanidine (ZANAFLEX) 2 MG capsule Take 1 Capsule by mouth 2 times a day. 180 Capsule 1    Mirabegron ER (MYRBETRIQ) 50 MG TABLET SR 24 HR Take 50 mg by mouth every day. 90 Tablet 1    gabapentin (NEURONTIN) 300 MG Cap Take 3 Capsules by mouth 2 times a day. Take 1 po qhs 180 Capsule 0    meloxicam (MOBIC) 15 MG tablet TAKE 1  "TABLET BY MOUTH DAILY 30 Tablet 6    albuterol 108 (90 Base) MCG/ACT Aero Soln inhalation aerosol Inhale 2 Puffs every 6 hours as needed for Shortness of Breath. 8.5 g 11    Tiotropium Bromide-Olodaterol (STIOLTO RESPIMAT) 2.5-2.5 MCG/ACT Aero Soln Inhale 2 Puffs every day. 1 Each 11    fluticasone (FLONASE) 50 MCG/ACT nasal spray Administer 1 Spray into affected nostril(S) every day. 16 g 6     No current Epic-ordered facility-administered medications on file.       Allergies:  Other environmental and Codeine        Objective:       Exam:  BP (!) 96/64 (BP Location: Left arm, Patient Position: Sitting, BP Cuff Size: Small adult)   Pulse 98   Temp 36.3 °C (97.3 °F) (Temporal)   Resp 18   Ht 1.676 m (5' 6\")   Wt 72.5 kg (159 lb 14.4 oz)   LMP 01/11/2017   SpO2 93%   BMI 25.81 kg/m²  Body mass index is 25.81 kg/m².    General: Normal appearing. No distress.  Pulmonary: Clear to ausculation.  Normal effort. No rales, ronchi, or wheezing.  Cardiovascular: Regular rate and rhythm without murmur.   Abdomen: Soft. Normal bowel sounds.  Tender epigastric, right lower quadrant.  Tympanic to percussion in the left upper quadrant      Labs:   Reviewed labs from ER visit 10/20/2022, CBC, CMP were normal except for mildly low potassium 3.3, mildly elevated glucose  CT abdomen pelvis showed findings consistent with enteritis, also with mild common bile duct dilatation, incidental left adrenal nodule, hepatomegaly, right pulmonary nodule    Assessment & Plan:     57 y.o. female with the following -     1. Abdominal bloating  - NM-GASTRIC EMPTYING; Future  - simethicone (MYLICON) 80 MG Chew Tab; Chew 1 Tablet every 6 hours as needed (bloating).  Dispense: 60 Tablet; Refill: 1  We will check for gastroparesis given her symptoms of nausea, bloating, early satiety, immediately after she eats.  We will try some simethicone for her gas.  She has a GI appointment in about 1 month, I do think colonoscopy is in order.  Other " possible differentials include side effect of Ozempic, however the timing of her symptoms are quite manageable around the time that she gets the injection.  She also tolerated the lower dose without any problems.  Also consider partial bowel obstruction, however she is still passing gas, having bowel movements.  Differential also includes retained stone in the common bile duct as her CT scan showed mild CBD dilatation    2. Pelvic pain  Patient had some abnormal findings on the CT scan, will need to follow-up on the adrenal nodule.    Return in about 7 weeks (around 12/20/2022).    Please note that this dictation was created using voice recognition software. I have made every reasonable attempt to correct obvious errors, but I expect that there are errors of grammar and possibly content that I did not discover before finalizing the note.

## 2022-11-02 NOTE — PATIENT INSTRUCTIONS
- Resolved wound be fragile for a few days, bathe and dry area gently, only ever regains a maximum of 80% of the tensile strength of the surrounding skin, remodeling of scar can continue for 6mo - a year. Contact PCP for a referral back to wound care if any problems with area opening and draining again.    -Should you experience any significant changes in your wound(s), such as infection (redness, swelling, localized heat, increased pain, fever > 101 F, chills) or have any questions regarding your home care instructions, please contact the wound center at [phone]. If after hours, contact your primary care physician or go to the hospital emergency room.

## 2022-11-02 NOTE — PROGRESS NOTES
Provider Encounter- Neuropathic Foot Ulcer      HISTORY OF PRESENT ILLNESS  Wound History:    START OF CARE IN CLINIC: 10/10/2022    REFERRING PROVIDER:   Nancy Nieves A.P.R.N.     WOUND- Neuropathic ulcer of toe   LOCATION: Left third toe   HISTORY:  Patient is a 57 year old female with history tetraplegia, neuropathy, MS, CP and frequent falls. She was treated previously at [facility]  for a wound to her Left great toe that resulted in an amputation.  She has been referred back for treatment of an open wound to her left third toe.  She injured her toe when she fell at her son's house while walking barefoot.  Shortly after her injury, she went to an urgent care ([facility]) where she was prescribed cephalexin, which she completed, and was instructed to use silver dressings for her wound care. She then went to the [facility] for her nail care on the 09/30/22 and was referred to the [facility] clinic for further wound care.     Pertinent Medical History: Tetraplegia, neuropathy, MS, CP, frequent falls, amputation of left hallux           TOBACCO USE: Former smoker, quit in January 2022    ALCOHOL USE: No    Patient's problem list, allergies, and current medications reviewed and updated in Epic    Interval History:  10/13/2022 : Initial provider visit with RENALDO Garcia, GEMA-BC, TERESITAN, CFJOSIAH.  Patient is known to me from previous treatment in the clinic.  She presents today with a dry ulcer to the dorsal aspect of her left third toe, and a healed wound to her left distal third toe.  She has been anxious, reluctant to decrease frequency of clinic visits or to discharge altogether.  She states she has an appointment to get an offloading boot and ability orthotics.  However, given that her wound on the dorsal aspect of her toe, I do not feel the boot is necessary, recommend offloading shoe instead.  Patient insists that she needs a boot, because the shoe causes her to fall.  I offered that she may not need either, as her wound  is nearly resolved, recommended that she resume wearing her orthotic shoes with inserts.  Patient is still insistent that she needs offloading footwear.  She is very worried that she will lose another toe.    10/25/2022: Clinic visit with Damon Cortes MD. Patient reports doing ok. She denies any fevers or chills. She is wearing new diabetic shoes and insoles . Wound is small and superficial without evidence of infection. She reports aquacel was too dry and stuck on wound bed. Appears to have minimal drainage and will change to hydrofera blue ready.    11/2/2022 : Clinic visit with RENALDO Garcia, FNP-BC, CWOCN, CFCN.   Patient presents today with complete healing of her left third toe wound.  She is worried that she has a new wound to her left fourth toe, because she noticed some blood on her sock yesterday.  I assessed all of her toes completely, did not notice any new wounds.  He is discharged to [facility].  She understands she is return to clinic ASAP if her wound recurs, or if she develops new wounds.      REVIEW OF SYSTEMS:   ROS unchanged from prior wound clinic assessment on 10/25/2022    PHYSICAL EXAMINATION:   /82 (BP Location: Left arm, Patient Position: Sitting)   Pulse 89   Temp 36.7 °C (98 °F) (Temporal)   Resp 20   LMP 01/11/2017   SpO2 90%     Physical Exam  Constitutional:       Appearance: She is normal weight.   HENT:      Head: Normocephalic.   Cardiovascular:      Rate and Rhythm: Normal rate.      Comments: Pedal pulses are palpable, though with some difficulty  Pulmonary:      Effort: Pulmonary effort is normal.   Musculoskeletal:         General: Deformity present.      Comments: Hammer toes left lower extremity  Healed amputation of left great toe   Skin:     Comments: Left dorsal 3rd toe -resolved    All toenails overgrown and mycotic.   Neurological:      Mental Status: She is alert.       WOUND ASSESSMENT  Wound 11/11/21 Left hallux amp site (Active)   Number of days: 356        Wound 12/27/21 Incision Foot Left adaptic, 4x4s, soft roll, ace wrap (Active)   Number of days: 310       Wound 03/23/22 Incision Abdomen (Active)   Number of days: 224           PROCEDURE:   - no need for debridement today, wound resolved.  -I did use an emery board to lightly file rough edges of her toenails.  She has an appointment with her foot nurse in a few weeks.          Pertinent Labs and Diagnostics:    Labs:     IMAGING: Foot/Toes Imaging, Past Year DX-FOOT-COMPLETE 3+ RIGHT    Result Date: 12/2/2021  Narrative 12/2/2021 1:41 PM HISTORY/REASON FOR EXAM:  Atraumatic Pain/Swelling/Deformity; exquisite tenderness to R 5th MTH. TECHNIQUE/EXAM DESCRIPTION AND NUMBER OF VIEWS: 3 nonweightbearing views of the RIGHT foot. COMPARISON:  5/23/2018 FINDINGS: Bandage is present at the fifth metatarsal-phalangeal level with no underlying bony destruction seen here or elsewhere No acute displaced fracture is noted. There is no subluxation. Hallux valgus deformity has worsened and there is mild first metatarsal-phalangeal joint space narrowing with spurring     Impression No radiographic evidence of acute osteomyelitis Worsening hallux valgus deformity with mild first metatarsal-phalangeal arthritis    Foot/Toes Imaging, Past Year DX-TOE(S) 2+ LEFT    Result Date: 12/2/2021  Narrative 12/2/2021 1:41 PM HISTORY/REASON FOR EXAM:  Atraumatic Pain/Swelling/Deformity. TECHNIQUE/EXAM DESCRIPTION AND NUMBER OF VIEWS:  3 views of the LEFT toes. COMPARISON: 5/23/2018 FINDINGS: There is distal first toe soft tissue ulceration with underlying new terminal tuft bony destruction First proximal phalanx cerclage wires are again seen with no new lucency in the vicinity. Patient is status post first metatarsal chevron osteotomy, bunionectomy There is moderate first metatarsal-phalangeal marginal spurring and mild joint space narrowing Some deformity of the third metatarsal neck is again seen compatible with a healed fracture. Healed  second proximal phalanx fracture at the PIP is also again seen calcaneal cuboid osteophyte formation and joint effusion There is osteopenia     Impression First distal phalanx terminal tuft acute osteomyelitis with overlying skin ulceration      VASCULAR STUDIES: No results found.    LAST  WOUND CULTURE:  DATE :   Lab Results   Component Value Date/Time    CULTRSULT Usual urogenital ynes ,000 cfu/mL 08/30/2022 10:37 AM         ASSESSMENT AND PLAN:   1. Neuropathic ulcer of toe of left foot, limited to breakdown of skin (HCC)  Comments: Patient injured her toe when she fell while walking barefoot    11/2/2022: Toe wound resolved.  She was worried that she had a new wound to her left fourth toe, which I did not find to be treated.  -Discharge from [facility]  -Patient to return to clinic ASAP if wound recurs, or if she develops new wounds     Wound care: Left open to air    2. History of amputation of left great toe (HCC)  Comments: Left hallux amputated in December 2021 due to osteomyelitis.  She was prescribed orthotic shoes and inserts postoperatively    3. Risk for falls  Comments: Patient has history of tetraplegia, MS and CP.  Injury to toe was caused by a fall.    4.  Onychomycosis, overgrown toenails    11/2/2022: Patient presents today with overgrown, mycotic toenails.  He routinely receives care at [facility] from foot nurse.  She has an appointment later this month.  -I did not attempt to trim her toenails, did not have appropriate equipment in clinic today.  However, I did use an emery board to file rough edges    PATIENT EDUCATION:  - Implications of loss of protective sensation (LOPS) discussed with patient- including increased risk for amputation.  - Advised to check feet at least daily, moisturize feet, and to always wear protective foot wear.   -  Importance of offloading foot to assist with wound healing  - Advised pt not to trim nails or calluses, seek foot/nail care from podiatrist or certified foot/nail  nurse  - Importance of adequate nutrition for wound healing    My total time spent caring for the patient on the day of the encounter was 20 minutes.   This does not include time spent on separately billable procedures/tests.     Please note that this note may have been created using voice recognition software. I have worked with technical experts from [facility] to optimize the interface.  I have made every reasonable attempt to correct obvious errors, but there may be errors of grammar and possibly content that I did not discover before finalizing the note.    N

## 2022-11-07 DIAGNOSIS — J30.1 NON-SEASONAL ALLERGIC RHINITIS DUE TO POLLEN: ICD-10-CM

## 2022-11-08 RX ORDER — FLUTICASONE PROPIONATE 50 MCG
1 SPRAY, SUSPENSION (ML) NASAL DAILY
Qty: 16 G | Refills: 6 | Status: SHIPPED | OUTPATIENT
Start: 2022-11-08 | End: 2023-11-28 | Stop reason: SDUPTHER

## 2022-11-23 DIAGNOSIS — K21.9 GERD WITHOUT ESOPHAGITIS: ICD-10-CM

## 2022-11-23 NOTE — TELEPHONE ENCOUNTER
Received request via: Pharmacy    Was the patient seen in the last year in this department? Yes, pt is taking omeprazole, cannot afford Nexium at this time    Does the patient have an active prescription (recently filled or refills available) for medication(s) requested? No    Does the patient have longterm Plus and need 100 day supply (blood pressure, diabetes and cholesterol meds only)? Patient does not have SCP

## 2022-11-25 RX ORDER — OMEPRAZOLE 20 MG/1
20 CAPSULE, DELAYED RELEASE ORAL DAILY
Qty: 30 CAPSULE | Refills: 2 | OUTPATIENT
Start: 2022-11-25

## 2022-11-28 ENCOUNTER — HOSPITAL ENCOUNTER (OUTPATIENT)
Dept: RADIOLOGY | Facility: MEDICAL CENTER | Age: 57
End: 2022-11-28
Attending: FAMILY MEDICINE
Payer: MEDICAID

## 2022-11-28 DIAGNOSIS — R14.0 ABDOMINAL BLOATING: ICD-10-CM

## 2022-11-28 PROCEDURE — A9541 TC99M SULFUR COLLOID: HCPCS

## 2022-11-29 ENCOUNTER — PATIENT MESSAGE (OUTPATIENT)
Dept: MEDICAL GROUP | Facility: MEDICAL CENTER | Age: 57
End: 2022-11-29

## 2022-12-05 DIAGNOSIS — K21.9 GERD WITHOUT ESOPHAGITIS: ICD-10-CM

## 2022-12-05 RX ORDER — OMEPRAZOLE 20 MG/1
20 CAPSULE, DELAYED RELEASE ORAL 2 TIMES DAILY
Qty: 60 CAPSULE | Refills: 3 | Status: SHIPPED | OUTPATIENT
Start: 2022-12-05 | End: 2022-12-30

## 2022-12-06 ENCOUNTER — TELEMEDICINE (OUTPATIENT)
Dept: PHYSICAL MEDICINE AND REHAB | Facility: MEDICAL CENTER | Age: 57
End: 2022-12-06
Payer: MEDICAID

## 2022-12-06 DIAGNOSIS — S14.109D INJURY OF CERVICAL SPINAL CORD, SUBSEQUENT ENCOUNTER (HCC): Primary | ICD-10-CM

## 2022-12-06 DIAGNOSIS — M79.672 FOOT PAIN, LEFT: ICD-10-CM

## 2022-12-06 DIAGNOSIS — M47.812 CERVICAL SPONDYLOSIS: ICD-10-CM

## 2022-12-06 DIAGNOSIS — M54.2 CHRONIC NECK PAIN: ICD-10-CM

## 2022-12-06 DIAGNOSIS — G89.29 CHRONIC NECK PAIN: ICD-10-CM

## 2022-12-06 DIAGNOSIS — M79.2 NEUROPATHIC PAIN: ICD-10-CM

## 2022-12-06 PROCEDURE — 99214 OFFICE O/P EST MOD 30 MIN: CPT | Mod: 95 | Performed by: PHYSICAL MEDICINE & REHABILITATION

## 2022-12-06 RX ORDER — GABAPENTIN 300 MG/1
900 CAPSULE ORAL
Qty: 540 CAPSULE | Refills: 3 | Status: SHIPPED | OUTPATIENT
Start: 2022-12-06 | End: 2023-11-08 | Stop reason: SDUPTHER

## 2022-12-06 NOTE — PROGRESS NOTES
[facility]  PM&R Neuro Rehabilitation Clinic  [facility] [place] [address]  Ph: [phone]    FOLLOW UP PATIENT EVALUATION    This evaluation was conducted via Zoom using secure and encrypted videoconferencing technology. The patient was in a private location inside of their home in the [facility].  The patient's identity was confirmed and verbal consent was obtained for this virtual visit.    Patient Name: Marilyn Salinas   Patient : 1965  Patient Age: 57 y.o.   PCP: Edwar Platt M.D.    SUBJECTIVE:   Patient Identification: Marilyn Salinas is a 57 y.o. RHD female with PMH significant for chronic back and neck pain and rehabilitation history significant for premorbid weakness and paresthesias, had GLF, imaging showing Disc herniation at C4-5 and C3- C4, with T2 hyperintensity of the cord and s/p ACDF C3-5 on 19 with Dr. Linton  and is presenting to PM&R clinic for a FOLLOW UP outpatient evaluation with the following chief complaint/s:    PM&R Background Information:  Original Date of Injury: 19 GLF and worsening weakness, pain.   Pertinent Procedure History: Disc herniation at C4-5 and C3- C4, with T2 hyperintensity of the cord at this level. she underwent ACDF C3-5 on 19 with Dr. Linton  Dates of Admission/Discharge to ARU: 19-20    Chief Complaint:  Equipment Follow Up    Interval History:  - Walker is broken, right break brake is broken. This causes her to fall.   - In need of new walker now.   - She is walking more with her shoes from [employer].   - She is going to have gastric emptying study.   - Will have colonoscopy. Still has bloating.   - Had been to ED in 10/2022 and these records were reviewed. She was seen for nausea, vomiting, diarrhea. Is now on PPI BID.   - Has quit smoking and it has been almost a year.   - Botox is still working to assist her bladder.   - Had one fall because she was too cold and couldn't get up.     Review of Systems:  All  other pertinent positive review of systems are noted above in HPI.     Past Medical History:  Past Medical History:   Diagnosis Date    TIA (transient ischemic attack) 03/17/2022    AGE 19. Patient states D/T drug use.     Anesthesia 03/17/2022    Patient states becomes upset and sad after anesthesia.    Full dentures 03/17/2022    Stroke (HCC) 03/17/2022    Pt. states, MINI STROKE AGE 19.    COVID-19 01/17/2022 1/17/2022 stopped 1-    Anesthesia 12/23/2021    agitated coming out of anesthesia    Cervical spinal cord injury (HCC) 12/19/2019    Anxiety     Arthritis     spine, feet     Asthma     Breath shortness     Cataract 12/23/2022    no surgery yet    Cerebral palsy (HCC)     DDD (degenerative disc disease), lumbar     Per Problem List    Dental disorder     upper and lower denture    Depression     Heart burn 12/23/2022    GERD    High cholesterol     History of falling     Marijuana user     Risk for falls     Tetraplegia (HCC)     Urinary bladder disorder     Urinary incontinence     Vertigo     Per Problem List      Past Surgical History:   Procedure Laterality Date    DEVYN BY LAPAROSCOPY  3/23/2022    Procedure: CHOLECYSTECTOMY, LAPAROSCOPIC;  Surgeon: Mayur Barrett M.D.;  Location: [facility];  Service: General    PB AMPUTATION TOE,MT-P JT Left 12/27/2021    Procedure: GREAT TOE PARTIAL AMPUTATION;  Surgeon: Emiliano Goff M.D.;  Location: [facility];  Service: Orthopedics    CERVICAL DISK AND FUSION ANTERIOR N/A 12/20/2019    Procedure: DISCECTOMY, SPINE, CERVICAL, ANTERIOR APPROACH, WITH FUSION - C3-5;  Surgeon: Connor Linton M.D.;  Location: [facility];  Service: Neurosurgery    CORPECTOMY N/A 12/20/2019    Procedure: CORPECTOMY;  Surgeon: Connor Linton M.D.;  Location: [facility];  Service: Neurosurgery    BLADDER SLING FEMALE N/A 10/3/2017    Procedure: BLADDER SLING FEMALE TOT, CYSTOSCOPY;  Surgeon: Hudson Driscoll M.D.;  Location:  SURGERY SAME DAY [facility];  Service: Gynecology    VAGINAL HYSTERECTOMY SCOPE TOTAL N/A 10/3/2017    Procedure: VAGINAL HYSTERECTOMY SCOPE TOTAL;  Surgeon: Hudson Driscoll M.D.;  Location: SURGERY SAME DAY [facility];  Service: Gynecology    SALPINGECTOMY Bilateral 10/3/2017    Procedure: SALPINGECTOMY;  Surgeon: Hudson Driscoll M.D.;  Location: SURGERY SAME DAY [facility];  Service: Gynecology    OOPHORECTOMY Bilateral 10/3/2017    Procedure: OOPHORECTOMY;  Surgeon: Hudson Driscoll M.D.;  Location: SURGERY SAME DAY [facility];  Service: Gynecology    ANTERIOR AND POSTERIOR REPAIR Bilateral 10/3/2017    Procedure: ANTERIOR AND POSTERIOR REPAIR;  Surgeon: Hudson Driscoll M.D.;  Location: SURGERY SAME DAY [facility];  Service: Gynecology    ENTEROCELE REPAIR N/A 10/3/2017    Procedure: ENTEROCELE REPAIR, PERINEOPLASTY;  Surgeon: Hudson Driscoll M.D.;  Location: SURGERY SAME DAY [facility];  Service: Gynecology    VAGINAL SUSPENSION N/A 10/3/2017    Procedure: VAGINAL SUSPENSION SACROSPINOUS VAULT POSSIBLE;  Surgeon: Hudson Driscoll M.D.;  Location: SURGERY SAME DAY [facility];  Service: Gynecology    OTHER Left 2005    hammertoe x2    EYE SURGERY  1972    for lazy eye    LUMPECTOMY          Current Outpatient Medications:     gabapentin (NEURONTIN) 300 MG Cap, Take 3 Capsules by mouth 2 times a day. Take 1 po qhs, Disp: 540 Capsule, Rfl: 3    fluticasone (FLONASE) 50 MCG/ACT nasal spray, Administer 1 Spray into affected nostril(S) every day., Disp: 16 g, Rfl: 6    baclofen (LIORESAL) 10 MG Tab, TAKE 3 TABLETS BY MOUTH TWICE DAILY, Disp: 180 Tablet, Rfl: 2    omeprazole (PRILOSEC) 20 MG delayed-release capsule, Take 1 Capsule by mouth 2 times a day., Disp: 60 Capsule, Rfl: 3    simethicone (MYLICON) 80 MG Chew Tab, Chew 1 Tablet every 6 hours as needed (bloating)., Disp: 60 Tablet, Rfl: 1    ondansetron (ZOFRAN ODT) 4 MG TABLET DISPERSIBLE, Take 1 Tablet by mouth every 6 hours as needed for  Nausea., Disp: 10 Tablet, Rfl: 0    dicyclomine (BENTYL) 20 MG Tab, Take 1 Tablet by mouth every 6 hours as needed (Abdominal Pain)., Disp: 10 Tablet, Rfl: 0    fesoterodine fumarate (TOVIAZ) 4 MG TABLET SR 24 HR, Take 1 Tablet by mouth every day., Disp: 90 Tablet, Rfl: 3    estradiol (ESTRACE VAGINAL) 0.1 MG/GM vaginal cream, Apply 1g cream inside vagina using applicator nightly for 2 weeks, then twice per week thereafter, Disp: 1 Each, Rfl: 3    venlafaxine XR (EFFEXOR XR) 75 MG CAPSULE SR 24 HR, , Disp: , Rfl:     Venlafaxine HCl 150 MG TABLET SR 24 HR, TAKE 1 TABLET BY MOUTH EVERY DAY FOR DEPRESSION, Disp: , Rfl:     tizanidine (ZANAFLEX) 2 MG capsule, Take 1 Capsule by mouth 2 times a day., Disp: 180 Capsule, Rfl: 1    Mirabegron ER (MYRBETRIQ) 50 MG TABLET SR 24 HR, Take 50 mg by mouth every day., Disp: 90 Tablet, Rfl: 1    meloxicam (MOBIC) 15 MG tablet, TAKE 1 TABLET BY MOUTH DAILY, Disp: 30 Tablet, Rfl: 6    albuterol 108 (90 Base) MCG/ACT Aero Soln inhalation aerosol, Inhale 2 Puffs every 6 hours as needed for Shortness of Breath., Disp: 8.5 g, Rfl: 11    Tiotropium Bromide-Olodaterol (STIOLTO RESPIMAT) 2.5-2.5 MCG/ACT Aero Soln, Inhale 2 Puffs every day., Disp: 1 Each, Rfl: 11  Allergies   Allergen Reactions    Other Environmental Hives     TUMBLEWEED    Codeine Itching, Unspecified and Rash     Rash, itching, mood disorder- becomes agitated  Other reaction(s): itchy, grumpy        Past Social History:  Social History     Socioeconomic History    Marital status: Single     Spouse name: Not on file    Number of children: Not on file    Years of education: Not on file    Highest education level: Not on file   Occupational History    Not on file   Tobacco Use    Smoking status: Former     Packs/day: 1.00     Years: 35.00     Pack years: 35.00     Types: Cigarettes     Quit date: 2022     Years since quittin.9    Smokeless tobacco: Never    Tobacco comments:     20 years of 1ppd, then 10 years weaning  down   Vaping Use    Vaping Use: Some days    Substances: THC, CBD   Substance and Sexual Activity    Alcohol use: No    Drug use: Yes     Types: Marijuana, Inhaled     Comment: marijuana daily (smoked and edibles)    Sexual activity: Not Currently   Other Topics Concern     Service No    Blood Transfusions No    Caffeine Concern No    Occupational Exposure No    Hobby Hazards No    Sleep Concern Yes    Stress Concern Yes    Weight Concern Yes    Special Diet No    Back Care Yes    Exercise No    Bike Helmet Yes    Seat Belt Yes    Self-Exams Yes   Social History Narrative    Not on file     Social Determinants of Health     Financial Resource Strain: Not on file   Food Insecurity: Not on file   Transportation Needs: Not on file   Physical Activity: Not on file   Stress: Not on file   Social Connections: Not on file   Intimate Partner Violence: Not on file   Housing Stability: Not on file        Family History:  Family History   Problem Relation Age of Onset    Cancer Mother         ovarian    Alcohol/Drug Mother     Cancer Brother         unknown, caused him to lose his leg when he was 4    Diabetes Maternal Aunt        Depression and Opioid Screening  PHQ-9:      12/8/2020     8:00 AM 2/22/2022     2:00 PM 3/23/2022     3:35 PM   Depression Screen (PHQ-2/PHQ-9)   PHQ-2 Total Score   0   PHQ-2 Total Score 0 5    PHQ-9 Total Score  14      Interpretation of PHQ-9 Total Score   Score Severity   1-4 No Depression   5-9 Mild Depression   10-14 Moderate Depression   15-19 Moderately Severe Depression   20-27 Severe Depression     Opioid Risk Score: No value filed.  Interpretation of Opioid Risk Score   Score 0-3 = Low risk of abuse. Do UDS at least once per year.  Score 4-7 = Moderate risk of abuse. Do UDS 1-4 times per year.  Score 8+ = High risk of abuse. Refer to specialist.      OBJECTIVE:   Vital Signs:  There were no vitals filed for this visit. - Virtual Visit    Pertinent Labs:  Lab Results   Component  Value Date/Time    SODIUM 137 10/20/2022 07:34 PM    POTASSIUM 3.3 (L) 10/20/2022 07:34 PM    CHLORIDE 102 10/20/2022 07:34 PM    CO2 17 (L) 10/20/2022 07:34 PM    GLUCOSE 109 (H) 10/20/2022 07:34 PM    BUN 9 10/20/2022 07:34 PM    CREATININE 0.57 10/20/2022 07:34 PM       Lab Results   Component Value Date/Time    HBA1C 5.6 07/27/2017 09:39 AM       Lab Results   Component Value Date/Time    WBC 7.8 10/20/2022 07:34 PM    RBC 4.89 10/20/2022 07:34 PM    HEMOGLOBIN 15.1 10/20/2022 07:34 PM    HEMATOCRIT 42.9 10/20/2022 07:34 PM    MCV 87.7 10/20/2022 07:34 PM    MCH 30.9 10/20/2022 07:34 PM    MCHC 35.2 (H) 10/20/2022 07:34 PM    MPV 10.4 10/20/2022 07:34 PM    NEUTSPOLYS 58.80 10/20/2022 07:34 PM    LYMPHOCYTES 31.40 10/20/2022 07:34 PM    MONOCYTES 7.70 10/20/2022 07:34 PM    EOSINOPHILS 1.20 10/20/2022 07:34 PM    BASOPHILS 0.50 10/20/2022 07:34 PM       Lab Results   Component Value Date/Time    ASTSGOT 27 10/20/2022 07:34 PM    ALTSGPT 38 10/20/2022 07:34 PM        Physical Exam:   GEN: No apparent distress  HEENT: Head normocephalic, atraumatic.  Sclera nonicteric bilaterally, no ocular discharge appreciated bilaterally.  PULMONARY: Breathing nonlabored on room air, no respiratory accessory muscle use.  Not requiring supplemental oxygen.  SKIN: No appreciable skin breakdown on exposed areas of skin.  PSYCH: Mood and affect within normal limits.  NEURO: Awake alert.  Conversational.  Logical thought content.    ASSESSMENT/PLAN: Marilyn Salinas  is a 57 y.o. female with rehabilitation history significant for premorbid weakness and paresthesias, had GLF, imaging showing Disc herniation at C4-5 and C3- C4, with T2 hyperintensity of the cord and s/p ACDF C3-5 on 12/20/19 with Dr. Linton, here for SCI follow up. The following plan was discussed with the patient who is in agreement.     Visit Diagnoses     ICD-10-CM   1. Injury of cervical spinal cord, subsequent encounter ([facility])  S14.109D   2. Foot pain, left   M79.672   3. Cervical spondylosis  M47.812   4. Chronic neck pain  M54.2    G89.29   5. Neuropathic pain  M79.2        Rehab/Neuro:   C2 AISD: Nontraumatic incomplete spinal cord injury, status post fall at home with worsening weakness, found to have severe cervical stenosis.  Status post C3-C5 ACDF with Dr. Linton on 12/20/19. No longer getting epidural steroid injections, too painful.  Nociceptive/musculoskeletal chronic back pain: Established with interventional pain management.  -Therapy: Established with physical therapy through [facility]  -Function: Improving.  Using ability shoes to ambulate.  In need of a new walker.  -Equipment: Prescription for a new walker at next visit as hers is broken.  Patient was not eligible for power scooter as they would only consider this for in-home ambulation and the scooter will not fit within her home.     2. Muscular dystrophy: ? CP per recent neurology evaluation.  Reportedly diagnosed in '60's per aunt. Normal CPK. Was seeing Nikunj tanner in 2018 - was supposed to have EMG/NCS. Medicaid wouldn't cover EMG/NCS at that time.  Scheduled for EMG/NCS 10/23/2020.  EEG reportedly negative.  Patient has since changed her neurologist and is now seeing Dr. Hartman. Had EMG/NCS results -results not available but patient reports she was told she has paraplegia and partial CP.   -Status: Stable.     Spasticity: Bilateral lower extremities worse than upper extremities.  Botox cost prohibitive.  Counseled again on Botox today, remains cost prohibitive.  -Med management: Continue baclofen 30 mg twice daily  -Med management: Continue tizanidine 2 mg twice daily    Neuropathic Pain:   -High risk med Management: Prescription for gabapentin 900 mg twice daily.     Neurogenic Bladder:   -Med management: Continue Myrbetriq and Toviaz.  -Established with urogynecology, continue Botox.    Depression:  -Med management: Continue venlafaxine 225 mg daily    Follow up: 1 month to ambulate and get  prescription for walker      Please note that this dictation was created using voice recognition software. I have made every reasonable attempt to correct obvious errors but there may be errors of grammar and content that I may have overlooked prior to finalization of this note.    Dr. Julianna Guillory DO, MS  Department of Physical Medicine & Rehabilitation  Neuro Rehabilitation Clinic  [facility]

## 2022-12-13 ENCOUNTER — PATIENT MESSAGE (OUTPATIENT)
Dept: MEDICAL GROUP | Facility: MEDICAL CENTER | Age: 57
End: 2022-12-13

## 2022-12-13 DIAGNOSIS — M47.812 CERVICAL SPONDYLOSIS: ICD-10-CM

## 2022-12-13 DIAGNOSIS — G89.29 CHRONIC NECK PAIN: ICD-10-CM

## 2022-12-13 DIAGNOSIS — M54.2 CHRONIC NECK PAIN: ICD-10-CM

## 2022-12-14 NOTE — PATIENT COMMUNICATION
DOCUMENTATION OF PAR STATUS:    1. Name of Medication & Dose: Omeprazole 40 mg tabs twice a day     2. Name of Prescription Coverage Company & phone #: Medicaid FFS    3. Date Prior Auth Submitted: 12/13/2022     4. What information was given to obtain insurance decision? PA form provided by [email or web address]    5. Prior Auth Status? Pending    6. Patient Notified: yes

## 2022-12-19 DIAGNOSIS — M51.36 DDD (DEGENERATIVE DISC DISEASE), LUMBAR: ICD-10-CM

## 2022-12-20 ENCOUNTER — TELEPHONE (OUTPATIENT)
Dept: HEALTH INFORMATION MANAGEMENT | Facility: OTHER | Age: 57
End: 2022-12-20

## 2022-12-28 RX ORDER — VENLAFAXINE HYDROCHLORIDE 150 MG/1
TABLET, EXTENDED RELEASE ORAL
Qty: 30 TABLET | Status: CANCELLED | OUTPATIENT
Start: 2022-12-28

## 2022-12-28 RX ORDER — MELOXICAM 15 MG/1
15 TABLET ORAL DAILY
Qty: 30 TABLET | Refills: 6 | Status: SHIPPED | OUTPATIENT
Start: 2022-12-28 | End: 2023-08-08 | Stop reason: SDUPTHER

## 2022-12-30 ENCOUNTER — OFFICE VISIT (OUTPATIENT)
Dept: MEDICAL GROUP | Facility: MEDICAL CENTER | Age: 57
End: 2022-12-30
Attending: FAMILY MEDICINE
Payer: MEDICAID

## 2022-12-30 ENCOUNTER — HOSPITAL ENCOUNTER (OUTPATIENT)
Facility: MEDICAL CENTER | Age: 57
End: 2022-12-30
Attending: FAMILY MEDICINE
Payer: MEDICAID

## 2022-12-30 VITALS
BODY MASS INDEX: 25.07 KG/M2 | WEIGHT: 156 LBS | RESPIRATION RATE: 16 BRPM | SYSTOLIC BLOOD PRESSURE: 100 MMHG | OXYGEN SATURATION: 94 % | HEART RATE: 88 BPM | TEMPERATURE: 97.5 F | DIASTOLIC BLOOD PRESSURE: 62 MMHG | HEIGHT: 66 IN

## 2022-12-30 DIAGNOSIS — K31.84 GASTROPARESIS: ICD-10-CM

## 2022-12-30 DIAGNOSIS — F41.9 ANXIETY AND DEPRESSION: ICD-10-CM

## 2022-12-30 DIAGNOSIS — R10.9 ABDOMINAL PAIN, UNSPECIFIED ABDOMINAL LOCATION: ICD-10-CM

## 2022-12-30 DIAGNOSIS — R60.0 BILATERAL LEG EDEMA: ICD-10-CM

## 2022-12-30 DIAGNOSIS — F32.A ANXIETY AND DEPRESSION: ICD-10-CM

## 2022-12-30 DIAGNOSIS — M54.2 CERVICAL SPINE PAIN: ICD-10-CM

## 2022-12-30 DIAGNOSIS — B35.1 ONYCHOMYCOSIS: ICD-10-CM

## 2022-12-30 PROCEDURE — 99214 OFFICE O/P EST MOD 30 MIN: CPT | Performed by: FAMILY MEDICINE

## 2022-12-30 PROCEDURE — 82570 ASSAY OF URINE CREATININE: CPT

## 2022-12-30 PROCEDURE — 84156 ASSAY OF PROTEIN URINE: CPT

## 2022-12-30 RX ORDER — DICYCLOMINE HCL 20 MG
20 TABLET ORAL EVERY 6 HOURS PRN
Qty: 30 TABLET | Refills: 2 | Status: SHIPPED | OUTPATIENT
Start: 2022-12-30 | End: 2024-03-22 | Stop reason: SDUPTHER

## 2022-12-30 RX ORDER — VENLAFAXINE HYDROCHLORIDE 150 MG/1
150 TABLET, EXTENDED RELEASE ORAL DAILY
Qty: 30 TABLET | Refills: 5 | Status: SHIPPED | OUTPATIENT
Start: 2022-12-30 | End: 2023-01-05

## 2022-12-30 RX ORDER — VENLAFAXINE HYDROCHLORIDE 75 MG/1
75 CAPSULE, EXTENDED RELEASE ORAL DAILY
Qty: 30 CAPSULE | Refills: 5 | Status: SHIPPED | OUTPATIENT
Start: 2022-12-30 | End: 2023-07-25 | Stop reason: SDUPTHER

## 2022-12-30 RX ORDER — TERBINAFINE HYDROCHLORIDE 250 MG/1
250 TABLET ORAL DAILY
Qty: 84 TABLET | Refills: 0 | Status: SHIPPED
Start: 2022-12-30 | End: 2023-03-31

## 2022-12-30 ASSESSMENT — FIBROSIS 4 INDEX: FIB4 SCORE: 0.92

## 2022-12-30 NOTE — ASSESSMENT & PLAN NOTE
Pt has started taking an equate brand antacid - famotidine, calcium carbonate, and mag ox  Gastric emptying test was positive. Went to see GI on 12/12/22, reviewed note.   Scheduled for colonoscopy, esophageal motility test, r/o sibo

## 2022-12-30 NOTE — ASSESSMENT & PLAN NOTE
Pt gets indentations in her legs and feet  Keeps her from wearing her special orthotic shoes and feels like she doesn't get max benefit   Bps are borderline  Has tried low salt diet in the past without benefit   Has tried compression stockings without benefit and she states that it makes her worse

## 2022-12-30 NOTE — ASSESSMENT & PLAN NOTE
On the left toenails, 2-4.   Causing pain  Being treated at the [facility] by RN with toenail clippings and tea tree oil

## 2022-12-31 LAB
CREAT UR-MCNC: 74.52 MG/DL
PROT UR-MCNC: 6 MG/DL (ref 0–15)
PROT/CREAT UR: 81 MG/G (ref 10–107)

## 2022-12-31 NOTE — PROGRESS NOTES
Subjective:     CC:   Chief Complaint   Patient presents with    Foot Swelling         HPI:     Gastroparesis  Pt has started taking an equate brand antacid - famotidine, calcium carbonate, and mag ox  Gastric emptying test was positive. Went to see GI on 12/12/22, reviewed note.   Scheduled for colonoscopy, esophageal motility test, r/o sibo    Bilateral leg edema  Pt gets indentations in her legs and feet  Keeps her from wearing her special orthotic shoes and feels like she doesn't get max benefit   Bps are borderline  Has tried low salt diet in the past without benefit   Has tried compression stockings without benefit and she states that it makes her worse    Onychomycosis  On the left toenails, 2-4.   Causing pain  Being treated at the [facility] by RN with toenail clippings and tea tree oil      Past Medical History:   Diagnosis Date    Anesthesia 12/23/2021    agitated coming out of anesthesia    Anesthesia 03/17/2022    Patient states becomes upset and sad after anesthesia.    Anxiety     Arthritis     spine, feet     Asthma     Breath shortness     Cataract 12/23/2022    no surgery yet    Cerebral palsy (HCC)     Cervical spinal cord injury (HCC) 12/19/2019    COVID-19 01/17/2022 1/17/2022 stopped 1-    DDD (degenerative disc disease), lumbar     Per Problem List    Dental disorder     upper and lower denture    Depression     Full dentures 03/17/2022    Heart burn 12/23/2022    GERD    High cholesterol     History of falling     Marijuana user     Risk for falls     Stroke (HCC) 03/17/2022    Pt. states, MINI STROKE AGE 19.    Tetraplegia (HCC)     TIA (transient ischemic attack) 03/17/2022    AGE 19. Patient states D/T drug use.     Urinary bladder disorder     Urinary incontinence     Vertigo     Per Problem List       Social History     Tobacco Use    Smoking status: Former     Packs/day: 1.00     Years: 35.00     Pack years: 35.00     Types: Cigarettes     Quit date: 1/1/2022     Years since  quittin.9    Smokeless tobacco: Never    Tobacco comments:     20 years of 1ppd, then 10 years weaning down   Vaping Use    Vaping Use: Some days    Substances: THC, CBD   Substance Use Topics    Alcohol use: No    Drug use: Yes     Types: Marijuana, Inhaled     Comment: marijuana daily (smoked and edibles)       Current Outpatient Medications Ordered in Epic   Medication Sig Dispense Refill    dicyclomine (BENTYL) 20 MG Tab Take 1 Tablet by mouth every 6 hours as needed (Abdominal Pain). 30 Tablet 2    Venlafaxine HCl 150 MG TABLET SR 24 HR Take 1 Tablet by mouth every day. Take with 75 mg capsule for total of 225mg daily 30 Tablet 5    venlafaxine XR (EFFEXOR XR) 75 MG CAPSULE SR 24 HR Take 1 Capsule by mouth every day. Take with 150mg capsule for total of 225mg daily 30 Capsule 5    terbinafine (LAMISIL) 250 MG Tab Take 1 Tablet by mouth every day. 84 Tablet 0    fluticasone (FLONASE) 50 MCG/ACT nasal spray Administer 1 Spray into affected nostril(S) every day. 16 g 6    baclofen (LIORESAL) 10 MG Tab TAKE 3 TABLETS BY MOUTH TWICE DAILY 180 Tablet 2    meloxicam (MOBIC) 15 MG tablet Take 1 Tablet by mouth every day. 30 Tablet 6    gabapentin (NEURONTIN) 300 MG Cap Take 3 Capsules by mouth 2 times a day. Take 1 po qhs 540 Capsule 3    simethicone (MYLICON) 80 MG Chew Tab Chew 1 Tablet every 6 hours as needed (bloating). 60 Tablet 1    fesoterodine fumarate (TOVIAZ) 4 MG TABLET SR 24 HR Take 1 Tablet by mouth every day. 90 Tablet 3    estradiol (ESTRACE VAGINAL) 0.1 MG/GM vaginal cream Apply 1g cream inside vagina using applicator nightly for 2 weeks, then twice per week thereafter 1 Each 3    tizanidine (ZANAFLEX) 2 MG capsule Take 1 Capsule by mouth 2 times a day. 180 Capsule 1    Mirabegron ER (MYRBETRIQ) 50 MG TABLET SR 24 HR Take 50 mg by mouth every day. 90 Tablet 1    albuterol 108 (90 Base) MCG/ACT Aero Soln inhalation aerosol Inhale 2 Puffs every 6 hours as needed for Shortness of Breath. 8.5 g 11     "Tiotropium Bromide-Olodaterol (STIOLTO RESPIMAT) 2.5-2.5 MCG/ACT Aero Soln Inhale 2 Puffs every day. 1 Each 11     No current [facility]-ordered facility-administered medications on file.       Allergies:  Other environmental and Codeine        Objective:       Exam:  /62   Pulse 88   Temp 36.4 °C (97.5 °F)   Resp 16   Ht 1.676 m (5' 5.98\")   Wt 70.8 kg (156 lb)   LMP 01/11/2017   SpO2 94%   BMI 25.19 kg/m²  Body mass index is 25.19 kg/m².    Constitutional: Alert, no distress, well-groomed.  Respiratory: Unlabored respiratory effort, no cough.  Skin: left 2-4 toenails thickened, yellow, brittle  Musculoskeletal: trace edema b/l LE       Labs:   Labs done 10/20/2022  Potassium 3.3  Alk phos mildly elevated at 105  Glucose mildly elevated at 109  LFTs are normal    Assessment & Plan:     57 y.o. female with the following -     1. Gastroparesis  Evaluated by GI.  As her symptoms have improved with over-the-counter ant acids, .  Consider Reglan.  She has multiple studies pending with GI.    2.  Abdominal pain  - dicyclomine (BENTYL) 20 MG Tab; Take 1 Tablet by mouth every 6 hours as needed (Abdominal Pain).  Dispense: 30 Tablet; Refill: 2  Patient was asked to have her abdominal pain    3. Anxiety and depression  - Venlafaxine HCl 150 MG TABLET SR 24 HR; Take 1 Tablet by mouth every day. Take with 75 mg capsule for total of 225mg daily  Dispense: 30 Tablet; Refill: 5  - venlafaxine XR (EFFEXOR XR) 75 MG CAPSULE SR 24 HR; Take 1 Capsule by mouth every day. Take with 150mg capsule for total of 225mg daily  Dispense: 30 Capsule; Refill: 5  Medication refill    4. Cervical spine pain  - Venlafaxine HCl 150 MG TABLET SR 24 HR; Take 1 Tablet by mouth every day. Take with 75 mg capsule for total of 225mg daily  Dispense: 30 Tablet; Refill: 5  - venlafaxine XR (EFFEXOR XR) 75 MG CAPSULE SR 24 HR; Take 1 Capsule by mouth every day. Take with 150mg capsule for total of 225mg daily  Dispense: 30 Capsule; Refill: 5    5. " Bilateral leg edema  - PROTEIN/CREAT RATIO URINE; Future  We will check protein creatinine ratio, she is very concerned about her lower extremity edema, although it is very minimal.  She adamantly declines compression stockings as she states that this makes it worse.  She is concerned as it is keeping her from wearing her orthotic shoes.  Would avoid diuretics as her blood pressures are marginal.  We will check protein creatinine ratio to see if there is any proteinuria causing her leg edema.    6. Onychomycosis  - terbinafine (LAMISIL) 250 MG Tab; Take 1 Tablet by mouth every day.  Dispense: 84 Tablet; Refill: 0  Sumatriptan, toenails are bothering her, causing pain.      Return in about 3 months (around 3/30/2023).    Please note that this dictation was created using voice recognition software. I have made every reasonable attempt to correct obvious errors, but I expect that there are errors of grammar and possibly content that I did not discover before finalizing the note.

## 2023-01-03 ENCOUNTER — PATIENT MESSAGE (OUTPATIENT)
Dept: MEDICAL GROUP | Facility: MEDICAL CENTER | Age: 58
End: 2023-01-03

## 2023-01-03 DIAGNOSIS — M54.2 CERVICAL SPINE PAIN: ICD-10-CM

## 2023-01-05 DIAGNOSIS — F32.A ANXIETY AND DEPRESSION: ICD-10-CM

## 2023-01-05 DIAGNOSIS — F41.9 ANXIETY AND DEPRESSION: ICD-10-CM

## 2023-01-05 DIAGNOSIS — M54.2 CERVICAL SPINE PAIN: ICD-10-CM

## 2023-01-05 RX ORDER — VENLAFAXINE HYDROCHLORIDE 150 MG/1
150 CAPSULE, EXTENDED RELEASE ORAL DAILY
Qty: 30 CAPSULE | Refills: 5 | Status: SHIPPED | OUTPATIENT
Start: 2023-01-05 | End: 2023-10-24 | Stop reason: SDUPTHER

## 2023-01-10 ENCOUNTER — APPOINTMENT (OUTPATIENT)
Dept: RADIOLOGY | Facility: MEDICAL CENTER | Age: 58
End: 2023-01-10
Attending: PHYSICIAN ASSISTANT
Payer: MEDICAID

## 2023-01-11 ENCOUNTER — OFFICE VISIT (OUTPATIENT)
Dept: PHYSICAL MEDICINE AND REHAB | Facility: MEDICAL CENTER | Age: 58
End: 2023-01-11
Payer: MEDICAID

## 2023-01-11 VITALS
SYSTOLIC BLOOD PRESSURE: 110 MMHG | HEIGHT: 66 IN | OXYGEN SATURATION: 92 % | WEIGHT: 155 LBS | HEART RATE: 78 BPM | BODY MASS INDEX: 24.91 KG/M2 | DIASTOLIC BLOOD PRESSURE: 72 MMHG | TEMPERATURE: 97.9 F

## 2023-01-11 DIAGNOSIS — Z74.09 IMPAIRED MOBILITY AND ENDURANCE: ICD-10-CM

## 2023-01-11 DIAGNOSIS — Z99.89 WALKER AS AMBULATION AID: ICD-10-CM

## 2023-01-11 DIAGNOSIS — R14.0 ABDOMINAL BLOATING: ICD-10-CM

## 2023-01-11 DIAGNOSIS — S14.109D INJURY OF CERVICAL SPINAL CORD, SUBSEQUENT ENCOUNTER (HCC): ICD-10-CM

## 2023-01-11 DIAGNOSIS — G82.50 TETRAPLEGIA (HCC): Primary | ICD-10-CM

## 2023-01-11 PROCEDURE — 99214 OFFICE O/P EST MOD 30 MIN: CPT | Performed by: PHYSICAL MEDICINE & REHABILITATION

## 2023-01-11 RX ORDER — SIMETHICONE 80 MG
80 TABLET,CHEWABLE ORAL EVERY 6 HOURS PRN
Qty: 120 TABLET | Refills: 1 | Status: SHIPPED | OUTPATIENT
Start: 2023-01-11 | End: 2023-09-18

## 2023-01-11 ASSESSMENT — PATIENT HEALTH QUESTIONNAIRE - PHQ9
SUM OF ALL RESPONSES TO PHQ QUESTIONS 1-9: 19
5. POOR APPETITE OR OVEREATING: 3 - NEARLY EVERY DAY
CLINICAL INTERPRETATION OF PHQ2 SCORE: 2

## 2023-01-11 ASSESSMENT — FIBROSIS 4 INDEX: FIB4 SCORE: 0.92

## 2023-01-11 NOTE — PROGRESS NOTES
[facility]  [facility]  [address] [place] [address]  Ph: [phone]    FOLLOW UP PATIENT EVALUATION    Patient Name: Marilyn Salinas   Patient : 1965  Patient Age: 57 y.o.   PCP: Edwar Platt M.D.    SUBJECTIVE:   Patient Identification: Marilyn Salinas is a 57 y.o. RHD female with PMH significant for chronic back and neck pain and rehabilitation history significant for premorbid weakness and paresthesias, had GLF, imaging showing Disc herniation at C4-5 and C3- C4, with T2 hyperintensity of the cord and s/p ACDF C3-5 on 19 with Dr. Linton  and is presenting to PM&R clinic for a FOLLOW UP outpatient evaluation with the following chief complaint/s:    PM&R Background Information:  Original Date of Injury: 19 GLF and worsening weakness, pain.   Pertinent Procedure History: Disc herniation at C4-5 and C3- C4, with T2 hyperintensity of the cord at this level. she underwent ACDF C3-5 on 19 with Dr. Linton  Dates of Admission/Discharge to ARU: 19-20    Chief Complaint:  Ambulating better    Interval History:  - Current walker is unsafe and broken.  The brakes do not appropriately engage.  - Feels as though it is unstable and sometimes tips over.  - Is still working with GI on her constipation and bloating.  Needs prescription for simethicone.  - It has been a year since she is quit smoking.  - Is not in any need of other medications at this time.  - Has modified barium swallow coming up as well as her colonoscopy.  They think she might have gastroparesis.  -Continues to ambulate with orthotic shoes and lift in the left.  -Continues with physical therapy at [facility].  Significantly improving.    Review of Systems:  All other pertinent positive review of systems are noted above in HPI.     Past Medical History:  Past Medical History:   Diagnosis Date    TIA (transient ischemic attack) 2022    AGE 19. Patient states D/T drug use.     Anesthesia  03/17/2022    Patient states becomes upset and sad after anesthesia.    Full dentures 03/17/2022    Stroke (HCC) 03/17/2022    Pt. states, MINI STROKE AGE 19.    COVID-19 01/17/2022 1/17/2022 stopped 1-    Anesthesia 12/23/2021    agitated coming out of anesthesia    Cervical spinal cord injury (HCC) 12/19/2019    Anxiety     Arthritis     spine, feet     Asthma     Breath shortness     Cataract 12/23/2022    no surgery yet    Cerebral palsy (HCC)     DDD (degenerative disc disease), lumbar     Per Problem List    Dental disorder     upper and lower denture    Depression     Heart burn 12/23/2022    GERD    High cholesterol     History of falling     Marijuana user     Risk for falls     Tetraplegia (HCC)     Urinary bladder disorder     Urinary incontinence     Vertigo     Per Problem List      Past Surgical History:   Procedure Laterality Date    DEVYN BY LAPAROSCOPY  3/23/2022    Procedure: CHOLECYSTECTOMY, LAPAROSCOPIC;  Surgeon: Mayur Barrett M.D.;  Location: [facility];  Service: General    PB AMPUTATION TOE,MT-P JT Left 12/27/2021    Procedure: GREAT TOE PARTIAL AMPUTATION;  Surgeon: Emiliano Goff M.D.;  Location: [facility];  Service: Orthopedics    CERVICAL DISK AND FUSION ANTERIOR N/A 12/20/2019    Procedure: DISCECTOMY, SPINE, CERVICAL, ANTERIOR APPROACH, WITH FUSION - C3-5;  Surgeon: Connor Linton M.D.;  Location[facility];  Service: Neurosurgery    CORPECTOMY N/A 12/20/2019    Procedure: CORPECTOMY;  Surgeon: Connor Linton M.D.;  Location: [facility];  Service: Neurosurgery    BLADDER SLING FEMALE N/A 10/3/2017    Procedure: BLADDER SLING FEMALE TOT, CYSTOSCOPY;  Surgeon: Hudson Driscoll M.D.;  Location: SURGERY SAME DAY [facility];  Service: Gynecology    VAGINAL HYSTERECTOMY SCOPE TOTAL N/A 10/3/2017    Procedure: VAGINAL HYSTERECTOMY SCOPE TOTAL;  Surgeon: Hudson Driscoll M.D.;  Location: SURGERY SAME DAY [facility];  Service:  Gynecology    SALPINGECTOMY Bilateral 10/3/2017    Procedure: SALPINGECTOMY;  Surgeon: Hudson Driscoll M.D.;  Location: SURGERY SAME DAY [facility];  Service: Gynecology    OOPHORECTOMY Bilateral 10/3/2017    Procedure: OOPHORECTOMY;  Surgeon: Hudson Driscoll M.D.;  Location: SURGERY SAME DAY [facility];  Service: Gynecology    ANTERIOR AND POSTERIOR REPAIR Bilateral 10/3/2017    Procedure: ANTERIOR AND POSTERIOR REPAIR;  Surgeon: Hudson Driscoll M.D.;  Location: SURGERY SAME DAY [facility];  Service: Gynecology    ENTEROCELE REPAIR N/A 10/3/2017    Procedure: ENTEROCELE REPAIR, PERINEOPLASTY;  Surgeon: Hudson Driscoll M.D.;  Location: SURGERY SAME DAY [facility];  Service: Gynecology    VAGINAL SUSPENSION N/A 10/3/2017    Procedure: VAGINAL SUSPENSION SACROSPINOUS VAULT POSSIBLE;  Surgeon: Hduson Driscoll M.D.;  Location: SURGERY SAME DAY [facility];  Service: Gynecology    OTHER Left 2005    hammertoe x2    EYE SURGERY  1972    for lazy eye    LUMPECTOMY          Current Outpatient Medications:     simethicone (MYLICON) 80 MG Chew Tab, Chew 1 Tablet every 6 hours as needed (bloating)., Disp: 120 Tablet, Rfl: 1    venlafaxine (EFFEXOR-XR) 150 MG extended-release capsule, Take 1 Capsule by mouth every day. Take with 75mg capsule every day for total daily dose of 225mg., Disp: 30 Capsule, Rfl: 5    dicyclomine (BENTYL) 20 MG Tab, Take 1 Tablet by mouth every 6 hours as needed (Abdominal Pain)., Disp: 30 Tablet, Rfl: 2    venlafaxine XR (EFFEXOR XR) 75 MG CAPSULE SR 24 HR, Take 1 Capsule by mouth every day. Take with 150mg capsule for total of 225mg daily, Disp: 30 Capsule, Rfl: 5    meloxicam (MOBIC) 15 MG tablet, Take 1 Tablet by mouth every day., Disp: 30 Tablet, Rfl: 6    gabapentin (NEURONTIN) 300 MG Cap, Take 3 Capsules by mouth 2 times a day. Take 1 po qhs, Disp: 540 Capsule, Rfl: 3    fluticasone (FLONASE) 50 MCG/ACT nasal spray, Administer 1 Spray into affected nostril(S) every day., Disp: 16  g, Rfl: 6    baclofen (LIORESAL) 10 MG Tab, TAKE 3 TABLETS BY MOUTH TWICE DAILY, Disp: 180 Tablet, Rfl: 2    fesoterodine fumarate (TOVIAZ) 4 MG TABLET SR 24 HR, Take 1 Tablet by mouth every day., Disp: 90 Tablet, Rfl: 3    estradiol (ESTRACE VAGINAL) 0.1 MG/GM vaginal cream, Apply 1g cream inside vagina using applicator nightly for 2 weeks, then twice per week thereafter, Disp: 1 Each, Rfl: 3    tizanidine (ZANAFLEX) 2 MG capsule, Take 1 Capsule by mouth 2 times a day., Disp: 180 Capsule, Rfl: 1    Mirabegron ER (MYRBETRIQ) 50 MG TABLET SR 24 HR, Take 50 mg by mouth every day., Disp: 90 Tablet, Rfl: 1    albuterol 108 (90 Base) MCG/ACT Aero Soln inhalation aerosol, Inhale 2 Puffs every 6 hours as needed for Shortness of Breath., Disp: 8.5 g, Rfl: 11    Tiotropium Bromide-Olodaterol (STIOLTO RESPIMAT) 2.5-2.5 MCG/ACT Aero Soln, Inhale 2 Puffs every day., Disp: 1 Each, Rfl: 11    terbinafine (LAMISIL) 250 MG Tab, Take 1 Tablet by mouth every day. (Patient not taking: Reported on 2023), Disp: 84 Tablet, Rfl: 0  Allergies   Allergen Reactions    Other Environmental Hives     TUMBLEWEED    Codeine Itching, Unspecified and Rash     Rash, itching, mood disorder- becomes agitated  Other reaction(s): itchy, grumpy        Past Social History:  Social History     Socioeconomic History    Marital status: Single     Spouse name: Not on file    Number of children: Not on file    Years of education: Not on file    Highest education level: Not on file   Occupational History    Not on file   Tobacco Use    Smoking status: Former     Packs/day: 1.00     Years: 35.00     Pack years: 35.00     Types: Cigarettes     Quit date: 2022     Years since quittin.0    Smokeless tobacco: Never    Tobacco comments:     20 years of 1ppd, then 10 years weaning down   Vaping Use    Vaping Use: Some days    Substances: THC, CBD   Substance and Sexual Activity    Alcohol use: No    Drug use: Yes     Types: Marijuana, Inhaled     Comment:  marijuana daily (smoked and edibles)    Sexual activity: Not Currently   Other Topics Concern     Service No    Blood Transfusions No    Caffeine Concern No    Occupational Exposure No    Hobby Hazards No    Sleep Concern Yes    Stress Concern Yes    Weight Concern Yes    Special Diet No    Back Care Yes    Exercise No    Bike Helmet Yes    Seat Belt Yes    Self-Exams Yes   Social History Narrative    Not on file     Social Determinants of Health     Financial Resource Strain: Not on file   Food Insecurity: Not on file   Transportation Needs: Not on file   Physical Activity: Not on file   Stress: Not on file   Social Connections: Not on file   Intimate Partner Violence: Not on file   Housing Stability: Not on file        Family History:  Family History   Problem Relation Age of Onset    Cancer Mother         ovarian    Alcohol/Drug Mother     Cancer Brother         unknown, caused him to lose his leg when he was 3    Diabetes Maternal Aunt        Depression and Opioid Screening  PHQ-9:      2/22/2022 3/23/2022 1/11/2023   Depression Screen (PHQ-2/PHQ-9)   PHQ-2 Total Score  0    PHQ-2 Total Score 5  2   PHQ-9 Total Score 14  19       Multiple values from one day are sorted in reverse-chronological order     Interpretation of PHQ-9 Total Score   Score Severity   1-4 No Depression   5-9 Mild Depression   10-14 Moderate Depression   15-19 Moderately Severe Depression   20-27 Severe Depression     Opioid Risk Score: No value filed.  Interpretation of Opioid Risk Score   Score 0-3 = Low risk of abuse. Do UDS at least once per year.  Score 4-7 = Moderate risk of abuse. Do UDS 1-4 times per year.  Score 8+ = High risk of abuse. Refer to specialist.      OBJECTIVE:   Vital Signs:  Vitals:    01/11/23 1053   BP: 110/72   Pulse: 78   Temp: 36.6 °C (97.9 °F)   SpO2: 92%    - Virtual Visit    Pertinent Labs:  Lab Results   Component Value Date/Time    SODIUM 137 10/20/2022 07:34 PM    POTASSIUM 3.3 (L) 10/20/2022 07:34  PM    CHLORIDE 102 10/20/2022 07:34 PM    CO2 17 (L) 10/20/2022 07:34 PM    GLUCOSE 109 (H) 10/20/2022 07:34 PM    BUN 9 10/20/2022 07:34 PM    CREATININE 0.57 10/20/2022 07:34 PM       Lab Results   Component Value Date/Time    HBA1C 5.6 07/27/2017 09:39 AM       Lab Results   Component Value Date/Time    WBC 7.8 10/20/2022 07:34 PM    RBC 4.89 10/20/2022 07:34 PM    HEMOGLOBIN 15.1 10/20/2022 07:34 PM    HEMATOCRIT 42.9 10/20/2022 07:34 PM    MCV 87.7 10/20/2022 07:34 PM    MCH 30.9 10/20/2022 07:34 PM    MCHC 35.2 (H) 10/20/2022 07:34 PM    MPV 10.4 10/20/2022 07:34 PM    NEUTSPOLYS 58.80 10/20/2022 07:34 PM    LYMPHOCYTES 31.40 10/20/2022 07:34 PM    MONOCYTES 7.70 10/20/2022 07:34 PM    EOSINOPHILS 1.20 10/20/2022 07:34 PM    BASOPHILS 0.50 10/20/2022 07:34 PM       Lab Results   Component Value Date/Time    ASTSGOT 27 10/20/2022 07:34 PM    ALTSGPT 38 10/20/2022 07:34 PM        Physical Exam:   GEN: No apparent distress  HEENT: Head normocephalic, atraumatic.  Sclera nonicteric bilaterally, no ocular discharge appreciated bilaterally.  CV: Extremities warm and well-perfused, no peripheral edema appreciated bilaterally.  PULMONARY: Breathing nonlabored on room air, no respiratory accessory muscle use.  Not requiring supplemental oxygen.  ABD: Soft, nontender.  Mildly distended.  SKIN: No appreciable skin breakdown on exposed areas of skin.  PSYCH: Mood and affect within normal limits.  NEURO: Awake alert.  Conversational.  Logical thought content.  Answers questions appropriately.  Ambulatory with four-wheel walker.  Breaks are broken.      ASSESSMENT/PLAN: Marilyn Salinas  is a 57 y.o. female with rehabilitation history significant for premorbid weakness and paresthesias, had GLF, imaging showing disc herniation at C4-5 and C3- C4, with T2 hyperintensity of the cord and s/p ACDF C3-5 on 12/20/19 with Dr. Linton, here for SCI follow up. The following plan was discussed with the patient who is in agreement.      Visit Diagnoses     ICD-10-CM   1. Tetraplegia ([facility])  G82.50   2. Abdominal bloating  R14.0   3. Injury of cervical spinal cord, subsequent encounter ([facility])  S14.109D   4. Walker as ambulation aid  Z99.89   5. Impaired mobility and endurance  Z74.09        Rehab/Neuro:   C2 AISD: Nontraumatic incomplete spinal cord injury, status post fall at home with worsening weakness, found to have severe cervical stenosis.  Status post C3-C5 ACDF with Dr. Linton on 12/20/19. No longer getting epidural steroid injections, too painful.  Nociceptive/musculoskeletal chronic back pain: Established with interventional pain management.  -Therapy: Established through [facility].  -Function: Ambulatory capability significantly improved.  - FACE to FACE: Patient requires new 4WW due to the fact that her current walker has a bed frame and the brakes are malfunctioning.  No further modifications can be made.  She has mobility limitation that significantly impairs her ability to participate in 1 or more mobility related activities of daily living in the home and her mobility limitation cannot sufficiently be resolved by the use of cane or crutches due to danger of falling and serious injury.  Patient is able to safely use a walker and her mobility deficit can be resolved with the use of a walker.  Requires seat due to poor endurance due to her underlying neurologic condition.  Requires 4 wheels.     2. Muscular dystrophy: ? CP per recent neurology evaluation. Reportedly diagnosed in '60's per aunt. Normal CPK. Was seeing Nikunj tanner in 2018 - was supposed to have EMG/NCS. Medicaid wouldn't cover EMG/NCS at that time.  Scheduled for EMG/NCS 10/23/2020.  EEG reportedly negative.  Patient has since changed her neurologist and is now seeing Dr. Hartman. Had EMG/NCS results -results not available but patient reports she was told she has paraplegia and partial CP.   -Status: Stable     Spasticity: Bilateral lower extremities worse than upper  extremities.  Botox cost prohibitive.  -Med management: Continue baclofen 30 mg twice daily  -Med management: Continue tizanidine 2 mg twice daily  -Spasticity stable.    Neuropathic Pain:   -High risk med Management: Continue gabapentin 900 mg twice daily.     Neurogenic Bladder: Bladder stable  -Med management: Continue Myrbetriq and Toviaz.  - Established with urology    Neurogenic bowel;  ?  Gastroparesis  -Follows with GI, upcoming modified barium study and colonoscopy.    Depression:  -Med management: Continue venlafaxine 225 mg daily.    Follow up: 6 months general follow-up    Total time spent was 35 minutes.  Included in this time is the time spent preparing for the visit including record review, my exam and evaluation, counseling and education regarding that which is aforementioned in the assessment and plan.  Time was spent documenting into patient's electronic health record.  Time was spent ordering the appropriate labs, tests, procedures, referrals, medications. Some of the time included occurred outside of charting time.  Discussion involved the patient.      Please note that this dictation was created using voice recognition software. I have made every reasonable attempt to correct obvious errors but there may be errors of grammar and content that I may have overlooked prior to finalization of this note.    Dr. Julianna Guillory DO, MS  Department of Physical Medicine & Rehabilitation  [facility]  [facility]

## 2023-01-21 DIAGNOSIS — R25.2 SPASTICITY: ICD-10-CM

## 2023-01-21 DIAGNOSIS — S14.109A INJURY OF CERVICAL SPINAL CORD, INITIAL ENCOUNTER (HCC): ICD-10-CM

## 2023-01-21 DIAGNOSIS — R32 URINARY INCONTINENCE, UNSPECIFIED TYPE: ICD-10-CM

## 2023-01-21 DIAGNOSIS — N31.9 NEUROGENIC BLADDER: ICD-10-CM

## 2023-01-25 RX ORDER — BACLOFEN 10 MG/1
30 TABLET ORAL 2 TIMES DAILY
Qty: 540 TABLET | Refills: 0 | Status: SHIPPED | OUTPATIENT
Start: 2023-01-25 | End: 2023-05-02 | Stop reason: SDUPTHER

## 2023-01-25 RX ORDER — MIRABEGRON 50 MG/1
50 TABLET, FILM COATED, EXTENDED RELEASE ORAL DAILY
Qty: 90 TABLET | Refills: 1 | Status: SHIPPED | OUTPATIENT
Start: 2023-01-25 | End: 2023-07-12 | Stop reason: SDUPTHER

## 2023-01-25 RX ORDER — TIZANIDINE HYDROCHLORIDE 2 MG/1
2 CAPSULE, GELATIN COATED ORAL
Qty: 180 CAPSULE | Refills: 1 | Status: SHIPPED | OUTPATIENT
Start: 2023-01-25 | End: 2023-07-12 | Stop reason: SDUPTHER

## 2023-01-26 ENCOUNTER — HOSPITAL ENCOUNTER (OUTPATIENT)
Dept: RADIOLOGY | Facility: MEDICAL CENTER | Age: 58
End: 2023-01-26
Attending: PHYSICIAN ASSISTANT
Payer: MEDICAID

## 2023-01-26 DIAGNOSIS — K21.9 GASTROESOPHAGEAL REFLUX DISEASE, UNSPECIFIED WHETHER ESOPHAGITIS PRESENT: ICD-10-CM

## 2023-01-26 DIAGNOSIS — K30 DELAYED GASTRIC EMPTYING: ICD-10-CM

## 2023-01-26 DIAGNOSIS — R14.0 BLOATING: ICD-10-CM

## 2023-01-26 DIAGNOSIS — R10.13 DYSPEPSIA: ICD-10-CM

## 2023-01-26 DIAGNOSIS — R19.7 DIARRHEA, UNSPECIFIED TYPE: ICD-10-CM

## 2023-01-26 PROCEDURE — 74240 X-RAY XM UPR GI TRC 1CNTRST: CPT

## 2023-02-14 ENCOUNTER — PATIENT MESSAGE (OUTPATIENT)
Dept: MEDICAL GROUP | Facility: MEDICAL CENTER | Age: 58
End: 2023-02-14

## 2023-02-16 ENCOUNTER — HOSPITAL ENCOUNTER (OUTPATIENT)
Dept: CARDIOLOGY | Facility: MEDICAL CENTER | Age: 58
End: 2023-02-16
Attending: FAMILY MEDICINE
Payer: MEDICAID

## 2023-02-16 DIAGNOSIS — R60.0 BILATERAL LEG EDEMA: ICD-10-CM

## 2023-02-16 LAB
LV EJECT FRACT  99904: 65
LV EJECT FRACT MOD 2C 99903: 68.86
LV EJECT FRACT MOD 4C 99902: 66.92
LV EJECT FRACT MOD BP 99901: 66.84

## 2023-02-16 PROCEDURE — 93306 TTE W/DOPPLER COMPLETE: CPT | Mod: 26 | Performed by: INTERNAL MEDICINE

## 2023-02-16 PROCEDURE — 93306 TTE W/DOPPLER COMPLETE: CPT

## 2023-02-17 ENCOUNTER — OFFICE VISIT (OUTPATIENT)
Dept: MEDICAL GROUP | Facility: MEDICAL CENTER | Age: 58
End: 2023-02-17
Attending: FAMILY MEDICINE
Payer: MEDICAID

## 2023-02-17 DIAGNOSIS — M79.675 PAIN OF TOE OF LEFT FOOT: ICD-10-CM

## 2023-02-17 DIAGNOSIS — J18.9 COMMUNITY ACQUIRED PNEUMONIA OF LEFT LOWER LOBE OF LUNG: ICD-10-CM

## 2023-02-17 DIAGNOSIS — R05.3 CHRONIC COUGH: ICD-10-CM

## 2023-02-17 PROBLEM — H54.40 BLIND RIGHT EYE: Status: ACTIVE | Noted: 2023-02-17

## 2023-02-17 LAB
EXTERNAL QUALITY CONTROL: NORMAL
FLUAV+FLUBV AG SPEC QL IA: NORMAL
INT CON NEG: NEGATIVE
INT CON NEG: NEGATIVE
INT CON POS: POSITIVE
INT CON POS: POSITIVE
SARS-COV+SARS-COV-2 AG RESP QL IA.RAPID: NEGATIVE

## 2023-02-17 PROCEDURE — 87804 INFLUENZA ASSAY W/OPTIC: CPT | Performed by: FAMILY MEDICINE

## 2023-02-17 PROCEDURE — 87426 SARSCOV CORONAVIRUS AG IA: CPT | Performed by: FAMILY MEDICINE

## 2023-02-17 PROCEDURE — 99213 OFFICE O/P EST LOW 20 MIN: CPT | Performed by: FAMILY MEDICINE

## 2023-02-17 PROCEDURE — 99214 OFFICE O/P EST MOD 30 MIN: CPT | Performed by: FAMILY MEDICINE

## 2023-02-17 RX ORDER — AZITHROMYCIN 250 MG/1
TABLET, FILM COATED ORAL
Qty: 6 TABLET | Refills: 0 | Status: SHIPPED | OUTPATIENT
Start: 2023-02-17 | End: 2023-02-17

## 2023-02-17 RX ORDER — AMOXICILLIN 500 MG/1
1000 CAPSULE ORAL 3 TIMES DAILY
Qty: 30 CAPSULE | Refills: 0 | Status: SHIPPED | OUTPATIENT
Start: 2023-02-17 | End: 2023-02-22

## 2023-02-17 RX ORDER — AZITHROMYCIN 250 MG/1
TABLET, FILM COATED ORAL
Qty: 6 TABLET | Refills: 0 | Status: SHIPPED | OUTPATIENT
Start: 2023-02-17 | End: 2023-03-31

## 2023-02-17 NOTE — ASSESSMENT & PLAN NOTE
1.5 months of cough  Cough is productive   Also with runny nose. No sinus congestion   She is also having slight wheezing, and using stiolo 1 puff twice a day. Not needing albuterol on top of that   She has tried mucinex, dayquil. dayquil helped a little bit.

## 2023-02-19 NOTE — PROGRESS NOTES
Subjective:     CC: No chief complaint on file.        HPI:     Chronic cough  1.5 months of cough  Cough is productive   Also with runny nose. No sinus congestion   She is also having slight wheezing, and using stiolo 1 puff twice a day. Not needing albuterol on top of that   She has tried mucinex, dayquil. dayquil helped a little bit.       Pain of toe of left foot  Left third toe pain  Ongoing for 1 year  He notes the pain has been getting worse  There is some redness just proximal from the toenail.   Worse at the end of the day and also when she wakes up in the morning.   Has history of toe osteomylelitis on the great toe and had the tip amputated in the past.       Past Medical History:   Diagnosis Date    Anesthesia 2021    agitated coming out of anesthesia    Anesthesia 2022    Patient states becomes upset and sad after anesthesia.    Anxiety     Arthritis     spine, feet     Asthma     Breath shortness     Cataract 2022    no surgery yet    Cerebral palsy (HCC)     Cervical spinal cord injury (HCC) 2019    COVID-19 2022 stopped 2022    DDD (degenerative disc disease), lumbar     Per Problem List    Dental disorder     upper and lower denture    Depression     Full dentures 2022    Heart burn 2022    GERD    High cholesterol     History of falling     Marijuana user     Risk for falls     Stroke (HCC) 2022    Pt. states, MINI STROKE AGE 19.    Tetraplegia (HCC)     TIA (transient ischemic attack) 2022    AGE 19. Patient states D/T drug use.     Urinary bladder disorder     Urinary incontinence     Vertigo     Per Problem List       Social History     Tobacco Use    Smoking status: Former     Packs/day: 1.00     Years: 35.00     Pack years: 35.00     Types: Cigarettes     Quit date: 2022     Years since quittin.1    Smokeless tobacco: Never    Tobacco comments:     20 years of 1ppd, then 10 years weaning down   Vaping Use    Vaping  Use: Some days    Substances: THC, CBD   Substance Use Topics    Alcohol use: No    Drug use: Yes     Types: Marijuana, Inhaled     Comment: marijuana daily (smoked and edibles)       Current Outpatient Medications Ordered in Epic   Medication Sig Dispense Refill    amoxicillin (AMOXIL) 500 MG Cap Take 2 Capsules by mouth 3 times a day for 5 days. 30 Capsule 0    azithromycin (ZITHROMAX) 250 MG Tab Take 2 tablets on day 1, then 1 tablet daily thereafter until all tablets are gone. Total of 5 day treatment. 6 Tablet 0    Mirabegron ER (MYRBETRIQ) 50 MG TABLET SR 24 HR Take 50 mg by mouth every day. 90 Tablet 1    tizanidine (ZANAFLEX) 2 MG capsule Take 1 Capsule by mouth 2 times a day. 180 Capsule 1    baclofen (LIORESAL) 10 MG Tab Take 3 Tablets by mouth 2 times a day. 540 Tablet 0    simethicone (MYLICON) 80 MG Chew Tab Chew 1 Tablet every 6 hours as needed (bloating). 120 Tablet 1    venlafaxine (EFFEXOR-XR) 150 MG extended-release capsule Take 1 Capsule by mouth every day. Take with 75mg capsule every day for total daily dose of 225mg. 30 Capsule 5    dicyclomine (BENTYL) 20 MG Tab Take 1 Tablet by mouth every 6 hours as needed (Abdominal Pain). 30 Tablet 2    venlafaxine XR (EFFEXOR XR) 75 MG CAPSULE SR 24 HR Take 1 Capsule by mouth every day. Take with 150mg capsule for total of 225mg daily 30 Capsule 5    terbinafine (LAMISIL) 250 MG Tab Take 1 Tablet by mouth every day. (Patient not taking: Reported on 1/11/2023) 84 Tablet 0    meloxicam (MOBIC) 15 MG tablet Take 1 Tablet by mouth every day. 30 Tablet 6    gabapentin (NEURONTIN) 300 MG Cap Take 3 Capsules by mouth 2 times a day. Take 1 po qhs 540 Capsule 3    fluticasone (FLONASE) 50 MCG/ACT nasal spray Administer 1 Spray into affected nostril(S) every day. 16 g 6    fesoterodine fumarate (TOVIAZ) 4 MG TABLET SR 24 HR Take 1 Tablet by mouth every day. 90 Tablet 3    estradiol (ESTRACE VAGINAL) 0.1 MG/GM vaginal cream Apply 1g cream inside vagina using  applicator nightly for 2 weeks, then twice per week thereafter 1 Each 3    albuterol 108 (90 Base) MCG/ACT Aero Soln inhalation aerosol Inhale 2 Puffs every 6 hours as needed for Shortness of Breath. 8.5 g 11    Tiotropium Bromide-Olodaterol (STIOLTO RESPIMAT) 2.5-2.5 MCG/ACT Aero Soln Inhale 2 Puffs every day. 1 Each 11     No current Epic-ordered facility-administered medications on file.       Allergies:  Other environmental and Codeine        Objective:       Exam:  [facility] 01/03/2017  There is no height or weight on file to calculate BMI.    General: Normal appearing. No distress.  Pulmonary: decreased lung sounds at left lower lung base. (+) egophany at left lower lung base.   Cardiovascular: Regular rate and rhythm without murmur.   Musculoskeletal: Pain of the left third toe at the PIP and DIP, slight erythema. No warmth    Labs:   Rapid flu/covid tests are negative today    Assessment & Plan:     57 y.o. female with the following -     1. Community acquired pneumonia of left lower lobe of lung  - amoxicillin (AMOXIL) 500 MG Cap; Take 2 Capsules by mouth 3 times a day for 5 days.  Dispense: 30 Capsule; Refill: 0  - azithromycin (ZITHROMAX) 250 MG Tab; Take 2 tablets on day 1, then 1 tablet daily thereafter until all tablets are gone. Total of 5 day treatment.  Dispense: 6 Tablet; Refill: 0  With abnormal lung sounds and history of productive cough ongoing for this long, will treat as community acquired PNA.   Will do virtual visit next week to check on symptoms    2. Chronic cough  - POCT SARS-COV Antigen DAV (Symptomatic Only)  - POCT Influenza A/B    3. Pain of toe of left foot  - Referral to Podiatry  - DX-TOE(S) 2+ LEFT; Future  - DX-FOOT-COMPLETE 3+ LEFT; Future  Pt has had history of osteomyelitis requiring amputation.   Xrays ordered. Will order MRI if abnormal.       Return in about 1 week (around 2/24/2023) for f/u pna.    Please note that this dictation was created using voice recognition software. I  have made every reasonable attempt to correct obvious errors, but I expect that there are errors of grammar and possibly content that I did not discover before finalizing the note.

## 2023-02-21 VITALS
RESPIRATION RATE: 17 BRPM | HEART RATE: 90 BPM | TEMPERATURE: 97.4 F | OXYGEN SATURATION: 94 % | HEIGHT: 66 IN | BODY MASS INDEX: 25.89 KG/M2 | SYSTOLIC BLOOD PRESSURE: 100 MMHG | DIASTOLIC BLOOD PRESSURE: 64 MMHG | WEIGHT: 161.1 LBS

## 2023-02-21 ASSESSMENT — FIBROSIS 4 INDEX: FIB4 SCORE: 0.92

## 2023-02-24 DIAGNOSIS — M79.675 PAIN OF TOE OF LEFT FOOT: ICD-10-CM

## 2023-02-28 ENCOUNTER — TELEPHONE (OUTPATIENT)
Dept: PHYSICAL MEDICINE AND REHAB | Facility: MEDICAL CENTER | Age: 58
End: 2023-02-28

## 2023-03-07 DIAGNOSIS — M54.2 CERVICAL SPINE PAIN: ICD-10-CM

## 2023-03-08 DIAGNOSIS — J18.9 COMMUNITY ACQUIRED PNEUMONIA OF LEFT LOWER LOBE OF LUNG: ICD-10-CM

## 2023-03-08 RX ORDER — AMOXICILLIN AND CLAVULANATE POTASSIUM 875; 125 MG/1; MG/1
1 TABLET, FILM COATED ORAL 2 TIMES DAILY
Qty: 10 TABLET | Refills: 0 | Status: SHIPPED | OUTPATIENT
Start: 2023-03-08 | End: 2023-03-13

## 2023-03-15 DIAGNOSIS — J18.9 COMMUNITY ACQUIRED PNEUMONIA OF LEFT LOWER LOBE OF LUNG: ICD-10-CM

## 2023-03-15 PROCEDURE — 1125F AMNT PAIN NOTED PAIN PRSNT: CPT | Performed by: FAMILY MEDICINE

## 2023-03-16 RX ORDER — AZITHROMYCIN 250 MG/1
TABLET, FILM COATED ORAL
Qty: 6 TABLET | Refills: 0 | OUTPATIENT
Start: 2023-03-16

## 2023-03-17 DIAGNOSIS — J45.20 MILD INTERMITTENT ASTHMA WITHOUT COMPLICATION: ICD-10-CM

## 2023-03-21 RX ORDER — TIOTROPIUM BROMIDE AND OLODATEROL 3.124; 2.736 UG/1; UG/1
2 SPRAY, METERED RESPIRATORY (INHALATION) DAILY
Qty: 4 G | Refills: 11 | Status: SHIPPED | OUTPATIENT
Start: 2023-03-21 | End: 2023-03-29 | Stop reason: SDUPTHER

## 2023-03-29 DIAGNOSIS — J18.9 COMMUNITY ACQUIRED PNEUMONIA OF LEFT LOWER LOBE OF LUNG: ICD-10-CM

## 2023-03-29 DIAGNOSIS — J45.20 MILD INTERMITTENT ASTHMA WITHOUT COMPLICATION: ICD-10-CM

## 2023-03-29 NOTE — TELEPHONE ENCOUNTER
Received request via: Pharmacy    Was the patient seen in the last year in this department? Yes    Does the patient have an active prescription (recently filled or refills available) for medication(s) requested? No, stiolto accidentally sent to wrong pharmacy    Does the patient have long term Plus and need 100 day supply (blood pressure, diabetes and cholesterol meds only)? Patient does not have SCP

## 2023-03-30 ENCOUNTER — HOSPITAL ENCOUNTER (OUTPATIENT)
Dept: RADIOLOGY | Facility: MEDICAL CENTER | Age: 58
End: 2023-03-30
Attending: FAMILY MEDICINE
Payer: MEDICAID

## 2023-03-30 DIAGNOSIS — M79.675 PAIN OF TOE OF LEFT FOOT: ICD-10-CM

## 2023-03-30 PROCEDURE — 73630 X-RAY EXAM OF FOOT: CPT | Mod: LT

## 2023-03-30 PROCEDURE — 73660 X-RAY EXAM OF TOE(S): CPT | Mod: LT,XU

## 2023-03-30 RX ORDER — POLYETHYLENE GLYCOL-3350 AND ELECTROLYTES 236; 6.74; 5.86; 2.97; 22.74 G/274.31G; G/274.31G; G/274.31G; G/274.31G; G/274.31G
POWDER, FOR SOLUTION ORAL
COMMUNITY
Start: 2023-02-03 | End: 2023-05-30

## 2023-03-31 ENCOUNTER — OFFICE VISIT (OUTPATIENT)
Dept: MEDICAL GROUP | Facility: MEDICAL CENTER | Age: 58
End: 2023-03-31
Attending: FAMILY MEDICINE
Payer: MEDICAID

## 2023-03-31 VITALS
DIASTOLIC BLOOD PRESSURE: 76 MMHG | HEART RATE: 91 BPM | BODY MASS INDEX: 26.03 KG/M2 | WEIGHT: 162 LBS | TEMPERATURE: 96.9 F | OXYGEN SATURATION: 97 % | RESPIRATION RATE: 16 BRPM | HEIGHT: 66 IN | SYSTOLIC BLOOD PRESSURE: 108 MMHG

## 2023-03-31 DIAGNOSIS — E55.9 VITAMIN D DEFICIENCY: ICD-10-CM

## 2023-03-31 DIAGNOSIS — J45.20 MILD INTERMITTENT ASTHMA WITHOUT COMPLICATION: ICD-10-CM

## 2023-03-31 DIAGNOSIS — M25.50 POLYARTHRALGIA: ICD-10-CM

## 2023-03-31 DIAGNOSIS — R05.3 CHRONIC COUGH: ICD-10-CM

## 2023-03-31 DIAGNOSIS — K52.839 MICROSCOPIC COLITIS, UNSPECIFIED MICROSCOPIC COLITIS TYPE: ICD-10-CM

## 2023-03-31 DIAGNOSIS — M79.675 PAIN IN LEFT TOE(S): ICD-10-CM

## 2023-03-31 PROCEDURE — 99214 OFFICE O/P EST MOD 30 MIN: CPT | Performed by: FAMILY MEDICINE

## 2023-03-31 PROCEDURE — 99213 OFFICE O/P EST LOW 20 MIN: CPT | Performed by: FAMILY MEDICINE

## 2023-03-31 PROCEDURE — 700101 HCHG RX REV CODE 250: Performed by: FAMILY MEDICINE

## 2023-03-31 RX ORDER — TIOTROPIUM BROMIDE AND OLODATEROL 3.124; 2.736 UG/1; UG/1
2 SPRAY, METERED RESPIRATORY (INHALATION) DAILY
Qty: 4 G | Refills: 11 | Status: SHIPPED | OUTPATIENT
Start: 2023-03-31 | End: 2023-07-07

## 2023-03-31 RX ORDER — ALBUTEROL SULFATE 2.5 MG/3ML
2.5 SOLUTION RESPIRATORY (INHALATION) ONCE
Status: COMPLETED | OUTPATIENT
Start: 2023-03-31 | End: 2023-03-31

## 2023-03-31 RX ORDER — BECLOMETHASONE DIPROPIONATE HFA 40 UG/1
1 AEROSOL, METERED RESPIRATORY (INHALATION) 2 TIMES DAILY
Qty: 10.6 G | Refills: 1 | Status: SHIPPED | OUTPATIENT
Start: 2023-03-31 | End: 2023-04-18 | Stop reason: SDUPTHER

## 2023-03-31 RX ORDER — AZITHROMYCIN 250 MG/1
TABLET, FILM COATED ORAL
Qty: 6 TABLET | Refills: 0 | OUTPATIENT
Start: 2023-03-31

## 2023-03-31 RX ADMIN — ALBUTEROL SULFATE 2.5 MG: 2.5 SOLUTION RESPIRATORY (INHALATION) at 11:41

## 2023-03-31 ASSESSMENT — FIBROSIS 4 INDEX: FIB4 SCORE: 0.92

## 2023-03-31 NOTE — ASSESSMENT & PLAN NOTE
Pt had colonoscopy done 2 weeks ago showing microscopic colitis. She has follow up in 1 week with GI

## 2023-03-31 NOTE — ASSESSMENT & PLAN NOTE
Pt continues to have some cough but is improved after abx treatment last month.   Slight SOB, is vaping but no cigarettes.  She has not had stiolto in 1 week. Usually doesn't use albuterol.

## 2023-04-01 NOTE — PROGRESS NOTES
Subjective:     CC:   Chief Complaint   Patient presents with    Follow-Up         HPI:     Microscopic colitis  Pt had colonoscopy done 2 weeks ago showing microscopic colitis. She has follow up in 1 week with GI    Vitamin D deficiency  Pt has not been taking vitamin D    Chronic cough  Pt continues to have some cough but is improved after abx treatment last month.   Slight SOB, is vaping but no cigarettes.  She has not had stiolto in 1 week. Usually doesn't use albuterol       Past Medical History:   Diagnosis Date    Anesthesia 2021    agitated coming out of anesthesia    Anesthesia 2022    Patient states becomes upset and sad after anesthesia.    Anxiety     Arthritis     spine, feet     Asthma     Breath shortness     Cataract 2022    no surgery yet    Cerebral palsy (HCC)     Cervical spinal cord injury (HCC) 2019    COVID-19 2022 stopped 2022    DDD (degenerative disc disease), lumbar     Per Problem List    Dental disorder     upper and lower denture    Depression     Full dentures 2022    Heart burn 2022    GERD    High cholesterol     History of falling     Marijuana user     Risk for falls     Stroke (HCC) 2022    Pt. states, MINI STROKE AGE 19.    Tetraplegia (HCC)     TIA (transient ischemic attack) 2022    AGE 19. Patient states D/T drug use.     Urinary bladder disorder     Urinary incontinence     Vertigo     Per Problem List       Social History     Tobacco Use    Smoking status: Former     Packs/day: 1.00     Years: 35.00     Pack years: 35.00     Types: Cigarettes     Quit date: 2022     Years since quittin.2    Smokeless tobacco: Never    Tobacco comments:     20 years of 1ppd, then 10 years weaning down   Vaping Use    Vaping Use: Some days    Substances: THC, CBD   Substance Use Topics    Alcohol use: No    Drug use: Yes     Types: Marijuana, Inhaled     Comment: marijuana daily (smoked and edibles)       Current  Outpatient Medications Ordered in Epic   Medication Sig Dispense Refill    beclomethasone HFA (QVAR REDIHALER) 40 MCG/ACT inhaler Inhale 1 Puff 2 times a day. 10.6 g 1    Mirabegron ER (MYRBETRIQ) 50 MG TABLET SR 24 HR Take 50 mg by mouth every day. 90 Tablet 1    tizanidine (ZANAFLEX) 2 MG capsule Take 1 Capsule by mouth 2 times a day. 180 Capsule 1    baclofen (LIORESAL) 10 MG Tab Take 3 Tablets by mouth 2 times a day. 540 Tablet 0    Tiotropium Bromide-Olodaterol (STIOLTO RESPIMAT) 2.5-2.5 MCG/ACT Aero Soln Inhale 2 Puffs every day. 4 g 11    GAVILYTE-G 236 g Recon Soln MIX AND DRINK AS DIRECTED      simethicone (MYLICON) 80 MG Chew Tab Chew 1 Tablet every 6 hours as needed (bloating). 120 Tablet 1    venlafaxine (EFFEXOR-XR) 150 MG extended-release capsule Take 1 Capsule by mouth every day. Take with 75mg capsule every day for total daily dose of 225mg. 30 Capsule 5    dicyclomine (BENTYL) 20 MG Tab Take 1 Tablet by mouth every 6 hours as needed (Abdominal Pain). 30 Tablet 2    venlafaxine XR (EFFEXOR XR) 75 MG CAPSULE SR 24 HR Take 1 Capsule by mouth every day. Take with 150mg capsule for total of 225mg daily 30 Capsule 5    meloxicam (MOBIC) 15 MG tablet Take 1 Tablet by mouth every day. 30 Tablet 6    gabapentin (NEURONTIN) 300 MG Cap Take 3 Capsules by mouth 2 times a day. Take 1 po qhs 540 Capsule 3    fluticasone (FLONASE) 50 MCG/ACT nasal spray Administer 1 Spray into affected nostril(S) every day. 16 g 6    fesoterodine fumarate (TOVIAZ) 4 MG TABLET SR 24 HR Take 1 Tablet by mouth every day. 90 Tablet 3    estradiol (ESTRACE VAGINAL) 0.1 MG/GM vaginal cream Apply 1g cream inside vagina using applicator nightly for 2 weeks, then twice per week thereafter 1 Each 3    albuterol 108 (90 Base) MCG/ACT Aero Soln inhalation aerosol Inhale 2 Puffs every 6 hours as needed for Shortness of Breath. 8.5 g 11     No current [facility]-ordered facility-administered medications on file.       Allergies:  Other environmental  "and Codeine        Objective:       Exam:  /76 (BP Location: Left arm, Patient Position: Sitting, BP Cuff Size: Adult)   Pulse 91   Temp 36.1 °C (96.9 °F) (Temporal)   Resp 16   Ht 1.676 m (5' 5.98\")   Wt 73.5 kg (162 lb)   LMP 01/03/2017   SpO2 97%   BMI 26.16 kg/m²  Body mass index is 26.16 kg/m².    General: Normal appearing. No distress.  Neck: Supple. Thyroid is not enlarged.  Pulmonary: decreased breath sounds, no egophany. Slight improvement after an in office albuterol treatment  Cardiovascular: Regular rate and rhythm without murmur.       Labs: reviewed colonoscopy notes from GI consultants    Assessment & Plan:     57 y.o. female with the following -     1. Microscopic colitis, unspecified microscopic colitis type  Patient has follow-up with GI.  She continues to note some abdominal discomfort    2. Vitamin D deficiency  We will be starting vitamin D supplements, will see if this might help with her    3. Polyarthralgia  - VITAMIN D,25 HYDROXY (DEFICIENCY); Future  - VITAMIN C SERUM; Future  - TSH WITH REFLEX TO FT4; Future  - Basic Metabolic Panel; Future  - MAGNESIUM; Future  - CREATINE KINASE; Future  - CCP ANTIBODY; Future  - CRP QUANTITIVE (NON-CARDIAC); Future  - ANKUSH REFLEXIVE PROFILE; Future  - Anti-Neutrophil Cytoplasmic Antibodies Screen; Future  - CONNECTIVE TISSUE DISEASES PROFILE; Future  - ANTI-DNA (DS)  Patient continues to have multiple joint pains.  We will broaden the work-up to include inflammatory arthritis.    4. Mild intermittent asthma without complication  - albuterol (PROVENTIL) 2.5mg/3ml nebulizer solution 2.5 mg  May have been exacerbated after not being on Stiolto, also recovering from acute acquired pneumonia from about 1 month ago.  I am adding on Qvar as below in addition to her Stiolto.  May be temporary.  We will see if this might help her cough and shortness of breath    5. Chronic cough  - beclomethasone HFA (QVAR REDIHALER) 40 MCG/ACT inhaler; Inhale 1 Puff " 2 times a day.  Dispense: 10.6 g; Refill: 1  Per #4    6. Pain in left toe(s)  - MR-FOOT-WITH & W/O LEFT; Future  I reviewed new x-rays completed yesterday, unfortunately they were not resulted yet during patient's appointment, however after review, noted possible osteomyelitis of the fourth left toe.  Patient was called and given these results, stat MRI has been ordered.  She also has orthopedics appointment next Friday      Return in about 1 month (around 4/30/2023).    Please note that this dictation was created using voice recognition software. I have made every reasonable attempt to correct obvious errors, but I expect that there are errors of grammar and possibly content that I did not discover before finalizing the note.

## 2023-04-05 ENCOUNTER — HOSPITAL ENCOUNTER (OUTPATIENT)
Dept: RADIOLOGY | Facility: MEDICAL CENTER | Age: 58
End: 2023-04-05
Attending: FAMILY MEDICINE
Payer: MEDICAID

## 2023-04-05 DIAGNOSIS — M79.675 PAIN IN LEFT TOE(S): ICD-10-CM

## 2023-04-05 PROCEDURE — 73720 MRI LWR EXTREMITY W/O&W/DYE: CPT | Mod: LT

## 2023-04-05 PROCEDURE — A9579 GAD-BASE MR CONTRAST NOS,1ML: HCPCS | Performed by: FAMILY MEDICINE

## 2023-04-05 PROCEDURE — 700117 HCHG RX CONTRAST REV CODE 255: Performed by: FAMILY MEDICINE

## 2023-04-05 RX ADMIN — GADOTERIDOL 15 ML: 279.3 INJECTION, SOLUTION INTRAVENOUS at 11:14

## 2023-04-13 ENCOUNTER — HOSPITAL ENCOUNTER (OUTPATIENT)
Dept: LAB | Facility: MEDICAL CENTER | Age: 58
End: 2023-04-13
Attending: FAMILY MEDICINE
Payer: MEDICAID

## 2023-04-13 ENCOUNTER — HOSPITAL ENCOUNTER (OUTPATIENT)
Dept: LAB | Facility: MEDICAL CENTER | Age: 58
End: 2023-04-13
Attending: PHYSICIAN ASSISTANT
Payer: MEDICAID

## 2023-04-13 DIAGNOSIS — M25.50 POLYARTHRALGIA: ICD-10-CM

## 2023-04-13 LAB
25(OH)D3 SERPL-MCNC: 44 NG/ML (ref 30–100)
ANION GAP SERPL CALC-SCNC: 12 MMOL/L (ref 7–16)
BUN SERPL-MCNC: 18 MG/DL (ref 8–22)
CALCIUM SERPL-MCNC: 9.7 MG/DL (ref 8.5–10.5)
CHLORIDE SERPL-SCNC: 105 MMOL/L (ref 96–112)
CK SERPL-CCNC: 77 U/L (ref 0–154)
CO2 SERPL-SCNC: 24 MMOL/L (ref 20–33)
CREAT SERPL-MCNC: 0.69 MG/DL (ref 0.5–1.4)
CRP SERPL HS-MCNC: <0.3 MG/DL (ref 0–0.75)
GFR SERPLBLD CREATININE-BSD FMLA CKD-EPI: 101 ML/MIN/1.73 M 2
GLUCOSE SERPL-MCNC: 88 MG/DL (ref 65–99)
MAGNESIUM SERPL-MCNC: 1.8 MG/DL (ref 1.5–2.5)
POTASSIUM SERPL-SCNC: 4.5 MMOL/L (ref 3.6–5.5)
SODIUM SERPL-SCNC: 141 MMOL/L (ref 135–145)
T4 FREE SERPL-MCNC: 0.96 NG/DL (ref 0.93–1.7)
TSH SERPL DL<=0.005 MIU/L-ACNC: 1.87 UIU/ML (ref 0.38–5.33)
TSH SERPL DL<=0.005 MIU/L-ACNC: 1.96 UIU/ML (ref 0.38–5.33)

## 2023-04-13 PROCEDURE — 84443 ASSAY THYROID STIM HORMONE: CPT

## 2023-04-13 PROCEDURE — 82180 ASSAY OF ASCORBIC ACID: CPT

## 2023-04-13 PROCEDURE — 84439 ASSAY OF FREE THYROXINE: CPT

## 2023-04-13 PROCEDURE — 83516 IMMUNOASSAY NONANTIBODY: CPT | Mod: 91

## 2023-04-13 PROCEDURE — 84443 ASSAY THYROID STIM HORMONE: CPT | Mod: 91

## 2023-04-13 PROCEDURE — 86225 DNA ANTIBODY NATIVE: CPT

## 2023-04-13 PROCEDURE — 86235 NUCLEAR ANTIGEN ANTIBODY: CPT

## 2023-04-13 PROCEDURE — 86200 CCP ANTIBODY: CPT

## 2023-04-13 PROCEDURE — 83735 ASSAY OF MAGNESIUM: CPT

## 2023-04-13 PROCEDURE — 86038 ANTINUCLEAR ANTIBODIES: CPT

## 2023-04-13 PROCEDURE — 80048 BASIC METABOLIC PNL TOTAL CA: CPT

## 2023-04-13 PROCEDURE — 82550 ASSAY OF CK (CPK): CPT

## 2023-04-13 PROCEDURE — 82306 VITAMIN D 25 HYDROXY: CPT

## 2023-04-13 PROCEDURE — 36415 COLL VENOUS BLD VENIPUNCTURE: CPT

## 2023-04-13 PROCEDURE — 86140 C-REACTIVE PROTEIN: CPT

## 2023-04-15 LAB
CCP IGG SERPL-ACNC: 2 UNITS (ref 0–19)
DSDNA AB TITR SER CLIF: NORMAL {TITER}
MYELOPEROXIDASE AB SER-ACNC: 0 AU/ML (ref 0–19)
NUCLEAR IGG SER QL IA: NORMAL
PROTEINASE3 AB SER-ACNC: 0 AU/ML (ref 0–19)

## 2023-04-16 LAB
CENTROMERE IGG TITR SER IF: 0 AU/ML (ref 0–40)
ENA JO1 AB TITR SER: 0 AU/ML (ref 0–40)
ENA SCL70 IGG SER QL: 0 AU/ML (ref 0–40)
ENA SM IGG SER-ACNC: 0 AU/ML (ref 0–40)
ENA SS-B IGG SER IA-ACNC: 0 AU/ML (ref 0–40)
RIBOSOMAL P AB SER-ACNC: 1 AU/ML (ref 0–40)
SSA52 R0ENA AB IGG Q0420: 1 AU/ML (ref 0–40)
SSA60 R0ENA AB IGG Q0419: 0 AU/ML (ref 0–40)
U1 SNRNP IGG SER QL: 2 UNITS (ref 0–19)

## 2023-04-18 DIAGNOSIS — R05.3 CHRONIC COUGH: ICD-10-CM

## 2023-04-20 LAB — VIT C SERPL-MCNC: 49 UMOL/L (ref 23–114)

## 2023-04-20 RX ORDER — BECLOMETHASONE DIPROPIONATE HFA 40 UG/1
1 AEROSOL, METERED RESPIRATORY (INHALATION) 2 TIMES DAILY
Qty: 10.6 G | Refills: 1 | Status: SHIPPED | OUTPATIENT
Start: 2023-04-20 | End: 2023-05-01

## 2023-04-23 DIAGNOSIS — R25.2 SPASTICITY: ICD-10-CM

## 2023-04-23 DIAGNOSIS — S14.109A INJURY OF CERVICAL SPINAL CORD, INITIAL ENCOUNTER (HCC): ICD-10-CM

## 2023-04-25 ENCOUNTER — TELEPHONE (OUTPATIENT)
Dept: MEDICAL GROUP | Facility: MEDICAL CENTER | Age: 58
End: 2023-04-25
Payer: MEDICAID

## 2023-04-26 ENCOUNTER — APPOINTMENT (OUTPATIENT)
Dept: RADIOLOGY | Facility: MEDICAL CENTER | Age: 58
End: 2023-04-26
Attending: EMERGENCY MEDICINE
Payer: MEDICAID

## 2023-04-26 ENCOUNTER — HOSPITAL ENCOUNTER (EMERGENCY)
Facility: MEDICAL CENTER | Age: 58
End: 2023-04-26
Attending: EMERGENCY MEDICINE
Payer: MEDICAID

## 2023-04-26 VITALS
HEART RATE: 81 BPM | TEMPERATURE: 97.5 F | SYSTOLIC BLOOD PRESSURE: 104 MMHG | BODY MASS INDEX: 26.03 KG/M2 | DIASTOLIC BLOOD PRESSURE: 62 MMHG | WEIGHT: 162 LBS | OXYGEN SATURATION: 92 % | HEIGHT: 66 IN | RESPIRATION RATE: 15 BRPM

## 2023-04-26 DIAGNOSIS — S01.01XA LACERATION OF SCALP, INITIAL ENCOUNTER: ICD-10-CM
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
## 2023-04-26 DIAGNOSIS — S70.02XA CONTUSION OF LEFT HIP, INITIAL ENCOUNTER: ICD-10-CM

## 2023-04-26 DIAGNOSIS — S09.8XXA BLUNT HEAD TRAUMA, INITIAL ENCOUNTER: ICD-10-CM

## 2023-04-26 LAB
ANION GAP SERPL CALC-SCNC: 10 MMOL/L (ref 7–16)
BASOPHILS # BLD AUTO: 0.9 % (ref 0–1.8)
BASOPHILS # BLD: 0.07 K/UL (ref 0–0.12)
BUN SERPL-MCNC: 15 MG/DL (ref 8–22)
CALCIUM SERPL-MCNC: 9.1 MG/DL (ref 8.5–10.5)
CHLORIDE SERPL-SCNC: 108 MMOL/L (ref 96–112)
CO2 SERPL-SCNC: 25 MMOL/L (ref 20–33)
CREAT SERPL-MCNC: 0.65 MG/DL (ref 0.5–1.4)
EOSINOPHIL # BLD AUTO: 0.14 K/UL (ref 0–0.51)
EOSINOPHIL NFR BLD: 1.9 % (ref 0–6.9)
ERYTHROCYTE [DISTWIDTH] IN BLOOD BY AUTOMATED COUNT: 45.3 FL (ref 35.9–50)
GFR SERPLBLD CREATININE-BSD FMLA CKD-EPI: 103 ML/MIN/1.73 M 2
GLUCOSE SERPL-MCNC: 90 MG/DL (ref 65–99)
HCT VFR BLD AUTO: 43.7 % (ref 37–47)
HGB BLD-MCNC: 15 G/DL (ref 12–16)
IMM GRANULOCYTES # BLD AUTO: 0.02 K/UL (ref 0–0.11)
IMM GRANULOCYTES NFR BLD AUTO: 0.3 % (ref 0–0.9)
LYMPHOCYTES # BLD AUTO: 1.59 K/UL (ref 1–4.8)
LYMPHOCYTES NFR BLD: 21.2 % (ref 22–41)
MCH RBC QN AUTO: 31 PG (ref 27–33)
MCHC RBC AUTO-ENTMCNC: 34.3 G/DL (ref 33.6–35)
MCV RBC AUTO: 90.3 FL (ref 81.4–97.8)
MONOCYTES # BLD AUTO: 0.48 K/UL (ref 0–0.85)
MONOCYTES NFR BLD AUTO: 6.4 % (ref 0–13.4)
NEUTROPHILS # BLD AUTO: 5.19 K/UL (ref 2–7.15)
NEUTROPHILS NFR BLD: 69.3 % (ref 44–72)
NRBC # BLD AUTO: 0 K/UL
NRBC BLD-RTO: 0 /100 WBC
PLATELET # BLD AUTO: 221 K/UL (ref 164–446)
PMV BLD AUTO: 10.4 FL (ref 9–12.9)
POTASSIUM SERPL-SCNC: 3.9 MMOL/L (ref 3.6–5.5)
RBC # BLD AUTO: 4.84 M/UL (ref 4.2–5.4)
SODIUM SERPL-SCNC: 143 MMOL/L (ref 135–145)
WBC # BLD AUTO: 7.5 K/UL (ref 4.8–10.8)

## 2023-04-26 PROCEDURE — 305308 HCHG STAPLER,SKIN,DISP.

## 2023-04-26 PROCEDURE — 71045 X-RAY EXAM CHEST 1 VIEW: CPT

## 2023-04-26 PROCEDURE — 70450 CT HEAD/BRAIN W/O DYE: CPT

## 2023-04-26 PROCEDURE — 304217 HCHG IRRIGATION SYSTEM

## 2023-04-26 PROCEDURE — 85025 COMPLETE CBC W/AUTO DIFF WBC: CPT

## 2023-04-26 PROCEDURE — 304999 HCHG REPAIR-SIMPLE/INTERMED LEVEL 1

## 2023-04-26 PROCEDURE — 73501 X-RAY EXAM HIP UNI 1 VIEW: CPT | Mod: LT

## 2023-04-26 PROCEDURE — 80048 BASIC METABOLIC PNL TOTAL CA: CPT

## 2023-04-26 PROCEDURE — 94640 AIRWAY INHALATION TREATMENT: CPT

## 2023-04-26 PROCEDURE — 94760 N-INVAS EAR/PLS OXIMETRY 1: CPT

## 2023-04-26 PROCEDURE — 99285 EMERGENCY DEPT VISIT HI MDM: CPT

## 2023-04-26 PROCEDURE — 700101 HCHG RX REV CODE 250: Mod: UD | Performed by: EMERGENCY MEDICINE

## 2023-04-26 PROCEDURE — 36415 COLL VENOUS BLD VENIPUNCTURE: CPT

## 2023-04-26 RX ADMIN — LIDOCAINE HYDROCHLORIDE 20 ML: 10; .005 INJECTION, SOLUTION EPIDURAL; INFILTRATION; INTRACAUDAL; PERINEURAL at 11:15

## 2023-04-26 RX ADMIN — ALBUTEROL SULFATE 2.5 MG: 2.5 SOLUTION RESPIRATORY (INHALATION) at 13:52

## 2023-04-26 ASSESSMENT — FIBROSIS 4 INDEX: FIB4 SCORE: 0.92

## 2023-04-26 NOTE — ED NOTES
Prior to pt. Being ambulated ensured pt. Was able to maintain room air sat >92%.  Pt. Was able to maintain o2 sat of 95% or greater after >15 minutes off of supplemental o2.

## 2023-04-26 NOTE — ED NOTES
Pt BIB EMS from home.  Pt was outside her home leaning on the door smoking and her dog come running out causing pt to lose balance and fell on to left side hitting her head on a glass table.  No LOC, not on thinners.  Pt had approx 1 in laceration to left side of head, bleeding controlled.  Pt also c/o lower back pain in to left hip going down into left leg.  Tender to touch.  Pt given 50 mcg of fentanyl and 4 mg of Zofran.  .  GCS 15, A/Ox4.  ERP to see.

## 2023-04-26 NOTE — ED NOTES
Assist RN: Provided pt with written and verbal instructions regarding follow-up and activity. All questions answered and patient verbalized understanding. Pt wheeled put of unit by staff.

## 2023-04-26 NOTE — ED PROVIDER NOTES
ER Provider Note    Scribed for Basim Goodman M.d. by Esperanza Ordaz. 4/26/2023  10:25 AM    Primary Care Provider: Indira Hutchison M.D.    CHIEF COMPLAINT  Chief Complaint   Patient presents with    T-5000 FALL    T-5000 Head Injury     Laceration      Hip Pain     Left hip     HPI/ROS  LIMITATION TO HISTORY   Select: : None  OUTSIDE HISTORIAN(S):  EMS      Marilyn Salinas is a 57 y.o. female who presents to the ED by EMS complaining of acute left hip pain secondary to a fall onset earlier today. The patient reports that she fell after being pushed by her dog outside on their porch where the ground was slippery and wet. She expresses that after being pushed she did a spin and then fell. Denies loss of consciousness. Patient has associated symptoms of a head laceration No alleviating or exacerbating factors reported.The patient is partially paralyzed from a previous neck injury. No known drug allergies.     PAST MEDICAL HISTORY  Past Medical History:   Diagnosis Date    Anesthesia 12/23/2021    agitated coming out of anesthesia    Anesthesia 03/17/2022    Patient states becomes upset and sad after anesthesia.    Anxiety     Arthritis     spine, feet     Asthma     Breath shortness     Cataract 12/23/2022    no surgery yet    Cerebral palsy (HCC)     Cervical spinal cord injury (HCC) 12/19/2019    COVID-19 01/17/2022 1/17/2022 stopped 1-    DDD (degenerative disc disease), lumbar     Per Problem List    Dental disorder     upper and lower denture    Depression     Full dentures 03/17/2022    Heart burn 12/23/2022    GERD    High cholesterol     History of falling     Marijuana user     Risk for falls     Stroke (HCC) 03/17/2022    Pt. states, MINI STROKE AGE 19.    Tetraplegia (HCC)     TIA (transient ischemic attack) 03/17/2022    AGE 19. Patient states D/T drug use.     Urinary bladder disorder     Urinary incontinence     Vertigo     Per Problem List       SURGICAL HISTORY  Past Surgical  History:   Procedure Laterality Date    DEVYN BY LAPAROSCOPY  3/23/2022    Procedure: CHOLECYSTECTOMY, LAPAROSCOPIC;  Surgeon: Mayur Barrett M.D.;  Location: [facility];  Service: General    PB AMPUTATION TOE,MT-P JT Left 12/27/2021    Procedure: GREAT TOE PARTIAL AMPUTATION;  Surgeon: Emiliano Goff M.D.;  Location: [facility];  Service: Orthopedics    CERVICAL DISK AND FUSION ANTERIOR N/A 12/20/2019    Procedure: DISCECTOMY, SPINE, CERVICAL, ANTERIOR APPROACH, WITH FUSION - C3-5;  Surgeon: Connor Linton M.D.;  Location: [facility];  Service: Neurosurgery    CORPECTOMY N/A 12/20/2019    Procedure: CORPECTOMY;  Surgeon: Connor Linton M.D.;  Location: [facility];  Service: Neurosurgery    BLADDER SLING FEMALE N/A 10/3/2017    Procedure: BLADDER SLING FEMALE TOT, CYSTOSCOPY;  Surgeon: Hudson Driscoll M.D.;  Location: SURGERY SAME DAY [facility];  Service: Gynecology    VAGINAL HYSTERECTOMY SCOPE TOTAL N/A 10/3/2017    Procedure: VAGINAL HYSTERECTOMY SCOPE TOTAL;  Surgeon: Hudson Driscoll M.D.;  Location: SURGERY SAME DAY [facility];  Service: Gynecology    SALPINGECTOMY Bilateral 10/3/2017    Procedure: SALPINGECTOMY;  Surgeon: Hudson Driscoll M.D.;  Location: SURGERY SAME DAY [facility];  Service: Gynecology    OOPHORECTOMY Bilateral 10/3/2017    Procedure: OOPHORECTOMY;  Surgeon: Hudson Driscoll M.D.;  Location: SURGERY SAME DAY [facility];  Service: Gynecology    ANTERIOR AND POSTERIOR REPAIR Bilateral 10/3/2017    Procedure: ANTERIOR AND POSTERIOR REPAIR;  Surgeon: Hudson Driscoll M.D.;  Location: SURGERY SAME DAY [facility];  Service: Gynecology    ENTEROCELE REPAIR N/A 10/3/2017    Procedure: ENTEROCELE REPAIR, PERINEOPLASTY;  Surgeon: Hudson Driscoll M.D.;  Location: SURGERY SAME DAY [facility];  Service: Gynecology    VAGINAL SUSPENSION N/A 10/3/2017    Procedure: VAGINAL SUSPENSION SACROSPINOUS VAULT POSSIBLE;  Surgeon: Hudson Driscoll  M.D.;  Location: SURGERY SAME DAY [facility];  Service: Gynecology    OTHER Left 2005    hammertoe x2    EYE SURGERY  1972    for lazy eye    LUMPECTOMY         FAMILY HISTORY  Family History   Problem Relation Age of Onset    Cancer Mother         ovarian    Alcohol/Drug Mother     Cancer Brother         unknown, caused him to lose his leg when he was 3    Diabetes Maternal Aunt        SOCIAL HISTORY   reports that she quit smoking about 15 months ago. Her smoking use included cigarettes. She has a 35.00 pack-year smoking history. She has never used smokeless tobacco. She reports current drug use. Drugs: Marijuana and Inhaled. She reports that she does not drink alcohol.    CURRENT MEDICATIONS  Discharge Medication List as of 4/26/2023  2:22 PM        CONTINUE these medications which have NOT CHANGED    Details   Mirabegron ER (MYRBETRIQ) 50 MG TABLET SR 24 HR Take 50 mg by mouth every day., Disp-90 Tablet, R-1, Normal      tizanidine (ZANAFLEX) 2 MG capsule Take 1 Capsule by mouth 2 times a day., Disp-180 Capsule, R-1, Normal      baclofen (LIORESAL) 10 MG Tab Take 3 Tablets by mouth 2 times a day., Disp-540 Tablet, R-0, Normal      beclomethasone HFA (QVAR REDIHALER) 40 MCG/ACT inhaler Inhale 1 Puff 2 times a day., Disp-10.6 g, R-1, Normal      Tiotropium Bromide-Olodaterol (STIOLTO RESPIMAT) 2.5-2.5 MCG/ACT Aero Soln Inhale 2 Puffs every day., Disp-4 g, R-11, Normal      GAVILYTE-G 236 g Recon Soln MIX AND DRINK AS DIRECTED, AMIE Historical Med      simethicone (MYLICON) 80 MG Chew Tab Chew 1 Tablet every 6 hours as needed (bloating)., Disp-120 Tablet, R-1, Normal      !! venlafaxine (EFFEXOR-XR) 150 MG extended-release capsule Take 1 Capsule by mouth every day. Take with 75mg capsule every day for total daily dose of 225mg., Disp-30 Capsule, R-5, Normal      dicyclomine (BENTYL) 20 MG Tab Take 1 Tablet by mouth every 6 hours as needed (Abdominal Pain)., Disp-30 Tablet, R-2, Normal      !! venlafaxine XR  "(EFFEXOR XR) 75 MG CAPSULE SR 24 HR Take 1 Capsule by mouth every day. Take with 150mg capsule for total of 225mg daily, Disp-30 Capsule, R-5, Normal      meloxicam (MOBIC) 15 MG tablet Take 1 Tablet by mouth every day., Disp-30 Tablet, R-6, Normal      gabapentin (NEURONTIN) 300 MG Cap Take 3 Capsules by mouth 2 times a day. Take 1 po qhs, Disp-540 Capsule, R-3, Normal      fluticasone (FLONASE) 50 MCG/ACT nasal spray Administer 1 Spray into affected nostril(S) every day., Disp-16 g, R-6, Normal      fesoterodine fumarate (TOVIAZ) 4 MG TABLET SR 24 HR Take 1 Tablet by mouth every day., Disp-90 Tablet, R-3, Normal      estradiol (ESTRACE VAGINAL) 0.1 MG/GM vaginal cream Apply 1g cream inside vagina using applicator nightly for 2 weeks, then twice per week thereafter, Disp-1 Each, R-3, Normal      albuterol 108 (90 Base) MCG/ACT Aero Soln inhalation aerosol Inhale 2 Puffs every 6 hours as needed for Shortness of Breath., Disp-8.5 g, R-11, Normal       !! - Potential duplicate medications found. Please discuss with provider.          ALLERGIES  Other environmental and Codeine    PHYSICAL EXAM  BP (!) 142/79   Pulse 79   Temp 36.2 °C (97.1 °F) (Temporal)   Resp 16   Ht 1.676 m (5' 6\")   Wt 73.5 kg (162 lb)   LMP 01/03/2017   SpO2 94%   BMI 26.15 kg/m²   Nursing note and vitals reviewed.  Constitutional: Well-developed and well-nourished.  Mild distress.   HENT: Head is normocephalic and there is a 2.5 cm linear laceration on the left posterior parietal scalp. Oropharynx is clear and moist without exudate or erythema.   Eyes: Pupils are equal, round, and reactive to light. Conjunctiva are normal.   Cardiovascular: Normal rate and regular rhythm. No murmur heard. Normal radial pulses.  Pulmonary/Chest: Breath sounds normal. No wheezes or rales.   Abdominal: Soft and non-tender. No distention    Musculoskeletal: Extremities exhibit normal range of motion without edema except for the left lower extremity Tenderness " to left hip, with no shortening or rotation.   Neurological: Awake, alert and oriented to person, place, and time. No focal deficits noted.  Skin: Skin is warm and dry. No rash.   Psychiatric: Normal mood and affect. Appropriate for clinical situation     DIAGNOSTIC STUDIES    Labs:   Results for orders placed or performed during the hospital encounter of 04/26/23   CBC WITH DIFFERENTIAL   Result Value Ref Range    WBC 7.5 4.8 - 10.8 K/uL    RBC 4.84 4.20 - 5.40 M/uL    Hemoglobin 15.0 12.0 - 16.0 g/dL    Hematocrit 43.7 37.0 - 47.0 %    MCV 90.3 81.4 - 97.8 fL    MCH 31.0 27.0 - 33.0 pg    MCHC 34.3 33.6 - 35.0 g/dL    RDW 45.3 35.9 - 50.0 fL    Platelet Count 221 164 - 446 K/uL    MPV 10.4 9.0 - 12.9 fL    Neutrophils-Polys 69.30 44.00 - 72.00 %    Lymphocytes 21.20 (L) 22.00 - 41.00 %    Monocytes 6.40 0.00 - 13.40 %    Eosinophils 1.90 0.00 - 6.90 %    Basophils 0.90 0.00 - 1.80 %    Immature Granulocytes 0.30 0.00 - 0.90 %    Nucleated RBC 0.00 /100 WBC    Neutrophils (Absolute) 5.19 2.00 - 7.15 K/uL    Lymphs (Absolute) 1.59 1.00 - 4.80 K/uL    Monos (Absolute) 0.48 0.00 - 0.85 K/uL    Eos (Absolute) 0.14 0.00 - 0.51 K/uL    Baso (Absolute) 0.07 0.00 - 0.12 K/uL    Immature Granulocytes (abs) 0.02 0.00 - 0.11 K/uL    NRBC (Absolute) 0.00 K/uL   BASIC METABOLIC PANEL   Result Value Ref Range    Sodium 143 135 - 145 mmol/L    Potassium 3.9 3.6 - 5.5 mmol/L    Chloride 108 96 - 112 mmol/L    Co2 25 20 - 33 mmol/L    Glucose 90 65 - 99 mg/dL    Bun 15 8 - 22 mg/dL    Creatinine 0.65 0.50 - 1.40 mg/dL    Calcium 9.1 8.5 - 10.5 mg/dL    Anion Gap 10.0 7.0 - 16.0   ESTIMATED GFR   Result Value Ref Range    GFR (CKD-EPI) 103 >60 mL/min/1.73 m 2     Radiology:   The attending emergency physician has independently interpreted the diagnostic imaging associated with this visit and am waiting the final reading from the radiologist.   Preliminary interpretation is a follows: No acute abnormality noted on chest x-ray CT scan  or hip x-ray  Radiologist interpretation:   DX-CHEST-PORTABLE (1 VIEW)   Final Result      No acute cardiac or pulmonary abnormalities are identified.      CT-HEAD W/O   Final Result      1.  Cerebral atrophy.      2.  Otherwise, Head CT without contrast within normal limits. No evidence of acute cerebral infarction, hemorrhage or mass lesion.         DX-HIP-UNILATERAL-WITH PELVIS-1 VIEW LEFT   Final Result      1.  No radiographic evidence of acute traumatic injury.   2.  Mild degenerative changes of the bilateral hip joints.         Laceration Repair Procedure Note    Indication: Laceration    Procedure: The patient was placed in the appropriate position and anesthesia around the laceration was obtained by infiltration using 1% Lidocaine with epinephrine. The area was then irrigated with normal saline. The laceration was closed with staples. There were no additional lacerations requiring repair. The wound area was then dressed with a sterile dressing.      Total repaired wound length: 2.5 cm.     Other Items: Staple count: 4    The patient tolerated the procedure well.    Complications: None       COURSE & MEDICAL DECISION MAKING     ED Observation Status? Yes; I am placing the patient in to an observation status due to a diagnostic uncertainty as well as therapeutic intensity. Patient placed in observation status at 10:48 AM, 4/26/2023.     Observation plan is as follows: Labs and imaging to evaluate. The patient will undergo evaluation for a traumatic injury.     Upon Reevaluation, the patient's condition has: Improved; and will be discharged.    Patient discharged from ED Observation status at 2:21 PM (Time) 4/26/2023  (Date).     INITIAL ASSESSMENT, COURSE AND PLAN  Care Narrative:     10:49 AM - Patient seen and examined at bedside. Discussed plan of care, including labs and imaging. Patient agrees to the plan of care. Ordered for CBC w/diff, BMP, DX-Hip-Unilateral-WithPelvis- Left, CT-Head w/o contrast to  evaluate her symptoms.     1:42 PM - Patient was reevaluated at bedside. Discussed lab and radiology results with the patient. She is ambulating well with a walker which is her baseline. The patient will be discharged should return if symptoms worsen or if new symptoms arise. The patient understands and agrees to plan.        DISPOSITION AND DISCUSSIONS    Decision tools and prescription drugs considered including, but not limited to: NEXUS criteria negative .    Patient will be discharged home.    FOLLOW UP:  Indira Hutchison M.D.  [address] [id number]  [phone]    Schedule an appointment as soon as possible for a visit       [facility], Emergency Dept  [address] [place] [id number] [phone]  In 10 days  for staple removal    [facility], Emergency Dept  [address] [place] [address] [id number] [phone]        OUTPATIENT MEDICATIONS:  Discharge Medication List as of 4/26/2023  2:22 PM           FINAL DIAGNOSIS  1. Fall, initial encounter    2. Contusion of left hip, initial encounter    3. Blunt head trauma, initial encounter    4. Laceration of scalp, initial encounter         The note accurately reflects work and decisions made by me.  Basim Goodman M.D.  4/26/2023  3:21 PM

## 2023-04-26 NOTE — ED NOTES
Assist RN: Pt states she does not use home oxygen. Supplemental oxygen removed and pt saturating above 90% on RA. PIVx2 removed.

## 2023-04-26 NOTE — TELEPHONE ENCOUNTER
Pt's Qvar Redihaler is not covered by her insurance, they prefer Pulmicort Flexhaler Brand. Please write new Rx for Pulmicort.

## 2023-04-26 NOTE — ED NOTES
Received report from GAVIOTA Powell to assume care of pt.  Pt. Reports she does not use o2 at home.  O2 turned off at this time to monitor pt. Room air sat.

## 2023-04-27 DIAGNOSIS — S14.109A INJURY OF CERVICAL SPINAL CORD, INITIAL ENCOUNTER (HCC): ICD-10-CM

## 2023-04-27 DIAGNOSIS — R25.2 SPASTICITY: ICD-10-CM

## 2023-05-01 DIAGNOSIS — J45.20 MILD INTERMITTENT ASTHMA WITHOUT COMPLICATION: ICD-10-CM

## 2023-05-02 DIAGNOSIS — R25.2 SPASTICITY: ICD-10-CM

## 2023-05-02 DIAGNOSIS — S14.109A INJURY OF CERVICAL SPINAL CORD, INITIAL ENCOUNTER (HCC): ICD-10-CM

## 2023-05-02 DIAGNOSIS — N31.9 NEUROGENIC BLADDER: ICD-10-CM

## 2023-05-02 RX ORDER — BACLOFEN 10 MG/1
30 TABLET ORAL 2 TIMES DAILY
Qty: 540 TABLET | Refills: 0 | Status: SHIPPED | OUTPATIENT
Start: 2023-05-02 | End: 2023-07-26

## 2023-05-04 RX ORDER — FESOTERODINE FUMARATE 4 MG/1
TABLET, FILM COATED, EXTENDED RELEASE ORAL
Qty: 90 TABLET | Refills: 3 | Status: SHIPPED | OUTPATIENT
Start: 2023-05-04 | End: 2023-11-08 | Stop reason: SDUPTHER

## 2023-05-05 ENCOUNTER — OFFICE VISIT (OUTPATIENT)
Dept: MEDICAL GROUP | Facility: MEDICAL CENTER | Age: 58
End: 2023-05-05
Attending: FAMILY MEDICINE
Payer: MEDICAID

## 2023-05-05 VITALS
BODY MASS INDEX: 25.39 KG/M2 | SYSTOLIC BLOOD PRESSURE: 106 MMHG | HEIGHT: 66 IN | WEIGHT: 158 LBS | OXYGEN SATURATION: 92 % | DIASTOLIC BLOOD PRESSURE: 72 MMHG | RESPIRATION RATE: 17 BRPM | HEART RATE: 82 BPM | TEMPERATURE: 98 F

## 2023-05-05 DIAGNOSIS — B35.1 ONYCHOMYCOSIS: ICD-10-CM
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
## 2023-05-05 DIAGNOSIS — M79.675 PAIN OF TOE OF LEFT FOOT: ICD-10-CM

## 2023-05-05 DIAGNOSIS — R05.3 CHRONIC COUGH: ICD-10-CM

## 2023-05-05 PROCEDURE — 99213 OFFICE O/P EST LOW 20 MIN: CPT | Performed by: FAMILY MEDICINE

## 2023-05-05 PROCEDURE — 99214 OFFICE O/P EST MOD 30 MIN: CPT | Performed by: FAMILY MEDICINE

## 2023-05-05 RX ORDER — RIFAXIMIN 550 MG/1
TABLET ORAL
COMMUNITY
Start: 2023-05-02 | End: 2023-07-07

## 2023-05-05 RX ORDER — TERBINAFINE HYDROCHLORIDE 250 MG/1
250 TABLET ORAL DAILY
Qty: 84 TABLET | Refills: 0 | Status: SHIPPED
Start: 2023-05-05 | End: 2023-07-07

## 2023-05-05 ASSESSMENT — FIBROSIS 4 INDEX: FIB4 SCORE: 1.11

## 2023-05-07 PROBLEM — R05.3 CHRONIC COUGH: Status: RESOLVED | Noted: 2023-02-17 | Resolved: 2023-05-07
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
## 2023-05-07 NOTE — ASSESSMENT & PLAN NOTE
Cough is much improved.   ICS was ordered at last visit to help with the lingering cough from PNA, however she was unable to obtain it. She reports her cough is much improved now and does not need anything to help her with this issue.

## 2023-05-07 NOTE — ASSESSMENT & PLAN NOTE
Patient never obtained treatment for onychomycosis. Continues to have thickened, yellow toenails.

## 2023-05-07 NOTE — ASSESSMENT & PLAN NOTE
Pt had completed xray of the left foot with concern for osteomyelitis, stat MRI was ordered and ultimately reviewed by orthopedics, thankfully with no evidence of osteo. The MRI did note cellulitis and inflammation of the 4th toe. Pt reports it is swollen but no drainage or spreading erythema.

## 2023-05-07 NOTE — PROGRESS NOTES
Subjective:     CC: No chief complaint on file.        HPI:     Pain of toe of left foot  Pt had completed xray of the left foot with concern for osteomyelitis, stat MRI was ordered and ultimately reviewed by orthopedics, thankfully with no evidence of osteo. The MRI did note cellulitis and inflammation of the 4th toe. Pt reports it is swollen but no drainage or spreading erythema.       Onychomycosis  Patient never obtained treatment for onychomycosis. Continues to have thickened, yellow toenails.     Fall  Pt recently went to ER after head trauma after a fall. She states she was walking down some stairs and didn't have her rollator and then ended up tripping over the dog. I reviewed ER notes. She had a laceration of the left parietal scalp and staples were used for closure. She presents today for removal of the staples.     Chronic cough  Cough is much improved.   ICS was ordered at last visit to help with the lingering cough from PNA, however she was unable to obtain it. She reports her cough is much improved now and does not need anything to help her with this issue.       Past Medical History:   Diagnosis Date    Anesthesia 12/23/2021    agitated coming out of anesthesia    Anesthesia 03/17/2022    Patient states becomes upset and sad after anesthesia.    Anxiety     Arthritis     spine, feet     Asthma     Breath shortness     Cataract 12/23/2022    no surgery yet    Cerebral palsy (HCC)     Cervical spinal cord injury (HCC) 12/19/2019    COVID-19 01/17/2022 1/17/2022 stopped 1-    DDD (degenerative disc disease), lumbar     Per Problem List    Dental disorder     upper and lower denture    Depression     Full dentures 03/17/2022    Heart burn 12/23/2022    GERD    High cholesterol     History of falling     Marijuana user     Risk for falls     Stroke (HCC) 03/17/2022    Pt. states, MINI STROKE AGE 19.    Tetraplegia (HCC)     TIA (transient ischemic attack) 03/17/2022    AGE 19. Patient states D/T  drug use.     Urinary bladder disorder     Urinary incontinence     Vertigo     Per Problem List       Social History     Tobacco Use    Smoking status: Former     Packs/day: 1.00     Years: 35.00     Pack years: 35.00     Types: Cigarettes     Quit date: 2022     Years since quittin.3    Smokeless tobacco: Never    Tobacco comments:     20 years of 1ppd, then 10 years weaning down   Vaping Use    Vaping Use: Some days    Substances: THC, CBD   Substance Use Topics    Alcohol use: No    Drug use: Yes     Types: Marijuana, Inhaled     Comment: marijuana daily (smoked and edibles)       Current Outpatient Medications Ordered in Epic   Medication Sig Dispense Refill    terbinafine (LAMISIL) 250 MG Tab Take 1 Tablet by mouth every day. PT WOULD LIKE TO PAY VIA [facility] 84 Tablet 0    TOVIAZ 4 MG TABLET SR 24 HR TAKE 1 TABLET BY MOUTH EVERY DAY 90 Tablet 3    baclofen (LIORESAL) 10 MG Tab Take 3 Tablets by mouth 2 times a day. 540 Tablet 0    Mirabegron ER (MYRBETRIQ) 50 MG TABLET SR 24 HR Take 50 mg by mouth every day. 90 Tablet 1    tizanidine (ZANAFLEX) 2 MG capsule Take 1 Capsule by mouth 2 times a day. 180 Capsule 1    XIFAXAN 550 MG Tab tablet TAKE 1 TABLET BY MOUTH THREE TIMES DAILY EVERY DAY FOR 14 DAYS FOR IRRITABLE BOWEL SYNDROME      Tiotropium Bromide-Olodaterol (STIOLTO RESPIMAT) 2.5-2.5 MCG/ACT Aero Soln Inhale 2 Puffs every day. 4 g 11    GAVILYTE-G 236 g Recon Soln MIX AND DRINK AS DIRECTED      simethicone (MYLICON) 80 MG Chew Tab Chew 1 Tablet every 6 hours as needed (bloating). 120 Tablet 1    venlafaxine (EFFEXOR-XR) 150 MG extended-release capsule Take 1 Capsule by mouth every day. Take with 75mg capsule every day for total daily dose of 225mg. 30 Capsule 5    dicyclomine (BENTYL) 20 MG Tab Take 1 Tablet by mouth every 6 hours as needed (Abdominal Pain). 30 Tablet 2    venlafaxine XR (EFFEXOR XR) 75 MG CAPSULE SR 24 HR Take 1 Capsule by mouth every day. Take with 150mg capsule for total of  "225mg daily 30 Capsule 5    meloxicam (MOBIC) 15 MG tablet Take 1 Tablet by mouth every day. 30 Tablet 6    gabapentin (NEURONTIN) 300 MG Cap Take 3 Capsules by mouth 2 times a day. Take 1 po qhs 540 Capsule 3    fluticasone (FLONASE) 50 MCG/ACT nasal spray Administer 1 Spray into affected nostril(S) every day. 16 g 6    estradiol (ESTRACE VAGINAL) 0.1 MG/GM vaginal cream Apply 1g cream inside vagina using applicator nightly for 2 weeks, then twice per week thereafter 1 Each 3    albuterol 108 (90 Base) MCG/ACT Aero Soln inhalation aerosol Inhale 2 Puffs every 6 hours as needed for Shortness of Breath. 8.5 g 11     No current [facility]-ordered facility-administered medications on file.       Allergies:  Other environmental and Codeine        Objective:       Exam:  /72 (BP Location: Left arm, Patient Position: Sitting, BP Cuff Size: Adult)   Pulse 82   Temp 36.7 °C (98 °F) (Temporal)   Resp 17   Ht 1.676 m (5' 6\")   Wt 71.7 kg (158 lb)   LMP 01/03/2017   SpO2 92%   BMI 25.50 kg/m²  Body mass index is 25.5 kg/m².    Pulmonary: Clear to ausculation.  Normal effort. No rales, ronchi, or wheezing.  Skin: L parietal scalp lesion healing well, 4 staples in place   Musculoskeletal: Left foot evaluated - no significant erythema, swelling, tenderness to touch. Thickened, yellow toenails of all nails of the left foot. Hammertoes. Callus noted on the 4th distal toe      Labs:   Done 4/26/23  Cbc/cmp normal  Autoimmune labs done 4/13/23 - normal    Assessment & Plan:     57 y.o. female with the following -     1. Onychomycosis  - terbinafine (LAMISIL) 250 MG Tab; Take 1 Tablet by mouth every day. PT WOULD LIKE TO PAY VIA [place]  Dispense: 84 Tablet; Refill: 0  Reorder 12 weeks of lamisil.     2. Pain of toe of left foot  Reviewed by ortho. Likely 2/2 hammertoes. Continue callus/toenail care.     3. Fall, subsequent encounter  Scalp staples were removed today     4. Chronic cough  Resolved - cough is much improved, no " longer needs ICS so this has been removed from med list.     Return in about 2 months (around 7/5/2023).    Please note that this dictation was created using voice recognition software. I have made every reasonable attempt to correct obvious errors, but I expect that there are errors of grammar and possibly content that I did not discover before finalizing the note.

## 2023-05-07 NOTE — ASSESSMENT & PLAN NOTE
Pt recently went to ER after head trauma after a fall. She states she was walking down some stairs and didn't have her rollator and then ended up tripping over the dog. I reviewed ER notes. She had a laceration of the left parietal scalp and staples were used for closure. She presents today for removal of the staples.

## 2023-05-10 ENCOUNTER — TELEPHONE (OUTPATIENT)
Dept: OBGYN | Facility: CLINIC | Age: 58
End: 2023-05-10
Payer: MEDICAID

## 2023-05-10 DIAGNOSIS — N32.81 OAB (OVERACTIVE BLADDER): ICD-10-CM

## 2023-05-10 DIAGNOSIS — N31.9 NEUROGENIC BLADDER: ICD-10-CM

## 2023-05-10 NOTE — TELEPHONE ENCOUNTER
Called pt to advise her we will have to cancel apt as we need to get her botox from a speciality pharmacy. Julianna will start the process and we will keep in touch with her when we hear from pharmacy.  Pt understands.

## 2023-05-10 NOTE — TELEPHONE ENCOUNTER
Spoke with Davy from [facility], started order for Botox 100 Unit/Vial every 6-12 months as needed. Will continue to fv with [facility] for Botox status .

## 2023-05-15 ENCOUNTER — TELEPHONE (OUTPATIENT)
Dept: OBGYN | Facility: CLINIC | Age: 58
End: 2023-05-15
Payer: MEDICAID

## 2023-05-15 NOTE — TELEPHONE ENCOUNTER
Kenyon called from [facility] to verify pt's insurance. Insurance information was given.

## 2023-05-19 ENCOUNTER — TELEPHONE (OUTPATIENT)
Dept: OBGYN | Facility: CLINIC | Age: 58
End: 2023-05-19
Payer: MEDICAID

## 2023-05-19 NOTE — TELEPHONE ENCOUNTER
Spoke with an individual from [facility] and they state they have yet to receive an approval from patients medicaid FFS insurance. I informed them that on our end we have an approval from Medicaid , provided them with Auth number and effective dates. She will pass information on for approval and once approval by [facility] we can proceed with process of delivery. Will fv Monday 5/22/23

## 2023-05-24 ENCOUNTER — TELEPHONE (OUTPATIENT)
Dept: OBGYN | Facility: CLINIC | Age: 58
End: 2023-05-24
Payer: MEDICAID

## 2023-05-24 NOTE — TELEPHONE ENCOUNTER
I spoke with tom from [facility], he states pharmacy still needs to go through the process of authorizing the Botox.  Tom requested we fax them over the Medicaid approval we have received on our end to [phone]. Botox approval faxed.

## 2023-05-30 DIAGNOSIS — K59.2 NEUROGENIC BOWEL: ICD-10-CM

## 2023-05-30 DIAGNOSIS — M54.2 CERVICAL SPINE PAIN: ICD-10-CM

## 2023-05-30 DIAGNOSIS — M51.36 DDD (DEGENERATIVE DISC DISEASE), LUMBAR: ICD-10-CM

## 2023-05-30 RX ORDER — POLYETHYLENE GLYCOL 3350 17 G/17G
17 POWDER, FOR SOLUTION ORAL DAILY
Qty: 578 G | Refills: 5 | Status: SHIPPED | OUTPATIENT
Start: 2023-05-30

## 2023-05-30 RX ORDER — POLYETHYLENE GLYCOL 3350 17 G/17G
17 POWDER, FOR SOLUTION ORAL DAILY
Qty: 578 G | Refills: 5 | Status: SHIPPED | OUTPATIENT
Start: 2023-05-30 | End: 2023-05-30 | Stop reason: SDUPTHER

## 2023-07-07 ENCOUNTER — OFFICE VISIT (OUTPATIENT)
Dept: MEDICAL GROUP | Facility: MEDICAL CENTER | Age: 58
End: 2023-07-07
Attending: FAMILY MEDICINE
Payer: MEDICAID

## 2023-07-07 VITALS
OXYGEN SATURATION: 92 % | HEIGHT: 65 IN | TEMPERATURE: 98.4 F | DIASTOLIC BLOOD PRESSURE: 68 MMHG | HEART RATE: 80 BPM | BODY MASS INDEX: 26.16 KG/M2 | RESPIRATION RATE: 14 BRPM | SYSTOLIC BLOOD PRESSURE: 90 MMHG | WEIGHT: 157 LBS

## 2023-07-07 DIAGNOSIS — R05.9 COUGH, UNSPECIFIED TYPE: ICD-10-CM

## 2023-07-07 DIAGNOSIS — S09.90XD TRAUMATIC INJURY OF HEAD, SUBSEQUENT ENCOUNTER: ICD-10-CM

## 2023-07-07 DIAGNOSIS — G80.8 OTHER CEREBRAL PALSY (HCC): ICD-10-CM

## 2023-07-07 PROBLEM — S09.90XA HEAD TRAUMA: Status: ACTIVE | Noted: 2023-05-07

## 2023-07-07 PROCEDURE — 3074F SYST BP LT 130 MM HG: CPT | Performed by: FAMILY MEDICINE

## 2023-07-07 PROCEDURE — 3078F DIAST BP <80 MM HG: CPT | Performed by: FAMILY MEDICINE

## 2023-07-07 PROCEDURE — 99214 OFFICE O/P EST MOD 30 MIN: CPT | Performed by: FAMILY MEDICINE

## 2023-07-07 PROCEDURE — 99213 OFFICE O/P EST LOW 20 MIN: CPT | Performed by: FAMILY MEDICINE

## 2023-07-07 RX ORDER — BUDESONIDE 90 UG/1
2 AEROSOL, POWDER RESPIRATORY (INHALATION) 2 TIMES DAILY
COMMUNITY
Start: 2023-06-01 | End: 2023-09-18

## 2023-07-07 ASSESSMENT — FIBROSIS 4 INDEX: FIB4 SCORE: 1.13

## 2023-07-07 NOTE — ASSESSMENT & PLAN NOTE
Patient reports continued pain of the head where she hit her head of the left parietal area.   She reports her mood has also changed for the worse. No difficulty sleeping but takes gummies for this. Also with dizziness and nausea.   She has a neurologist at [facility] for CP, last seen 1.5 years ago.   She has a new referral for PT. She does report repeat falls and no further head injuries but has history of numerous head traumas.   Falls are from loss of balance.   She is drinking plenty of water. She is not sure if she passed out after her fall. She does sometimes report some near syncopal episodes upon standing due to dizziness.

## 2023-07-07 NOTE — ASSESSMENT & PLAN NOTE
"We had discussed stopping her inhalers last visit since she no longer had lingering cough, but she had received it and started using it with reports that \"it got the last bits out\" and intermittently uses her albuterol inhaler. No hx of asthma or lung issues.   She is no longer using stiolto either.   No daily cough  "

## 2023-07-07 NOTE — PROGRESS NOTES
"Subjective:     CC:   Chief Complaint   Patient presents with    Follow-Up     Head injury/ pain on left side of head         HPI:     Head trauma  Patient reports continued pain of the head where she hit her head of the left parietal area.   She reports her mood has also changed for the worse. No difficulty sleeping but takes gummies for this. Also with dizziness and nausea.   She has a neurologist at [facility] for CP, last seen 1.5 years ago.   She has a new referral for PT. She does report repeat falls and no further head injuries but has history of numerous head traumas.   Falls are from loss of balance.   She is drinking plenty of water. She is not sure if she passed out after her fall. She does sometimes report some near syncopal episodes upon standing due to dizziness.     Chronic cough  We had discussed stopping her inhalers last visit since she no longer had lingering cough, but she had received it and started using it with reports that \"it got the last bits out\" and intermittently uses her albuterol inhaler. No hx of asthma or lung issues.   She is no longer using stiolto either.   No daily cough      Past Medical History:   Diagnosis Date    Anesthesia 12/23/2021    agitated coming out of anesthesia    Anesthesia 03/17/2022    Patient states becomes upset and sad after anesthesia.    Anxiety     Arthritis     spine, feet     Asthma     Breath shortness     Cataract 12/23/2022    no surgery yet    Cerebral palsy (HCC)     Cervical spinal cord injury (HCC) 12/19/2019    COVID-19 01/17/2022 1/17/2022 stopped 1-    DDD (degenerative disc disease), lumbar     Per Problem List    Dental disorder     upper and lower denture    Depression     Full dentures 03/17/2022    Heart burn 12/23/2022    GERD    High cholesterol     History of falling     Marijuana user     Risk for falls     Stroke (HCC) 03/17/2022    Pt. states, MINI STROKE AGE 19.    Tetraplegia (HCC)     TIA (transient ischemic attack) 03/17/2022 "    AGE 19. Patient states D/T drug use.     Urinary bladder disorder     Urinary incontinence     Vertigo     Per Problem List       Social History     Tobacco Use    Smoking status: Former     Packs/day: 1.00     Years: 35.00     Pack years: 35.00     Types: Cigarettes     Quit date: 2022     Years since quittin.5    Smokeless tobacco: Never    Tobacco comments:     20 years of 1ppd, then 10 years weaning down   Vaping Use    Vaping Use: Some days    Substances: THC, CBD   Substance Use Topics    Alcohol use: No    Drug use: Yes     Types: Marijuana, Inhaled     Comment: marijuana daily (smoked and edibles)       Current Outpatient Medications Ordered in Epic   Medication Sig Dispense Refill    PULMICORT FLEXHALER 90 MCG/ACT AEROSOL POWDER, BREATH ACTIVATED Inhale 2 Puffs 2 times a day.      polyethylene glycol 3350 (MIRALAX) 17 GM/SCOOP Powder Take 17 g by mouth every day. 578 g 5    TOVIAZ 4 MG TABLET SR 24 HR TAKE 1 TABLET BY MOUTH EVERY DAY 90 Tablet 3    baclofen (LIORESAL) 10 MG Tab Take 3 Tablets by mouth 2 times a day. 540 Tablet 0    Mirabegron ER (MYRBETRIQ) 50 MG TABLET SR 24 HR Take 50 mg by mouth every day. 90 Tablet 1    tizanidine (ZANAFLEX) 2 MG capsule Take 1 Capsule by mouth 2 times a day. 180 Capsule 1    simethicone (MYLICON) 80 MG Chew Tab Chew 1 Tablet every 6 hours as needed (bloating). 120 Tablet 1    venlafaxine (EFFEXOR-XR) 150 MG extended-release capsule Take 1 Capsule by mouth every day. Take with 75mg capsule every day for total daily dose of 225mg. 30 Capsule 5    dicyclomine (BENTYL) 20 MG Tab Take 1 Tablet by mouth every 6 hours as needed (Abdominal Pain). 30 Tablet 2    venlafaxine XR (EFFEXOR XR) 75 MG CAPSULE SR 24 HR Take 1 Capsule by mouth every day. Take with 150mg capsule for total of 225mg daily 30 Capsule 5    meloxicam (MOBIC) 15 MG tablet Take 1 Tablet by mouth every day. 30 Tablet 6    gabapentin (NEURONTIN) 300 MG Cap Take 3 Capsules by mouth 2 times a day. Take  "1 po qhs 540 Capsule 3    fluticasone (FLONASE) 50 MCG/ACT nasal spray Administer 1 Spray into affected nostril(S) every day. 16 g 6    estradiol (ESTRACE VAGINAL) 0.1 MG/GM vaginal cream Apply 1g cream inside vagina using applicator nightly for 2 weeks, then twice per week thereafter 1 Each 3    albuterol 108 (90 Base) MCG/ACT Aero Soln inhalation aerosol Inhale 2 Puffs every 6 hours as needed for Shortness of Breath. 8.5 g 11     No current Epic-ordered facility-administered medications on file.       Allergies:  Other environmental and Codeine        Objective:       Exam:  BP 90/68 (BP Location: Left arm, Patient Position: Sitting)   Pulse 80   Temp 36.9 °C (98.4 °F) (Temporal)   Resp 14   Ht 1.651 m (5' 5\")   Wt 71.2 kg (157 lb)   LMP 01/03/2017   SpO2 92%   BMI 26.13 kg/m²  Body mass index is 26.13 kg/m².    General: Normal appearing. No distress.  Pulmonary: Clear to ausculation.  Normal effort. No rales, ronchi, or wheezing.  Neurologic: Neuro: CN II-XII intact, strength 5/5 in all muscle groups, sensation intact bilaterally to light touch and pinprick, FTN intact bilaterally  Psych: Normal mood and affect.       Labs:   Reviewed labs 4/26/23    Assessment & Plan:     58 y.o. female with the following -     1. Traumatic injury of head, subsequent encounter  - Referral to Neurology  Likely TBI from recent brain trauma  Send to neuro  Pt also has frequent falls, going to PT. Reviewed meds side effects and baclofen has hypotensive side effects, pt to discuss with Dr. Guillory next week. BP today is marginal and may be contributing to her falls as she is lightheaded/dizzy upon standing     2. Other cerebral palsy (HCC)  - Referral to Neurology    3. Cough, unspecified type  No longer has cough, advised to try stopping her inhalers. May restart if needs albuterol frequnetly.         Return in about 4 months (around 11/7/2023).    Please note that this dictation was created using voice recognition software. I " have made every reasonable attempt to correct obvious errors, but I expect that there are errors of grammar and possibly content that I did not discover before finalizing the note.

## 2023-07-07 NOTE — PROGRESS NOTES
Subjective:     CC:   Chief Complaint   Patient presents with    Follow-Up     Head injury/ pain on left side of head         HPI:     No problem-specific Assessment & Plan notes found for this encounter.      Past Medical History:   Diagnosis Date    Anesthesia 2021    agitated coming out of anesthesia    Anesthesia 2022    Patient states becomes upset and sad after anesthesia.    Anxiety     Arthritis     spine, feet     Asthma     Breath shortness     Cataract 2022    no surgery yet    Cerebral palsy (HCC)     Cervical spinal cord injury (HCC) 2019    COVID-19 2022 stopped 2022    DDD (degenerative disc disease), lumbar     Per Problem List    Dental disorder     upper and lower denture    Depression     Full dentures 2022    Heart burn 2022    GERD    High cholesterol     History of falling     Marijuana user     Risk for falls     Stroke ([facility]) 2022    Pt. states, MINI STROKE AGE 19.    Tetraplegia (HCC)     TIA (transient ischemic attack) 2022    AGE 19. Patient states D/T drug use.     Urinary bladder disorder     Urinary incontinence     Vertigo     Per Problem List       Social History     Tobacco Use    Smoking status: Former     Packs/day: 1.00     Years: 35.00     Pack years: 35.00     Types: Cigarettes     Quit date: 2022     Years since quittin.5    Smokeless tobacco: Never    Tobacco comments:     20 years of 1ppd, then 10 years weaning down   Vaping Use    Vaping Use: Some days    Substances: THC, CBD   Substance Use Topics    Alcohol use: No    Drug use: Yes     Types: Marijuana, Inhaled     Comment: marijuana daily (smoked and edibles)       Current Outpatient Medications Ordered in Epic   Medication Sig Dispense Refill    PULMICORT FLEXHALER 90 MCG/ACT AEROSOL POWDER, BREATH ACTIVATED Inhale 2 Puffs 2 times a day.      polyethylene glycol 3350 (MIRALAX) 17 GM/SCOOP Powder Take 17 g by mouth every day. 578 g 5    TOVIAZ  4 MG TABLET SR 24 HR TAKE 1 TABLET BY MOUTH EVERY DAY 90 Tablet 3    baclofen (LIORESAL) 10 MG Tab Take 3 Tablets by mouth 2 times a day. 540 Tablet 0    Mirabegron ER (MYRBETRIQ) 50 MG TABLET SR 24 HR Take 50 mg by mouth every day. 90 Tablet 1    tizanidine (ZANAFLEX) 2 MG capsule Take 1 Capsule by mouth 2 times a day. 180 Capsule 1    simethicone (MYLICON) 80 MG Chew Tab Chew 1 Tablet every 6 hours as needed (bloating). 120 Tablet 1    venlafaxine (EFFEXOR-XR) 150 MG extended-release capsule Take 1 Capsule by mouth every day. Take with 75mg capsule every day for total daily dose of 225mg. 30 Capsule 5    dicyclomine (BENTYL) 20 MG Tab Take 1 Tablet by mouth every 6 hours as needed (Abdominal Pain). 30 Tablet 2    venlafaxine XR (EFFEXOR XR) 75 MG CAPSULE SR 24 HR Take 1 Capsule by mouth every day. Take with 150mg capsule for total of 225mg daily 30 Capsule 5    meloxicam (MOBIC) 15 MG tablet Take 1 Tablet by mouth every day. 30 Tablet 6    gabapentin (NEURONTIN) 300 MG Cap Take 3 Capsules by mouth 2 times a day. Take 1 po qhs 540 Capsule 3    fluticasone (FLONASE) 50 MCG/ACT nasal spray Administer 1 Spray into affected nostril(S) every day. 16 g 6    estradiol (ESTRACE VAGINAL) 0.1 MG/GM vaginal cream Apply 1g cream inside vagina using applicator nightly for 2 weeks, then twice per week thereafter 1 Each 3    albuterol 108 (90 Base) MCG/ACT Aero Soln inhalation aerosol Inhale 2 Puffs every 6 hours as needed for Shortness of Breath. 8.5 g 11    terbinafine (LAMISIL) 250 MG Tab Take 1 Tablet by mouth every day. PT WOULD LIKE TO PAY [employer]IA [email or web address] (Patient not taking: Reported on 7/7/2023) 84 Tablet 0    XIFAXAN 550 MG Tab tablet TAKE 1 TABLET BY MOUTH THREE TIMES DAILY EVERY DAY FOR 14 DAYS FOR IRRITABLE BOWEL SYNDROME (Patient not taking: Reported on 7/7/2023)      Tiotropium Bromide-Olodaterol (STIOLTO RESPIMAT) 2.5-2.5 MCG/ACT Aero Soln Inhale 2 Puffs every day. (Patient not taking: Reported on 7/7/2023) 4 g 11  "    No current [facility]-ordered facility-administered medications on file.       Allergies:  Other environmental and Codeine        Objective:       Exam:  BP 90/68 (BP Location: Left arm, Patient Position: Sitting)   Pulse 80   Temp 36.9 °C (98.4 °F) (Temporal)   Resp 14   Ht 1.651 m (5' 5\")   Wt 71.2 kg (157 lb)   LMP 01/03/2017   SpO2 92%   BMI 26.13 kg/m²  Body mass index is 26.13 kg/m².    General: Normal appearing. No distress.  Eyes:  Eyes conjunctiva clear lids without ptosis, pupils equal and reactive to light accommodation  ENT: Ears normal shape and contour, canals are clear bilaterally, tympanic membranes are benign, oropharynx is without erythema, edema or exudates.   Neck: Supple. Thyroid is not enlarged.  Pulmonary: Clear to ausculation.  Normal effort. No rales, ronchi, or wheezing.  Cardiovascular: Regular rate and rhythm without murmur.   Abdomen: Soft, nontender, nondistended. Normal bowel sounds.  Neurologic: no facial droop, gait normal  Lymph: No cervical or supraclavicular lymph nodes are palpable  Skin: Warm and dry.  No obvious lesions.  Musculoskeletal: Normal gait. No extremity cyanosis, clubbing, or edema.  Psych: Normal mood and affect.       Labs: ***    Assessment & Plan:     58 y.o. female with the following -     There are no diagnoses linked to this encounter.    No follow-ups on file.    Please note that this dictation was created using voice recognition software. I have made every reasonable attempt to correct obvious errors, but I expect that there are errors of grammar and possibly content that I did not discover before finalizing the note.        "

## 2023-07-12 ENCOUNTER — OFFICE VISIT (OUTPATIENT)
Dept: PHYSICAL MEDICINE AND REHAB | Facility: MEDICAL CENTER | Age: 58
End: 2023-07-12
Payer: MEDICAID

## 2023-07-12 VITALS
OXYGEN SATURATION: 90 % | DIASTOLIC BLOOD PRESSURE: 74 MMHG | WEIGHT: 158 LBS | SYSTOLIC BLOOD PRESSURE: 102 MMHG | TEMPERATURE: 96.6 F | BODY MASS INDEX: 25.39 KG/M2 | HEART RATE: 85 BPM | HEIGHT: 66 IN

## 2023-07-12 DIAGNOSIS — R32 URINARY INCONTINENCE, UNSPECIFIED TYPE: ICD-10-CM

## 2023-07-12 DIAGNOSIS — I95.1 ORTHOSTATIC HYPOTENSION: ICD-10-CM

## 2023-07-12 DIAGNOSIS — N31.9 NEUROGENIC BLADDER: ICD-10-CM

## 2023-07-12 DIAGNOSIS — R25.2 SPASTICITY: ICD-10-CM

## 2023-07-12 DIAGNOSIS — R26.9 ABNORMAL GAIT: ICD-10-CM

## 2023-07-12 DIAGNOSIS — Z99.89 WALKER AS AMBULATION AID: ICD-10-CM

## 2023-07-12 DIAGNOSIS — Z74.09 IMPAIRED MOBILITY AND ENDURANCE: ICD-10-CM

## 2023-07-12 DIAGNOSIS — R41.9 DEFICIT IN COMPREHENSION: ICD-10-CM

## 2023-07-12 DIAGNOSIS — S14.109D INJURY OF CERVICAL SPINAL CORD, SUBSEQUENT ENCOUNTER (HCC): Primary | ICD-10-CM

## 2023-07-12 DIAGNOSIS — M25.551 BILATERAL HIP PAIN: ICD-10-CM

## 2023-07-12 DIAGNOSIS — M25.552 BILATERAL HIP PAIN: ICD-10-CM

## 2023-07-12 PROCEDURE — 3074F SYST BP LT 130 MM HG: CPT | Performed by: PHYSICAL MEDICINE & REHABILITATION

## 2023-07-12 PROCEDURE — 99214 OFFICE O/P EST MOD 30 MIN: CPT | Performed by: PHYSICAL MEDICINE & REHABILITATION

## 2023-07-12 PROCEDURE — 3078F DIAST BP <80 MM HG: CPT | Performed by: PHYSICAL MEDICINE & REHABILITATION

## 2023-07-12 PROCEDURE — 1125F AMNT PAIN NOTED PAIN PRSNT: CPT | Performed by: PHYSICAL MEDICINE & REHABILITATION

## 2023-07-12 RX ORDER — MIRABEGRON 50 MG/1
50 TABLET, FILM COATED, EXTENDED RELEASE ORAL DAILY
Qty: 90 TABLET | Refills: 1 | Status: SHIPPED | OUTPATIENT
Start: 2023-07-12 | End: 2023-11-08

## 2023-07-12 RX ORDER — MIDODRINE HYDROCHLORIDE 5 MG/1
5 TABLET ORAL 3 TIMES DAILY PRN
Qty: 270 TABLET | Refills: 3 | Status: SHIPPED | OUTPATIENT
Start: 2023-07-12 | End: 2023-11-08

## 2023-07-12 RX ORDER — TIZANIDINE HYDROCHLORIDE 2 MG/1
2 CAPSULE, GELATIN COATED ORAL
Qty: 180 CAPSULE | Refills: 3 | Status: SHIPPED | OUTPATIENT
Start: 2023-07-12 | End: 2023-10-24 | Stop reason: SDUPTHER

## 2023-07-12 ASSESSMENT — PATIENT HEALTH QUESTIONNAIRE - PHQ9
SUM OF ALL RESPONSES TO PHQ QUESTIONS 1-9: 22
CLINICAL INTERPRETATION OF PHQ2 SCORE: 6
5. POOR APPETITE OR OVEREATING: 3 - NEARLY EVERY DAY

## 2023-07-12 ASSESSMENT — FIBROSIS 4 INDEX: FIB4 SCORE: 1.13

## 2023-07-12 ASSESSMENT — PAIN SCALES - GENERAL: PAINLEVEL: 5=MODERATE PAIN

## 2023-07-12 NOTE — PROGRESS NOTES
[facility]  PM&R Neuro [facility]  [address] [place] [id number]  Ph: [phone]    FOLLOW UP PATIENT EVALUATION    Patient Name: Marilyn Salinas   Patient : 1965  Patient Age: 58 y.o.   PCP: Edwar Platt M.D.    SUBJECTIVE:   Patient Identification: Marilyn Salinas is a 58 y.o. RHD female with PMH significant for chronic back and neck pain and rehabilitation history significant for premorbid weakness and paresthesias, had GLF, imaging showing Disc herniation at C4-5 and C3- C4, with T2 hyperintensity of the cord and s/p ACDF C3-5 on 19 with Dr. Linton  and is presenting to PM&R clinic for a FOLLOW UP outpatient evaluation with the following chief complaint/s:    PM&R Background Information:  Original Date of Injury: 19 GLF and worsening weakness, pain.   Pertinent Procedure History: Disc herniation at C4-5 and C3- C4, with T2 hyperintensity of the cord at this level. she underwent ACDF C3-5 on 19 with Dr. Linton  Dates of Admission/Discharge to ARU: 19-20    Chief Complaint:  Follow Up    Interval History:    Her walker is currently unsafe. The walker is completely unsafe at this point. The brake is completely broken. She had a fall due to the fact that the left brake broke. Her walker flips easily to the left. It is broken beyond repair. She feels like her comprehension is declining. She is out of [facility]. She has not had Botox in a while and is having leakage. She will be going soon. She is on Myrbetriq. She will be getting Botox soon. She is still getting physical therapy. She has fallen a lot of times and has hip imbalance. One more prescription for neck therapy might be helpful.     Review of Systems:  All other pertinent positive review of systems are noted above in HPI.     Past Medical History:  Past Medical History:   Diagnosis Date    TIA (transient ischemic attack) 2022    AGE 19. Patient states D/T drug use.     Anesthesia 2022     Patient states becomes upset and sad after anesthesia.    Full dentures 03/17/2022    Stroke (HCC) 03/17/2022    Pt. states, MINI STROKE AGE 19.    COVID-19 01/17/2022 1/17/2022 stopped 1-    Anesthesia 12/23/2021    agitated coming out of anesthesia    Cervical spinal cord injury (HCC) 12/19/2019    Anxiety     Arthritis     spine, feet     Asthma     Breath shortness     Cataract 12/23/2022    no surgery yet    Cerebral palsy (HCC)     DDD (degenerative disc disease), lumbar     Per Problem List    Dental disorder     upper and lower denture    Depression     Heart burn 12/23/2022    GERD    High cholesterol     History of falling     Marijuana user     Risk for falls     Tetraplegia (HCC)     Urinary bladder disorder     Urinary incontinence     Vertigo     Per Problem List      Past Surgical History:   Procedure Laterality Date    DEVYN BY LAPAROSCOPY  3/23/2022    Procedure: CHOLECYSTECTOMY, LAPAROSCOPIC;  Surgeon: Mayur Barrett M.D.;  Location: [facility];  Service: General    PB AMPUTATION TOE,MT-P JT Left 12/27/2021    Procedure: GREAT TOE PARTIAL AMPUTATION;  Surgeon: Emiliano Goff M.D.;  Location: [facility];  Service: Orthopedics    CERVICAL DISK AND FUSION ANTERIOR N/A 12/20/2019    Procedure: DISCECTOMY, SPINE, CERVICAL, ANTERIOR APPROACH, WITH FUSION - C3-5;  Surgeon: Connor Linton M.D.;  Location: [facility];  Service: Neurosurgery    CORPECTOMY N/A 12/20/2019    Procedure: CORPECTOMY;  Surgeon: Connor Linton M.D.;  Location: [facility];  Service: Neurosurgery    BLADDER SLING FEMALE N/A 10/3/2017    Procedure: BLADDER SLING FEMALE TOT, CYSTOSCOPY;  Surgeon: Hudson Driscoll M.D.;  Location: SURGERY SAME DAY [facility];  Service: Gynecology    VAGINAL HYSTERECTOMY SCOPE TOTAL N/A 10/3/2017    Procedure: VAGINAL HYSTERECTOMY SCOPE TOTAL;  Surgeon: Hudson Driscoll M.D.;  Location: SURGERY SAME DAY [facility];  Service: Gynecology     SALPINGECTOMY Bilateral 10/3/2017    Procedure: SALPINGECTOMY;  Surgeon: Hudson Driscoll M.D.;  Location: SURGERY SAME DAY [facility];  Service: Gynecology    OOPHORECTOMY Bilateral 10/3/2017    Procedure: OOPHORECTOMY;  Surgeon: Hudson Driscoll M.D.;  Location: SURGERY SAME DAY [facility];  Service: Gynecology    ANTERIOR AND POSTERIOR REPAIR Bilateral 10/3/2017    Procedure: ANTERIOR AND POSTERIOR REPAIR;  Surgeon: Hudson Driscoll M.D.;  Location: SURGERY SAME DAY [facility];  Service: Gynecology    ENTEROCELE REPAIR N/A 10/3/2017    Procedure: ENTEROCELE REPAIR, PERINEOPLASTY;  Surgeon: Hudson Driscoll M.D.;  Location: SURGERY SAME DAY [facility];  Service: Gynecology    VAGINAL SUSPENSION N/A 10/3/2017    Procedure: VAGINAL SUSPENSION SACROSPINOUS VAULT POSSIBLE;  Surgeon: Hudson Driscoll M.D.;  Location: SURGERY SAME DAY [facility];  Service: Gynecology    OTHER Left 2005    hammertoe x2    EYE SURGERY  1972    for lazy eye    LUMPECTOMY          Current Outpatient Medications:     tizanidine (ZANAFLEX) 2 MG capsule, Take 1 Capsule by mouth 2 times a day., Disp: 180 Capsule, Rfl: 3    Mirabegron ER (MYRBETRIQ) 50 MG TABLET SR 24 HR, Take 50 mg by mouth every day., Disp: 90 Tablet, Rfl: 1    midodrine (PROAMATINE) 5 MG Tab, Take 1 Tablet by mouth 3 times a day as needed (Take for BP < 100 or for symptoms of dizziness due to low blood pressure.)., Disp: 270 Tablet, Rfl: 3    PULMICORT FLEXHALER 90 MCG/ACT AEROSOL POWDER, BREATH ACTIVATED, Inhale 2 Puffs 2 times a day., Disp: , Rfl:     polyethylene glycol 3350 (MIRALAX) 17 GM/SCOOP Powder, Take 17 g by mouth every day., Disp: 578 g, Rfl: 5    TOVIAZ 4 MG TABLET SR 24 HR, TAKE 1 TABLET BY MOUTH EVERY DAY, Disp: 90 Tablet, Rfl: 3    baclofen (LIORESAL) 10 MG Tab, Take 3 Tablets by mouth 2 times a day., Disp: 540 Tablet, Rfl: 0    simethicone (MYLICON) 80 MG Chew Tab, Chew 1 Tablet every 6 hours as needed (bloating)., Disp: 120 Tablet, Rfl: 1     venlafaxine (EFFEXOR-XR) 150 MG extended-release capsule, Take 1 Capsule by mouth every day. Take with 75mg capsule every day for total daily dose of 225mg., Disp: 30 Capsule, Rfl: 5    dicyclomine (BENTYL) 20 MG Tab, Take 1 Tablet by mouth every 6 hours as needed (Abdominal Pain)., Disp: 30 Tablet, Rfl: 2    venlafaxine XR (EFFEXOR XR) 75 MG CAPSULE SR 24 HR, Take 1 Capsule by mouth every day. Take with 150mg capsule for total of 225mg daily, Disp: 30 Capsule, Rfl: 5    meloxicam (MOBIC) 15 MG tablet, Take 1 Tablet by mouth every day., Disp: 30 Tablet, Rfl: 6    gabapentin (NEURONTIN) 300 MG Cap, Take 3 Capsules by mouth 2 times a day. Take 1 po qhs, Disp: 540 Capsule, Rfl: 3    fluticasone (FLONASE) 50 MCG/ACT nasal spray, Administer 1 Spray into affected nostril(S) every day., Disp: 16 g, Rfl: 6    estradiol (ESTRACE VAGINAL) 0.1 MG/GM vaginal cream, Apply 1g cream inside vagina using applicator nightly for 2 weeks, then twice per week thereafter, Disp: 1 Each, Rfl: 3    albuterol 108 (90 Base) MCG/ACT Aero Soln inhalation aerosol, Inhale 2 Puffs every 6 hours as needed for Shortness of Breath., Disp: 8.5 g, Rfl: 11  Allergies   Allergen Reactions    Other Environmental Hives     TUMBLEWEED    Codeine Itching, Unspecified and Rash     Rash, itching, mood disorder- becomes agitated  Other reaction(s): itchy, grumpy        Past Social History:  Social History     Socioeconomic History    Marital status: Single     Spouse name: Not on file    Number of children: Not on file    Years of education: Not on file    Highest education level: Not on file   Occupational History    Not on file   Tobacco Use    Smoking status: Former     Packs/day: 1.00     Years: 35.00     Pack years: 35.00     Types: Cigarettes     Quit date: 2022     Years since quittin.5    Smokeless tobacco: Never    Tobacco comments:     20 years of 1ppd, then 10 years weaning down   Vaping Use    Vaping Use: Some days    Substances: THC, CBD    Substance and Sexual Activity    Alcohol use: No    Drug use: Yes     Types: Marijuana, Inhaled     Comment: marijuana daily (smoked and edibles)    Sexual activity: Not Currently   Other Topics Concern     Service No    Blood Transfusions No    Caffeine Concern No    Occupational Exposure No    Hobby Hazards No    Sleep Concern Yes    Stress Concern Yes    Weight Concern Yes    Special Diet No    Back Care Yes    Exercise No    Bike Helmet Yes    Seat Belt Yes    Self-Exams Yes   Social History Narrative    Not on file     Social Determinants of Health     Financial Resource Strain: Not on file   Food Insecurity: No Food Insecurity (12/27/2019)    Hunger Vital Sign     Worried About Running Out of Food in the Last Year: Never true     Ran Out of Food in the Last Year: Never true   Transportation Needs: No Transportation Needs (12/27/2019)    PRAPARE - Transportation     Lack of Transportation (Medical): No     Lack of Transportation (Non-Medical): No   Physical Activity: Not on file   Stress: Not on file   Social Connections: Not on file   Intimate Partner Violence: Not on file   Housing Stability: Not on file        Family History:  Family History   Problem Relation Age of Onset    Cancer Mother         ovarian    Alcohol/Drug Mother     Cancer Brother         unknown, caused him to lose his leg when he was 4    Diabetes Maternal Aunt        Depression and Opioid Screening  PHQ-9:      3/23/2022     3:35 PM 1/11/2023    11:15 AM 7/12/2023    11:00 AM   Depression Screen (PHQ-2/PHQ-9)   PHQ-2 Total Score 0     PHQ-2 Total Score  2 6   PHQ-9 Total Score  19 22     Interpretation of PHQ-9 Total Score   Score Severity   1-4 No Depression   5-9 Mild Depression   10-14 Moderate Depression   15-19 Moderately Severe Depression   20-27 Severe Depression     Opioid Risk Score: No value filed.  Interpretation of Opioid Risk Score   Score 0-3 = Low risk of abuse. Do UDS at least once per year.  Score 4-7 = Moderate  risk of abuse. Do UDS 1-4 times per year.  Score 8+ = High risk of abuse. Refer to specialist.      OBJECTIVE:   Vital Signs:  Vitals:    07/12/23 1040   BP: 102/74   Pulse: 85   Temp: 35.9 °C (96.6 °F)   SpO2: 90%    - Virtual Visit    Pertinent Labs:  Lab Results   Component Value Date/Time    SODIUM 143 04/26/2023 10:28 AM    POTASSIUM 3.9 04/26/2023 10:28 AM    CHLORIDE 108 04/26/2023 10:28 AM    CO2 25 04/26/2023 10:28 AM    GLUCOSE 90 04/26/2023 10:28 AM    BUN 15 04/26/2023 10:28 AM    CREATININE 0.65 04/26/2023 10:28 AM       Lab Results   Component Value Date/Time    HBA1C 5.6 07/27/2017 09:39 AM       Lab Results   Component Value Date/Time    WBC 7.5 04/26/2023 10:28 AM    RBC 4.84 04/26/2023 10:28 AM    HEMOGLOBIN 15.0 04/26/2023 10:28 AM    HEMATOCRIT 43.7 04/26/2023 10:28 AM    MCV 90.3 04/26/2023 10:28 AM    MCH 31.0 04/26/2023 10:28 AM    MCHC 34.3 04/26/2023 10:28 AM    MPV 10.4 04/26/2023 10:28 AM    NEUTSPOLYS 69.30 04/26/2023 10:28 AM    LYMPHOCYTES 21.20 (L) 04/26/2023 10:28 AM    MONOCYTES 6.40 04/26/2023 10:28 AM    EOSINOPHILS 1.90 04/26/2023 10:28 AM    BASOPHILS 0.90 04/26/2023 10:28 AM       Lab Results   Component Value Date/Time    ASTSGOT 27 10/20/2022 07:34 PM    ALTSGPT 38 10/20/2022 07:34 PM        Physical Exam:   GEN: No apparent distress  HEENT: Head normocephalic, atraumatic.  Sclera nonicteric bilaterally, no ocular discharge appreciated bilaterally.  CV: Extremities warm and well-perfused, no peripheral edema appreciated bilaterally.  PULMONARY: Breathing nonlabored on room air, no respiratory accessory muscle use.  Not requiring supplemental oxygen.  ABD: Soft, nontender.  Mildly distended.  SKIN: No appreciable skin breakdown on exposed areas of skin.  PSYCH: Mood and affect within normal limits.  NEURO: Awake alert.  Conversational.  Logical thought content.  Answers questions appropriately.  Ambulatory with four-wheel walker.  Breaks are broken.      ASSESSMENT/PLAN: Marilyn  Cleve Salinas  is a 58 y.o. female with rehabilitation history significant for premorbid weakness and paresthesias, had GLF, imaging showing disc herniation at C4-5 and C3- C4, with T2 hyperintensity of the cord and s/p ACDF C3-5 on 12/20/19 with Dr. Linton, here for SCI follow up. The following plan was discussed with the patient who is in agreement.     Visit Diagnoses     ICD-10-CM   1. Injury of cervical spinal cord, subsequent encounter ([facility])  S14.109D   2. Spasticity  R25.2   3. Walker as ambulation aid  Z99.89   4. Impaired mobility and endurance  Z74.09   5. Deficit in comprehension  R41.9   6. Neurogenic bladder  N31.9   7. Urinary incontinence, unspecified type  R32   8. Orthostatic hypotension  I95.1   9. Abnormal gait  R26.9   10. Bilateral hip pain  M25.551    M25.552        Rehab/Neuro:   C2 AISD: Nontraumatic incomplete spinal cord injury, status post fall at home with worsening weakness, found to have severe cervical stenosis.  Status post C3-C5 ACDF with Dr. Linton on 12/20/19. No longer getting epidural steroid injections, too painful.  Nociceptive/musculoskeletal chronic back pain: Established with interventional pain management.  -Therapy: Referral for more physical therapy for her hips, then her neck.  - FACE to FACE: Patient requires new 4WW due to the fact that her current walker has a bed frame and the brakes are malfunctioning.  No further modifications can be made.  She has mobility limitation that significantly impairs her ability to participate in 1 or more mobility related activities of daily living in the home and her mobility limitation cannot sufficiently be resolved by the use of cane or crutches due to danger of falling and serious injury.  Patient is able to safely use a walker and her mobility deficit can be resolved with the use of a walker.  Requires seat due to poor endurance due to her underlying neurologic condition.  Requires 4 wheels.     2. Muscular dystrophy: ? CP per  recent neurology evaluation. Reportedly diagnosed in '60's per aunt. Normal CPK. Was seeing Nikunj back in 2018 - was supposed to have EMG/NCS. Medicaid wouldn't cover EMG/NCS at that time.  Scheduled for EMG/NCS 10/23/2020.  EEG reportedly negative.  Patient has since changed her neurologist and is now seeing Dr. Hartman. Had EMG/NCS results -results not available but patient reports she was told she has paraplegia and partial CP.   -Status: Stable    3.  Impaired comprehension  -Referral to speech therapy     Spasticity: Bilateral lower extremities worse than upper extremities.  Botox cost prohibitive.  -Med management: Prescription for baclofen 30 mg twice daily  - Med management: Prescription for tizanidine 2 mg twice daily    Hypotension:  -Unfortunately we cannot modify many of her spasticity medications due to the fact that she has severe spasticity and cannot get any other agents or Botox.  - Med management: Prescription for Midodrine 5 mg 3 times daily    Neuropathic Pain:   -High risk med Management: Continue gabapentin 900 mg twice daily.     Neurogenic Bladder: Bladder stable  - Established with urology  - Med management: Continue Toviaz, prescription provided today.  - Continue Myrbetriq, prescription provided today.    Depression:  -Med management: Continue venlafaxine 225 mg daily.    Follow up: As needed      Please note that this dictation was created using voice recognition software. I have made every reasonable attempt to correct obvious errors but there may be errors of grammar and content that I may have overlooked prior to finalization of this note.    Dr. Julianna Guillory DO, MS  Department of Physical Medicine & Rehabilitation  Neuro Rehabilitation Clinic  [facility]

## 2023-07-19 PROCEDURE — 52287 CYSTOSCOPY CHEMODENERVATION: CPT | Performed by: STUDENT IN AN ORGANIZED HEALTH CARE EDUCATION/TRAINING PROGRAM

## 2023-07-19 NOTE — PROCEDURES
Procedure: Bladder chemodenervation with onabotulintoxinA    Marilyn Salinas is a 58 y.o. with neurogenic bladder and OAB presenting for bladder botox #2, last treatment 4/2022 with 100 units. She was thoroughly counseled on the procedure and consented for cystourethroscopy and bladder chemodenervation with onabotulinum toxin A.  She is aware the risks include infection, bladder perforation, rare reaction to medication, transient urinary retention.  Symptomatic improvement tends to occur at 1 week after injection, and last between 6 and 12 months.  All questions were answered    She brings Botox with her today, as this has been previously filled by Medicaid [facility] pharmacy.  30 minutes prior to the procedure her bladder was backfilled with 60 mL of lidocaine, and urethra instilled with lido jet. She was given bactrim DS as antibiotic prophylaxis    Cystoscopy w/Botox    Date/Time: 7/19/2023 8:49 AM    Performed by: Giselle Caceres M.D.  Authorized by: Giselle Caceres M.D.    Procedure discussed: discussed risks, benefits and alternatives    Chaperone present: yes    Timeout: timeout called immediately prior to procedure    Prep: patient was prepped and draped in usual sterile fashion    Prep type: Betadine    Anesthesia: local anesthesia      Procedure Details     Cystoscope type: flexible    Cystoscopy route: transurethral      Cystoscopy location: native bladder      Irrigation used: saline      Position: dorsal lithotomy    Urethra     Urethra: normal      Vagina     Vagina: normal      Bladder     Bladder: normal      Botox Injection Details     Changes since previous cystoscopy: no changes since previous cystoscopy      Indication: overactive bladder      Volume per injection comment: 2.0 mL    Total number of injections: 5    Total number of units: 100    Post-Procedure Details     Appearance of urine after procedure: clear    Outcome: patient tolerated procedure well with no complications      Disposition:  discharged home in satisfactory condition        Giselle Caceres MD

## 2023-07-20 ENCOUNTER — OFFICE VISIT (OUTPATIENT)
Dept: OBGYN | Facility: CLINIC | Age: 58
End: 2023-07-20
Attending: STUDENT IN AN ORGANIZED HEALTH CARE EDUCATION/TRAINING PROGRAM
Payer: MEDICAID

## 2023-07-20 DIAGNOSIS — N31.9 NEUROGENIC BLADDER: ICD-10-CM

## 2023-07-20 DIAGNOSIS — N32.81 OAB (OVERACTIVE BLADDER): Primary | ICD-10-CM

## 2023-07-20 PROCEDURE — 81002 URINALYSIS NONAUTO W/O SCOPE: CPT | Performed by: STUDENT IN AN ORGANIZED HEALTH CARE EDUCATION/TRAINING PROGRAM

## 2023-07-20 RX ORDER — SULFAMETHOXAZOLE AND TRIMETHOPRIM 800; 160 MG/1; MG/1
1 TABLET ORAL ONCE
Status: COMPLETED | OUTPATIENT
Start: 2023-07-20 | End: 2023-07-20

## 2023-07-20 RX ADMIN — SULFAMETHOXAZOLE AND TRIMETHOPRIM 1 TABLET: 800; 160 TABLET ORAL at 15:40

## 2023-07-22 RX ORDER — BACLOFEN 10 MG/1
TABLET ORAL
Qty: 540 TABLET | Refills: 0 | OUTPATIENT
Start: 2023-07-22

## 2023-07-25 DIAGNOSIS — M54.2 CERVICAL SPINE PAIN: ICD-10-CM

## 2023-07-25 DIAGNOSIS — F32.A ANXIETY AND DEPRESSION: ICD-10-CM

## 2023-07-25 DIAGNOSIS — F41.9 ANXIETY AND DEPRESSION: ICD-10-CM

## 2023-07-25 RX ORDER — VENLAFAXINE HYDROCHLORIDE 75 MG/1
75 CAPSULE, EXTENDED RELEASE ORAL DAILY
Qty: 30 CAPSULE | Refills: 2 | Status: SHIPPED | OUTPATIENT
Start: 2023-07-25 | End: 2023-10-24

## 2023-07-25 NOTE — TELEPHONE ENCOUNTER
Received request via: Pharmacy    Was the patient seen in the last year in this department? Yes    Does the patient have an active prescription (recently filled or refills available) for medication(s) requested? No    Does the patient have MCFP Plus and need 100 day supply (blood pressure, diabetes and cholesterol meds only)? Patient does not have SCP    Future Appointments         Provider Department [place]    11/10/2023 10:40 AM (Arrive by 10:25 AM) Indira Hutchison M.D. [facility]

## 2023-07-26 DIAGNOSIS — R25.2 SPASTICITY: ICD-10-CM

## 2023-07-26 DIAGNOSIS — S14.109A INJURY OF CERVICAL SPINAL CORD, INITIAL ENCOUNTER (HCC): ICD-10-CM

## 2023-07-26 RX ORDER — BACLOFEN 10 MG/1
TABLET ORAL
Qty: 540 TABLET | Refills: 0 | OUTPATIENT
Start: 2023-07-26

## 2023-07-26 RX ORDER — BACLOFEN 10 MG/1
30 TABLET ORAL 2 TIMES DAILY
Qty: 540 TABLET | Refills: 0 | Status: SHIPPED | OUTPATIENT
Start: 2023-07-26 | End: 2023-10-24 | Stop reason: SDUPTHER

## 2023-07-26 NOTE — TELEPHONE ENCOUNTER
BACLOFEN 10MG TABLETS    Received request via: Pharmacy    Was the patient seen in the last year in this department? Yes    Does the patient have an active prescription (recently filled or refills available) for medication(s) requested?  Yes    Does the patient have snf Plus and need 100 day supply (blood pressure, diabetes and cholesterol meds only)? Patient does not have SCP

## 2023-09-09 NOTE — PROCEDURES
PULMONARY FUNCTION TEST INTERPRETATION    REQUESTING PROVIDER:  RENALDO Pack    REASON FOR REQUEST:  Mild persistent asthma.    FINDINGS:  1.  Acceptable and reproducible.  2.  FEV1 2.42 liters (83%), FVC 3.04 liters (83%), ratio 80%.  3.  Flow volume loops normal appearing.  4.  TLC 6.19 liters (115%).  5.  DLCO 20.04 mL/min/mmHg (91%).    IMPRESSION:  Normal spirometry.  No response to bronchodilators.  Lung volumes   relatively normal other than some evidence of air trapping with elevated   residual volume.  Normal gas transfer.  This could be consistent with the   patient's underlying diagnosis of asthma.       ____________________________________     MD DAVID Valdes / JANE    DD:  12/02/2018 13:50:00  DT:  12/02/2018 15:05:55    D#:  [id number]  Job#:  [id number]    cc: Connor MACARIO    
Stable

## 2023-09-18 ENCOUNTER — OFFICE VISIT (OUTPATIENT)
Dept: MEDICAL GROUP | Facility: MEDICAL CENTER | Age: 58
End: 2023-09-18
Attending: NURSE PRACTITIONER
Payer: MEDICAID

## 2023-09-18 VITALS
OXYGEN SATURATION: 96 % | HEIGHT: 66 IN | TEMPERATURE: 98.6 F | HEART RATE: 77 BPM | RESPIRATION RATE: 17 BRPM | SYSTOLIC BLOOD PRESSURE: 100 MMHG | WEIGHT: 159.7 LBS | DIASTOLIC BLOOD PRESSURE: 72 MMHG | BODY MASS INDEX: 25.67 KG/M2

## 2023-09-18 DIAGNOSIS — G89.29 CHRONIC LEFT SHOULDER PAIN: ICD-10-CM

## 2023-09-18 DIAGNOSIS — R05.3 CHRONIC COUGH: ICD-10-CM

## 2023-09-18 DIAGNOSIS — M25.512 CHRONIC LEFT SHOULDER PAIN: ICD-10-CM

## 2023-09-18 DIAGNOSIS — J45.20 MILD INTERMITTENT ASTHMA WITHOUT COMPLICATION: ICD-10-CM

## 2023-09-18 PROCEDURE — 3078F DIAST BP <80 MM HG: CPT | Performed by: NURSE PRACTITIONER

## 2023-09-18 PROCEDURE — 99214 OFFICE O/P EST MOD 30 MIN: CPT | Performed by: NURSE PRACTITIONER

## 2023-09-18 PROCEDURE — 3074F SYST BP LT 130 MM HG: CPT | Performed by: NURSE PRACTITIONER

## 2023-09-18 PROCEDURE — 99212 OFFICE O/P EST SF 10 MIN: CPT | Performed by: NURSE PRACTITIONER

## 2023-09-18 RX ORDER — MONTELUKAST SODIUM 10 MG/1
10 TABLET ORAL DAILY
Qty: 30 TABLET | Refills: 1 | Status: SHIPPED | OUTPATIENT
Start: 2023-09-18 | End: 2023-11-08

## 2023-09-18 RX ORDER — BUDESONIDE 90 UG/1
2 AEROSOL, POWDER RESPIRATORY (INHALATION) 2 TIMES DAILY
Qty: 1 EACH | Refills: 1 | Status: SHIPPED | OUTPATIENT
Start: 2023-09-18 | End: 2023-10-24 | Stop reason: SDUPTHER

## 2023-09-18 ASSESSMENT — FIBROSIS 4 INDEX: FIB4 SCORE: 1.13

## 2023-09-25 PROBLEM — M25.512 CHRONIC LEFT SHOULDER PAIN: Status: ACTIVE | Noted: 2023-09-25

## 2023-09-25 PROBLEM — G89.29 CHRONIC LEFT SHOULDER PAIN: Status: ACTIVE | Noted: 2023-09-25

## 2023-09-25 NOTE — PROGRESS NOTES
Chief Complaint   Patient presents with    Elbow Pain     Right side pt states there a bump and is painful    Bump     Patient been having a bump for the longest time and states is painful.    Medication Problem     Patient states inhaler is making her spit yellow mucus.    Shoulder Pain     Patient fell in April of 2023 and has been having left shoulder pain.       Subjective:     HPI:   Marilyn Salinas is a 58 y.o. female here to discuss the evaluation and management of:      Problem   Chronic Left Shoulder Pain    Patient states that her chronic left shoulder pain is causing some significant issues and she would like to go to physical therapy to help with this.     Chronic Cough    Patient states that her chronic cough is returned and she would like to do something about this.  During patient's last visit with her primary care physician that had stopped her inhalers as her cough had resolved.  She is now having coughs daily.  She does have a history of asthma         ROS  See HPI     Allergies   Allergen Reactions    Other Environmental Hives     TUMBLEWEED    Codeine Itching, Unspecified and Rash     Rash, itching, mood disorder- becomes agitated  Other reaction(s): itchy, grumpy       Current medicines (including changes today)  Current Outpatient Medications   Medication Sig Dispense Refill    PULMICORT FLEXHALER 90 MCG/ACT AEROSOL POWDER, BREATH ACTIVATED Inhale 2 Puffs 2 times a day. 1 Each 1    montelukast (SINGULAIR) 10 MG Tab Take 1 Tablet by mouth every day. 30 Tablet 1    meloxicam (MOBIC) 15 MG tablet Take 1 Tablet by mouth every day. 30 Tablet 2    albuterol 108 (90 Base) MCG/ACT Aero Soln inhalation aerosol Inhale 2 Puffs every 6 hours as needed for Shortness of Breath. 8.5 g 1    baclofen (LIORESAL) 10 MG Tab TAKE 3 TABLETS BY MOUTH TWICE DAILY 540 Tablet 0    venlafaxine XR (EFFEXOR XR) 75 MG CAPSULE SR 24 HR Take 1 Capsule by mouth every day. Take with 150mg capsule for total of 225mg daily 30  Capsule 2    tizanidine (ZANAFLEX) 2 MG capsule Take 1 Capsule by mouth 2 times a day. 180 Capsule 3    Mirabegron ER (MYRBETRIQ) 50 MG TABLET SR 24 HR Take 50 mg by mouth every day. 90 Tablet 1    midodrine (PROAMATINE) 5 MG Tab Take 1 Tablet by mouth 3 times a day as needed (Take for BP < 100 or for symptoms of dizziness due to low blood pressure.). 270 Tablet 3    polyethylene glycol 3350 (MIRALAX) 17 GM/SCOOP Powder Take 17 g by mouth every day. 578 g 5    TOVIAZ 4 MG TABLET SR 24 HR TAKE 1 TABLET BY MOUTH EVERY DAY 90 Tablet 3    venlafaxine (EFFEXOR-XR) 150 MG extended-release capsule Take 1 Capsule by mouth every day. Take with 75mg capsule every day for total daily dose of 225mg. 30 Capsule 5    dicyclomine (BENTYL) 20 MG Tab Take 1 Tablet by mouth every 6 hours as needed (Abdominal Pain). 30 Tablet 2    gabapentin (NEURONTIN) 300 MG Cap Take 3 Capsules by mouth 2 times a day. Take 1 po qhs 540 Capsule 3    fluticasone (FLONASE) 50 MCG/ACT nasal spray Administer 1 Spray into affected nostril(S) every day. 16 g 6    estradiol (ESTRACE VAGINAL) 0.1 MG/GM vaginal cream Apply 1g cream inside vagina using applicator nightly for 2 weeks, then twice per week thereafter 1 Each 3     No current facility-administered medications for this visit.       Social History     Tobacco Use    Smoking status: Former     Current packs/day: 0.00     Average packs/day: 1 pack/day for 35.0 years (35.0 ttl pk-yrs)     Types: Cigarettes     Start date: 1987     Quit date: 2022     Years since quittin.7    Smokeless tobacco: Never    Tobacco comments:     20 years of 1ppd, then 10 years weaning down   Vaping Use    Vaping Use: Some days    Substances: THC, CBD   Substance Use Topics    Alcohol use: No    Drug use: Yes     Types: Marijuana, Inhaled     Comment: marijuana daily (smoked and edibles)       Patient Active Problem List    Diagnosis Date Noted    Chronic left shoulder pain 2023    Head trauma 2023     "Microscopic colitis 03/31/2023    Chronic cough 02/17/2023    Pain of toe of left foot 02/17/2023    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    Cervical spinal cord injury (HCC) 08/05/2021    Other cerebral palsy (HCC) 12/21/2020    Bilateral leg edema 12/21/2020    Onychomycosis 12/21/2020    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    Status post cervical spinal fusion 06/19/2020    Neurogenic bowel 01/07/2020    Neurogenic bladder 01/07/2020    Neuropathic pain 01/07/2020    Quadriplegia, C1-C4 incomplete (HCC) 01/07/2020    Lumbar radiculopathy 12/19/2019    Cervical spine pain 12/19/2019    Foraminal stenosis of cervical region 04/12/2019    Vertigo 02/22/2019    Risk for falls 12/31/2018    Corns and callus 08/21/2018    Pelvic pain 10/03/2017    Vitamin D deficiency 07/31/2017    Hyperlipidemia, unspecified 07/31/2017    Mild intermittent asthma 06/19/2017    Tobacco abuse, in remission 06/19/2017    Anxiety and depression 06/19/2017    Gastroparesis 06/19/2017    DDD (degenerative disc disease), lumbar 06/19/2017    Pain in both feet 06/19/2017       Family History   Problem Relation Age of Onset    Cancer Mother         ovarian    Alcohol/Drug Mother     Cancer Brother         unknown, caused him to lose his leg when he was 3    Diabetes Maternal Aunt           Objective:     /72 (BP Location: Left arm, Patient Position: Sitting, BP Cuff Size: Adult)   Pulse 77   Temp 37 °C (98.6 °F) (Temporal)   Resp 17   Ht 1.676 m (5' 6\")   Wt 72.4 kg (159 lb 11.2 oz)   SpO2 96%  Body mass index is 25.78 kg/m².    Physical Exam:  Physical Exam  Vitals reviewed.   Constitutional:       General: She is awake.      Appearance: Normal appearance. She is well-developed.   HENT:      Head: Normocephalic.   Eyes:      Conjunctiva/sclera: Conjunctivae normal.   Cardiovascular:      Rate and Rhythm: Normal rate and regular rhythm.      Heart sounds: Normal heart sounds.   Pulmonary:      Effort: Pulmonary effort " is normal. No respiratory distress.      Breath sounds: Normal breath sounds. No wheezing or rhonchi.   Musculoskeletal:      Cervical back: Neck supple.   Skin:     General: Skin is warm and dry.   Neurological:      Mental Status: She is alert and oriented to person, place, and time.   Psychiatric:         Mood and Affect: Mood normal.         Behavior: Behavior normal. Behavior is cooperative.              Assessment and Plan:     The following treatment plan was discussed:    Problem List Items Addressed This Visit       Mild intermittent asthma    Relevant Medications    PULMICORT FLEXHALER 90 MCG/ACT AEROSOL POWDER, BREATH ACTIVATED    montelukast (SINGULAIR) 10 MG Tab    Chronic cough     Ongoing, uncontrolled-  We will restart her on her inhalers, she was previously using Stiolto but she did not feel like that was very beneficial  We will trial patient on Pulmicort twice daily  Refilled albuterol inhaler  Encourage patient to take her Singulair and use her Flonase to help with allergies that may be contributing to her cough           Relevant Medications    PULMICORT FLEXHALER 90 MCG/ACT AEROSOL POWDER, BREATH ACTIVATED    montelukast (SINGULAIR) 10 MG Tab    Chronic left shoulder pain     Referral to physical therapy for acute on chronic left shoulder pain         Relevant Orders    Referral to Physical Therapy       Any change or worsening of signs or symptoms, patient encouraged to follow-up or report to emergency room for further evaluation. Patient verbalizes understanding and agrees.    Follow-Up:follow up as needed         PLEASE NOTE: This dictation was created using voice recognition software. I have made every reasonable attempt to correct obvious errors, but I expect that there are errors of grammar and possibly content that I did not discover before finalizing the note.

## 2023-09-25 NOTE — ASSESSMENT & PLAN NOTE
Ongoing, uncontrolled-  We will restart her on her inhalers, she was previously using Stiolto but she did not feel like that was very beneficial  We will trial patient on Pulmicort twice daily  Refilled albuterol inhaler  Encourage patient to take her Singulair and use her Flonase to help with allergies that may be contributing to her cough

## 2023-10-04 DIAGNOSIS — J45.20 MILD INTERMITTENT ASTHMA WITHOUT COMPLICATION: ICD-10-CM

## 2023-10-04 RX ORDER — ALBUTEROL SULFATE 90 UG/1
2 AEROSOL, METERED RESPIRATORY (INHALATION) EVERY 6 HOURS PRN
Qty: 18 G | Refills: 2 | Status: SHIPPED | OUTPATIENT
Start: 2023-10-04 | End: 2023-12-27

## 2023-10-05 ENCOUNTER — OFFICE VISIT (OUTPATIENT)
Dept: URGENT CARE | Facility: CLINIC | Age: 58
End: 2023-10-05
Payer: MEDICAID

## 2023-10-05 VITALS
WEIGHT: 152.8 LBS | RESPIRATION RATE: 20 BRPM | SYSTOLIC BLOOD PRESSURE: 124 MMHG | HEART RATE: 78 BPM | OXYGEN SATURATION: 98 % | BODY MASS INDEX: 24.56 KG/M2 | DIASTOLIC BLOOD PRESSURE: 64 MMHG | HEIGHT: 66 IN | TEMPERATURE: 97.8 F

## 2023-10-05 DIAGNOSIS — U07.1 COVID-19 VIRUS INFECTION: ICD-10-CM

## 2023-10-05 DIAGNOSIS — J32.9 RHINOSINUSITIS: ICD-10-CM

## 2023-10-05 DIAGNOSIS — B34.9 ACUTE VIRAL SYNDROME: ICD-10-CM

## 2023-10-05 LAB
FLUAV RNA SPEC QL NAA+PROBE: NEGATIVE
FLUBV RNA SPEC QL NAA+PROBE: NEGATIVE
RSV RNA SPEC QL NAA+PROBE: NEGATIVE
SARS-COV-2 RNA RESP QL NAA+PROBE: POSITIVE

## 2023-10-05 PROCEDURE — 3078F DIAST BP <80 MM HG: CPT | Performed by: STUDENT IN AN ORGANIZED HEALTH CARE EDUCATION/TRAINING PROGRAM

## 2023-10-05 PROCEDURE — 3074F SYST BP LT 130 MM HG: CPT | Performed by: STUDENT IN AN ORGANIZED HEALTH CARE EDUCATION/TRAINING PROGRAM

## 2023-10-05 PROCEDURE — 87637 SARSCOV2&INF A&B&RSV AMP PRB: CPT | Mod: QW | Performed by: STUDENT IN AN ORGANIZED HEALTH CARE EDUCATION/TRAINING PROGRAM

## 2023-10-05 PROCEDURE — 99214 OFFICE O/P EST MOD 30 MIN: CPT | Performed by: STUDENT IN AN ORGANIZED HEALTH CARE EDUCATION/TRAINING PROGRAM

## 2023-10-05 RX ORDER — ACETAMINOPHEN 500 MG
1000 TABLET ORAL ONCE
Status: SHIPPED | OUTPATIENT
Start: 2023-10-05 | End: 2023-10-08

## 2023-10-05 ASSESSMENT — FIBROSIS 4 INDEX: FIB4 SCORE: 1.13

## 2023-10-06 NOTE — PROGRESS NOTES
Subjective:   CHIEF COMPLAINT  Chief Complaint   Patient presents with    Nasal Congestion     X1WEEK FATIGUE/       HPI  Marilyn Salinas is a 58 y.o. female who presents with a chief complaint of fatigue and sweats x1 week.  She is tried TheraFlu, DayQuil and NyQuil which has not helped.  Positive ROS for chronic wheezing and shortness of breath secondary to asthma but no change from baseline.  Denies experiencing any cough.  No fevers.  Reports 1 known sick contacts.    REVIEW OF SYSTEMS  General: no fever or chills  GI: no nausea or vomiting  See HPI for further details.    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    Status post cervical spinal fusion 06/19/2020    Neurogenic bowel 01/07/2020    Neurogenic bladder 01/07/2020    Neuropathic pain 01/07/2020    Quadriplegia, C1-C4 incomplete (HCC) 01/07/2020    Lumbar radiculopathy 12/19/2019    Cervical spine pain 12/19/2019    Foraminal stenosis of cervical region 04/12/2019    Vertigo 02/22/2019    Risk for falls 12/31/2018    Corns and callus 08/21/2018    Pelvic pain 10/03/2017    Vitamin D deficiency 07/31/2017    Hyperlipidemia, unspecified 07/31/2017    Mild intermittent asthma 06/19/2017    Tobacco abuse, in remission 06/19/2017    Anxiety and depression 06/19/2017    Gastroparesis 06/19/2017    DDD (degenerative disc disease), lumbar 06/19/2017    Pain in both feet 06/19/2017       SURGICAL HISTORY   has a past surgical history that includes eye surgery (1972); lumpectomy; bladder sling female (N/A, 10/3/2017);  cervical disk and fusion anterior (N/A, 2019); corpectomy (N/A, 2019); amputation toe,mt-p jt (Left, 2021); other (Left, ); vaginal hysterectomy scope total (N/A, 10/3/2017); salpingectomy (Bilateral, 10/3/2017); oophorectomy (Bilateral, 10/3/2017); anterior and posterior repair (Bilateral, 10/3/2017); enterocele repair (N/A, 10/3/2017); vaginal suspension (N/A, 10/3/2017); and loco by laparoscopy (3/23/2022).    ALLERGIES  Allergies   Allergen Reactions    Other Environmental Hives     TUMBLEWEED    Codeine Itching, Unspecified and Rash     Rash, itching, mood disorder- becomes agitated  Other reaction(s): itchy, grumpy       CURRENT MEDICATIONS  acetaminophen  baclofen Tabs  dicyclomine Tabs  estradiol  fluticasone  gabapentin Caps  meloxicam  midodrine Tabs  montelukast Tabs  Myrbetriq Tb24  polyethylene glycol 3350 Powd  Pulmicort Flexhaler Aepb  tizanidine  Toviaz Tb24  venlafaxine  venlafaxine XR Cp24  Ventolin HFA Aers    SOCIAL HISTORY  Social History     Tobacco Use    Smoking status: Former     Current packs/day: 0.00     Average packs/day: 1 pack/day for 35.0 years (35.0 ttl pk-yrs)     Types: Cigarettes     Start date: 1987     Quit date: 2022     Years since quittin.7    Smokeless tobacco: Never    Tobacco comments:     20 years of 1ppd, then 10 years weaning down   Vaping Use    Vaping Use: Some days    Substances: THC, CBD   Substance and Sexual Activity    Alcohol use: No    Drug use: Yes     Types: Marijuana, Inhaled     Comment: marijuana daily (smoked and edibles)    Sexual activity: Not Currently       FAMILY HISTORY  Family History   Problem Relation Age of Onset    Cancer Mother         ovarian    Alcohol/Drug Mother     Cancer Brother         unknown, caused him to lose his leg when he was 3    Diabetes Maternal Aunt           Objective:   PHYSICAL EXAM  VITAL SIGNS: /64 (BP Location: Left arm, Patient Position: Sitting)   Pulse 78   Temp 36.6 °C  "(97.8 °F) (Temporal)   Resp 20   Ht 1.676 m (5' 6\")   Wt 69.3 kg (152 lb 12.8 oz)   LMP 01/03/2017   SpO2 98%   BMI 24.66 kg/m²     Gen: no acute distress, normal voice  Skin: dry, intact, moist mucosal membranes  Eyes: No conjunctival injection bilaterally.  Neck: Normal range of motion. No meningeal signs.   Lungs: No increased work of breathing.  CTAB w/ symmetric expansion  CV: RRR w/o murmurs or clicks  Psych: normal affect, normal judgement, alert, awake    Assessment/Plan:     1. COVID-19 virus infection        2. Rhinosinusitis  POCT CEPHEID COV-2, FLU A/B, RSV - PCR      3. Acute viral syndrome  acetaminophen (Tylenol) tablet 1,000 mg      Patient tested positive for COVID-19 which would explain her symptoms.  She was tearful and upset during the encounter but she was nontoxic, well-hydrated without any respiratory distress.  She is outside the 5-day window for use of paxlovid.  She was given Tylenol 1000 mg x 1 in the clinic to help with the myalgias.  Recommended continuation of Tylenol every 8 hours as needed.  Also encouraged relative rest and fluid hydration.  Return to urgent care any new/worsening symptoms or further questions or concerns.  Patient understood everything discussed.  All questions were answered.    Differential diagnosis and supportive care discussed. Follow-up as needed if symptoms worsen or fail to improve to PCP, Urgent care or Emergency Room.    32 minutes was spent caring for this patient on the day of the encounter which included messaging patient through [employer] reviewing test results, face-to-face time, discussing the diagnosis, medical management, and completion of the chart. This does not include time spent on separately billable procedures/tests.      Please note that this dictation was created using voice recognition software. I have made a reasonable attempt to correct obvious errors, but I expect that there are errors of grammar and possibly content that I did not " discover before finalizing the note.

## 2023-10-15 DIAGNOSIS — M79.672 FOOT PAIN, LEFT: ICD-10-CM

## 2023-10-17 RX ORDER — GABAPENTIN 300 MG/1
CAPSULE ORAL
Qty: 540 CAPSULE | Refills: 3 | OUTPATIENT
Start: 2023-10-17

## 2023-10-17 NOTE — TELEPHONE ENCOUNTER
Monse Sanders,    Unfortunately, Dr. Guillory has transitioned to inpatient and no longer has access to outpatient management. Please contact your primary care for any current needs.     Best regards,  Vicente Jones Ass't

## 2023-10-24 ENCOUNTER — OFFICE VISIT (OUTPATIENT)
Dept: MEDICAL GROUP | Facility: MEDICAL CENTER | Age: 58
End: 2023-10-24
Attending: NURSE PRACTITIONER
Payer: MEDICAID

## 2023-10-24 VITALS
DIASTOLIC BLOOD PRESSURE: 60 MMHG | BODY MASS INDEX: 25.75 KG/M2 | TEMPERATURE: 97.3 F | HEIGHT: 66 IN | OXYGEN SATURATION: 93 % | WEIGHT: 160.2 LBS | HEART RATE: 83 BPM | SYSTOLIC BLOOD PRESSURE: 110 MMHG | RESPIRATION RATE: 17 BRPM

## 2023-10-24 DIAGNOSIS — S14.109A INJURY OF CERVICAL SPINAL CORD, INITIAL ENCOUNTER (HCC): ICD-10-CM

## 2023-10-24 DIAGNOSIS — R05.3 CHRONIC COUGH: ICD-10-CM

## 2023-10-24 DIAGNOSIS — J45.20 MILD INTERMITTENT ASTHMA WITHOUT COMPLICATION: ICD-10-CM

## 2023-10-24 DIAGNOSIS — R25.2 SPASTICITY: ICD-10-CM

## 2023-10-24 DIAGNOSIS — M54.2 CERVICAL SPINE PAIN: ICD-10-CM

## 2023-10-24 DIAGNOSIS — F41.9 ANXIETY AND DEPRESSION: ICD-10-CM

## 2023-10-24 DIAGNOSIS — F32.A ANXIETY AND DEPRESSION: ICD-10-CM

## 2023-10-24 PROCEDURE — 3078F DIAST BP <80 MM HG: CPT | Performed by: NURSE PRACTITIONER

## 2023-10-24 PROCEDURE — 99213 OFFICE O/P EST LOW 20 MIN: CPT | Performed by: NURSE PRACTITIONER

## 2023-10-24 PROCEDURE — 99214 OFFICE O/P EST MOD 30 MIN: CPT | Performed by: NURSE PRACTITIONER

## 2023-10-24 PROCEDURE — 3074F SYST BP LT 130 MM HG: CPT | Performed by: NURSE PRACTITIONER

## 2023-10-24 RX ORDER — GUAIFENESIN 600 MG/1
600 TABLET, EXTENDED RELEASE ORAL EVERY 12 HOURS PRN
Qty: 60 TABLET | Refills: 0 | Status: SHIPPED | OUTPATIENT
Start: 2023-10-24 | End: 2023-12-01 | Stop reason: SDUPTHER

## 2023-10-24 RX ORDER — BUDESONIDE 90 UG/1
2 AEROSOL, POWDER RESPIRATORY (INHALATION) 2 TIMES DAILY
Qty: 1 EACH | Refills: 1 | Status: SHIPPED | OUTPATIENT
Start: 2023-10-24 | End: 2023-11-08

## 2023-10-24 RX ORDER — BACLOFEN 10 MG/1
30 TABLET ORAL 2 TIMES DAILY
Qty: 540 TABLET | Refills: 0 | Status: SHIPPED | OUTPATIENT
Start: 2023-10-24 | End: 2023-11-08 | Stop reason: SDUPTHER

## 2023-10-24 RX ORDER — TIZANIDINE HYDROCHLORIDE 2 MG/1
2 CAPSULE, GELATIN COATED ORAL
Qty: 180 CAPSULE | Refills: 3 | Status: SHIPPED | OUTPATIENT
Start: 2023-10-24 | End: 2023-11-08 | Stop reason: SDUPTHER

## 2023-10-24 RX ORDER — VENLAFAXINE HYDROCHLORIDE 150 MG/1
150 CAPSULE, EXTENDED RELEASE ORAL DAILY
Qty: 30 CAPSULE | Refills: 2 | Status: SHIPPED | OUTPATIENT
Start: 2023-10-24 | End: 2023-11-08 | Stop reason: SDUPTHER

## 2023-10-24 ASSESSMENT — FIBROSIS 4 INDEX: FIB4 SCORE: 1.13

## 2023-10-24 NOTE — ASSESSMENT & PLAN NOTE
Ongoing-   Went over proper inhaler use and will have her use her pulmicort and her rescue inhaler as needed.   Mucinex to help with expectoration   Lungs clear on auscultation in clinic today

## 2023-11-03 NOTE — PROGRESS NOTES
Chief Complaint   Patient presents with    Follow-Up     Medication check on what to exactly take either Pulmicort or ventolin     Patient been having tight chest  Yes shortness of breath   yes to headache   No to sore throat  Yes to cough patient mention that normal to her  Runny Nose No longer Present        Medication Refill       Subjective:     HPI:   Marilyn Salinas is a 58 y.o. female here to discuss the evaluation and management of:      Problem   Mild Intermittent Asthma    Patient had covid at the beginning of the month. Was confused as to which inhaler she should be using. Had been to Urgent care where she believed that they told her to stop her pulmicort and only use her ventolin. She did not feel this was beneficial so she has been trying to use her pulmicort more. Has noticed increase secretions. The secretions seem thick and sticky.           ROS  See HPI     Allergies   Allergen Reactions    Other Environmental Hives     TUMBLEWEED    Codeine Itching, Unspecified and Rash     Rash, itching, mood disorder- becomes agitated  Other reaction(s): itchy, grumpy       Current medicines (including changes today)  Current Outpatient Medications   Medication Sig Dispense Refill    PULMICORT FLEXHALER 90 MCG/ACT AEROSOL POWDER, BREATH ACTIVATED Inhale 2 Puffs 2 times a day. 1 Each 1    guaiFENesin ER (MUCINEX) 600 MG TABLET SR 12 HR Take 1 Tablet by mouth every 12 hours as needed for Cough. 60 Tablet 0    baclofen (LIORESAL) 10 MG Tab Take 3 Tablets by mouth 2 times a day. 540 Tablet 0    tizanidine (ZANAFLEX) 2 MG capsule Take 1 Capsule by mouth 2 times a day. 180 Capsule 3    venlafaxine (EFFEXOR-XR) 150 MG extended-release capsule Take 1 Capsule by mouth every day. Take with 75mg capsule every day for total daily dose of 225mg. 30 Capsule 2    estradiol (ESTRACE) 0.1 MG/GM vaginal cream APPLY 1 GRAM VAGINALLY USING APPLICATOR EVERY NIGHT AT BEDTIME TWICE PER WEEK 42.5 g 6    VENTOLIN  (90 Base)  MCG/ACT Aero Soln inhalation aerosol INHALE 2 PUFFS BY MOUTH EVERY 6 HOURS AS NEEDED FOR SHORTNESS OF BREATH 18 g 2    montelukast (SINGULAIR) 10 MG Tab Take 1 Tablet by mouth every day. 30 Tablet 1    meloxicam (MOBIC) 15 MG tablet Take 1 Tablet by mouth every day. 30 Tablet 2    Mirabegron ER (MYRBETRIQ) 50 MG TABLET SR 24 HR Take 50 mg by mouth every day. 90 Tablet 1    midodrine (PROAMATINE) 5 MG Tab Take 1 Tablet by mouth 3 times a day as needed (Take for BP < 100 or for symptoms of dizziness due to low blood pressure.). 270 Tablet 3    polyethylene glycol 3350 (MIRALAX) 17 GM/SCOOP Powder Take 17 g by mouth every day. 578 g 5    TOVIAZ 4 MG TABLET SR 24 HR TAKE 1 TABLET BY MOUTH EVERY DAY 90 Tablet 3    dicyclomine (BENTYL) 20 MG Tab Take 1 Tablet by mouth every 6 hours as needed (Abdominal Pain). 30 Tablet 2    gabapentin (NEURONTIN) 300 MG Cap Take 3 Capsules by mouth 2 times a day. Take 1 po qhs 540 Capsule 3    fluticasone (FLONASE) 50 MCG/ACT nasal spray Administer 1 Spray into affected nostril(S) every day. 16 g 6     No current facility-administered medications for this visit.       Social History     Tobacco Use    Smoking status: Former     Current packs/day: 0.00     Average packs/day: 1 pack/day for 35.0 years (35.0 ttl pk-yrs)     Types: Cigarettes     Start date: 1987     Quit date: 2022     Years since quittin.8    Smokeless tobacco: Never    Tobacco comments:     20 years of 1ppd, then 10 years weaning down   Vaping Use    Vaping Use: Some days    Substances: THC, CBD   Substance Use Topics    Alcohol use: No    Drug use: Yes     Types: Marijuana, Inhaled     Comment: marijuana daily (smoked and edibles)       Patient Active Problem List    Diagnosis Date Noted    Chronic left shoulder pain 2023    Head trauma 2023    Microscopic colitis 2023    Chronic cough 2023    Pain of toe of left foot 2023    Blind right eye 2023    Cervical spinal cord  "injury ([facility]) 08/05/2021    Other cerebral palsy ([facility]) 12/21/2020    Bilateral leg edema 12/21/2020    Onychomycosis 12/21/2020    Cerebral paresis with homolateral ataxia ([facility]) 06/19/2020    Status post cervical spinal fusion 06/19/2020    Neurogenic bowel 01/07/2020    Neurogenic bladder 01/07/2020    Neuropathic pain 01/07/2020    Quadriplegia, C1-C4 incomplete ([facility]) 01/07/2020    Lumbar radiculopathy 12/19/2019    Cervical spine pain 12/19/2019    Foraminal stenosis of cervical region 04/12/2019    Vertigo 02/22/2019    Risk for falls 12/31/2018    Corns and callus 08/21/2018    Pelvic pain 10/03/2017    Vitamin D deficiency 07/31/2017    Hyperlipidemia, unspecified 07/31/2017    Mild intermittent asthma 06/19/2017    Tobacco abuse, in remission 06/19/2017    Anxiety and depression 06/19/2017    Gastroparesis 06/19/2017    DDD (degenerative disc disease), lumbar 06/19/2017    Pain in both feet 06/19/2017       Family History   Problem Relation Age of Onset    Cancer Mother         ovarian    Alcohol/Drug Mother     Cancer Brother         unknown, caused him to lose his leg when he was 3    Diabetes Maternal Aunt           Objective:     /60 (BP Location: Left arm, Patient Position: Sitting, BP Cuff Size: Adult)   Pulse 83   Temp 36.3 °C (97.3 °F) (Temporal)   Resp 17   Ht 1.676 m (5' 6\")   Wt 72.7 kg (160 lb 3.2 oz)   SpO2 93%  Body mass index is 25.86 kg/m².    Physical Exam:  Physical Exam  Vitals reviewed.   Constitutional:       General: She is awake.      Appearance: Normal appearance. She is well-developed.   HENT:      Head: Normocephalic.      Nose: Nose normal.      Mouth/Throat:      Mouth: Mucous membranes are moist.      Pharynx: Posterior oropharyngeal erythema present.   Eyes:      Conjunctiva/sclera: Conjunctivae normal.   Cardiovascular:      Rate and Rhythm: Normal rate and regular rhythm.   Pulmonary:      Effort: Pulmonary effort is normal. No respiratory distress.      Breath " sounds: Normal breath sounds. No wheezing.   Musculoskeletal:      Cervical back: Neck supple.   Skin:     General: Skin is warm and dry.   Neurological:      Mental Status: She is alert and oriented to person, place, and time.   Psychiatric:         Mood and Affect: Mood normal.         Behavior: Behavior normal. Behavior is cooperative.              Assessment and Plan:     The following treatment plan was discussed:    Problem List Items Addressed This Visit       Mild intermittent asthma     Ongoing-   Went over proper inhaler use and will have her use her pulmicort and her rescue inhaler as needed.   Mucinex to help with expectoration   Lungs clear on auscultation in clinic today          Relevant Medications    PULMICORT FLEXHALER 90 MCG/ACT AEROSOL POWDER, BREATH ACTIVATED    Anxiety and depression    Relevant Medications    venlafaxine (EFFEXOR-XR) 150 MG extended-release capsule    Cervical spine pain    Relevant Medications    venlafaxine (EFFEXOR-XR) 150 MG extended-release capsule    Cervical spinal cord injury (HCC)    Relevant Medications    baclofen (LIORESAL) 10 MG Tab    Chronic cough    Relevant Medications    PULMICORT FLEXHALER 90 MCG/ACT AEROSOL POWDER, BREATH ACTIVATED    guaiFENesin ER (MUCINEX) 600 MG TABLET SR 12 HR     Other Visit Diagnoses       Spasticity        Relevant Medications    baclofen (LIORESAL) 10 MG Tab    tizanidine (ZANAFLEX) 2 MG capsule            Any change or worsening of signs or symptoms, patient encouraged to follow-up or report to emergency room for further evaluation. Patient verbalizes understanding and agrees.    Follow-Up: No follow-ups on file.      PLEASE NOTE: This dictation was created using voice recognition software. I have made every reasonable attempt to correct obvious errors, but I expect that there are errors of grammar and possibly content that I did not discover before finalizing the note.

## 2023-11-08 ENCOUNTER — OFFICE VISIT (OUTPATIENT)
Dept: MEDICAL GROUP | Facility: MEDICAL CENTER | Age: 58
End: 2023-11-08
Attending: FAMILY MEDICINE
Payer: MEDICAID

## 2023-11-08 VITALS
OXYGEN SATURATION: 94 % | BODY MASS INDEX: 25.71 KG/M2 | HEART RATE: 74 BPM | HEIGHT: 66 IN | WEIGHT: 160 LBS | SYSTOLIC BLOOD PRESSURE: 94 MMHG | TEMPERATURE: 97.9 F | DIASTOLIC BLOOD PRESSURE: 70 MMHG | RESPIRATION RATE: 16 BRPM

## 2023-11-08 DIAGNOSIS — R25.2 SPASTICITY: ICD-10-CM

## 2023-11-08 DIAGNOSIS — M51.36 DDD (DEGENERATIVE DISC DISEASE), LUMBAR: ICD-10-CM

## 2023-11-08 DIAGNOSIS — M54.2 CERVICAL SPINE PAIN: ICD-10-CM

## 2023-11-08 DIAGNOSIS — F32.A ANXIETY AND DEPRESSION: ICD-10-CM

## 2023-11-08 DIAGNOSIS — F41.9 ANXIETY AND DEPRESSION: ICD-10-CM

## 2023-11-08 DIAGNOSIS — M79.672 FOOT PAIN, LEFT: ICD-10-CM

## 2023-11-08 DIAGNOSIS — S14.109A INJURY OF CERVICAL SPINAL CORD, INITIAL ENCOUNTER (HCC): ICD-10-CM

## 2023-11-08 DIAGNOSIS — J45.20 MILD INTERMITTENT ASTHMA WITHOUT COMPLICATION: ICD-10-CM

## 2023-11-08 DIAGNOSIS — N31.9 NEUROGENIC BLADDER: ICD-10-CM

## 2023-11-08 DIAGNOSIS — M25.512 ACUTE PAIN OF LEFT SHOULDER: ICD-10-CM

## 2023-11-08 PROBLEM — K63.89 OTHER SPECIFIED DISEASES OF INTESTINE: Status: ACTIVE | Noted: 2023-11-08

## 2023-11-08 PROCEDURE — 3078F DIAST BP <80 MM HG: CPT | Performed by: FAMILY MEDICINE

## 2023-11-08 PROCEDURE — 99213 OFFICE O/P EST LOW 20 MIN: CPT | Performed by: FAMILY MEDICINE

## 2023-11-08 PROCEDURE — 99214 OFFICE O/P EST MOD 30 MIN: CPT | Performed by: FAMILY MEDICINE

## 2023-11-08 PROCEDURE — 3074F SYST BP LT 130 MM HG: CPT | Performed by: FAMILY MEDICINE

## 2023-11-08 RX ORDER — VENLAFAXINE HYDROCHLORIDE 150 MG/1
150 CAPSULE, EXTENDED RELEASE ORAL DAILY
Qty: 30 CAPSULE | Refills: 5 | Status: SHIPPED | OUTPATIENT
Start: 2023-11-08

## 2023-11-08 RX ORDER — FESOTERODINE FUMARATE 4 MG/1
4 TABLET, EXTENDED RELEASE ORAL
Qty: 30 TABLET | Refills: 5 | Status: SHIPPED | OUTPATIENT
Start: 2023-11-08

## 2023-11-08 RX ORDER — TIZANIDINE HYDROCHLORIDE 2 MG/1
2 CAPSULE, GELATIN COATED ORAL
Qty: 60 CAPSULE | Refills: 5 | Status: SHIPPED | OUTPATIENT
Start: 2023-11-08

## 2023-11-08 RX ORDER — MELOXICAM 15 MG/1
15 TABLET ORAL DAILY
Qty: 30 TABLET | Refills: 5 | Status: SHIPPED | OUTPATIENT
Start: 2023-11-08

## 2023-11-08 RX ORDER — BACLOFEN 10 MG/1
30 TABLET ORAL 2 TIMES DAILY
Qty: 180 TABLET | Refills: 5 | Status: SHIPPED | OUTPATIENT
Start: 2023-11-08

## 2023-11-08 RX ORDER — GABAPENTIN 300 MG/1
900 CAPSULE ORAL
Qty: 180 CAPSULE | Refills: 5 | Status: SHIPPED | OUTPATIENT
Start: 2023-11-08

## 2023-11-08 ASSESSMENT — FIBROSIS 4 INDEX: FIB4 SCORE: 1.13

## 2023-11-08 NOTE — PROGRESS NOTES
Subjective:     CC:   Chief Complaint   Patient presents with    Medication Management         HPI:     Mild intermittent asthma  Patient is doing well with breathing. Decided to use ventolin about once per day  Mucinex was very helpful  Stopped pulmicort as it was hurting her throat.   Overall improved  Had asthma prior to covid but only needed albuterol prn      Neurogenic bladder  Pt's PM&R doctor left practice so she is in need of refills of her medications for neurogenic bladder  She reports that she has been getting botox injections for this as well, with improvement.   She is also on myrbetriq 50mg and fesoterodine 4mg with good benefit but would like to reduce myrbetriq as she feels that she has been having some constipation symptoms.     Cervical spine pain  Pt was on effexor 225, however was resduced to 150mg and is having good benefit. She would like to continue this dose.       Past Medical History:   Diagnosis Date    Anesthesia 12/23/2021    agitated coming out of anesthesia    Anesthesia 03/17/2022    Patient states becomes upset and sad after anesthesia.    Anxiety     Arthritis     spine, feet     Asthma     Breath shortness     Cataract 12/23/2022    no surgery yet    Cerebral palsy (HCC)     Cervical spinal cord injury (HCC) 12/19/2019    COVID-19 01/17/2022 1/17/2022 stopped 1-    DDD (degenerative disc disease), lumbar     Per Problem List    Dental disorder     upper and lower denture    Depression     Full dentures 03/17/2022    Heart burn 12/23/2022    GERD    High cholesterol     History of falling     Marijuana user     Risk for falls     Stroke (HCC) 03/17/2022    Pt. states, MINI STROKE AGE 19.    Tetraplegia (HCC)     TIA (transient ischemic attack) 03/17/2022    AGE 19. Patient states D/T drug use.     Urinary bladder disorder     Urinary incontinence     Vertigo     Per Problem List       Social History     Tobacco Use    Smoking status: Former     Current packs/day: 0.00      Average packs/day: 1 pack/day for 35.0 years (35.0 ttl pk-yrs)     Types: Cigarettes     Start date: 1987     Quit date: 2022     Years since quittin.8    Smokeless tobacco: Never    Tobacco comments:     20 years of 1ppd, then 10 years weaning down   Vaping Use    Vaping Use: Some days    Substances: THC, CBD   Substance Use Topics    Alcohol use: No    Drug use: Yes     Types: Marijuana, Inhaled     Comment: marijuana daily (smoked and edibles)       Current Outpatient Medications Ordered in Epic   Medication Sig Dispense Refill    budesonide-formoterol (SYMBICORT) 80-4.5 MCG/ACT Aerosol Inhale 2 Puffs 2 times a day. 10.2 g 5    baclofen (LIORESAL) 10 MG Tab Take 3 Tablets by mouth 2 times a day. 180 Tablet 5    gabapentin (NEURONTIN) 300 MG Cap Take 3 Capsules by mouth 2 times a day. Take 1 po qhs 180 Capsule 5    meloxicam (MOBIC) 15 MG tablet Take 1 Tablet by mouth every day. 30 Tablet 5    mirabegron ER (MYRBETRIQ) 25 MG TABLET SR 24 HR Take 1 Tablet by mouth every day. 30 Tablet 5    tizanidine (ZANAFLEX) 2 MG capsule Take 1 Capsule by mouth 2 times a day. 60 Capsule 5    fesoterodine fumarate (TOVIAZ) 4 MG TABLET SR 24 HR Take 1 Tablet by mouth every day. 30 Tablet 5    venlafaxine (EFFEXOR-XR) 150 MG extended-release capsule Take 1 Capsule by mouth every day. 30 Capsule 5    guaiFENesin ER (MUCINEX) 600 MG TABLET SR 12 HR Take 1 Tablet by mouth every 12 hours as needed for Cough. 60 Tablet 0    VENTOLIN  (90 Base) MCG/ACT Aero Soln inhalation aerosol INHALE 2 PUFFS BY MOUTH EVERY 6 HOURS AS NEEDED FOR SHORTNESS OF BREATH 18 g 2    polyethylene glycol 3350 (MIRALAX) 17 GM/SCOOP Powder Take 17 g by mouth every day. 578 g 5    dicyclomine (BENTYL) 20 MG Tab Take 1 Tablet by mouth every 6 hours as needed (Abdominal Pain). 30 Tablet 2    fluticasone (FLONASE) 50 MCG/ACT nasal spray Administer 1 Spray into affected nostril(S) every day. 16 g 6     No current Epic-ordered facility-administered  "medications on file.       Allergies:  Other environmental and Codeine        Objective:       Exam:  BP 94/70 (BP Location: Left arm, Patient Position: Sitting)   Pulse 74   Temp 36.6 °C (97.9 °F) (Temporal)   Resp 16   Ht 1.676 m (5' 6\")   Wt 72.6 kg (160 lb)   LMP 01/03/2017   SpO2 94%   BMI 25.82 kg/m²  Body mass index is 25.82 kg/m².    General: Normal appearing. No distress.  Pulmonary: Clear to ausculation.  Normal effort. No rales, ronchi, or wheezing.  Cardiovascular: Regular rate and rhythm without murmur.   Psych: Normal mood and affect.       Labs:   Reviewed last notes from primary care    Assessment & Plan:     58 y.o. female with the following -     1. Mild intermittent asthma without complication  Stop pulmicort - powder may be irritating. I was going to advise stopping this but then pt mentioned use of ventolin with benefit at least once per day. She did get covid about 1 month ago and this may be due to that. I offered symbicort to help with cough and hopefully it will resolve over the next few weeks, however she declined and will continue ventolin use. I advised her that if it continues and she continues to require ventolin more than 3-4 times per week, we should consider a daily inhaler.     2. Injury of cervical spinal cord, initial encounter (HCC)  - baclofen (LIORESAL) 10 MG Tab; Take 3 Tablets by mouth 2 times a day.  Dispense: 180 Tablet; Refill: 5  - fesoterodine fumarate (TOVIAZ) 4 MG TABLET SR 24 HR; Take 1 Tablet by mouth every day.  Dispense: 30 Tablet; Refill: 5  Med refill    3. Spasticity  - baclofen (LIORESAL) 10 MG Tab; Take 3 Tablets by mouth 2 times a day.  Dispense: 180 Tablet; Refill: 5  - tizanidine (ZANAFLEX) 2 MG capsule; Take 1 Capsule by mouth 2 times a day.  Dispense: 60 Capsule; Refill: 5  Med refill    4. Foot pain, left  - gabapentin (NEURONTIN) 300 MG Cap; Take 3 Capsules by mouth 2 times a day. Take 1 po qhs  Dispense: 180 Capsule; Refill: 5  Med refill    5. " DDD (degenerative disc disease), lumbar  - meloxicam (MOBIC) 15 MG tablet; Take 1 Tablet by mouth every day.  Dispense: 30 Tablet; Refill: 5  Med refill    6. Neurogenic bladder  - mirabegron ER (MYRBETRIQ) 25 MG TABLET SR 24 HR; Take 1 Tablet by mouth every day.  Dispense: 30 Tablet; Refill: 5  - fesoterodine fumarate (TOVIAZ) 4 MG TABLET SR 24 HR; Take 1 Tablet by mouth every day.  Dispense: 30 Tablet; Refill: 5  Med refill, reduced dose of myrbetriq    7. Anxiety and depression  - venlafaxine (EFFEXOR-XR) 150 MG extended-release capsule; Take 1 Capsule by mouth every day.  Dispense: 30 Capsule; Refill: 5  Med refill    8. Cervical spine pain  - venlafaxine (EFFEXOR-XR) 150 MG extended-release capsule; Take 1 Capsule by mouth every day.  Dispense: 30 Capsule; Refill: 5    9. Acute pain of left shoulder  - Referral to Physical Therapy  Pt requesting this referral to be placed again.      Return in about 2 months (around 1/8/2024) for f/u breathing.    Please note that this dictation was created using voice recognition software. I have made every reasonable attempt to correct obvious errors, but I expect that there are errors of grammar and possibly content that I did not discover before finalizing the note.

## 2023-11-08 NOTE — ASSESSMENT & PLAN NOTE
Patient is doing well with breathing. Decided to use ventolin about once per day  Mucinex was very helpful  Stopped pulmicort as it was hurting her throat.   Overall improved  Had asthma prior to covid but only needed albuterol prn

## 2023-11-09 RX ORDER — BUDESONIDE AND FORMOTEROL FUMARATE DIHYDRATE 80; 4.5 UG/1; UG/1
2 AEROSOL RESPIRATORY (INHALATION) 2 TIMES DAILY
Qty: 10.2 G | Refills: 5 | Status: SHIPPED | OUTPATIENT
Start: 2023-11-09 | End: 2023-11-09

## 2023-11-10 NOTE — ASSESSMENT & PLAN NOTE
Pt was on effexor 225, however was resduced to 150mg and is having good benefit. She would like to continue this dose.

## 2023-11-10 NOTE — ASSESSMENT & PLAN NOTE
Pt's PM&R doctor left practice so she is in need of refills of her medications for neurogenic bladder  She reports that she has been getting botox injections for this as well, with improvement.   She is also on myrbetriq 50mg and fesoterodine 4mg with good benefit but would like to reduce myrbetriq as she feels that she has been having some constipation symptoms.

## 2023-11-28 DIAGNOSIS — J30.1 NON-SEASONAL ALLERGIC RHINITIS DUE TO POLLEN: ICD-10-CM

## 2023-11-28 NOTE — TELEPHONE ENCOUNTER
Received request via: Pharmacy    Was the patient seen in the last year in this department? Yes    Does the patient have an active prescription (recently filled or refills available) for medication(s) requested? No    Does the patient have [facility] and need 100 day supply (blood pressure, diabetes and cholesterol meds only)? Patient does not have SCP    Future Appointments         Provider Department [place]    2/14/2024 2:00 PM (Arrive by 1:45 PM) Indira Hutchison M.D. [facility]

## 2023-11-29 RX ORDER — FLUTICASONE PROPIONATE 50 MCG
1 SPRAY, SUSPENSION (ML) NASAL DAILY
Qty: 16 G | Refills: 6 | Status: SHIPPED | OUTPATIENT
Start: 2023-11-29

## 2023-12-01 DIAGNOSIS — R05.3 CHRONIC COUGH: ICD-10-CM

## 2023-12-01 RX ORDER — GUAIFENESIN 600 MG/1
600 TABLET, EXTENDED RELEASE ORAL EVERY 12 HOURS PRN
Qty: 60 TABLET | Refills: 0 | Status: SHIPPED | OUTPATIENT
Start: 2023-12-01 | End: 2024-01-30 | Stop reason: SDUPTHER

## 2023-12-01 NOTE — TELEPHONE ENCOUNTER
Received request via: Pharmacy    Was the patient seen in the last year in this department? Yes    Does the patient have an active prescription (recently filled or refills available) for medication(s) requested? No    Does the patient have care home [facility] and need 100 day supply (blood pressure, diabetes and cholesterol meds only)? Patient does not have SCP  Future Appointments         Provider Department [place]    2/14/2024 2:00 PM (Arrive by 1:45 PM) Indira Hutchison M.D. [facility]

## 2023-12-26 DIAGNOSIS — J45.20 MILD INTERMITTENT ASTHMA WITHOUT COMPLICATION: ICD-10-CM

## 2023-12-27 RX ORDER — ALBUTEROL SULFATE 90 UG/1
2 AEROSOL, METERED RESPIRATORY (INHALATION) EVERY 6 HOURS PRN
Qty: 18 G | Refills: 2 | Status: SHIPPED | OUTPATIENT
Start: 2023-12-27

## 2024-01-02 ENCOUNTER — APPOINTMENT (OUTPATIENT)
Dept: URGENT CARE | Facility: CLINIC | Age: 59
End: 2024-01-02
Payer: MEDICAID

## 2024-01-02 ENCOUNTER — OFFICE VISIT (OUTPATIENT)
Dept: URGENT CARE | Facility: CLINIC | Age: 59
End: 2024-01-02
Payer: MEDICAID

## 2024-01-02 VITALS
HEART RATE: 76 BPM | WEIGHT: 162.1 LBS | BODY MASS INDEX: 26.05 KG/M2 | HEIGHT: 66 IN | OXYGEN SATURATION: 96 % | DIASTOLIC BLOOD PRESSURE: 84 MMHG | TEMPERATURE: 96.7 F | RESPIRATION RATE: 16 BRPM | SYSTOLIC BLOOD PRESSURE: 122 MMHG

## 2024-01-02 DIAGNOSIS — J02.9 SORE THROAT: ICD-10-CM

## 2024-01-02 LAB — S PYO DNA SPEC NAA+PROBE: NOT DETECTED

## 2024-01-02 PROCEDURE — 87651 STREP A DNA AMP PROBE: CPT | Performed by: FAMILY MEDICINE

## 2024-01-02 PROCEDURE — 99213 OFFICE O/P EST LOW 20 MIN: CPT | Performed by: FAMILY MEDICINE

## 2024-01-02 PROCEDURE — 3074F SYST BP LT 130 MM HG: CPT | Performed by: FAMILY MEDICINE

## 2024-01-02 PROCEDURE — 3079F DIAST BP 80-89 MM HG: CPT | Performed by: FAMILY MEDICINE

## 2024-01-02 ASSESSMENT — FIBROSIS 4 INDEX: FIB4 SCORE: 1.13

## 2024-01-02 NOTE — PROGRESS NOTES
"  Subjective:      58 y.o. female presents to urgent care for cold symptoms that started yesterday.  She is experiencing sore throat and change in voice.  No fever, headache, body ache, cough, or diarrhea. No tobacco product use.  She does have asthma for which she uses albuterol as needed.  Typically she uses this once per day, but since getting sick is using it 2 times per day.  She is not vaccinated against COVID.  No known sick contacts.    She denies any other questions or concerns at this time.    Current problem list, medication, and past medical/surgical history were reviewed in Epic.    ROS  See HPI     Objective:      /84   Pulse 76   Temp 35.9 °C (96.7 °F) (Temporal)   Resp 16   Ht 1.676 m (5' 6\")   Wt 73.5 kg (162 lb 1.6 oz)   LMP 01/03/2017   SpO2 96%   BMI 26.16 kg/m²     Physical Exam  Constitutional:       General: She is not in acute distress.     Appearance: She is not diaphoretic.   HENT:      Right Ear: Tympanic membrane, ear canal and external ear normal.      Left Ear: Tympanic membrane, ear canal and external ear normal.      Mouth/Throat:      Tongue: Tongue does not deviate from midline.      Palate: No lesions.      Pharynx: Uvula midline. Posterior oropharyngeal erythema present. No oropharyngeal exudate.      Tonsils: No tonsillar exudate. 1+ on the right. 1+ on the left.   Cardiovascular:      Rate and Rhythm: Normal rate and regular rhythm.      Heart sounds: Normal heart sounds.   Pulmonary:      Effort: Pulmonary effort is normal. No respiratory distress.      Breath sounds: Normal breath sounds.   Neurological:      Mental Status: She is alert.   Psychiatric:         Mood and Affect: Affect normal.         Judgment: Judgment normal.       Assessment/Plan:     1. Sore throat  Rapid strep negative.  Most consistent with virus.  Tylenol, ibuprofen, and gargle with warm salt water as needed for symptomatic relief.  - POCT CEPHEID GROUP A STREP - PCR      Instructed to " return to Urgent Care or nearest Emergency Department if symptoms fail to improve, for any change in condition, further concerns, or new concerning symptoms. Patient states understanding of the plan of care and discharge instructions.    Monica Boggs M.D.

## 2024-01-11 DIAGNOSIS — G80.8 OTHER CEREBRAL PALSY (HCC): ICD-10-CM

## 2024-01-11 DIAGNOSIS — R41.9 DEFICIT IN COMPREHENSION: ICD-10-CM

## 2024-01-30 ENCOUNTER — OFFICE VISIT (OUTPATIENT)
Dept: MEDICAL GROUP | Facility: MEDICAL CENTER | Age: 59
End: 2024-01-30
Attending: FAMILY MEDICINE
Payer: MEDICAID

## 2024-01-30 VITALS
HEIGHT: 66 IN | RESPIRATION RATE: 16 BRPM | TEMPERATURE: 97.6 F | BODY MASS INDEX: 24.91 KG/M2 | HEART RATE: 78 BPM | DIASTOLIC BLOOD PRESSURE: 64 MMHG | OXYGEN SATURATION: 90 % | WEIGHT: 155 LBS | SYSTOLIC BLOOD PRESSURE: 100 MMHG

## 2024-01-30 DIAGNOSIS — R05.3 CHRONIC COUGH: ICD-10-CM

## 2024-01-30 DIAGNOSIS — R05.9 COUGH, UNSPECIFIED TYPE: ICD-10-CM

## 2024-01-30 PROCEDURE — 3074F SYST BP LT 130 MM HG: CPT | Performed by: FAMILY MEDICINE

## 2024-01-30 PROCEDURE — 3078F DIAST BP <80 MM HG: CPT | Performed by: FAMILY MEDICINE

## 2024-01-30 PROCEDURE — 99212 OFFICE O/P EST SF 10 MIN: CPT | Performed by: FAMILY MEDICINE

## 2024-01-30 PROCEDURE — 99214 OFFICE O/P EST MOD 30 MIN: CPT | Performed by: FAMILY MEDICINE

## 2024-01-30 RX ORDER — GUAIFENESIN 600 MG/1
600 TABLET, EXTENDED RELEASE ORAL EVERY 12 HOURS PRN
Qty: 60 TABLET | Refills: 0 | Status: SHIPPED | OUTPATIENT
Start: 2024-01-30

## 2024-01-30 ASSESSMENT — FIBROSIS 4 INDEX: FIB4 SCORE: 1.13

## 2024-01-30 NOTE — PROGRESS NOTES
Subjective:     CC:   Chief Complaint   Patient presents with    Congestion         HPI:     Pt presents today reporting 1 week history of productive cough   Hoarseness on 24, tested neg for strep at the time at   Patient thinks that the mucinex was helping significantly and would like to go back on this. Also has ventolin, which was also helpful, and uses this nightly   Slight runny nose, no ear pain, chest tightness, no sob.   Regular allergies recently.   Feels like asthma has been a little worse recently due to the wind.         Past Medical History:   Diagnosis Date    Anesthesia 2021    agitated coming out of anesthesia    Anesthesia 2022    Patient states becomes upset and sad after anesthesia.    Anxiety     Arthritis     spine, feet     Asthma     Breath shortness     Cataract 2022    no surgery yet    Cerebral palsy (HCC)     Cervical spinal cord injury (HCC) 2019    COVID-19 2022 stopped 2022    DDD (degenerative disc disease), lumbar     Per Problem List    Dental disorder     upper and lower denture    Depression     Full dentures 2022    Heart burn 2022    GERD    High cholesterol     History of falling     Marijuana user     Risk for falls     Stroke (HCC) 2022    Pt. states, MINI STROKE AGE 19.    Tetraplegia ([facility])     TIA (transient ischemic attack) 2022    AGE 19. Patient states D/T drug use.     Urinary bladder disorder     Urinary incontinence     Vertigo     Per Problem List       Social History     Tobacco Use    Smoking status: Some Days     Current packs/day: 0.00     Average packs/day: 1 pack/day for 35.0 years (35.0 ttl pk-yrs)     Types: Cigarettes     Start date: 1987     Last attempt to quit: 2022     Years since quittin.0    Smokeless tobacco: Never    Tobacco comments:     20 years of 1ppd, then 10 years weaning down   Vaping Use    Vaping Use: Every day    Substances: THC, CBD   Substance Use  "Topics    Alcohol use: No    Drug use: Yes     Types: Marijuana, Inhaled     Comment: marijuana daily (smoked and edibles)       Current Outpatient Medications Ordered in Epic   Medication Sig Dispense Refill    guaiFENesin ER (MUCINEX) 600 MG TABLET SR 12 HR Take 1 Tablet by mouth every 12 hours as needed for Cough. 60 Tablet 0    VENTOLIN  (90 Base) MCG/ACT Aero Soln inhalation aerosol INHALE 2 PUFFS BY MOUTH EVERY 6 HOURS AS NEEDED FOR SHORTNESS OF BREATH 18 g 2    fluticasone (FLONASE) 50 MCG/ACT nasal spray Administer 1 Spray into affected nostril(S) every day. 16 g 6    baclofen (LIORESAL) 10 MG Tab Take 3 Tablets by mouth 2 times a day. 180 Tablet 5    gabapentin (NEURONTIN) 300 MG Cap Take 3 Capsules by mouth 2 times a day. Take 1 po qhs 180 Capsule 5    meloxicam (MOBIC) 15 MG tablet Take 1 Tablet by mouth every day. 30 Tablet 5    mirabegron ER (MYRBETRIQ) 25 MG TABLET SR 24 HR Take 1 Tablet by mouth every day. 30 Tablet 5    tizanidine (ZANAFLEX) 2 MG capsule Take 1 Capsule by mouth 2 times a day. 60 Capsule 5    fesoterodine fumarate (TOVIAZ) 4 MG TABLET SR 24 HR Take 1 Tablet by mouth every day. 30 Tablet 5    venlafaxine (EFFEXOR-XR) 150 MG extended-release capsule Take 1 Capsule by mouth every day. 30 Capsule 5    polyethylene glycol 3350 (MIRALAX) 17 GM/SCOOP Powder Take 17 g by mouth every day. 578 g 5    dicyclomine (BENTYL) 20 MG Tab Take 1 Tablet by mouth every 6 hours as needed (Abdominal Pain). 30 Tablet 2     No current Epic-ordered facility-administered medications on file.       Allergies:  Other environmental and Codeine        Objective:       Exam:  /64 (BP Location: Left arm, Patient Position: Sitting)   Pulse 78   Temp 36.4 °C (97.6 °F) (Temporal)   Resp 16   Ht 1.676 m (5' 6\")   Wt 70.3 kg (155 lb)   LMP 01/03/2017   SpO2 90%   BMI 25.02 kg/m²  Body mass index is 25.02 kg/m².    General: Normal appearing. No distress.  Pulmonary: scattered wheezing, dullness in the " Left upper lobe. Egophony noted bilateral upper lobes  Cardiovascular: Regular rate and rhythm without murmur.   Psych: Normal mood and affect.       Data reviewed:   none    Assessment & Plan:     58 y.o. female with the following -     1. Cough, unspecified type  - DX-CHEST-2 VIEWS; Future  Clinical exam findings suspicious for pna, however she does not seem particularly ill, without fever - she didn't cough much in office today.   Given increased need for ventolin, I will treat as stepwise therapy for her asthma - she has pulmicort at home and will start this.   If no improvement and worsens, will treat as CAP     2. Chronic cough  - guaiFENesin ER (MUCINEX) 600 MG TABLET SR 12 HR; Take 1 Tablet by mouth every 12 hours as needed for Cough.  Dispense: 60 Tablet; Refill: 0      Return in about 2 weeks (around 2/13/2024) for already scheduled.    Please note that this dictation was created using voice recognition software. I have made every reasonable attempt to correct obvious errors, but I expect that there are errors of grammar and possibly content that I did not discover before finalizing the note.

## 2024-02-05 ENCOUNTER — APPOINTMENT (OUTPATIENT)
Dept: RADIOLOGY | Facility: MEDICAL CENTER | Age: 59
End: 2024-02-05
Attending: FAMILY MEDICINE
Payer: MEDICAID

## 2024-02-10 ENCOUNTER — HOSPITAL ENCOUNTER (OUTPATIENT)
Dept: RADIOLOGY | Facility: MEDICAL CENTER | Age: 59
End: 2024-02-10
Attending: FAMILY MEDICINE
Payer: MEDICAID

## 2024-02-10 DIAGNOSIS — R05.9 COUGH, UNSPECIFIED TYPE: ICD-10-CM

## 2024-02-10 PROCEDURE — 71046 X-RAY EXAM CHEST 2 VIEWS: CPT

## 2024-02-14 ENCOUNTER — OFFICE VISIT (OUTPATIENT)
Dept: MEDICAL GROUP | Facility: MEDICAL CENTER | Age: 59
End: 2024-02-14
Attending: FAMILY MEDICINE
Payer: MEDICAID

## 2024-02-14 VITALS
TEMPERATURE: 98 F | HEIGHT: 66 IN | DIASTOLIC BLOOD PRESSURE: 70 MMHG | OXYGEN SATURATION: 93 % | RESPIRATION RATE: 16 BRPM | HEART RATE: 82 BPM | SYSTOLIC BLOOD PRESSURE: 108 MMHG | BODY MASS INDEX: 25.07 KG/M2 | WEIGHT: 156 LBS

## 2024-02-14 DIAGNOSIS — R05.3 CHRONIC COUGH: ICD-10-CM

## 2024-02-14 DIAGNOSIS — M79.89 SOFT TISSUE MASS: ICD-10-CM

## 2024-02-14 PROCEDURE — 3074F SYST BP LT 130 MM HG: CPT | Performed by: FAMILY MEDICINE

## 2024-02-14 PROCEDURE — 3078F DIAST BP <80 MM HG: CPT | Performed by: FAMILY MEDICINE

## 2024-02-14 PROCEDURE — 99213 OFFICE O/P EST LOW 20 MIN: CPT | Performed by: FAMILY MEDICINE

## 2024-02-14 PROCEDURE — 99213 OFFICE O/P EST LOW 20 MIN: CPT | Mod: 25 | Performed by: FAMILY MEDICINE

## 2024-02-14 PROCEDURE — 99173 VISUAL ACUITY SCREEN: CPT | Performed by: FAMILY MEDICINE

## 2024-02-14 ASSESSMENT — FIBROSIS 4 INDEX: FIB4 SCORE: 1.13

## 2024-02-14 NOTE — PROGRESS NOTES
Subjective:     CC:   Chief Complaint   Patient presents with    Results    Follow-Up     Spot on back         HPI:     Chronic cough  Pt tried pulmicort last week, which did improve her cough  Lungs sound better    Soft tissue mass  Pt reports that she just noticed a lump on the back, to the R of lower thoracic area. She reports it is itchy. She is not sure if it has grown or not. She has never noticed it before.        Past Medical History:   Diagnosis Date    Anesthesia 2021    agitated coming out of anesthesia    Anesthesia 2022    Patient states becomes upset and sad after anesthesia.    Anxiety     Arthritis     spine, feet     Asthma     Breath shortness     Cataract 2022    no surgery yet    Cerebral palsy (HCC)     Cervical spinal cord injury (HCC) 2019    COVID-19 2022 stopped 2022    DDD (degenerative disc disease), lumbar     Per Problem List    Dental disorder     upper and lower denture    Depression     Full dentures 2022    Heart burn 2022    GERD    High cholesterol     History of falling     Marijuana user     Risk for falls     Stroke ([facility]) 2022    Pt. states, MINI STROKE AGE 19.    Tetraplegia (HCC)     TIA (transient ischemic attack) 2022    AGE 19. Patient states D/T drug use.     Urinary bladder disorder     Urinary incontinence     Vertigo     Per Problem List       Social History     Tobacco Use    Smoking status: Some Days     Current packs/day: 0.00     Average packs/day: 1 pack/day for 35.0 years (35.0 ttl pk-yrs)     Types: Cigarettes     Start date: 1987     Last attempt to quit: 2022     Years since quittin.1    Smokeless tobacco: Never    Tobacco comments:     20 years of 1ppd, then 10 years weaning down   Vaping Use    Vaping Use: Every day    Substances: THC, CBD   Substance Use Topics    Alcohol use: No    Drug use: Yes     Types: Marijuana, Inhaled     Comment: marijuana daily (smoked and edibles)  "      Current Outpatient Medications Ordered in Epic   Medication Sig Dispense Refill    budesonide (PULMICORT) 90 MCG/ACT AEROSOL POWDER, BREATH ACTIVATED Inhale 2 Puffs every day. 1 Each 5    guaiFENesin ER (MUCINEX) 600 MG TABLET SR 12 HR Take 1 Tablet by mouth every 12 hours as needed for Cough. 60 Tablet 0    VENTOLIN  (90 Base) MCG/ACT Aero Soln inhalation aerosol INHALE 2 PUFFS BY MOUTH EVERY 6 HOURS AS NEEDED FOR SHORTNESS OF BREATH 18 g 2    fluticasone (FLONASE) 50 MCG/ACT nasal spray Administer 1 Spray into affected nostril(S) every day. 16 g 6    baclofen (LIORESAL) 10 MG Tab Take 3 Tablets by mouth 2 times a day. 180 Tablet 5    gabapentin (NEURONTIN) 300 MG Cap Take 3 Capsules by mouth 2 times a day. Take 1 po qhs 180 Capsule 5    meloxicam (MOBIC) 15 MG tablet Take 1 Tablet by mouth every day. 30 Tablet 5    mirabegron ER (MYRBETRIQ) 25 MG TABLET SR 24 HR Take 1 Tablet by mouth every day. 30 Tablet 5    tizanidine (ZANAFLEX) 2 MG capsule Take 1 Capsule by mouth 2 times a day. 60 Capsule 5    fesoterodine fumarate (TOVIAZ) 4 MG TABLET SR 24 HR Take 1 Tablet by mouth every day. 30 Tablet 5    venlafaxine (EFFEXOR-XR) 150 MG extended-release capsule Take 1 Capsule by mouth every day. 30 Capsule 5    polyethylene glycol 3350 (MIRALAX) 17 GM/SCOOP Powder Take 17 g by mouth every day. 578 g 5    dicyclomine (BENTYL) 20 MG Tab Take 1 Tablet by mouth every 6 hours as needed (Abdominal Pain). 30 Tablet 2     No current Epic-ordered facility-administered medications on file.       Allergies:  Other environmental and Codeine        Objective:       Exam:  /70 (BP Location: Left arm, Patient Position: Sitting)   Pulse 82   Temp 36.7 °C (98 °F) (Temporal)   Resp 16   Ht 1.676 m (5' 6\")   Wt 70.8 kg (156 lb)   LMP 01/03/2017   SpO2 93%   BMI 25.18 kg/m²  Body mass index is 25.18 kg/m².    General: Normal appearing. No distress.  Pulmonary: clear, areas of decreased lung sounds and egophany are " resolved since last visit.   Cardiovascular: Regular rate and rhythm without murmur.   Skin: soft tissue mass to the right of lower thoracic area, about 3cm at longest diameter. Soft, nonmobile  Psych: Normal mood and affect.       Data reviewed:   none    Assessment & Plan:     58 y.o. female with the following -     1. Chronic cough  - budesonide (PULMICORT) 90 MCG/ACT AEROSOL POWDER, BREATH ACTIVATED; Inhale 2 Puffs every day.  Dispense: 1 Each; Refill: 5  Will continue pulmicort. May be related to her asthma    2. Soft tissue mass  - US-SOFT TISSUES OF HEAD - NECK; Future  Likely a sebaceous cyst. Pt does not want removal if not necessary but is concerned about it. She is requesting imaging.     Return if symptoms worsen or fail to improve.    Please note that this dictation was created using voice recognition software. I have made every reasonable attempt to correct obvious errors, but I expect that there are errors of grammar and possibly content that I did not discover before finalizing the note.

## 2024-02-15 PROBLEM — M79.89 SOFT TISSUE MASS: Status: ACTIVE | Noted: 2024-02-15

## 2024-02-16 NOTE — ASSESSMENT & PLAN NOTE
Pt reports that she just noticed a lump on the back, to the R of lower thoracic area. She reports it is itchy. She is not sure if it has grown or not. She has never noticed it before.

## 2024-02-26 DIAGNOSIS — M79.89 SOFT TISSUE MASS: ICD-10-CM

## 2024-02-28 ENCOUNTER — APPOINTMENT (OUTPATIENT)
Dept: RADIOLOGY | Facility: MEDICAL CENTER | Age: 59
End: 2024-02-28
Attending: FAMILY MEDICINE
Payer: MEDICAID

## 2024-02-28 DIAGNOSIS — M79.89 SOFT TISSUE MASS: ICD-10-CM

## 2024-02-28 PROCEDURE — 76705 ECHO EXAM OF ABDOMEN: CPT

## 2024-03-04 ENCOUNTER — PATIENT MESSAGE (OUTPATIENT)
Dept: OBGYN | Facility: CLINIC | Age: 59
End: 2024-03-04

## 2024-03-04 ENCOUNTER — TELEMEDICINE (OUTPATIENT)
Dept: GYNECOLOGY | Facility: CLINIC | Age: 59
End: 2024-03-04
Payer: MEDICAID

## 2024-03-04 DIAGNOSIS — N39.41 URGE URINARY INCONTINENCE: ICD-10-CM

## 2024-03-04 DIAGNOSIS — N31.9 NEUROGENIC BLADDER: ICD-10-CM

## 2024-03-04 DIAGNOSIS — N95.2 ATROPHIC VAGINITIS: ICD-10-CM

## 2024-03-04 PROCEDURE — 99213 OFFICE O/P EST LOW 20 MIN: CPT | Mod: 95 | Performed by: STUDENT IN AN ORGANIZED HEALTH CARE EDUCATION/TRAINING PROGRAM

## 2024-03-04 NOTE — PROGRESS NOTES
Urogynecology and Pelvic Reconstructive Surgery Follow Up    Marilyn Salinas [id number] :1965    Referred by: Julianna Guillory DO    Reason for Visit: Follow-up    This evaluation was conducted via Zoom using secure and encrypted videoconferencing technology. The patient was in a private location at her home in the [facility].    The patient's identity was confirmed and verbal consent was obtained for this virtual visit.      Subjective     History of Presenting Illness:    Ms.Dina Cleve Salinas is a 58 y.o. year old P2 with h/o nontraumatic incomplete spinal cord injury, paraplegia, partial CP, and she  presents for follow up of constant urinary incontinence (mixed) and occasional bowel incontinence.     She underwent bladder botox treatment #2 in 2023 -this led to significant improvement COVID she started to have recurrence of symptoms after 6 months.  She strongly desires repeat Botox      Prior HPI: She was referred by her Physiatrist Dr. Guillory for the evaluation and management of neurogenic bladder with incontinence    She has constant incontinence, mostly with urgency but sometimes without warning. She also has leakage with cough/laugh/exercise, but less than with urgency. DANY somewhat improved after 2017 sling (see below). Symptoms most bothersome at night when she has to use at least 2 diapers. Due to mobility issues (uses a walker) she has trouble getting to the bathroom.     She has occasional bowel incontinence, both small and larger episodes. These are note predictable or regular in frequency. She is scheduled for a cholecystectomy with Dr. Barrett later this month, but is worried about possible diarrhea.     Prior Pelvic surgery:   10/3/2017: Laparoscopic-assisted vaginal hysterectomy, bilateral    salpingo-oophorectomy, anterior and posterior vaginal repair, enterocele    repair, perineoplasty, sacrospinous vault suspension, transobturator tape mid urethral sling procedure, cystoscopy  (Dr. Driscoll) Op report reviewed     Prior treatment:   Botox - #1 100u 4/19/22, #2 100u 7/2023  Mirabegron - stopped  Fesoterodine - stopped      Fluid intake:   Mostly water  Coffee to help with constipation    Pelvic floor symptom review:     Bladder:   Voids per day: 5-7 Voids per night: 1-2   --> none s/p botox   Urinary incontinence episodes per day: 2-3 per day, 1-2 at night --> none s/p botox   Urge leakage:  On Movement to Bathroom and Full Bladder urge worse than stress --> none s/p botox   Stress leakage: With Cough, With Laugh and With Exercise --> no change   Continuous / insensible urine loss: sometimes   Nocturnal enuresis: No    Leakage volume: Moderate   Number of pads/day: 1 diaper at night    Bladder emptying: Complete   Voiding symptoms: None   UTI in last 12 months: No        Prolapse:     Prolapse symptoms: None        Bowel:    Constipation: Yes   Bowel movements per day: 1    Straining to empty bowels: Yes   Splinting to evacuate: No    Painful evacuation: No    Difficulty emptying rectum: No    Incontinence to stool: occasionally   Incontinence to gas: No     Blood in stool: No    Hemorrhoids: No        Sexual function:    Sexually active: No            Past medical and surgical history    Past medical history:  Past Medical History:   Diagnosis Date    TIA (transient ischemic attack) 03/17/2022    AGE 19. Patient states D/T drug use.     Anesthesia 03/17/2022    Patient states becomes upset and sad after anesthesia.    Full dentures 03/17/2022    Stroke (HCC) 03/17/2022    Pt. states, MINI STROKE AGE 19.    COVID-19 01/17/2022 1/17/2022 stopped 1-    Anesthesia 12/23/2021    agitated coming out of anesthesia    Cervical spinal cord injury (HCC) 12/19/2019    Anxiety     Arthritis     spine, feet     Asthma     Breath shortness     Cataract 12/23/2022    no surgery yet    Cerebral palsy (HCC)     DDD (degenerative disc disease), lumbar     Per Problem List    Dental disorder      upper and lower denture    Depression     Heart burn 12/23/2022    GERD    High cholesterol     History of falling     Marijuana user     Risk for falls     Tetraplegia (HCC)     Urinary bladder disorder     Urinary incontinence     Vertigo     Per Problem List     Past surgical history:  Past Surgical History:   Procedure Laterality Date    DEVYN BY LAPAROSCOPY  3/23/2022    Procedure: CHOLECYSTECTOMY, LAPAROSCOPIC;  Surgeon: Mayur Barrett M.D.;  Location: [facility];  Service: General    PB AMPUTATION TOE,MT-P JT Left 12/27/2021    Procedure: GREAT TOE PARTIAL AMPUTATION;  Surgeon: Emiliano Goff M.D.;  Location: [facility];  Service: Orthopedics    CERVICAL DISK AND FUSION ANTERIOR N/A 12/20/2019    Procedure: DISCECTOMY, SPINE, CERVICAL, ANTERIOR APPROACH, WITH FUSION - C3-5;  Surgeon: Connor Linton M.D.;  Location: [facility];  Service: Neurosurgery    CORPECTOMY N/A 12/20/2019    Procedure: CORPECTOMY;  Surgeon: Connor Linton M.D.;  Location: [facility];  Service: Neurosurgery    BLADDER SLING FEMALE N/A 10/3/2017    Procedure: BLADDER SLING FEMALE TOT, CYSTOSCOPY;  Surgeon: Hudson Driscoll M.D.;  Location: SURGERY SAME DAY [facility];  Service: Gynecology    VAGINAL HYSTERECTOMY SCOPE TOTAL N/A 10/3/2017    Procedure: VAGINAL HYSTERECTOMY SCOPE TOTAL;  Surgeon: Hudson Driscoll M.D.;  Location: SURGERY SAME DAY [facility];  Service: Gynecology    SALPINGECTOMY Bilateral 10/3/2017    Procedure: SALPINGECTOMY;  Surgeon: Hudson Driscoll M.D.;  Location: SURGERY SAME DAY [facility];  Service: Gynecology    OOPHORECTOMY Bilateral 10/3/2017    Procedure: OOPHORECTOMY;  Surgeon: Hudson Driscoll M.D.;  Location: SURGERY SAME DAY [facility];  Service: Gynecology    ANTERIOR AND POSTERIOR REPAIR Bilateral 10/3/2017    Procedure: ANTERIOR AND POSTERIOR REPAIR;  Surgeon: Hudson Driscoll M.D.;  Location: SURGERY SAME DAY [facility];  Service: Gynecology     ENTEROCELE REPAIR N/A 10/3/2017    Procedure: ENTEROCELE REPAIR, PERINEOPLASTY;  Surgeon: Hudson Driscoll M.D.;  Location: SURGERY [facility];  Service: Gynecology    VAGINAL SUSPENSION N/A 10/3/2017    Procedure: VAGINAL SUSPENSION SACROSPINOUS VAULT POSSIBLE;  Surgeon: Hudson Driscoll M.D.;  Location: SURGERY SAME DAY [facility];  Service: Gynecology    OTHER Left 2005    hammertoe x2    EYE SURGERY  1972    for lazy eye    LUMPECTOMY       Medications:has a current medication list which includes the following prescription(s): onabotulinum toxin type a, budesonide, guaifenesin er, ventolin hfa, fluticasone, baclofen, gabapentin, meloxicam, mirabegron er, tizanidine, fesoterodine fumarate, venlafaxine, polyethylene glycol 3350, and dicyclomine.  Allergies:Other environmental and Codeine  Family history:  Family History   Problem Relation Age of Onset    Cancer Mother         ovarian    Alcohol/Drug Mother     Cancer Brother         unknown, caused him to lose his leg when he was 3    Diabetes Maternal Aunt      Social history: reports that she has been smoking cigarettes. She started smoking about 37 years ago. She has a 35 pack-year smoking history. She has never used smokeless tobacco. She reports current drug use. Drugs: Marijuana and Inhaled. She reports that she does not drink alcohol.    Review of systems: A full review of systems was performed, and negative with the exception of want is noted above in the HPI.        Objective        LMP 01/03/2017     Physical Exam  Vitals reviewed. Exam conducted with a chaperone present (MA - see notes.).   Constitutional:       Appearance: Normal appearance.   HENT:      Head: Normocephalic.      Mouth/Throat:      Mouth: Mucous membranes are moist.   Cardiovascular:      Rate and Rhythm: Normal rate.   Pulmonary:      Effort: Pulmonary effort is normal.   Abdominal:      Palpations: Abdomen is soft. There is no mass.      Tenderness: There is no abdominal  tenderness.   Skin:     General: Skin is warm and dry.   Neurological:      Mental Status: She is alert.      Motor: Weakness present.      Gait: Gait abnormal.      Comments: Uses walker   Psychiatric:         Mood and Affect: Mood normal.       Genitourinary (prior): Narrowed introitus with moderate to severe vaginal atrophy.  Some tenderness on placement of UDS catheters.  No prolapse.        Multichannel urodynamics -see report :   Filling phase: Sustained uninhibited DO starting at 127mL, normal compliance, able to fill up to 334 after DO suppression. Stress leak with CLPP 108cm H2O at 149mL, MUCP 69   Voiding phase: Complete emptying with detrusor contraction and some Valsalva effort.  Patient was told previously that she should Valsalva to empty her bladder    Diagnostic test and records review:    Urine dipstick: negative    Labs: n/a    Radiology:     Renal US 2020:  The right kidney measures 9.51 cm.  The left kidney measures 8.27 cm.  There is no hydronephrosis.  There are no abnormal calcifications.  The bladder demonstrates no focal wall abnormality.    Documentation reviewed: Prior EMR Records           Assessment & Plan     Ms.Dina Cleve Salinas is a 58 y.o. year old P2 with neurogenic bladder defined as mixed, urge predominant urinary incontinence with nocturia.     1. Neurogenic bladder  2. Mixed incontinence  3. Injury of cervical spinal cord, subsequent encounter ([facility])  4. Other cerebral palsy ([facility])  5. Nocturia  - Prior renal ultrasound without hydronephrosis  -Continue vaginal estrogen   - Schedule for treatment #3 of chemodenervation of bladder with onabotulinum toxin A, 100 units.  Preauthorization request placed.  She understands risk benefits of this procedure  -May call when the effects of Botox were off for a repeat therapy.      6. Fecal incontinence  Not addressed today.  This is bothersome but infrequent. No change in stil leakage after cholecystectomy. She feels constinued constipe.      7.  Atrophic vaginitis  She has vaginal atrophy on examination. Reviewed risks, benefits, and indications for vaginal estrogen therapy.  Continue with vaginal estrogen therapy twice weekly.                  Giselle Caceres MD, FACOG    Female Pelvic Medicine and Reconstructive Surgery  Department of Obstetrics and Gynecology  [facility]    This medical record contains text that has been entered with the assistance of computer voice recognition and dictation software.  Therefore, it may contain unintended errors in text, spelling, punctuation, or grammar

## 2024-03-06 ENCOUNTER — TELEPHONE (OUTPATIENT)
Dept: OBGYN | Facility: CLINIC | Age: 59
End: 2024-03-06
Payer: MEDICAID

## 2024-03-06 NOTE — TELEPHONE ENCOUNTER
Spoke with Mary from [facility] to  with correction on patients account on there end. She states they have successfully corrected her account and I can fax over Botox rx to [phone] . RX Faxed. Will Fv with [facility] .

## 2024-03-08 ENCOUNTER — TELEPHONE (OUTPATIENT)
Dept: MEDICAL GROUP | Facility: MEDICAL CENTER | Age: 59
End: 2024-03-08
Payer: MEDICAID

## 2024-03-13 ENCOUNTER — OFFICE VISIT (OUTPATIENT)
Dept: MEDICAL GROUP | Facility: MEDICAL CENTER | Age: 59
End: 2024-03-13
Attending: INTERNAL MEDICINE
Payer: MEDICAID

## 2024-03-13 VITALS
RESPIRATION RATE: 16 BRPM | TEMPERATURE: 97.9 F | HEART RATE: 86 BPM | OXYGEN SATURATION: 91 % | DIASTOLIC BLOOD PRESSURE: 68 MMHG | WEIGHT: 158 LBS | SYSTOLIC BLOOD PRESSURE: 98 MMHG | HEIGHT: 66 IN | BODY MASS INDEX: 25.39 KG/M2

## 2024-03-13 DIAGNOSIS — J45.21 MILD INTERMITTENT ASTHMA WITH ACUTE EXACERBATION: ICD-10-CM

## 2024-03-13 PROBLEM — J18.9 COMMUNITY ACQUIRED PNEUMONIA: Status: ACTIVE | Noted: 2024-03-13

## 2024-03-13 PROBLEM — J18.9 COMMUNITY ACQUIRED PNEUMONIA: Status: RESOLVED | Noted: 2024-03-13 | Resolved: 2024-03-13

## 2024-03-13 PROCEDURE — 99213 OFFICE O/P EST LOW 20 MIN: CPT | Performed by: INTERNAL MEDICINE

## 2024-03-13 PROCEDURE — 3078F DIAST BP <80 MM HG: CPT | Performed by: INTERNAL MEDICINE

## 2024-03-13 PROCEDURE — 3074F SYST BP LT 130 MM HG: CPT | Performed by: INTERNAL MEDICINE

## 2024-03-13 PROCEDURE — 99212 OFFICE O/P EST SF 10 MIN: CPT | Performed by: INTERNAL MEDICINE

## 2024-03-13 RX ORDER — DOXYCYCLINE 100 MG/1
100 CAPSULE ORAL 2 TIMES DAILY
Qty: 10 CAPSULE | Refills: 0 | Status: SHIPPED | OUTPATIENT
Start: 2024-03-13 | End: 2024-03-18

## 2024-03-13 RX ORDER — PREDNISONE 20 MG/1
TABLET ORAL
Qty: 8 TABLET | Refills: 0 | Status: SHIPPED | OUTPATIENT
Start: 2024-03-13 | End: 2024-03-29

## 2024-03-13 ASSESSMENT — FIBROSIS 4 INDEX: FIB4 SCORE: 1.13

## 2024-03-13 ASSESSMENT — PATIENT HEALTH QUESTIONNAIRE - PHQ9: CLINICAL INTERPRETATION OF PHQ2 SCORE: 0

## 2024-03-13 NOTE — PROGRESS NOTES
Subjective:   Marilyn Salinas is a 58 y.o. female here today for productive cough, feeling sick    Mild intermittent asthma with acute exacerbation  She has a chronic cough at baseline with a history of asthma and she reports over the past week she has had significant worsening of her cough productive of yellow sputum as well as increased shortness of breath.  She has been using her Ventolin at least twice a day, last use about an hour prior to her office visit with me.  She has not had her Pulmicort stating that pharmacy has not had it in stock.  She has also been taking Mucinex.  She denies fevers and chills but reports a lot of fatigue as well as chest tightness.  Reports roommate is sick with similar symptoms.  He went to urgent care and tested negative for influenza and COVID.  Reports that he was diagnosed with pneumonia.   Denies ear pain, sinus pressure.  Reports mild runny nose, denies sore throat, post nasal drip.    Current medicines (including changes today)  Current Outpatient Medications   Medication Sig Dispense Refill    doxycycline (MONODOX) 100 MG capsule Take 1 Capsule by mouth 2 times a day for 5 days. 10 Capsule 0    predniSONE (DELTASONE) 20 MG Tab Take 2 tabs daily for 4 days 8 Tablet 0    budesonide (PULMICORT) 90 MCG/ACT AEROSOL POWDER, BREATH ACTIVATED Inhale 2 Puffs every day. 1 Each 5    guaiFENesin ER (MUCINEX) 600 MG TABLET SR 12 HR Take 1 Tablet by mouth every 12 hours as needed for Cough. 60 Tablet 0    VENTOLIN  (90 Base) MCG/ACT Aero Soln inhalation aerosol INHALE 2 PUFFS BY MOUTH EVERY 6 HOURS AS NEEDED FOR SHORTNESS OF BREATH 18 g 2    fluticasone (FLONASE) 50 MCG/ACT nasal spray Administer 1 Spray into affected nostril(S) every day. 16 g 6    baclofen (LIORESAL) 10 MG Tab Take 3 Tablets by mouth 2 times a day. 180 Tablet 5    gabapentin (NEURONTIN) 300 MG Cap Take 3 Capsules by mouth 2 times a day. Take 1 po qhs 180 Capsule 5    meloxicam (MOBIC) 15 MG tablet Take 1  Tablet by mouth every day. 30 Tablet 5    mirabegron ER (MYRBETRIQ) 25 MG TABLET SR 24 HR Take 1 Tablet by mouth every day. 30 Tablet 5    tizanidine (ZANAFLEX) 2 MG capsule Take 1 Capsule by mouth 2 times a day. 60 Capsule 5    fesoterodine fumarate (TOVIAZ) 4 MG TABLET SR 24 HR Take 1 Tablet by mouth every day. 30 Tablet 5    venlafaxine (EFFEXOR-XR) 150 MG extended-release capsule Take 1 Capsule by mouth every day. 30 Capsule 5    polyethylene glycol 3350 (MIRALAX) 17 GM/SCOOP Powder Take 17 g by mouth every day. 578 g 5    dicyclomine (BENTYL) 20 MG Tab Take 1 Tablet by mouth every 6 hours as needed (Abdominal Pain). 30 Tablet 2     No current facility-administered medications for this visit.     She  has a past medical history of Anesthesia (12/23/2021), Anesthesia (03/17/2022), Anxiety, Arthritis, Asthma, Breath shortness, Cataract (12/23/2022), Cerebral palsy ([facility]), Cervical spinal cord injury (HCC) (12/19/2019), COVID-19 (01/17/2022), DDD (degenerative disc disease), lumbar, Dental disorder, Depression, Full dentures (03/17/2022), Heart burn (12/23/2022), High cholesterol, History of falling, Marijuana user, Risk for falls, Stroke ([facility]) (03/17/2022), Tetraplegia ([facility]), TIA (transient ischemic attack) (03/17/2022), Urinary bladder disorder, Urinary incontinence, and Vertigo.         Objective:     Vitals:    03/13/24 1029   BP: 98/68   Pulse: 86   Resp: 16   Temp: 36.6 °C (97.9 °F)   SpO2: 91%     Body mass index is 25.51 kg/m².   Physical Exam:  Constitutional: Alert, no distress.  Skin: Warm, dry, good turgor, no rashes in visible areas.  Eye: Equal, round and reactive, conjunctiva clear, lids normal.  Respiratory: Unlabored respiratory effort, lungs clear to auscultation, no wheezes, no ronchi.  Psych: Alert and oriented x3, normal affect and mood.      Assessment and Plan:   The following treatment plan was discussed    1. Mild intermittent asthma with acute exacerbation  Symptoms are at least consistent  with exacerbation of her underlying asthma and given her change in sputum as well as comorbid medical conditions which would make her more prone to developing pneumonia, we discussed treatment with a 5-day course of doxycycline as well as 4 days of prednisone.  She will follow-up if no improvement in symptoms upon completion of therapy.  - doxycycline (MONODOX) 100 MG capsule; Take 1 Capsule by mouth 2 times a day for 5 days.  Dispense: 10 Capsule; Refill: 0  - predniSONE (DELTASONE) 20 MG Tab; Take 2 tabs daily for 4 days  Dispense: 8 Tablet; Refill: 0        Followup: Return if symptoms worsen or fail to improve.

## 2024-03-13 NOTE — ASSESSMENT & PLAN NOTE
She has a chronic cough at baseline with a history of asthma and she reports over the past week she has had significant worsening of her cough productive of yellow sputum as well as increased shortness of breath

## 2024-03-13 NOTE — ASSESSMENT & PLAN NOTE
She has a chronic cough at baseline with a history of asthma and she reports over the past week she has had significant worsening of her cough productive of yellow sputum as well as increased shortness of breath.  She has been using her Ventolin at least twice a day, last use about an hour prior to her office visit with me.  She has not had her Pulmicort stating that pharmacy has not had it in stock.  She has also been taking Mucinex.  She denies fevers and chills but reports a lot of fatigue as well as chest tightness.  Reports roommate is sick with similar symptoms.  He went to urgent care and tested negative for influenza and COVID.  Reports that he was diagnosed with pneumonia.

## 2024-03-15 ENCOUNTER — TELEPHONE (OUTPATIENT)
Dept: MEDICAL GROUP | Facility: MEDICAL CENTER | Age: 59
End: 2024-03-15
Payer: MEDICAID

## 2024-03-15 NOTE — TELEPHONE ENCOUNTER
MEDICATION PRIOR AUTHORIZATION NEEDED:    1. Name of Medication: [id number]    2. Requested By (Name of [facility]): [facility]     3. Is insurance on file current? yes    4. What is the name & phone number of the 3rd party payor? Medicaid ffs    PA sent through cover my meds

## 2024-03-22 DIAGNOSIS — R10.9 ABDOMINAL PAIN, UNSPECIFIED ABDOMINAL LOCATION: ICD-10-CM

## 2024-03-22 RX ORDER — DICYCLOMINE HCL 20 MG
20 TABLET ORAL EVERY 6 HOURS PRN
Qty: 30 TABLET | Refills: 2 | Status: SHIPPED | OUTPATIENT
Start: 2024-03-22

## 2024-03-22 NOTE — TELEPHONE ENCOUNTER
Received request via: Pharmacy    Was the patient seen in the last year in this department? Yes    Does the patient have an active prescription (recently filled or refills available) for medication(s) requested? No    [facility]    Does the patient have correction Plus and need 100 day supply (blood pressure, diabetes and cholesterol meds only)? Patient does not have SCP

## 2024-03-27 ENCOUNTER — TELEPHONE (OUTPATIENT)
Dept: OBGYN | Facility: CLINIC | Age: 59
End: 2024-03-27
Payer: MEDICAID

## 2024-03-27 NOTE — TELEPHONE ENCOUNTER
Caller Name: [facility]   Call Back Number: N/A    How would the patient prefer to be contacted with a response:   No call back needed     [facility] called in and was inquiring about the Onabotulinum botox that was ordered for the pt. She said that either an ICD10 or site of injection is needed for the prescription to be completed.I referred back to 's note from 3/4/24 and the site of injection is the bladder. I relayed this information to a pharmacy tech by the name of Naheed SHELTON and she stated they will enter this and finalize. Prescription to be sent to 901 office. Call ended.    See [employer] report. Remote monitoring reviewed over a 90 day period. End of 90 day monitoring period date of service 5-3-19.

## 2024-03-29 ENCOUNTER — OFFICE VISIT (OUTPATIENT)
Dept: MEDICAL GROUP | Facility: MEDICAL CENTER | Age: 59
End: 2024-03-29
Attending: FAMILY MEDICINE
Payer: MEDICAID

## 2024-03-29 VITALS
WEIGHT: 160.6 LBS | TEMPERATURE: 97.9 F | OXYGEN SATURATION: 96 % | RESPIRATION RATE: 16 BRPM | BODY MASS INDEX: 25.81 KG/M2 | HEART RATE: 73 BPM | SYSTOLIC BLOOD PRESSURE: 92 MMHG | HEIGHT: 66 IN | DIASTOLIC BLOOD PRESSURE: 60 MMHG

## 2024-03-29 DIAGNOSIS — G47.33 OSA (OBSTRUCTIVE SLEEP APNEA): ICD-10-CM

## 2024-03-29 DIAGNOSIS — M25.562 ACUTE PAIN OF BOTH KNEES: ICD-10-CM

## 2024-03-29 DIAGNOSIS — R05.3 CHRONIC COUGH: ICD-10-CM

## 2024-03-29 DIAGNOSIS — M25.561 ACUTE PAIN OF BOTH KNEES: ICD-10-CM

## 2024-03-29 RX ORDER — AZITHROMYCIN 250 MG/1
TABLET, FILM COATED ORAL
Qty: 6 TABLET | Refills: 0 | Status: SHIPPED | OUTPATIENT
Start: 2024-03-29

## 2024-03-29 RX ORDER — FLUTICASONE FUROATE AND VILANTEROL 100; 25 UG/1; UG/1
1 POWDER RESPIRATORY (INHALATION) DAILY
Qty: 28 EACH | Refills: 5 | Status: SHIPPED | OUTPATIENT
Start: 2024-03-29

## 2024-03-29 ASSESSMENT — FIBROSIS 4 INDEX: FIB4 SCORE: 1.13

## 2024-03-29 NOTE — ASSESSMENT & PLAN NOTE
Patient was seen 2 weeks ago with concerns of increasing productive cough with yellow sputum. At the time she was given steroids + doxy - she reports that she improved but still has sputum that concerns her.   She is using pulmicort bid, not using ventolin   She is still quite fatigued   No fevers or chills

## 2024-03-29 NOTE — ASSESSMENT & PLAN NOTE
Pt reports recent fall onto the knees b/l  Noted some swelling of her L knee, which is tender to touch.   She is already taking meloxicam.   Last PT - 10/2023 for the hip and completed the whole course  She is getting PT for the neck next.

## 2024-03-30 NOTE — PROGRESS NOTES
Subjective:     CC:   Chief Complaint   Patient presents with    Other     Pt has concerns of poss pneumonia.   Heavy chest.  Swollen knee      Fatigue         HPI:     Chronic cough  Patient was seen 2 weeks ago with concerns of increasing productive cough with yellow sputum. At the time she was given steroids + doxy - she reports that she improved but still has sputum that concerns her.   She is using pulmicort bid, not using ventolin   She is still quite fatigued   No fevers or chills      Acute pain of both knees  Pt reports recent fall onto the knees b/l  Noted some swelling of her L knee, which is tender to touch.   She is already taking meloxicam.   Last PT - 10/2023 for the hip and completed the whole course  She is getting PT for the neck next.       Past Medical History:   Diagnosis Date    Anesthesia 12/23/2021    agitated coming out of anesthesia    Anesthesia 03/17/2022    Patient states becomes upset and sad after anesthesia.    Anxiety     Arthritis     spine, feet     Asthma     Breath shortness     Cataract 12/23/2022    no surgery yet    Cerebral palsy (HCC)     Cervical spinal cord injury (HCC) 12/19/2019    COVID-19 01/17/2022 1/17/2022 stopped 1-    DDD (degenerative disc disease), lumbar     Per Problem List    Dental disorder     upper and lower denture    Depression     Full dentures 03/17/2022    Heart burn 12/23/2022    GERD    High cholesterol     History of falling     Marijuana user     Risk for falls     Stroke (HCC) 03/17/2022    Pt. states, MINI STROKE AGE 19.    Tetraplegia (HCC)     TIA (transient ischemic attack) 03/17/2022    AGE 19. Patient states D/T drug use.     Urinary bladder disorder     Urinary incontinence     Vertigo     Per Problem List       Social History     Tobacco Use    Smoking status: Some Days     Current packs/day: 0.00     Average packs/day: 1 pack/day for 35.0 years (35.0 ttl pk-yrs)     Types: Cigarettes     Start date: 1/1/1987     Last attempt to  quit: 2022     Years since quittin.2    Smokeless tobacco: Never    Tobacco comments:     20 years of 1ppd, then 10 years weaning down   Vaping Use    Vaping Use: Every day    Substances: THC, CBD   Substance Use Topics    Alcohol use: No    Drug use: Yes     Types: Marijuana, Inhaled     Comment: marijuana daily (smoked and edibles)       Current Outpatient Medications Ordered in Epic   Medication Sig Dispense Refill    fluticasone furoate-vilanterol (BREO ELLIPTA) 100-25 MCG/ACT AEROSOL POWDER, BREATH ACTIVATED Inhale 1 Puff every day. 28 Each 5    azithromycin (ZITHROMAX) 250 MG Tab Take 2 tabs on day 1, then 1 tab daily thereafter until prescription is completed 6 Tablet 0    diclofenac sodium (VOLTAREN) 1 % Gel Apply 2 g topically 4 times a day as needed (pain). 150 g 5    dicyclomine (BENTYL) 20 MG Tab Take 1 Tablet by mouth every 6 hours as needed (Abdominal Pain). 30 Tablet 2    guaiFENesin ER (MUCINEX) 600 MG TABLET SR 12 HR Take 1 Tablet by mouth every 12 hours as needed for Cough. 60 Tablet 0    VENTOLIN  (90 Base) MCG/ACT Aero Soln inhalation aerosol INHALE 2 PUFFS BY MOUTH EVERY 6 HOURS AS NEEDED FOR SHORTNESS OF BREATH 18 g 2    fluticasone (FLONASE) 50 MCG/ACT nasal spray Administer 1 Spray into affected nostril(S) every day. 16 g 6    baclofen (LIORESAL) 10 MG Tab Take 3 Tablets by mouth 2 times a day. 180 Tablet 5    gabapentin (NEURONTIN) 300 MG Cap Take 3 Capsules by mouth 2 times a day. Take 1 po qhs 180 Capsule 5    meloxicam (MOBIC) 15 MG tablet Take 1 Tablet by mouth every day. 30 Tablet 5    mirabegron ER (MYRBETRIQ) 25 MG TABLET SR 24 HR Take 1 Tablet by mouth every day. 30 Tablet 5    tizanidine (ZANAFLEX) 2 MG capsule Take 1 Capsule by mouth 2 times a day. 60 Capsule 5    fesoterodine fumarate (TOVIAZ) 4 MG TABLET SR 24 HR Take 1 Tablet by mouth every day. 30 Tablet 5    venlafaxine (EFFEXOR-XR) 150 MG extended-release capsule Take 1 Capsule by mouth every day. 30 Capsule 5  "   polyethylene glycol 3350 (MIRALAX) 17 GM/SCOOP Powder Take 17 g by mouth every day. 578 g 5     No current [facility]-ordered facility-administered medications on file.       Allergies:  Other environmental and Codeine        Objective:       Exam:  BP 92/60 (BP Location: Left arm, Patient Position: Sitting, BP Cuff Size: Adult)   Pulse 73   Temp 36.6 °C (97.9 °F) (Temporal)   Resp 16   Ht 1.676 m (5' 6\")   Wt 72.8 kg (160 lb 9.6 oz)   LMP 01/03/2017   SpO2 96%   BMI 25.92 kg/m²  Body mass index is 25.92 kg/m².    General: Normal appearing. No distress.  Pulmonary: Clear to ausculation.  Normal effort. No rales, ronchi, or wheezing.  Cardiovascular: Regular rate and rhythm without murmur.   Musculoskeletal: slight puffiness b/l knees with tenderness in the joint line b/l  Psych: Normal mood and affect.       Data reviewed:   Reviewed Physiatry notes and PT referrals    Assessment & Plan:     58 y.o. female with the following -     1. Chronic cough  - fluticasone furoate-vilanterol (BREO ELLIPTA) 100-25 MCG/ACT AEROSOL POWDER, BREATH ACTIVATED; Inhale 1 Puff every day.  Dispense: 28 Each; Refill: 5  - azithromycin (ZITHROMAX) 250 MG Tab; Take 2 tabs on day 1, then 1 tab daily thereafter until prescription is completed  Dispense: 6 Tablet; Refill: 0  - Referral to Pulmonary and Sleep Medicine  Will trial another course of azithro, as I suspect she is just having after effects from pna.   Stepwise therapy, stom pulmicort and start breo as ordered  Will have her estab with pulm in case this does not improve    2. Acute pain of both knees  - diclofenac sodium (VOLTAREN) 1 % Gel; Apply 2 g topically 4 times a day as needed (pain).  Dispense: 150 g; Refill: 5  She has PT for her neck upcoming so can't do PT for knees, but I think this would probably be beneficial  Pt should reestab with physiatry  Trial voltaren gel for knees, already on meloxicam    3. REE (obstructive sleep apnea)  - Referral to Pulmonary and Sleep " Medicine  Pt reports being dx with sleep apnea at [facility] and needs referral for cpap      Return in about 1 month (around 4/29/2024) for f/u breathing.    Please note that this dictation was created using voice recognition software. I have made every reasonable attempt to correct obvious errors, but I expect that there are errors of grammar and possibly content that I did not discover before finalizing the note.

## 2024-04-01 ENCOUNTER — TELEPHONE (OUTPATIENT)
Dept: OBGYN | Facility: CLINIC | Age: 59
End: 2024-04-01
Payer: MEDICAID

## 2024-04-01 NOTE — TELEPHONE ENCOUNTER
Spoke with Maryellen from [facility] service team to fv with Botox order. Maryellen states Botox is scheduled to be delivered to office 4/3/24.

## 2024-04-03 NOTE — PROCEDURES
Marilyn Salinas is a 58 y.o. with neurogenic bladder and OAB presenting for bladder botox #3, last treatment 7/2023 with 100 units. She was thoroughly counseled on the procedure and consented for cystourethroscopy and bladder chemodenervation with onabotulinum toxin A.  She is aware the risks include infection, bladder perforation, rare reaction to medication, transient urinary retention.  Symptomatic improvement tends to occur at 1 week after injection, and last between 6 and 12 months.  All questions were answered     She brings 100 units Botox with her today, as this has been previously filled by [facility] pharmacy.  30 minutes prior to the procedure her bladder was backfilled with 60 mL of lidocaine, and urethra instilled with lido jet. She was given bactrim DS as antibiotic prophylaxis      Cystoscopy w/Botox    Date/Time: 4/3/2024 4:17 PM    Performed by: Giselle Caceres M.D.  Authorized by: Giselle Caceres M.D.    Procedure discussed: discussed risks, benefits and alternatives    Chaperone present: yes    Timeout: timeout called immediately prior to procedure    Prep: patient was prepped and draped in usual sterile fashion    Prep type: Betadine    Anesthesia: local anesthesia      Procedure Details     Cystoscope type: flexible    Cystoscopy route: transurethral      Cystoscopy location: native bladder      Irrigation used: saline      Position: dorsal lithotomy    Urethra     Urethra: normal      Vagina     Vagina: normal      Bladder     Bladder: normal      Botox Injection Details     Changes since previous cystoscopy: no changes since previous cystoscopy      Indication: overactive bladder      Total number of units: 100    Post-Procedure Details     Appearance of urine after procedure: clear    Outcome: patient tolerated procedure with difficulty      Complications: More urethral pain than usual during this cystourethroscopy, she is stopped using vaginal estrogen, so I recommend she restart this  in order to get decrease urethral pain during future procedures    Bleeding: no bleeding      Disposition: discharged home in satisfactory condition          Giselle Caceres MD

## 2024-04-04 ENCOUNTER — OFFICE VISIT (OUTPATIENT)
Dept: GYNECOLOGY | Facility: CLINIC | Age: 59
End: 2024-04-04
Payer: MEDICAID

## 2024-04-04 VITALS — SYSTOLIC BLOOD PRESSURE: 104 MMHG | DIASTOLIC BLOOD PRESSURE: 65 MMHG

## 2024-04-04 DIAGNOSIS — N39.41 URGE URINARY INCONTINENCE: ICD-10-CM

## 2024-04-04 DIAGNOSIS — N31.9 NEUROGENIC BLADDER: Primary | ICD-10-CM

## 2024-04-04 LAB
APPEARANCE UR: CLEAR
BILIRUB UR STRIP-MCNC: NEGATIVE MG/DL
COLOR UR AUTO: YELLOW
GLUCOSE UR STRIP.AUTO-MCNC: NEGATIVE MG/DL
KETONES UR STRIP.AUTO-MCNC: NEGATIVE MG/DL
LEUKOCYTE ESTERASE UR QL STRIP.AUTO: NORMAL
NITRITE UR QL STRIP.AUTO: NEGATIVE
PH UR STRIP.AUTO: 7 [PH] (ref 5–8)
PROT UR QL STRIP: NEGATIVE MG/DL
RBC UR QL AUTO: NEGATIVE
SP GR UR STRIP.AUTO: 1.01
UROBILINOGEN UR STRIP-MCNC: NORMAL MG/DL

## 2024-04-04 PROCEDURE — 52287 CYSTOSCOPY CHEMODENERVATION: CPT | Performed by: STUDENT IN AN ORGANIZED HEALTH CARE EDUCATION/TRAINING PROGRAM

## 2024-04-04 PROCEDURE — 81002 URINALYSIS NONAUTO W/O SCOPE: CPT | Performed by: STUDENT IN AN ORGANIZED HEALTH CARE EDUCATION/TRAINING PROGRAM

## 2024-04-04 RX ORDER — SULFAMETHOXAZOLE AND TRIMETHOPRIM 800; 160 MG/1; MG/1
1 TABLET ORAL ONCE
Status: COMPLETED | OUTPATIENT
Start: 2024-04-04 | End: 2024-04-04

## 2024-04-04 RX ADMIN — SULFAMETHOXAZOLE AND TRIMETHOPRIM 1 TABLET: 800; 160 TABLET ORAL at 16:19

## 2024-04-18 DIAGNOSIS — M79.672 FOOT PAIN, LEFT: ICD-10-CM

## 2024-04-18 NOTE — TELEPHONE ENCOUNTER
Received request via: Pharmacy    Was the patient seen in the last year in this department? Yes    Does the patient have an active prescription (recently filled or refills available) for medication(s) requested? No    [facility]      Does the patient have [facility] and need 100 day supply (blood pressure, diabetes and cholesterol meds only)? Patient does not have SCP

## 2024-04-19 RX ORDER — GABAPENTIN 300 MG/1
900 CAPSULE ORAL
Qty: 180 CAPSULE | Refills: 5 | Status: SHIPPED | OUTPATIENT
Start: 2024-04-19

## 2024-04-30 ENCOUNTER — OFFICE VISIT (OUTPATIENT)
Dept: MEDICAL GROUP | Facility: MEDICAL CENTER | Age: 59
End: 2024-04-30
Attending: FAMILY MEDICINE
Payer: MEDICAID

## 2024-04-30 VITALS
TEMPERATURE: 97.3 F | HEIGHT: 66 IN | WEIGHT: 163 LBS | RESPIRATION RATE: 14 BRPM | DIASTOLIC BLOOD PRESSURE: 70 MMHG | SYSTOLIC BLOOD PRESSURE: 100 MMHG | BODY MASS INDEX: 26.2 KG/M2 | OXYGEN SATURATION: 97 % | HEART RATE: 74 BPM

## 2024-04-30 DIAGNOSIS — R05.3 CHRONIC COUGH: ICD-10-CM

## 2024-04-30 PROCEDURE — 99214 OFFICE O/P EST MOD 30 MIN: CPT | Performed by: FAMILY MEDICINE

## 2024-04-30 RX ORDER — FLUTICASONE FUROATE AND VILANTEROL 200; 25 UG/1; UG/1
1 POWDER RESPIRATORY (INHALATION) DAILY
Qty: 60 EACH | Refills: 5 | Status: SHIPPED | OUTPATIENT
Start: 2024-04-30

## 2024-04-30 RX ORDER — GUAIFENESIN 600 MG/1
600 TABLET, EXTENDED RELEASE ORAL EVERY 12 HOURS PRN
Qty: 60 TABLET | Refills: 2 | Status: SHIPPED | OUTPATIENT
Start: 2024-04-30

## 2024-04-30 ASSESSMENT — FIBROSIS 4 INDEX: FIB4 SCORE: 1.13

## 2024-04-30 NOTE — PROGRESS NOTES
"Verbal consent was acquired by the patient to use [employer] ambient listening note generation during this visit   Subjective:     HPI:   History of Present Illness  The patient presents for evaluation of multiple medical concerns.    SOB - The patient continues to experience dyspnea and expectoration of phlegm, despite initiating use of Breo. She refrained from using Mucinex during the initial week of Breo, but resumed using over-the-counter Mucinex this week due to increased phlegm production. She has a scheduled pulmonary appointment in 3 months. Despite completing her course of antibiotics, she continues to feel unwell, experiencing fatigue and a lack of energy. Breo has been beneficial in expectorating phlegm, but not complete resolution. She has been expectorating phlegm for the past 3 months after CAP, a symptom she has not previously encountered. She denies any exposure to potential triggers for her respiratory issues. She has a history of allergies and sinus issues, for which she takes over-the-counter allergy medication. She was previously prescribed Singulair, which induced fatigue. Her oxygen saturation levels have fluctuated between 90 and 94 following deep breathing.       She has quit smoking, but she used to smoke a pack a day for 20 years and then 10 years of weaning down. She does smoke occasional cigarettes.      Objective:     Exam:  /70 (BP Location: Left arm, Patient Position: Sitting)   Pulse 74   Temp 36.3 °C (97.3 °F) (Temporal)   Resp 14   Ht 1.676 m (5' 6\")   Wt 73.9 kg (163 lb)   LMP 01/03/2017   SpO2 97% Comment: walking with  BMI 26.31 kg/m²  Body mass index is 26.31 kg/m².    Physical Exam  Physical Exam      6 min walk test  At rest on room air: 94%  At rest on 2L O2: normal  Walk on room air: 84%  Walking on 1L O2: 97%    General: Normal appearing. No distress.  Pulmonary: decreased lung sounds b/l  Cardiovascular: Regular rate and rhythm without murmur. Radial pulses " are intact and equal bilaterally.      Assessment & Plan:     1. Chronic cough  PULMONARY FUNCTION TESTS -Test requested: Complete Pulmonary Function Test    fluticasone furoate-vilanterol (BREO ELLIPTA) 200-25 MCG/ACT AEROSOL POWDER, BREATH ACTIVATED    guaiFENesin ER (MUCINEX) 600 MG TABLET SR 12 HR          Assessment & Plan  SOB  2. hypoxia  Will increase breo dose  On review of past cxr, it does look somewhat hyperinflated  Will rx mucinex for pt  PFTs ordered for eval of chronic lung dz  Initiate O2 - continued fatigue may be due to hypoxia when active   Needs portable small tanks or portable concentrator - pt has hx of CP and must ambulate with rollator and would be unable to travel with large tank with rolling cart          Return in about 2 months (around 6/30/2024).      Please note that this dictation was created using voice recognition software. I have made every reasonable attempt to correct obvious errors, but I expect that there are errors of grammar and possibly content that I did not discover before finalizing the note.

## 2024-05-07 ENCOUNTER — NON-PROVIDER VISIT (OUTPATIENT)
Dept: SLEEP MEDICINE | Facility: MEDICAL CENTER | Age: 59
End: 2024-05-07
Attending: FAMILY MEDICINE
Payer: MEDICAID

## 2024-05-07 VITALS — WEIGHT: 160 LBS | HEIGHT: 66 IN | BODY MASS INDEX: 25.71 KG/M2

## 2024-05-07 DIAGNOSIS — R05.3 CHRONIC COUGH: ICD-10-CM

## 2024-05-07 PROCEDURE — 94729 DIFFUSING CAPACITY: CPT | Mod: 26 | Performed by: STUDENT IN AN ORGANIZED HEALTH CARE EDUCATION/TRAINING PROGRAM

## 2024-05-07 PROCEDURE — 94726 PLETHYSMOGRAPHY LUNG VOLUMES: CPT | Mod: 26 | Performed by: STUDENT IN AN ORGANIZED HEALTH CARE EDUCATION/TRAINING PROGRAM

## 2024-05-07 PROCEDURE — 94060 EVALUATION OF WHEEZING: CPT | Mod: 26 | Performed by: STUDENT IN AN ORGANIZED HEALTH CARE EDUCATION/TRAINING PROGRAM

## 2024-05-07 ASSESSMENT — PULMONARY FUNCTION TESTS
FEV1_PREDICTED: 2.73
FEV1/FVC_PERCENT_CHANGE: 3
FEV1/FVC: 79
FVC_LLN: 2.89
FVC_PERCENT_PREDICTED: 81
FEV1_PERCENT_CHANGE: -2
FVC_PREDICTED: 3.47
FEV1_LLN: 2.28
FEV1/FVC: 76
FVC: 2.92
FEV1_PERCENT_CHANGE: 0
FEV1: 2.24
FEV1/FVC_PERCENT_PREDICTED: 96
FEV1/FVC_PERCENT_PREDICTED: 99
FEV1_PERCENT_PREDICTED: 81
FEV1/FVC: 77
FEV1/FVC_PREDICTED: 79
FVC_PERCENT_PREDICTED: 84
FEV1/FVC_PERCENT_LLN: 66
FEV1/FVC: 79.43
FEV1/FVC_PERCENT_PREDICTED: 79
FEV1/FVC_PERCENT_PREDICTED: 100
FEV1/FVC_PERCENT_PREDICTED: 96
FEV1: 2.24
FEV1/FVC_PERCENT_CHANGE: 0
FEV1_PERCENT_PREDICTED: 81
FVC: 2.82

## 2024-05-07 ASSESSMENT — FIBROSIS 4 INDEX: FIB4 SCORE: 1.13

## 2024-05-07 NOTE — PROCEDURES
Technician: ELLEN Gupta    Technician Comment:  Good patient effort & cooperation.  The results of this test meet the ATS/ERS standards for acceptability & reproducibility.  Test was performed on the [employer] Body Plethysmograph-Elite DX system.  Predicted values were GLI-2012 for spirometry, GLI-2020 for DLCO, ITS for Lung Volumes.  The DLCO was uncorrected for Hgb.  A bronchodilator of Albuterol HFA -2puffs via spacer administered.  DLCO performed during dilation period.    Interpretation:    There is no significant obstruction or restriction. No significant bronchodilator response. Normal diffusion capacity.    DISPLAY PLAN FREE TEXT

## 2024-05-17 DIAGNOSIS — N31.9 NEUROGENIC BLADDER: ICD-10-CM

## 2024-05-17 DIAGNOSIS — S14.109A INJURY OF CERVICAL SPINAL CORD, INITIAL ENCOUNTER (HCC): ICD-10-CM

## 2024-05-17 DIAGNOSIS — F41.9 ANXIETY AND DEPRESSION: ICD-10-CM

## 2024-05-17 DIAGNOSIS — M54.2 CERVICAL SPINE PAIN: ICD-10-CM

## 2024-05-17 DIAGNOSIS — F32.A ANXIETY AND DEPRESSION: ICD-10-CM

## 2024-05-17 DIAGNOSIS — M51.36 DDD (DEGENERATIVE DISC DISEASE), LUMBAR: ICD-10-CM

## 2024-05-17 NOTE — TELEPHONE ENCOUNTER
Received request via: Pharmacy    Was the patient seen in the last year in this department? Yes    Does the patient have an active prescription (recently filled or refills available) for medication(s) requested? No    [email or web address]      Does the patient have group home Plus and need 100 day supply (blood pressure, diabetes and cholesterol meds only)? Patient does not have SCP

## 2024-05-21 DIAGNOSIS — M54.2 CERVICAL SPINE PAIN: ICD-10-CM

## 2024-05-21 DIAGNOSIS — M79.672 FOOT PAIN, LEFT: ICD-10-CM

## 2024-05-21 DIAGNOSIS — F41.9 ANXIETY AND DEPRESSION: ICD-10-CM

## 2024-05-21 DIAGNOSIS — F32.A ANXIETY AND DEPRESSION: ICD-10-CM

## 2024-05-21 DIAGNOSIS — M51.36 DDD (DEGENERATIVE DISC DISEASE), LUMBAR: ICD-10-CM

## 2024-05-21 DIAGNOSIS — N31.9 NEUROGENIC BLADDER: ICD-10-CM

## 2024-05-21 DIAGNOSIS — S14.109A INJURY OF CERVICAL SPINAL CORD, INITIAL ENCOUNTER (HCC): ICD-10-CM

## 2024-05-21 NOTE — TELEPHONE ENCOUNTER
Received request via: Patient    Was the patient seen in the last year in this department? Yes    Does the patient have an active prescription (recently filled or refills available) for medication(s) requested? No    [facility]    Does the patient have long term Plus and need 100 day supply (blood pressure, diabetes and cholesterol meds only)? Patient does not have [facility]    Future Appointments         Provider [facility]    6/28/2024 10:40 AM (Arrive by 10:25 AM) Indira Hutchison M.D. [facility]    7/17/2024 2:30 PM (Arrive by 2:15 PM) Minerva Jacobson M.D. [facility]     9/11/2024 11:00 AM (Arrive by 10:45 AM) No Bolanos M.D. [facility]

## 2024-05-22 DIAGNOSIS — R05.3 CHRONIC COUGH: ICD-10-CM

## 2024-05-22 RX ORDER — FESOTERODINE FUMARATE 4 MG/1
4 TABLET, FILM COATED, EXTENDED RELEASE ORAL
Qty: 30 TABLET | Refills: 5 | OUTPATIENT
Start: 2024-05-22

## 2024-05-22 RX ORDER — MIRABEGRON 25 MG/1
25 TABLET, FILM COATED, EXTENDED RELEASE ORAL DAILY
Qty: 30 TABLET | Refills: 5 | Status: SHIPPED | OUTPATIENT
Start: 2024-05-22

## 2024-05-22 RX ORDER — FESOTERODINE FUMARATE 4 MG/1
4 TABLET, FILM COATED, EXTENDED RELEASE ORAL
Qty: 30 TABLET | Refills: 5 | Status: SHIPPED | OUTPATIENT
Start: 2024-05-22

## 2024-05-22 RX ORDER — MELOXICAM 15 MG/1
15 TABLET ORAL DAILY
Qty: 30 TABLET | Refills: 5 | OUTPATIENT
Start: 2024-05-22

## 2024-05-22 RX ORDER — VENLAFAXINE HYDROCHLORIDE 150 MG/1
150 CAPSULE, EXTENDED RELEASE ORAL DAILY
Qty: 30 CAPSULE | Refills: 5 | Status: SHIPPED | OUTPATIENT
Start: 2024-05-22

## 2024-05-22 RX ORDER — MELOXICAM 15 MG/1
15 TABLET ORAL DAILY
Qty: 30 TABLET | Refills: 5 | Status: SHIPPED | OUTPATIENT
Start: 2024-05-22

## 2024-05-22 RX ORDER — MIRABEGRON 25 MG/1
25 TABLET, FILM COATED, EXTENDED RELEASE ORAL DAILY
Qty: 30 TABLET | Refills: 5 | OUTPATIENT
Start: 2024-05-22

## 2024-05-22 RX ORDER — GABAPENTIN 300 MG/1
900 CAPSULE ORAL
Qty: 180 CAPSULE | Refills: 5 | Status: SHIPPED | OUTPATIENT
Start: 2024-05-22

## 2024-05-22 RX ORDER — VENLAFAXINE HYDROCHLORIDE 150 MG/1
150 CAPSULE, EXTENDED RELEASE ORAL DAILY
Qty: 30 CAPSULE | Refills: 5 | OUTPATIENT
Start: 2024-05-22

## 2024-05-22 RX ORDER — MOMETASONE FUROATE AND FORMOTEROL FUMARATE DIHYDRATE 50; 5 UG/1; UG/1
2 AEROSOL RESPIRATORY (INHALATION) 2 TIMES DAILY
Qty: 13 G | Refills: 5 | Status: SHIPPED | OUTPATIENT
Start: 2024-05-22

## 2024-06-08 ENCOUNTER — PATIENT MESSAGE (OUTPATIENT)
Dept: MEDICAL GROUP | Facility: MEDICAL CENTER | Age: 59
End: 2024-06-08
Payer: MEDICAID

## 2024-06-08 DIAGNOSIS — S14.109A INJURY OF CERVICAL SPINAL CORD, INITIAL ENCOUNTER (HCC): ICD-10-CM

## 2024-06-08 DIAGNOSIS — R25.2 SPASTICITY: ICD-10-CM

## 2024-06-09 ENCOUNTER — OFFICE VISIT (OUTPATIENT)
Dept: URGENT CARE | Facility: CLINIC | Age: 59
End: 2024-06-09
Payer: MEDICAID

## 2024-06-09 ENCOUNTER — APPOINTMENT (OUTPATIENT)
Dept: RADIOLOGY | Facility: IMAGING CENTER | Age: 59
End: 2024-06-09
Attending: NURSE PRACTITIONER
Payer: MEDICAID

## 2024-06-09 VITALS
TEMPERATURE: 97.3 F | HEART RATE: 84 BPM | SYSTOLIC BLOOD PRESSURE: 98 MMHG | HEIGHT: 66 IN | BODY MASS INDEX: 25.71 KG/M2 | OXYGEN SATURATION: 91 % | WEIGHT: 160 LBS | RESPIRATION RATE: 17 BRPM | DIASTOLIC BLOOD PRESSURE: 72 MMHG

## 2024-06-09 DIAGNOSIS — R05.3 CHRONIC COUGH: ICD-10-CM

## 2024-06-09 DIAGNOSIS — J45.21 MILD INTERMITTENT ASTHMA WITH ACUTE EXACERBATION: ICD-10-CM

## 2024-06-09 PROCEDURE — 71046 X-RAY EXAM CHEST 2 VIEWS: CPT | Mod: TC | Performed by: NURSE PRACTITIONER

## 2024-06-09 PROCEDURE — 99214 OFFICE O/P EST MOD 30 MIN: CPT | Performed by: NURSE PRACTITIONER

## 2024-06-09 PROCEDURE — 3074F SYST BP LT 130 MM HG: CPT | Performed by: NURSE PRACTITIONER

## 2024-06-09 PROCEDURE — 3078F DIAST BP <80 MM HG: CPT | Performed by: NURSE PRACTITIONER

## 2024-06-09 RX ORDER — PREDNISONE 20 MG/1
40 TABLET ORAL DAILY
Qty: 10 TABLET | Refills: 0 | Status: SHIPPED | OUTPATIENT
Start: 2024-06-09 | End: 2024-06-14

## 2024-06-09 RX ORDER — BENZONATATE 100 MG/1
100 CAPSULE ORAL 3 TIMES DAILY PRN
Qty: 30 CAPSULE | Refills: 0 | Status: SHIPPED | OUTPATIENT
Start: 2024-06-09 | End: 2024-06-19

## 2024-06-09 ASSESSMENT — FIBROSIS 4 INDEX: FIB4 SCORE: 1.13

## 2024-06-09 NOTE — PROGRESS NOTES
Date: 06/09/24        Chief Complaint   Patient presents with    Nasal Congestion    Cough     X 3 months        History of Present Illness: 58 y.o.  female presents to clinic with cough.  Patient has had an ongoing cough since February which is 4 months ago.  She has been seeing her primary care she has had a chest x-ray in February which was negative.  She has had pulmonary function tests as well which she reports showed that she did not have COPD.  She has been using her Ventolin inhaler she stopped using her Breo as she did not like the way it made her feel.  She reports her cough is worsened over the past month.  She denies any fevers or bodyaches.  She does have some rhinorrhea and postnasal drip.  She presents to clinic for evaluation.      ROS:    No severe shortness of breath   No Cardiac like chest pain, as discussed   As otherwise stated in HPI    Medical/SX/ Social History:  Reviewed per chart    Pertinent Medications:    Current Outpatient Medications on File Prior to Visit   Medication Sig Dispense Refill    mirabegron ER (MYRBETRIQ) 25 MG TABLET SR 24 HR Take 1 Tablet by mouth every day. 30 Tablet 5    venlafaxine (EFFEXOR-XR) 150 MG extended-release capsule Take 1 Capsule by mouth every day. 30 Capsule 5    meloxicam (MOBIC) 15 MG tablet Take 1 Tablet by mouth every day. 30 Tablet 5    gabapentin (NEURONTIN) 300 MG Cap Take 3 Capsules by mouth 2 times a day. Take 1 po qhs 180 Capsule 5    dicyclomine (BENTYL) 20 MG Tab Take 1 Tablet by mouth every 6 hours as needed (Abdominal Pain). 30 Tablet 2    VENTOLIN  (90 Base) MCG/ACT Aero Soln inhalation aerosol INHALE 2 PUFFS BY MOUTH EVERY 6 HOURS AS NEEDED FOR SHORTNESS OF BREATH 18 g 2    baclofen (LIORESAL) 10 MG Tab Take 3 Tablets by mouth 2 times a day. 180 Tablet 5    tizanidine (ZANAFLEX) 2 MG capsule Take 1 Capsule by mouth 2 times a day. 60 Capsule 5    polyethylene glycol 3350 (MIRALAX) 17 GM/SCOOP Powder Take 17 g by mouth every day. 578 g  5    fesoterodine fumarate (TOVIAZ) 4 MG TABLET SR 24 HR Take 1 Tablet by mouth every day. 30 Tablet 5    mometasone-formoterol (DULERA) 50-5 MCG/ACT Aerosol Inhale 2 Puffs 2 times a day. Use spacer 13 g 5    guaiFENesin ER (MUCINEX) 600 MG TABLET SR 12 HR Take 1 Tablet by mouth every 12 hours as needed for Cough. 60 Tablet 2    diclofenac sodium (VOLTAREN) 1 % Gel Apply 2 g topically 4 times a day as needed (pain). 150 g 5    fluticasone (FLONASE) 50 MCG/ACT nasal spray Administer 1 Spray into affected nostril(S) every day. 16 g 6     No current facility-administered medications on file prior to visit.        Allergies:    Other environmental and Codeine     Problem list, medications, and allergies reviewed by myself today in Epic     Physical Exam:    Vitals:    06/09/24 1350   BP: 98/72   Pulse: 84   Resp: 17   Temp: 36.3 °C (97.3 °F)   SpO2: 91%             Physical Exam  Constitutional:       General: She is not in acute distress.     Appearance: Normal appearance. She is well-developed and normal weight. She is not ill-appearing, toxic-appearing or diaphoretic.   HENT:      Head: Normocephalic and atraumatic.   Eyes:      General: Lids are normal. Gaze aligned appropriately. No allergic shiner or scleral icterus.     Extraocular Movements: Extraocular movements intact.      Conjunctiva/sclera: Conjunctivae normal.   Cardiovascular:      Rate and Rhythm: Normal rate and regular rhythm.      Pulses:           Radial pulses are 2+ on the right side and 2+ on the left side.      Heart sounds: Normal heart sounds.   Pulmonary:      Effort: Pulmonary effort is normal.      Breath sounds: Normal air entry. Examination of the right-upper field reveals wheezing. Examination of the left-upper field reveals wheezing. Examination of the right-middle field reveals wheezing. Examination of the left-middle field reveals wheezing. Examination of the right-lower field reveals wheezing. Examination of the left-lower field  reveals wheezing. Wheezing present. No decreased breath sounds, rhonchi or rales.   Abdominal:      General: Abdomen is flat. Bowel sounds are normal.      Palpations: Abdomen is soft.      Tenderness: There is no abdominal tenderness.   Musculoskeletal:      Right lower leg: No edema.      Left lower leg: No edema.   Skin:     General: Skin is warm.      Capillary Refill: Capillary refill takes less than 2 seconds.      Coloration: Skin is not cyanotic or pale.   Neurological:      Mental Status: She is alert and oriented to person, place, and time.      Gait: Gait is intact.   Psychiatric:         Behavior: Behavior normal. Behavior is cooperative.                  Diagnostics:      DX-CHEST-2 VIEWS    Result Date: 6/9/2024 6/9/2024 2:22 PM HISTORY/REASON FOR EXAM: Abnormal finding of lung field; rhonchi noted. TECHNIQUE/EXAM DESCRIPTION AND NUMBER OF VIEWS: Two views of the chest. COMPARISON:  2/10/2024 FINDINGS: Cardiomediastinal contours are normal. No pulmonary consolidation is seen. No pleural space process is evident. Spinal fusion hardware is noted in the neck.    No radiographic evidence of acute cardiopulmonary process.          Medical Decision making and plan :  I personally reviewed prior external notes and test results pertinent to today's visit. Pt is clinically stable at today's acute urgent care visit.  Patient appears nontoxic with no acute distress noted. Appropriate for outpatient care at this time.      Pleasant 58 y.o. female presented clinic with chronic cough and an exacerbation over the past 1 months.  She has been seeing her primary care in regards to this chronic cough.  On chart review I do note patient was prescribed a new inhaler approximately 2 weeks ago.  Patient was unaware of this.  She did call the pharmacy and this new inhaler will be available to her in the next week.  We did obtain a chest x-ray which is negative.  Advised patient I note no noted bacterial foci that would  indicate a need antibiotics at this time.  Advised that bronchitis does not need antibiotics.  Although due to diffuse wheezing throughout we will treat with 5-day course of prednisone.  She may also trial Tessalon Perles for bothersome cough symptoms.  Encouraged her to continue to follow-up with pulmonary as well as her PCP.    1. Chronic cough    - DX-CHEST-2 VIEWS  - benzonatate (TESSALON) 100 MG Cap; Take 1 Capsule by mouth 3 times a day as needed for Cough for up to 10 days.  Dispense: 30 Capsule; Refill: 0    2. Mild intermittent asthma with acute exacerbation    - predniSONE (DELTASONE) 20 MG Tab; Take 2 Tablets by mouth every day for 5 days.  Dispense: 10 Tablet; Refill: 0         Shared decision-making was utilized with patient for treatment plan. Medication discussed included indication for use and the potential benefits and side effects. Education was provided regarding the aforementioned assessments.  Differential Diagnosis, natural history, and supportive care discussed. All of the patient's questions were answered to their satisfaction at the time of discharge. Patient was encouraged to monitor symptoms closely. Those signs and symptoms which would warrant concern and mandate seeking a higher level of service through the emergency department discussed at length.  Patient stated agreement and understanding of this plan of care.    Disposition:  Home in stable condition       Voice Recognition Disclaimer:  Portions of this document were created using voice recognition software. The software does have a chance of producing errors of grammar and possibly content. I have made every reasonable attempt to correct obvious errors, but there may be errors of grammar and possibly content that I did not discover before finalizing the documentation.    Alina Melara, [email or web address].

## 2024-06-10 NOTE — PATIENT COMMUNICATION
Received request via: Patient    Was the patient seen in the last year in this department? Yes    Does the patient have an active prescription (recently filled or refills available) for medication(s) requested? No    Pharmacy Name: [facility]    Does the patient have [facility] Plus and need 100 day supply (blood pressure, diabetes and cholesterol meds only)? Patient does not have SCP

## 2024-06-11 RX ORDER — BACLOFEN 10 MG/1
30 TABLET ORAL 2 TIMES DAILY
Qty: 180 TABLET | Refills: 5 | Status: SHIPPED | OUTPATIENT
Start: 2024-06-11 | End: 2024-06-25 | Stop reason: SDUPTHER

## 2024-06-11 RX ORDER — TIZANIDINE HYDROCHLORIDE 2 MG/1
2 CAPSULE, GELATIN COATED ORAL
Qty: 60 CAPSULE | Refills: 5 | Status: SHIPPED | OUTPATIENT
Start: 2024-06-11 | End: 2024-06-25 | Stop reason: SDUPTHER

## 2024-06-13 ENCOUNTER — HOSPITAL ENCOUNTER (OUTPATIENT)
Dept: LAB | Facility: MEDICAL CENTER | Age: 59
End: 2024-06-13
Attending: NURSE PRACTITIONER
Payer: MEDICAID

## 2024-06-13 LAB
ALBUMIN SERPL BCP-MCNC: 4.3 G/DL (ref 3.2–4.9)
ALBUMIN/GLOB SERPL: 1.7 G/DL
ALP SERPL-CCNC: 78 U/L (ref 30–99)
ALT SERPL-CCNC: 29 U/L (ref 2–50)
ANION GAP SERPL CALC-SCNC: 14 MMOL/L (ref 7–16)
AST SERPL-CCNC: 27 U/L (ref 12–45)
BASOPHILS # BLD AUTO: 0.8 % (ref 0–1.8)
BASOPHILS # BLD: 0.08 K/UL (ref 0–0.12)
BILIRUB SERPL-MCNC: 0.6 MG/DL (ref 0.1–1.5)
BUN SERPL-MCNC: 13 MG/DL (ref 8–22)
CALCIUM ALBUM COR SERPL-MCNC: 9.7 MG/DL (ref 8.5–10.5)
CALCIUM SERPL-MCNC: 9.9 MG/DL (ref 8.5–10.5)
CHLORIDE SERPL-SCNC: 102 MMOL/L (ref 96–112)
CO2 SERPL-SCNC: 25 MMOL/L (ref 20–33)
CREAT SERPL-MCNC: 0.74 MG/DL (ref 0.5–1.4)
EOSINOPHIL # BLD AUTO: 0.1 K/UL (ref 0–0.51)
EOSINOPHIL NFR BLD: 1 % (ref 0–6.9)
ERYTHROCYTE [DISTWIDTH] IN BLOOD BY AUTOMATED COUNT: 46.7 FL (ref 35.9–50)
GFR SERPLBLD CREATININE-BSD FMLA CKD-EPI: 94 ML/MIN/1.73 M 2
GLOBULIN SER CALC-MCNC: 2.6 G/DL (ref 1.9–3.5)
GLUCOSE SERPL-MCNC: 87 MG/DL (ref 65–99)
HCT VFR BLD AUTO: 45.7 % (ref 37–47)
HGB BLD-MCNC: 15.6 G/DL (ref 12–16)
IMM GRANULOCYTES # BLD AUTO: 0.02 K/UL (ref 0–0.11)
IMM GRANULOCYTES NFR BLD AUTO: 0.2 % (ref 0–0.9)
LYMPHOCYTES # BLD AUTO: 2.61 K/UL (ref 1–4.8)
LYMPHOCYTES NFR BLD: 27.2 % (ref 22–41)
MCH RBC QN AUTO: 31.5 PG (ref 27–33)
MCHC RBC AUTO-ENTMCNC: 34.1 G/DL (ref 32.2–35.5)
MCV RBC AUTO: 92.1 FL (ref 81.4–97.8)
MONOCYTES # BLD AUTO: 0.44 K/UL (ref 0–0.85)
MONOCYTES NFR BLD AUTO: 4.6 % (ref 0–13.4)
NEUTROPHILS # BLD AUTO: 6.36 K/UL (ref 1.82–7.42)
NEUTROPHILS NFR BLD: 66.2 % (ref 44–72)
NRBC # BLD AUTO: 0 K/UL
NRBC BLD-RTO: 0 /100 WBC (ref 0–0.2)
PLATELET # BLD AUTO: 276 K/UL (ref 164–446)
PMV BLD AUTO: 11 FL (ref 9–12.9)
POTASSIUM SERPL-SCNC: 4.2 MMOL/L (ref 3.6–5.5)
PROT SERPL-MCNC: 6.9 G/DL (ref 6–8.2)
RBC # BLD AUTO: 4.96 M/UL (ref 4.2–5.4)
SODIUM SERPL-SCNC: 141 MMOL/L (ref 135–145)
WBC # BLD AUTO: 9.6 K/UL (ref 4.8–10.8)

## 2024-06-13 PROCEDURE — 85025 COMPLETE CBC W/AUTO DIFF WBC: CPT

## 2024-06-13 PROCEDURE — 80053 COMPREHEN METABOLIC PANEL: CPT

## 2024-06-13 PROCEDURE — 36415 COLL VENOUS BLD VENIPUNCTURE: CPT

## 2024-06-14 ENCOUNTER — OFFICE VISIT (OUTPATIENT)
Dept: MEDICAL GROUP | Facility: MEDICAL CENTER | Age: 59
End: 2024-06-14
Attending: FAMILY MEDICINE
Payer: MEDICAID

## 2024-06-14 VITALS
OXYGEN SATURATION: 92 % | TEMPERATURE: 97.1 F | HEART RATE: 73 BPM | SYSTOLIC BLOOD PRESSURE: 116 MMHG | HEIGHT: 66 IN | BODY MASS INDEX: 25.71 KG/M2 | DIASTOLIC BLOOD PRESSURE: 78 MMHG | WEIGHT: 160 LBS | RESPIRATION RATE: 16 BRPM

## 2024-06-14 DIAGNOSIS — K31.84 GASTROPARESIS: ICD-10-CM

## 2024-06-14 DIAGNOSIS — J45.21 MILD INTERMITTENT ASTHMA WITH ACUTE EXACERBATION: ICD-10-CM

## 2024-06-14 DIAGNOSIS — R05.3 CHRONIC COUGH: ICD-10-CM

## 2024-06-14 PROCEDURE — 94640 AIRWAY INHALATION TREATMENT: CPT | Performed by: FAMILY MEDICINE

## 2024-06-14 PROCEDURE — 3078F DIAST BP <80 MM HG: CPT | Performed by: FAMILY MEDICINE

## 2024-06-14 PROCEDURE — 3074F SYST BP LT 130 MM HG: CPT | Performed by: FAMILY MEDICINE

## 2024-06-14 PROCEDURE — 99214 OFFICE O/P EST MOD 30 MIN: CPT | Performed by: FAMILY MEDICINE

## 2024-06-14 PROCEDURE — 700101 HCHG RX REV CODE 250: Mod: UD

## 2024-06-14 RX ORDER — IPRATROPIUM BROMIDE AND ALBUTEROL SULFATE 2.5; .5 MG/3ML; MG/3ML
3 SOLUTION RESPIRATORY (INHALATION) ONCE
Status: COMPLETED | OUTPATIENT
Start: 2024-06-14 | End: 2024-06-14

## 2024-06-14 RX ADMIN — IPRATROPIUM BROMIDE AND ALBUTEROL SULFATE 3 ML: 2.5; .5 SOLUTION RESPIRATORY (INHALATION) at 09:27

## 2024-06-14 ASSESSMENT — FIBROSIS 4 INDEX: FIB4 SCORE: 1.04

## 2024-06-14 NOTE — PROGRESS NOTES
"Verbal consent was acquired by the patient to use [employer] ambient listening note generation during this visit   Subjective:     HPI:   History of Present Illness  The patient presents for evaluation of multiple medical concerns.    GI -   Pt has seen Gi recently, has orders for breath test for SIBO. Apparently she tested positive for this last year, un sure if she completed ordered abx. She is frustarted as she continues to have abdominal bloating. Gastric emptying study was positive in the past. Gi is undergoing eval for etiology    Asthma -   The patient sought urgent care earlier in the week due to persistent feeling of SOB and coughing. She was found to have significant wheezing and was given 5d course of prednisone, since then she has not needed ventolin. She continues to take benzonatate, which provides relief for her cough. She uses nocturnal oxygen, which she reports as beneficial. A pulmonary appointment is scheduled next month. She has not yet obtained the Dulera inhaler due to unavailability at the pharmacy. Currently, she is not using any inhalers except ventolin prn. She was previously on Breo, which she discontinued due to throat discomfort.     Supplemental Information  She had a gallbladder removed 2 years ago.      Objective:     Exam:  /78 (BP Location: Left arm, Patient Position: Sitting)   Pulse 73   Temp 36.2 °C (97.1 °F) (Temporal)   Resp 16   Ht 1.676 m (5' 6\")   Wt 72.6 kg (160 lb)   LMP 01/03/2017   SpO2 92%   BMI 25.82 kg/m²  Body mass index is 25.82 kg/m².    Physical Exam  Physical Exam      General: Normal appearing. No distress.  Pulmonary: decreased air movement, slightly improved after duoneb in office  Cardiovascular: Regular rate and rhythm without murmur. Radial pulses are intact and equal bilaterally.    Data:     Reviewed past Gi notes  Reviewed  notes    Assessment & Plan:     1. Mild intermittent asthma with acute exacerbation  ipratropium-albuterol (DUONEB) " nebulizer solution    mometasone-formoterol (DULERA) 100-5 MCG/ACT Aerosol      2. Chronic cough  mometasone-formoterol (DULERA) 100-5 MCG/ACT Aerosol          Assessment & Plan  1. Respiratory distress.  The patient's lung function test yielded normal results. The dosage of Dulera will be increased to 100 mcg as the 50mcg dosing cannot be found, to be taken as 2 puffs twice daily, in conjunction with a spacer. Advised to rinse mouth after use. She is to f/u for this management with pulm. PFTs showed no COPD.     2. Abdominal bloating.  Pt has repeat testing done with GI. Explained that they are trying to find etiology, but certainly could trial reglan/other promotility agents if no etiology found.     Follow-up  A follow-up appointment is scheduled for 3 months from now.          Return in about 3 months (around 9/14/2024).      Please note that this dictation was created using voice recognition software. I have made every reasonable attempt to correct obvious errors, but I expect that there are errors of grammar and possibly content that I did not discover before finalizing the note.

## 2024-06-25 DIAGNOSIS — R25.2 SPASTICITY: ICD-10-CM

## 2024-06-25 DIAGNOSIS — N63.10 MASS OF RIGHT BREAST, UNSPECIFIED QUADRANT: ICD-10-CM

## 2024-06-25 DIAGNOSIS — S14.109A INJURY OF CERVICAL SPINAL CORD, INITIAL ENCOUNTER (HCC): ICD-10-CM

## 2024-06-25 DIAGNOSIS — Z12.31 ENCOUNTER FOR SCREENING MAMMOGRAM FOR MALIGNANT NEOPLASM OF BREAST: ICD-10-CM

## 2024-06-25 RX ORDER — TIZANIDINE HYDROCHLORIDE 2 MG/1
2 CAPSULE, GELATIN COATED ORAL
Qty: 60 CAPSULE | Refills: 5 | Status: SHIPPED | OUTPATIENT
Start: 2024-06-25

## 2024-06-25 RX ORDER — BACLOFEN 10 MG/1
30 TABLET ORAL 2 TIMES DAILY
Qty: 180 TABLET | Refills: 5 | Status: SHIPPED | OUTPATIENT
Start: 2024-06-25

## 2024-06-26 NOTE — PROGRESS NOTES
Spiritual Care Note    Patient Information     Patient's Name: Marilyn Salinas   MRN: [id number]    YOB: 1965   Age and Gender: 54 y.o. female   Service Area: Inpatient    Room (and Bed): [place] [address]   Ethnicity or Nationality:     Primary Language: English   Yarsani/Spiritual preference: Restoration   Place of Residence: [place], [place]   Family/Friends/Others Present: No   Clinical Team Present: No   Medical Diagnosis(-es)/Procedure(s): Quadriplegia   Code Status: Full Code    Date of Admission: 12/27/2019   Length of Stay: 4 days        Spiritual Care Provider Information:  Name of Spiritual Care Provider: Maria G Erazo  Title of Spiritual Care Provider: Associate   Phone Number: [phone]  E-mail: [email or web address]  Total time : 10 minutes    Spiritual Screen Results:    Gen Nursing  Spiritual Screen  Is your spiritual health or inner well-being important to you as you cope with your medical condition?: Yes  Would you like to receive a visit from our Spiritual Care team or your own Gnosticism or spiritual leader?: Yes  Was spiritual care education provided to the patient?: Declined     Palliative Care  PC Yarsani/Spiritual Screening  Was spiritual care education provided to the patient?: Declined      Encounter/Request Information  Encounter/Request Type   Visited With: Patient  Nature of the Visit: Initial  General Visit: Yes  Referral From/ Origin of Request: Epic nursing    Religous Needs/Values       Spiritual Assessment     Spiritual Care Encounters    Interaction/Conversation: Pt declined visit.   told her to tell her nurse if she changs her mind.    Plan: Visit Upon Request    Notes:             Patient very upset over being rescheduled. Please keep an eye on schedule and call her asap. I have her on high priority waiting list as well.

## 2024-07-12 ENCOUNTER — HOSPITAL ENCOUNTER (OUTPATIENT)
Dept: RADIOLOGY | Facility: MEDICAL CENTER | Age: 59
End: 2024-07-12
Attending: FAMILY MEDICINE
Payer: MEDICAID

## 2024-07-12 DIAGNOSIS — N63.10 MASS OF RIGHT BREAST, UNSPECIFIED QUADRANT: ICD-10-CM

## 2024-07-12 DIAGNOSIS — Z12.31 ENCOUNTER FOR SCREENING MAMMOGRAM FOR MALIGNANT NEOPLASM OF BREAST: ICD-10-CM

## 2024-07-12 PROCEDURE — 76642 ULTRASOUND BREAST LIMITED: CPT | Mod: RT

## 2024-07-12 PROCEDURE — G0279 TOMOSYNTHESIS, MAMMO: HCPCS

## 2024-07-15 ASSESSMENT — ENCOUNTER SYMPTOMS
DYSPNEA AT REST: 1
SHORTNESS OF BREATH: 1
HEMOPTYSIS: 0
WHEEZING: 1
RESPIRATORY SYMPTOMS COMMENTS: YES
CHEST TIGHTNESS: 1

## 2024-07-17 ENCOUNTER — OFFICE VISIT (OUTPATIENT)
Dept: SLEEP MEDICINE | Facility: MEDICAL CENTER | Age: 59
End: 2024-07-17
Attending: FAMILY MEDICINE
Payer: MEDICAID

## 2024-07-17 VITALS
OXYGEN SATURATION: 94 % | WEIGHT: 160 LBS | HEIGHT: 66 IN | DIASTOLIC BLOOD PRESSURE: 66 MMHG | BODY MASS INDEX: 25.71 KG/M2 | SYSTOLIC BLOOD PRESSURE: 112 MMHG | HEART RATE: 70 BPM

## 2024-07-17 DIAGNOSIS — K21.9 GASTROESOPHAGEAL REFLUX DISEASE, UNSPECIFIED WHETHER ESOPHAGITIS PRESENT: ICD-10-CM

## 2024-07-17 DIAGNOSIS — J44.9 OBSTRUCTIVE LUNG DISEASE (GENERALIZED) (HCC): ICD-10-CM

## 2024-07-17 DIAGNOSIS — R05.3 CHRONIC COUGH: ICD-10-CM

## 2024-07-17 DIAGNOSIS — K31.84 GASTROPARESIS: ICD-10-CM

## 2024-07-17 DIAGNOSIS — J42 CHRONIC BRONCHITIS, UNSPECIFIED CHRONIC BRONCHITIS TYPE (HCC): ICD-10-CM

## 2024-07-17 DIAGNOSIS — Z72.0 TOBACCO USE: ICD-10-CM

## 2024-07-17 PROCEDURE — 99204 OFFICE O/P NEW MOD 45 MIN: CPT | Performed by: INTERNAL MEDICINE

## 2024-07-17 PROCEDURE — 3078F DIAST BP <80 MM HG: CPT | Performed by: INTERNAL MEDICINE

## 2024-07-17 PROCEDURE — 99213 OFFICE O/P EST LOW 20 MIN: CPT | Performed by: INTERNAL MEDICINE

## 2024-07-17 PROCEDURE — 3074F SYST BP LT 130 MM HG: CPT | Performed by: INTERNAL MEDICINE

## 2024-07-17 ASSESSMENT — ENCOUNTER SYMPTOMS
NECK PAIN: 0
ORTHOPNEA: 0
CHILLS: 0
DEPRESSION: 0
WEAKNESS: 0
TREMORS: 0
BLURRED VISION: 0
DOUBLE VISION: 0
WEIGHT LOSS: 0
FOCAL WEAKNESS: 0
ABDOMINAL PAIN: 0
SPUTUM PRODUCTION: 1
DIAPHORESIS: 0
FEVER: 0
NAUSEA: 0
VOMITING: 0
SHORTNESS OF BREATH: 1
DIZZINESS: 0
CLAUDICATION: 0
SPEECH CHANGE: 0
DIARRHEA: 0
EYE REDNESS: 0
PHOTOPHOBIA: 0
HEADACHES: 0
FALLS: 0
HEMOPTYSIS: 0
PND: 0
HEARTBURN: 0
SORE THROAT: 0
BACK PAIN: 0
MYALGIAS: 0
EYE DISCHARGE: 0
STRIDOR: 0
EYE PAIN: 0
SINUS PAIN: 0
COUGH: 1
CONSTIPATION: 0
WHEEZING: 1
PALPITATIONS: 0

## 2024-07-17 ASSESSMENT — FIBROSIS 4 INDEX: FIB4 SCORE: 1.05

## 2024-07-22 ENCOUNTER — TELEPHONE (OUTPATIENT)
Dept: PHARMACY | Facility: MEDICAL CENTER | Age: 59
End: 2024-07-22
Payer: MEDICAID

## 2024-07-25 ENCOUNTER — HOSPITAL ENCOUNTER (OUTPATIENT)
Dept: RADIOLOGY | Facility: MEDICAL CENTER | Age: 59
End: 2024-07-25
Attending: INTERNAL MEDICINE
Payer: MEDICAID

## 2024-07-25 DIAGNOSIS — R05.3 CHRONIC COUGH: ICD-10-CM

## 2024-07-25 PROCEDURE — 71250 CT THORAX DX C-: CPT

## 2024-08-24 ENCOUNTER — APPOINTMENT (OUTPATIENT)
Dept: RADIOLOGY | Facility: MEDICAL CENTER | Age: 59
End: 2024-08-24
Attending: EMERGENCY MEDICINE
Payer: MEDICAID

## 2024-08-24 ENCOUNTER — HOSPITAL ENCOUNTER (EMERGENCY)
Facility: MEDICAL CENTER | Age: 59
End: 2024-08-24
Attending: EMERGENCY MEDICINE
Payer: MEDICAID

## 2024-08-24 VITALS
WEIGHT: 160 LBS | DIASTOLIC BLOOD PRESSURE: 66 MMHG | SYSTOLIC BLOOD PRESSURE: 110 MMHG | RESPIRATION RATE: 16 BRPM | TEMPERATURE: 97 F | HEART RATE: 74 BPM | OXYGEN SATURATION: 96 % | BODY MASS INDEX: 25.82 KG/M2

## 2024-08-24 DIAGNOSIS — S06.0X0A CONCUSSION WITHOUT LOSS OF CONSCIOUSNESS, INITIAL ENCOUNTER: ICD-10-CM

## 2024-08-24 DIAGNOSIS — S01.81XA LACERATION OF FOREHEAD, INITIAL ENCOUNTER: ICD-10-CM

## 2024-08-24 PROCEDURE — 700101 HCHG RX REV CODE 250: Performed by: EMERGENCY MEDICINE

## 2024-08-24 PROCEDURE — 700111 HCHG RX REV CODE 636 W/ 250 OVERRIDE (IP): Performed by: EMERGENCY MEDICINE

## 2024-08-24 PROCEDURE — 90715 TDAP VACCINE 7 YRS/> IM: CPT | Performed by: EMERGENCY MEDICINE

## 2024-08-24 PROCEDURE — 303747 HCHG EXTRA SUTURE

## 2024-08-24 PROCEDURE — 304217 HCHG IRRIGATION SYSTEM

## 2024-08-24 PROCEDURE — 90471 IMMUNIZATION ADMIN: CPT

## 2024-08-24 PROCEDURE — A9270 NON-COVERED ITEM OR SERVICE: HCPCS | Mod: UD | Performed by: EMERGENCY MEDICINE

## 2024-08-24 PROCEDURE — 99285 EMERGENCY DEPT VISIT HI MDM: CPT

## 2024-08-24 PROCEDURE — 70450 CT HEAD/BRAIN W/O DYE: CPT

## 2024-08-24 PROCEDURE — 304999 HCHG REPAIR-SIMPLE/INTERMED LEVEL 1

## 2024-08-24 PROCEDURE — 72125 CT NECK SPINE W/O DYE: CPT

## 2024-08-24 PROCEDURE — 700102 HCHG RX REV CODE 250 W/ 637 OVERRIDE(OP): Mod: UD | Performed by: EMERGENCY MEDICINE

## 2024-08-24 RX ORDER — OXYCODONE AND ACETAMINOPHEN 5; 325 MG/1; MG/1
1 TABLET ORAL ONCE
Status: COMPLETED | OUTPATIENT
Start: 2024-08-24 | End: 2024-08-24

## 2024-08-24 RX ORDER — OXYCODONE HYDROCHLORIDE 5 MG/1
5 TABLET ORAL EVERY 4 HOURS PRN
Qty: 8 TABLET | Refills: 0 | Status: SHIPPED | OUTPATIENT
Start: 2024-08-24 | End: 2024-08-27

## 2024-08-24 RX ORDER — OXYCODONE HYDROCHLORIDE 5 MG/1
5 TABLET ORAL ONCE
Status: COMPLETED | OUTPATIENT
Start: 2024-08-24 | End: 2024-08-24

## 2024-08-24 RX ORDER — ONDANSETRON 4 MG/1
4 TABLET, ORALLY DISINTEGRATING ORAL ONCE
Status: COMPLETED | OUTPATIENT
Start: 2024-08-24 | End: 2024-08-24

## 2024-08-24 RX ADMIN — OXYCODONE HYDROCHLORIDE 5 MG: 5 TABLET ORAL at 16:35

## 2024-08-24 RX ADMIN — OXYCODONE HYDROCHLORIDE AND ACETAMINOPHEN 1 TABLET: 5; 325 TABLET ORAL at 13:34

## 2024-08-24 RX ADMIN — ONDANSETRON 4 MG: 4 TABLET, ORALLY DISINTEGRATING ORAL at 13:34

## 2024-08-24 RX ADMIN — Medication 3 ML: at 13:36

## 2024-08-24 RX ADMIN — CLOSTRIDIUM TETANI TOXOID ANTIGEN (FORMALDEHYDE INACTIVATED), CORYNEBACTERIUM DIPHTHERIAE TOXOID ANTIGEN (FORMALDEHYDE INACTIVATED), BORDETELLA PERTUSSIS TOXOID ANTIGEN (GLUTARALDEHYDE INACTIVATED), BORDETELLA PERTUSSIS FILAMENTOUS HEMAGGLUTININ ANTIGEN (FORMALDEHYDE INACTIVATED), BORDETELLA PERTUSSIS PERTACTIN ANTIGEN, AND BORDETELLA PERTUSSIS FIMBRIAE 2/3 ANTIGEN 0.5 ML: 5; 2; 2.5; 5; 3; 5 INJECTION, SUSPENSION INTRAMUSCULAR at 13:34

## 2024-08-24 ASSESSMENT — FIBROSIS 4 INDEX: FIB4 SCORE: 1.05

## 2024-08-24 ASSESSMENT — PAIN DESCRIPTION - PAIN TYPE: TYPE: ACUTE PAIN

## 2024-08-24 NOTE — ED NOTES
Patient provided discharge instructions. Patient verbalized understanding. Patient leaving ER in stable condition in wheelchair with caregiver. Wristband removed.

## 2024-08-24 NOTE — ED NOTES
Patient to room from lobby in wheelchair. Connected to monitor. Agree with triage note. Charted for ERP. Call light within reach. Friend at bedside.    MD at bedside.

## 2024-08-24 NOTE — ED TRIAGE NOTES
Pt to triage .  Chief Complaint   Patient presents with    T-5000 Head Injury     Pt tripped and fell forward while using her walker. Pt hit head against gravel. Denies Loc     Head Laceration

## 2024-08-24 NOTE — ED PROVIDER NOTES
ED Provider Note    Primary care provider: Indira Hutchison M.D.    CHIEF COMPLAINT  Chief Complaint   Patient presents with    T-5000 Head Injury     Pt tripped and fell forward while using her walker. Pt hit head against gravel. Denies Loc     Head Laceration       Additional history obtained from: Friend states that she vomited earlier, almost lost consciousness with the fall.  Limitation to History:  Select: : None    HPI  Marilyn Salinas is a 59 y.o. female who presents to the Emergency Department after head trauma.  The patient normally ambulates with a walker, she was going downstairs, fell forward, struck her forehead against the walker, fell to the ground.  Notes severe forehead pain, occipital headache, neck pain.  Denies any new numbness or new focal weakness.  Unaware of last tetanus shot.  Did vomit once.      External Record Review: Reviewed the most recent outpatient note from the pulmonary clinic, the patient was seen there July 17.  She is being followed for a chronic cough.  Has a history of tetraplegia following a fall, ambulates with a walker.  History of GERD with delayed gastric emptying.  Pulmonary ordered a CT of the chest for the chronic cough, it was completed July 25 that showed a benign adrenal adenoma, otherwise unremarkable.    REVIEW OF SYSTEMS  See HPI.     PAST MEDICAL HISTORY   has a past medical history of Anesthesia (12/23/2021), Anesthesia (03/17/2022), Anxiety, Arthritis, Asthma, Breath shortness, Cataract (12/23/2022), Cerebral palsy (HCC), Cervical spinal cord injury (HCC) (12/19/2019), COVID-19 (01/17/2022), DDD (degenerative disc disease), lumbar, Dental disorder, Depression, Full dentures (03/17/2022), Heart burn (12/23/2022), High cholesterol, History of falling, Marijuana user, Risk for falls, Stroke (HCC) (03/17/2022), Tetraplegia (HCC), TIA (transient ischemic attack) (03/17/2022), Urinary bladder disorder, Urinary incontinence, and Vertigo.    SURGICAL HISTORY   has a  past surgical history that includes eye surgery (); lumpectomy; bladder sling female (N/A, 10/3/2017); cervical disk and fusion anterior (N/A, 2019); corpectomy (N/A, 2019); amputation toe,mt-p jt (Left, 2021); other (Left, ); vaginal hysterectomy scope total (N/A, 10/3/2017); salpingectomy (Bilateral, 10/3/2017); oophorectomy (Bilateral, 10/3/2017); anterior and posterior repair (Bilateral, 10/3/2017); enterocele repair (N/A, 10/3/2017); vaginal suspension (N/A, 10/3/2017); and loco by laparoscopy (3/23/2022).    SOCIAL HISTORY  Social History     Tobacco Use    Smoking status: Some Days     Current packs/day: 0.00     Average packs/day: 1 pack/day for 35.0 years (35.0 ttl pk-yrs)     Types: Cigarettes     Start date: 1987     Last attempt to quit: 2022     Years since quittin.6    Smokeless tobacco: Never    Tobacco comments:     20 years of 1ppd, then 10 years weaning down   Vaping Use    Vaping status: Some Days    Start date: 2020    Substances: THC, CBD   Substance Use Topics    Alcohol use: No    Drug use: Yes     Types: Marijuana, Inhaled     Comment: marijuana daily (smoked and edibles)      Social History     Substance and Sexual Activity   Drug Use Yes    Types: Marijuana, Inhaled    Comment: marijuana daily (smoked and edibles)       FAMILY HISTORY  Family History   Problem Relation Age of Onset    Cancer Mother         ovarian    Alcohol/Drug Mother     Cancer Brother         unknown, caused him to lose his leg when he was 3    Diabetes Maternal Aunt        CURRENT MEDICATIONS  Reviewed.  See Encounter Summary.     ALLERGIES  Allergies   Allergen Reactions    Other Environmental Hives     TUMBLEWEED    Codeine Itching, Unspecified and Rash     Rash, itching, mood disorder- becomes agitated  Other reaction(s): itchy, grumpy       PHYSICAL EXAM  VITAL SIGNS: /66   Pulse 74   Temp 36.1 °C (97 °F) (Temporal)   Resp 16   Wt 72.6 kg (160 lb)   LMP  01/03/2017   SpO2 96%   BMI 25.82 kg/m²   Constitutional: Awake, alert in no apparent distress.  HENT: Normocephalic, Bilateral external ears normal. Nose normal.  2 cm stellate laceration mid forehead, no raccoon eyes, no gil signs.  No obvious midline tenderness, the patient does note discomfort throughout the posterior neck.  Eyes: Conjunctiva normal, non-icteric, EOMI.    Thorax & Lungs: Easy unlabored respirations, Clear to ascultation bilaterally.  Cardiovascular: Regular rate, Regular rhythm, No murmurs, rubs or gallops. Bilateral pulses symmetrical.   Abdomen:  Soft, nontender, nondistended, normal active bowel sounds.   :    Skin: Visualized skin is  Dry, No erythema, No rash.   Musculoskeletal:   No cyanosis, clubbing or edema. No leg asymmetry.   Neurologic: Alert, Grossly non-focal.   Psychiatric: Normal affect, Normal mood  Lymphatic:      RADIOLOGY  I have independently interpreted the diagnostic imaging associated with this visit and am waiting the final reading from the radiologist.   My preliminary interpretation is as follows: No intracranial hemorrhage    Radiologist interpretation:   CT-CSPINE WITHOUT PLUS RECONS   Final Result      1.  No fracture detected.   2.  Increasing bony lucency within the fused vertebral bodies and along the corpectomy device since prior. This may be further evaluated with cervical MRI.      CT-HEAD W/O   Final Result      1.  No acute intracranial abnormality detected.                   COURSE & MEDICAL DECISION MAKING  Pertinent Labs & Imaging studies reviewed. (See chart for details)    COURSE & MEDICAL DECISION MAKING  Pertinent Labs & Imaging studies reviewed. (See chart for details)    Differential diagnoses include but are not limited to: Contusion, ICH, laceration, C-spine injury    1:12 PM - Nursing notes reviewed, patient seen and examined at bedside.    Procedure note: The laceration was anesthetized using L ET.  Following this was copiously irrigated  with sterile water.  The laceration was then closed using 5-0 fast-absorbing gut in simple interrupted fashion.  4 sutures were placed, total length of laceration 2.5 cm.  No complications.    Escalation of care considered, and ultimately not performed: Laboratory analysis.      Decision tools and prescription drugs considered including, but not limited to: Considered [place] head CT criteria and Nexus criteria, however the patient does have neurodeficits at baseline, also notes significant pain, therefore do not feel comfortable using clinical decision rules at this time.    Decision Making:  This is a pleasant 59 y.o. year old female who presents after a trip, fall.  The patient has a scalp laceration.  Did have head and neck pain.  Imaging completed that is unremarkable.  Patient is neurovascularly intact.  Laceration closed, tetanus updated.     She is having significant pain in the forehead, requests short course of pain medications which will be provided.    In prescribing controlled substances to this patient, I certify that I have obtained and reviewed the medical history of Marilyn Salinas. I have also made a good patricia effort to obtain applicable records from other providers who have treated the patient and records did not demonstrate any increased risk of substance abuse that would prevent me from prescribing controlled substances.     I have conducted a physical exam and documented it. I have reviewed Ms. Salinas’s prescription history as maintained by the [place] Prescription Monitoring Program.     I have assessed the patient’s risk for abuse, dependency, and addiction using the validated Opioid Risk Tool available at https://www.mdcalc.com/ktfids-mwyi-cnrj-ort-narcotic-abuse.     Given the above, I believe the benefits of controlled substance therapy outweigh the risks. The reasons for prescribing controlled substances include non-narcotic, oral analgesic alternatives have been inadequate for pain  control. Accordingly, I have discussed the risk and benefits, treatment plan, and alternative therapies with the patient.        The patient was discharged home (see d/c instructions) was told to return immediately for any signs or symptoms listed, or any worsening at all.  The patient verbally agreed to the discharge precautions and follow-up plan which is documented in EPIC.    Discharge Medications:  Discharge Medication List as of 8/24/2024  4:29 PM        START taking these medications    Details   oxyCODONE immediate-release (ROXICODONE) 5 MG Tab Take 1 Tablet by mouth every four hours as needed for Severe Pain for up to 3 days., Disp-8 Tablet, R-0, Normal             FINAL IMPRESSION  1. Laceration of forehead, initial encounter    2. Concussion without loss of consciousness, initial encounter

## 2024-08-30 ENCOUNTER — OFFICE VISIT (OUTPATIENT)
Dept: MEDICAL GROUP | Facility: MEDICAL CENTER | Age: 59
End: 2024-08-30
Attending: FAMILY MEDICINE
Payer: MEDICAID

## 2024-08-30 ENCOUNTER — HOSPITAL ENCOUNTER (OUTPATIENT)
Facility: MEDICAL CENTER | Age: 59
End: 2024-08-30
Attending: FAMILY MEDICINE
Payer: MEDICAID

## 2024-08-30 VITALS
WEIGHT: 158 LBS | RESPIRATION RATE: 14 BRPM | SYSTOLIC BLOOD PRESSURE: 90 MMHG | HEART RATE: 80 BPM | BODY MASS INDEX: 25.39 KG/M2 | DIASTOLIC BLOOD PRESSURE: 62 MMHG | OXYGEN SATURATION: 96 % | TEMPERATURE: 97.4 F | HEIGHT: 66 IN

## 2024-08-30 DIAGNOSIS — S09.90XD TRAUMATIC INJURY OF HEAD, SUBSEQUENT ENCOUNTER: ICD-10-CM
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
## 2024-08-30 PROCEDURE — 80307 DRUG TEST PRSMV CHEM ANLYZR: CPT

## 2024-08-30 RX ORDER — BUTALBITAL, ACETAMINOPHEN AND CAFFEINE 50; 325; 40 MG/1; MG/1; MG/1
1 TABLET ORAL EVERY 8 HOURS PRN
Qty: 21 TABLET | Refills: 0 | Status: SHIPPED | OUTPATIENT
Start: 2024-08-30 | End: 2024-09-06

## 2024-08-30 RX ORDER — PANTOPRAZOLE SODIUM 20 MG/1
20 TABLET, DELAYED RELEASE ORAL DAILY
COMMUNITY

## 2024-08-30 RX ORDER — OXYCODONE AND ACETAMINOPHEN 5; 325 MG/1; MG/1
1 TABLET ORAL
Qty: 7 TABLET | Refills: 0 | Status: SHIPPED | OUTPATIENT
Start: 2024-08-30 | End: 2024-09-06

## 2024-08-30 ASSESSMENT — FIBROSIS 4 INDEX: FIB4 SCORE: 1.05

## 2024-08-30 NOTE — PROGRESS NOTES
Subjective:     CC:   Chief Complaint   Patient presents with    Follow-Up     Er visit         HPI:     Head trauma  Pt recently went to ER after fall, had head lac that received sutures   Reports stomach has been upset since fall with nausea. Has been taking ibuprofen at night - 600-800mg. Takes meloxicam 15mg in the morning.   Has been on pantoprazole 20mg daily for the last couple of months, although incorrectly, and this was corrected yesterday  She has been feeling some brain fog, out of it. She has been having headaches.     Fall  Pt has been noting some L hip pain since her fall. She has significant bruising on her anterior thighs as this is where she hit her rollator. Pain is around the L posterior buttock/hip. Xrays were not obtained of this area after the fall.   She has been taking oxycodone once daily as needed for her pain as given by the ER. She does not experience drowsiness with this medication.   She is on gabapentin 900mg bid, she does not some sleepiness in the afternoon but notes significant increase in pain if she decreases her dosing at all. She already has baclofen and tizanidine. She has gone to pain mgmt before, notes injections do not help her.     Past Medical History:   Diagnosis Date    Anesthesia 12/23/2021    agitated coming out of anesthesia    Anesthesia 03/17/2022    Patient states becomes upset and sad after anesthesia.    Anxiety     Arthritis     spine, feet     Asthma     Breath shortness     Cataract 12/23/2022    no surgery yet    Cerebral palsy (HCC)     Cervical spinal cord injury (HCC) 12/19/2019    COVID-19 01/17/2022 1/17/2022 stopped 1-    DDD (degenerative disc disease), lumbar     Per Problem List    Dental disorder     upper and lower denture    Depression     Full dentures 03/17/2022    Heart burn 12/23/2022    GERD    High cholesterol     History of falling     Marijuana user     Risk for falls     Stroke (HCC) 03/17/2022    Pt. states, MINI STROKE AGE  19.    Tetraplegia (HCC)     TIA (transient ischemic attack) 2022    AGE 19. Patient states D/T drug use.     Urinary bladder disorder     Urinary incontinence     Vertigo     Per Problem List       Social History     Tobacco Use    Smoking status: Some Days     Current packs/day: 0.00     Average packs/day: 1 pack/day for 35.0 years (35.0 ttl pk-yrs)     Types: Cigarettes     Start date: 1987     Last attempt to quit: 2022     Years since quittin.6    Smokeless tobacco: Never    Tobacco comments:     20 years of 1ppd, then 10 years weaning down   Vaping Use    Vaping status: Some Days    Start date: 2020    Substances: THC, CBD   Substance Use Topics    Alcohol use: No    Drug use: Yes     Types: Marijuana, Inhaled     Comment: marijuana daily (smoked and edibles)       Current Outpatient Medications Ordered in Epic   Medication Sig Dispense Refill    butalbital/apap/caffeine (FIORICET) -40 mg Tab Take 1 Tablet by mouth every 8 hours as needed for Headache for up to 7 days. 21 Tablet 0    oxyCODONE-acetaminophen (PERCOCET) 5-325 MG Tab Take 1 Tablet by mouth 1 time a day as needed for Severe Pain for up to 7 days. 7 Tablet 0    Budeson-Glycopyrrol-Formoterol (BREZTRI AEROSPHERE) 160-9-4.8 MCG/ACT Aerosol Inhale 2 Puffs 2 times a day. 10.7 g 6    baclofen (LIORESAL) 10 MG Tab Take 3 Tablets by mouth 2 times a day. 180 Tablet 5    tizanidine (ZANAFLEX) 2 MG capsule Take 1 Capsule by mouth 2 times a day. 60 Capsule 5    mirabegron ER (MYRBETRIQ) 25 MG TABLET SR 24 HR Take 1 Tablet by mouth every day. 30 Tablet 5    venlafaxine (EFFEXOR-XR) 150 MG extended-release capsule Take 1 Capsule by mouth every day. 30 Capsule 5    fesoterodine fumarate (TOVIAZ) 4 MG TABLET SR 24 HR Take 1 Tablet by mouth every day. 30 Tablet 5    meloxicam (MOBIC) 15 MG tablet Take 1 Tablet by mouth every day. 30 Tablet 5    gabapentin (NEURONTIN) 300 MG Cap Take 3 Capsules by mouth 2 times a day. Take 1 po qhs 180  "Capsule 5    guaiFENesin ER (MUCINEX) 600 MG TABLET SR 12 HR Take 1 Tablet by mouth every 12 hours as needed for Cough. 60 Tablet 2    diclofenac sodium (VOLTAREN) 1 % Gel Apply 2 g topically 4 times a day as needed (pain). 150 g 5    dicyclomine (BENTYL) 20 MG Tab Take 1 Tablet by mouth every 6 hours as needed (Abdominal Pain). 30 Tablet 2    VENTOLIN  (90 Base) MCG/ACT Aero Soln inhalation aerosol INHALE 2 PUFFS BY MOUTH EVERY 6 HOURS AS NEEDED FOR SHORTNESS OF BREATH 18 g 2    fluticasone (FLONASE) 50 MCG/ACT nasal spray Administer 1 Spray into affected nostril(S) every day. 16 g 6    polyethylene glycol 3350 (MIRALAX) 17 GM/SCOOP Powder Take 17 g by mouth every day. 578 g 5    pantoprazole (PROTONIX) 20 MG tablet Take 20 mg by mouth every day.       No current [facility]-ordered facility-administered medications on file.       Allergies:  Other environmental and Codeine        Objective:       Exam:  BP 90/62 (BP Location: Left arm, Patient Position: Sitting)   Pulse 80   Temp 36.3 °C (97.4 °F) (Temporal)   Resp 14   Ht 1.676 m (5' 6\")   Wt 71.7 kg (158 lb)   LMP 01/03/2017   SpO2 96%   BMI 25.50 kg/m²  Body mass index is 25.5 kg/m².    General: Normal appearing. No distress.  Skin: forehead lac - healing well. Sutures not visible  Musculoskeletal: bruising across b/l anterior upper thighs. Tenderness around the L hip/buttock.   Psych: Normal mood and affect.       Data reviewed:   Reviewed ER note, imaging    Assessment & Plan:     59 y.o. female with the following -     1. Traumatic injury of head, subsequent encounter  - Controlled Substance Treatment Agreement  - PAIN MANAGEMENT SCRN, W/ RFLX TO QNT; Future  - Referral to Physical Therapy  - DX-HIP-BILATERAL-WITH PELVIS-2 VIEWS; Future  - butalbital/apap/caffeine (FIORICET) -40 mg Tab; Take 1 Tablet by mouth every 8 hours as needed for Headache for up to 7 days.  Dispense: 21 Tablet; Refill: 0  - oxyCODONE-acetaminophen (PERCOCET) 5-325 MG " Tab; Take 1 Tablet by mouth 1 time a day as needed for Severe Pain for up to 7 days.  Dispense: 7 Tablet; Refill: 0  2. Fall, initial encounter  Advised pt not to take further NSAIDs, may be causing her GI upset.   Already on muscle relaxers  Recommended adding tylenol PRN for pain  Will provide 5mg percocet #7 tabs, fioricet for headaches. She notes she does not get drowsy with percocet.   Pt already on multiple potentially sedating medications. We reviewed these and I offered some suggestions on reducing dose, but pt thinks this fall was just because of not paying attention. She doesn't think medications led to her fall and declines adjusting them. Since adding on other meds may increase sedation, will do fioricet for HA.   F/u as scheduled on 9/20        Return in about 3 weeks (around 9/20/2024).    Pt not using O2  Recheck 6 min walk at next visit    Please note that this dictation was created using voice recognition software. I have made every reasonable attempt to correct obvious errors, but I expect that there are errors of grammar and possibly content that I did not discover before finalizing the note.

## 2024-08-30 NOTE — ASSESSMENT & PLAN NOTE
Pt recently went to ER after fall, had head lac that received sutures   Reports stomach has been upset since fall with nausea. Has been taking ibuprofen at night - 600-800mg. Takes meloxicam 15mg in the morning.   Has been on pantoprazole 20mg daily for the last couple of months, although incorrectly, and this was corrected yesterday  She has been feeling some brain fog, out of it. She has been having headaches.

## 2024-09-01 LAB
AMPHET CTO UR CFM-MCNC: NEGATIVE NG/ML
BARBITURATES CTO UR CFM-MCNC: NEGATIVE NG/ML
BENZODIAZ CTO UR CFM-MCNC: NEGATIVE NG/ML
BUPRENORPHINE UR-MCNC: NEGATIVE NG/ML
CANNABINOIDS CTO UR CFM-MCNC: NORMAL NG/ML
CARISOPRODOL UR-MCNC: NEGATIVE NG/ML
COCAINE CTO UR CFM-MCNC: NEGATIVE NG/ML
CREAT UR-MCNC: 27.5 MG/DL (ref 20–400)
DRUG SCREEN COMMENT UR-IMP: NORMAL
ETHYL GLUCURONIDE UR QL SCN: NEGATIVE NG/ML
FENTANYL UR-MCNC: NEGATIVE NG/ML
MEPERIDINE CTO UR SCN-MCNC: NEGATIVE NG/ML
METHADONE CTO UR CFM-MCNC: NEGATIVE NG/ML
OPIATES UR QL SCN: NEGATIVE NG/ML
OXYCDOXYM URSCRN 2005102: NEGATIVE NG/ML
PCP CTO UR CFM-MCNC: NEGATIVE NG/ML
PROPOXYPH CTO UR CFM-MCNC: NEGATIVE NG/ML
TAPENTADOL UR-MCNC: NEGATIVE NG/ML
TRAMADOL CTO UR SCN-MCNC: NEGATIVE NG/ML
ZOLPIDEM UR-MCNC: NEGATIVE NG/ML

## 2024-09-04 LAB — THC UR CFM-MCNC: 287 NG/ML

## 2024-09-06 ENCOUNTER — APPOINTMENT (OUTPATIENT)
Dept: RADIOLOGY | Facility: MEDICAL CENTER | Age: 59
End: 2024-09-06
Attending: FAMILY MEDICINE
Payer: MEDICAID

## 2024-09-06 DIAGNOSIS — S09.90XD TRAUMATIC INJURY OF HEAD, SUBSEQUENT ENCOUNTER: ICD-10-CM

## 2024-09-06 PROCEDURE — 73521 X-RAY EXAM HIPS BI 2 VIEWS: CPT

## 2024-09-11 ENCOUNTER — APPOINTMENT (OUTPATIENT)
Dept: SLEEP MEDICINE | Facility: MEDICAL CENTER | Age: 59
End: 2024-09-11
Attending: FAMILY MEDICINE
Payer: MEDICAID

## 2024-09-20 ENCOUNTER — APPOINTMENT (OUTPATIENT)
Dept: RADIOLOGY | Facility: IMAGING CENTER | Age: 59
End: 2024-09-20
Attending: PHYSICIAN ASSISTANT
Payer: MEDICAID

## 2024-09-20 ENCOUNTER — OFFICE VISIT (OUTPATIENT)
Dept: URGENT CARE | Facility: CLINIC | Age: 59
End: 2024-09-20
Payer: MEDICAID

## 2024-09-20 VITALS
SYSTOLIC BLOOD PRESSURE: 98 MMHG | BODY MASS INDEX: 25.71 KG/M2 | HEIGHT: 66 IN | TEMPERATURE: 96.6 F | RESPIRATION RATE: 14 BRPM | HEART RATE: 83 BPM | DIASTOLIC BLOOD PRESSURE: 62 MMHG | WEIGHT: 160 LBS | OXYGEN SATURATION: 94 %

## 2024-09-20 DIAGNOSIS — M79.645 PAIN OF FINGER OF LEFT HAND: ICD-10-CM

## 2024-09-20 DIAGNOSIS — M79.645 FINGER PAIN, LEFT: ICD-10-CM

## 2024-09-20 PROCEDURE — 3078F DIAST BP <80 MM HG: CPT | Performed by: PHYSICIAN ASSISTANT

## 2024-09-20 PROCEDURE — 3074F SYST BP LT 130 MM HG: CPT | Performed by: PHYSICIAN ASSISTANT

## 2024-09-20 PROCEDURE — 99213 OFFICE O/P EST LOW 20 MIN: CPT | Performed by: PHYSICIAN ASSISTANT

## 2024-09-20 PROCEDURE — 73140 X-RAY EXAM OF FINGER(S): CPT | Mod: TC,LT | Performed by: RADIOLOGY

## 2024-09-20 RX ORDER — VITAMIN B COMPLEX
1000 TABLET ORAL DAILY
COMMUNITY

## 2024-09-20 ASSESSMENT — FIBROSIS 4 INDEX: FIB4 SCORE: 1.05

## 2024-09-20 ASSESSMENT — ENCOUNTER SYMPTOMS
TINGLING: 0
WEAKNESS: 0
MYALGIAS: 1

## 2024-09-20 NOTE — PROGRESS NOTES
Subjective:     CHIEF COMPLAINT  Chief Complaint   Patient presents with    Injury     Left ring finger, fell on it x 1 month       HPI  Marilyn Salinas is a very pleasant 59 y.o. female who presents to the clinic with continued left ring finger pain x 1 month.  Patient sustained a ground-level fall on 8/24/2024.  She was subsequently seen in the ED for this.  She sustained a head injury with facial laceration which was repaired in the ED.  At the time she forgot to mention but also fell awkwardly injuring her left ring finger.  She has continued to experience pain since this event.  Currently no discoloration or swelling.  Maintains full range of motion of the digit.  Certain movements or palpation along the digit reproduces pain.  She has been taking Tylenol and meloxicam.    REVIEW OF SYSTEMS  Review of Systems   Musculoskeletal:  Positive for joint pain and myalgias.   Skin:  Negative for rash.   Neurological:  Negative for tingling and weakness.       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    Cervical spinal cord injury (HCC) 08/05/2021    Other cerebral palsy (HCC) 12/21/2020    Bilateral leg edema 12/21/2020    Onychomycosis 12/21/2020    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    Status post cervical spinal fusion 06/19/2020    Neurogenic bowel 01/07/2020    Neurogenic bladder 01/07/2020    Neuropathic pain 01/07/2020    Quadriplegia, C1-C4 incomplete (HCC) 01/07/2020    Lumbar radiculopathy 12/19/2019    Cervical spine pain 12/19/2019    Foraminal stenosis of cervical region 04/12/2019    Vertigo 02/22/2019    Risk for falls 12/31/2018     Corns and callus 08/21/2018    Pelvic pain 10/03/2017    Vitamin D deficiency 07/31/2017    Hyperlipidemia, unspecified 07/31/2017    Mild intermittent asthma with acute exacerbation 06/19/2017    Tobacco abuse, in remission 06/19/2017    Anxiety and depression 06/19/2017    Gastroparesis 06/19/2017    DDD (degenerative disc disease), lumbar 06/19/2017    Pain in both feet 06/19/2017       SURGICAL HISTORY   has a past surgical history that includes eye surgery (1972); lumpectomy; bladder sling female (N/A, 10/3/2017); cervical disk and fusion anterior (N/A, 12/20/2019); corpectomy (N/A, 12/20/2019); amputation toe,mt-p jt (Left, 12/27/2021); other (Left, 2005); vaginal hysterectomy scope total (N/A, 10/3/2017); salpingectomy (Bilateral, 10/3/2017); oophorectomy (Bilateral, 10/3/2017); anterior and posterior repair (Bilateral, 10/3/2017); enterocele repair (N/A, 10/3/2017); vaginal suspension (N/A, 10/3/2017); and loco by laparoscopy (3/23/2022).    ALLERGIES  Allergies   Allergen Reactions    Other Environmental Hives     TUMBLEWEED    Codeine Itching, Unspecified and Rash     Rash, itching, mood disorder- becomes agitated  Other reaction(s): itchy, grumpy       CURRENT MEDICATIONS  Home Medications       Reviewed by Tra Benavidez P.A.-C. (Physician Assistant) on 09/20/24 at 1341  Med List Status: <None>     Medication Last Dose Status   baclofen (LIORESAL) 10 MG Tab Taking Active   Budeson-Glycopyrrol-Formoterol (BREZTRI AEROSPHERE) 160-9-4.8 MCG/ACT Aerosol Taking Active   diclofenac sodium (VOLTAREN) 1 % Gel Not Taking Active   dicyclomine (BENTYL) 20 MG Tab Taking Active   fesoterodine fumarate (TOVIAZ) 4 MG TABLET SR 24 HR Taking Active   fluticasone (FLONASE) 50 MCG/ACT nasal spray Taking Active   gabapentin (NEURONTIN) 300 MG Cap Taking Active   guaiFENesin ER (MUCINEX) 600 MG TABLET SR 12 HR Not Taking Active   meloxicam (MOBIC) 15 MG tablet Taking Active   mirabegron ER (MYRBETRIQ) 25 MG TABLET SR 24 HR  "Taking Active   pantoprazole (PROTONIX) 20 MG tablet Taking Active   polyethylene glycol 3350 (MIRALAX) 17 GM/SCOOP Powder  Active   tizanidine (ZANAFLEX) 2 MG capsule Taking Active   venlafaxine (EFFEXOR-XR) 150 MG extended-release capsule Taking Active   VENTOLIN  (90 Base) MCG/ACT Aero Soln inhalation aerosol Not Taking Active   vitamin D3 (CHOLECALCIFEROL) 1000 Unit (25 mcg) Tab Taking Active                    SOCIAL HISTORY  Social History     Tobacco Use    Smoking status: Some Days     Current packs/day: 0.00     Average packs/day: 1 pack/day for 35.0 years (35.0 ttl pk-yrs)     Types: Cigarettes     Start date: 1987     Last attempt to quit: 2022     Years since quittin.7    Smokeless tobacco: Never    Tobacco comments:     20 years of 1ppd, then 10 years weaning down   Vaping Use    Vaping status: Some Days    Start date: 2020    Substances: THC, CBD   Substance and Sexual Activity    Alcohol use: No    Drug use: Yes     Types: Marijuana, Inhaled     Comment: marijuana daily (smoked and edibles)    Sexual activity: Not Currently       FAMILY HISTORY  Family History   Problem Relation Age of Onset    Cancer Mother         ovarian    Alcohol/Drug Mother     Cancer Brother         unknown, caused him to lose his leg when he was 3    Diabetes Maternal Aunt           Objective:     VITAL SIGNS: BP 98/62   Pulse 83   Temp 35.9 °C (96.6 °F) (Temporal)   Resp 14   Ht 1.676 m (5' 6\")   Wt 72.6 kg (160 lb)   LMP 2017   SpO2 94%   BMI 25.82 kg/m²     PHYSICAL EXAM  Physical Exam  Constitutional:       General: She is not in acute distress.     Appearance: Normal appearance. She is not ill-appearing, toxic-appearing or diaphoretic.   HENT:      Head: Normocephalic and atraumatic.   Eyes:      Conjunctiva/sclera: Conjunctivae normal.   Pulmonary:      Effort: Pulmonary effort is normal.   Musculoskeletal:      Cervical back: Normal range of motion.      Comments: Left ring finger: " There is no obvious deformity, discoloration or edema present.  Patient Dors is tenderness to palpation over the PIP joint.  Maintains full flexion and extension of the MCP PIP and DIP joints.  Sensation intact distally.   Skin:     Findings: No bruising, erythema or rash.   Neurological:      General: No focal deficit present.      Mental Status: She is alert and oriented to person, place, and time. Mental status is at baseline.       RADIOLOGY RESULTS   DX-FINGER(S) 2+ LEFT    Result Date: 9/20/2024 9/20/2024 1:42 PM HISTORY/REASON FOR EXAM:  Pain/Deformity Following Trauma; ring finger. . TECHNIQUE/EXAM DESCRIPTION AND NUMBER OF VIEWS:  3 views of the LEFT fingers. COMPARISON: None FINDINGS: There is no fracture. Alignment is normal. 1st carpometacarpal joint and multiple distal interphalangeal joint degenerative changes are present.     Negative for fracture Osteoarthritis of the 1st carpal metacarpal joint and multiple distal interphalangeal joint         Assessment/Plan:     1. Finger pain, left  DX-FINGER(S) 2+ LEFT          MDM/Comments:    X-ray reviewed in clinic without any evidence of acute fracture or bony abnormality.  Patient does have osteoarthritis of multiple joints present which I believe may be contributing to the patient's discomfort.  She is currently taking meloxicam for this and I advised continuing as previously prescribed.  Pain is Tylenol as needed for additional pain relief.    Differential diagnosis, natural history, supportive care, and indications for immediate follow-up discussed. All questions answered. Patient agrees with the plan of care.    Follow-up as needed if symptoms worsen or fail to improve to PCP, Urgent care or Emergency Room.    I have personally reviewed prior external notes and test results pertinent to today's visit.  I have independently reviewed and interpreted all diagnostics ordered during this urgent care acute visit.   Discussed management options  (risks,benefits, and alternatives to treatment). Pt expresses understanding and the treatment plan was agreed upon. Questions were encouraged and answered to pt's satisfaction.    Please note that this dictation was created using voice recognition software. I have made a reasonable attempt to correct obvious errors, but I expect that there are errors of grammar and possibly content that I did not discover before finalizing the note.

## 2024-10-02 DIAGNOSIS — M79.672 PAIN IN BOTH FEET: ICD-10-CM

## 2024-10-02 DIAGNOSIS — M79.671 PAIN IN BOTH FEET: ICD-10-CM

## 2024-10-02 DIAGNOSIS — G83.89 CEREBRAL PARESIS WITH HOMOLATERAL ATAXIA (HCC): ICD-10-CM

## 2024-10-09 DIAGNOSIS — N31.9 NEUROGENIC BLADDER: ICD-10-CM

## 2024-10-09 DIAGNOSIS — S14.109A INJURY OF CERVICAL SPINAL CORD, INITIAL ENCOUNTER (HCC): ICD-10-CM

## 2024-10-09 RX ORDER — MIRABEGRON 25 MG/1
25 TABLET, FILM COATED, EXTENDED RELEASE ORAL DAILY
Qty: 30 TABLET | Refills: 5 | Status: SHIPPED | OUTPATIENT
Start: 2024-10-09

## 2024-10-09 RX ORDER — FESOTERODINE FUMARATE 4 MG/1
4 TABLET, FILM COATED, EXTENDED RELEASE ORAL
Qty: 30 TABLET | Refills: 5 | Status: SHIPPED | OUTPATIENT
Start: 2024-10-09

## 2024-10-11 ENCOUNTER — TELEMEDICINE (OUTPATIENT)
Dept: MEDICAL GROUP | Facility: MEDICAL CENTER | Age: 59
End: 2024-10-11
Attending: FAMILY MEDICINE
Payer: MEDICAID

## 2024-10-11 VITALS — BODY MASS INDEX: 25.71 KG/M2 | WEIGHT: 160 LBS | RESPIRATION RATE: 14 BRPM | HEIGHT: 66 IN

## 2024-10-11 DIAGNOSIS — R05.3 CHRONIC COUGH: ICD-10-CM

## 2024-10-11 DIAGNOSIS — K31.84 GASTROPARESIS: ICD-10-CM

## 2024-10-11 DIAGNOSIS — G43.809 OTHER MIGRAINE WITHOUT STATUS MIGRAINOSUS, NOT INTRACTABLE: ICD-10-CM

## 2024-10-11 PROCEDURE — 99214 OFFICE O/P EST MOD 30 MIN: CPT | Performed by: FAMILY MEDICINE

## 2024-10-11 RX ORDER — VITAMIN B COMPLEX
1 CAPSULE ORAL DAILY
COMMUNITY
Start: 2024-09-05

## 2024-10-11 RX ORDER — DIVALPROEX SODIUM 250 MG/1
TABLET, DELAYED RELEASE ORAL
COMMUNITY
Start: 2024-09-17

## 2024-10-11 RX ORDER — METOCLOPRAMIDE 10 MG/1
TABLET ORAL
COMMUNITY
Start: 2024-08-29

## 2024-10-11 RX ORDER — SUMATRIPTAN 100 MG/1
50-100 TABLET, FILM COATED ORAL
Qty: 10 TABLET | Refills: 3 | Status: SHIPPED | OUTPATIENT
Start: 2024-10-11

## 2024-10-11 ASSESSMENT — FIBROSIS 4 INDEX: FIB4 SCORE: 1.05

## 2024-11-08 DIAGNOSIS — M54.2 CERVICAL SPINE PAIN: ICD-10-CM

## 2024-11-08 DIAGNOSIS — M51.369 DDD (DEGENERATIVE DISC DISEASE), LUMBAR: ICD-10-CM

## 2024-11-08 DIAGNOSIS — F32.A ANXIETY AND DEPRESSION: ICD-10-CM

## 2024-11-08 DIAGNOSIS — F41.9 ANXIETY AND DEPRESSION: ICD-10-CM

## 2024-11-08 NOTE — TELEPHONE ENCOUNTER
Received request via: Pharmacy    Was the patient seen in the last year in this department? Yes    Does the patient have an active prescription (recently filled or refills available) for medication(s) requested? No    [facility]    Does the patient have intermediate Plus and need 100-day supply? (This applies to ALL medications) Patient does not have SCP

## 2024-11-11 ENCOUNTER — TELEPHONE (OUTPATIENT)
Dept: OBGYN | Facility: CLINIC | Age: 59
End: 2024-11-11
Payer: MEDICAID

## 2024-11-11 DIAGNOSIS — N32.81 OAB (OVERACTIVE BLADDER): ICD-10-CM

## 2024-11-11 DIAGNOSIS — N31.9 NEUROGENIC BLADDER: ICD-10-CM

## 2024-11-12 RX ORDER — MELOXICAM 15 MG/1
15 TABLET ORAL DAILY
Qty: 30 TABLET | Refills: 0 | Status: SHIPPED | OUTPATIENT
Start: 2024-11-12

## 2024-11-12 RX ORDER — VENLAFAXINE HYDROCHLORIDE 150 MG/1
150 CAPSULE, EXTENDED RELEASE ORAL DAILY
Qty: 30 CAPSULE | Refills: 0 | Status: SHIPPED | OUTPATIENT
Start: 2024-11-12

## 2024-11-13 ENCOUNTER — TELEPHONE (OUTPATIENT)
Dept: OBGYN | Facility: CLINIC | Age: 59
End: 2024-11-13
Payer: MEDICAID

## 2024-11-13 NOTE — TELEPHONE ENCOUNTER
Caller Name: samson / pharmacy eligibility         Samson called from pharmacy eligibility asking if prior auth has been placed for pts botox. After reviewing pts chart it looks like prior auth was placed by dr ghosh on 11/11/2024. Representative understood and had no further questions at this time.  Message forwarded to dr anuj boo.

## 2024-11-19 ENCOUNTER — OFFICE VISIT (OUTPATIENT)
Dept: SLEEP MEDICINE | Facility: MEDICAL CENTER | Age: 59
End: 2024-11-19
Attending: NURSE PRACTITIONER
Payer: MEDICAID

## 2024-11-19 VITALS
HEART RATE: 68 BPM | DIASTOLIC BLOOD PRESSURE: 74 MMHG | SYSTOLIC BLOOD PRESSURE: 114 MMHG | WEIGHT: 161.3 LBS | RESPIRATION RATE: 16 BRPM | HEIGHT: 66 IN | BODY MASS INDEX: 25.92 KG/M2 | OXYGEN SATURATION: 93 %

## 2024-11-19 DIAGNOSIS — R05.3 CHRONIC COUGH: ICD-10-CM

## 2024-11-19 DIAGNOSIS — F17.200 CURRENT SMOKER: ICD-10-CM

## 2024-11-19 DIAGNOSIS — R91.8 LUNG NODULES: ICD-10-CM

## 2024-11-19 DIAGNOSIS — J45.20 MILD INTERMITTENT ASTHMA WITHOUT COMPLICATION: ICD-10-CM

## 2024-11-19 PROCEDURE — 99214 OFFICE O/P EST MOD 30 MIN: CPT | Performed by: NURSE PRACTITIONER

## 2024-11-19 PROCEDURE — 99213 OFFICE O/P EST LOW 20 MIN: CPT | Performed by: NURSE PRACTITIONER

## 2024-11-19 PROCEDURE — 3074F SYST BP LT 130 MM HG: CPT | Performed by: NURSE PRACTITIONER

## 2024-11-19 PROCEDURE — 3078F DIAST BP <80 MM HG: CPT | Performed by: NURSE PRACTITIONER

## 2024-11-19 RX ORDER — ALBUTEROL SULFATE 90 UG/1
2 INHALANT RESPIRATORY (INHALATION) EVERY 6 HOURS PRN
Qty: 18 G | Refills: 11 | Status: SHIPPED | OUTPATIENT
Start: 2024-11-19

## 2024-11-19 ASSESSMENT — FIBROSIS 4 INDEX: FIB4 SCORE: 1.05

## 2024-11-19 NOTE — PROGRESS NOTES
Chief Complaint   Patient presents with    Follow-Up     chronic cough  7/17/2024-    1 LPM oxygen at night        HPI:  Marilyn Salinas is a 59 y.o. year old female here today for follow-up on mild persistent asthma, current smoker, chronic cough and lung nodules.  History of tetraplegia using walker.  History of GERD with delayed gastric emptying.  Last office visit 7/17/2024 with Dr. Jacobson    CT chest 7/25/24:  1. Multiple tiny benign-appearing bilateral lung nodules. Recommend follow-up low-dose chest CT in 12 months.  2. 1.8 cm left adrenal adenoma.  With patient recommend annual follow-up for smoking history.    MMRC Grade: 1-2  Exacerbations this year: 0    Currently prescribed Breztri 2 puffs twice daily and albuterol inhaler as needed.  Current smoker    Interval Events:  11/19/24: She continues to smoke 1 pack of cigarettes per month.  She is trying to cut back.  She notes the cold air to be a trigger for difficulty with breathing.  She has a history of chronic GI issues followed by gastroenterology currently using prune juice twice daily, 2 cups of coffee a day and Reglan before each meal.  She notes drinking a significant amount of water.  She tends to go up with fluids and then can cough afterwards.  Her cough is not usually productive.  She notes that not to be persistent.  The pharmacy ran out of Breztri so she went back to Breo but finds it not as effective.  It is now on order and will be ready for pickup this week.  She on average uses albuterol inhaler 3 times a month.  She notes rare chest pressure or wheezing.  She notes shortness of breath with walking.    PULM HX:  Current smoker, 1 pack/month with 5-pack-year history.  PFT 5/7/2024 noted normal lung function.  Patient was currently on Breo and albuterol inhaler at that time.  Echo 2/16/2023 indicated LVEF 65%, RVSP unable to be calculated.  No prior study for comparison.      ROS: As per HPI and otherwise negative if not  stated.    Past Medical History:   Diagnosis Date    Anesthesia 12/23/2021    agitated coming out of anesthesia    Anesthesia 03/17/2022    Patient states becomes upset and sad after anesthesia.    Anxiety     Arthritis     spine, feet     Asthma     Breath shortness     Cataract 12/23/2022    no surgery yet    Cerebral palsy (HCC)     Cervical spinal cord injury (HCC) 12/19/2019    COVID-19 01/17/2022 1/17/2022 stopped 1-    DDD (degenerative disc disease), lumbar     Per Problem List    Dental disorder     upper and lower denture    Depression     Full dentures 03/17/2022    Heart burn 12/23/2022    GERD    High cholesterol     History of falling     Marijuana user     Risk for falls     Stroke (HCC) 03/17/2022    Pt. states, MINI STROKE AGE 19.    Tetraplegia (HCC)     TIA (transient ischemic attack) 03/17/2022    AGE 19. Patient states D/T drug use.     Urinary bladder disorder     Urinary incontinence     Vertigo     Per Problem List       Past Surgical History:   Procedure Laterality Date    DEVYN BY LAPAROSCOPY  3/23/2022    Procedure: CHOLECYSTECTOMY, LAPAROSCOPIC;  Surgeon: Mayur Barrett M.D.;  Location: SURGERY [facility];  Service: General    PB AMPUTATION TOE,MT-P JT Left 12/27/2021    Procedure: GREAT TOE PARTIAL AMPUTATION;  Surgeon: Emiliano Goff M.D.;  Location: [facility];  Service: Orthopedics    CERVICAL DISK AND FUSION ANTERIOR N/A 12/20/2019    Procedure: DISCECTOMY, SPINE, CERVICAL, ANTERIOR APPROACH, WITH FUSION - C3-5;  Surgeon: Connor Linton M.D.;  Location: SURGERY [facility];  Service: Neurosurgery    CORPECTOMY N/A 12/20/2019    Procedure: CORPECTOMY;  Surgeon: Connor Linton M.D.;  Location: SURGERY [facility];  Service: Neurosurgery    BLADDER SLING FEMALE N/A 10/3/2017    Procedure: BLADDER SLING FEMALE TOT, CYSTOSCOPY;  Surgeon: Hudson Driscoll M.D.;  Location: SURGERY [facility] DAY [facility];  Service: Gynecology    VAGINAL HYSTERECTOMY SCOPE  TOTAL N/A 10/3/2017    Procedure: VAGINAL HYSTERECTOMY SCOPE TOTAL;  Surgeon: Hudson Driscoll M.D.;  Location: SURGERY SAME DAY [facility];  Service: Gynecology    SALPINGECTOMY Bilateral 10/3/2017    Procedure: SALPINGECTOMY;  Surgeon: Hudson Driscoll M.D.;  Location: SURGERY SAME DAY [facility];  Service: Gynecology    OOPHORECTOMY Bilateral 10/3/2017    Procedure: OOPHORECTOMY;  Surgeon: Hudson Driscoll M.D.;  Location: SURGERY SAME DAY [facility];  Service: Gynecology    ANTERIOR AND POSTERIOR REPAIR Bilateral 10/3/2017    Procedure: ANTERIOR AND POSTERIOR REPAIR;  Surgeon: Hudson Driscoll M.D.;  Location: SURGERY SAME DAY [facility];  Service: Gynecology    ENTEROCELE REPAIR N/A 10/3/2017    Procedure: ENTEROCELE REPAIR, PERINEOPLASTY;  Surgeon: Hudson Driscoll M.D.;  Location: SURGERY SAME DAY [facility];  Service: Gynecology    VAGINAL SUSPENSION N/A 10/3/2017    Procedure: VAGINAL SUSPENSION SACROSPINOUS VAULT POSSIBLE;  Surgeon: Hudson Driscoll M.D.;  Location: SURGERY SAME DAY [facility];  Service: Gynecology    OTHER Left 2005    hammertoe x2    EYE SURGERY  1972    for lazy eye    LUMPECTOMY         Family History   Problem Relation Age of Onset    Cancer Mother         ovarian    Alcohol/Drug Mother     Cancer Brother         unknown, caused him to lose his leg when he was 3    Diabetes Maternal Aunt        Social History     Socioeconomic History    Marital status: Single     Spouse name: Not on file    Number of children: Not on file    Years of education: Not on file    Highest education level: Not on file   Occupational History    Not on file   Tobacco Use    Smoking status: Some Days     Current packs/day: 0.00     Average packs/day: 1 pack/day for 35.0 years (35.0 ttl pk-yrs)     Types: Cigarettes     Start date: 1987     Last attempt to quit: 2022     Years since quittin.8    Smokeless tobacco: Never    Tobacco comments:     20 years of 1ppd, then 10 years weaning  "down   Vaping Use    Vaping status: Some Days    Start date: 1/1/2020    Substances: THC, CBD   Substance and Sexual Activity    Alcohol use: No    Drug use: Yes     Types: Marijuana, Inhaled     Comment: marijuana daily (smoked and edibles)    Sexual activity: Not Currently   Other Topics Concern     Service No    Blood Transfusions No    Caffeine Concern No    Occupational Exposure No    Hobby Hazards No    Sleep Concern Yes    Stress Concern Yes    Weight Concern Yes    Special Diet No    Back Care Yes    Exercise No    Bike Helmet Yes    Seat Belt Yes    Self-Exams Yes   Social History Narrative    Not on file     Social Drivers of Health     Financial Resource Strain: Not on file   Food Insecurity: No Food Insecurity (12/27/2019)    Hunger Vital Sign     Worried About Running Out of Food in the Last Year: Never true     Ran Out of Food in the Last Year: Never true   Transportation Needs: No Transportation Needs (12/27/2019)    PRAPARE - Transportation     Lack of Transportation (Medical): No     Lack of Transportation (Non-Medical): No   Physical Activity: Not on file   Stress: Not on file   Social Connections: Not on file   Intimate Partner Violence: Not on file   Housing Stability: Not on file       Allergies as of 11/19/2024 - Reviewed 11/19/2024   Allergen Reaction Noted    Other environmental Hives 03/17/2022    Codeine Itching, Unspecified, and Rash 12/19/2019        Vitals:  /74 (BP Location: Left arm, Patient Position: Sitting, BP Cuff Size: Adult)   Pulse 68   Resp 16   Ht 1.676 m (5' 6\")   Wt 73.2 kg (161 lb 4.8 oz)   SpO2 93%     Current medications as of today   Current Outpatient Medications   Medication Sig Dispense Refill    albuterol (VENTOLIN HFA) 108 (90 Base) MCG/ACT Aero Soln inhalation aerosol Inhale 2 Puffs every 6 hours as needed for Shortness of Breath. 18 g 11    Budeson-Glycopyrrol-Formoterol (BREZTRI AEROSPHERE) 160-9-4.8 MCG/ACT Aerosol Inhale 2 Puffs 2 times a " day. 10.7 g 11    venlafaxine (EFFEXOR-XR) 150 MG extended-release capsule Take 1 Capsule by mouth every day. 30 Capsule 0    meloxicam (MOBIC) 15 MG tablet Take 1 Tablet by mouth every day. 30 Tablet 0    metoclopramide (REGLAN) 10 MG Tab TAKE 1 TABLET BY MOUTH BEFORE MEALS      divalproex (DEPAKOTE) 250 MG Tablet Delayed Response TAKE 1 TABLET BY MOUTH EVERY NIGHT FOR HEADACHE PREVENTION. STOP IF ANY SIDE EFFECTS-      B Complex Vitamins (VITAMIN B COMPLEX) Cap Take 1 Capsule by mouth every day.      sumatriptan (IMITREX) 100 MG tablet Take 0.5-1 Tablets by mouth one time as needed for Migraine for up to 1 dose. Take at onset of migraine. May take extra dose if you still have symptoms after 2 hours. Max dose 200mg/day 10 Tablet 3    mirabegron ER (MYRBETRIQ) 25 MG TABLET SR 24 HR Take 1 Tablet by mouth every day. DISPENSE AS WRITTEN NO SUBSTITUTIONS 30 Tablet 5    TOVIAZ 4 MG TABLET SR 24 HR Take 1 Tablet by mouth every day. DISPENSE AS WRITTEN NO SUBSTITUTIONS 30 Tablet 5    vitamin D3 (CHOLECALCIFEROL) 1000 Unit (25 mcg) Tab Take 1,000 Units by mouth every day.      pantoprazole (PROTONIX) 20 MG tablet Take 20 mg by mouth every day.      baclofen (LIORESAL) 10 MG Tab Take 3 Tablets by mouth 2 times a day. 180 Tablet 5    tizanidine (ZANAFLEX) 2 MG capsule Take 1 Capsule by mouth 2 times a day. 60 Capsule 5    gabapentin (NEURONTIN) 300 MG Cap Take 3 Capsules by mouth 2 times a day. Take 1 po qhs 180 Capsule 5    guaiFENesin ER (MUCINEX) 600 MG TABLET SR 12 HR Take 1 Tablet by mouth every 12 hours as needed for Cough. 60 Tablet 2    dicyclomine (BENTYL) 20 MG Tab Take 1 Tablet by mouth every 6 hours as needed (Abdominal Pain). 30 Tablet 2    fluticasone (FLONASE) 50 MCG/ACT nasal spray Administer 1 Spray into affected nostril(S) every day. 16 g 6    polyethylene glycol 3350 (MIRALAX) 17 GM/SCOOP Powder Take 17 g by mouth every day. 578 g 5     No current facility-administered medications for this visit.          Physical Exam:   Gen:           Alert and oriented, No apparent distress. Mood and affect appropriate, normal interaction with examiner.  Eyes:          PERRL, EOM intact, sclere white, conjunctive moist.  Ears:          Not examined.   Hearing:     Grossly intact.  Nose:          Normal, no lesions or deformities.  Dentition:    Not examined.   Oropharynx:   Not examined.   Mallampati Classification: Not examined.   Neck:        Supple, trachea midline, no masses.  Respiratory Effort: No intercostal retractions or use of accessory muscles.   Lung Auscultation:      Clear to auscultation bilaterally; no rales, rhonchi or wheezing. No cough on exam.  CV:            Regular rate and rhythm. No murmurs, rubs or gallops.  Abd:           Not examined.   Lymphadenopathy: Not examined.   Gait and Station: walker  Digits and Nails: No clubbing, cyanosis, petechiae, or nodes.   Cranial Nerves: II-XII grossly intact.  Skin:        No rashes, lesions or ulcers noted.               Ext:           No cyanosis or edema.      Assessment:  1. Chronic cough  albuterol (VENTOLIN HFA) 108 (90 Base) MCG/ACT Aero Soln inhalation aerosol    Budeson-Glycopyrrol-Formoterol (BREZTRI AEROSPHERE) 160-9-4.8 MCG/ACT Aerosol    CT-CHEST (THORAX) W/O      2. Lung nodules  CT-CHEST (THORAX) W/O      3. Mild intermittent asthma without complication  albuterol (VENTOLIN HFA) 108 (90 Base) MCG/ACT Aero Soln inhalation aerosol    Budeson-Glycopyrrol-Formoterol (BREZTRI AEROSPHERE) 160-9-4.8 MCG/ACT Aerosol      4. Current smoker  CT-CHEST (THORAX) W/O      5. BMI 26.0-26.9,adult                 Immunizations:    Flu:declines  Pneumovax 23:2017  Prevnar 13:not due  PCV 20: recommend  COVID-19: recommend    Plan:  Chronic cough is stable and most likely multifactorial due to her gastric issues and high suspicion of silent reflux.  Reviewed to avoid gulping of fluids.  She will continue to benefit from current bronchodilators.   Refills  Lung  nodules are small and most likely benign but recommend repeat exam due July 2025 due to current smoking status.   CT chest  See above and continue current bronchodilators.  Strongly encourage smoking cessation and patient is trying to cut back.  Encourage healthy diet and general activity for conditioning.  Follow-up with GI for ongoing intervention of delayed gastric emptying, GERD and dysphagia.  Follow-up in 3 to 4 months per patient request, sooner if needed.    Please note that this dictation was created using voice recognition software. I have made every reasonable attempt to correct obvious errors, but it is possible there are errors of grammar and possibly content that I did not discover before finalizing the note.

## 2024-11-22 ENCOUNTER — OFFICE VISIT (OUTPATIENT)
Dept: URGENT CARE | Facility: CLINIC | Age: 59
End: 2024-11-22
Payer: MEDICAID

## 2024-11-22 VITALS
TEMPERATURE: 97.7 F | OXYGEN SATURATION: 94 % | HEART RATE: 86 BPM | SYSTOLIC BLOOD PRESSURE: 112 MMHG | HEIGHT: 66 IN | BODY MASS INDEX: 26.03 KG/M2 | RESPIRATION RATE: 18 BRPM | DIASTOLIC BLOOD PRESSURE: 80 MMHG | WEIGHT: 162 LBS

## 2024-11-22 DIAGNOSIS — J40 BRONCHITIS: ICD-10-CM

## 2024-11-22 PROCEDURE — 3079F DIAST BP 80-89 MM HG: CPT

## 2024-11-22 PROCEDURE — 99214 OFFICE O/P EST MOD 30 MIN: CPT

## 2024-11-22 PROCEDURE — 3074F SYST BP LT 130 MM HG: CPT

## 2024-11-22 RX ORDER — BENZONATATE 100 MG/1
100 CAPSULE ORAL 3 TIMES DAILY PRN
Qty: 60 CAPSULE | Refills: 0 | Status: SHIPPED | OUTPATIENT
Start: 2024-11-22

## 2024-11-22 ASSESSMENT — ENCOUNTER SYMPTOMS
SPUTUM PRODUCTION: 1
SORE THROAT: 0
SINUS PAIN: 1
FEVER: 0
COUGH: 1
HEADACHES: 1

## 2024-11-22 ASSESSMENT — FIBROSIS 4 INDEX: FIB4 SCORE: 1.05

## 2024-11-22 NOTE — PROGRESS NOTES
Subjective:     CHIEF COMPLAINT  Chief Complaint   Patient presents with    Nasal Congestion     Runny nose, coughing up phlegm, fatigue x2 weeks       HPI  Marilyn Salinas is a very pleasant 59 y.o. female who presents accompanied by a family member with a productive cough that has been present for the past 2 weeks.  The patient has a history of asthma as well as sleep apnea and is currently followed by pulmonology.  Her most recent pulmonology appointment was 3 days ago on 11/19/2024.  Her pulmonologist has advised her to continue taking Breo as well as albuterol and stated her lungs were sounding good.  She has a chest CT ordered coming up.  The patient currently smokes tobacco as well as marijuana, but is working on [place] back.  She has not had any recent fevers, does report she has had low energy.  She is uncertain whether the low energy is secondary to being unable to tolerate her CPAP and having low oxygen during the night.  She has not had any known sick contacts.  She has been experiencing a runny nose and a headache. The patient has expressed concern for bronchitis and states she has had bronchitis in the past.       REVIEW OF SYSTEMS  Review of Systems   Constitutional:  Positive for malaise/fatigue. Negative for fever.   HENT:  Positive for congestion and sinus pain. Negative for sore throat.    Respiratory:  Positive for cough and sputum production.    Neurological:  Positive for headaches.       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    Status post cervical spinal fusion 06/19/2020    Neurogenic bowel 01/07/2020    Neurogenic bladder 01/07/2020    Neuropathic pain 01/07/2020    Quadriplegia, C1-C4 incomplete (HCC) 01/07/2020    Lumbar radiculopathy 12/19/2019    Cervical spine pain 12/19/2019    Foraminal stenosis of cervical region 04/12/2019    Vertigo 02/22/2019    Risk for falls 12/31/2018    Corns and callus 08/21/2018    Pelvic pain 10/03/2017    Vitamin D deficiency 07/31/2017    Hyperlipidemia, unspecified 07/31/2017    Mild intermittent asthma without complication 06/19/2017    Tobacco abuse, in remission 06/19/2017    Anxiety and depression 06/19/2017    Gastroparesis 06/19/2017    DDD (degenerative disc disease), lumbar 06/19/2017    Pain in both feet 06/19/2017       SURGICAL HISTORY   has a past surgical history that includes eye surgery (1972); lumpectomy; bladder sling female (N/A, 10/3/2017); cervical disk and fusion anterior (N/A, 12/20/2019); corpectomy (N/A, 12/20/2019); amputation toe,mt-p jt (Left, 12/27/2021); other (Left, 2005); vaginal hysterectomy scope total (N/A, 10/3/2017); salpingectomy (Bilateral, 10/3/2017); oophorectomy (Bilateral, 10/3/2017); anterior and posterior repair (Bilateral, 10/3/2017); enterocele repair (N/A, 10/3/2017); vaginal suspension (N/A, 10/3/2017); and loco by laparoscopy (3/23/2022).    ALLERGIES  Allergies   Allergen Reactions    Other Environmental Hives     TUMBLEWEED    Codeine Itching, Unspecified and Rash     Rash, itching, mood disorder- becomes agitated  Other reaction(s): itchy, grumpy       CURRENT MEDICATIONS  Home Medications       Reviewed by Parisa Hernandez P.A.-CNona (Physician Assistant) on 11/22/24 at 1417  Med List Status: <None>     Medication Last Dose Status   albuterol (VENTOLIN HFA) 108 (90 Base) MCG/ACT Aero Soln inhalation aerosol PRN Active   B Complex Vitamins  (VITAMIN B COMPLEX) Cap Taking Active   baclofen (LIORESAL) 10 MG Tab Taking Active   Budeson-Glycopyrrol-Formoterol (BREZTRI AEROSPHERE) 160-9-4.8 MCG/ACT Aerosol Taking Active   dicyclomine (BENTYL) 20 MG Tab PRN Active   divalproex (DEPAKOTE) 250 MG Tablet Delayed Response Not Taking Active   fluticasone (FLONASE) 50 MCG/ACT nasal spray Taking Active   gabapentin (NEURONTIN) 300 MG Cap Taking Active   guaiFENesin ER (MUCINEX) 600 MG TABLET SR 12 HR Not Taking Active   meloxicam (MOBIC) 15 MG tablet Taking Active   metoclopramide (REGLAN) 10 MG Tab Taking Active   mirabegron ER (MYRBETRIQ) 25 MG TABLET SR 24 HR Taking Active   pantoprazole (PROTONIX) 20 MG tablet Taking Active   polyethylene glycol 3350 (MIRALAX) 17 GM/SCOOP Powder Not Taking Active   sumatriptan (IMITREX) 100 MG tablet Not Taking Active   tizanidine (ZANAFLEX) 2 MG capsule Taking Active   TOVIAZ 4 MG TABLET SR 24 HR Taking Active   venlafaxine (EFFEXOR-XR) 150 MG extended-release capsule Taking Active   vitamin D3 (CHOLECALCIFEROL) 1000 Unit (25 mcg) Tab Taking Active                    SOCIAL HISTORY  Social History     Tobacco Use    Smoking status: Some Days     Current packs/day: 0.00     Average packs/day: 1 pack/day for 35.0 years (35.0 ttl pk-yrs)     Types: Cigarettes     Start date: 1987     Last attempt to quit: 2022     Years since quittin.8    Smokeless tobacco: Never    Tobacco comments:     20 years of 1ppd, then 10 years weaning down   Vaping Use    Vaping status: Some Days    Start date: 2020    Substances: THC, CBD   Substance and Sexual Activity    Alcohol use: Not Currently    Drug use: Yes     Types: Marijuana, Inhaled     Comment: marijuana daily (smoked and edibles)    Sexual activity: Not Currently       FAMILY HISTORY  Family History   Problem Relation Age of Onset    Cancer Mother         ovarian    Alcohol/Drug Mother     Cancer Brother         unknown, caused him to lose his leg when he was 3    Diabetes  "Maternal Aunt           Objective:     VITAL SIGNS: /80   Pulse 86   Temp 36.5 °C (97.7 °F) (Temporal)   Resp 18   Ht 1.676 m (5' 6\")   Wt 73.5 kg (162 lb)   LMP 01/03/2017   SpO2 94%   BMI 26.15 kg/m²     PHYSICAL EXAM  Physical Exam  Vitals reviewed. Exam conducted with a chaperone present.   Constitutional:       General: She is not in acute distress.     Appearance: Normal appearance. She is not ill-appearing or toxic-appearing.   HENT:      Head: Normocephalic and atraumatic.      Right Ear: Tympanic membrane, ear canal and external ear normal.      Left Ear: Tympanic membrane, ear canal and external ear normal.      Nose: Congestion (Runny nose) present.      Mouth/Throat:      Mouth: Mucous membranes are moist.      Pharynx: Posterior oropharyngeal erythema present.   Eyes:      Conjunctiva/sclera: Conjunctivae normal.      Pupils: Pupils are equal, round, and reactive to light.   Cardiovascular:      Rate and Rhythm: Normal rate and regular rhythm.      Heart sounds: Normal heart sounds.   Pulmonary:      Effort: Pulmonary effort is normal. No respiratory distress.      Breath sounds: No stridor. Wheezing (Faint) present. No rhonchi or rales.   Skin:     General: Skin is warm and dry.      Coloration: Skin is not pale.   Neurological:      General: No focal deficit present.      Mental Status: She is alert and oriented to person, place, and time.   Psychiatric:         Mood and Affect: Mood normal.         Assessment/Plan:     1. Bronchitis  - benzonatate (TESSALON) 100 MG Cap; Take 1 Capsule by mouth 3 times a day as needed for Cough.  Dispense: 60 Capsule; Refill: 0  -Continue Breo and albuterol inhalers as prescribed by pulmonology  -Tylenol OTC as needed for discomfort  -Monitor symptoms closely and return to clinic if symptoms worsen or fail to resolve  -Continue working at cutting back marijuana and tobacco intake    MDM/Comments:  Patient has stable vital signs and is non-toxic " appearing.  Patient's lungs are clear to auscultation bilaterally with faint wheezing.  Patient has a history of asthma and REE and is followed by pulmonology.  I suspect the patient is experiencing bronchitis.  Discussed with the patient that bronchitis is typically viral and does not require antibiotics.  Patient has been prescribed benzonatate for cough relief.  I suspect fatigue may be secondary to not wearing a CPAP during the night.  Discussed monitoring her symptoms extremely closely and returning to the clinic if symptoms worsen or fail to resolve.  Patient demonstrated understanding of treatment plan at this time and will RTC if symptoms worsen or fail to resolve.     Differential diagnosis, natural history, supportive care, and indications for immediate follow-up discussed. All questions answered. Patient agrees with the plan of care.    Follow-up as needed if symptoms worsen or fail to improve to PCP, Urgent care or Emergency Room.    I have personally reviewed prior external notes and test results pertinent to today's visit.  I have independently reviewed and interpreted all diagnostics ordered during this urgent care acute visit.   Discussed management options (risks,benefits, and alternatives to treatment). Pt expresses understanding and the treatment plan was agreed upon. Questions were encouraged and answered to pt's satisfaction.    Please note that this dictation was created using voice recognition software. I have made a reasonable attempt to correct obvious errors, but I expect that there are errors of grammar and possibly content that I did not discover before finalizing the note.

## 2024-11-25 ENCOUNTER — TELEPHONE (OUTPATIENT)
Dept: OBGYN | Facility: CLINIC | Age: 59
End: 2024-11-25
Payer: MEDICAID

## 2024-11-25 NOTE — TELEPHONE ENCOUNTER
I spoke with representative Jackie and provided Botox Auth number. She states she has sent this over again so they can reprocess the rx, she has sent it as expedited which usually take 2-3 business days.

## 2024-11-25 NOTE — TELEPHONE ENCOUNTER
I contacted [facility] to fv with patients Botox order.   I spoke with representative Marino and she states they have yet to receive insurance authorization for this rx.  Encounter routed to referral specialist to advise.

## 2024-12-10 ENCOUNTER — OFFICE VISIT (OUTPATIENT)
Dept: GYNECOLOGY | Facility: CLINIC | Age: 59
End: 2024-12-10
Payer: MEDICAID

## 2024-12-10 DIAGNOSIS — N39.41 URGE URINARY INCONTINENCE: Primary | ICD-10-CM

## 2024-12-10 DIAGNOSIS — M51.369 DDD (DEGENERATIVE DISC DISEASE), LUMBAR: ICD-10-CM

## 2024-12-10 DIAGNOSIS — F32.A ANXIETY AND DEPRESSION: ICD-10-CM

## 2024-12-10 DIAGNOSIS — N32.81 OAB (OVERACTIVE BLADDER): ICD-10-CM

## 2024-12-10 DIAGNOSIS — M54.2 CERVICAL SPINE PAIN: ICD-10-CM

## 2024-12-10 DIAGNOSIS — N31.9 NEUROGENIC BLADDER: ICD-10-CM

## 2024-12-10 DIAGNOSIS — F41.9 ANXIETY AND DEPRESSION: ICD-10-CM

## 2024-12-10 LAB
APPEARANCE UR: NORMAL
BILIRUB UR STRIP-MCNC: NEGATIVE MG/DL
COLOR UR AUTO: YELLOW
GLUCOSE UR STRIP.AUTO-MCNC: NEGATIVE MG/DL
KETONES UR STRIP.AUTO-MCNC: NEGATIVE MG/DL
LEUKOCYTE ESTERASE UR QL STRIP.AUTO: NEGATIVE
NITRITE UR QL STRIP.AUTO: NEGATIVE
PH UR STRIP.AUTO: 7 [PH] (ref 5–8)
PROT UR QL STRIP: NEGATIVE MG/DL
RBC UR QL AUTO: NEGATIVE
SP GR UR STRIP.AUTO: 1.01
UROBILINOGEN UR STRIP-MCNC: 0.2 MG/DL

## 2024-12-10 PROCEDURE — 81002 URINALYSIS NONAUTO W/O SCOPE: CPT | Performed by: STUDENT IN AN ORGANIZED HEALTH CARE EDUCATION/TRAINING PROGRAM

## 2024-12-10 PROCEDURE — 52287 CYSTOSCOPY CHEMODENERVATION: CPT | Performed by: STUDENT IN AN ORGANIZED HEALTH CARE EDUCATION/TRAINING PROGRAM

## 2024-12-10 RX ORDER — SULFAMETHOXAZOLE AND TRIMETHOPRIM 800; 160 MG/1; MG/1
1 TABLET ORAL ONCE
Status: COMPLETED | OUTPATIENT
Start: 2024-12-10 | End: 2024-12-10

## 2024-12-10 RX ADMIN — SULFAMETHOXAZOLE AND TRIMETHOPRIM 1 TABLET: 800; 160 TABLET ORAL at 15:21

## 2024-12-10 NOTE — TELEPHONE ENCOUNTER
Received request via: Pharmacy    Was the patient seen in the last year in this department? Yes    Does the patient have an active prescription (recently filled or refills available) for medication(s) requested? No    [facility]    Does the patient have shelter Plus and need 100-day supply? (This applies to ALL medications) Patient does not have SCP

## 2024-12-10 NOTE — PROCEDURES
Marilyn Salinas is a 59 y.o. with neurogenic bladder and OAB presenting for bladder botox #4, last treatment 4/2024 with 100 units. She was thoroughly counseled on the procedure and consented for cystourethroscopy and bladder chemodenervation with onabotulinum toxin A.  She is aware the risks include infection, bladder perforation, rare reaction to medication, transient urinary retention.  Symptomatic improvement tends to occur at 1 week after injection, and last between 6 and 12 months.  All questions were answered     She brings 100 units Botox with her today, as this has been previously filled by [facility].  45 minutes prior to the procedure her bladder was backfilled with 60 mL of lidocaine, and urethra instilled with lido jet. She was given bactrim DS as antibiotic prophylaxis      Cystoscopy w/Botox    Date/Time: 12/10/2024 2:17 PM    Performed by: Giselle Caceres M.D.  Authorized by: Giselle Caceres M.D.    Procedure discussed: discussed risks, benefits and alternatives    Chaperone present: yes    Timeout: timeout called immediately prior to procedure    Prep: patient was prepped and draped in usual sterile fashion    Prep type: Betadine    Anesthesia: local anesthesia      Procedure Details     Cystoscope type: flexible    Cystoscopy route: transurethral      Cystoscopy location: native bladder      Irrigation used: saline      Position: dorsal lithotomy    Urethra     Urethra: normal      Vagina     Vagina: normal      Bladder     Bladder: normal      Bladder comment: No changes since last cystoscopy    Botox Injection Details     Changes since previous cystoscopy: no changes since previous cystoscopy      Indication: overactive bladder      Volume per injection comment: 2.0 mL    Total number of injections: 5    Post-Procedure Details     Appearance of urine after procedure: clear    Outcome: patient tolerated procedure well with no complications      Disposition: discharged home in  satisfactory condition          Giselle Caceres MD

## 2024-12-11 RX ORDER — VENLAFAXINE HYDROCHLORIDE 150 MG/1
150 CAPSULE, EXTENDED RELEASE ORAL DAILY
Qty: 30 CAPSULE | Refills: 5 | Status: SHIPPED | OUTPATIENT
Start: 2024-12-11

## 2024-12-11 RX ORDER — MELOXICAM 15 MG/1
15 TABLET ORAL DAILY
Qty: 30 TABLET | Refills: 5 | Status: SHIPPED | OUTPATIENT
Start: 2024-12-11

## 2024-12-17 ENCOUNTER — OFFICE VISIT (OUTPATIENT)
Dept: MEDICAL GROUP | Facility: MEDICAL CENTER | Age: 59
End: 2024-12-17
Attending: FAMILY MEDICINE
Payer: MEDICAID

## 2024-12-17 VITALS
SYSTOLIC BLOOD PRESSURE: 116 MMHG | HEIGHT: 66 IN | TEMPERATURE: 97.6 F | HEART RATE: 77 BPM | RESPIRATION RATE: 16 BRPM | DIASTOLIC BLOOD PRESSURE: 64 MMHG | BODY MASS INDEX: 25.71 KG/M2 | WEIGHT: 160 LBS | OXYGEN SATURATION: 97 %

## 2024-12-17 DIAGNOSIS — M62.830 MUSCLE SPASM OF BACK: ICD-10-CM

## 2024-12-17 PROCEDURE — 3074F SYST BP LT 130 MM HG: CPT | Performed by: FAMILY MEDICINE

## 2024-12-17 PROCEDURE — 99213 OFFICE O/P EST LOW 20 MIN: CPT | Performed by: FAMILY MEDICINE

## 2024-12-17 PROCEDURE — 3078F DIAST BP <80 MM HG: CPT | Performed by: FAMILY MEDICINE

## 2024-12-17 ASSESSMENT — FIBROSIS 4 INDEX: FIB4 SCORE: 1.05

## 2024-12-18 NOTE — PROGRESS NOTES
Verbal consent was acquired by the patient to use [employer] ambient listening note generation during this visit.    Subjective   Chief Complaint   Patient presents with    Other     Sore spot between shoulder blades        HPI:   Marilyn presents today with    History of Present Illness  The patient presents for evaluation of a sore spot between her shoulder blades.    She has been experiencing this discomfort for approximately 6 months, which has recently intensified, prompting her to seek medical attention. The pain is described as dull and localized to a specific area. She is uncertain if the pain is related to her arthritis or another underlying condition. A scan conducted at the onset of the pain revealed no abnormalities, attributing the discomfort to arthritis. She has been under the care of Dr. Lugo, with whom she has had several consultations, although not specifically for this issue. She resides with roommates. Her current medication regimen includes baclofen, tizanidine, gabapentin, and meloxicam, all of which are taken daily. These medications were prescribed following a neck injury sustained 5 years ago and have been instrumental in maintaining her mobility.    SOCIAL HISTORY  She resides with roommates.    RELEVANT MEDICATIONS  Baclofen, tizanidine, gabapentin, meloxicam        Health Maintenance Due   Topic Date Due    Hepatitis B Vaccine (Hep B) (1 of 3 - 19+ 3-dose series) Never done    Zoster (Shingles) Vaccines (1 of 2) Never done    Lung Cancer Screening Shared Decision Making  Never done    Pneumococcal Vaccine: 0-64 Years (2 of 2 - PCV) 07/31/2018    Influenza Vaccine (1) Never done    COVID-19 Vaccine (1 - 2024-25 season) Never done       Objective   Social History     Tobacco Use    Smoking status: Some Days     Current packs/day: 0.00     Average packs/day: 1 pack/day for 35.0 years (35.0 ttl pk-yrs)     Types: Cigarettes     Start date: 1/1/1987     Last attempt to quit: 1/1/2022     Years  "since quittin.9    Smokeless tobacco: Never    Tobacco comments:     20 years of 1ppd, then 10 years weaning down   Vaping Use    Vaping status: Some Days    Start date: 2020    Substances: THC, CBD   Substance Use Topics    Alcohol use: Not Currently    Drug use: Yes     Types: Marijuana, Inhaled     Comment: marijuana daily (smoked and edibles)       Exam:  /64 (BP Location: Right arm, Patient Position: Sitting, BP Cuff Size: Adult)   Pulse 77   Temp 36.4 °C (97.6 °F) (Temporal)   Resp 16   Ht 1.676 m (5' 6\")   Wt 72.6 kg (160 lb)   LMP 2017   SpO2 97%   BMI 25.82 kg/m²     Physical Exam  Musculoskeletal:      Thoracic back: Spasms present.        Back:       Comments: Discrete muscle spasm noted at outlined area, no other area tender to palpation        Constitutional: Alert, no distress  Skin: No rashes in visible areas  Eye: Conjunctiva clear, lids normal  Respiratory: Unlabored respiratory effort, no cough  MSK: Uses rolling walker gait, moves all extremities, kyphotic    Psych: Alert and oriented x3, normal affect and mood    Allergies   Allergen Reactions    Other Environmental Hives     TUMBLEWEED    Codeine Itching, Unspecified and Rash     Rash, itching, mood disorder- becomes agitated  Other reaction(s): itchy, grumpy       [id number] - [place] - [address] AT [facility] OF [address]  [address] [address]  Phone: [phone] Fax: [phone]    (Inactive see NCPDP [id number]) [facility] - [place], [place] - [address]  [address]  [address] [place] [address]  Phone: [phone] Fax: [phone]    [id number] - [place] - [address]  [address]  [place] [address]  Phone: [phone] Fax: [phone]    [address] [address]  Phone: [phone] Fax: [phone]    Current Outpatient " Medications   Medication Sig Dispense Refill    venlafaxine (EFFEXOR-XR) 150 MG extended-release capsule TAKE 1 CAPSULE BY MOUTH EVERY DAY 30 Capsule 5    meloxicam (MOBIC) 15 MG tablet TAKE 1 TABLET BY MOUTH EVERY DAY 30 Tablet 5    benzonatate (TESSALON) 100 MG Cap Take 1 Capsule by mouth 3 times a day as needed for Cough. 60 Capsule 0    albuterol (VENTOLIN HFA) 108 (90 Base) MCG/ACT Aero Soln inhalation aerosol Inhale 2 Puffs every 6 hours as needed for Shortness of Breath. 18 g 11    Budeson-Glycopyrrol-Formoterol (BREZTRI AEROSPHERE) 160-9-4.8 MCG/ACT Aerosol Inhale 2 Puffs 2 times a day. 10.7 g 11    metoclopramide (REGLAN) 10 MG Tab TAKE 1 TABLET BY MOUTH BEFORE MEALS      B Complex Vitamins (VITAMIN B COMPLEX) Cap Take 1 Capsule by mouth every day.      mirabegron ER (MYRBETRIQ) 25 MG TABLET SR 24 HR Take 1 Tablet by mouth every day. DISPENSE AS WRITTEN NO SUBSTITUTIONS 30 Tablet 5    TOVIAZ 4 MG TABLET SR 24 HR Take 1 Tablet by mouth every day. DISPENSE AS WRITTEN NO SUBSTITUTIONS 30 Tablet 5    vitamin D3 (CHOLECALCIFEROL) 1000 Unit (25 mcg) Tab Take 1,000 Units by mouth every day.      pantoprazole (PROTONIX) 20 MG tablet Take 20 mg by mouth every day.      baclofen (LIORESAL) 10 MG Tab Take 3 Tablets by mouth 2 times a day. 180 Tablet 5    tizanidine (ZANAFLEX) 2 MG capsule Take 1 Capsule by mouth 2 times a day. 60 Capsule 5    gabapentin (NEURONTIN) 300 MG Cap Take 3 Capsules by mouth 2 times a day. Take 1 po qhs 180 Capsule 5    dicyclomine (BENTYL) 20 MG Tab Take 1 Tablet by mouth every 6 hours as needed (Abdominal Pain). 30 Tablet 2    fluticasone (FLONASE) 50 MCG/ACT nasal spray Administer 1 Spray into affected nostril(S) every day. 16 g 6    guaiFENesin ER (MUCINEX) 600 MG TABLET SR 12 HR Take 1 Tablet by mouth every 12 hours as needed for Cough. (Patient not taking: Reported on 12/17/2024) 60 Tablet 2     No current facility-administered medications for this visit.       Assessment & Plan    59  y.o. female with the following -   1. Muscle spasm of back          Assessment & Plan  1. Muscle spasm.  The patient's symptoms are indicative of a muscle spasm, which could be exacerbated by her existing arthritis. Despite being on baclofen, tizanidine, gabapentin, and meloxicam, the muscle spasm persists. She has a history of disc disease and radiculopathy, which may also contribute to her current condition. She was advised to apply a heating pad to the affected area to alleviate discomfort. Additionally, the use of topical therapies such as Icy Hot or Vicks VapoRub was recommended, with a caution to avoid contact with eyes and to wash hands thoroughly after application. She was encouraged to discuss with her primary care physician if the heating pad does not provide relief, trigger point injections may be considered.        Return if symptoms worsen or fail to improve.    Please note that this dictation was created using voice recognition software. I have made every reasonable attempt to correct obvious errors, but I expect that there are errors of grammar and possibly content that I did not discover before finalizing the note.

## 2024-12-31 ENCOUNTER — OFFICE VISIT (OUTPATIENT)
Dept: SLEEP MEDICINE | Facility: MEDICAL CENTER | Age: 59
End: 2024-12-31
Attending: STUDENT IN AN ORGANIZED HEALTH CARE EDUCATION/TRAINING PROGRAM
Payer: MEDICAID

## 2024-12-31 VITALS
HEIGHT: 66 IN | DIASTOLIC BLOOD PRESSURE: 60 MMHG | OXYGEN SATURATION: 94 % | SYSTOLIC BLOOD PRESSURE: 90 MMHG | WEIGHT: 160 LBS | RESPIRATION RATE: 16 BRPM | BODY MASS INDEX: 25.71 KG/M2 | HEART RATE: 82 BPM

## 2024-12-31 DIAGNOSIS — G47.33 OSA (OBSTRUCTIVE SLEEP APNEA): ICD-10-CM

## 2024-12-31 PROCEDURE — 99213 OFFICE O/P EST LOW 20 MIN: CPT | Performed by: STUDENT IN AN ORGANIZED HEALTH CARE EDUCATION/TRAINING PROGRAM

## 2024-12-31 ASSESSMENT — FIBROSIS 4 INDEX: FIB4 SCORE: 1.05

## 2024-12-31 NOTE — PROGRESS NOTES
[facility] Consult Note     Date: 12/31/2024 / Time: 12:22 PM      Thank you for requesting a sleep medicine consultation on Marilyn Salinas at the sleep center. Presents today with the   Chief Complaint   Patient presents with    New Patient     Ref by Dr. Hutchison Dx: REE    Is Pt currently on PAP/O2? 1L , Noc   DME:  [employer] / ph [phone] / fx [phone]    Any prior Sleep Studies? NO    Previously seen with [facility]? NO        She is referred by Indira Hutchison M.D.  [address] [place] [phone] for evaluation and treatment of sleep disorder breathing .     HISTORY OF PRESENT ILLNESS:     Marilyn Salinas is a 59 y.o. female current smoker (25 pyh) with history of tetraplegia (incomplete C1-C4 s/p spinal fusion) s/p fall 5 years ago for which she now mbulates with walker, chronic cough, obstructive lung disease, depression, GERD with hx of delayed gastric emptying, TIA who presents to Sleep Clinic for sleep disordered breathing evaluation and treatment.    Her biggest concern is sleepiness during the day.  Was prescribed nocturnal 1 LPM after hospitalization.     She endorses daytime fatigue, sleepiness, poor sleep quality and non-restorative sleep.    She is unaware if she snores, but endorses difficulty breathing at night, night sweats, GERD. She endorses gasping for air, and apnea events.       Previously worked as an  however after the accident is now disabled.  Prior PFTs demonstrate normal airflows with normal lung volumes and normal DLCO.    She has a roommate but no bedroom partner    As per supplemental questionnaire to be scanned or imported into chart:    [place] Sleepiness Score: 11    Sleep Schedule  Bedtime: 10 p-11p  She watches tv about 8-10 pm. Typically falling asleep in the recliner  Wake time: 8:30-9 am   Sleep-onset latency: 1-2 hours, this has been going on for her whole life.  Awakenings from sleep: once  Difficulty falling back asleep:  "sometimes  Bedroom partner: no  Naps: Yes - occasionally 20 min-2 hours    DAYTIME SYMPTOMS:   Excessive daytime sleepiness: Yes  Daytime fatigue: Yes  Difficulty concentrating: Yes - endorses brain fog  Memory problems: No   Irritability:Yes, endorses depression and anxiety  Work/school performance issues: No   Sleepiness with driving: No   Caffeine/stimulant use: Yes, 2 cups of coffee  Alcohol use:No  Smokes THC 3x/day     SLEEP RELATED SYMPTOMS  Snoring: Yes  Witnessed apnea or gasping/choking: Yes  Dry mouth or mouth breathing: Yes  Sweating: Yes, occasionally   Teeth grinding/biting: Yes  Morning headaches: No   Refreshed Upon Awakening: No      SLEEP RELATED BEHAVIORS:  Parasomnias (walking, talking, eating, violence): No   Leg kicking: No   Restless legs - \"urge to move\": No   Nightmares: No  Recurrent: No   Dream enactment: No      NARCOLEPSY:  Cataplexy: No   Sleep paralysis: No   Sleep attacks: No   Hypnagogic/hypnopompic hallucinations: No     MEDICAL HISTORY  Past Medical History:   Diagnosis Date    Anesthesia 12/23/2021    agitated coming out of anesthesia    Anesthesia 03/17/2022    Patient states becomes upset and sad after anesthesia.    Anxiety     Arthritis     spine, feet     Asthma     Breath shortness     Cataract 12/23/2022    no surgery yet    Cerebral palsy (HCC)     Cervical spinal cord injury (HCC) 12/19/2019    COVID-19 01/17/2022 1/17/2022 stopped 1-    DDD (degenerative disc disease), lumbar     Per Problem List    Dental disorder     upper and lower denture    Depression     Full dentures 03/17/2022    Heart burn 12/23/2022    GERD    High cholesterol     History of falling     Marijuana user     Risk for falls     Stroke (HCC) 03/17/2022    Pt. states, MINI STROKE AGE 19.    Tetraplegia (HCC)     TIA (transient ischemic attack) 03/17/2022    AGE 19. Patient states D/T drug use.     Urinary bladder disorder     Urinary incontinence     Vertigo     Per Problem List    "     SURGICAL HISTORY  Past Surgical History:   Procedure Laterality Date    DEVYN BY LAPAROSCOPY  3/23/2022    Procedure: CHOLECYSTECTOMY, LAPAROSCOPIC;  Surgeon: Mayur Barrett M.D.;  Location: [facility];  Service: General    PB AMPUTATION TOE,MT-P JT Left 12/27/2021    Procedure: GREAT TOE PARTIAL AMPUTATION;  Surgeon: Emiliano Goff M.D.;  Location: [facility];  Service: Orthopedics    CERVICAL DISK AND FUSION ANTERIOR N/A 12/20/2019    Procedure: DISCECTOMY, SPINE, CERVICAL, ANTERIOR APPROACH, WITH FUSION - C3-5;  Surgeon: Connor Linton M.D.;  Location: [facility];  Service: Neurosurgery    CORPECTOMY N/A 12/20/2019    Procedure: CORPECTOMY;  Surgeon: Connor Linton M.D.;  Location: [facility];  Service: Neurosurgery    BLADDER SLING FEMALE N/A 10/3/2017    Procedure: BLADDER SLING FEMALE TOT, CYSTOSCOPY;  Surgeon: Hudson Driscoll M.D.;  Location: SURGERY SAME DAY [facility];  Service: Gynecology    VAGINAL HYSTERECTOMY SCOPE TOTAL N/A 10/3/2017    Procedure: VAGINAL HYSTERECTOMY SCOPE TOTAL;  Surgeon: Hudson Driscoll M.D.;  Location: SURGERY SAME DAY [facility];  Service: Gynecology    SALPINGECTOMY Bilateral 10/3/2017    Procedure: SALPINGECTOMY;  Surgeon: Hudson Driscoll M.D.;  Location: SURGERY SAME DAY [facility];  Service: Gynecology    OOPHORECTOMY Bilateral 10/3/2017    Procedure: OOPHORECTOMY;  Surgeon: Hudson Driscoll M.D.;  Location: SURGERY SAME DAY [facility];  Service: Gynecology    ANTERIOR AND POSTERIOR REPAIR Bilateral 10/3/2017    Procedure: ANTERIOR AND POSTERIOR REPAIR;  Surgeon: Hudson Driscoll M.D.;  Location: SURGERY SAME DAY [facility];  Service: Gynecology    ENTEROCELE REPAIR N/A 10/3/2017    Procedure: ENTEROCELE REPAIR, PERINEOPLASTY;  Surgeon: Hudson Driscoll M.D.;  Location: SURGERY SAME DAY [facility];  Service: Gynecology    VAGINAL SUSPENSION N/A 10/3/2017    Procedure: VAGINAL SUSPENSION SACROSPINOUS VAULT  POSSIBLE;  Surgeon: Hudson Driscoll M.D.;  Location: SURGERY SAME DAY [facility];  Service: Gynecology    OTHER Left 2005    hammertoe x2    EYE SURGERY  1972    for lazy eye    LUMPECTOMY          FAMILY HISTORY  Family History   Problem Relation Age of Onset    Cancer Mother         ovarian    Alcohol/Drug Mother     Cancer Brother         unknown, caused him to lose his leg when he was 4    Diabetes Maternal Aunt        SOCIAL HISTORY  Social History     Socioeconomic History    Marital status: Single   Tobacco Use    Smoking status: Some Days     Current packs/day: 0.00     Average packs/day: 1 pack/day for 35.0 years (35.0 ttl pk-yrs)     Types: Cigarettes     Start date: 1/1/1987     Last attempt to quit: 1/1/2022     Years since quitting: 3.0    Smokeless tobacco: Never    Tobacco comments:     20 years of 1ppd, then 10 years weaning down   Vaping Use    Vaping status: Some Days    Start date: 1/1/2020    Substances: THC, CBD   Substance and Sexual Activity    Alcohol use: Not Currently    Drug use: Yes     Types: Marijuana, Inhaled     Comment: marijuana daily (smoked and edibles)    Sexual activity: Not Currently   Other Topics Concern     Service No    Blood Transfusions No    Caffeine Concern No    Occupational Exposure No    Hobby Hazards No    Sleep Concern Yes    Stress Concern Yes    Weight Concern Yes    Special Diet No    Back Care Yes    Exercise No    Bike Helmet Yes    Seat Belt Yes    Self-Exams Yes     Social Drivers of Health     Food Insecurity: No Food Insecurity (12/27/2019)    Hunger Vital Sign     Worried About Running Out of Food in the Last Year: Never true     Ran Out of Food in the Last Year: Never true   Transportation Needs: No Transportation Needs (12/27/2019)    PRAPARE - Transportation     Lack of Transportation (Medical): No     Lack of Transportation (Non-Medical): No        Occupation: disabled    CURRENT MEDICATIONS  Current Outpatient Medications   Medication Sig     venlafaxine (EFFEXOR-XR) 150 MG extended-release capsule TAKE 1 CAPSULE BY MOUTH EVERY DAY    meloxicam (MOBIC) 15 MG tablet TAKE 1 TABLET BY MOUTH EVERY DAY    benzonatate (TESSALON) 100 MG Cap Take 1 Capsule by mouth 3 times a day as needed for Cough.    albuterol (VENTOLIN HFA) 108 (90 Base) MCG/ACT Aero Soln inhalation aerosol Inhale 2 Puffs every 6 hours as needed for Shortness of Breath.    Budeson-Glycopyrrol-Formoterol (BREZTRI AEROSPHERE) 160-9-4.8 MCG/ACT Aerosol Inhale 2 Puffs 2 times a day.    metoclopramide (REGLAN) 10 MG Tab TAKE 1 TABLET BY MOUTH BEFORE MEALS    B Complex Vitamins (VITAMIN B COMPLEX) Cap Take 1 Capsule by mouth every day.    mirabegron ER (MYRBETRIQ) 25 MG TABLET SR 24 HR Take 1 Tablet by mouth every day. DISPENSE AS WRITTEN NO SUBSTITUTIONS    TOVIAZ 4 MG TABLET SR 24 HR Take 1 Tablet by mouth every day. DISPENSE AS WRITTEN NO SUBSTITUTIONS    vitamin D3 (CHOLECALCIFEROL) 1000 Unit (25 mcg) Tab Take 1,000 Units by mouth every day.    pantoprazole (PROTONIX) 20 MG tablet Take 20 mg by mouth every day.    baclofen (LIORESAL) 10 MG Tab Take 3 Tablets by mouth 2 times a day.    tizanidine (ZANAFLEX) 2 MG capsule Take 1 Capsule by mouth 2 times a day.    gabapentin (NEURONTIN) 300 MG Cap Take 3 Capsules by mouth 2 times a day. Take 1 po qhs    dicyclomine (BENTYL) 20 MG Tab Take 1 Tablet by mouth every 6 hours as needed (Abdominal Pain).    fluticasone (FLONASE) 50 MCG/ACT nasal spray Administer 1 Spray into affected nostril(S) every day.    guaiFENesin ER (MUCINEX) 600 MG TABLET SR 12 HR Take 1 Tablet by mouth every 12 hours as needed for Cough. (Patient not taking: Reported on 11/22/2024)       REVIEW OF SYSTEMS  Constitutional: Denies fevers, Denies weight changes  Ears/Nose/Throat/Mouth: Denies nasal congestion or sore throat   Cardiovascular: Denies chest pain  Respiratory: Denies shortness of breath, Denies cough  Gastrointestinal/Hepatic: Denies nausea, vomiting  Sleep: see  "HPI    Physical Examination:  Vitals/ General Appearance:   Weight/BMI: Body mass index is 25.82 kg/m².  Vitals:    12/31/24 1338   BP: 90/60   BP Location: Left arm   Patient Position: Sitting   BP Cuff Size: Adult   Pulse: 82   Resp: 16   SpO2: 94%   Weight: 72.6 kg (160 lb)   Height: 1.676 m (5' 6\")       Pt. is alert and oriented to time, place and person. Cooperative and in no apparent distress.     Constitutional: Alert, no distress, well-groomed.  Skin: No rashes in visible areas.  Eye: Round. Conjunctiva clear, lids normal. No icterus.   ENT EXAM  Hard palate narrow: No   Hard palate high: Yes  Soft palate/uvula (Mallampati score): 4  Tongue Scalloping: Yes  Retrognathia: Yes  Micrognathia: No   Cardiovascular:regular rate and rhythm  Pulmonary:Normal breath sounds  Neurologic:Awake, alert and oriented x 3  Extremities: No clubbing, cyanosis, or edema     Bicarb:   Lab Results   Component Value Date/Time    CO2 25 06/13/2024 1143    CO2 25 04/26/2023 1028    CO2 24 04/13/2023 1437     TSH:   Lab Results   Component Value Date/Time    TSHULTRASEN 1.870 04/13/2023 1439     CREATININE:   Lab Results   Component Value Date/Time    CREATININE 0.74 06/13/2024 1143     VIT D:   Lab Results   Component Value Date/Time    25HYDROXY 44 04/13/2023 1437     H/H:  Lab Results   Component Value Date/Time    HEMOGLOBIN 15.6 06/13/2024 11:43 AM       ASSESSMENT AND PLAN   1. Marilyn Salinas has symptoms of Obstructive Sleep Apnea (REE) including choking/gasping during sleep, witnessed apnea, dry mouth, unrefreshed upon awakening. These symptoms interfere with activities of daily living. ESS 11  Pt has risk factors for REE include crowded oropharynx.  She is also at further risk for more severe REE during REM periods given moderate central canal stenosis C1-C4 status post cervical corpectomy, may need more support like noninvasive ventilation  The pathophysiology of REE and the increased risk of cardiovascular morbidity " from untreated REE is discussed in detail with the patient. She also has anxiety and depression, asthma which can be worsened by REE.      We have discussed diagnostic options including in-laboratory, attended polysomnography and home sleep testing. We have also discussed treatment options including airway pressurization, reconstructive otolaryngologic surgery, dental appliances and weight management.     Subsequently,treatment options will be discussed based on the diagnostic study. Meanwhile, She is urged to avoid supine sleep, weight gain and alcoholic beverages since all of these can worsen REE. She is cautioned against drowsy driving. If She feels sleepy while driving, advised must pull over for a break/nap, rather than persist on the road, in the interest of Pt's own safety and that of others on the road.    Plan  -  She will be scheduled for an overnight PSG to assess sleep related breathing disorder.  If meets criteria for obstructive sleep apnea will initiate therapy with CPAP/BiPAP and ensure proper ventilation.  Ensure once respiratory events controlled that no residual hypoxemia remains  - Follow up 1-2 weeks after sleep study to discuss results and treatment options moving forward   -Advised to reach out via MyChart or by phone with any questions or concerns.     2.  Sleep onset and maintenance insomnia -discussed improving sleep hygiene; decreasing stimulus prior to bedtime, spending less time in bed, avoiding daytime naps.  CBT-I resources were given.  Suspect sleep disordered breathing treatment will help.    3.  Regarding treatment of other past medical problems and general health maintenance,  Pt is urged to follow up with PCP.      Please note portions of this record was created using voice recognition software. I have made every reasonable attempt to correct obvious errors, but I expect that there are errors of grammar and possibly content I did not discover before finalizing the note.

## 2024-12-31 NOTE — PATIENT INSTRUCTIONS
Psychologists who conduct CBTi in the area:    Dr. Lucrecia Overton  [address] [place] [address]  [phone]    Dr. Karina Carbajal  [address], [place] [address] [phone]     CBT-I Online Resources    Path to Better Sleep (free) - https://www.veterantraining.va.gov/insomnia/index.asp  This free online Cognitive Behavioral Therapy for Insomnia course, which was developed for  veterans, takes you through a sleep &quot;check-in&quot; and the various components of CBT-I,  including modifying unhelpful behaviors and unhelpful thoughts around sleep.  Insomnia  (free) - https://insomniacoach.com/  Offers a self-guided 5-week training plan designed to change sleep habits. Smart phone kirk  available.  CBT for Insomnia - Nolan Fortune, PhD, CBSM ($50-$70) -  https://www.cbtforinsomnia.com/  CBT for Insomnia is a five week PDF-based CBT-I program based on Dr. Nolan Fortune&#39;  twenty years of CBT-I research and clinical practice at [employer].  Go! To Sleep - [facility] ($40) - Go! to Sleep ([facility])  A 6-week online course for improving sleep that teaches you to identify and then reframe  specific thoughts and behaviors that interfere with your ability to sleep deeply.  Sleepio - Matthias Espinal PhD, CBSM ($50-$70) - https://www.manetch.Medocity/  Sleepio is a 6-module online CBT-I program developed with Matthias Espinal, a renowned  insomnia clinician and researcher, that is available through some Employee Assistance  Programs.  Sleepfitness.com - Luz Elena Cortes, PhD, CBSM ($129) - https://Interana.Medocity/  A 6-week self-guided insomnia program based on Cognitive Behavioral Treatment for  Insomnia (CBTi) that is in online format. You can sign up for the 7-day free trial and learn  how CBTi can improve your sleep.  Insomnia Solved - Garcia Irizarry MD ($89) - http://www.leticia.Medocity/fix-  my-insomnia/  Insomnia Solved is a self-guided CBTI program created by  Garcia Irizarry M.D., a board-  certified medical doctor. The complete program includes full access to exclusive multimedia  content, including an 154-page eBook and online modules and audiofiles.  CBT-I Books  Juan Carlos Mind: Turn Off Your Noisy Thoughts and Get a Good Night&#39;s Sleep -  Leatha Bunch, PhD and Monica Godoy, PhD  Accessible, enjoyable, and grounded in evidence-based cognitive behavioral therapy (CBT),  Juan Carlos Mind directly addresses the effects of rumination?or having an overactive  brain?on your ability to sleep well. Written by two psychologists who specialize in sleep  disorders, the book contains helpful exercises and insights into how you can better manage  your thoughts at bedtime, and finally get some sleep.  Quiet Your Mind and Get to Sleep: Solutions to Insomnia for Those with Depression,  Anxiety or Chronic Pain - Monica Godoy, PhD  This workbook uses cognitive behavior therapy, which has been shown to work as well as  sleep medications and produce longer-lasting effects. Research shows that it also works  well for those whose insomnia is experienced in the context of anxiety, depression, and  chronic pain. The complete program in this book goes to the root of your insomnia and offers  the same techniques used by experienced sleep specialists.  Sleep Through Insomnia - Garcia Irizarry MD  Cognitive behavioral therapy for insomnia (CBTI) is often structured as a 6-week treatment  program that can help people who have difficulty falling asleep, staying asleep, or find that  sleep is unrefreshing. CBTI is scientifically proven, highly effective, and does not rely on    medications. CBTI has life-long benefits and most participants report improved sleep  satisfaction. Insomnia Solved is based on the core features of this treatment.  Here are some guidelines to help you establish healthy sleep habits:      Keep a consistent sleep schedule. Get up at the same time every day, even on  weekends or  during vacations.      Set a bedtime that is early enough for you to get at least 7 hours of sleep. Establish relaxing  bedtime rituals (like a warm bath or nighttime reading routine) so that your body knows it&#39;s time  to wind down.      Limit exposure to light in the evenings. The light from screens (including smartphones, tablets,  televisions, and computers) can convince your brain that it&#39;s daytime and make you feel less  tired than you actually are. Lamplight is fine. Starting 2 hours before bedtime, turn off the  screens and choose quiet, relaxing activities that you enjoy.      Use your bed only for sleep and sex. Watching TV, using your laptop, etc in bed will make you  start to associate bed with the violent movie on TV, the stressful emails on your computer, etc.  Keep your bedroom quiet, dark, and cool, and let it be a haven from the rest of your busy life.      If you don’t fall asleep after what feels like about 20 minutes (don&#39;t keep your eye on the  clock!), get out of bed. The same is true for falling asleep after waking up in the middle of the  night. Leave the bedroom, find a book to read (or other quiet activity -- no screens), get a  simple, light snack or make a cup of hot milk or herbal tea, and relax in a your favorite chair. If  you feel tired enough to go back to sleep, return to bed. If not, don&#39;t worry. You&#39;ll be sleepier at  bedtime the next night and more likely to sleep well.      Exercise regularly and maintain a healthy diet. But, avoid vigorous exercise within 2 hours of  bedtime, and don’t eat a large meal before bedtime. If you are hungry at night, eat a light,  healthy snack.      Avoid consuming caffeine after 2 pm. If you are very sensitive to caffeine, don&#39;t use it after  noon.      Avoid consuming alcohol before bedtime. Alcohol may make you feel sleepy initially but will  actually reduce the quality of your sleep and make it likelier that  you will wake up in the middle  of the night.                 Allow 1-2 hours of “wind down” before bedtime with relaxing, non-stimulating activities              Try limiting screen use and/or using blue light blockers (apps for filters) starting 2 hours  before bedtime to prevent suppression of melatonin.              Get up and out of bed within 15 minutes of your desired time in the morning to set your  internal clock.              Make sure to get early exposure to bright light.  This will help you feel more awake and  set your internal clock to make it easier to wake up in the morning.

## 2025-01-15 ENCOUNTER — PATIENT MESSAGE (OUTPATIENT)
Dept: SLEEP MEDICINE | Facility: MEDICAL CENTER | Age: 60
End: 2025-01-15
Payer: MEDICAID

## 2025-01-18 ENCOUNTER — SLEEP STUDY (OUTPATIENT)
Dept: SLEEP MEDICINE | Facility: MEDICAL CENTER | Age: 60
End: 2025-01-18
Attending: STUDENT IN AN ORGANIZED HEALTH CARE EDUCATION/TRAINING PROGRAM
Payer: MEDICAID

## 2025-01-18 DIAGNOSIS — G47.31 CSA (CENTRAL SLEEP APNEA): ICD-10-CM

## 2025-01-18 PROCEDURE — 95811 POLYSOM 6/>YRS CPAP 4/> PARM: CPT | Performed by: STUDENT IN AN ORGANIZED HEALTH CARE EDUCATION/TRAINING PROGRAM

## 2025-01-20 NOTE — PROCEDURES
Physician:   Patient: DOMINICK GALINDO  ID: [id number] Date: 1/18/2025 Exam No.:   MONTAGE: Standard  STUDY TYPE: Split Night  RECORDING TECHNIQUE:   After the scalp was prepared, gold plated electrodes were applied to the scalp according to the International 10-20 System. EEG (electroencephalogram) was continuously monitored from the O1-M2, O2-M1, C3-M2, C4-M1, F3-M2, and F4-M1. EOGs (electrooculograms) were monitored by electrodes placed at the left and right outer canthi. Chin EMG (electromyogram) was monitored by electrodes placed on the mentalis and sub-mentalis muscles. Nasal and oral airflow were monitored using a triple port thermocouple as well as oronasal pressure transducer. Respiratory effort was measured by inductive plethysmography technology employing abdominal and thoracic belts. Blood oxygen saturation and pulse were monitored by pulse oximetry. Heart rhythm was monitored by surface electrocardiogram. Leg EMG was studied using surface electrodes placed on left and right anterior tibialis. A microphone was used to monitor tracheal sounds and snoring. Body position was monitored and documented by technician observation.   SCORING CRITERIA:   A modification of the AASM manual for scoring of sleep and associated events was used. Obstructive apneas were scored by cessation of airflow for at least 10 seconds with continuing respiratory effort. Central apneas were scored by cessation of airflow for at least 10 seconds with no respiratory effort. Hypopneas were scored by a 30% or more reduction in airflow for at least 10 seconds accompanied by arterial oxygen desaturation of 3% or an arousal. For CMS (Medicare) patients, per AASM rule 1B, hypopneas are scored by 30% with mild reduction in airflow for at least 10 seconds accompanied by arterial saturation decreased at 4%.  DIAGNOSTIC  Study start time was 09:21:18 PM. Diagnostic recording time was 191 minutes with a total sleep time of 129 minutes resulting in a  sleep efficiency of 67.54%%. Sleep latency from the start of the study was 46 minutes and the latency from sleep to REM was 00 minutes. In total,13 arousals were scored for an arousal index of 6.0.  Respiratory:  There were a total of 241 apneas consisting of 1 obstructive apneas, 0 mixed apneas, and 240 central apneas. A total of 3 hypopneas were scored. The apnea index was 112.09 per hour and the hypopnea index was 1.40 per hour resulting in an overall AHI of 113.49. AHI during REM was 0.0 and AHI while supine was 113.49.  Oximetry:  There was a mean oxygen saturation of 88.0%. The minimum oxygen saturation during NREM sleep was 80.0% and in REM was --%. Time spent during sleep with oxygen saturations <88% was 67.0 minutes.   Cardiac:  The highest heart rate seen while awake was 87 BPM while the highest heart rate during sleep was 81 BPM with an average sleeping heart rate of 65 BPM.  Limb Movements:  There were a total of 6 PLMs during sleep, which resulted in a PLM index of 2.8. There were 4 PLMs associated with arousals which resulted in a PLMS arousal index of 1.9.  TREATMENT:  Treatment recording time was 5h 24.5m (324 minutes) with a total sleep time of 4h 0.5m (240 minutes) resulting in a sleep efficiency of 74.1%. Sleep latency from the start of treatment was 02 minutes and REM latency from sleep onset was 0h 0.0m. The patient had 43 arousals in total for an arousal index of 10.7.  Respiratory:   There were 186 apneas in total consisting of 2 obstructive apneas, 184 central apneas, and 0 mixed apneas for an apnea index of 46.40. The patient had 18 hypopneas in total, which resulted in a hypopnea index of 4.49. The overall AHI was 50.89, with a REM AHI of 0.00, and a supine AHI of 50.89.   Oximetry:  The mean SaO2 during treatment was 90.0%. The minimum oxygen saturation in NREM was 85.0 % and in REM was --%. Patient spent 101.5 minutes of TST with SaO2 <88%.  Cardiac:  The highest heart rate during sleep  was 81 BPM with an average sleeping heart rate of 66BPM.  Limb Movements:  There were a total of 0 PLMS during titration sleep time that resulted in an index of 0.0. There were 5 PLMS associated with arousals. This resulted in a PLM arousal index of 1.2.  Titration: CPAP was tried from 5-8cm H2O. Bipap was tried from 10/6-13/9cm H2O. ASV was tried with EPAP 5-8cm H2O, Min PS 3cm H2O, and Max PS 15cm H2O.   This was a fully attended sleep study. This test was technically adequate. This patient was titrated on CPAP starting at *** cm of water pressure. Patient was titrated up to *** cm of water pressure. Patient did best at *** cm of water pressure. Patient spent *** minutes at that pressure and the AHI was *** which is considered *** obstructive sleep apnea.   Assessment:   DIAGNOSTIC: ***Obstructive Sleep Apnea Hypopnea - AHI*** ***Nocturnal desaturation - francisco saturation ***% - saturations <88% below for *** minutes of TST.  TREATMENT: ***Obstructive Sleep Apnea Hypopnea - AHI*** ***Nocturnal desaturation - francisco saturation ***% - saturations <88% below for *** minutes of TST.  Recommendation:    apnea hypopnea index and compliance for further outpatient monitoring and management of PAP therapy. In some cases alternative treatment options may be proven effective in resolving sleep apnea. These options include upper airway surgery, the use of a dental orthotic, weight loss, or positional therapy. Clinical correlation is required. In general patients with sleep apnea are advised to avoid alcohol, sedatives and not to operate a motor vehicle while drowsy. Untreated sleep apnea increases the risk for cardiovascular and neurovascular disease.

## 2025-01-21 ENCOUNTER — OFFICE VISIT (OUTPATIENT)
Dept: MEDICAL GROUP | Facility: MEDICAL CENTER | Age: 60
End: 2025-01-21
Attending: FAMILY MEDICINE
Payer: MEDICAID

## 2025-01-21 VITALS
SYSTOLIC BLOOD PRESSURE: 100 MMHG | HEART RATE: 100 BPM | BODY MASS INDEX: 25.71 KG/M2 | HEIGHT: 66 IN | OXYGEN SATURATION: 93 % | RESPIRATION RATE: 14 BRPM | TEMPERATURE: 97.6 F | WEIGHT: 160 LBS | DIASTOLIC BLOOD PRESSURE: 78 MMHG

## 2025-01-21 DIAGNOSIS — J45.20 MILD INTERMITTENT ASTHMA WITHOUT COMPLICATION: ICD-10-CM

## 2025-01-21 DIAGNOSIS — M54.50 CHRONIC MIDLINE LOW BACK PAIN, UNSPECIFIED WHETHER SCIATICA PRESENT: ICD-10-CM

## 2025-01-21 DIAGNOSIS — G89.29 CHRONIC MIDLINE LOW BACK PAIN, UNSPECIFIED WHETHER SCIATICA PRESENT: ICD-10-CM

## 2025-01-21 DIAGNOSIS — R05.3 CHRONIC COUGH: ICD-10-CM

## 2025-01-21 PROCEDURE — 99213 OFFICE O/P EST LOW 20 MIN: CPT | Performed by: FAMILY MEDICINE

## 2025-01-21 PROCEDURE — 3074F SYST BP LT 130 MM HG: CPT | Performed by: FAMILY MEDICINE

## 2025-01-21 PROCEDURE — 3078F DIAST BP <80 MM HG: CPT | Performed by: FAMILY MEDICINE

## 2025-01-21 PROCEDURE — 99214 OFFICE O/P EST MOD 30 MIN: CPT | Performed by: FAMILY MEDICINE

## 2025-01-21 RX ORDER — OXYCODONE AND ACETAMINOPHEN 5; 325 MG/1; MG/1
1 TABLET ORAL
Qty: 7 TABLET | Refills: 0 | Status: SHIPPED | OUTPATIENT
Start: 2025-01-21 | End: 2025-01-28

## 2025-01-21 RX ORDER — ALBUTEROL SULFATE 90 UG/1
2 INHALANT RESPIRATORY (INHALATION) EVERY 6 HOURS PRN
Qty: 18 G | Refills: 11 | Status: SHIPPED | OUTPATIENT
Start: 2025-01-21

## 2025-01-21 RX ORDER — GUAIFENESIN 1200 MG/1
1 TABLET, EXTENDED RELEASE ORAL 2 TIMES DAILY PRN
Qty: 60 TABLET | Refills: 2 | Status: SHIPPED | OUTPATIENT
Start: 2025-01-21

## 2025-01-21 ASSESSMENT — FIBROSIS 4 INDEX: FIB4 SCORE: 1.05

## 2025-01-22 NOTE — PROGRESS NOTES
Verbal consent was acquired by the patient to use "[employer] ambient listening note generation during this visit   Subjective:     HPI:   History of Present Illness  The patient presents for evaluation of recurrent falls, respiratory issues, and back pain.    Recurrent Falls  She experienced a fall while attempting to retrieve an object from the ground using a grabber, resulting in a head injury. This incident occurred between Christmas and New Year's, approximately 3 to 4 weeks ago. She reports no significant headaches or sleep disturbances since the fall but does mention a slight sensation of mental fog. She is currently undergoing physical therapy, focusing on hip and neck strengthening exercises, which she finds beneficial. She has a history of migraines, which have resolved with the use of Depakote prescribed by her neurologist.    Respiratory Issues  She has been experiencing respiratory issues, including throat discomfort and coughing up phlegm. Her pulmonologist has informed her that her lungs are clear and there is no concern regarding lung nodules. She is currently on Breztri, which she reports as effective. She recently underwent a sleep study. She continues to smoke 1 to 2 cigarettes per week. She has not been taking Mucinex regularly but has used it as needed in the past. She has not found benzonatate helpful for her cough. She has previously tried Mucinex DM, which she found beneficial for expectoration.    Back Pain  She has 3 remaining tablets of oxycodone, which she reserves for severe pain episodes. She was given 7 tabs about 4-5 months ago. She experiences back pain, which is exacerbated to the point of immobility, prompting her to take oxycodone. She also reports leg pain and weakness, which worsens without exercise. She has not consulted a pain specialist for her back pain. She has reduced her marijuana use from 10 times daily to 3 times daily. She has found relief with Tylenol Arthritis,  "although the effects are short-lived. She has previously consulted with Dr. Linton, a neck surgeon, and Dr. Youngblood, a pain management specialist, who attempted a steroid injection for her back pain, but she could not tolerate this. This was at [facility]surgery [facility]. She has has C spine surgery before. MRI L spine previously showed mid-mod central canal stenosis. She was discharged from their care 4 years ago. She reports constant coldness from the waist down.    SOCIAL HISTORY  The patient smokes one or two cigarettes a week. The patient admits to marijuana use, currently three times a day, down from ten times a day.    MEDICATIONS  Current: Depakote, Breztri, oxycodone, Tylenol arthritis, marijuana (reduced use)  Past: benzonatate, Mucinex DM      Objective:     Exam:  /78 (BP Location: Left arm, Patient Position: Sitting)   Pulse 100   Temp 36.4 °C (97.6 °F) (Temporal)   Resp 14   Ht 1.676 m (5' 6\")   Wt 72.6 kg (160 lb)   LMP 01/03/2017   SpO2 93%   BMI 25.82 kg/m²  Body mass index is 25.82 kg/m².    Constitutional: Alert, no distress, well-groomed.  Respiratory: Unlabored respiratory effort, no cough.  MSK: small lac noted on the L temple  Psych: normal affect and mood.      Data:   Reviewed PT notes, last primary care note    Assessment & Plan:     1. Mild intermittent asthma without complication  albuterol (VENTOLIN HFA) 108 (90 Base) MCG/ACT Aero Soln inhalation aerosol      2. Chronic cough  albuterol (VENTOLIN HFA) 108 (90 Base) MCG/ACT Aero Soln inhalation aerosol    Guaifenesin 1200 MG TABLET SR 12 HR      3. Chronic midline low back pain, unspecified whether sciatica present  Referral to Pain Management    oxyCODONE-acetaminophen (PERCOCET) 5-325 MG Tab          Assessment & Plan  1. Recurrent falls - her current symptoms may be indicative of a mild concussion, which should improve over time. The goal is to reduce the number of head traumas and potential concussions, concerning as she did " have a head trauma not too long ago  - undergoing PT for strengthening  - would recommend that pt be evaluated by pain mgmt again and evaluate L spine Mri to see if spinal cord compression could be contributing to falls and weakness. Referral as below.     2. Respiratory issues - Reports issues with mucus accumulation in her throat, attributed to clear lung sounds and non-concerning nodules.  - increase guaifenisin to max dose, 1200mg bid  - Advise to continue using Breztri as it is working well    3. Back pain - Spinal issues in the lower back leading to increased weakness in the legs and recurrent falls. An MRI from 4 to 5 years ago revealed compression on the spinal canal in the middle. Experiencing more symptoms, including increased weakness and falls.  - Referral to [facility] for reevaluation of back pain  - Provide a short course of oxycodone for chronic pain management with the condition to abstain from marijuana use  - Advise to contact [facility] after 1 to 2 weeks and inform them that a referral has been made  - Send a [employer] message if any issues arise            Return in about 3 months (around 4/21/2025).      Please note that this dictation was created using voice recognition software. I have made every reasonable attempt to correct obvious errors, but I expect that there are errors of grammar and possibly content that I did not discover before finalizing the note.

## 2025-02-11 DIAGNOSIS — R25.2 SPASTICITY: ICD-10-CM

## 2025-02-11 DIAGNOSIS — S14.109A INJURY OF CERVICAL SPINAL CORD, INITIAL ENCOUNTER (HCC): ICD-10-CM

## 2025-02-11 NOTE — TELEPHONE ENCOUNTER
Received request via: Pharmacy    Was the patient seen in the last year in this department? Yes    Does the patient have an active prescription (recently filled or refills available) for medication(s) requested? No    Pharmacy Name: [facility]    Does the patient have MCFP Plus and need 100-day supply? (This applies to ALL medications) Patient does not have SCP

## 2025-02-12 RX ORDER — BACLOFEN 10 MG/1
30 TABLET ORAL 2 TIMES DAILY
Qty: 180 TABLET | Refills: 5 | Status: SHIPPED | OUTPATIENT
Start: 2025-02-12

## 2025-02-13 ENCOUNTER — TELEPHONE (OUTPATIENT)
Dept: GYNECOLOGY | Facility: CLINIC | Age: 60
End: 2025-02-13
Payer: MEDICAID

## 2025-02-13 RX ORDER — ESTRADIOL 0.1 MG/G
CREAM VAGINAL
Qty: 1 EACH | Refills: 6 | Status: SHIPPED | OUTPATIENT
Start: 2025-02-13

## 2025-02-21 ENCOUNTER — APPOINTMENT (OUTPATIENT)
Dept: SLEEP MEDICINE | Facility: MEDICAL CENTER | Age: 60
End: 2025-02-21
Payer: MEDICAID

## 2025-02-25 ENCOUNTER — OFFICE VISIT (OUTPATIENT)
Dept: SLEEP MEDICINE | Facility: MEDICAL CENTER | Age: 60
End: 2025-02-25
Payer: MEDICAID

## 2025-02-25 VITALS
BODY MASS INDEX: 25.71 KG/M2 | SYSTOLIC BLOOD PRESSURE: 110 MMHG | HEART RATE: 91 BPM | DIASTOLIC BLOOD PRESSURE: 78 MMHG | OXYGEN SATURATION: 92 % | RESPIRATION RATE: 18 BRPM | HEIGHT: 66 IN | WEIGHT: 160 LBS

## 2025-02-25 DIAGNOSIS — G47.34 NOCTURNAL HYPOXIA: ICD-10-CM

## 2025-02-25 DIAGNOSIS — G47.31 CENTRAL SLEEP APNEA: ICD-10-CM

## 2025-02-25 PROCEDURE — 99213 OFFICE O/P EST LOW 20 MIN: CPT

## 2025-02-25 PROCEDURE — 3078F DIAST BP <80 MM HG: CPT

## 2025-02-25 PROCEDURE — 3074F SYST BP LT 130 MM HG: CPT

## 2025-02-25 ASSESSMENT — FIBROSIS 4 INDEX: FIB4 SCORE: 1.05

## 2025-02-25 NOTE — PROGRESS NOTES
[facility] Follow-up Visit    CC:   Chief Complaint   Patient presents with    Follow-Up     Last Office Visit 12/31/24 with Willian     Study Complete on 1/18/25 (Signed)           HPI:  Marilyn Salinas is a 59 y.o.female present today to discuss sleep study results. Patient was initially seen by Dr. Dorsey 12/31/2024 for daytime sleepiness, fatigue, poor sleep quality, nonrestorative sleep.  At that time it was recommended that she obtain an in lab sleep study.  Patient had PSG split-night testing done 1/18/2025 which showed very severe central sleep apnea with an AHI of 113.49.  Patient did have nocturnal desaturation with 67 minutes less than 88% during diagnostic portion.  Patient was tried on CPAP BiPAP and ASV.  Patient did best on ASV and recommendation for auto ASV was made.  Patient had improved oxygen saturation during treatment portion but did have residual hypoxemia despite control of AHI therefore recommendation for bleed in O2 was made.  Patient currently uses nocturnal oxygen which was prescribed during a hospitalization.        Sleep History:  1/18/25 - PSG split night, Very severe Central Sleep Apnea Hypopnea - .49 with Nocturnal desaturation - francisco saturation 80% - saturations <88% below for 67 minutes of TST. 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    Cervical spinal cord injury (HCC) 08/05/2021    Other cerebral palsy (HCC) 12/21/2020    Bilateral leg edema 12/21/2020    Onychomycosis 12/21/2020     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    Status post cervical spinal fusion 06/19/2020    Neurogenic bowel 01/07/2020    Neurogenic bladder 01/07/2020    Neuropathic pain 01/07/2020    Quadriplegia, C1-C4 incomplete (HCC) 01/07/2020    Lumbar radiculopathy 12/19/2019    Cervical spine pain 12/19/2019    Foraminal stenosis of cervical region 04/12/2019    Vertigo 02/22/2019    Risk for falls 12/31/2018    Corns and callus 08/21/2018    Pelvic pain 10/03/2017    Vitamin D deficiency 07/31/2017    Hyperlipidemia, unspecified 07/31/2017    Mild intermittent asthma without complication 06/19/2017    Tobacco abuse, in remission 06/19/2017    Anxiety and depression 06/19/2017    Gastroparesis 06/19/2017    DDD (degenerative disc disease), lumbar 06/19/2017    Pain in both feet 06/19/2017       Past Medical History:   Diagnosis Date    Anesthesia 12/23/2021    agitated coming out of anesthesia    Anesthesia 03/17/2022    Patient states becomes upset and sad after anesthesia.    Anxiety     Arthritis     spine, feet     Asthma     Breath shortness     Cataract 12/23/2022    no surgery yet    Cerebral palsy (HCC)     Cervical spinal cord injury (HCC) 12/19/2019    COVID-19 01/17/2022 1/17/2022 stopped 1-    DDD (degenerative disc disease), lumbar     Per Problem List    Dental disorder     upper and lower denture    Depression     Full dentures 03/17/2022    Heart burn 12/23/2022    GERD    High cholesterol     History of falling     Marijuana user     Risk for falls     Stroke (HCC) 03/17/2022    Pt. states, MINI STROKE AGE 19.    Tetraplegia (HCC)     TIA (transient ischemic attack) 03/17/2022    AGE 19. Patient states D/T drug use.     Urinary bladder disorder     Urinary incontinence     Vertigo     Per Problem List        Past Surgical History:   Procedure Laterality Date    DEVYN BY LAPAROSCOPY  3/23/2022    Procedure: CHOLECYSTECTOMY, LAPAROSCOPIC;  Surgeon: Mayur Barrett M.D.;  Location:  SURGERY [facility];  Service: General    PB AMPUTATION TOE,MT-P JT Left 12/27/2021    Procedure: GREAT TOE PARTIAL AMPUTATION;  Surgeon: Emiliano Goff M.D.;  Location: [facility];  Service: Orthopedics    CERVICAL DISK AND FUSION ANTERIOR N/A 12/20/2019    Procedure: DISCECTOMY, SPINE, CERVICAL, ANTERIOR APPROACH, WITH FUSION - C3-5;  Surgeon: Connor Linton M.D.;  Location: [facility];  Service: Neurosurgery    CORPECTOMY N/A 12/20/2019    Procedure: CORPECTOMY;  Surgeon: Connor Linton M.D.;  Location: SURGERY [facility];  Service: Neurosurgery    BLADDER SLING FEMALE N/A 10/3/2017    Procedure: BLADDER SLING FEMALE TOT, CYSTOSCOPY;  Surgeon: Hudson Driscoll M.D.;  Location: SURGERY SAME DAY [facility];  Service: Gynecology    VAGINAL HYSTERECTOMY SCOPE TOTAL N/A 10/3/2017    Procedure: VAGINAL HYSTERECTOMY SCOPE TOTAL;  Surgeon: Hudson Driscoll M.D.;  Location: SURGERY SAME DAY [facility];  Service: Gynecology    SALPINGECTOMY Bilateral 10/3/2017    Procedure: SALPINGECTOMY;  Surgeon: Hudson Driscoll M.D.;  Location: SURGERY SAME DAY [facility];  Service: Gynecology    OOPHORECTOMY Bilateral 10/3/2017    Procedure: OOPHORECTOMY;  Surgeon: Hudson Driscoll M.D.;  Location: SURGERY SAME DAY [facility];  Service: Gynecology    ANTERIOR AND POSTERIOR REPAIR Bilateral 10/3/2017    Procedure: ANTERIOR AND POSTERIOR REPAIR;  Surgeon: Hudson Driscoll M.D.;  Location: SURGERY SAME DAY [facility];  Service: Gynecology    ENTEROCELE REPAIR N/A 10/3/2017    Procedure: ENTEROCELE REPAIR, PERINEOPLASTY;  Surgeon: Hudson Driscoll M.D.;  Location: SURGERY SAME DAY [facility];  Service: Gynecology    VAGINAL SUSPENSION N/A 10/3/2017    Procedure: VAGINAL SUSPENSION SACROSPINOUS VAULT POSSIBLE;  Surgeon: Hudson Driscoll M.D.;  Location: SURGERY SAME DAY [facility];  Service: Gynecology    OTHER Left 2005    hammertoe x2    EYE SURGERY  1972    for lazy eye    LUMPECTOMY          Family History   Problem Relation Age of Onset    Cancer Mother         ovarian    Alcohol/Drug Mother     Cancer Brother         unknown, caused him to lose his leg when he was 4    Diabetes Maternal Aunt        Social History     Socioeconomic History    Marital status: Single     Spouse name: Not on file    Number of children: Not on file    Years of education: Not on file    Highest education level: Not on file   Occupational History    Not on file   Tobacco Use    Smoking status: Some Days     Current packs/day: 0.00     Average packs/day: 1 pack/day for 35.0 years (35.0 ttl pk-yrs)     Types: Cigarettes     Start date: 1/1/1987     Last attempt to quit: 1/1/2022     Years since quitting: 3.1    Smokeless tobacco: Never    Tobacco comments:     20 years of 1ppd, then 10 years weaning down   Vaping Use    Vaping status: Some Days    Start date: 1/1/2020    Substances: THC, CBD   Substance and Sexual Activity    Alcohol use: Not Currently    Drug use: Yes     Types: Marijuana, Inhaled     Comment: marijuana daily (smoked and edibles)    Sexual activity: Not Currently   Other Topics Concern     Service No    Blood Transfusions No    Caffeine Concern No    Occupational Exposure No    Hobby Hazards No    Sleep Concern Yes    Stress Concern Yes    Weight Concern Yes    Special Diet No    Back Care Yes    Exercise No    Bike Helmet Yes    Seat Belt Yes    Self-Exams Yes   Social History Narrative    Not on file     Social Drivers of Health     Financial Resource Strain: Not on file   Food Insecurity: No Food Insecurity (12/27/2019)    Hunger Vital Sign     Worried About Running Out of Food in the Last Year: Never true     Ran Out of Food in the Last Year: Never true   Transportation Needs: No Transportation Needs (12/27/2019)    PRAPARE - Transportation     Lack of Transportation (Medical): No     Lack of Transportation (Non-Medical): No   Physical Activity: Not on file   Stress: Not on file   Social  Connections: Not on file   Intimate Partner Violence: Not on file   Housing Stability: Not on file       Current Outpatient Medications   Medication Sig Dispense Refill    estradiol (ESTRACE VAGINAL) 0.1 MG/GM vaginal cream Apply 1g cream inside vagina twice per week 1 Each 6    baclofen (LIORESAL) 10 MG Tab Take 3 Tablets by mouth 2 times a day. 180 Tablet 5    albuterol (VENTOLIN HFA) 108 (90 Base) MCG/ACT Aero Soln inhalation aerosol Inhale 2 Puffs every 6 hours as needed for Shortness of Breath. 18 g 11    Guaifenesin 1200 MG TABLET SR 12 HR Take 1 Tablet by mouth 2 times a day as needed (chest congestion). 60 Tablet 2    venlafaxine (EFFEXOR-XR) 150 MG extended-release capsule TAKE 1 CAPSULE BY MOUTH EVERY DAY 30 Capsule 5    meloxicam (MOBIC) 15 MG tablet TAKE 1 TABLET BY MOUTH EVERY DAY 30 Tablet 5    Budeson-Glycopyrrol-Formoterol (BREZTRI AEROSPHERE) 160-9-4.8 MCG/ACT Aerosol Inhale 2 Puffs 2 times a day. 10.7 g 11    metoclopramide (REGLAN) 10 MG Tab TAKE 1 TABLET BY MOUTH BEFORE MEALS      B Complex Vitamins (VITAMIN B COMPLEX) Cap Take 1 Capsule by mouth every day.      mirabegron ER (MYRBETRIQ) 25 MG TABLET SR 24 HR Take 1 Tablet by mouth every day. DISPENSE AS WRITTEN NO SUBSTITUTIONS 30 Tablet 5    TOVIAZ 4 MG TABLET SR 24 HR Take 1 Tablet by mouth every day. DISPENSE AS WRITTEN NO SUBSTITUTIONS 30 Tablet 5    pantoprazole (PROTONIX) 20 MG tablet Take 20 mg by mouth every day.      tizanidine (ZANAFLEX) 2 MG capsule Take 1 Capsule by mouth 2 times a day. 60 Capsule 5    gabapentin (NEURONTIN) 300 MG Cap Take 3 Capsules by mouth 2 times a day. Take 1 po qhs 180 Capsule 5    dicyclomine (BENTYL) 20 MG Tab Take 1 Tablet by mouth every 6 hours as needed (Abdominal Pain). 30 Tablet 2    fluticasone (FLONASE) 50 MCG/ACT nasal spray Administer 1 Spray into affected nostril(S) every day. 16 g 6     No current facility-administered medications for this visit.        ALLERGIES: Other environmental and  "Codeine    ROS  Constitutional: Denies fevers, Denies weight changes  Ears/Nose/Throat/Mouth: Denies nasal congestion or sore throat   Cardiovascular: Denies chest pain  Respiratory: Denies shortness of breath, Denies cough  Gastrointestinal/Hepatic: Denies nausea, vomiting  Sleep: see HPI      PHYSICAL EXAM  /78 (BP Location: Left arm, Patient Position: Sitting, BP Cuff Size: Adult)   Pulse 91   Resp 18   Ht 1.676 m (5' 6\")   Wt 72.6 kg (160 lb)   LMP 01/03/2017   SpO2 92%   BMI 25.82 kg/m²   Constitutional: Alert, no distress, well-groomed.  Skin: Warm, dry, good turgor, no rashes in visible areas.  Eye: Equal, round and reactive, conjunctiva clear, lids normal.  ENMT: Lips without lesions, good dentition, moist mucous membranes.  Neck: Trachea midline, no masses, no thyromegaly.  Respiratory: Unlabored respiratory effort, no cough.  MSK: use walker, moves all extremities.  Neuro: Grossly non-focal.   Psych: Alert and oriented x3, normal affect and mood.      Medical Decision Making   Assessment and Plan  The medical record was reviewed.    Obstructive sleep apnea  Nocturnal hypoxia  - Diagnostic and titration nocturnal polysomnogram's  reviewed. Based on results, it is recommended that patient start autoASV. Patient is agreeable.  - Patients with sleep apnea are at increased risk of cardiovascular disease including coronary artery disease, systemic arterial hypertension, pulmonary arterial hypertension, cardiac arrythmias, and stroke. Positive airway pressure will favorably impact many of the adverse conditions and effects provoked by sleep apnea. Avoid driving a motor vehicle when drowsy  -Discussed pathophysiology of central sleep apnea vs obstructive sleep apnea and reasoning behind recommendation for auto ASV and how this differs from traditional PAP therapy.  Discussed residual hypoxia during study and recommendation for O2 bleed in.  Patient was hoping to not have to use oxygen upon starting " therapy.  Discussed in the future once stable on ASV therapy we could try an OPO without weight and O2 to evaluate if hypoxia persists.  Patient  understands and is willing to try therapy however states that she is a bit overwhelmed.  Recommended returning after using ASV for 1 to 2 weeks as adjusting to this type of therapy this time.  Advised to reach out via [employer] if she has significant issues upon initially starting therapy as some settings can be remotely adjusted.  - Clean equipment frequently, and get new mask and supplies as allowed by insurance and DME. Advised to let DME company know in the first 30 days if any issues with mask fit arise so that new mask can be tried.   There are no diagnoses linked to this encounter.    PLAN:   - Order placed for machine, mask and supplies   - Advised to reach out via [employer] with questions   - Discussed compliance requirements for insurance purposes as well as ultimate clinical goal of wearing and tolerating PAP device nightly for full night of sleep to achieve optimal symptomatic and prophylactic benefit.       Return in about 2 months (around 4/25/2025) for within a couple weeks of using ASV machine.   - Recommend an earlier appointment, if significant treatment barriers develop.  - Patient to follow up with the appropriate healthcare practitioners for all other medical problems and issues.    Please note portions of this record was created using voice recognition software. I have made every reasonable attempt to correct obvious errors, but I expect that there are errors of grammar and possibly content I did not discover before finalizing the note.

## 2025-03-12 DIAGNOSIS — M79.672 FOOT PAIN, LEFT: ICD-10-CM

## 2025-03-12 RX ORDER — GABAPENTIN 300 MG/1
900 CAPSULE ORAL
Qty: 180 CAPSULE | Refills: 5 | Status: SHIPPED | OUTPATIENT
Start: 2025-03-12

## 2025-03-12 NOTE — TELEPHONE ENCOUNTER
Received request via: Pharmacy    Was the patient seen in the last year in this department? Yes    Does the patient have an active prescription (recently filled or refills available) for medication(s) requested? No    Pharmacy Name: [facility]    Does the patient have detention Plus and need 100-day supply? (This applies to ALL medications) Patient does not have SCP

## 2025-03-20 ENCOUNTER — HOSPITAL ENCOUNTER (OUTPATIENT)
Dept: LAB | Facility: MEDICAL CENTER | Age: 60
End: 2025-03-20
Attending: NURSE PRACTITIONER
Payer: MEDICAID

## 2025-03-20 ENCOUNTER — TELEPHONE (OUTPATIENT)
Dept: SLEEP MEDICINE | Facility: MEDICAL CENTER | Age: 60
End: 2025-03-20
Payer: MEDICAID

## 2025-03-20 LAB
ALBUMIN SERPL BCP-MCNC: 4.4 G/DL (ref 3.2–4.9)
ALBUMIN/GLOB SERPL: 1.6 G/DL
ALP SERPL-CCNC: 80 U/L (ref 30–99)
ALT SERPL-CCNC: 29 U/L (ref 2–50)
ANION GAP SERPL CALC-SCNC: 11 MMOL/L (ref 7–16)
AST SERPL-CCNC: 24 U/L (ref 12–45)
BASOPHILS # BLD AUTO: 1.2 % (ref 0–1.8)
BASOPHILS # BLD: 0.08 K/UL (ref 0–0.12)
BILIRUB SERPL-MCNC: 0.3 MG/DL (ref 0.1–1.5)
BUN SERPL-MCNC: 17 MG/DL (ref 8–22)
CALCIUM ALBUM COR SERPL-MCNC: 9.4 MG/DL (ref 8.5–10.5)
CALCIUM SERPL-MCNC: 9.7 MG/DL (ref 8.5–10.5)
CHLORIDE SERPL-SCNC: 103 MMOL/L (ref 96–112)
CO2 SERPL-SCNC: 26 MMOL/L (ref 20–33)
CREAT SERPL-MCNC: 0.86 MG/DL (ref 0.5–1.4)
EOSINOPHIL # BLD AUTO: 0.12 K/UL (ref 0–0.51)
EOSINOPHIL NFR BLD: 1.7 % (ref 0–6.9)
ERYTHROCYTE [DISTWIDTH] IN BLOOD BY AUTOMATED COUNT: 48.8 FL (ref 35.9–50)
GFR SERPLBLD CREATININE-BSD FMLA CKD-EPI: 78 ML/MIN/1.73 M 2
GLOBULIN SER CALC-MCNC: 2.7 G/DL (ref 1.9–3.5)
GLUCOSE SERPL-MCNC: 83 MG/DL (ref 65–99)
HCT VFR BLD AUTO: 45.3 % (ref 37–47)
HGB BLD-MCNC: 15.1 G/DL (ref 12–16)
IMM GRANULOCYTES # BLD AUTO: 0.02 K/UL (ref 0–0.11)
IMM GRANULOCYTES NFR BLD AUTO: 0.3 % (ref 0–0.9)
LYMPHOCYTES # BLD AUTO: 2.3 K/UL (ref 1–4.8)
LYMPHOCYTES NFR BLD: 33.3 % (ref 22–41)
MCH RBC QN AUTO: 31.9 PG (ref 27–33)
MCHC RBC AUTO-ENTMCNC: 33.3 G/DL (ref 32.2–35.5)
MCV RBC AUTO: 95.8 FL (ref 81.4–97.8)
MONOCYTES # BLD AUTO: 0.56 K/UL (ref 0–0.85)
MONOCYTES NFR BLD AUTO: 8.1 % (ref 0–13.4)
NEUTROPHILS # BLD AUTO: 3.83 K/UL (ref 1.82–7.42)
NEUTROPHILS NFR BLD: 55.4 % (ref 44–72)
NRBC # BLD AUTO: 0 K/UL
NRBC BLD-RTO: 0 /100 WBC (ref 0–0.2)
PLATELET # BLD AUTO: 251 K/UL (ref 164–446)
PMV BLD AUTO: 10.8 FL (ref 9–12.9)
POTASSIUM SERPL-SCNC: 4.3 MMOL/L (ref 3.6–5.5)
PROT SERPL-MCNC: 7.1 G/DL (ref 6–8.2)
RBC # BLD AUTO: 4.73 M/UL (ref 4.2–5.4)
SODIUM SERPL-SCNC: 140 MMOL/L (ref 135–145)
WBC # BLD AUTO: 6.9 K/UL (ref 4.8–10.8)

## 2025-03-20 PROCEDURE — 80053 COMPREHEN METABOLIC PANEL: CPT

## 2025-03-20 PROCEDURE — 85025 COMPLETE CBC W/AUTO DIFF WBC: CPT

## 2025-03-20 PROCEDURE — 36415 COLL VENOUS BLD VENIPUNCTURE: CPT

## 2025-03-20 NOTE — TELEPHONE ENCOUNTER
Kinza from [facility] called stating that she called the pt twice now and has not been able to reach the pt to set the pt up with her ASV. Kinza stated that she has left the pt VM  as well regarding this matter

## 2025-03-24 ENCOUNTER — HOSPITAL ENCOUNTER (OUTPATIENT)
Facility: MEDICAL CENTER | Age: 60
End: 2025-03-24
Attending: NURSE PRACTITIONER
Payer: MEDICAID

## 2025-03-24 PROCEDURE — 87086 URINE CULTURE/COLONY COUNT: CPT

## 2025-03-24 PROCEDURE — 87186 SC STD MICRODIL/AGAR DIL: CPT

## 2025-03-24 PROCEDURE — 87077 CULTURE AEROBIC IDENTIFY: CPT

## 2025-03-27 LAB
BACTERIA UR CULT: ABNORMAL
BACTERIA UR CULT: ABNORMAL
SIGNIFICANT IND 70042: ABNORMAL
SITE SITE: ABNORMAL
SOURCE SOURCE: ABNORMAL

## 2025-04-01 ENCOUNTER — OFFICE VISIT (OUTPATIENT)
Dept: SLEEP MEDICINE | Facility: MEDICAL CENTER | Age: 60
End: 2025-04-01
Attending: NURSE PRACTITIONER
Payer: MEDICAID

## 2025-04-01 VITALS
DIASTOLIC BLOOD PRESSURE: 76 MMHG | HEART RATE: 80 BPM | SYSTOLIC BLOOD PRESSURE: 102 MMHG | WEIGHT: 158 LBS | HEIGHT: 66 IN | OXYGEN SATURATION: 93 % | BODY MASS INDEX: 25.39 KG/M2

## 2025-04-01 DIAGNOSIS — Z87.891 FORMER SMOKER: ICD-10-CM

## 2025-04-01 DIAGNOSIS — R05.3 CHRONIC COUGH: ICD-10-CM

## 2025-04-01 DIAGNOSIS — J01.90 ACUTE SINUSITIS, RECURRENCE NOT SPECIFIED, UNSPECIFIED LOCATION: ICD-10-CM

## 2025-04-01 DIAGNOSIS — J45.20 MILD INTERMITTENT ASTHMA WITHOUT COMPLICATION: ICD-10-CM

## 2025-04-01 PROCEDURE — 99213 OFFICE O/P EST LOW 20 MIN: CPT | Performed by: NURSE PRACTITIONER

## 2025-04-01 PROCEDURE — 3078F DIAST BP <80 MM HG: CPT | Performed by: NURSE PRACTITIONER

## 2025-04-01 PROCEDURE — 3074F SYST BP LT 130 MM HG: CPT | Performed by: NURSE PRACTITIONER

## 2025-04-01 RX ORDER — DIVALPROEX SODIUM 250 MG/1
250 TABLET, DELAYED RELEASE ORAL 3 TIMES DAILY
COMMUNITY

## 2025-04-01 RX ORDER — ALBUTEROL SULFATE 90 UG/1
2 INHALANT RESPIRATORY (INHALATION) EVERY 4 HOURS PRN
Qty: 8 G | Refills: 11 | Status: SHIPPED | OUTPATIENT
Start: 2025-04-01

## 2025-04-01 ASSESSMENT — PATIENT HEALTH QUESTIONNAIRE - PHQ9
SUM OF ALL RESPONSES TO PHQ QUESTIONS 1-9: 2
5. POOR APPETITE OR OVEREATING: 0 - NOT AT ALL
CLINICAL INTERPRETATION OF PHQ2 SCORE: 2

## 2025-04-01 ASSESSMENT — FIBROSIS 4 INDEX: FIB4 SCORE: 1.03

## 2025-04-01 NOTE — PATIENT INSTRUCTIONS
Recommend repeat imaging of chest 7/2025 with CT scan for smoking hx - please call [phone] to schedule     Start antibiotic for sinus infection - take with probiotic/with food

## 2025-04-01 NOTE — PROGRESS NOTES
Chief Complaint   Patient presents with    Follow-Up      Chronic cough. Last seen 11/19/24      Cough     With green-yellowish phlegm x 1 month       HPI:  Marilyn Salinas is a 59 y.o. year old female here today for follow-up on mild persistent asthma, current smoker, chronic cough and lung nodules.  History of tetraplegia using walker.  History of GERD with delayed gastric emptying.  Last office visit 11/19/24 ME  Last OV sleep 2/25/25    CT chest for monitoring due 7/2025    MMRC Grade: 1-2  Exacerbations this year: 0    Interval Events:  4/1/25: Main complaint is increased cough and phlegm with green/yellow discharge and regular sinus drainage.  She denies ear plugging or sinus pressure.  She denies a history of regular sinus infections.  She does note having allergy symptoms.  No regular albuterol inhaler use.  She is currently on year with Romycin prescribed by GI but is not been taking it since she does not have a pill cutter to cut the pills with.  She notes she quit smoking 2 days ago and trying to stay motivated.  I congratulated her.  11/19/24: She continues to smoke 1 pack of cigarettes per month.  She is trying to cut back.  She notes the cold air to be a trigger for difficulty with breathing.  She has a history of chronic GI issues followed by gastroenterology currently using prune juice twice daily, 2 cups of coffee a day and Reglan before each meal.  She notes drinking a significant amount of water.  She tends to go up with fluids and then can cough afterwards.  Her cough is not usually productive.  She notes that not to be persistent.  The pharmacy ran out of [facility] so she went back to [facility] but finds it not as effective.  It is now on order and will be ready for pickup this week.  She on average uses albuterol inhaler 3 times a month.  She notes rare chest pressure or wheezing.  She notes shortness of breath with walking.     PULM HX:  Current smoker, 1 pack/month with 5-pack-year history.  PFT  5/7/2024 noted normal lung function.  Patient was currently on Breo and albuterol inhaler at that time.  Echo 2/16/2023 indicated LVEF 65%, RVSP unable to be calculated.  No prior study for comparison.  CT chest 7/25/24:  1. Multiple tiny benign-appearing bilateral lung nodules. Recommend follow-up low-dose chest CT in 12 months.  2. 1.8 cm left adrenal adenoma.    ROS: As per HPI and otherwise negative if not stated.    Past Medical History:   Diagnosis Date    Anesthesia 12/23/2021    agitated coming out of anesthesia    Anesthesia 03/17/2022    Patient states becomes upset and sad after anesthesia.    Anxiety     Arthritis     spine, feet     Asthma     Breath shortness     Cataract 12/23/2022    no surgery yet    Cerebral palsy (HCC)     Cervical spinal cord injury (HCC) 12/19/2019    COVID-19 01/17/2022 1/17/2022 stopped 1-    DDD (degenerative disc disease), lumbar     Per Problem List    Dental disorder     upper and lower denture    Depression     Full dentures 03/17/2022    Heart burn 12/23/2022    GERD    High cholesterol     History of falling     Marijuana user     Risk for falls     Stroke (HCC) 03/17/2022    Pt. states, MINI STROKE AGE 19.    Tetraplegia (HCC)     TIA (transient ischemic attack) 03/17/2022    AGE 19. Patient states D/T drug use.     Urinary bladder disorder     Urinary incontinence     Vertigo     Per Problem List       Past Surgical History:   Procedure Laterality Date    DEVYN BY LAPAROSCOPY  3/23/2022    Procedure: CHOLECYSTECTOMY, LAPAROSCOPIC;  Surgeon: Mayur Barrett M.D.;  Location: SURGERY [facility];  Service: General    PB AMPUTATION TOE,MT-P JT Left 12/27/2021    Procedure: GREAT TOE PARTIAL AMPUTATION;  Surgeon: Emiliano Goff M.D.;  Location: [facility];  Service: Orthopedics    CERVICAL DISK AND FUSION ANTERIOR N/A 12/20/2019    Procedure: DISCECTOMY, SPINE, CERVICAL, ANTERIOR APPROACH, WITH FUSION - C3-5;  Surgeon: Connor Linton M.D.;  Location:  SURGERY [facility];  Service: Neurosurgery    CORPECTOMY N/A 12/20/2019    Procedure: CORPECTOMY;  Surgeon: Connor Linton M.D.;  Location: SURGERY [facility];  Service: Neurosurgery    BLADDER SLING FEMALE N/A 10/3/2017    Procedure: BLADDER SLING FEMALE TOT, CYSTOSCOPY;  Surgeon: Hudson Driscoll M.D.;  Location: SURGERY SAME DAY [facility];  Service: Gynecology    VAGINAL HYSTERECTOMY SCOPE TOTAL N/A 10/3/2017    Procedure: VAGINAL HYSTERECTOMY SCOPE TOTAL;  Surgeon: Hudson Driscoll M.D.;  Location: SURGERY SAME DAY [facility];  Service: Gynecology    SALPINGECTOMY Bilateral 10/3/2017    Procedure: SALPINGECTOMY;  Surgeon: Hudson Driscoll M.D.;  Location: SURGERY SAME DAY [facility];  Service: Gynecology    OOPHORECTOMY Bilateral 10/3/2017    Procedure: OOPHORECTOMY;  Surgeon: Hudson Driscoll M.D.;  Location: SURGERY SAME DAY [facility];  Service: Gynecology    ANTERIOR AND POSTERIOR REPAIR Bilateral 10/3/2017    Procedure: ANTERIOR AND POSTERIOR REPAIR;  Surgeon: Hudson Driscoll M.D.;  Location: SURGERY SAME DAY [facility];  Service: Gynecology    ENTEROCELE REPAIR N/A 10/3/2017    Procedure: ENTEROCELE REPAIR, PERINEOPLASTY;  Surgeon: Hudson Driscoll M.D.;  Location: SURGERY SAME DAY [facility];  Service: Gynecology    VAGINAL SUSPENSION N/A 10/3/2017    Procedure: VAGINAL SUSPENSION SACROSPINOUS VAULT POSSIBLE;  Surgeon: Hudson Driscoll M.D.;  Location: SURGERY SAME DAY [facility];  Service: Gynecology    OTHER Left 2005    hammertoe x2    EYE SURGERY  1972    for lazy eye    LUMPECTOMY         Family History   Problem Relation Age of Onset    Cancer Mother         ovarian    Alcohol/Drug Mother     Cancer Brother         unknown, caused him to lose his leg when he was 4    Diabetes Maternal Aunt        Social History     Socioeconomic History    Marital status: Single     Spouse name: Not on file    Number of children: Not on file    Years of education: Not on file    Highest  "education level: Not on file   Occupational History    Not on file   Tobacco Use    Smoking status: Former     Current packs/day: 0.00     Average packs/day: 1 pack/day for 38.2 years (38.2 ttl pk-yrs)     Types: Cigarettes     Start date: 1/1/1987     Quit date: 3/30/2025    Smokeless tobacco: Never    Tobacco comments:     20 years of 1ppd, then 10 years weaning down     Pt quit 2 days ago   Vaping Use    Vaping status: Some Days    Start date: 1/1/2020    Substances: THC, CBD   Substance and Sexual Activity    Alcohol use: Not Currently    Drug use: Yes     Types: Marijuana, Inhaled     Comment: marijuana daily (smoked and edibles)    Sexual activity: Not Currently   Other Topics Concern     Service No    Blood Transfusions No    Caffeine Concern No    Occupational Exposure No    Hobby Hazards No    Sleep Concern Yes    Stress Concern Yes    Weight Concern Yes    Special Diet No    Back Care Yes    Exercise No    Bike Helmet Yes    Seat Belt Yes    Self-Exams Yes   Social History Narrative    Not on file     Social Drivers of Health     Financial Resource Strain: Not on file   Food Insecurity: No Food Insecurity (12/27/2019)    Hunger Vital Sign     Worried About Running Out of Food in the Last Year: Never true     Ran Out of Food in the Last Year: Never true   Transportation Needs: No Transportation Needs (12/27/2019)    PRAPARE - Transportation     Lack of Transportation (Medical): No     Lack of Transportation (Non-Medical): No   Physical Activity: Not on file   Stress: Not on file   Social Connections: Not on file   Intimate Partner Violence: Not on file   Housing Stability: Not on file       Allergies as of 04/01/2025 - Reviewed 04/01/2025   Allergen Reaction Noted    Other environmental Hives 03/17/2022    Codeine Itching, Unspecified, and Rash 12/19/2019        Vitals:  /76 (BP Location: Left arm, Patient Position: Sitting, BP Cuff Size: Adult)   Pulse 80   Ht 1.676 m (5' 6\")   Wt 71.7 kg " (158 lb)   SpO2 93%     Current medications as of today   Current Outpatient Medications   Medication Sig Dispense Refill    divalproex (DEPAKOTE) 250 MG Tablet Delayed Response Take 250 mg by mouth 3 times a day.      albuterol (PROAIR HFA) 108 (90 Base) MCG/ACT Aero Soln inhalation aerosol Inhale 2 Puffs every four hours as needed for Shortness of Breath (wheezing). 8 g 11    amoxicillin-clavulanate (AUGMENTIN) 875-125 MG Tab Take 1 Tablet by mouth 2 times a day with meals. 14 Tablet 0    gabapentin (NEURONTIN) 300 MG Cap Take 3 Capsules by mouth 2 times a day. Take 1 po qhs 180 Capsule 5    estradiol (ESTRACE VAGINAL) 0.1 MG/GM vaginal cream Apply 1g cream inside vagina twice per week 1 Each 6    baclofen (LIORESAL) 10 MG Tab Take 3 Tablets by mouth 2 times a day. 180 Tablet 5    albuterol (VENTOLIN HFA) 108 (90 Base) MCG/ACT Aero Soln inhalation aerosol Inhale 2 Puffs every 6 hours as needed for Shortness of Breath. 18 g 11    Guaifenesin 1200 MG TABLET SR 12 HR Take 1 Tablet by mouth 2 times a day as needed (chest congestion). 60 Tablet 2    venlafaxine (EFFEXOR-XR) 150 MG extended-release capsule TAKE 1 CAPSULE BY MOUTH EVERY DAY 30 Capsule 5    meloxicam (MOBIC) 15 MG tablet TAKE 1 TABLET BY MOUTH EVERY DAY 30 Tablet 5    Budeson-Glycopyrrol-Formoterol (BREZTRI AEROSPHERE) 160-9-4.8 MCG/ACT Aerosol Inhale 2 Puffs 2 times a day. 10.7 g 11    metoclopramide (REGLAN) 10 MG Tab TAKE 1 TABLET BY MOUTH BEFORE MEALS      B Complex Vitamins (VITAMIN B COMPLEX) Cap Take 1 Capsule by mouth every day.      mirabegron ER (MYRBETRIQ) 25 MG TABLET SR 24 HR Take 1 Tablet by mouth every day. DISPENSE AS WRITTEN NO SUBSTITUTIONS 30 Tablet 5    TOVIAZ 4 MG TABLET SR 24 HR Take 1 Tablet by mouth every day. DISPENSE AS WRITTEN NO SUBSTITUTIONS 30 Tablet 5    pantoprazole (PROTONIX) 20 MG tablet Take 20 mg by mouth every day.      tizanidine (ZANAFLEX) 2 MG capsule Take 1 Capsule by mouth 2 times a day. 60 Capsule 5     dicyclomine (BENTYL) 20 MG Tab Take 1 Tablet by mouth every 6 hours as needed (Abdominal Pain). 30 Tablet 2    fluticasone (FLONASE) 50 MCG/ACT nasal spray Administer 1 Spray into affected nostril(S) every day. 16 g 6     No current facility-administered medications for this visit.         Physical Exam:   Gen:           Alert and oriented, No apparent distress. Mood and affect appropriate, normal interaction with examiner.  Eyes:          glasses  Ears:          Not examined.   Hearing:     Grossly intact.  Nose:          Normal, no lesions or deformities.  Dentition:    Not examined.   Oropharynx:   Not examined.   Mallampati Classification: Not examined.   Neck:        Supple, trachea midline, no masses.  Respiratory Effort: No intercostal retractions or use of accessory muscles.   Lung Auscultation:      Clear to auscultation bilaterally; no rales, rhonchi or wheezing.  CV:            Regular rate and rhythm. No murmurs, rubs or gallops.  Abd:           Not examined.  Lymphadenopathy: Not examined.   Gait and Station: walker  Digits and Nails: No clubbing, cyanosis, petechiae, or nodes.   Cranial Nerves: II-XII grossly intact.  Skin:        No rashes, lesions or ulcers noted.               Ext:           No cyanosis or edema.      Assessment:  1. Mild intermittent asthma without complication  albuterol (PROAIR HFA) 108 (90 Base) MCG/ACT Aero Soln inhalation aerosol      2. Acute sinusitis, recurrence not specified, unspecified location  amoxicillin-clavulanate (AUGMENTIN) 875-125 MG Tab      3. Chronic cough        4. BMI 25.0-25.9,adult        5. Former smoker                 Immunizations:    Flu:recommend  Pneumovax 23:2017  Prevnar 13:not due  PCV 20: declines  COVID-19: recommend    Plan:  Asthma is clinically stable but having increased cough and phlegm due to sinus issues.  She will continue with current bronchodilators.  Reviewed proper use.   Refill proair  Rx for Augmentin for 7 days for acute sinus  infection.  Advised patient take with probiotic and with food.  See above for chronic cough  Encourage healthy diet and activity for conditioning.  Follow-up in 4 months after CT scan to review respiratory symptoms, sooner if needed.  Follow-up with sleep medicine as scheduled.    Please note that this dictation was created using voice recognition software. I have made every reasonable attempt to correct obvious errors, but it is possible there are errors of grammar and possibly content that I did not discover before finalizing the note.

## 2025-04-02 ENCOUNTER — OFFICE VISIT (OUTPATIENT)
Dept: MEDICAL GROUP | Facility: MEDICAL CENTER | Age: 60
End: 2025-04-02
Attending: FAMILY MEDICINE
Payer: MEDICAID

## 2025-04-02 VITALS
SYSTOLIC BLOOD PRESSURE: 100 MMHG | DIASTOLIC BLOOD PRESSURE: 80 MMHG | BODY MASS INDEX: 25.71 KG/M2 | OXYGEN SATURATION: 92 % | HEART RATE: 83 BPM | HEIGHT: 66 IN | TEMPERATURE: 98.4 F | RESPIRATION RATE: 16 BRPM | WEIGHT: 160 LBS

## 2025-04-02 DIAGNOSIS — R10.2 PELVIC PAIN: ICD-10-CM

## 2025-04-02 DIAGNOSIS — N30.00 ACUTE CYSTITIS WITHOUT HEMATURIA: ICD-10-CM

## 2025-04-02 LAB
APPEARANCE UR: CLEAR
BILIRUB UR STRIP-MCNC: NORMAL MG/DL
COLOR UR AUTO: YELLOW
GLUCOSE UR STRIP.AUTO-MCNC: NORMAL MG/DL
KETONES UR STRIP.AUTO-MCNC: NORMAL MG/DL
LEUKOCYTE ESTERASE UR QL STRIP.AUTO: NORMAL
NITRITE UR QL STRIP.AUTO: NORMAL
PH UR STRIP.AUTO: 6.5 [PH] (ref 5–8)
PROT UR QL STRIP: NORMAL MG/DL
RBC UR QL AUTO: NORMAL
SP GR UR STRIP.AUTO: 1.01
UROBILINOGEN UR STRIP-MCNC: 0.2 MG/DL

## 2025-04-02 PROCEDURE — 99213 OFFICE O/P EST LOW 20 MIN: CPT | Performed by: FAMILY MEDICINE

## 2025-04-02 PROCEDURE — 3079F DIAST BP 80-89 MM HG: CPT | Performed by: FAMILY MEDICINE

## 2025-04-02 PROCEDURE — 3074F SYST BP LT 130 MM HG: CPT | Performed by: FAMILY MEDICINE

## 2025-04-02 PROCEDURE — 81002 URINALYSIS NONAUTO W/O SCOPE: CPT | Performed by: FAMILY MEDICINE

## 2025-04-02 ASSESSMENT — FIBROSIS 4 INDEX: FIB4 SCORE: 1.03

## 2025-04-04 NOTE — PROGRESS NOTES
"Verbal consent was acquired by the patient to use foodpanda / hellofood ambient listening note generation during this visit   Subjective:     HPI:   History of Present Illness  The patient presents for evaluation of urinary tract infection, sinus infection, scab in the right ear, and back pain.    Urinary Tract Infection  She reports a recent urinalysis conducted on 03/20/2025, ordered by her gastroenterologist. The sample was collected on 03/24/2025 by her Gi doctor as she was having some urinary sx. She has not been prescribed any antibiotics for this condition. Her symptoms include malodorous and cloudy urine, accompanied by mild urinary discomfort, urgency, and frequency. She also experiences mild chills but reports no fever. She does not have a history of recurrent urinary tract infections. She was started on augmentin x 7 days yesterday by pulmonologist. No new back pains, fevers, chills.     Sinus Infection  She was diagnosed with a sinus infection by her pulmonologist yesterday and was prescribed Augmentin, which she has not yet started.    Scab in Right Ear  She expresses concern about a scab in her right ear, which she inadvertently dislodged while scratching due to itching. She was advised to apply Neosporin using a Q-tip.    She had an EEG, which showed something on the right side that controls the left side. Her medication was increased, and she was advised to follow up in 3 months. She experiences tingling and numbness on the left side sometimes. She has not seen a neurosurgeon or pain management specialist yet. She is going to [facility] tomorrow.    MEDICATIONS  Current: Augmentin      Objective:     Exam:  /80 (BP Location: Left arm, Patient Position: Sitting, BP Cuff Size: Adult)   Pulse 83   Temp 36.9 °C (98.4 °F) (Temporal)   Resp 16   Ht 1.676 m (5' 6\")   Wt 72.6 kg (160 lb)   LMP 01/03/2017   SpO2 92%   BMI 25.82 kg/m²  Body mass index is 25.82 kg/m².    Physical Exam  A small " scab is present in the right ear.  Mild tenderness noted upon palpation of the abdomen.    General: Normal appearing. No distress.  Head: normocephalic  ENT: Ears normal shape and contour, canals are clear bilaterally, tympanic membranes are benign. L canal has a very small scab, anteriorly. Non inflamed, no oozing, no active bleeding.  Abdomen: Soft, nondistended. Normal bowel sounds. Minimal suprapubic tenderness. No CVA or flank tenderness    Data:   Reviewed pulm note, urine culture results    Assessment & Plan:     1. Pelvic pain  POCT Urinalysis      2. Acute cystitis without hematuria            Assessment & Plan  1. Urinary Tract Infection (UTI): Acute. Urinalysis from 03/24/2025 confirmed the diagnosis. No evidence of kidney infection as there is no new back or flank pain.  - Complete the 7-day course of Augmentin prescribed for sinus infection. Confirmed with pharmacist with resistance patterns of the bacterium and augmentin should cover it.  - Monitor for worsening symptoms or new symptoms such as fever or severe back pain.    2. Sinus Infection: Acute.  - Start Augmentin as soon as possible and complete the 7-day course.    3. Scab in Right Ear.  - Apply Vaseline to the area instead of Neosporin to avoid potential hypersensitivity reactions.  - Monitor for signs of infection such as pus or fluid drainage.    4. Back pain.  - Scheduled to see a spine specialist tomorrow.    Follow-up  - Cancel the next appointment in 3 weeks.          Return in about 3 months (around 7/2/2025).      Please note that this dictation was created using voice recognition software. I have made every reasonable attempt to correct obvious errors, but I expect that there are errors of grammar and possibly content that I did not discover before finalizing the note.

## 2025-04-07 DIAGNOSIS — J45.20 MILD INTERMITTENT ASTHMA WITHOUT COMPLICATION: ICD-10-CM

## 2025-04-07 DIAGNOSIS — R05.3 CHRONIC COUGH: ICD-10-CM

## 2025-04-07 PROCEDURE — RXMED WILLOW AMBULATORY MEDICATION CHARGE: Performed by: NURSE PRACTITIONER

## 2025-04-07 RX ORDER — BUDESONIDE, GLYCOPYRROLATE, AND FORMOTEROL FUMARATE 160; 9; 4.8 UG/1; UG/1; UG/1
2 AEROSOL, METERED RESPIRATORY (INHALATION) 2 TIMES DAILY
Qty: 10.7 G | Refills: 11 | Status: SHIPPED | OUTPATIENT
Start: 2025-04-07

## 2025-04-07 NOTE — TELEPHONE ENCOUNTER
Have we ever prescribed this med? Yes.  If yes, what date? 11/19/24     Last OV: 04/01/25 with Elsy MACARIO     Next OV: 08/05/25 with Elsy MACARIO     DX: asthma    Medications:   Requested Prescriptions     Pending Prescriptions Disp Refills    Respicardia 160-9-4.8 MCG/ACT Aerosol [Pharmacy Med Name: BREZTRI AERO SPHERE INHALER 120 INH] 10.7 g 11     Sig: INHALE 2 PUFFS BY MOUTH TWICE DAILY

## 2025-04-10 ENCOUNTER — PHARMACY VISIT (OUTPATIENT)
Dept: PHARMACY | Facility: MEDICAL CENTER | Age: 60
End: 2025-04-10
Payer: COMMERCIAL

## 2025-04-29 ENCOUNTER — APPOINTMENT (OUTPATIENT)
Dept: SLEEP MEDICINE | Facility: MEDICAL CENTER | Age: 60
End: 2025-04-29
Payer: MEDICAID

## 2025-05-03 ENCOUNTER — PATIENT MESSAGE (OUTPATIENT)
Dept: SLEEP MEDICINE | Facility: MEDICAL CENTER | Age: 60
End: 2025-05-03
Payer: MEDICAID

## 2025-05-05 ENCOUNTER — PATIENT MESSAGE (OUTPATIENT)
Dept: SLEEP MEDICINE | Facility: MEDICAL CENTER | Age: 60
End: 2025-05-05
Payer: MEDICAID

## 2025-05-13 ENCOUNTER — PHARMACY VISIT (OUTPATIENT)
Dept: PHARMACY | Facility: MEDICAL CENTER | Age: 60
End: 2025-05-13
Payer: COMMERCIAL

## 2025-05-13 ENCOUNTER — APPOINTMENT (OUTPATIENT)
Dept: SLEEP MEDICINE | Facility: MEDICAL CENTER | Age: 60
End: 2025-05-13
Payer: MEDICAID

## 2025-05-13 DIAGNOSIS — N31.9 NEUROGENIC BLADDER: ICD-10-CM

## 2025-05-13 PROCEDURE — RXMED WILLOW AMBULATORY MEDICATION CHARGE: Performed by: NURSE PRACTITIONER

## 2025-05-14 ENCOUNTER — APPOINTMENT (OUTPATIENT)
Dept: RADIOLOGY | Facility: IMAGING CENTER | Age: 60
End: 2025-05-14
Attending: PHYSICIAN ASSISTANT
Payer: MEDICAID

## 2025-05-14 ENCOUNTER — RESULTS FOLLOW-UP (OUTPATIENT)
Dept: URGENT CARE | Facility: PHYSICIAN GROUP | Age: 60
End: 2025-05-14

## 2025-05-14 ENCOUNTER — OFFICE VISIT (OUTPATIENT)
Dept: URGENT CARE | Facility: PHYSICIAN GROUP | Age: 60
End: 2025-05-14
Payer: MEDICAID

## 2025-05-14 VITALS
TEMPERATURE: 97 F | BODY MASS INDEX: 23.14 KG/M2 | RESPIRATION RATE: 16 BRPM | HEART RATE: 86 BPM | DIASTOLIC BLOOD PRESSURE: 62 MMHG | SYSTOLIC BLOOD PRESSURE: 98 MMHG | OXYGEN SATURATION: 96 % | HEIGHT: 66 IN | WEIGHT: 144 LBS

## 2025-05-14 DIAGNOSIS — R05.1 ACUTE COUGH: Primary | ICD-10-CM

## 2025-05-14 PROCEDURE — 3078F DIAST BP <80 MM HG: CPT | Performed by: PHYSICIAN ASSISTANT

## 2025-05-14 PROCEDURE — 71046 X-RAY EXAM CHEST 2 VIEWS: CPT | Mod: TC | Performed by: RADIOLOGY

## 2025-05-14 PROCEDURE — 3074F SYST BP LT 130 MM HG: CPT | Performed by: PHYSICIAN ASSISTANT

## 2025-05-14 PROCEDURE — 99214 OFFICE O/P EST MOD 30 MIN: CPT | Performed by: PHYSICIAN ASSISTANT

## 2025-05-14 RX ORDER — PREDNISONE 10 MG/1
30 TABLET ORAL DAILY
Qty: 15 TABLET | Refills: 0 | Status: SHIPPED | OUTPATIENT
Start: 2025-05-14 | End: 2025-05-19

## 2025-05-14 ASSESSMENT — ENCOUNTER SYMPTOMS
VOMITING: 0
COUGH: 1
WHEEZING: 0
DIZZINESS: 0
DIARRHEA: 0
SHORTNESS OF BREATH: 0
SORE THROAT: 0
NAUSEA: 0
CONSTIPATION: 0
EYE DISCHARGE: 0
HEADACHES: 0
EYE PAIN: 0
SINUS PAIN: 0
EYE REDNESS: 0
DIAPHORESIS: 0
CHILLS: 0
SPUTUM PRODUCTION: 1
ABDOMINAL PAIN: 0
FEVER: 0

## 2025-05-14 ASSESSMENT — FIBROSIS 4 INDEX: FIB4 SCORE: 1.03

## 2025-05-14 NOTE — TELEPHONE ENCOUNTER
Received request via: Pharmacy    Was the patient seen in the last year in this department? Yes    Does the patient have an active prescription (recently filled or refills available) for medication(s) requested? No    [email or web address]    Does the patient have long term Plus and need 100-day supply? (This applies to ALL medications) Patient does not have SCP

## 2025-05-14 NOTE — PROGRESS NOTES
"  Subjective:     Marilyn Salinas  is a 59 y.o. female who presents for Sinus Problem (Possible for sinus infection, coughing mucus x 1 week)       She presents today for productive cough ongoing over the past week.  She was prescribed Augmentin 1 month ago but symptoms are persistent since that time.  She was prompted to the urgent care after her MyChart messages with her pulmonologist.  At this time she has a shortness of breath sensation but no severe chest pains.  No fevers.  No nausea or vomiting, no abdominal pain, no diarrhea.  Past medical history is positive for asthma, does not feel that she is having an asthma flareup at this time.       Review of Systems   Constitutional:  Negative for chills, diaphoresis, fever and malaise/fatigue.   HENT:  Positive for congestion. Negative for ear discharge, sinus pain and sore throat.    Eyes:  Negative for pain, discharge and redness.   Respiratory:  Positive for cough and sputum production. Negative for shortness of breath and wheezing.    Cardiovascular:  Negative for chest pain.   Gastrointestinal:  Negative for abdominal pain, constipation, diarrhea, nausea and vomiting.   Neurological:  Negative for dizziness and headaches.      Allergies[1]  Past Medical History[2]     Objective:   BP 98/62 (BP Location: Right arm, Patient Position: Sitting, BP Cuff Size: Adult)   Pulse 86   Temp 36.1 °C (97 °F) (Temporal)   Resp 16   Ht 1.676 m (5' 6\")   Wt 65.3 kg (144 lb)   LMP 01/03/2017   SpO2 96%   BMI 23.24 kg/m²   Physical Exam  Vitals and nursing note reviewed.   Constitutional:       General: She is not in acute distress.     Appearance: Normal appearance. She is not ill-appearing, toxic-appearing or diaphoretic.   HENT:      Head: Normocephalic.      Right Ear: Tympanic membrane, ear canal and external ear normal. There is no impacted cerumen.      Left Ear: Tympanic membrane, ear canal and external ear normal. There is no impacted cerumen.      Nose: " Congestion present. No rhinorrhea.      Mouth/Throat:      Mouth: Mucous membranes are moist.      Pharynx: No oropharyngeal exudate or posterior oropharyngeal erythema.   Eyes:      General:         Right eye: No discharge.         Left eye: No discharge.      Conjunctiva/sclera: Conjunctivae normal.   Cardiovascular:      Rate and Rhythm: Normal rate and regular rhythm.   Pulmonary:      Effort: Pulmonary effort is normal. No respiratory distress.      Breath sounds: Normal breath sounds. No stridor. No wheezing, rhonchi or rales.   Musculoskeletal:      Cervical back: Neck supple.   Lymphadenopathy:      Cervical: No cervical adenopathy.   Neurological:      General: No focal deficit present.      Mental Status: She is alert and oriented to person, place, and time.   Psychiatric:         Mood and Affect: Mood normal.         Behavior: Behavior normal.         Thought Content: Thought content normal.         Judgment: Judgment normal.             Diagnostic testing:    Two-view chest x-ray  RadiologistIMPRESSION:     No active disease.    Assessment/Plan:     Encounter Diagnoses   Name Primary?    Acute cough Yes         Plan for care for today's complaint includes obtaining two-view chest x-ray, this was negative for pneumonia.  Start patient on prednisone for symptom support.  Lung auscultation was normal today, no rales, wheezes, rhonchi.  Encouraged use of over-the-counter medications for additional symptom relief.  Vital signs were stable during today's office visit, patient was overall well-appearing. Continue to monitor symptoms and return to urgent care or follow-up with primary care provider if symptoms remain ongoing.  Follow-up in the emergency department if symptoms become severe, ER precautions discussed in office today.    OpenSpacehart messages between pulmonology and the patient from 5/3/2025 were reviewed.  Most recent primary care visit on 4/2/2025 and most recent pulmonology visit from 4/1/2025 were  reviewed.    See AVS Instructions below for written guidance provided to patient on after-visit management and care in addition to our verbal discussion during the visit.    Please note that this dictation was created using voice recognition software. I have attempted to correct all errors, but there may be sound-alike, spelling, grammar and possibly content errors that I did not discover before finalizing the note.    Sajan Hsu PA-C    This office visit was seen in conjunction with Leo [email or web address]         [1]   Allergies  Allergen Reactions    Other Environmental Hives     TUMBLEWEED    Codeine Itching, Unspecified and Rash     Rash, itching, mood disorder- becomes agitated  Other reaction(s): itchy, grumpy   [2]   Past Medical History:  Diagnosis Date    Anesthesia 12/23/2021    agitated coming out of anesthesia    Anesthesia 03/17/2022    Patient states becomes upset and sad after anesthesia.    Anxiety     Arthritis     spine, feet     Asthma     Breath shortness     Cataract 12/23/2022    no surgery yet    Cerebral palsy (HCC)     Cervical spinal cord injury (HCC) 12/19/2019    COVID-19 01/17/2022 1/17/2022 stopped 1-    DDD (degenerative disc disease), lumbar     Per Problem List    Dental disorder     upper and lower denture    Depression     Full dentures 03/17/2022    Heart burn 12/23/2022    GERD    High cholesterol     History of falling     Marijuana user     Risk for falls     Stroke (HCC) 03/17/2022    Pt. states, MINI STROKE AGE 19.    Tetraplegia (HCC)     TIA (transient ischemic attack) 03/17/2022    AGE 19. Patient states D/T drug use.     Urinary bladder disorder     Urinary incontinence     Vertigo     Per Problem List

## 2025-05-15 ENCOUNTER — PATIENT MESSAGE (OUTPATIENT)
Dept: SLEEP MEDICINE | Facility: MEDICAL CENTER | Age: 60
End: 2025-05-15
Payer: MEDICAID

## 2025-05-30 DIAGNOSIS — G83.89 CEREBRAL PARESIS WITH HOMOLATERAL ATAXIA (HCC): Primary | ICD-10-CM

## 2025-05-30 RX ORDER — MIRABEGRON 25 MG/1
25 TABLET, FILM COATED, EXTENDED RELEASE ORAL DAILY
Qty: 30 TABLET | Refills: 5 | Status: SHIPPED | OUTPATIENT
Start: 2025-05-30

## 2025-06-03 DIAGNOSIS — S14.109A INJURY OF CERVICAL SPINAL CORD, INITIAL ENCOUNTER (HCC): ICD-10-CM

## 2025-06-03 DIAGNOSIS — N31.9 NEUROGENIC BLADDER: ICD-10-CM

## 2025-06-03 NOTE — TELEPHONE ENCOUNTER
Received request via: Pharmacy    Was the patient seen in the last year in this department? Yes    Does the patient have an active prescription (recently filled or refills available) for medication(s) requested? No    Pharmacy Name: [email or web address]    Does the patient have FDC Plus and need 100-day supply? (This applies to ALL medications) Patient does not have SCP

## 2025-06-06 PROCEDURE — RXMED WILLOW AMBULATORY MEDICATION CHARGE: Performed by: NURSE PRACTITIONER

## 2025-06-09 RX ORDER — FESOTERODINE FUMARATE 4 MG/1
4 TABLET, FILM COATED, EXTENDED RELEASE ORAL
Qty: 30 TABLET | Refills: 2 | Status: SHIPPED | OUTPATIENT
Start: 2025-06-09

## 2025-06-10 NOTE — Clinical Note
REFERRAL APPROVAL NOTICE         Sent on Mary 10, 2025                   Marilyn Salinas  [address]  [place] [address]                   Dear Ms. Salinas,    After a careful review of the medical information and benefit coverage, [facility] has processed your referral. See below for additional details.    If applicable, you must be actively enrolled with your insurance for coverage of the authorized service. If you have any questions regarding your coverage, please contact your insurance directly.    REFERRAL INFORMATION   Referral #:  [id number]  Referred-To Provider    Referred-By Provider:  Alex Hutchison M.D.   [facility]      [address]  [place] [address]  [phone] [address]  [place] [address]  [phone]    Referral Start Date:  05/30/2025  Referral End Date:   05/30/2026             SCHEDULING  If you do not already have an appointment, please call [phone] to make an appointment.     MORE INFORMATION  If you do not already have a Vsnap account, sign up at: [email or web address]  You can access your medical information, make appointments, see lab results, billing information, and more.  If you have questions regarding this referral, please contact  the [facility] Referrals department at:             [phone]. Monday - Friday 8:00AM - 5:00PM.     Sincerely,    [facility]

## 2025-06-11 ENCOUNTER — PHARMACY VISIT (OUTPATIENT)
Dept: PHARMACY | Facility: MEDICAL CENTER | Age: 60
End: 2025-06-11
Payer: COMMERCIAL

## 2025-06-18 ENCOUNTER — OFFICE VISIT (OUTPATIENT)
Dept: MEDICAL GROUP | Facility: MEDICAL CENTER | Age: 60
End: 2025-06-18
Attending: FAMILY MEDICINE
Payer: MEDICAID

## 2025-06-18 VITALS
HEART RATE: 82 BPM | SYSTOLIC BLOOD PRESSURE: 102 MMHG | HEIGHT: 66 IN | BODY MASS INDEX: 25.71 KG/M2 | RESPIRATION RATE: 16 BRPM | WEIGHT: 160 LBS | OXYGEN SATURATION: 93 % | DIASTOLIC BLOOD PRESSURE: 70 MMHG | TEMPERATURE: 97 F

## 2025-06-18 DIAGNOSIS — M79.605 BILATERAL LOWER EXTREMITY PAIN: Primary | ICD-10-CM

## 2025-06-18 DIAGNOSIS — M79.604 BILATERAL LOWER EXTREMITY PAIN: Primary | ICD-10-CM

## 2025-06-18 DIAGNOSIS — Z13.1 SCREENING FOR DIABETES MELLITUS (DM): ICD-10-CM

## 2025-06-18 DIAGNOSIS — E78.00 HYPERCHOLESTEREMIA: ICD-10-CM

## 2025-06-18 PROCEDURE — 3074F SYST BP LT 130 MM HG: CPT | Performed by: FAMILY MEDICINE

## 2025-06-18 PROCEDURE — 3078F DIAST BP <80 MM HG: CPT | Performed by: FAMILY MEDICINE

## 2025-06-18 PROCEDURE — 99214 OFFICE O/P EST MOD 30 MIN: CPT | Performed by: FAMILY MEDICINE

## 2025-06-18 ASSESSMENT — FIBROSIS 4 INDEX: FIB4 SCORE: 1.03

## 2025-06-19 NOTE — PROGRESS NOTES
Verbal consent was acquired by the patient to use [employer] ambient listening note generation during this visit   Subjective:     HPI:   History of Present Illness  The patient presents for evaluation of lower extremity edema, constipation, and chronic pain.    Lower Extremity swelling an dpain  She reports swelling in her lower extremities, predominantly affecting her legs, feet, and ankles. This has been ongoing for approximately 2 weeks. She also mentions a sore on the dorsal aspect of her left foot, which occasionally becomes so severe that it impedes her ability to walk. The swelling is primarily localized to the tops of her feet and ankles, with no other areas affected except for mild swelling in her lower back. She describes the sensation as constant, regardless of whether she is walking or resting. She has attempted to manage the swelling through elevation and ice application, but these measures have only provided minimal relief. She has not consulted a podiatrist recently due to insurance issues with her previous foot surgeon. She has a history of chronic foot pain and leg tenderness. She has previously undergone an MRI of her left foot due to concerns about her fourth toe, which revealed arthritis in the midfoot and forefoot.    Constipation  She experiences frequent constipation, which she attributes to her medication regimen. She maintains a diet rich in fruits and vegetables but has been unable to lose 20 pounds for several years. She takes over-the-counter docusate gel tablets every morning, which she finds helpful. She also consumes 2 cups of coffee each morning and has found grape juice to be particularly beneficial. She has previously tried senna while hospitalized, but it was ineffective.    She has been prescribed Myrbetriq, but it was not approved by her insurance. She has been managing well with just Toviaz.      Objective:     Exam:  /70 (BP Location: Left arm, Patient Position:  "Sitting, BP Cuff Size: Adult)   Pulse 82   Temp 36.1 °C (97 °F) (Temporal)   Resp 16   Ht 1.676 m (5' 6\")   Wt 72.6 kg (160 lb)   LMP 01/03/2017   SpO2 93%   BMI 25.82 kg/m²  Body mass index is 25.82 kg/m².    Physical Exam  Extremities: Small wound on the top of the left foot. Tenderness noted around the medial and lateral epicondyles on b/l feet. R foot with tender achilles. Calves b/l are tender. No appreciable swelling. Some varicose veins noted.      Data:   Past Mri of the R foot from 2023 showing midfoot and forefoot OA    Assessment & Plan:     1. Bilateral lower extremity pain  US-EXTREMITY VENOUS LOWER BILAT    US-EXTREMITY ARTERY LOWER BILAT W/LALITA (COMBO)    VITAMIN D,25 HYDROXY (DEFICIENCY)    Referral to Podiatry    TSH WITH REFLEX TO FT4    HEPATIC FUNCTION PANEL    HEPATITIS PANEL ACUTE(4 COMPONENTS)    Basic Metabolic Panel      2. Screening for diabetes mellitus (DM)  HEMOGLOBIN A1C      3. Hypercholesteremia  Lipid Profile          Assessment & Plan  1. Lower extremity edema: pt has come multiple times with complaints of swelling but I have never been able to appreciate this. Could be that she is just feeling pain, which might feel like swelling. She does have some varicose veins noted b/l LE, so will obtain LE doppler to assess for reflux.   - Ankle-brachial index (LALITA) to r/o PAD, as she does have pain at the dorsum of b/l feet. Feet however are warm to touch.   - Referral to a podiatrist for further evaluation of foot structures - pains may be OA related  - Compression stockings recommended, but patient reports discomfort with use.  - Elevation of feet advised.  - Monitor salt intake in diet.    2. Constipation: Chronic.  - Continue using over-the-counter stool softener (docusate).  - Maintain a high-fiber diet.  - Consider fiber supplements such as Benefiber or Metamucil.  - reviewed stimulant laxatives - try senna, dulcolax, or send msg if not working and I can send in lactulose.    3. " Chronic pain: Chronic.  - Pain shots scheduled with Dr. Youngblood in August.  - Discuss concerns with neurosurgery next month.  - Referral to a podiatrist for further evaluation of foot structures.    4. Health maintenance.  - Lab work to screen for vitamin D deficiency and other potential deficiencies.  - Cholesterol levels and diabetes screening.    Follow-up  - Follow up in 2 mos          Return in about 2 months (around 8/18/2025).      Please note that this dictation was created using voice recognition software. I have made every reasonable attempt to correct obvious errors, but I expect that there are errors of grammar and possibly content that I did not discover before finalizing the note.

## 2025-06-23 DIAGNOSIS — F32.A ANXIETY AND DEPRESSION: ICD-10-CM

## 2025-06-23 DIAGNOSIS — M51.369 DDD (DEGENERATIVE DISC DISEASE), LUMBAR: ICD-10-CM

## 2025-06-23 DIAGNOSIS — F41.9 ANXIETY AND DEPRESSION: ICD-10-CM

## 2025-06-23 DIAGNOSIS — M54.2 CERVICAL SPINE PAIN: ICD-10-CM

## 2025-06-24 RX ORDER — MELOXICAM 15 MG/1
15 TABLET ORAL DAILY
Qty: 30 TABLET | Refills: 11 | Status: SHIPPED | OUTPATIENT
Start: 2025-06-24

## 2025-06-24 RX ORDER — VENLAFAXINE HYDROCHLORIDE 150 MG/1
150 CAPSULE, EXTENDED RELEASE ORAL DAILY
Qty: 30 CAPSULE | Refills: 11 | Status: SHIPPED | OUTPATIENT
Start: 2025-06-24

## 2025-06-24 NOTE — TELEPHONE ENCOUNTER
Received request via: Patient    Was the patient seen in the last year in this department? Yes    Does the patient have an active prescription (recently filled or refills available) for medication(s) requested? No    [facility]: [facility]    Does the patient have group home Plus and need 100-day supply? (This applies to ALL medications) Patient does not have [facility]    Future Appointments         Provider Department Center    6/26/2025 2:00 PM (Arrive by 1:45 PM) ELAINE [facility] Medicine - [facility]     7/25/2025 1:25 PM (Arrive by 1:10 PM) [address] [facility] - [place] - [address]    8/5/2025 10:20 AM (Arrive by 10:05 AM) ELAINE Peguero [facility] Medicine - [facility]     8/19/2025 8:40 AM (Arrive by 8:25 AM) Indira Hutchison M.D. [facility]

## 2025-06-24 NOTE — TELEPHONE ENCOUNTER
Received request via: Patient    Was the patient seen in the last year in this department? Yes    Does the patient have an active prescription (recently filled or refills available) for medication(s) requested? No    Pharmacy Name: [facility]    Does the patient have retirement Plus and need 100-day supply? (This applies to ALL medications) Patient does not have [facility]

## 2025-06-25 NOTE — Clinical Note
REFERRAL APPROVAL NOTICE         Sent on June 25, 2025                   Marilyn Salinas  [address]  [place] [address]                   Dear Ms. Salinas,    After a careful review of the medical information and benefit coverage, [facility] has processed your referral. See below for additional details.    If applicable, you must be actively enrolled with your insurance for coverage of the authorized service. If you have any questions regarding your coverage, please contact your insurance directly.    REFERRAL INFORMATION   Referral #:  [id number]  Referred-To Provider    Referred-By Provider:  Podiatry    Indira Hutchison M.D.   [facility]      [address]  [place] [address]  [phone] [address]  [facility] [address]  [phone]    Referral Start Date:  06/18/2025  Referral End Date:   06/18/2026             SCHEDULING  If you do not already have an appointment, please call [phone] to make an appointment.     MORE INFORMATION  If you do not already have a Timeful account, sign up at: [email or web address]  You can access your medical information, make appointments, see lab results, billing information, and more.  If you have questions regarding this referral, please contact  the [facility] Referrals department at:             [phone]. Monday - Friday 8:00AM - 5:00PM.     Sincerely,    [facility]

## 2025-06-26 ENCOUNTER — OFFICE VISIT (OUTPATIENT)
Dept: SLEEP MEDICINE | Facility: MEDICAL CENTER | Age: 60
End: 2025-06-26
Payer: MEDICAID

## 2025-06-26 VITALS
WEIGHT: 160 LBS | RESPIRATION RATE: 16 BRPM | SYSTOLIC BLOOD PRESSURE: 122 MMHG | HEIGHT: 66 IN | DIASTOLIC BLOOD PRESSURE: 72 MMHG | BODY MASS INDEX: 25.71 KG/M2 | OXYGEN SATURATION: 92 % | HEART RATE: 83 BPM

## 2025-06-26 DIAGNOSIS — G47.31 CSA (CENTRAL SLEEP APNEA): Primary | ICD-10-CM

## 2025-06-26 DIAGNOSIS — G47.34 NOCTURNAL HYPOXIA: ICD-10-CM

## 2025-06-26 PROCEDURE — 3074F SYST BP LT 130 MM HG: CPT

## 2025-06-26 PROCEDURE — 3078F DIAST BP <80 MM HG: CPT

## 2025-06-26 PROCEDURE — 99213 OFFICE O/P EST LOW 20 MIN: CPT

## 2025-06-26 PROCEDURE — 99214 OFFICE O/P EST MOD 30 MIN: CPT

## 2025-06-26 ASSESSMENT — FIBROSIS 4 INDEX: FIB4 SCORE: 1.03

## 2025-06-26 NOTE — PROGRESS NOTES
[facility] Follow-up Visit    CC:   Chief Complaint   Patient presents with    Follow-Up     Last Office Visit 2/25/2025 with Gennaro MACARIO   ASV EPAP @ 8-10 PS MAX PS MIN 3 2 LPM           HPI:    Marilyn Salinas is a 59 y.o.female present today for ongoing management of central sleep apnea with nocturnal hypoxia. Past medical history significant for incomplete tetraplegia, CP, 38 pack year smoker. Patient was last seen by sleep medicine 2/25/25 by myself. The primary reason for today's visit is for initial compliance after starting ASV. Patient uses the machine nightly and reports great benefit. Patient reports sometimes the pressure feels too high, notices leaking around the mask at times, and some redness under her nose. She puts a bandaid on her lip due to this. Patient denies residual snoring and dry mouth . Generally sleep quality is good.     DME provider: [facility]   Device: SprainGo 11   Settings: ASV EPAP 8-10, PS 3-15  Mask: XS [place] FFM   Chin strap/lip strap: no      Sleep History  1/18/25 - PSG split night, Very severe Central Sleep Apnea Hypopnea - .49 with Nocturnal desaturation - francisco saturation 80% - saturations <88% below for 67 minutes of TST. 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    Cervical spinal cord injury (HCC) 08/05/2021    Other cerebral palsy (HCC) 12/21/2020    Bilateral leg edema 12/21/2020    Onychomycosis 12/21/2020    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    Status post cervical  "spinal fusion 06/19/2020    Neurogenic bowel 01/07/2020    Neurogenic bladder 01/07/2020    Neuropathic pain 01/07/2020    Quadriplegia, C1-C4 incomplete (HCC) 01/07/2020    Lumbar radiculopathy 12/19/2019    Cervical spine pain 12/19/2019    Foraminal stenosis of cervical region 04/12/2019    Vertigo 02/22/2019    Risk for falls 12/31/2018    Corns and callus 08/21/2018    Pelvic pain 10/03/2017    Vitamin D deficiency 07/31/2017    Hyperlipidemia, unspecified 07/31/2017    Mild intermittent asthma without complication 06/19/2017    Tobacco abuse, in remission 06/19/2017    Anxiety and depression 06/19/2017    Gastroparesis 06/19/2017    DDD (degenerative disc disease), lumbar 06/19/2017    Pain in both feet 06/19/2017       Past Medical History[1]     Past Surgical History[2]    Family History   Problem Relation Age of Onset    Cancer Mother         ovarian    Alcohol/Drug Mother     Cancer Brother         unknown, caused him to lose his leg when he was 3    Diabetes Maternal Aunt        Current Medications[3]     ALLERGIES: Other environmental and Codeine    ROS  Constitutional: Denies fevers, Denies weight changes  Ears/Nose/Throat/Mouth: Denies nasal congestion or sore throat   Cardiovascular: Denies chest pain  Respiratory: Denies shortness of breath, Denies cough  Gastrointestinal/Hepatic: Denies nausea, vomiting  Sleep: see HPI      PHYSICAL EXAM  /72 (BP Location: Left arm, Patient Position: Sitting, BP Cuff Size: Adult)   Pulse 83   Resp 16   Ht 1.676 m (5' 6\") Comment: per patient  Wt 72.6 kg (160 lb) Comment: per patient  LMP 01/03/2017   SpO2 92%   BMI 25.82 kg/m²   Constitutional: Alert, no distress, well-groomed.  Skin: Warm, dry, good turgor, no rashes in visible areas.  Eye: Equal, round and reactive, conjunctiva clear, lids normal.  ENMT: Lips without lesions, good dentition, moist mucous membranes.  Neck: Trachea midline, no masses, no thyromegaly.  Respiratory: Unlabored respiratory " effort, no cough.  MSK: Normal gait, moves all extremities.  Neuro: Grossly non-focal.   Psych: Alert and oriented x3, normal affect and mood.      Medical Decision Making   Assessment and Plan  The medical record was reviewed including Diagnostic and titration nocturnal polysomnogram's  and compliance reports .    Obstructive sleep apnea  - Compliance data reviewed showing 98% usage > 4hours in last 30 days. Average AHI 6.6 events/hour.  Leak 95th percentile 37. Pressure 95% 18/9.8. Based on AHI prior to therapy, AHI 6.6 is a huge improvement. Discussed events may come down further once mask leak is improved.  - Discussed potential causes of excessive mask leak including poorly fitting mask, mouth opening, position changes while sleeping, and pressure related leak. Discussed that excessive leak can prevent the machine from working as well and can make it difficult to assess residual events (AHI) or determine why they are occurring. I recommended mask fitting and decreasing the pressure. Discussed going to DME for mask re-fitting due to leak and report of skin breakdown under the mask.  - Current Settings ASV EPAP 8-10, PS 3-15. Recommend decreasing pressure settings to EPAP 8, PS 3-15.   - Pt continues to use and benefit from machine.   - Orders placed for mask and supplies   - Advised to reach out via MyChart with questions     REE/PAP EDUCATION:  - Clean equipment frequently, and get new mask and supplies as allowed by insurance and DME.   - Avoid driving a motor vehicle when drowsy  - Patients with REE are at increased risk of cardiovascular disease including coronary artery disease, systemic arterial hypertension, pulmonary arterial hypertension, cardiac arrythmias, and stroke. Positive airway pressure will favorably impact many of the adverse conditions and effects provoked by REE.       Return in about 1 year (around 6/26/2026), or if symptoms worsen or fail to improve.   - Recommend an earlier appointment,  if significant treatment barriers develop.  - Patient to follow up with the appropriate healthcare practitioners for all other medical problems and issues.    Please note portions of this record was created using voice recognition software. I have made every reasonable attempt to correct obvious errors, but I expect that there are errors of grammar and possibly content I did not discover before finalizing the note.           [1]   Past Medical History:  Diagnosis Date    Anesthesia 12/23/2021    agitated coming out of anesthesia    Anesthesia 03/17/2022    Patient states becomes upset and sad after anesthesia.    Anxiety     Arthritis     spine, feet     Asthma     Breath shortness     Cataract 12/23/2022    no surgery yet    Cerebral palsy (HCC)     Cervical spinal cord injury (HCC) 12/19/2019    COVID-19 01/17/2022 1/17/2022 stopped 1-    DDD (degenerative disc disease), lumbar     Per Problem List    Dental disorder     upper and lower denture    Depression     Full dentures 03/17/2022    Heart burn 12/23/2022    GERD    High cholesterol     History of falling     Marijuana user     Risk for falls     Stroke (HCC) 03/17/2022    Pt. states, MINI STROKE AGE 19.    Tetraplegia (HCC)     TIA (transient ischemic attack) 03/17/2022    AGE 19. Patient states D/T drug use.     Urinary bladder disorder     Urinary incontinence     Vertigo     Per Problem List   [2]   Past Surgical History:  Procedure Laterality Date    DEVYN BY LAPAROSCOPY  3/23/2022    Procedure: CHOLECYSTECTOMY, LAPAROSCOPIC;  Surgeon: Mayur Barrett M.D.;  Location: SURGERY [facility];  Service: General    PB AMPUTATION TOE,MT-P JT Left 12/27/2021    Procedure: GREAT TOE PARTIAL AMPUTATION;  Surgeon: Emiliano Goff M.D.;  Location: SURGERY [facility];  Service: Orthopedics    CERVICAL DISK AND FUSION ANTERIOR N/A 12/20/2019    Procedure: DISCECTOMY, SPINE, CERVICAL, ANTERIOR APPROACH, WITH FUSION - C3-5;  Surgeon: Connor Linton M.D.;   Location: SURGERY [facility];  Service: Neurosurgery    CORPECTOMY N/A 12/20/2019    Procedure: CORPECTOMY;  Surgeon: Connor Linton M.D.;  Location: SURGERY [facility];  Service: Neurosurgery    BLADDER SLING FEMALE N/A 10/3/2017    Procedure: BLADDER SLING FEMALE TOT, CYSTOSCOPY;  Surgeon: Hudson Driscoll M.D.;  Location: SURGERY SAME [facility];  Service: Gynecology    VAGINAL HYSTERECTOMY SCOPE TOTAL N/A 10/3/2017    Procedure: VAGINAL HYSTERECTOMY SCOPE TOTAL;  Surgeon: Hudson Driscoll M.D.;  Location: SURGERY SAME DAY [facility];  Service: Gynecology    SALPINGECTOMY Bilateral 10/3/2017    Procedure: SALPINGECTOMY;  Surgeon: Hudson Driscoll M.D.;  Location: SURGERY SAME DAY [facility];  Service: Gynecology    OOPHORECTOMY Bilateral 10/3/2017    Procedure: OOPHORECTOMY;  Surgeon: Hudson Driscoll M.D.;  Location: SURGERY SAME DAY [facility];  Service: Gynecology    ANTERIOR AND POSTERIOR REPAIR Bilateral 10/3/2017    Procedure: ANTERIOR AND POSTERIOR REPAIR;  Surgeon: Hudson Driscoll M.D.;  Location: SURGERY SAME DAY [facility];  Service: Gynecology    ENTEROCELE REPAIR N/A 10/3/2017    Procedure: ENTEROCELE REPAIR, PERINEOPLASTY;  Surgeon: Hudson Driscoll M.D.;  Location: SURGERY SAME DAY [facility];  Service: Gynecology    VAGINAL SUSPENSION N/A 10/3/2017    Procedure: VAGINAL SUSPENSION SACROSPINOUS VAULT POSSIBLE;  Surgeon: Hudson Driscoll M.D.;  Location: SURGERY SAME DAY [facility];  Service: Gynecology    [facility]    [facility]  [facility]    for lazy eye    LUMPECTOMY     [3]   Current Outpatient Medications   Medication Sig Dispense Refill    venlafaxine (EFFEXOR-XR) 150 MG extended-release capsule Take 1 Capsule by mouth every day. 30 Capsule 11    meloxicam (MOBIC) 15 MG tablet Take 1 Tablet by mouth every day. 30 Tablet 11    TOVIAZ 4 MG TABLET SR 24 HR Take 1 Tablet by mouth every day. DISPENSE AS WRITTEN NO SUBSTITUTIONS 30 Tablet 2     Budeson-Glycopyrrol-Formoterol (BREZTRI AEROSPHERE) 160-9-4.8 MCG/ACT Aerosol INHALE 2 PUFFS BY MOUTH TWICE DAILY 10.7 g 11    albuterol (PROAIR HFA) 108 (90 Base) MCG/ACT Aero Soln inhalation aerosol Inhale 2 Puffs every four hours as needed for Shortness of Breath (wheezing). 8 g 11    gabapentin (NEURONTIN) 300 MG Cap Take 3 Capsules by mouth 2 times a day. Take 1 po qhs 180 Capsule 5    estradiol (ESTRACE VAGINAL) 0.1 MG/GM vaginal cream Apply 1g cream inside vagina twice per week 1 Each 6    baclofen (LIORESAL) 10 MG Tab Take 3 Tablets by mouth 2 times a day. 180 Tablet 5    Guaifenesin 1200 MG TABLET SR 12 HR Take 1 Tablet by mouth 2 times a day as needed (chest congestion). 60 Tablet 2    metoclopramide (REGLAN) 10 MG Tab TAKE 1 TABLET BY MOUTH BEFORE MEALS      B Complex Vitamins (VITAMIN B COMPLEX) Cap Take 1 Capsule by mouth every day.      pantoprazole (PROTONIX) 20 MG tablet Take 20 mg by mouth every day.      tizanidine (ZANAFLEX) 2 MG capsule Take 1 Capsule by mouth 2 times a day. 60 Capsule 5    dicyclomine (BENTYL) 20 MG Tab Take 1 Tablet by mouth every 6 hours as needed (Abdominal Pain). 30 Tablet 2    fluticasone (FLONASE) 50 MCG/ACT nasal spray Administer 1 Spray into affected nostril(S) every day. 16 g 6    divalproex (DEPAKOTE) 250 MG Tablet Delayed Response Take 250 mg by mouth 3 times a day. (Patient not taking: No sig reported)      amoxicillin-clavulanate (AUGMENTIN) 875-125 MG Tab Take 1 Tablet by mouth 2 times a day with meals. (Patient not taking: Reported on 6/26/2025) 14 Tablet 0    albuterol (VENTOLIN HFA) 108 (90 Base) MCG/ACT Aero Soln inhalation aerosol Inhale 2 Puffs every 6 hours as needed for Shortness of Breath. 18 g 11     No current facility-administered medications for this visit.

## 2025-07-07 PROCEDURE — RXMED WILLOW AMBULATORY MEDICATION CHARGE: Performed by: NURSE PRACTITIONER

## 2025-07-18 ENCOUNTER — PHARMACY VISIT (OUTPATIENT)
Dept: PHARMACY | Facility: MEDICAL CENTER | Age: 60
End: 2025-07-18
Payer: COMMERCIAL

## 2025-07-25 ENCOUNTER — HOSPITAL ENCOUNTER (OUTPATIENT)
Dept: RADIOLOGY | Facility: MEDICAL CENTER | Age: 60
End: 2025-07-25
Attending: NURSE PRACTITIONER
Payer: MEDICAID

## 2025-07-25 DIAGNOSIS — R05.3 CHRONIC COUGH: ICD-10-CM

## 2025-07-25 DIAGNOSIS — R91.8 LUNG NODULES: ICD-10-CM

## 2025-07-25 DIAGNOSIS — F17.200 CURRENT SMOKER: ICD-10-CM

## 2025-07-25 PROCEDURE — 71250 CT THORAX DX C-: CPT

## 2025-07-29 ENCOUNTER — HOSPITAL ENCOUNTER (OUTPATIENT)
Facility: MEDICAL CENTER | Age: 60
End: 2025-07-29
Attending: FAMILY MEDICINE
Payer: MEDICAID

## 2025-07-29 VITALS — BODY MASS INDEX: 25.71 KG/M2 | HEIGHT: 66 IN | WEIGHT: 160 LBS

## 2025-07-29 DIAGNOSIS — Z12.31 VISIT FOR SCREENING MAMMOGRAM: ICD-10-CM

## 2025-07-29 PROCEDURE — 77063 BREAST TOMOSYNTHESIS BI: CPT

## 2025-07-29 ASSESSMENT — FIBROSIS 4 INDEX: FIB4 SCORE: 1.05

## 2025-08-01 ENCOUNTER — RESULTS FOLLOW-UP (OUTPATIENT)
Dept: MEDICAL GROUP | Facility: MEDICAL CENTER | Age: 60
End: 2025-08-01
Payer: MEDICAID

## 2025-08-01 DIAGNOSIS — R25.2 SPASTICITY: ICD-10-CM

## 2025-08-01 RX ORDER — TIZANIDINE HYDROCHLORIDE 2 MG/1
2 CAPSULE, GELATIN COATED ORAL
Qty: 60 CAPSULE | Refills: 5 | Status: SHIPPED | OUTPATIENT
Start: 2025-08-01

## 2025-08-05 ENCOUNTER — OFFICE VISIT (OUTPATIENT)
Dept: SLEEP MEDICINE | Facility: MEDICAL CENTER | Age: 60
End: 2025-08-05
Attending: NURSE PRACTITIONER
Payer: MEDICAID

## 2025-08-05 VITALS
SYSTOLIC BLOOD PRESSURE: 114 MMHG | BODY MASS INDEX: 25.39 KG/M2 | DIASTOLIC BLOOD PRESSURE: 70 MMHG | OXYGEN SATURATION: 93 % | WEIGHT: 158 LBS | HEART RATE: 86 BPM | HEIGHT: 66 IN

## 2025-08-05 DIAGNOSIS — R91.8 LUNG NODULES: ICD-10-CM

## 2025-08-05 DIAGNOSIS — R05.3 CHRONIC COUGH: Chronic | ICD-10-CM

## 2025-08-05 DIAGNOSIS — J45.20 MILD INTERMITTENT ASTHMA WITHOUT COMPLICATION: Primary | Chronic | ICD-10-CM

## 2025-08-05 DIAGNOSIS — Z87.891 FORMER SMOKER: ICD-10-CM

## 2025-08-05 PROCEDURE — 3078F DIAST BP <80 MM HG: CPT | Performed by: NURSE PRACTITIONER

## 2025-08-05 PROCEDURE — 99212 OFFICE O/P EST SF 10 MIN: CPT | Performed by: NURSE PRACTITIONER

## 2025-08-05 PROCEDURE — 3074F SYST BP LT 130 MM HG: CPT | Performed by: NURSE PRACTITIONER

## 2025-08-05 PROCEDURE — 99213 OFFICE O/P EST LOW 20 MIN: CPT | Performed by: NURSE PRACTITIONER

## 2025-08-05 ASSESSMENT — FIBROSIS 4 INDEX: FIB4 SCORE: 1.05

## 2025-08-08 ENCOUNTER — HOSPITAL ENCOUNTER (OUTPATIENT)
Dept: LAB | Facility: MEDICAL CENTER | Age: 60
End: 2025-08-08
Attending: FAMILY MEDICINE
Payer: MEDICAID

## 2025-08-08 DIAGNOSIS — M79.604 BILATERAL LOWER EXTREMITY PAIN: ICD-10-CM

## 2025-08-08 DIAGNOSIS — M79.605 BILATERAL LOWER EXTREMITY PAIN: ICD-10-CM

## 2025-08-08 DIAGNOSIS — E78.00 HYPERCHOLESTEREMIA: ICD-10-CM

## 2025-08-08 DIAGNOSIS — Z13.1 SCREENING FOR DIABETES MELLITUS (DM): ICD-10-CM

## 2025-08-08 LAB
25(OH)D3 SERPL-MCNC: 48 NG/ML (ref 30–100)
ALBUMIN SERPL BCP-MCNC: 4.3 G/DL (ref 3.2–4.9)
ALP SERPL-CCNC: 86 U/L (ref 30–99)
ALT SERPL-CCNC: 24 U/L (ref 2–50)
ANION GAP SERPL CALC-SCNC: 13 MMOL/L (ref 7–16)
AST SERPL-CCNC: 21 U/L (ref 12–45)
BILIRUB CONJ SERPL-MCNC: <0.2 MG/DL (ref 0.1–0.5)
BILIRUB INDIRECT SERPL-MCNC: NORMAL MG/DL (ref 0–1)
BILIRUB SERPL-MCNC: 0.4 MG/DL (ref 0.1–1.5)
BUN SERPL-MCNC: 17 MG/DL (ref 8–22)
CALCIUM SERPL-MCNC: 9.7 MG/DL (ref 8.5–10.5)
CHLORIDE SERPL-SCNC: 102 MMOL/L (ref 96–112)
CHOLEST SERPL-MCNC: 280 MG/DL (ref 100–199)
CO2 SERPL-SCNC: 24 MMOL/L (ref 20–33)
CREAT SERPL-MCNC: 0.78 MG/DL (ref 0.5–1.4)
EST. AVERAGE GLUCOSE BLD GHB EST-MCNC: 105 MG/DL
GFR SERPLBLD CREATININE-BSD FMLA CKD-EPI: 87 ML/MIN/1.73 M 2
GLUCOSE SERPL-MCNC: 90 MG/DL (ref 65–99)
HAV IGM SERPL QL IA: NORMAL
HBA1C MFR BLD: 5.3 % (ref 4–5.6)
HBV CORE IGM SER QL: NORMAL
HBV SURFACE AG SER QL: NORMAL
HCV AB SER QL: NORMAL
HDLC SERPL-MCNC: 69 MG/DL
LDLC SERPL CALC-MCNC: 188 MG/DL
POTASSIUM SERPL-SCNC: 4.4 MMOL/L (ref 3.6–5.5)
PROT SERPL-MCNC: 7 G/DL (ref 6–8.2)
SODIUM SERPL-SCNC: 139 MMOL/L (ref 135–145)
TRIGL SERPL-MCNC: 114 MG/DL (ref 0–149)
TSH SERPL DL<=0.005 MIU/L-ACNC: 2.28 UIU/ML (ref 0.38–5.33)

## 2025-08-08 PROCEDURE — 80061 LIPID PANEL: CPT

## 2025-08-08 PROCEDURE — 82306 VITAMIN D 25 HYDROXY: CPT

## 2025-08-08 PROCEDURE — 80074 ACUTE HEPATITIS PANEL: CPT

## 2025-08-08 PROCEDURE — 36415 COLL VENOUS BLD VENIPUNCTURE: CPT

## 2025-08-08 PROCEDURE — 80048 BASIC METABOLIC PNL TOTAL CA: CPT

## 2025-08-08 PROCEDURE — 80076 HEPATIC FUNCTION PANEL: CPT

## 2025-08-08 PROCEDURE — 83036 HEMOGLOBIN GLYCOSYLATED A1C: CPT

## 2025-08-08 PROCEDURE — 84443 ASSAY THYROID STIM HORMONE: CPT

## 2025-08-12 ENCOUNTER — HOSPITAL ENCOUNTER (OUTPATIENT)
Dept: RADIOLOGY | Facility: MEDICAL CENTER | Age: 60
End: 2025-08-12
Attending: FAMILY MEDICINE
Payer: MEDICAID

## 2025-08-12 ENCOUNTER — RESULTS FOLLOW-UP (OUTPATIENT)
Dept: MEDICAL GROUP | Facility: MEDICAL CENTER | Age: 60
End: 2025-08-12

## 2025-08-12 DIAGNOSIS — M79.605 BILATERAL LOWER EXTREMITY PAIN: ICD-10-CM

## 2025-08-12 DIAGNOSIS — M79.604 BILATERAL LOWER EXTREMITY PAIN: ICD-10-CM

## 2025-08-12 PROCEDURE — 93922 UPR/L XTREMITY ART 2 LEVELS: CPT | Mod: 26 | Performed by: INTERNAL MEDICINE

## 2025-08-12 PROCEDURE — 93970 EXTREMITY STUDY: CPT

## 2025-08-12 PROCEDURE — 93922 UPR/L XTREMITY ART 2 LEVELS: CPT

## 2025-08-12 PROCEDURE — 93970 EXTREMITY STUDY: CPT | Mod: 26 | Performed by: INTERNAL MEDICINE

## 2025-08-19 ENCOUNTER — OFFICE VISIT (OUTPATIENT)
Dept: MEDICAL GROUP | Facility: MEDICAL CENTER | Age: 60
End: 2025-08-19
Attending: FAMILY MEDICINE
Payer: MEDICAID

## 2025-08-19 VITALS
OXYGEN SATURATION: 93 % | RESPIRATION RATE: 16 BRPM | HEART RATE: 91 BPM | TEMPERATURE: 98 F | HEIGHT: 66 IN | BODY MASS INDEX: 25.71 KG/M2 | DIASTOLIC BLOOD PRESSURE: 58 MMHG | SYSTOLIC BLOOD PRESSURE: 92 MMHG | WEIGHT: 160 LBS

## 2025-08-19 DIAGNOSIS — N31.9 NEUROGENIC BLADDER: ICD-10-CM

## 2025-08-19 DIAGNOSIS — M79.671 PAIN IN BOTH FEET: Primary | ICD-10-CM

## 2025-08-19 DIAGNOSIS — M79.672 FOOT PAIN, LEFT: ICD-10-CM

## 2025-08-19 DIAGNOSIS — M79.672 PAIN IN BOTH FEET: Primary | ICD-10-CM

## 2025-08-19 DIAGNOSIS — R25.2 SPASTICITY: ICD-10-CM

## 2025-08-19 DIAGNOSIS — S14.109A INJURY OF CERVICAL SPINAL CORD, INITIAL ENCOUNTER (HCC): ICD-10-CM

## 2025-08-19 DIAGNOSIS — R05.3 CHRONIC COUGH: ICD-10-CM

## 2025-08-19 DIAGNOSIS — J30.1 NON-SEASONAL ALLERGIC RHINITIS DUE TO POLLEN: ICD-10-CM

## 2025-08-19 DIAGNOSIS — E78.5 HYPERLIPIDEMIA, UNSPECIFIED HYPERLIPIDEMIA TYPE: ICD-10-CM

## 2025-08-19 PROCEDURE — 3074F SYST BP LT 130 MM HG: CPT | Performed by: FAMILY MEDICINE

## 2025-08-19 PROCEDURE — 99214 OFFICE O/P EST MOD 30 MIN: CPT | Performed by: FAMILY MEDICINE

## 2025-08-19 PROCEDURE — 3078F DIAST BP <80 MM HG: CPT | Performed by: FAMILY MEDICINE

## 2025-08-19 RX ORDER — BACLOFEN 10 MG/1
30 TABLET ORAL 2 TIMES DAILY
Qty: 180 TABLET | Refills: 3 | Status: SHIPPED | OUTPATIENT
Start: 2025-08-19

## 2025-08-19 RX ORDER — DIVALPROEX SODIUM 125 MG/1
125 TABLET, DELAYED RELEASE ORAL DAILY
COMMUNITY
Start: 2025-08-14

## 2025-08-19 RX ORDER — LIDOCAINE 50 MG/G
OINTMENT TOPICAL
Qty: 50 G | Refills: 2 | Status: SHIPPED | OUTPATIENT
Start: 2025-08-19

## 2025-08-19 RX ORDER — FLUTICASONE PROPIONATE 50 MCG
1 SPRAY, SUSPENSION (ML) NASAL DAILY
Qty: 16 G | Refills: 6 | Status: SHIPPED | OUTPATIENT
Start: 2025-08-19

## 2025-08-19 RX ORDER — FESOTERODINE FUMARATE 4 MG/1
4 TABLET, FILM COATED, EXTENDED RELEASE ORAL
Qty: 30 TABLET | Refills: 5 | Status: SHIPPED | OUTPATIENT
Start: 2025-08-19

## 2025-08-19 RX ORDER — TRAZODONE HYDROCHLORIDE 50 MG/1
25 TABLET ORAL NIGHTLY
COMMUNITY
Start: 2025-08-10

## 2025-08-19 RX ORDER — GABAPENTIN 300 MG/1
CAPSULE ORAL
Qty: 210 CAPSULE | Refills: 5 | Status: SHIPPED | OUTPATIENT
Start: 2025-08-19

## 2025-08-19 ASSESSMENT — LIFESTYLE VARIABLES
HOW MANY STANDARD DRINKS CONTAINING ALCOHOL DO YOU HAVE ON A TYPICAL DAY: PATIENT DOES NOT DRINK
AUDIT-C TOTAL SCORE: 0
HOW OFTEN DO YOU HAVE SIX OR MORE DRINKS ON ONE OCCASION: NEVER
SKIP TO QUESTIONS 9-10: 1
HOW OFTEN DO YOU HAVE A DRINK CONTAINING ALCOHOL: NEVER

## 2025-08-19 ASSESSMENT — FIBROSIS 4 INDEX: FIB4 SCORE: 1.01

## 2025-08-26 ENCOUNTER — RESULTS FOLLOW-UP (OUTPATIENT)
Dept: MEDICAL GROUP | Facility: MEDICAL CENTER | Age: 60
End: 2025-08-26
Payer: MEDICAID

## 2025-08-26 ENCOUNTER — HOSPITAL ENCOUNTER (OUTPATIENT)
Dept: LAB | Facility: MEDICAL CENTER | Age: 60
End: 2025-08-26
Attending: FAMILY MEDICINE
Payer: MEDICAID

## 2025-08-26 ENCOUNTER — HOSPITAL ENCOUNTER (OUTPATIENT)
Dept: RADIOLOGY | Facility: MEDICAL CENTER | Age: 60
End: 2025-08-26
Attending: FAMILY MEDICINE
Payer: MEDICAID

## 2025-08-26 DIAGNOSIS — M79.672 PAIN IN BOTH FEET: ICD-10-CM

## 2025-08-26 DIAGNOSIS — E78.5 HYPERLIPIDEMIA, UNSPECIFIED HYPERLIPIDEMIA TYPE: ICD-10-CM

## 2025-08-26 DIAGNOSIS — M79.671 PAIN IN BOTH FEET: ICD-10-CM

## 2025-08-26 DIAGNOSIS — E78.00 PURE HYPERCHOLESTEROLEMIA: Primary | ICD-10-CM

## 2025-08-26 LAB
CHOLEST SERPL-MCNC: 275 MG/DL (ref 100–199)
FASTING STATUS PATIENT QL REPORTED: NORMAL
HDLC SERPL-MCNC: 64 MG/DL
LDLC SERPL CALC-MCNC: 186 MG/DL
TRIGL SERPL-MCNC: 125 MG/DL (ref 0–149)

## 2025-08-26 PROCEDURE — 36415 COLL VENOUS BLD VENIPUNCTURE: CPT

## 2025-08-26 PROCEDURE — 80061 LIPID PANEL: CPT

## 2025-08-26 PROCEDURE — 73630 X-RAY EXAM OF FOOT: CPT | Mod: LT

## 2025-08-26 PROCEDURE — RXMED WILLOW AMBULATORY MEDICATION CHARGE: Performed by: NURSE PRACTITIONER

## 2025-08-26 RX ORDER — ATORVASTATIN CALCIUM 20 MG/1
20 TABLET, FILM COATED ORAL NIGHTLY
Qty: 90 TABLET | Refills: 3 | Status: SHIPPED | OUTPATIENT
Start: 2025-08-26

## 2025-08-28 ENCOUNTER — PHARMACY VISIT (OUTPATIENT)
Dept: PHARMACY | Facility: MEDICAL CENTER | Age: 60
End: 2025-08-28
Payer: COMMERCIAL

## (undated) DEVICE — CLOSURE SKIN STRIP 1/2 X 4 IN - (STERI STRIP) (50/BX 4BX/CA)

## (undated) DEVICE — CHLORAPREP 26 ML APPLICATOR - ORANGE TINT(25/CA)

## (undated) DEVICE — GLOVESZ 8.5 BIOGEL PI MICRO - PF LF (50PR/BX)

## (undated) DEVICE — CLIP APPLIER 10MM ENDO LARGE (3EA/BX)

## (undated) DEVICE — APPICATOR HEMOSTAT ARISTA XL - FLEXITIP (10EA/CA)

## (undated) DEVICE — LIGASURE LAPAROSCOPIC 5MM - (6EA/CA)

## (undated) DEVICE — TROCAR 5X100 NON BLADED Z-TH - READ KII (6/BX)

## (undated) DEVICE — GLOVE BIOGEL SZ 8 SURGICAL PF LTX - (50PR/BX 4BX/CA)

## (undated) DEVICE — SET EXTENSION WITH 2 PORTS (48EA/CA) ***PART #2C8610 IS A SUBSTITUTE*****

## (undated) DEVICE — DRAPE VAGINAL BIB W/ POUCH (10EA/CA)

## (undated) DEVICE — SUTURE CAPIO (12EA/BX)

## (undated) DEVICE — KIT ANESTHESIA W/CIRCUIT & 3/LT BAG W/FILTER (20EA/CA)

## (undated) DEVICE — GLOVE SZ 7 BIOGEL PI MICRO - PF LF (50PR/BX 4BX/CA)

## (undated) DEVICE — SUTURE GENERAL

## (undated) DEVICE — SET SUCTION/IRRIGATION WITH DISPOSABLE TIP (6/CA )PART #0250-070-520 IS A SUB

## (undated) DEVICE — SLEEVE, VASO, THIGH, MED

## (undated) DEVICE — KIT EVACUATER 3 SPRING PVC LF 1/8 DRAIN SIZE (10EA/CA)"

## (undated) DEVICE — PROTECTOR ULNA NERVE - (36PR/CA)

## (undated) DEVICE — SUCTION INSTRUMENT YANKAUER BULBOUS TIP W/O VENT (50EA/CA)

## (undated) DEVICE — GLOVE BIOGEL PI INDICATOR SZ 6.5 SURGICAL PF LF - (50/BX 4BX/CA)

## (undated) DEVICE — SENSOR SPO2 NEO LNCS ADHESIVE (20/BX) SEE USER NOTES

## (undated) DEVICE — TROCAR STEP 5MM - (3/CA)

## (undated) DEVICE — DRAPESURG STERI-DRAPE LONG - (10/BX 4BX/CA)

## (undated) DEVICE — CANISTER SUCTION 3000ML MECHANICAL FILTER AUTO SHUTOFF MEDI-VAC NONSTERILE LF DISP  (40EA/CA)

## (undated) DEVICE — BANDAID SHEER STRIP 3/4 IN (100EA/BX 12BX/CA)

## (undated) DEVICE — ELECTRODE DUAL RETURN W/ CORD - (50/PK)

## (undated) DEVICE — TUBE E-T HI-LO CUFF 7.0MM (10EA/PK)

## (undated) DEVICE — ARMREST CRADLE FOAM - (2PR/PK 12PR/CA)

## (undated) DEVICE — TOWEL STOP TIMEOUT SAFETY FLAG (40EA/CA)

## (undated) DEVICE — GOWN WARMING STANDARD FLEX - (30/CA)

## (undated) DEVICE — SYRINGE DISP. 50CC LS - (40/BX)

## (undated) DEVICE — TROCAR STEP 11MM - (3/CA)

## (undated) DEVICE — TROCAR Z THREAD12MM OPTICAL - NON BLADED (6/BX)

## (undated) DEVICE — GLOVE BIOGEL ECLIPSE PF LATEX SIZE 8.0  (50PR/BX)

## (undated) DEVICE — STERI STRIP COMPOUND BENZOIN - TINCTURE 0.6ML WITH APPLICATOR (40EA/BX)

## (undated) DEVICE — BLADE SURGICAL #11 - (50/BX)

## (undated) DEVICE — Device

## (undated) DEVICE — GLOVE SZ 6 BIOGEL PI MICRO - PF LF (50PR/BX 4BX/CA)

## (undated) DEVICE — LACTATED RINGERS INJ 1000 ML - (14EA/CA 60CA/PF)

## (undated) DEVICE — KIT ROOM DECONTAMINATION

## (undated) DEVICE — MASK ANESTHESIA ADULT  - (100/CA)

## (undated) DEVICE — ELECTRODE 850 FOAM ADHESIVE - HYDROGEL RADIOTRNSPRNT (50/PK)

## (undated) DEVICE — DISSECT TOOL MIDAS REX

## (undated) DEVICE — GLOVE SZ 6.5 BIOGEL PI MICRO - PF LF (50PR/BX)

## (undated) DEVICE — DRESSING ABDOMINAL PAD STERILE 8 X 10" (360EA/CA)"

## (undated) DEVICE — MIDAS LUBRICATOR DIFFUSER PACK (4EA/CA)

## (undated) DEVICE — NEEDLE SPINAL NON-SAFETY 18 GA X 3 IN (25EA/BX)

## (undated) DEVICE — SUTURE 3-0 VICRYL PLUS SH - 8X 18 INCH (12/BX)

## (undated) DEVICE — GLOVE BIOGEL INDICATOR SZ 6.5 SURGICAL PF LTX - (50PR/BX 4BX/CA)

## (undated) DEVICE — KIT SURGIFLO W/OUT THROMBIN - (6EA/CA)

## (undated) DEVICE — RESTRAINTS LIMB DISP. - (12/BX 4BX/CA)

## (undated) DEVICE — SET LEADWIRE 5 LEAD BEDSIDE DISPOSABLE ECG (1SET OF 5/EA)

## (undated) DEVICE — BIT DRILL MATCH HEAD MIDAS REX 15-A 2.2MM X 15CM

## (undated) DEVICE — DEVICE SUTURE CAPTURING

## (undated) DEVICE — HEAD HOLDER JUNIOR/ADULT

## (undated) DEVICE — PACK LOWER EXTREMITY - (2/CA)

## (undated) DEVICE — TRAY SRGPRP PVP IOD WT PRP - (20/CA)

## (undated) DEVICE — SUTURE 3-0 ETHILON PS-1 (36PK/BX)

## (undated) DEVICE — SUTURE 4-0 VICRYL PLUSFS-1 - 27 INCH (36/BX)

## (undated) DEVICE — BANDAID X-LARGE 2 X 4 IN LF (50EA/BX)

## (undated) DEVICE — NEEDLE INSUFFLATION FOR STEP - (12/BX)

## (undated) DEVICE — DRESSING 3X8 ADAPTIC GAUZE - NON-ADHERING (36/PK 6PK/BX)

## (undated) DEVICE — SODIUM CHL IRRIGATION 0.9% 1000ML (12EA/CA)

## (undated) DEVICE — PACK MINOR BASIN - (2EA/CA)

## (undated) DEVICE — MASK, LARYNGEAL AIRWAY #4

## (undated) DEVICE — PACK LAPAROSCOPY - (1/CA)

## (undated) DEVICE — CATHETER IV 20 GA X 1-1/4 ---SURG.& SDS ONLY--- (50EA/BX)

## (undated) DEVICE — TUBING SETDISPOS HIGH FLOW II - (10/BX)

## (undated) DEVICE — HEMOSTAT ARISTA PWD 5 GRAM - (5/BX)

## (undated) DEVICE — SUTURE 4-0 VICRYL PLUS FS-2 - 27 INCH (36/BX)

## (undated) DEVICE — MANIFOLD NEPTUNE 1 PORT (20/PK)

## (undated) DEVICE — BAG RETRIEVAL 10ML (10EA/BX)

## (undated) DEVICE — TUBE CONNECTING SUCTION - CLEAR PLASTIC STERILE 72 IN (50EA/CA)

## (undated) DEVICE — SPONGE PEANUT - (5/PK 50PK/CA)

## (undated) DEVICE — CANISTER SUCTION RIGID RED 1500CC (40EA/CA)

## (undated) DEVICE — GOWN SURGEONS X-LARGE - DISP. (30/CA)

## (undated) DEVICE — NEPTUNE 4 PORT MANIFOLD - (20/PK)

## (undated) DEVICE — TUBING CLEARLINK DUO-VENT - C-FLO (48EA/CA)

## (undated) DEVICE — SYRINGE 10 ML CONTROL LL (25EA/BX 4BX/CA)

## (undated) DEVICE — SUTURE 4-0 MONOCRYL PLUS PS-2 - 27 INCH (36/BX)

## (undated) DEVICE — PACK LAP CHOLE OR - (2EA/CA)

## (undated) DEVICE — SCISSORS 5MM CVD (6EA/BX)

## (undated) DEVICE — SYRINGE SAFETY 10 ML 18 GA X 1 1/2 BLUNT LL (100/BX 4BX/CA)

## (undated) DEVICE — SUTURE 0 VICRYL PLUS UR-6 - 27 INCH (36/BX)

## (undated) DEVICE — GLOVE BIOGEL INDICATOR SZ 8 SURGICAL PF LTX - (50/BX 4BX/CA)

## (undated) DEVICE — PADDING CAST 4 IN STERILE - 4 X 4 YDS (24/CA)

## (undated) DEVICE — TUBE CONNECT SUCTION CLEAR 120 X 1/4" (50EA/CA)"

## (undated) DEVICE — GLOVE BIOGEL SZ 6.5 SURGICAL PF LTX (50PR/BX 4BX/CA)

## (undated) DEVICE — SET IRRIGATION CYSTOSCOPY TUBE L80 IN (20EA/CA)

## (undated) DEVICE — GOWN SURGEONS LARGE - (32/CA)

## (undated) DEVICE — SET TUBING PNEUMOCLEAR HIGH FLOW SMOKE EVACUATION (10EA/BX)

## (undated) DEVICE — GLOVE BIOGEL PI INDICATOR SZ 7.5 SURGICAL PF LF -(50/BX 4BX/CA)

## (undated) DEVICE — SYRINGE DISP. 60 CC LL - (30/BX, 12BX/CA)**WHEN THESE ARE GONE ORDER #500206**

## (undated) DEVICE — BLADE SURGICAL #15 - (50/BX 3BX/CA)

## (undated) DEVICE — BOVIE BLADE COATED &INSULATED - 25/PK

## (undated) DEVICE — BANDAGE ELASTIC 4 HONEYCOMB - 4"X5YD LF (20/CA)"

## (undated) DEVICE — GLOVE BIOGEL PI ORTHO SZ 7 PF LF (40PR/BX)

## (undated) DEVICE — GLOVE BIOGEL ECLIPSE  PF LATEX SIZE 6.5 (50PR/BX)

## (undated) DEVICE — PAD LAP STERILE 18 X 18 - (5/PK 40PK/CA)

## (undated) DEVICE — WATER IRRIGATION STERILE 1000ML (12EA/CA)

## (undated) DEVICE — DERMABOND ADVANCED - (12EA/BX)

## (undated) DEVICE — KIT  I.V. START (100EA/CA)

## (undated) DEVICE — SCREW DISTRACTION 14MM YELLOW - STERILE (10EA/BX) (5TX4=20)

## (undated) DEVICE — TRAY FOLEY CATHETER STATLOCK 16FR SURESTEP  (10EA/CA)

## (undated) DEVICE — TUBING C&T SET FLYING LEADS DRAIN TUBING (10EA/BX)

## (undated) DEVICE — GLOVE BIOGEL PI INDICATOR SZ 8.0 SURGICAL PF LF -(50/BX 4BX/CA)

## (undated) DEVICE — INTRAOP NEURO IN OR 1:1 PER 15 MIN

## (undated) DEVICE — SUTURE 0 VICRYL PLUS CT-1 - 8 X 18 INCH (12/BX)

## (undated) DEVICE — TOURNIQUET CUFF 24 X 4 ONE PORT - STERILE (10/BX)

## (undated) DEVICE — LACTATED RINGERS INJ. 500 ML - (24EA/CA)

## (undated) DEVICE — CANNULA W/SEAL 5X100 Z-THRE - ADED KII (12/BX)

## (undated) DEVICE — SUTURE 2-0 VICRYL PLUS CT-1 36 (36PK/BX)"

## (undated) DEVICE — PAD SANITARY 11IN MAXI IND WRAPPED  (12EA/PK 24PK/CA)

## (undated) DEVICE — SHEET PEDIATRIC LAPAROTOMY - (10/CA)

## (undated) DEVICE — PACK NEURO - (2EA/CA)

## (undated) DEVICE — GLOVE BIOGEL PI ORTHO SZ 6 1/2 SURGICAL PF LF (40PR/BX)